# Patient Record
Sex: MALE | Race: WHITE | Employment: OTHER | URBAN - METROPOLITAN AREA
[De-identification: names, ages, dates, MRNs, and addresses within clinical notes are randomized per-mention and may not be internally consistent; named-entity substitution may affect disease eponyms.]

---

## 2017-10-09 ENCOUNTER — RECORDS - HEALTHEAST (OUTPATIENT)
Dept: LAB | Facility: CLINIC | Age: 77
End: 2017-10-09

## 2017-10-09 LAB
CHOLEST SERPL-MCNC: 140 MG/DL
FASTING STATUS PATIENT QL REPORTED: NORMAL
HDLC SERPL-MCNC: 55 MG/DL
LDLC SERPL CALC-MCNC: 72 MG/DL
TRIGL SERPL-MCNC: 63 MG/DL

## 2018-06-20 ENCOUNTER — RECORDS - HEALTHEAST (OUTPATIENT)
Dept: LAB | Facility: CLINIC | Age: 78
End: 2018-06-20

## 2018-06-21 LAB — BACTERIA SPEC CULT: NO GROWTH

## 2018-10-10 ENCOUNTER — RECORDS - HEALTHEAST (OUTPATIENT)
Dept: LAB | Facility: CLINIC | Age: 78
End: 2018-10-10

## 2018-10-11 LAB — BACTERIA SPEC CULT: NO GROWTH

## 2019-01-17 ENCOUNTER — RECORDS - HEALTHEAST (OUTPATIENT)
Dept: LAB | Facility: CLINIC | Age: 79
End: 2019-01-17

## 2019-01-17 LAB
ANION GAP SERPL CALCULATED.3IONS-SCNC: 13 MMOL/L (ref 5–18)
BUN SERPL-MCNC: 22 MG/DL (ref 8–28)
CALCIUM SERPL-MCNC: 9.9 MG/DL (ref 8.5–10.5)
CHLORIDE BLD-SCNC: 100 MMOL/L (ref 98–107)
CHOLEST SERPL-MCNC: 156 MG/DL
CO2 SERPL-SCNC: 25 MMOL/L (ref 22–31)
CREAT SERPL-MCNC: 0.85 MG/DL (ref 0.7–1.3)
FASTING STATUS PATIENT QL REPORTED: YES
GFR SERPL CREATININE-BSD FRML MDRD: >60 ML/MIN/1.73M2
GLUCOSE BLD-MCNC: 99 MG/DL (ref 70–125)
HDLC SERPL-MCNC: 61 MG/DL
LDLC SERPL CALC-MCNC: 80 MG/DL
POTASSIUM BLD-SCNC: 4.3 MMOL/L (ref 3.5–5)
SODIUM SERPL-SCNC: 138 MMOL/L (ref 136–145)
TRIGL SERPL-MCNC: 75 MG/DL

## 2019-05-16 ENCOUNTER — RECORDS - HEALTHEAST (OUTPATIENT)
Dept: LAB | Facility: CLINIC | Age: 79
End: 2019-05-16

## 2019-05-16 LAB
ALBUMIN SERPL-MCNC: 4.2 G/DL (ref 3.5–5)
ALP SERPL-CCNC: 81 U/L (ref 45–120)
ALT SERPL W P-5'-P-CCNC: <9 U/L (ref 0–45)
ANION GAP SERPL CALCULATED.3IONS-SCNC: 11 MMOL/L (ref 5–18)
AST SERPL W P-5'-P-CCNC: 24 U/L (ref 0–40)
BASOPHILS # BLD AUTO: 0.1 THOU/UL (ref 0–0.2)
BASOPHILS NFR BLD AUTO: 1 % (ref 0–2)
BILIRUB SERPL-MCNC: 0.9 MG/DL (ref 0–1)
BUN SERPL-MCNC: 23 MG/DL (ref 8–28)
CALCIUM SERPL-MCNC: 10.1 MG/DL (ref 8.5–10.5)
CHLORIDE BLD-SCNC: 104 MMOL/L (ref 98–107)
CHOLEST SERPL-MCNC: 161 MG/DL
CO2 SERPL-SCNC: 23 MMOL/L (ref 22–31)
CREAT SERPL-MCNC: 0.87 MG/DL (ref 0.7–1.3)
EOSINOPHIL # BLD AUTO: 0.2 THOU/UL (ref 0–0.4)
EOSINOPHIL NFR BLD AUTO: 3 % (ref 0–6)
ERYTHROCYTE [DISTWIDTH] IN BLOOD BY AUTOMATED COUNT: 12.8 % (ref 11–14.5)
FASTING STATUS PATIENT QL REPORTED: YES
GFR SERPL CREATININE-BSD FRML MDRD: >60 ML/MIN/1.73M2
GLUCOSE BLD-MCNC: 116 MG/DL (ref 70–125)
HCT VFR BLD AUTO: 47.7 % (ref 40–54)
HDLC SERPL-MCNC: 57 MG/DL
HGB BLD-MCNC: 15.7 G/DL (ref 14–18)
LDLC SERPL CALC-MCNC: 89 MG/DL
LYMPHOCYTES # BLD AUTO: 0.9 THOU/UL (ref 0.8–4.4)
LYMPHOCYTES NFR BLD AUTO: 16 % (ref 20–40)
MCH RBC QN AUTO: 31.5 PG (ref 27–34)
MCHC RBC AUTO-ENTMCNC: 32.9 G/DL (ref 32–36)
MCV RBC AUTO: 96 FL (ref 80–100)
MONOCYTES # BLD AUTO: 0.5 THOU/UL (ref 0–0.9)
MONOCYTES NFR BLD AUTO: 8 % (ref 2–10)
NEUTROPHILS # BLD AUTO: 3.9 THOU/UL (ref 2–7.7)
NEUTROPHILS NFR BLD AUTO: 71 % (ref 50–70)
PLATELET # BLD AUTO: 260 THOU/UL (ref 140–440)
PMV BLD AUTO: 10.1 FL (ref 8.5–12.5)
POTASSIUM BLD-SCNC: 3.9 MMOL/L (ref 3.5–5)
PROT SERPL-MCNC: 7.5 G/DL (ref 6–8)
RBC # BLD AUTO: 4.98 MILL/UL (ref 4.4–6.2)
SODIUM SERPL-SCNC: 138 MMOL/L (ref 136–145)
TRIGL SERPL-MCNC: 77 MG/DL
WBC: 5.6 THOU/UL (ref 4–11)

## 2019-07-23 ASSESSMENT — MIFFLIN-ST. JEOR: SCORE: 1629.32

## 2019-08-13 ENCOUNTER — TRANSFERRED RECORDS (OUTPATIENT)
Dept: HEALTH INFORMATION MANAGEMENT | Facility: CLINIC | Age: 79
End: 2019-08-13

## 2019-08-13 ENCOUNTER — RECORDS - HEALTHEAST (OUTPATIENT)
Dept: LAB | Facility: CLINIC | Age: 79
End: 2019-08-13

## 2019-08-13 LAB
ANION GAP SERPL CALCULATED.3IONS-SCNC: 10 MMOL/L (ref 5–18)
BUN SERPL-MCNC: 12 MG/DL (ref 8–28)
CALCIUM SERPL-MCNC: 9.8 MG/DL (ref 8.5–10.5)
CHLORIDE BLD-SCNC: 103 MMOL/L (ref 98–107)
CO2 SERPL-SCNC: 26 MMOL/L (ref 22–31)
CREAT SERPL-MCNC: 0.81 MG/DL (ref 0.7–1.3)
GFR SERPL CREATININE-BSD FRML MDRD: >60 ML/MIN/1.73M2
GLUCOSE BLD-MCNC: 110 MG/DL (ref 70–125)
POTASSIUM BLD-SCNC: 4.5 MMOL/L (ref 3.5–5)
SODIUM SERPL-SCNC: 139 MMOL/L (ref 136–145)
VIT B12 SERPL-MCNC: 608 PG/ML (ref 213–816)

## 2019-08-13 ASSESSMENT — MIFFLIN-ST. JEOR: SCORE: 1606.64

## 2019-08-15 ENCOUNTER — AMBULATORY - HEALTHEAST (OUTPATIENT)
Dept: OTHER | Facility: CLINIC | Age: 79
End: 2019-08-15

## 2019-08-15 ENCOUNTER — SURGERY - HEALTHEAST (OUTPATIENT)
Dept: SURGERY | Facility: CLINIC | Age: 79
End: 2019-08-15

## 2019-08-15 ENCOUNTER — ANESTHESIA - HEALTHEAST (OUTPATIENT)
Dept: SURGERY | Facility: CLINIC | Age: 79
End: 2019-08-15

## 2019-08-20 ENCOUNTER — RECORDS - HEALTHEAST (OUTPATIENT)
Dept: LAB | Facility: CLINIC | Age: 79
End: 2019-08-20

## 2019-08-22 LAB — BACTERIA SPEC CULT: NO GROWTH

## 2020-01-27 ENCOUNTER — RECORDS - HEALTHEAST (OUTPATIENT)
Dept: LAB | Facility: CLINIC | Age: 80
End: 2020-01-27

## 2020-01-27 LAB
ALBUMIN SERPL-MCNC: 3.9 G/DL (ref 3.5–5)
ALP SERPL-CCNC: 78 U/L (ref 45–120)
ALT SERPL W P-5'-P-CCNC: <9 U/L (ref 0–45)
ANION GAP SERPL CALCULATED.3IONS-SCNC: 8 MMOL/L (ref 5–18)
AST SERPL W P-5'-P-CCNC: 23 U/L (ref 0–40)
BILIRUB SERPL-MCNC: 0.7 MG/DL (ref 0–1)
BUN SERPL-MCNC: 17 MG/DL (ref 8–28)
CALCIUM SERPL-MCNC: 9.5 MG/DL (ref 8.5–10.5)
CHLORIDE BLD-SCNC: 104 MMOL/L (ref 98–107)
CO2 SERPL-SCNC: 25 MMOL/L (ref 22–31)
CREAT SERPL-MCNC: 0.86 MG/DL (ref 0.7–1.3)
ERYTHROCYTE [DISTWIDTH] IN BLOOD BY AUTOMATED COUNT: 13.2 % (ref 11–14.5)
GFR SERPL CREATININE-BSD FRML MDRD: >60 ML/MIN/1.73M2
GLUCOSE BLD-MCNC: 119 MG/DL (ref 70–125)
HCT VFR BLD AUTO: 45 % (ref 40–54)
HGB BLD-MCNC: 14.8 G/DL (ref 14–18)
MCH RBC QN AUTO: 32.2 PG (ref 27–34)
MCHC RBC AUTO-ENTMCNC: 32.9 G/DL (ref 32–36)
MCV RBC AUTO: 98 FL (ref 80–100)
PLATELET # BLD AUTO: 238 THOU/UL (ref 140–440)
PMV BLD AUTO: 10.3 FL (ref 8.5–12.5)
POTASSIUM BLD-SCNC: 4.1 MMOL/L (ref 3.5–5)
PROT SERPL-MCNC: 6.8 G/DL (ref 6–8)
RBC # BLD AUTO: 4.59 MILL/UL (ref 4.4–6.2)
SODIUM SERPL-SCNC: 137 MMOL/L (ref 136–145)
WBC: 6 THOU/UL (ref 4–11)

## 2020-03-24 ASSESSMENT — MIFFLIN-ST. JEOR: SCORE: 1640.21

## 2020-03-25 ENCOUNTER — SURGERY - HEALTHEAST (OUTPATIENT)
Dept: CARDIOLOGY | Facility: CLINIC | Age: 80
End: 2020-03-25

## 2020-03-26 ASSESSMENT — MIFFLIN-ST. JEOR: SCORE: 1615.72

## 2020-03-27 ENCOUNTER — AMBULATORY - HEALTHEAST (OUTPATIENT)
Dept: CARE COORDINATION | Facility: CLINIC | Age: 80
End: 2020-03-27

## 2020-03-27 ENCOUNTER — COMMUNICATION - HEALTHEAST (OUTPATIENT)
Dept: NURSING | Facility: CLINIC | Age: 80
End: 2020-03-27

## 2020-03-27 DIAGNOSIS — Z95.5 S/P DRUG ELUTING CORONARY STENT PLACEMENT: ICD-10-CM

## 2020-03-30 ENCOUNTER — RECORDS - HEALTHEAST (OUTPATIENT)
Dept: ADMINISTRATIVE | Facility: OTHER | Age: 80
End: 2020-03-30

## 2020-03-30 ENCOUNTER — AMBULATORY - HEALTHEAST (OUTPATIENT)
Dept: CARDIOLOGY | Facility: CLINIC | Age: 80
End: 2020-03-30

## 2020-03-31 ENCOUNTER — COMMUNICATION - HEALTHEAST (OUTPATIENT)
Dept: CARDIOLOGY | Facility: CLINIC | Age: 80
End: 2020-03-31

## 2020-04-01 ENCOUNTER — AMBULATORY - HEALTHEAST (OUTPATIENT)
Dept: CARDIOLOGY | Facility: CLINIC | Age: 80
End: 2020-04-01

## 2020-04-01 ENCOUNTER — OFFICE VISIT - HEALTHEAST (OUTPATIENT)
Dept: CARDIOLOGY | Facility: CLINIC | Age: 80
End: 2020-04-01

## 2020-04-01 DIAGNOSIS — I50.41 ACUTE COMBINED SYSTOLIC AND DIASTOLIC CONGESTIVE HEART FAILURE (H): ICD-10-CM

## 2020-04-01 DIAGNOSIS — I44.7 LBBB (LEFT BUNDLE BRANCH BLOCK): ICD-10-CM

## 2020-04-01 DIAGNOSIS — I25.10 CORONARY ARTERY DISEASE DUE TO LIPID RICH PLAQUE: ICD-10-CM

## 2020-04-01 DIAGNOSIS — Z95.0 CARDIAC PACEMAKER IN SITU: ICD-10-CM

## 2020-04-01 DIAGNOSIS — I44.2 COMPLETE HEART BLOCK (H): ICD-10-CM

## 2020-04-01 DIAGNOSIS — I27.20 PULMONARY HYPERTENSION (H): ICD-10-CM

## 2020-04-01 DIAGNOSIS — I35.1 NONRHEUMATIC AORTIC VALVE INSUFFICIENCY: ICD-10-CM

## 2020-04-01 DIAGNOSIS — I25.83 CORONARY ARTERY DISEASE DUE TO LIPID RICH PLAQUE: ICD-10-CM

## 2020-04-01 DIAGNOSIS — I10 BENIGN HYPERTENSION: ICD-10-CM

## 2020-04-01 LAB
HCC DEVICE COMMENTS: NORMAL
HCC DEVICE IMPLANTING PROVIDER: NORMAL
HCC DEVICE MANUFACTURE: NORMAL
HCC DEVICE MODEL: NORMAL
HCC DEVICE SERIAL NUMBER: NORMAL
HCC DEVICE TYPE: NORMAL

## 2020-04-01 ASSESSMENT — MIFFLIN-ST. JEOR: SCORE: 1611.18

## 2020-04-03 ENCOUNTER — COMMUNICATION - HEALTHEAST (OUTPATIENT)
Dept: CARE COORDINATION | Facility: CLINIC | Age: 80
End: 2020-04-03

## 2020-04-03 ASSESSMENT — ACTIVITIES OF DAILY LIVING (ADL): DEPENDENT_IADLS:: INDEPENDENT

## 2020-04-06 ENCOUNTER — RECORDS - HEALTHEAST (OUTPATIENT)
Dept: LAB | Facility: CLINIC | Age: 80
End: 2020-04-06

## 2020-04-06 LAB
ANION GAP SERPL CALCULATED.3IONS-SCNC: 12 MMOL/L (ref 5–18)
BUN SERPL-MCNC: 14 MG/DL (ref 8–28)
CALCIUM SERPL-MCNC: 9.8 MG/DL (ref 8.5–10.5)
CHLORIDE BLD-SCNC: 101 MMOL/L (ref 98–107)
CO2 SERPL-SCNC: 25 MMOL/L (ref 22–31)
CREAT SERPL-MCNC: 0.98 MG/DL (ref 0.7–1.3)
GFR SERPL CREATININE-BSD FRML MDRD: >60 ML/MIN/1.73M2
GLUCOSE BLD-MCNC: 151 MG/DL (ref 70–125)
POTASSIUM BLD-SCNC: 4.1 MMOL/L (ref 3.5–5)
SODIUM SERPL-SCNC: 138 MMOL/L (ref 136–145)

## 2020-04-07 LAB — BACTERIA SPEC CULT: NO GROWTH

## 2020-04-14 ENCOUNTER — COMMUNICATION - HEALTHEAST (OUTPATIENT)
Dept: NURSING | Facility: CLINIC | Age: 80
End: 2020-04-14

## 2020-04-21 ENCOUNTER — COMMUNICATION - HEALTHEAST (OUTPATIENT)
Dept: NURSING | Facility: CLINIC | Age: 80
End: 2020-04-21

## 2020-04-28 ENCOUNTER — AMBULATORY - HEALTHEAST (OUTPATIENT)
Dept: CARDIOLOGY | Facility: CLINIC | Age: 80
End: 2020-04-28

## 2020-04-28 ENCOUNTER — COMMUNICATION - HEALTHEAST (OUTPATIENT)
Dept: CARDIOLOGY | Facility: CLINIC | Age: 80
End: 2020-04-28

## 2020-04-28 DIAGNOSIS — Z95.0 CARDIAC PACEMAKER IN SITU: ICD-10-CM

## 2020-04-28 DIAGNOSIS — I44.2 ATRIOVENTRICULAR BLOCK, COMPLETE (H): ICD-10-CM

## 2020-05-04 ENCOUNTER — COMMUNICATION - HEALTHEAST (OUTPATIENT)
Dept: CARE COORDINATION | Facility: CLINIC | Age: 80
End: 2020-05-04

## 2020-05-18 ENCOUNTER — COMMUNICATION - HEALTHEAST (OUTPATIENT)
Dept: NURSING | Facility: CLINIC | Age: 80
End: 2020-05-18

## 2020-06-02 ENCOUNTER — COMMUNICATION - HEALTHEAST (OUTPATIENT)
Dept: NURSING | Facility: CLINIC | Age: 80
End: 2020-06-02

## 2020-06-03 ENCOUNTER — COMMUNICATION - HEALTHEAST (OUTPATIENT)
Dept: CARE COORDINATION | Facility: CLINIC | Age: 80
End: 2020-06-03

## 2020-06-16 ENCOUNTER — COMMUNICATION - HEALTHEAST (OUTPATIENT)
Dept: NURSING | Facility: CLINIC | Age: 80
End: 2020-06-16

## 2020-06-26 ENCOUNTER — COMMUNICATION - HEALTHEAST (OUTPATIENT)
Dept: CARE COORDINATION | Facility: CLINIC | Age: 80
End: 2020-06-26

## 2020-06-29 ENCOUNTER — COMMUNICATION - HEALTHEAST (OUTPATIENT)
Dept: CARDIOLOGY | Facility: CLINIC | Age: 80
End: 2020-06-29

## 2020-06-30 ENCOUNTER — COMMUNICATION - HEALTHEAST (OUTPATIENT)
Dept: CARDIOLOGY | Facility: CLINIC | Age: 80
End: 2020-06-30

## 2020-06-30 ENCOUNTER — AMBULATORY - HEALTHEAST (OUTPATIENT)
Dept: CARDIOLOGY | Facility: CLINIC | Age: 80
End: 2020-06-30

## 2020-06-30 ENCOUNTER — COMMUNICATION - HEALTHEAST (OUTPATIENT)
Dept: TELEHEALTH | Facility: CLINIC | Age: 80
End: 2020-06-30

## 2020-06-30 DIAGNOSIS — Z95.0 BIVENTRICULAR CARDIAC PACEMAKER IN SITU: ICD-10-CM

## 2020-06-30 DIAGNOSIS — R00.1 BRADYCARDIA: ICD-10-CM

## 2020-09-01 ENCOUNTER — HOSPITAL ENCOUNTER (OUTPATIENT)
Dept: CARDIOLOGY | Facility: CLINIC | Age: 80
Discharge: HOME | End: 2020-09-01
Attending: INTERNAL MEDICINE

## 2020-09-01 DIAGNOSIS — I35.1 NONRHEUMATIC AORTIC VALVE INSUFFICIENCY: ICD-10-CM

## 2020-09-01 DIAGNOSIS — I27.20 PULMONARY HYPERTENSION (H): ICD-10-CM

## 2020-09-01 LAB
AORTIC ROOT: 4 CM
AORTIC VALVE MEAN VELOCITY: 88.9 CM/S
AR DECEL SLOPE: 2700 MM/S2
AR PEAK VELOCITY: 385 CM/S
ASCENDING AORTA: 4.1 CM
AV DIMENSIONLESS INDEX VTI: 0.7
AV MEAN GRADIENT: 3 MMHG
AV PEAK GRADIENT: 5.7 MMHG
AV REGURGITANT PEAK GRADIENT: 59.3 MMHG
AV REGURGITATION PRESSURE HALF TIME: 420 MS
AV VALVE AREA: 3.2 CM2
AV VELOCITY RATIO: 0.7
BSA FOR ECHO PROCEDURE: 2.1 M2
CV BLOOD PRESSURE: ABNORMAL MMHG
CV ECHO HEIGHT: 71 IN
CV ECHO WEIGHT: 195 LBS
DOP CALC AO PEAK VEL: 119 CM/S
DOP CALC AO VTI: 23 CM
DOP CALC LVOT AREA: 4.52 CM2
DOP CALC LVOT DIAMETER: 2.4 CM
DOP CALC LVOT PEAK VEL: 88.4 CM/S
DOP CALC LVOT STROKE VOLUME: 73.7 CM3
DOP CALCLVOT PEAK VEL VTI: 16.3 CM
EJECTION FRACTION: 46 % (ref 55–75)
FRACTIONAL SHORTENING: 19 % (ref 28–44)
INTERVENTRICULAR SEPTUM IN END DIASTOLE: 2.16 CM (ref 0.6–1)
IVS/PW RATIO: 0.9
LA AREA 1: 24.9 CM2
LA AREA 2: 31.7 CM2
LEFT ATRIUM LENGTH: 6.17 CM
LEFT ATRIUM SIZE: 4.8 CM
LEFT ATRIUM TO AORTIC ROOT RATIO: 1.2 NO UNITS
LEFT ATRIUM VOLUME INDEX: 51.8 ML/M2
LEFT ATRIUM VOLUME: 108.7 ML
LEFT VENTRICLE CARDIAC INDEX: 2.2 L/MIN/M2
LEFT VENTRICLE CARDIAC OUTPUT: 4.7 L/MIN
LEFT VENTRICLE DIASTOLIC VOLUME INDEX: 69 CM3/M2 (ref 34–74)
LEFT VENTRICLE DIASTOLIC VOLUME: 145 CM3 (ref 62–150)
LEFT VENTRICLE HEART RATE: 64 BPM
LEFT VENTRICLE MASS INDEX: 289.8 G/M2
LEFT VENTRICLE SYSTOLIC VOLUME INDEX: 37.1 CM3/M2 (ref 11–31)
LEFT VENTRICLE SYSTOLIC VOLUME: 78 CM3 (ref 21–61)
LEFT VENTRICULAR INTERNAL DIMENSION IN DIASTOLE: 4.95 CM (ref 4.2–5.8)
LEFT VENTRICULAR INTERNAL DIMENSION IN SYSTOLE: 4.01 CM (ref 2.5–4)
LEFT VENTRICULAR MASS: 608.5 G
LEFT VENTRICULAR OUTFLOW TRACT MEAN GRADIENT: 2 MMHG
LEFT VENTRICULAR OUTFLOW TRACT MEAN VELOCITY: 60.5 CM/S
LEFT VENTRICULAR OUTFLOW TRACT PEAK GRADIENT: 3 MMHG
LEFT VENTRICULAR POSTERIOR WALL IN END DIASTOLE: 2.37 CM (ref 0.6–1)
LV STROKE VOLUME INDEX: 35.1 ML/M2
NUC REST DIASTOLIC VOLUME INDEX: 3120 LBS
NUC REST SYSTOLIC VOLUME INDEX: 71 IN
PR MAX PG: 10 MMHG
PR PEAK VELOCITY: 160 CM/S
TRICUSPID REGURGITATION PEAK PRESSURE GRADIENT: 43.3 MMHG
TRICUSPID VALVE ANULAR PLANE SYSTOLIC EXCURSION: 1.9 CM
TRICUSPID VALVE PEAK REGURGITANT VELOCITY: 329 CM/S

## 2020-09-01 ASSESSMENT — MIFFLIN-ST. JEOR: SCORE: 1606.64

## 2020-09-09 ENCOUNTER — COMMUNICATION - HEALTHEAST (OUTPATIENT)
Dept: CARDIOLOGY | Facility: CLINIC | Age: 80
End: 2020-09-09

## 2020-09-11 ENCOUNTER — COMMUNICATION - HEALTHEAST (OUTPATIENT)
Dept: CARDIOLOGY | Facility: CLINIC | Age: 80
End: 2020-09-11

## 2020-09-28 ENCOUNTER — AMBULATORY - HEALTHEAST (OUTPATIENT)
Dept: CARDIOLOGY | Facility: CLINIC | Age: 80
End: 2020-09-28

## 2020-09-28 DIAGNOSIS — I44.2 ATRIOVENTRICULAR BLOCK, COMPLETE (H): ICD-10-CM

## 2020-09-28 DIAGNOSIS — Z95.0 CARDIAC PACEMAKER IN SITU: ICD-10-CM

## 2020-09-28 DIAGNOSIS — R00.1 BRADYCARDIA: ICD-10-CM

## 2020-09-28 DIAGNOSIS — I44.2 COMPLETE HEART BLOCK (H): ICD-10-CM

## 2020-09-29 ENCOUNTER — AMBULATORY - HEALTHEAST (OUTPATIENT)
Dept: CARDIOLOGY | Facility: CLINIC | Age: 80
End: 2020-09-29

## 2020-09-29 DIAGNOSIS — I44.2 COMPLETE HEART BLOCK (H): ICD-10-CM

## 2020-09-29 DIAGNOSIS — Z95.0 CARDIAC PACEMAKER IN SITU: ICD-10-CM

## 2020-09-29 DIAGNOSIS — R00.1 BRADYCARDIA: ICD-10-CM

## 2020-09-29 DIAGNOSIS — I44.7 LBBB (LEFT BUNDLE BRANCH BLOCK): ICD-10-CM

## 2020-09-30 ENCOUNTER — COMMUNICATION - HEALTHEAST (OUTPATIENT)
Dept: CARDIOLOGY | Facility: CLINIC | Age: 80
End: 2020-09-30

## 2020-09-30 DIAGNOSIS — I51.7 LEFT VENTRICULAR HYPERTROPHY: ICD-10-CM

## 2020-09-30 DIAGNOSIS — I50.41 ACUTE COMBINED SYSTOLIC AND DIASTOLIC CONGESTIVE HEART FAILURE (H): ICD-10-CM

## 2020-09-30 DIAGNOSIS — I47.29 NSVT (NONSUSTAINED VENTRICULAR TACHYCARDIA) (H): ICD-10-CM

## 2020-10-07 ENCOUNTER — HOSPITAL ENCOUNTER (OUTPATIENT)
Dept: NUCLEAR MEDICINE | Facility: CLINIC | Age: 80
Discharge: HOME | End: 2020-10-07
Attending: INTERNAL MEDICINE

## 2020-10-07 ENCOUNTER — HOSPITAL ENCOUNTER (OUTPATIENT)
Dept: CARDIOLOGY | Facility: CLINIC | Age: 80
Discharge: HOME | End: 2020-10-07
Attending: INTERNAL MEDICINE

## 2020-10-07 DIAGNOSIS — I50.41 ACUTE COMBINED SYSTOLIC AND DIASTOLIC CONGESTIVE HEART FAILURE (H): ICD-10-CM

## 2020-10-07 DIAGNOSIS — I47.29 NSVT (NONSUSTAINED VENTRICULAR TACHYCARDIA) (H): ICD-10-CM

## 2020-10-07 LAB
CV STRESS CURRENT BP HE: NORMAL
CV STRESS CURRENT HR HE: 74
CV STRESS CURRENT HR HE: 76
CV STRESS CURRENT HR HE: 77
CV STRESS CURRENT HR HE: 77
CV STRESS CURRENT HR HE: 78
CV STRESS CURRENT HR HE: 78
CV STRESS CURRENT HR HE: 79
CV STRESS CURRENT HR HE: 81
CV STRESS CURRENT HR HE: 82
CV STRESS CURRENT HR HE: 83
CV STRESS CURRENT HR HE: 83
CV STRESS CURRENT HR HE: 84
CV STRESS CURRENT HR HE: 84
CV STRESS DEVIATION TIME HE: NORMAL
CV STRESS ECHO PERCENT HR HE: NORMAL
CV STRESS EXERCISE STAGE HE: NORMAL
CV STRESS FINAL RESTING BP HE: NORMAL
CV STRESS FINAL RESTING HR HE: 79
CV STRESS MAX HR HE: 86
CV STRESS MAX TREADMILL GRADE HE: 0
CV STRESS MAX TREADMILL SPEED HE: 0
CV STRESS PEAK DIA BP HE: NORMAL
CV STRESS PEAK SYS BP HE: NORMAL
CV STRESS PHASE HE: NORMAL
CV STRESS PROTOCOL HE: NORMAL
CV STRESS RESTING PT POSITION HE: NORMAL
CV STRESS ST DEVIATION AMOUNT HE: NORMAL
CV STRESS ST DEVIATION ELEVATION HE: NORMAL
CV STRESS ST EVELATION AMOUNT HE: NORMAL
CV STRESS TEST TYPE HE: NORMAL
CV STRESS TOTAL STAGE TIME MIN 1 HE: NORMAL
NUC STRESS EJECTION FRACTION: 32 %
RATE PRESSURE PRODUCT: NORMAL
STRESS ECHO BASELINE DIASTOLIC HE: 73
STRESS ECHO BASELINE HR: 71
STRESS ECHO BASELINE SYSTOLIC BP: 148
STRESS ECHO CALCULATED PERCENT HR: 62 %
STRESS ECHO LAST STRESS DIASTOLIC BP: 67
STRESS ECHO LAST STRESS HR: 79
STRESS ECHO LAST STRESS SYSTOLIC BP: 155
STRESS ECHO TARGET HR: 139
STRESS/REST PERFUSION RATIO: 1.31

## 2020-10-13 ENCOUNTER — AMBULATORY - HEALTHEAST (OUTPATIENT)
Dept: CARDIOLOGY | Facility: CLINIC | Age: 80
End: 2020-10-13

## 2020-10-13 DIAGNOSIS — I25.10 CORONARY ARTERY DISEASE DUE TO LIPID RICH PLAQUE: ICD-10-CM

## 2020-10-13 DIAGNOSIS — I25.83 CORONARY ARTERY DISEASE DUE TO LIPID RICH PLAQUE: ICD-10-CM

## 2020-10-16 ENCOUNTER — RECORDS - HEALTHEAST (OUTPATIENT)
Dept: LAB | Facility: CLINIC | Age: 80
End: 2020-10-16

## 2020-10-16 LAB
ANION GAP SERPL CALCULATED.3IONS-SCNC: 11 MMOL/L (ref 5–18)
BUN SERPL-MCNC: 17 MG/DL (ref 8–28)
CALCIUM SERPL-MCNC: 9.6 MG/DL (ref 8.5–10.5)
CHLORIDE BLD-SCNC: 103 MMOL/L (ref 98–107)
CO2 SERPL-SCNC: 26 MMOL/L (ref 22–31)
CREAT SERPL-MCNC: 0.84 MG/DL (ref 0.7–1.3)
GFR SERPL CREATININE-BSD FRML MDRD: >60 ML/MIN/1.73M2
GLUCOSE BLD-MCNC: 106 MG/DL (ref 70–125)
POTASSIUM BLD-SCNC: 4.2 MMOL/L (ref 3.5–5)
SODIUM SERPL-SCNC: 140 MMOL/L (ref 136–145)

## 2020-10-20 ENCOUNTER — COMMUNICATION - HEALTHEAST (OUTPATIENT)
Dept: CARDIOLOGY | Facility: CLINIC | Age: 80
End: 2020-10-20

## 2020-10-20 ENCOUNTER — AMBULATORY - HEALTHEAST (OUTPATIENT)
Dept: LAB | Facility: CLINIC | Age: 80
End: 2020-10-20

## 2020-10-20 ENCOUNTER — AMBULATORY - HEALTHEAST (OUTPATIENT)
Dept: CARDIOLOGY | Facility: CLINIC | Age: 80
End: 2020-10-20

## 2020-10-20 DIAGNOSIS — Z11.59 ENCOUNTER FOR SCREENING FOR OTHER VIRAL DISEASES: ICD-10-CM

## 2020-10-21 ENCOUNTER — SURGERY - HEALTHEAST (OUTPATIENT)
Dept: CARDIOLOGY | Facility: CLINIC | Age: 80
End: 2020-10-21

## 2020-10-21 DIAGNOSIS — R94.39 ABNORMAL NUCLEAR STRESS TEST: ICD-10-CM

## 2020-10-21 DIAGNOSIS — I25.10 CAD (CORONARY ARTERY DISEASE): ICD-10-CM

## 2020-10-22 ENCOUNTER — COMMUNICATION - HEALTHEAST (OUTPATIENT)
Dept: SCHEDULING | Facility: CLINIC | Age: 80
End: 2020-10-22

## 2020-10-23 ENCOUNTER — RECORDS - HEALTHEAST (OUTPATIENT)
Dept: ADMINISTRATIVE | Facility: OTHER | Age: 80
End: 2020-10-23

## 2020-10-23 ENCOUNTER — SURGERY - HEALTHEAST (OUTPATIENT)
Dept: CARDIOLOGY | Facility: CLINIC | Age: 80
End: 2020-10-23

## 2020-10-23 ASSESSMENT — MIFFLIN-ST. JEOR: SCORE: 1574.89

## 2020-12-30 ENCOUNTER — AMBULATORY - HEALTHEAST (OUTPATIENT)
Dept: CARDIOLOGY | Facility: CLINIC | Age: 80
End: 2020-12-30

## 2020-12-30 DIAGNOSIS — Z95.0 CARDIAC PACEMAKER IN SITU: ICD-10-CM

## 2020-12-30 DIAGNOSIS — I44.7 LBBB (LEFT BUNDLE BRANCH BLOCK): ICD-10-CM

## 2020-12-30 DIAGNOSIS — R00.1 BRADYCARDIA: ICD-10-CM

## 2021-01-27 ENCOUNTER — RECORDS - HEALTHEAST (OUTPATIENT)
Dept: LAB | Facility: CLINIC | Age: 81
End: 2021-01-27

## 2021-01-29 LAB — BACTERIA SPEC CULT: NO GROWTH

## 2021-02-16 ENCOUNTER — AMBULATORY - HEALTHEAST (OUTPATIENT)
Dept: NURSING | Facility: CLINIC | Age: 81
End: 2021-02-16

## 2021-02-16 ENCOUNTER — COMMUNICATION - HEALTHEAST (OUTPATIENT)
Dept: NURSING | Facility: CLINIC | Age: 81
End: 2021-02-16

## 2021-02-16 DIAGNOSIS — U07.1 2019 NOVEL CORONAVIRUS DISEASE (COVID-19): ICD-10-CM

## 2021-02-17 ENCOUNTER — COMMUNICATION - HEALTHEAST (OUTPATIENT)
Dept: NURSING | Facility: CLINIC | Age: 81
End: 2021-02-17

## 2021-03-30 ENCOUNTER — AMBULATORY - HEALTHEAST (OUTPATIENT)
Dept: CARDIOLOGY | Facility: CLINIC | Age: 81
End: 2021-03-30

## 2021-03-30 DIAGNOSIS — I44.7 LBBB (LEFT BUNDLE BRANCH BLOCK): ICD-10-CM

## 2021-03-30 DIAGNOSIS — Z95.0 CARDIAC PACEMAKER IN SITU: ICD-10-CM

## 2021-04-09 ENCOUNTER — COMMUNICATION - HEALTHEAST (OUTPATIENT)
Dept: CARDIOLOGY | Facility: CLINIC | Age: 81
End: 2021-04-09

## 2021-04-10 ENCOUNTER — AMBULATORY - HEALTHEAST (OUTPATIENT)
Dept: NURSING | Facility: CLINIC | Age: 81
End: 2021-04-10

## 2021-04-15 ENCOUNTER — COMMUNICATION - HEALTHEAST (OUTPATIENT)
Dept: CARDIOLOGY | Facility: CLINIC | Age: 81
End: 2021-04-15

## 2021-04-15 ENCOUNTER — AMBULATORY - HEALTHEAST (OUTPATIENT)
Dept: CARDIOLOGY | Facility: CLINIC | Age: 81
End: 2021-04-15

## 2021-04-15 DIAGNOSIS — I44.2 COMPLETE HEART BLOCK (H): ICD-10-CM

## 2021-04-15 DIAGNOSIS — I50.41 ACUTE COMBINED SYSTOLIC AND DIASTOLIC CONGESTIVE HEART FAILURE (H): ICD-10-CM

## 2021-04-15 DIAGNOSIS — I50.42 CHRONIC COMBINED SYSTOLIC AND DIASTOLIC HEART FAILURE (H): ICD-10-CM

## 2021-04-15 DIAGNOSIS — Z95.0 CARDIAC PACEMAKER IN SITU: ICD-10-CM

## 2021-04-29 ENCOUNTER — AMBULATORY - HEALTHEAST (OUTPATIENT)
Dept: LAB | Facility: CLINIC | Age: 81
End: 2021-04-29

## 2021-04-29 DIAGNOSIS — I50.42 CHRONIC COMBINED SYSTOLIC AND DIASTOLIC HEART FAILURE (H): ICD-10-CM

## 2021-04-29 LAB
ANION GAP SERPL CALCULATED.3IONS-SCNC: 8 MMOL/L (ref 5–18)
BUN SERPL-MCNC: 19 MG/DL (ref 8–28)
CALCIUM SERPL-MCNC: 9.4 MG/DL (ref 8.5–10.5)
CHLORIDE BLD-SCNC: 104 MMOL/L (ref 98–107)
CO2 SERPL-SCNC: 28 MMOL/L (ref 22–31)
CREAT SERPL-MCNC: 0.88 MG/DL (ref 0.7–1.3)
GFR SERPL CREATININE-BSD FRML MDRD: >60 ML/MIN/1.73M2
GLUCOSE BLD-MCNC: 112 MG/DL (ref 70–125)
MAGNESIUM SERPL-MCNC: 1.8 MG/DL (ref 1.8–2.6)
POTASSIUM BLD-SCNC: 4.2 MMOL/L (ref 3.5–5)
SODIUM SERPL-SCNC: 140 MMOL/L (ref 136–145)

## 2021-04-30 ENCOUNTER — COMMUNICATION - HEALTHEAST (OUTPATIENT)
Dept: CARDIOLOGY | Facility: CLINIC | Age: 81
End: 2021-04-30

## 2021-05-07 ENCOUNTER — AMBULATORY - HEALTHEAST (OUTPATIENT)
Dept: CARDIOLOGY | Facility: CLINIC | Age: 81
End: 2021-05-07

## 2021-05-07 ENCOUNTER — OFFICE VISIT - HEALTHEAST (OUTPATIENT)
Dept: CARDIOLOGY | Facility: CLINIC | Age: 81
End: 2021-05-07

## 2021-05-07 DIAGNOSIS — E78.2 COMBINED HYPERLIPIDEMIA: ICD-10-CM

## 2021-05-07 DIAGNOSIS — I42.0 DILATED CARDIOMYOPATHY (H): ICD-10-CM

## 2021-05-07 DIAGNOSIS — I25.10 CORONARY ARTERY DISEASE DUE TO LIPID RICH PLAQUE: ICD-10-CM

## 2021-05-07 DIAGNOSIS — U07.1 COVID-19: ICD-10-CM

## 2021-05-07 DIAGNOSIS — R00.1 BRADYCARDIA: ICD-10-CM

## 2021-05-07 DIAGNOSIS — I25.83 CORONARY ARTERY DISEASE DUE TO LIPID RICH PLAQUE: ICD-10-CM

## 2021-05-07 DIAGNOSIS — Z95.0 CARDIAC PACEMAKER IN SITU: ICD-10-CM

## 2021-05-07 DIAGNOSIS — N40.0 BPH (BENIGN PROSTATIC HYPERPLASIA): ICD-10-CM

## 2021-05-07 DIAGNOSIS — I50.42 CHRONIC COMBINED SYSTOLIC AND DIASTOLIC HEART FAILURE (H): ICD-10-CM

## 2021-05-07 DIAGNOSIS — I35.1 NONRHEUMATIC AORTIC VALVE INSUFFICIENCY: ICD-10-CM

## 2021-05-07 DIAGNOSIS — I48.91 ATRIAL FIBRILLATION, TRANSIENT (H): ICD-10-CM

## 2021-05-07 DIAGNOSIS — I10 PRIMARY HYPERTENSION: ICD-10-CM

## 2021-05-07 RX ORDER — GABAPENTIN 100 MG/1
200 CAPSULE ORAL 2 TIMES DAILY
Status: ON HOLD | COMMUNITY
Start: 2021-04-23 | End: 2023-01-01

## 2021-05-07 ASSESSMENT — MIFFLIN-ST. JEOR: SCORE: 1565.82

## 2021-05-12 ENCOUNTER — COMMUNICATION - HEALTHEAST (OUTPATIENT)
Dept: CARDIOLOGY | Facility: CLINIC | Age: 81
End: 2021-05-12

## 2021-05-12 DIAGNOSIS — U07.1 COVID-19: ICD-10-CM

## 2021-05-12 DIAGNOSIS — I48.91 ATRIAL FIBRILLATION, TRANSIENT (H): ICD-10-CM

## 2021-05-19 ENCOUNTER — HOSPITAL ENCOUNTER (OUTPATIENT)
Dept: CARDIOLOGY | Facility: CLINIC | Age: 81
Discharge: HOME | End: 2021-05-19
Attending: INTERNAL MEDICINE

## 2021-05-19 DIAGNOSIS — I25.10 CORONARY ARTERY DISEASE DUE TO LIPID RICH PLAQUE: ICD-10-CM

## 2021-05-19 DIAGNOSIS — I50.42 CHRONIC COMBINED SYSTOLIC AND DIASTOLIC HEART FAILURE (H): ICD-10-CM

## 2021-05-19 DIAGNOSIS — I25.83 CORONARY ARTERY DISEASE DUE TO LIPID RICH PLAQUE: ICD-10-CM

## 2021-05-19 LAB
AORTIC ROOT: 4.1 CM
AORTIC VALVE MEAN VELOCITY: 89.1 CM/S
AR DECEL SLOPE: 2590 MM/S2
AR PEAK VELOCITY: 405 CM/S
ASCENDING AORTA: 4.1 CM
AV DIMENSIONLESS INDEX VTI: 0.7
AV MEAN GRADIENT: 4 MMHG
AV PEAK GRADIENT: 6 MMHG
AV REGURGITANT PEAK GRADIENT: 65.6 MMHG
AV REGURGITATION PRESSURE HALF TIME: 433 MS
AV VALVE AREA: 3.1 CM2
AV VELOCITY RATIO: 0.7
BSA FOR ECHO PROCEDURE: 2.05 M2
CV BLOOD PRESSURE: ABNORMAL MMHG
CV ECHO HEIGHT: 71 IN
CV ECHO WEIGHT: 186 LBS
DOP CALC AO PEAK VEL: 122 CM/S
DOP CALC AO VTI: 24.4 CM
DOP CALC LVOT AREA: 4.15 CM2
DOP CALC LVOT DIAMETER: 2.3 CM
DOP CALC LVOT PEAK VEL: 86.9 CM/S
DOP CALC LVOT STROKE VOLUME: 75.6 CM3
DOP CALCLVOT PEAK VEL VTI: 18.2 CM
EJECTION FRACTION: 43 % (ref 55–75)
FRACTIONAL SHORTENING: 19.2 % (ref 28–44)
INTERVENTRICULAR SEPTUM IN END DIASTOLE: 1.9 CM (ref 0.6–1)
IVS/PW RATIO: 1
LA AREA 1: 33 CM2
LA AREA 2: 32 CM2
LEFT ATRIUM LENGTH: 5.84 CM
LEFT ATRIUM SIZE: 4.8 CM
LEFT ATRIUM VOLUME INDEX: 75 ML/M2
LEFT ATRIUM VOLUME: 153.7 ML
LEFT VENTRICLE CARDIAC INDEX: 2.2 L/MIN/M2
LEFT VENTRICLE CARDIAC OUTPUT: 4.5 L/MIN
LEFT VENTRICLE DIASTOLIC VOLUME INDEX: 75.6 CM3/M2 (ref 34–74)
LEFT VENTRICLE DIASTOLIC VOLUME: 155 CM3 (ref 62–150)
LEFT VENTRICLE HEART RATE: 60 BPM
LEFT VENTRICLE MASS INDEX: 249.1 G/M2
LEFT VENTRICLE SYSTOLIC VOLUME INDEX: 43.4 CM3/M2 (ref 11–31)
LEFT VENTRICLE SYSTOLIC VOLUME: 89 CM3 (ref 21–61)
LEFT VENTRICULAR INTERNAL DIMENSION IN DIASTOLE: 5.2 CM (ref 4.2–5.8)
LEFT VENTRICULAR INTERNAL DIMENSION IN SYSTOLE: 4.2 CM (ref 2.5–4)
LEFT VENTRICULAR MASS: 510.6 G
LEFT VENTRICULAR OUTFLOW TRACT MEAN GRADIENT: 2 MMHG
LEFT VENTRICULAR OUTFLOW TRACT MEAN VELOCITY: 63.1 CM/S
LEFT VENTRICULAR OUTFLOW TRACT PEAK GRADIENT: 3 MMHG
LEFT VENTRICULAR POSTERIOR WALL IN END DIASTOLE: 2 CM (ref 0.6–1)
LV STROKE VOLUME INDEX: 36.9 ML/M2
Lab: 5 CM
MITRAL REGURGITANT VELOCITY TIME INTEGRAL: 130 CM
MR FLOW: 39 CM3
MR MEAN GRADIENT: 45 MMHG
MR MEAN VELOCITY: 293 CM/S
MR PEAK GRADIENT: 63.7 MMHG
MR PISA EROA: 0.1 CM2
MR PISA RADIUS: 0.5 CM
MR PISA VN NYQUIST: 37.3 CM/S
MV AVERAGE E/E' RATIO: 17.1 CM/S
MV DECELERATION TIME: 196 MS
MV E'TISSUE VEL-LAT: 5.65 CM/S
MV E'TISSUE VEL-MED: 4.39 CM/S
MV LATERAL E/E' RATIO: 15.2
MV MEDIAL E/E' RATIO: 19.5
MV PEAK E VELOCITY: 85.8 CM/S
MV REGURGITANT VOLUME: 19.1 CC
NUC REST DIASTOLIC VOLUME INDEX: 2976 LBS
NUC REST SYSTOLIC VOLUME INDEX: 71 IN
PISA MR PEAK VEL: 399 CM/S
PR MAX PG: 11 MMHG
PR PEAK VELOCITY: 163 CM/S
PV ACCELERATION TIME: 130 MS
RAA: 30 CM2
TRICUSPID REGURGITATION PEAK PRESSURE GRADIENT: 42.8 MMHG
TRICUSPID VALVE ANULAR PLANE SYSTOLIC EXCURSION: 2.4 CM
TRICUSPID VALVE PEAK REGURGITANT VELOCITY: 327 CM/S

## 2021-05-19 ASSESSMENT — MIFFLIN-ST. JEOR: SCORE: 1565.82

## 2021-05-25 ENCOUNTER — COMMUNICATION - HEALTHEAST (OUTPATIENT)
Dept: CARDIOLOGY | Facility: CLINIC | Age: 81
End: 2021-05-25

## 2021-05-26 ENCOUNTER — COMMUNICATION - HEALTHEAST (OUTPATIENT)
Dept: CARDIOLOGY | Facility: CLINIC | Age: 81
End: 2021-05-26

## 2021-05-27 VITALS — BODY MASS INDEX: 26.04 KG/M2 | HEIGHT: 71 IN | WEIGHT: 186 LBS

## 2021-05-27 VITALS
HEART RATE: 64 BPM | SYSTOLIC BLOOD PRESSURE: 120 MMHG | WEIGHT: 186 LBS | BODY MASS INDEX: 26.04 KG/M2 | HEIGHT: 71 IN | RESPIRATION RATE: 16 BRPM | DIASTOLIC BLOOD PRESSURE: 70 MMHG

## 2021-05-30 ENCOUNTER — RECORDS - HEALTHEAST (OUTPATIENT)
Dept: ADMINISTRATIVE | Facility: CLINIC | Age: 81
End: 2021-05-30

## 2021-05-31 NOTE — ANESTHESIA CARE TRANSFER NOTE
Last vitals:   Vitals:    08/15/19 1120   BP: 118/59   Pulse: 81   Resp: 18   Temp: 37.2  C (99  F)   SpO2: 100%     Patient's level of consciousness is drowsy  Spontaneous respirations: yes  Maintains airway independently: yes  Dentition unchanged: yes  Oropharynx: oropharynx clear of all foreign objects    QCDR Measures:  ASA# 20 - Surgical Safety Checklist: WHO surgical safety checklist completed prior to induction    PQRS# 430 - Adult PONV Prevention: 4558F - Pt received => 2 anti-emetic agents (different classes) preop & intraop  ASA# 8 - Peds PONV Prevention: NA - Not pediatric patient, not GA or 2 or more risk factors NOT present  PQRS# 424 - Mandy-op Temp Management: 4559F - At least one body temp DOCUMENTED => 35.5C or 95.9F within required timeframe  PQRS# 426 - PACU Transfer Protocol: - Transfer of care checklist used  ASA# 14 - Acute Post-op Pain: ASA14B - Patient did NOT experience pain >= 7 out of 10

## 2021-05-31 NOTE — ANESTHESIA PREPROCEDURE EVALUATION
Anesthesia Evaluation      Patient summary reviewed   No history of anesthetic complications     Airway   Mallampati: II  Neck ROM: full   Pulmonary - negative ROS and normal exam                          Cardiovascular - negative ROS and normal exam  (+) hypertension, CAD, CABG/stent, ,      Neuro/Psych - negative ROS     Endo/Other - negative ROS      GI/Hepatic/Renal - negative ROS   (+) GERD,             Dental - normal exam   (+) upper dentures and lower dentures                       Anesthesia Plan  Planned anesthetic: spinal and peripheral nerve block    ASA 2     Anesthetic plan and risks discussed with: patient    Post-op plan: routine recovery

## 2021-05-31 NOTE — ANESTHESIA PROCEDURE NOTES
Peripheral Block    Patient location during procedure: pre-op  Start time: 8/15/2019 9:05 AM  End time: 8/15/2019 9:10 AM  post-op analgesia per surgeon order as noted in medical record  Staffing:  Performing  Anesthesiologist: Aditya Lanier MD  Preanesthetic Checklist  Completed: patient identified, site marked, risks, benefits, and alternatives discussed, timeout performed, consent obtained, airway assessed, oxygen available, suction available, emergency drugs available and hand hygiene performed  Peripheral Block  Block type: saphenous, adductor canal block  Prep: ChloraPrep  Patient position: supine  Patient monitoring: cardiac monitor, continuous pulse oximetry, heart rate and blood pressure  Laterality: right  Injection technique: ultrasound guided    Ultrasound used to visualize needle placement in proximity to nerve being blocked: yes   Permanent ultrasound image captured for medical record      Needle  Needle type: Stimuplex   Needle gauge: 20G  Needle length: 6 in    Assessment  Injection assessment: no difficulty with injection, negative aspiration for heme, no paresthesia on injection and incremental injection

## 2021-05-31 NOTE — ANESTHESIA POSTPROCEDURE EVALUATION
Patient: Reymundo Petersen  RIGHT  MINIMALLY TOTAL KNEE ARTHROPLASTY  Anesthesia type: spinal    Patient location: PACU  Last vitals:   Vitals Value Taken Time   /71 8/15/2019  1:20 PM   Temp 36.5  C (97.7  F) 8/15/2019  1:20 PM   Pulse 67 8/15/2019  1:20 PM   Resp 16 8/15/2019  1:20 PM   SpO2 99 % 8/15/2019  1:20 PM     Post vital signs: stable  Level of consciousness: awake and responds to simple questions  Post-anesthesia pain: pain controlled  Post-anesthesia nausea and vomiting: no  Pulmonary: unassisted, return to baseline  Cardiovascular: stable and blood pressure at baseline  Hydration: adequate  Anesthetic events: no    QCDR Measures:  ASA# 11 - Madny-op Cardiac Arrest: ASA11B - Patient did NOT experience unanticipated cardiac arrest  ASA# 12 - Mandy-op Mortality Rate: ASA12B - Patient did NOT die  ASA# 13 - PACU Re-Intubation Rate: NA - No ETT / LMA used for case  ASA# 10 - Composite Anes Safety: ASA10A - No serious adverse event    Additional Notes:

## 2021-05-31 NOTE — ANESTHESIA PROCEDURE NOTES
Spinal Block    Patient location during procedure: OR  Start time: 8/15/2019 9:30 AM  End time: 8/15/2019 9:40 AM  Reason for block: primary anesthetic    Staffing:  Performing  Anesthesiologist: Aditya Lanier MD    Preanesthetic Checklist  Completed: patient identified, risks, benefits, and alternatives discussed, timeout performed, consent obtained, airway assessed, oxygen available, suction available, emergency drugs available and hand hygiene performed  Spinal Block  Patient position: sitting  Prep: ChloraPrep  Patient monitoring: heart rate, cardiac monitor, continuous pulse ox and blood pressure  Approach: left paramedian  Location: L3-4  Injection technique: single-shot  Needle type: pencil-tip   Needle gauge: 24 G

## 2021-06-02 ENCOUNTER — RECORDS - HEALTHEAST (OUTPATIENT)
Dept: ADMINISTRATIVE | Facility: CLINIC | Age: 81
End: 2021-06-02

## 2021-06-03 VITALS — BODY MASS INDEX: 27.3 KG/M2 | HEIGHT: 71 IN | WEIGHT: 195 LBS

## 2021-06-04 VITALS
TEMPERATURE: 97.9 F | DIASTOLIC BLOOD PRESSURE: 50 MMHG | SYSTOLIC BLOOD PRESSURE: 116 MMHG | RESPIRATION RATE: 16 BRPM | BODY MASS INDEX: 27.44 KG/M2 | WEIGHT: 196 LBS | HEART RATE: 83 BPM | HEIGHT: 71 IN

## 2021-06-04 VITALS
SYSTOLIC BLOOD PRESSURE: 128 MMHG | DIASTOLIC BLOOD PRESSURE: 60 MMHG | OXYGEN SATURATION: 95 % | BODY MASS INDEX: 27.2 KG/M2 | RESPIRATION RATE: 14 BRPM | HEART RATE: 80 BPM | WEIGHT: 195 LBS

## 2021-06-04 VITALS — BODY MASS INDEX: 27.58 KG/M2 | WEIGHT: 197 LBS | HEIGHT: 71 IN

## 2021-06-04 VITALS — WEIGHT: 195 LBS | BODY MASS INDEX: 27.3 KG/M2 | HEIGHT: 71 IN

## 2021-06-05 VITALS — BODY MASS INDEX: 26.32 KG/M2 | HEIGHT: 71 IN | WEIGHT: 188 LBS

## 2021-06-07 NOTE — TELEPHONE ENCOUNTER
Wellness Screening Tool  Symptom Screening:  Do you have one of the following new symptoms:    Fever or reported chills?  No    A new cough (started within the past 14 days)?  No    Shortness of breath (started within the past 14 days) ?  No     Nausea, vomiting, or diarrhea?  No    Within the past 3 weeks, have you been exposed to someone with a known positive illness below:    COVID-19 (known or suspected)?  No    Chicken pox?  No    Mealses?  No    Pertussis?  No    Patient notified of visitor restrictions: Yes  Patient's appointment status: Patient will be seen in clinic as scheduled on 4/1

## 2021-06-07 NOTE — PROGRESS NOTES
"Clinic Care Coordination Contact    Follow Up Progress Note - BPCI-A    Background/Clinical Data:  Patient was hospitalized at [facility] from 3/24/20 to 3/26/20 for complete heart block, and underwent EP pacemaker insertion.  Pt has additional medical history of CAD, CHF, pulmonary HTN, non-rheumatic aortic valve insuffiencey, HLD, HTN.      Assessment:  Pt states he's feeling good, pacemaker site is healed now.  Denies CP or shortness of breath. States his son bought him an oximeter this past weekend, O2 sats when checked was at 97% and his pulse was 70.  His BP has been \"pretty good,\" SBP ranging 125-145, unsure diastolic readings.  States he wears a mask when he goes out.  Still has hip pain, hoping to get a cortisone injection soon.  Has had follow up with PCP and cardiology since d/c, next follow up in a few months for his echo.        Goals addressed this encounter:   Goals Addressed                 This Visit's Progress       Patient Stated      Reducing Risks (pt-stated)   On track     Goal Statement:  I want to stay out of the hospital for the next 90 days.  Date Goal set:  4/3/2020  Barriers:  Unknown  Strengths:  Able to identify needs and advocate for those needs, good support system from family  Date to Achieve By:  6/26/2020  Patient expressed understanding of goal:  Yes  Action steps to achieve this goal:  1. I will call my clinic if I start to feel sick or notice any change(s) in my health, and schedule an appt if needed  2. I will call the triage nurse line for advice, before going to the hospital( continued)  3. I will go to  or Walk-In-Clinic before going to the hospital, if I am unable to get an appt with my PCP  4. I will update the Community Healthcare Worker during outreach calls and ask for additional support or resources, if needed  (continued)    Updated: 4/21/2020             Intervention/Education provided during outreach:  N/A         Plan:   Next RN Care Coordinator outreach, " 6/3/2020

## 2021-06-07 NOTE — PATIENT INSTRUCTIONS - HE
Mr Reymundo AMBRIZ Nicola,  I enjoyed visiting with you again today.  I am glad to hear you are doing well.  Per our conversation we will recheck the ultrasound of the heart around September.  I will plan on seeing you March or sooner if needed.  Jorge A Leigh

## 2021-06-07 NOTE — TELEPHONE ENCOUNTER
===View-only below this line===  ----- Message -----  From: Yuliet Leigh MD  Sent: 4/29/2020  11:00 AM CDT  To: Shannon Castro, RN, Mary Velasquez RN    Given VT will need to recheck echo in September as planned, need to look at AI.  LF        Per 4/1/2020 visit with LBF:  Plan  1.  Continue current meds.  2.  Recheck echo in September which will be 6 months out from prior echo to evaluate pulmonic pressures, IVC dilation, and mitral and aortic insufficiencies.  3.  Follow-up me in 1 year or sooner if needed.      Order already placed by LBF for echo complete. Msg sent so  to make sure to create recall for patient to have this done in September. Tried calling patient to update him on LBF review of device check and to plan on having echocardiogram in the Fall as previously discussed at 4/1/2020 visit; unable to leave voicemail- he does not have a voicemail box set up. -[facility]

## 2021-06-07 NOTE — PROGRESS NOTES
" Tell me how you are doing now that you are home?\" I am doing good now that he can sleep       Discharge Instructions     Do you understand your discharge instructions?  Pt. Response: yes I do        \"Has an appointment with your primary care provider been scheduled?\" yes  Talk to some one from my clinic yesterday     Medications    \"Did you have any medication changes?   NO    Call Summary    \"What questions or concerns do you have about your recent visit and your follow-up care?\"  I have  None              Can be addressed if scheduled with RN or SW either Care Coordination or if declining to triage for further questions.        Thank you for taking the time to answer my questions now at this time I would like to schedule an appointment with the RN or SW to call you in the next week.       Appointment for Care Coordination assessment has been made with CCC RN 4/3/2020    Patient stated that he doesn't need any help at this time and had a lot of family support but the RN can call but he doesn't think he needs this service    "

## 2021-06-07 NOTE — PROGRESS NOTES
DEVICE CLINIC RN POST-OP NOTE    Reason for visit: 1 week PO CRTP check and wound assessment with Dr. 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 Taylor Sandoval      Assessment  Subjective: Patient reports feeling ok without significant incisional discomfort  Vitals: There were no vitals filed for this visit.    Incision/device pocket: Steri-strips removed. Area around incision was cleaned with adhesive remover. Incision is clean, dry, and intact with edges well approximated. There is no redness or drainage noted. Incision was wiped with alcohol wipe x1. Minimal swelling present.        Device Findings  Interrogation of device reveals normal sensing and capture thresholds  See the Paceart Report for a full summary of the device information.      Patient Education    [facility]'s Partnership Agreement for Device Patients and Post-operative Checklist were presented and reviewed with patient at today's appointment.    Signs and symptoms of infection, poor wound healing, and device function were reviewed. Range of motion and weight restrictions for the right side are 4 weeks. He was issued a Carelink remote monitor and instructed on its set-up and use for remote monitoring by the [employer] representative prior to hospital discharge. These instructions were reviewed with the patient at today s visit.       Plan  - 1 month remote scheduled 4/28/2020  - 3 month PO scheduled 6/30/2020

## 2021-06-07 NOTE — PROGRESS NOTES
Remote device check.  Please see link for full device report.  Patient was informed of results and next follow up via mail.

## 2021-06-07 NOTE — PROGRESS NOTES
Clinic Care Coordination Contact    Follow Up Progress Note          Goals addressed this encounter:   Goals Addressed    None          Patient stated that he is doing really good and he is trying to stay healthy. No questions or concerns repoeted today.          Plan:   CHW will follow up per BPCI-A standard work  Care Coordinator will follow up in 1 week

## 2021-06-07 NOTE — TELEPHONE ENCOUNTER
"Type: 1 month PO check  Presenting Rhythm: AP/BiVP, rates: 60bpm  Lead/Battery status: stable   Arrhythmias: no mode switch episodes. 1 ventricular high rate detected. EGM shows NSVT episode lasting 10 beats, rates: 130-180bpm. EF: 47%.   Comments: normal device function. jtr     Transmission reviewed. Brief NSVT noted on 4/20/20 at ~7 pm as mentioned above. Reviewed with patient. He denies any troublesome lightheadedness or near-syncope. Advised to call with any episodes so we can recheck his device. He agrees to do so, otherwise states he has \"been feeling pretty good.\" Denies any other concerns.     Will updated Dr. Leigh.   Shannon Castro RN    "

## 2021-06-07 NOTE — PATIENT INSTRUCTIONS - HE
Pacemaker Post-operative Checklist      The Device Registered Nurse (RN) reviewed the pacemaker function.      The Device RN did a wound assessment and wound care teaching.    Please call the Device Nurses with any signs of infection or questions regarding wound healing. Device Nurse Line: [phone], Option #3      The Device RN demonstrated and displayed the specific remote monitoring system for your pacemaker.      The Device RN reviewed the Partnership Agreement Form.    Patient Instructions    Do not lift your right arm above the shoulder height, perform any vigorous arm movements such as swimming, golfing, washing windows, shoveling show, vacuuming or lifting greater than 10-15lbs with the affected arm for 4 weeks from the date of implant.      To reduce the risk of infection, try to avoid any dental procedures for the first 6 weeks after your pacemaker implant. If you have an emergent or urgent dental need during this time, contact the device clinic for a prescription for an antibiotic.      You will receive a device identification (ID) card in the mail from the device  within 6 weeks to replace the temporary ID card you were given in the hospital.      You may travel by any mode of transportation; just show your pacemaker ID card. You may be subject to a hand search or use of a handheld wand, but official should not keep the wand over the implant site for greater than 5-10 seconds.      For any surgery, let your doctor know you have a pacemaker.       Your pacemaker is MRI safe       Most household appliances, including a microwave, will not interfere with your pacemaker function. If you suspect interference, simply move away from the source. Cell phones do not cause a problem. Please refer to the device booklet from the  or their website under the section on electromagnetic interference (KAILYN) for further guidelines on things that may interfere with your pacemaker.       Device  Clinic Contact Information  Device Nurses: [phone], Option #3   Device Remote Specialists: [phone], Option #2. For questions about your Remote Transmission or Transmission Schedule  Device Schedulers: [phone], Option #1

## 2021-06-07 NOTE — PROGRESS NOTES
Clinic Care Coordination Contact    Community Health Worker Follow Up    Goals:   Goals Addressed                 This Visit's Progress       Patient Stated      Reducing Risks (pt-stated)        Goal Statement:  I want to stay out of the hospital for the next 90 days.  Date Goal set:  4/3/2020  Barriers:  Unknown  Strengths:  Able to identify needs and advocate for those needs, good support system from family  Date to Achieve By:  6/26/2020  Patient expressed understanding of goal:  Yes  Action steps to achieve this goal:  1. I will call my clinic if I start to feel sick or notice any change(s) in my health, and schedule an appt if needed  2. I will call the triage nurse line for advice, before going to the hospital( continued)  3. I will go to  or Walk-In-Clinic before going to the hospital, if I am unable to get an appt with my PCP  4. I will update the Community Healthcare Worker during outreach calls and ask for additional support or resources, if needed  (continued)    Updated: 4/21/2020            CHW Next Follow-up: 2 weeks     CHW Plan: outreach to patient per BPCI-A standard

## 2021-06-08 NOTE — PROGRESS NOTES
Clinic Care Coordination Contact  [facility]       Clinical Data: Care Coordinator Outreach  Outreach attempted x 1.Plan:Care Coordinator will try to reach patient again in 10 business days.

## 2021-06-08 NOTE — PROGRESS NOTES
Clinic Care Coordination Contact    Community Health Worker Follow Up    Goals:   Goals Addressed                 This Visit's Progress       Patient Stated      Reducing Risks (pt-stated)        Goal Statement:  I want to stay out of the hospital for the next 90 days.  Date Goal set:  4/3/2020  Barriers:  Unknown  Strengths:  Able to identify needs and advocate for those needs, good support system from family  Date to Achieve By:  6/26/2020  Patient expressed understanding of goal:  Yes  Action steps to achieve this goal:  1. I will call my clinic if I start to feel sick or notice any change(s) in my health, and schedule an appt if needed  2. I will call the triage nurse line for advice, before going to the hospital( continued)  3. I will go to  or Walk-In-Clinic before going to the hospital, if I am unable to get an appt with my PCP  4. I will continue to update the Community Healthcare Worker during outreach calls and ask for additional support or resources, if needed      Updated: 6/2/2020            CHW Next Follow-up: 2 weeks      CHW called and spoke with the patient, he stated that he is doing really good, has an appointment coming up to get his pacemaker checked on 6/25/2020 at . Patient has no new needs or concerns.

## 2021-06-08 NOTE — PROGRESS NOTES
Clinic Care Coordination Contact    Community Health Worker Follow Up    Goals:   Goals Addressed                 This Visit's Progress       Patient Stated      Reducing Risks (pt-stated)        Goal Statement:  I want to stay out of the hospital for the next 90 days.  Date Goal set:  4/3/2020  Barriers:  Unknown  Strengths:  Able to identify needs and advocate for those needs, good support system from family  Date to Achieve By:  6/26/2020  Patient expressed understanding of goal:  Yes  Action steps to achieve this goal:  1. I will call my clinic if I start to feel sick or notice any change(s) in my health, and schedule an appt if needed  2. I will call the triage nurse line for advice, before going to the hospital( continued)  3. I will go to  or Walk-In-Clinic before going to the hospital, if I am unable to get an appt with my PCP  4. I will continue to update the Community Healthcare Worker during outreach calls and ask for additional support or resources, if needed  (continued)    Updated: 6/16/2020            CHW Next Follow-up: 2 weeks    CHW Plan: send chart review request for graduation to [facility] RN    CHW called and spoke with the patient he stated that he is doing well. Patient is staying active by cutting the grass and walking.

## 2021-06-08 NOTE — PROGRESS NOTES
Noted.  RN Care Coordinator final BPCI-A outreach scheduled on 6/26/20.    Lidia Doherty RN Clinic Care Coordinator

## 2021-06-08 NOTE — PROGRESS NOTES
Clinic Care Coordination Contact    Follow Up Progress Note - BPCI-A      Assessment:  Pt reports he's doing well.  Has some mild arthritic knee and hip pain, follows with ortho and looking into having another cortisone injection in his hip sometime this summer.  He's still pretty active, does his own yard work and changes the oil on his car.  Pain decreases with rest.    Denies CP, shortness of breath.  Has an upcoming device check 6/30/20 at [facility] for a pacemaker check.        Goals addressed this encounter:   Goals Addressed                 This Visit's Progress       Patient Stated      Reducing Risks (pt-stated)   On track     Goal Statement:  I want to stay out of the hospital for the next 90 days.  Date Goal set:  4/3/2020  Barriers:  Unknown  Strengths:  Able to identify needs and advocate for those needs, good support system from family  Date to Achieve By:  6/26/2020  Patient expressed understanding of goal:  Yes  Action steps to achieve this goal:  1. I will call my clinic if I start to feel sick or notice any change(s) in my health, and schedule an appt if needed  2. I will call the triage nurse line for advice, before going to the hospital( continued)  3. I will go to  or Walk-In-Clinic before going to the hospital, if I am unable to get an appt with my PCP  4. I will continue to update the Community Healthcare Worker during outreach calls and ask for additional support or resources, if needed      Updated: 6/2/2020             Intervention/Education provided during outreach:  N/A         Plan:  90 day post hospitalization monitoring ends 6/26/20, based on hospital d/c date of 3/26/20, per BPCI-A standard workflow.  Final RN Care Coordinator outreach, 6/26/20.       Lidia Doherty RN Clinic Care Coordinator

## 2021-06-09 NOTE — TELEPHONE ENCOUNTER
Disregard the following notes. This was entered in error. Jovani Silva RN BSN    Type: in-clinic CRTD rep check   Presenting: AS-BiVP, 66bpm  Lead/Battery Status: Stable lead and battery measurements.  Atrial Arrhythmias: none  Vent Arrhythmias: none  Comments: Normal device function. 97% BiVP. Per Al, BSC Rep, shock impedance was stable before, during and after isometrics. Per TS, recommendation of CXR. CXR was previously performed in-patient 6/25/2020. No changes made today. YY        Results routed to Dr. Oneill.    Jovani Silva, RN BSN  Device Nurse

## 2021-06-09 NOTE — PROGRESS NOTES
"Clinic Care Coordination Contact    Follow Up Progress Note - BPCI-A     Assessment:  Pt states he's doing well; every once in a while he has \"a little shortness of breath with activity but nothing severe.\"  He is scheduled for a device check on Tuesday, 6//30/20 at  and an eye appt on Monday, 6/29/20.        Goals addressed this encounter:   Goals Addressed                 This Visit's Progress       Patient Stated      COMPLETED: Reducing Risks (pt-stated)   On track     Goal Statement:  I want to stay out of the hospital for the next 90 days.  Date Goal set:  4/3/2020  Barriers:  Unknown  Strengths:  Able to identify needs and advocate for those needs, good support system from family  Date to Achieve By:  6/26/2020  Patient expressed understanding of goal:  Yes  Action steps to achieve this goal:  1. I will call my clinic if I start to feel sick or notice any change(s) in my health, and schedule an appt if needed  2. I will call the triage nurse line for advice, before going to the hospital( continued)  3. I will go to  or Walk-In-Clinic before going to the hospital, if I am unable to get an appt with my PCP  4. I will continue to update the Community Healthcare Worker during outreach calls and ask for additional support or resources, if needed  (continued)    Updated: 6/16/2020             Intervention/Education provided during outreach:  90 day BPCI-A monitoring completed today.         Plan:  90 day BPCI-A monitoring ends today.  Informed patient today was the final RN Care Coordinator outreach.  Patient verbalized understanding to follow up with PCP for further needs/concerns, going forward.  Care Team updated to remove CCC team, and BPCI-A episode resolved.       Lidia Doherty RN Clinic Care Coordinator     "

## 2021-06-11 NOTE — PROGRESS NOTES
Dr. Leigh,     Review of recent remote device check showed 11 high ventricular arrhythmias detected. Review of EGMs showed between 6-28 bt runs NSVT. Last echo was on 9/1/2020 EF: 46%. Please review remote transmission and give any further recommendations. Thank you -AJM    Type: Routine pacemaker remote transmission.   Presenting Rhythm: AP BiVP 73 bpm.  Lead/Battery status: Stable.   Arrhythmias: Since 6/30/2020; 1 mode switch detected on 8/11/2020, review of EGM shows 3 minutes 30 seconds of AF. 11 high ventricular arrhythmias. Review of EGMs show 6-28 bt run NSVT. Last echo 9/1/2020 EF: 46%.   Comments: Normal device function. BiVP: 98.56%. Routed to Dr. Leigh. AJM

## 2021-06-12 NOTE — TELEPHONE ENCOUNTER
----- Message from Aleena Jimenez sent at 10/19/2020  4:34 PM CDT -----  Regarding: JOSE ORDERING  CORS POSS PCI W/ADRIANA/BUCK  930AM ADMIT ON 10/23  H&P-PMD (I APOLOGIZE I DIDN'T INFORM THE PATIENT OF THIS! Are you able to give him a call or have the schedulers set him up w/an NP?)    Thanks!  Aleena

## 2021-06-12 NOTE — TELEPHONE ENCOUNTER
Reymundo AMBRIZ Petersen  [address] [place] [address]  [phone] (home)     Primary cardiologist:  Dr Leigh  PCP:  Napoleon Casas MD  Procedure:  Coronary angiogram with possible percutaneous coronary intervention  H&P completed by:  Solange PCP 10/16/20  Case MD:  Dr Freire or Dr Carranza  Admit date and time:  10/23/20 09:30  Ordering MD:  Dr Leigh  Diagnosis:  Abnormal NM stress  Anticoagulation: None  CPAP: No  Bypass Grafts: No  Renal Issues: No  Allergies:   Allergies   Allergen Reactions     Milk Nausea And Vomiting     Diabetic?: No  Device?: Yes  Type:  Medtronic pacemaker    Angiogram Teaching    Reason for Visit:  Telephone call to discuss pre-procedure education in preparation for: coronary angiogram with possible percutaneous coronary intervention    Procedure Prep:  Cardiologist note dated: 10/7/20 on NM MPI with Lexiscan  EKG results obtained, dated: on admission  Pertinent test results obtained - Viewable in Epic, dated: 10/7/20 NM Stress, 9/1/20 echo, 3/25/20 pacemaker insertion, 4/13/11 previous PCI  Hemogram results obtained: on admission  Basic Metabolic Panel results obtained: on admission  Lipid Profile results obtained: on admission    Pre-procedure instructions  Patient instructed to be NPO after midnight.  Patient instructed to arrange for transportation home following procedure.  Patient instructed to have a responsible adult with them for 24 hours post-procedure.  Post-procedure follow up process.  Conscious sedation discussed.    Pre-procedure medication instructions  Patient instructed on antiplatelet medication.  Continue medications as scheduled, with a small amount of water on the day of the procedure unless indicated.  Patient instructed to take 325 mg of Aspirin am of procedure: Yes  Other medication: instructed to TAKE gabapentin and losartan a.m. of the procedure.    *PATIENTS RECORDS AVAILABLE IN [facility] UNLESS OTHERWISE INDICATED*    *Order set was entered on this date:  10/21/20      Patient Active Problem List   Diagnosis     Primary osteoarthritis of knees, bilateral     Atrioventricular block, complete (H)     Benign hypertension     BPH (benign prostatic hyperplasia)     Bradycardia     Combined hyperlipidemia     CAD (coronary artery disease)     Dizziness     DJD (degenerative joint disease)     LBBB (left bundle branch block)     Left ventricular hypertrophy     S/P drug eluting coronary stent placement     Troponin level elevated     Acute combined systolic and diastolic congestive heart failure (H)     Pulmonary hypertension (H)     Nonrheumatic aortic valve insufficiency     Cardiac pacemaker in situ     Coronary artery disease due to lipid rich plaque       Current Outpatient Medications   Medication Sig Dispense Refill     acetaminophen (TYLENOL) 500 MG tablet Take 1 tablet (500 mg total) by mouth every 4 (four) hours as needed.  0     aspirin 81 mg chewable tablet Chew 81 mg daily.       atorvastatin (LIPITOR) 10 MG tablet Take 10 mg by mouth 4 (four) times a week. Days vary        bismuth subsalicylate (PEPTO BISMOL) 262 mg/15 mL suspension Take 15 mL by mouth every 6 (six) hours as needed for indigestion.       coenzyme Q10 100 mg capsule Take 100 mg by mouth daily.       diphenhydrAMINE-acetaminophen (TYLENOL PM)  mg Tab Take 1 tablet by mouth at bedtime as needed.       doxazosin (CARDURA) 8 MG tablet Take 4 mg by mouth at bedtime.              finasteride (PROSCAR) 5 mg tablet Take 5 mg by mouth at bedtime.              furosemide (LASIX) 40 MG tablet Take 20 mg by mouth daily as needed.       gabapentin (NEURONTIN) 300 MG capsule Take 300 mg by mouth 2 times daily before breakfast and lunch. 300mg in morning  300mg at noon  900mg at bedtime       gabapentin (NEURONTIN) 300 MG capsule Take 900 mg by mouth at bedtime. 300mg in morning  300mg at noon  900mg at bedtime       glucosamine/chondr cole A sod (OSTEO BI-FLEX ORAL) Take 1 tablet by mouth 2 (two) times a  day.              losartan (COZAAR) 25 MG tablet Take 1 tablet (25 mg total) by mouth daily. 30 tablet 0     multivitamin therapeutic tablet Take 1 tablet by mouth daily.       naproxen sodium (ALEVE) 220 MG tablet Take 220 mg by mouth every 8 (eight) hours as needed for pain.       omeprazole (PRILOSEC) 20 MG capsule Take 20 mg by mouth 2 (two) times a day before meals.       sertraline (ZOLOFT) 50 MG tablet Take 50 mg by mouth daily.              triamcinolone (KENALOG) 0.1 % cream Apply 1 application topically 2 (two) times a day as needed.       vit A/vit C/vit E/zinc/copper (PRESERVISION AREDS ORAL) Take 1 tablet by mouth 2 (two) times a day.       No current facility-administered medications for this visit.        Allergies   Allergen Reactions     Milk Nausea And Vomiting       Plan  Daughter will be .  Patient ready for procedure    Nora STANTON RN

## 2021-06-15 NOTE — PROGRESS NOTES
Clinic Care Coordination Contact  [facility]/Voicemail    Clinical Data: Care Coordinator Outreach  Outreach attempted x 1.  VM unavailable, not able to leave message.  Plan: Care Coordinator will try to reach patient again in 1-2 business days.

## 2021-06-15 NOTE — PROGRESS NOTES
Clinic Care Coordination Contact  [facility]    Clinical Data: Care Coordinator Outreach  Outreach attempted x 2.  No VM available.  Plan: Care Coordinator will try to reach patient again in 1-2 business days.

## 2021-06-16 PROBLEM — U07.1 COVID-19: Status: ACTIVE | Noted: 2021-02-12

## 2021-06-16 PROBLEM — M17.0 PRIMARY OSTEOARTHRITIS OF KNEES, BILATERAL: Status: ACTIVE | Noted: 2019-08-15

## 2021-06-16 PROBLEM — I44.2 COMPLETE HEART BLOCK (H): Status: ACTIVE | Noted: 2020-03-24

## 2021-06-16 PROBLEM — Z95.0 STATUS POST BIVENTRICULAR PACEMAKER: Status: ACTIVE | Noted: 2020-12-01

## 2021-06-16 PROBLEM — Z95.5 S/P DRUG ELUTING CORONARY STENT PLACEMENT: Status: ACTIVE | Noted: 2019-05-30

## 2021-06-16 PROBLEM — E78.2 COMBINED HYPERLIPIDEMIA: Status: ACTIVE | Noted: 2018-07-11

## 2021-06-16 PROBLEM — I35.1 NONRHEUMATIC AORTIC VALVE INSUFFICIENCY: Chronic | Status: ACTIVE | Noted: 2020-04-01

## 2021-06-16 PROBLEM — I51.7 LEFT VENTRICULAR HYPERTROPHY: Status: ACTIVE | Noted: 2018-07-31

## 2021-06-16 PROBLEM — I48.91 ATRIAL FIBRILLATION, TRANSIENT (H): Chronic | Status: ACTIVE | Noted: 2020-08-11

## 2021-06-16 PROBLEM — R63.4 WEIGHT LOSS: Status: ACTIVE | Noted: 2021-02-11

## 2021-06-16 PROBLEM — I50.42 CHRONIC COMBINED SYSTOLIC AND DIASTOLIC HEART FAILURE (H): Chronic | Status: ACTIVE | Noted: 2021-02-11

## 2021-06-16 PROBLEM — U07.1 INFECTION DUE TO 2019 NOVEL CORONAVIRUS: Chronic | Status: ACTIVE | Noted: 2021-02-11

## 2021-06-16 PROBLEM — R42 DIZZINESS: Status: ACTIVE | Noted: 2018-07-11

## 2021-06-16 PROBLEM — R79.89 TROPONIN LEVEL ELEVATED: Status: ACTIVE | Noted: 2020-03-25

## 2021-06-16 PROBLEM — R00.1 BRADYCARDIA: Status: ACTIVE | Noted: 2018-07-11

## 2021-06-16 NOTE — TELEPHONE ENCOUNTER
Type: alert remote CRT-P transmission for VT detected.  Presenting rhythm: AP-biVP, rate 60 bpm.  Battery/lead status: stable  Arrhythmias: since 3/30/21, no AT/AF detected. One 22-bt (7 seconds) VT rate 170's. One other NSVT also noted, ~10 beats.  Comments: normal pacemaker function. BiV pacing 98.6%. Routed to device RN. prd       Alert received for episode of NSVT this morning around 6 AM, lasting ~7 seconds. Hx NSVTs in past with Stress test/Angio performed 10/2020. Takes Toprol 25 mg daily. Reviewed with patient, denies any awareness of arrhythmias this morning but states he was probably still sleeping. Advised he call with any troublesome symptoms.  Discussed that his is due to see Dr. Leigh, he will call today to set up appointment. Denies any other concerns/questions.      Shannon Castro RN

## 2021-06-17 NOTE — TELEPHONE ENCOUNTER
Received a call directly from Ramirez. He wanted to clarify his medication, Xarelto to F. His medication list indicated he was on 10 mg post-covid infection. He is no loner taking this and has been off of it for over 5 weeks now. Will send in Rx for 20 mg as he is does not have any 10 mg tablets in the house. Will send update to [facility] that he was off anticoagulation since 30 days post- covid discharge. -[facility]        Dr. Leigh,  Ramirez just wanted to clarify that he was only on the 10 mg of Xarelto 30 days post COVID infection/discharge from . He has been off of it for nearly 5 weeks. I sent in an rx for 20 mg tablets as your note indicates for his paroxysmal afib.    Thanks,  Shelton

## 2021-06-17 NOTE — PATIENT INSTRUCTIONS - HE
Mr Reymundo AMBRIZ Nicola,  I enjoyed visiting with you again today.  I am glad to hear you are doing well.  Per our conversation cut the aspirin to every other day and take the ALLEVE as little as possible.  Check the RIVAROXABAN at home and if only 10 mg take 2 at supper. Call me at [phone] so we can get you 20 mg tablets.  We will relook at heart with the echo.  I will plan on seeing you 1 year or sooner if needed.  Jorge A eLigh

## 2021-06-17 NOTE — TELEPHONE ENCOUNTER
Telephone Encounter by Mary Velasquez RN at 9/11/2020  2:51 PM     Author: Mary Velasquez RN Service: -- Author Type: Registered Nurse    Filed: 9/11/2020  2:57 PM Encounter Date: 9/11/2020 Status: Signed    : Mary Velasquez RN (Registered Nurse)       Echo Complete: Result Notes       Mary Velasquez RN   9/9/2020 12:01 PM       Called and left another message to return call to discuss stable test results; stable result letter mailed. -Stacie Humphrey RN   9/1/2020  4:45 PM       Left message for patient to return call to discuss results. -kcl          Yuliet Leigh MD   9/1/2020  4:39 PM       Please inform him echo is essentially unchanged, continue current meds and follow-up.   LF            Received a call back from Don after result note was completed. Result letter was mailed as well. He verbalized understanding of results. He will follow up and continue with current medication plan. -[facility] [place]

## 2021-06-17 NOTE — TELEPHONE ENCOUNTER
Telephone Encounter by Mary Velasquez RN at 4/16/2021 10:13 AM     Author: Mary Velasquez RN Service: -- Author Type: Registered Nurse    Filed: 4/16/2021 10:32 AM Encounter Date: 4/15/2021 Status: Signed    : Mary Velasquez RN (Registered Nurse)       Yuliet Leigh MD Caswell, Mallory J, RN   Caller: Unspecified (Yesterday, 11:16 AM)             This 80-year-old gentleman is overdue to see me, he has history of coronary artery disease. Has a cardiomyopathy as well and a recent VT on device check.  Can we arrange for renal profile as well as magnesium? Can he then see me thereafter?  LF      Lab orders placed. Appt with [facility] scheduled 5/7 along with device check. Attempted to call patient; unable to leave voicemail. Tried mobile line- pt hung on writer twice so unable to leave voicemail. Msg sent to  pool to arrange blood work. -[facility]

## 2021-06-17 NOTE — PROGRESS NOTES
In clinic device check with Dr. Leigh.  Please see link for full device report.  Patient was informed of results and next follow up during today's visit.

## 2021-06-20 NOTE — LETTER
Letter by Lidia Doherty RN at      Author: Lidia Doherty RN Service: -- Author Type: --    Filed:  Encounter Date: 4/3/2020 Status: (Other)       CARE COORDINATION  [facility]  [address] [place] [address]        April 7, 2020      Reymundo AMBRIZ Nicola  [address] [place] [address]      Dear Reymundo,    I am a clinic care coordinator who works with at [facility] Care Coordination.  I wanted to thank you for spending the time to talk with me.  Below is a description of clinic care coordination and how I can further assist you.      The clinic care coordinator team is made up of a registered nurse,  and community health worker who understand the health care system. The goal of clinic care coordination is to help you manage your health and improve access to the health care system in the most efficient manner. The team can assist you in meeting your health care goals by providing education, coordinating services, strengthening the communication among your providers  and supporting you with any resource needs.    Please feel free to contact the Community Health Worker, Elle at [phone], or Emerson at [phone], with any questions or concerns. We are focused on providing you with the highest-quality healthcare experience possible and that all starts with you.     Sincerely,     Lidia Doherty RN Clinic Care Coordinator     Enclosed: I have enclosed a copy of the Care Plan. This has helpful information and goals that we have talked about. Please keep this in an easy to access place to use as needed.

## 2021-06-20 NOTE — LETTER
Letter by Lidia Doherty RN at      Author: Lidia Doherty RN Service: -- Author Type: --    Filed:  Encounter Date: 6/26/2020 Status: (Other)       CARE COORDINATION  [facility]  [address] [place] [address]       June 26, 2020      Reymundo AMBRIZ Nicola  [address] [place] [address]      Dear Reymundo,  Your Care Team congratulates you on your journey to maintain wellness. This document will help guide you on your journey to maintain a healthy lifestyle.  You can use this to help you overcome any barriers you may encounter.  If you should have any questions or concerns, you can contact the members of your Care Team or contact your Primary Care Clinic for assistance.      Health Maintenance  Health Maintenance Reviewed: Up to date    My Access Plan  Medical Emergency 911   Primary Clinic Line Napoleon Casas MD - [phone]   24 Hour Appointment Line [phone] or  [phone] (toll-free)   24 Hour Nurse Line [phone]   Preferred Urgent Care     Preferred Hospital     Preferred Pharmacy [facility] - [address]     Behavioral Health Crisis Line The National Suicide Prevention Lifeline at 1-617.928.5339 or 911     My Care Team Members  Patient Care Team       Relationship Specialty Notifications Start End    Napoleon Casas MD PCP - General Family Medicine  12/9/14     Phone: [phone] Fax: [phone] [address] [place] [address]           Goals        Patient Stated    ? COMPLETED: Reducing Risks (pt-stated)      Goal Statement:  I want to stay out of the hospital for the next 90 days.  Date Goal set:  4/3/2020  Barriers:  Unknown  Strengths:  Able to identify needs and advocate for those needs, good support system from family  Date to Achieve By:  6/26/2020  Patient expressed understanding of goal:  Yes  Action steps to achieve this goal:  1. I will call my clinic if I start to feel  sick or notice any change(s) in my health, and schedule an appt if needed  2. I will call the triage nurse line for advice, before going to the hospital( continued)  3. I will go to  or Walk-In-Clinic before going to the hospital, if I am unable to get an appt with my PCP  4. I will continue to update the Community Healthcare Worker during outreach calls and ask for additional support or resources, if needed  (continued)    Updated: 6/16/2020             Advance Care Plans/Directives Type:      Thank you for providing us with your Advance Directive. We will keep this on file and recommend that it be updated every 2 years, if your health situation changes, if there is a death of one of your health care agents, and/or if there are changes in your marital status.    It has been your Clinic Care Team's pleasure to work with you on your goals.    Regards,  Your Clinic Care Team

## 2021-06-20 NOTE — LETTER
Letter by Mary Velasquez RN at      Author: Mary Velasquez RN Service: -- Author Type: --    Filed:  Encounter Date: 9/9/2020 Status: (Other)         Reymundo Petersen  [address] [place] [address]             September 9, 2020         Dear Mr. Petersen,    Below are the results from your recent visit:    Resulted Orders   Echo Complete   Result Value Ref Range    LV volume diastolic 145 62 - 150 cm3    LV volume systolic 78 (!) 21 - 61 cm3    HR 64 bpm    IVSd 2.16 (!) 0.6 - 1.0 cm    LVIDd 4.95 4.2 - 5.8 cm    LVIDs 4.01 (!) 2.5 - 4.0 cm    LVOT diam 2.4 cm    LVOT mean gradient 2 mmHg    LVOT peak VTI 16.3 cm    LVOT mean ozzy 60.5 cm/s    LVOT peak ozzy 88.4 cm/s    LVOT peak gradient 3 mmHg    LV PWd 2.37 (!) 0.6 - 1.0 cm    AR decel slope 2,700 mm/s2    AR p 1/2 time 420 ms    AR peak ozzy 385 cm/s    AV peak ozzy 119 cm/s    AV mean ozzy 88.9 cm/s    AV mean gradient 3 mmHg    AV VTI 23 cm    AO root 4 cm    AO ascending 4.1 cm    LA size 4.8 cm    LA/AO root ratio 1.2 no units    NE peak ozzy 160 cm/s    NE peak gradient 10 mmHg    TR peak ozzy 329 cm/s    LA area 1 24.9 cm2    LA length 6.17 cm    LA area 2 31.7 cm2    TAPSE 1.9 cm    BSA 2.1 m2    Hieght 71 in    Weight 3,120 lbs    /68 mmHg    IVS/PW ratio 0.9     TR peak gradent 43.3 mmHg    LV FS 19.0 28 - 44 %    Echo LVEF calculated 46 55 - 75 %    LA volume 108.7 mL    LV mass 608.5 g    AV area 3.2 cm2    AV DIM IND ozzy 0.7     LVOT area 4.52 cm2    LVOT SV 73.7 cm3    AV peak gradient 5.7 mmHg    LV systolic volume index 37.1 11 - 31 cm3/m2    LV diastolic volume index 69.0 34 - 74 cm3/m2    LA volume index 51.8 mL/m2    LV mass index 289.8 g/m2    LV SVi 35.1 ml/m2    LV CO 4.7 l/min    LV Ci 2.2 l/min/m2    Height 71.0 in    Weight 195 lbs    AR peak gradient 59.3 mmHg    AV DIM IND VTI 0.7     Addendum: 9/1/2020      1.Left ventricle ejection fraction is mildly decreased. The calculated left ventricular ejection fraction is  46%.    2.TAPSE is normal, which is consistent with normal right ventricular systolic function.    3.The aortic root is mildly dilated. The ascending aorta is mildly dilated.    4.Estimated central venous pressure equal to 15 mmHg.    5.Moderate tricuspid dilatation with mild pulmonic and mitral insufficiencies. Mild to moderate aortic insufficiency, deceleration pressure half-time is measured 85 ms.    6.Moderate pulmonary hypertension suggested.    7.When compared to the previous study dated 3/25/2020, no significant change.         Narrative      1.Left ventricle ejection fraction is mildly decreased. The calculated   left ventricular ejection fraction is 46%.    2.TAPSE is normal, which is consistent with normal right ventricular   systolic function.    3.The aortic root is mildly dilated. The ascending aorta is mildly   dilated.    4.Estimated central venous pressure equal to 15 mmHg.    5.Moderate tricuspid dilatation with mild pulmonic and mitral   insufficiencies. Mild to moderate aortic insufficiency, deceleration   pressure half-time is measured 85 ms.    6.Moderate pulmonary hypertension suggested.    7.When compared to the previous study dated 3/25/2020, no significant   change.No significant valvular abnormalities are noted on screening   Doppler exam.        The test results show that your current treatment is working. We were unsuccessful at reaching you by phone.  Please continue your current medication and plan. Here is the message from Dr. Leigh:    Yuliet Leigh MD Caswell, Mary BLACK RN               Please inform him echo is essentially unchanged, continue current meds and follow-up.   LF            Please call with questions or contact us using [employer].    Sincerely,    Mary

## 2021-06-20 NOTE — LETTER
Letter by Lidia Doherty, RN at      Author: Lidia Doherty RN Service: -- Author Type: --    Filed:  Encounter Date: 4/3/2020 Status: (Other)       Care Plan  About Me:    Patient Name:  Reymundo Petersen    YOB: 1940  Age:         79 y.o.   [facility] MRN:    [id number] Telephone Information:  Home Phone [phone]   Mobile [phone]       Address:  [address] [place] [address] Email address:  No e-mail address on record      Emergency Contact(s)  Extended Emergency Contact Information      Name: Shine Petersen  Address:       [address] [place] [address]  Home Phone Number: [phone]  Relation: Child      Name: Juliet Call  Address:       [address] [place] [address]  Home Phone Number: [phone]  Work Phone Number: [phone]  Relation: Child      Name: NicolaJusta Hall  Address:       [address] [place] [address]  Home Phone Number: [phone]  Relation: Child            Health Maintenance  Health Maintenance Reviewed: Up to date          My Access Plan  Medical Emergency 911   Primary Clinic Line Napoleon Casas MD - [phone]   24 Hour Appointment Line [phone] or  [phone] (toll-free)   24 Hour Nurse Line [phone] (toll-free)   Preferred Urgent Care Other([facility])   Preferred [facility]  [phone]   Preferred Pharmacy [facility], [place] - [address]     Behavioral Health Crisis Line The National Suicide Prevention Lifeline at 1-718.115.3107 or 911           My Care Team Members  Patient Care Team       Relationship Specialty Notifications Start End    Napoleon Casas MD PCP - General Family Medicine  12/9/14     Phone: [phone] Fax: [phone] [address] [place] [address]    Emerson Tucker, CHW Community Health Worker Primary Care - [facility] Admissions 3/27/20      BPCI-A    Elle Griffin, W Community Health Worker Primary Care - [facility] Admissions 3/27/20     BPCI-A    Phone: [phone] Fax: [phone]        Lidia Doherty, RN Lead Care Coordinator Primary Care - [facility] Admissions 4/7/20     BPCI-A    Maryann Del Real, BSW Clinic Care Coordinator Primary Care - [facility] Admissions 4/7/20     BPCI-A              My Care Plans  Self Management and Treatment Plan      Goals and (Comments)  Goals        General    Reducing Risks (pt-stated)     Notes - Note edited  4/7/2020  9:13 AM by Lidia Doherty, RN    Goal Statement:  I want to stay out of the hospital for the next 90 days.  Date Goal set:  4/3/2020  Barriers:  Unknown  Strengths:  Able to identify needs and advocate for those needs, good support system from family  Date to Achieve By:  6/26/2020  Patient expressed understanding of goal:  Yes  Action steps to achieve this goal:  1. I will call my clinic if I start to feel sick or notice any change(s) in my health, and schedule an appt if needed  2. I will call the triage nurse line for advice, before going to the hospital  3. I will go to  or Walk-In-Clinic before going to the hospital, if I am unable to get an appt with my PCP  4. I will update the Community Healthcare Worker during outreach calls and ask for additional support or resources, if needed           Advance Care Plans/Directives Type:        My Medical and Care Information  Problem List   Patient Active Problem List   Diagnosis   ? Primary osteoarthritis of knees, bilateral   ? Complete heart block (H)   ? Benign hypertension   ? BPH (benign prostatic hyperplasia)   ? Bradycardia   ? Combined hyperlipidemia   ? CAD (coronary artery disease)   ? Dizziness   ? DJD (degenerative joint disease)   ? LBBB (left bundle branch block)   ? Left ventricular hypertrophy   ? S/P drug eluting coronary stent placement   ? Troponin level elevated   ? Acute combined systolic and diastolic congestive heart failure (H)   ?  Pulmonary hypertension (H)   ? Nonrheumatic aortic valve insufficiency        Current Medications and Allergies:      Current Outpatient Medications:   ?  acetaminophen (TYLENOL) 500 MG tablet, Take 1 tablet (500 mg total) by mouth every 4 (four) hours as needed., Disp:  , Rfl: 0  ?  aspirin 81 mg chewable tablet, Chew 81 mg daily., Disp: , Rfl:   ?  atorvastatin (LIPITOR) 10 MG tablet, Take 10 mg by mouth 3 (three) times a week. Days vary , Disp: , Rfl:   ?  bismuth subsalicylate (PEPTO BISMOL) 262 mg/15 mL suspension, Take 15 mL by mouth every 6 (six) hours as needed for indigestion., Disp: , Rfl:   ?  coenzyme Q10 100 mg capsule, Take 100 mg by mouth daily., Disp: , Rfl:   ?  doxazosin (CARDURA) 8 MG tablet, Take 4 mg by mouth at bedtime.    , Disp: , Rfl:   ?  finasteride (PROSCAR) 5 mg tablet, Take 5 mg by mouth at bedtime.    , Disp: , Rfl:   ?  furosemide (LASIX) 40 MG tablet, Take 20 mg by mouth daily as needed., Disp: , Rfl:   ?  glucosamine/chondr cole A sod (OSTEO BI-FLEX ORAL), Take 1 tablet by mouth 2 (two) times a day.    , Disp: , Rfl:   ?  losartan (COZAAR) 25 MG tablet, Take 1 tablet (25 mg total) by mouth daily., Disp: 30 tablet, Rfl: 0  ?  multivitamin therapeutic tablet, Take 1 tablet by mouth daily., Disp: , Rfl:   ?  omeprazole (PRILOSEC) 20 MG capsule, Take 20 mg by mouth 2 (two) times a day before meals., Disp: , Rfl:   ?  sertraline (ZOLOFT) 50 MG tablet, Take 50 mg by mouth daily.    , Disp: , Rfl:   ?  triamcinolone (KENALOG) 0.1 % cream, Apply 1 application topically 2 (two) times a day as needed., Disp: , Rfl:   ?  vit A/vit C/vit E/zinc/copper (PRESERVISION AREDS ORAL), Take 1 tablet by mouth 2 (two) times a day., Disp: , Rfl:      Care Coordination Start Date: No linked episodes   Frequency of Care Coordination:     Form Last Updated: 04/07/2020

## 2021-06-20 NOTE — LETTER
Letter by Erum Handy RN at      Author: Erum Handy RN Service: -- Author Type: --    Filed:  Encounter Date: 9/28/2020 Status: (Other)         Reymundo Petersen  [address] [place] [address]      September 29, 2020      Dear Mr. Petersen,    RE: Remote Results    We are writing to you regarding your recent Remote Pacemaker check from home. Your transmission was received successfully. Battery status is satisfactory at this time.     Your results are being reviewed.  You will be contacted if further information is necessary.     Your next device appointment will be a remote check on 12/30/2020; this will occur automatically.    To schedule or reschedule, please call [phone] and press 1.    NOTE: If you would like to do an extra transmission, please call [phone] and press 3 to speak to a nurse BEFORE transmitting. This ensures that the Device Clinic staff is aware of the reason you are sending a transmission, and can follow-up with you after it has been reviewed.    We will be checking your implanted device from home (remotely) every three months unless otherwise instructed. We will need to see you in the clinic at least once a year. You may need to be seen in the clinic sooner depending on the results of your check.    Please be aware:    The follow-up schedule is like a Physician prescription.    Your remote monitor is paired to your specific implanted device.      Sincerely,    [facility]

## 2021-06-20 NOTE — LETTER
Letter by Kacey Hui RDCS at      Author: Kacey Hui RDCS Service: -- Author Type: --    Filed:  Encounter Date: 4/28/2020 Status: (Other)         Reymundo Petersen  [address] [place] [address]      April 28, 2020      Dear Mr. Petersen,    RE: Remote Results    We are writing to you regarding your recent Remote Pacemaker check from home. Your transmission was received successfully. Battery status is satisfactory at this time.     Your results are showing no significant changes.    Your next device appointment will be a clinic visit on June 30, 2020 at 12:50 at our  [place] location, [facility], [address].    To schedule or reschedule, please call [phone] and press 1.    NOTE: If you would like to do an extra transmission, please call [phone] and press 3 to speak to a nurse BEFORE transmitting. This ensures that the Device Clinic staff is aware of the reason you are sending a transmission, and can follow-up with you after it has been reviewed.    We will be checking your implanted device from home (remotely) every three months unless otherwise instructed. We will need to see you in the clinic at least once a year. You may need to be seen in the clinic sooner depending on the results of your check.    Please be aware:    The follow-up schedule is like a Physician prescription.    Your remote monitor is paired to your specific implanted device.      Sincerely,    [facility]

## 2021-06-21 NOTE — LETTER
Letter by Devora Terrazas RN at      Author: Devora Terrazas RN Service: -- Author Type: --    Filed:  Encounter Date: 12/30/2020 Status: (Other)         Reymundo Petersen  [address] [place] [address]      December 30, 2020      Dear Shae Petersen,    RE: Remote Results    We are writing to you regarding your recent Remote Pacemaker check from home. Your transmission was received successfully. Battery status is satisfactory at this time.     Your results are showing no significant changes.    Your next device appointment will be a remote check on 3/31/2021; this will occur automatically.    To schedule or reschedule, please call [phone] and press 1.    NOTE: If you would like to do an extra transmission, please call [phone] and press 3 to speak to a nurse BEFORE transmitting. This ensures that the Device Clinic staff is aware of the reason you are sending a transmission, and can follow-up with you after it has been reviewed.    We will be checking your implanted device from home (remotely) every three months unless otherwise instructed. We will need to see you in the clinic at least once a year. You may need to be seen in the clinic sooner depending on the results of your check.    Please be aware:    The follow-up schedule is like a Physician prescription.    Your remote monitor is paired to your specific implanted device.      Sincerely,    [facility]

## 2021-06-21 NOTE — LETTER
Letter by Mary Velasquez RN at      Author: Mary Velasquez RN Service: -- Author Type: --    Filed:  Encounter Date: 4/30/2021 Status: (Other)         Reymundo Petersen  [address] [place] [address]             April 30, 2021         Dear Mr. Petersen,    Below are the results from your recent visit:    Resulted Orders   Basic metabolic panel   Result Value Ref Range    Sodium 140 136 - 145 mmol/L    Potassium 4.2 3.5 - 5.0 mmol/L    Chloride 104 98 - 107 mmol/L    CO2 28 22 - 31 mmol/L    Anion Gap, Calculation 8 5 - 18 mmol/L    Glucose 112 70 - 125 mg/dL    Calcium 9.4 8.5 - 10.5 mg/dL    BUN 19 8 - 28 mg/dL    Creatinine 0.88 0.70 - 1.30 mg/dL    GFR MDRD Af Amer >60 >60 mL/min/1.73m2    GFR MDRD Non Af Amer >60 >60 mL/min/1.73m2    Narrative    Fasting Glucose reference range is 70-99 mg/dL per  American Diabetes Association (ADA) guidelines.   Magnesium   Result Value Ref Range    Magnesium 1.8 1.8 - 2.6 mg/dL     The test results show that your lab tests were all normal. I will include Dr. Leigh's message below. Please plan to follow up as scheduled on May 7th with Dr. Leigh.    Mary Dean

## 2021-06-21 NOTE — LETTER
Letter by Yuliet Leigh MD at      Author: Yuliet Leigh MD Service: -- Author Type: --    Filed:  Encounter Date: 4/9/2021 Status: (Other)         Reymundo KATINA Petersen  [address]  [place] [address]      April 9, 2021      Dear Reymundo,    This letter is to remind you that you are due for your follow up appointment with Dr. Jorge A Leigh. To help ensure you are in the best health possible, a regular follow-up with your cardiologist is essential.     Please call our Patient Scheduling Line at [phone] to schedule your appointment at your earliest convenience.  If you have recently scheduled an appointment, please disregard this letter.    We look forward to seeing you again. As always, we are available at the number  above for any questions or concerns you may have.      Sincerely,     [facility] and [facility]

## 2021-06-21 NOTE — LETTER
Letter by Jesica Vila at      Author: Jesica Vila Service: -- Author Type: --    Filed:  Encounter Date: 3/30/2021 Status: (Other)         Reymundo Petersen  [address] [place] [address]      March 30, 2021      Dear Shae Petersen,    RE: Remote Results    We are writing to you regarding your recent Remote Pacemaker check from home. Your transmission was received successfully. Battery status is satisfactory at this time.     Your results are being reviewed.  You will be contacted if further information is necessary.     Your next device appointment will be a remote check on June 30, 2021.  You can choose the time of day you wish to transmit.    To schedule or reschedule, please call [phone] and press 1.    NOTE: If you would like to do an extra transmission, please call [phone] and press 3 to speak to a nurse BEFORE transmitting. This ensures that the Device Clinic staff is aware of the reason you are sending a transmission, and can follow-up with you after it has been reviewed.    We will be checking your implanted device from home (remotely) every three months unless otherwise instructed. We will need to see you in the clinic at least once a year. You may need to be seen in the clinic sooner depending on the results of your check.    Please be aware:    The follow-up schedule is like a Physician prescription.    Your remote monitor is paired to your specific implanted device.      Sincerely,    [facility]

## 2021-06-24 ENCOUNTER — HOSPITAL ENCOUNTER (EMERGENCY)
Dept: EMERGENCY MEDICINE | Facility: CLINIC | Age: 81
Discharge: HOME | End: 2021-06-24
Admitting: EMERGENCY MEDICINE
Payer: COMMERCIAL

## 2021-06-24 DIAGNOSIS — R31.0 GROSS HEMATURIA: ICD-10-CM

## 2021-06-24 DIAGNOSIS — N39.0 URINARY TRACT INFECTION WITH HEMATURIA, SITE UNSPECIFIED: ICD-10-CM

## 2021-06-24 DIAGNOSIS — R31.9 URINARY TRACT INFECTION WITH HEMATURIA, SITE UNSPECIFIED: ICD-10-CM

## 2021-06-24 LAB
ALBUMIN UR-MCNC: ABNORMAL G/DL
ANION GAP SERPL CALCULATED.3IONS-SCNC: 8 MMOL/L (ref 5–18)
APPEARANCE UR: ABNORMAL
BACTERIA #/AREA URNS HPF: ABNORMAL /[HPF]
BILIRUB UR QL STRIP: ABNORMAL
BUN SERPL-MCNC: 17 MG/DL (ref 8–28)
CALCIUM SERPL-MCNC: 9.3 MG/DL (ref 8.5–10.5)
CHLORIDE BLD-SCNC: 106 MMOL/L (ref 98–107)
CO2 SERPL-SCNC: 25 MMOL/L (ref 22–31)
COLOR UR AUTO: ABNORMAL
CREAT SERPL-MCNC: 0.79 MG/DL (ref 0.7–1.3)
ERYTHROCYTE [DISTWIDTH] IN BLOOD BY AUTOMATED COUNT: 12.5 % (ref 11–14.5)
GFR SERPL CREATININE-BSD FRML MDRD: >60 ML/MIN/1.73M2
GLUCOSE BLD-MCNC: 135 MG/DL (ref 70–125)
GLUCOSE UR STRIP-MCNC: NEGATIVE MG/DL
HCT VFR BLD AUTO: 43.2 % (ref 40–54)
HGB BLD-MCNC: 14.3 G/DL (ref 14–18)
HGB UR QL STRIP: ABNORMAL
KETONES UR STRIP-MCNC: ABNORMAL MG/DL
LEUKOCYTE ESTERASE UR QL STRIP: ABNORMAL
MCH RBC QN AUTO: 32.1 PG (ref 27–34)
MCHC RBC AUTO-ENTMCNC: 33.1 G/DL (ref 32–36)
MCV RBC AUTO: 97 FL (ref 80–100)
MUCOUS THREADS #/AREA URNS LPF: PRESENT LPF
NITRATE UR QL: ABNORMAL
PH UR STRIP: 5.5 [PH] (ref 5–8)
PLATELET # BLD AUTO: 197 THOU/UL (ref 140–440)
PMV BLD AUTO: 9.7 FL (ref 8.5–12.5)
POTASSIUM BLD-SCNC: 3.6 MMOL/L (ref 3.5–5)
RBC # BLD AUTO: 4.46 MILL/UL (ref 4.4–6.2)
RBC URINE: >182 HPF
SODIUM SERPL-SCNC: 139 MMOL/L (ref 136–145)
SP GR UR STRIP: 1.02 (ref 1–1.03)
SQUAMOUS EPITHELIAL: 5 /HPF
UROBILINOGEN UR STRIP-ACNC: ABNORMAL
WBC URINE: 150 HPF
WBC: 6.4 THOU/UL (ref 4–11)
YEAST #/AREA URNS HPF: ABNORMAL HPF

## 2021-06-24 ASSESSMENT — MIFFLIN-ST. JEOR: SCORE: 1561.28

## 2021-06-25 LAB — BACTERIA SPEC CULT: NO GROWTH

## 2021-06-25 NOTE — ED NOTES
eMERGENCY dEPARTMENT PROGRESS NOTE        FINAL IMPRESSION    1. Gross hematuria    2. Urinary tract infection with hematuria, site unspecified          MEDICAL DECISION MAKING  Reymundo Petersen is a 80 y.o. male who presented to the ED for evaluation of hematuria and low back pain. Over the past 3 days, patient has had persistent hematuria and slight discomfort over the bladder. He has had low back pain for quite some time, but it has been worse over the past 3 weeks. Signed out to myself awaiting CT.      Anticipate discharge to home.    I obtained my own brief history of patient who reports over the past 3 days, patient reports having persistent progressive hematuria that is now a shirlene color. He states that he is able to empty his bladder, but mentions having slight suprapubic tenderness.    Hgb 14.3. Kidney function WNL. No leukocytosis. UA with significant amount of blood and also appears infected with many bacteria and WBCs present. RBCs interfering with analysis and unable to get leukocytes or nitrites. Culture pending. CT with no definite etiology for symptoms. No urinary tract stones or hydronephrosis. He feels as if he is able to empty bladder completely with no concern for retention. Will have him hold his xarelto for 1-2 days. Will start him on Keflex and have him follow up with PCP. We discussed return precautions and patient discharged home in stable condition.      ED COURSE  4:14 PM  Patient signed out to me by Rere Villagran PA-C  6:21 PM  Updated the patient. We discussed plans for discharge including supportive cares, symptomatic treatment, outpatient follow up, and reasons to return to the emergency department.     At the conclusion of the encounter I discussed the results of all of the tests and the disposition. The questions were answered. The patient and family acknowledged understanding and were agreeable with the care plan.    DISCHARGE PRESCRIPTIONS  New Prescriptions    CEPHALEXIN  "(KEFLEX) 500 MG CAPSULE    Take 1 capsule (500 mg total) by mouth 2 (two) times a day for 7 days.     ______________________________________________________________________    PHYSICAL EXAM  Patient Vitals for the past 24 hrs:   BP Temp Temp src Pulse Resp SpO2 Height Weight   06/24/21 1645 -- -- -- 65 -- 98 % -- --   06/24/21 1630 -- -- -- 64 -- 97 % -- --   06/24/21 1615 -- -- -- 61 -- 96 % -- --   06/24/21 1600 -- -- -- 66 -- 95 % -- --   06/24/21 1545 -- -- -- 61 -- 96 % -- --   06/24/21 1530 -- -- -- 60 -- 96 % -- --   06/24/21 1438 143/73 99.1  F (37.3  C) Oral 78 20 97 % 5' 11\" (1.803 m) 185 lb (83.9 kg)     General: Appears in no acute distress, awake, alert, interactive.  Eyes: Conjunctivae non-injected. Sclera anicteric.  HENT: Atraumatic.  Neck: Supple.  Respiratory/Chest: Respiration unlabored.  Abdomen: non distended. Mild suprapubic tenderness.  Musculoskeletal: Normal extremities. No edema or erythema.  Skin: Normal color. No rash or diaphoresis.  Neurologic: Face symmetric, moves all extremities spontaneously. Speech clear.  Psychiatric: Oriented to person, place, and time. Affect appropriate.    LAB  Pertinent labs results reviewed   Results for orders placed or performed during the hospital encounter of 06/24/21   Urinalysis-UC if Indicated for patients > 12 years   Result Value Ref Range    Color, UA Red (!) Light Yellow, Yellow    Clarity, UA Excessively Turbid (!) Clear    Glucose, UA Negative Negative    Protein, UA      Bilirubin, UA      Urobilinogen, UA      pH, UA 5.5 5.0 - 8.0    Blood, UA >1.0 mg/dL (!) Negative    Ketones, UA      Nitrite, UA      Leukocytes, UA      Specific Gravity, UA 1.020 1.001 - 1.030    RBC, UA >182 (H) <=2 hpf    WBC  (H) <=5 hpf    Bacteria, UA Many (!) None Seen    Squamous Epithel, UA 5 <=5 /HPF    Mucus, UA Present (!) None Seen lpf    Yeast, UA Many (!) None Seen hpf   HM2(CBC w/o Differential)   Result Value Ref Range    WBC 6.4 4.0 - 11.0 thou/uL    " RBC 4.46 4.40 - 6.20 mill/uL    Hemoglobin 14.3 14.0 - 18.0 g/dL    Hematocrit 43.2 40.0 - 54.0 %    MCV 97 80 - 100 fL    MCH 32.1 27.0 - 34.0 pg    MCHC 33.1 32.0 - 36.0 g/dL    RDW 12.5 11.0 - 14.5 %    Platelets 197 140 - 440 thou/uL    MPV 9.7 8.5 - 12.5 fL   Basic Metabolic Panel   Result Value Ref Range    Sodium 139 136 - 145 mmol/L    Potassium 3.6 3.5 - 5.0 mmol/L    Chloride 106 98 - 107 mmol/L    CO2 25 22 - 31 mmol/L    Anion Gap, Calculation 8 5 - 18 mmol/L    Glucose 135 (H) 70 - 125 mg/dL    Calcium 9.3 8.5 - 10.5 mg/dL    BUN 17 8 - 28 mg/dL    Creatinine 0.79 0.70 - 1.30 mg/dL    GFR MDRD Af Amer >60 >60 mL/min/1.73m2    GFR MDRD Non Af Amer >60 >60 mL/min/1.73m2         RADIOLOGY    Pertinent imaging reviewed   Please see official radiology report.  CT Abdomen Pelvis Without Oral Without IV Contrast   Final Result   1.  No definite etiology for symptoms. No urinary tract stones or hydronephrosis.   2.  Diverticulosis but no acute inflammation of bowel evident. No obstruction.             Marine Lee PA-C  06/24/21 [id number]

## 2021-06-25 NOTE — TELEPHONE ENCOUNTER
Attempted to call patient; he is not at home currently. Will try tomorrow. -[facility]

## 2021-06-25 NOTE — TELEPHONE ENCOUNTER
----- Message from AGUSTÍN Monge Junior sent at 5/26/2021 12:36 PM CDT -----  Regarding: LBF Patient  General phone call:    Caller: Patient   Primary cardiologist: STEVEN  Detailed reason for call: Patient has questions about medications   Best phone number: [phone]  Best time to contact: After 4 today or sometime tomorrow   Ok to leave a detailed message? Yes  Device? Yes    Additional Info:       Noted. Will call patient after 4 pm as above. -janeth

## 2021-06-25 NOTE — TELEPHONE ENCOUNTER
Call back received from Ramirez. He just wanted to pass along that he has noticed more bruising and little cuts since starting Xarelto. We went over precautions and what to watch for with anticoagulation. He knows when to seek out medical attention for excessive bleeding. He will call back with any other questions or concerns. -[facility]

## 2021-06-25 NOTE — ED TRIAGE NOTES
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  Pain near his bladder

## 2021-06-26 NOTE — ED PROVIDER NOTES
EMERGENCY DEPARTMENT ENCOUNTER      NAME: Reymundo Petersen  AGE: 80 y.o. male  YOB: 1940  MRN: [id number]  EVALUATION DATE & TIME: 6/24/2021  2:45 PM    PCP: Provider, No Primary Care    ED PROVIDER: Rere Villagran PA-C      Chief Complaint   Patient presents with     Hematuria         FINAL IMPRESSION:  Hematuria      MEDICAL DECISION MAKING:    Pertinent Labs & Imaging studies reviewed. (See chart for details)  80 y.o. male presents to the Emergency Department for evaluation of hematuria and low back pain.  Has had low back pain for quite some time according to the patient and daughter but it has been worse over the last 3 weeks.  It is more over the low back and not necessarily the flank.  Over the past 3 days he has had worsening hematuria.  He notes some mild discomfort over the bladder.  No fevers.    Here vitals are stable.  He is afebrile here and looks quite well.  No flank tenderness, no abdominal tenderness.  Does not appear acutely ill.    Differential diagnosis includes urinary tract infection, malignancy, kidney stone, anemia, kidney disease.  The patient is on Xarelto which is likely worsening the hematuria.    Initiated work-up with basic labs and a CT scan of the abdomen pelvis.  Lab work reassuring.  Urinalysis seems concerning for UTI with WBCs, bacteria.    Patient care was signed out to colleague Marine Lee PA-C at 4:00 PM.  Still awaiting CT scan.  Plan on dispo per CT report but likely plan to treat for UTI and have patient follow-up with urology and pcp.     At the conclusion of the encounter I discussed the results of all of the tests and the disposition. The questions were answered. The patient or family acknowledged understanding and was agreeable with the care plan.       ED COURSE  2:55 PM Met and evaluated patient. Discussed ED plan.   4:00 PM Patient care signed out to Diana Lee PA-C    MEDICATIONS GIVEN IN THE EMERGENCY:  Medications - No data to display    NEW  PRESCRIPTIONS STARTED AT TODAY'S ER VISIT  New Prescriptions    No medications on file          =================================================================    HPI    Patient information was obtained from: Patient    Use of Intrepreter: N/A        Reymundo Petersen is a 80 y.o. male with a relevant PMH of DJD, A-fib, pulmonary hypertension, heart failure, GERD, CAD, complete heart block, who presents the ED via walk in for evaluation of back pain and hematuria.     For the past three weeks, patient reports persistent elevated midline lower back pain that radiates to his bilateral lower back, left greater than right. Patient states he has a history o chronic back pain but this is worse than normal. He states that back pain is provoked when laying flat or walking around.     Over the past 3 days, patient reports having persistent progressive hematuria that is now a shirlene color. He states that he is able to empty his bladder, but mentions having slight suprapubic tenderness. He states that he spoke with his PCP and was advised to be seen in the ED.     Patient mentions restarting on 20 mg Rivaroxaban 6 weeks ago for heart failure, stents, and paroxymal a-fib.  He denies fever, nausea, diarrhea, black or bloody stools, chest pain, shortness of breath, pain in lower extremities, or any other complaints at this time.       REVIEW OF SYSTEMS   Constitutional: Negative for fevers, chills.  Vision: Negative for vision changes  HENT:  Negative for congestion, sore throat  Pulmonary: Negative for shortness of breath  Cardiac: Negative for chest pain  GI: Positive mid supra pubic tenderness. Negative for nausea, vomiting, diarrhea, black or bloody stools.   : Positive hematuria. Negative urinary retention.   Musculoskeletal: Positive lower back pain. Negative for lower extremity pain  Skin: Negative for skin changes  Neuro: Negative for weakness, numbness    All other systems reviewed and are negative      PAST MEDICAL  HISTORY:  Past Medical History:   Diagnosis Date     Atrial fibrillation, transient (H) 8/11/2020     BPH (benign prostatic hyperplasia) 7/11/2018     Chronic combined systolic and diastolic heart failure (H) 2/11/2021     Coronary artery disease      Dilated cardiomyopathy (H) 2/11/2021     MARIAN (generalized anxiety disorder) 2/11/2021     GERD (gastroesophageal reflux disease)      High cholesterol      Hypertension      LBBB (left bundle branch block) 7/11/2018     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     Pulmonary hypertension (H) 4/1/2020       PAST SURGICAL HISTORY:  Past Surgical History:   Procedure Laterality Date     CARPAL TUNNEL RELEASE       CORONARY ANGIOPLASTY WITH STENT PLACEMENT       CV CORONARY ANGIOGRAM N/A 10/23/2020    Procedure: Coronary Angiogram;  Surgeon: Varun Freire MD;  Location: [facility] Cath Lab;  Service: Cardiology     CV LEFT HEART CATHETERIZATION WO LEFT VETRICULOGRAM Left 10/23/2020    Procedure: Left Heart Catheterization Without Left Ventriculogram;  Surgeon: Varun Freire MD;  Location: [facility] Cath Lab;  Service: Cardiology     EP BIV PACEMAKER INSERT N/A 3/25/2020    Procedure: EP Biventricular Pacemaker Insertion;  Surgeon: Taylor Sandoval MD;  Location: [facility] Cath Lab;  Service: Cardiology     KNEE ARTHROSCOPY       VT TOTAL KNEE ARTHROPLASTY Right 8/15/2019    Procedure: RIGHT  MINIMALLY TOTAL KNEE ARTHROPLASTY;  Surgeon: Keven Cerda MD;  Location: [facility];  Service: Orthopedics           CURRENT MEDICATIONS:    No current facility-administered medications on file prior to encounter.      Current Outpatient Medications on File Prior to Encounter   Medication Sig     aspirin 81 mg chewable tablet Chew 81 mg daily.     atorvastatin (LIPITOR) 10 MG tablet Take 10 mg by mouth 4 (four) times a week. Days vary      coQ10, ubiquinol, 200 mg cap Take 400 mg by mouth daily.      doxazosin (CARDURA) 8 MG tablet Take 4 mg  "by mouth at bedtime.            finasteride (PROSCAR) 5 mg tablet Take 5 mg by mouth at bedtime.            furosemide (LASIX) 40 MG tablet Take 20 mg by mouth daily as needed.     gabapentin (NEURONTIN) 100 MG capsule TAKE ONE CAPSULE BY MOUTH EVERY MORNING AND AT NOON AND THREE CAPSULES BY MOUTH AT NIGHT     glucosamine/chondr cole A sod (OSTEO BI-FLEX ORAL) Take 1 tablet by mouth 2 (two) times a day.            losartan (COZAAR) 25 MG tablet Take 1 tablet (25 mg total) by mouth daily.     melatonin 10 mg Tab Take 10 mg by mouth at bedtime as needed.     metoprolol succinate (TOPROL-XL) 25 MG Take 1 tablet (25 mg total) by mouth at bedtime.     naproxen sodium (ALEVE) 220 MG tablet Take 220 mg by mouth every 8 (eight) hours as needed for pain.     omeprazole (PRILOSEC) 20 MG capsule Take 20 mg by mouth 2 (two) times a day before meals.     rivaroxaban ANTICOAGULANT (XARELTO) 20 mg tablet Take 1 tablet (20 mg total) by mouth daily.     sertraline (ZOLOFT) 50 MG tablet Take 50 mg by mouth daily.            triamcinolone (KENALOG) 0.1 % cream Apply 1 application topically 2 (two) times a day as needed.     vit A/vit C/vit E/zinc/copper (PRESERVISION AREDS ORAL) Take 1 tablet by mouth 2 (two) times a day.       ALLERGIES:  Allergies   Allergen Reactions     Milk Nausea And Vomiting       FAMILY HISTORY:  Family History   Problem Relation Age of Onset     Heart disease Father      Diabetes type II Sister      Alzheimer's disease Mother      COPD Brother        SOCIAL HISTORY:   Smoking: None.  Alcohol: None.  Illicit drug: None.    Other: Patient lives with sister-in-law.     VITALS:  Patient Vitals for the past 24 hrs:   BP Temp Temp src Pulse Resp SpO2 Height Weight   06/24/21 1438 143/73 99.1  F (37.3  C) Oral 78 20 97 % 5' 11\" (1.803 m) 185 lb (83.9 kg)       PHYSICAL EXAM    General Appearance:  Alert, cooperative, no distress, appears stated age  HENT: Normocephalic without obvious deformity, atraumatic. Mucous " membranes moist   Eyes: Conjunctiva clear, Lids normal. No discharge.   Respiratory: No distress. Lungs clear to ausculation bilaterally. No wheezes, rhonchi or stridor  Cardiovascular: Regular rate and rhythm. Normal cap refill. No peripheral edema  GI: Abdomen soft, nontender, normal bowel sounds  : No CVA tenderness  Musculoskeletal: Moving all extremities. No swelling. No midline back tenderness  Integument: Warm, dry, no rashes or lesions  Neurologic: Alert and orientated x3. No focal deficits.  Psych: Normal mood and affect        LAB:  All pertinent labs reviewed and interpreted.  Results for orders placed or performed during the hospital encounter of 06/24/21   Urinalysis-UC if Indicated for patients > 12 years   Result Value Ref Range    Color, UA Red (!) Light Yellow, Yellow    Clarity, UA Excessively Turbid (!) Clear    Glucose, UA Negative Negative    Protein, UA      Bilirubin, UA      Urobilinogen, UA      pH, UA 5.5 5.0 - 8.0    Blood, UA >1.0 mg/dL (!) Negative    Ketones, UA      Nitrite, UA      Leukocytes, UA      Specific Gravity, UA 1.020 1.001 - 1.030    RBC, UA >182 (H) <=2 hpf    WBC  (H) <=5 hpf    Bacteria, UA Many (!) None Seen    Squamous Epithel, UA 5 <=5 /HPF    Mucus, UA Present (!) None Seen lpf    Yeast, UA Many (!) None Seen hpf   HM2(CBC w/o Differential)   Result Value Ref Range    WBC 6.4 4.0 - 11.0 thou/uL    RBC 4.46 4.40 - 6.20 mill/uL    Hemoglobin 14.3 14.0 - 18.0 g/dL    Hematocrit 43.2 40.0 - 54.0 %    MCV 97 80 - 100 fL    MCH 32.1 27.0 - 34.0 pg    MCHC 33.1 32.0 - 36.0 g/dL    RDW 12.5 11.0 - 14.5 %    Platelets 197 140 - 440 thou/uL    MPV 9.7 8.5 - 12.5 fL   Basic Metabolic Panel   Result Value Ref Range    Sodium 139 136 - 145 mmol/L    Potassium 3.6 3.5 - 5.0 mmol/L    Chloride 106 98 - 107 mmol/L    CO2 25 22 - 31 mmol/L    Anion Gap, Calculation 8 5 - 18 mmol/L    Glucose 135 (H) 70 - 125 mg/dL    Calcium 9.3 8.5 - 10.5 mg/dL    BUN 17 8 - 28 mg/dL     Creatinine 0.79 0.70 - 1.30 mg/dL    GFR MDRD Af Amer >60 >60 mL/min/1.73m2    GFR MDRD Non Af Amer >60 >60 mL/min/1.73m2           I, Bogdan Trinh, am serving as a scribe to document services personally performed by Rere Villagran PA-C based on my observation and the provider's statements to me. I, Rere Villagran PA-C attest that Bogdan Trinh is acting in a scribe capacity, has observed my performance of the services and has documented them in accordance with my direction.      Rere Villagran PA-C  Emergency Medicine  [facility] EMERGENCY ROOM  [facility] [facility] [address]  Dept: [phone]  Loc: [phone]       Rere Villagran PA-C  06/24/21 1039

## 2021-06-28 NOTE — PROGRESS NOTES
Progress Notes by Yuliet Leigh MD at 4/1/2020 10:30 AM     Author: Yuliet Leigh MD Service: -- Author Type: Physician    Filed: 4/1/2020 10:51 AM Encounter Date: 4/1/2020 Status: Signed    : Yuliet Leigh MD (Physician)           [facility]  Heart Care Clinic Follow-up Note    Assessment & Plan        1. Complete heart block (H) )-symptomatic with a ventricular escape of about 40 bpm.  Given this dual pacemaker placed with inability to place LV lead.  This is a Medtronic device with Medtronic right atrial and Medtronic right ventricular His bundle as well as right ventricular apical lead.  He is pacing 99.3% of the time because of the advanced complete heart block although today he is in a 2-1 second-degree type block.   2. Coronary artery disease due to lipid rich plaque - in 2011 due to shortness of breath underwent angiography showing normal left main, normal LAD, normal circumflex, and significant distal right coronary artery lesion which was intervened upon with a stent.  Ejection fraction at that time was 52%.   3. Benign hypertension -under good control currently.   4. Acute combined systolic and diastolic congestive heart failure (H) -secondary to complete heart block he had significant IVC dilatation with increased central pressure as well as significant mitral and aortic insufficiencies.  With IV diuretic this is resolved.   5. LBBB (left bundle branch block) -given this His bundle pacing with inability to place left ventricular lead.   6. Pulmonary hypertension (H) -mild to moderate with suspected pressure of 53 mmHg on the right side, will recheck echo about 6 months which will be September to reevaluate pulmonic pressures.   7. Nonrheumatic aortic valve insufficiency -based on echo mild to moderate as well as mild to moderate mitral regurgitation.  Will recheck echo.     Plan  1.  Continue current meds.  2.  Recheck echo in September which will be 6 months out from prior  echo to evaluate pulmonic pressures, IVC dilation, and mitral and aortic insufficiencies.  3.  Follow-up me in 1 year or sooner if needed.    Subjective  CC: 79-year-old white gentleman being seen post hospital discharge follow-up today.  He is back at home living independently and feels like a new man.  There is no fatigue, syncope, dizziness, chest discomfort, palpitations, shortness of breath, PND, orthopnea or peripheral edema.    Medications  Current Outpatient Medications   Medication Sig Note   ? acetaminophen (TYLENOL) 500 MG tablet Take 1 tablet (500 mg total) by mouth every 4 (four) hours as needed.    ? aspirin 81 mg chewable tablet Chew 81 mg daily.    ? atorvastatin (LIPITOR) 10 MG tablet Take 10 mg by mouth 3 (three) times a week. Days vary  3/24/2020: Patient states he takes it 2-3 times per week - no specific days, just when he remembers    ? bismuth subsalicylate (PEPTO BISMOL) 262 mg/15 mL suspension Take 15 mL by mouth every 6 (six) hours as needed for indigestion.    ? coenzyme Q10 100 mg capsule Take 100 mg by mouth daily.    ? doxazosin (CARDURA) 8 MG tablet Take 4 mg by mouth at bedtime.           ? finasteride (PROSCAR) 5 mg tablet Take 5 mg by mouth at bedtime.           ? furosemide (LASIX) 40 MG tablet Take 20 mg by mouth daily as needed. 3/24/2020: Takes 2-3 times per month   ? glucosamine/chondr ocle A sod (OSTEO BI-FLEX ORAL) Take 1 tablet by mouth 2 (two) times a day.           ? losartan (COZAAR) 25 MG tablet Take 1 tablet (25 mg total) by mouth daily.    ? multivitamin therapeutic tablet Take 1 tablet by mouth daily.    ? omeprazole (PRILOSEC) 20 MG capsule Take 20 mg by mouth 2 (two) times a day before meals.    ? sertraline (ZOLOFT) 50 MG tablet Take 50 mg by mouth daily.           ? triamcinolone (KENALOG) 0.1 % cream Apply 1 application topically 2 (two) times a day as needed.    ? vit A/vit C/vit E/zinc/copper (PRESERVISION AREDS ORAL) Take 1 tablet by mouth 2 (two) times a day.   "      Objective  /50 (Patient Site: Right Arm, Patient Position: Sitting, Cuff Size: Adult Regular)   Pulse 83   Temp 97.9  F (36.6  C) (Oral)   Resp 16   Ht 5' 11\" (1.803 m)   Wt 196 lb (88.9 kg) Comment: With shoes.  BMI 27.34 kg/m      General Appearance:    Alert, cooperative, no distress, appears stated age   Head:    Normocephalic, without obvious abnormality, atraumatic   Throat:   Lips, mucosa, and tongue normal; teeth and gums normal   Neck:   Supple, symmetrical, trachea midline, no adenopathy;        thyroid:  No enlargement/tenderness/nodules; no carotid    bruit or JVD   Back:     Symmetric, no curvature, ROM normal, no CVA tenderness   Lungs:     Clear to auscultation bilaterally, respirations unlabored   Chest wall:    No tenderness, right-sided pacemaker scar noted   Heart:    Regular rate and rhythm, S1 and S2 normal, no murmur, rub   or gallop   Abdomen:     Soft, non-tender, bowel sounds active all four quadrants,     no masses, no organomegaly   Extremities:   Normal, atraumatic, no cyanosis or edema   Pulses:   2+ and symmetric all extremities   Skin:   Skin color, texture, turgor normal, no rashes or lesions     Results    Lab Results personally reviewed   Lab Results   Component Value Date    CHOL 161 05/16/2019    CHOL 156 01/17/2019     Lab Results   Component Value Date    HDL 57 05/16/2019    HDL 61 01/17/2019     Lab Results   Component Value Date    LDLCALC 89 05/16/2019    LDLCALC 80 01/17/2019     Lab Results   Component Value Date    TRIG 77 05/16/2019    TRIG 75 01/17/2019     Lab Results   Component Value Date    WBC 5.2 03/25/2020    HGB 11.2 (L) 03/25/2020    HCT 34.4 (L) 03/25/2020     03/26/2020     Lab Results   Component Value Date    CREATININE 1.04 03/26/2020    BUN 17 03/26/2020     03/26/2020    K 4.1 03/26/2020    CO2 22 03/26/2020     Review of Systems:   General: WNL  Eyes: WNL  Ears/Nose/Throat: WNL  Lungs: WNL  Heart: WNL  Stomach: " WNL  Bladder: WNL  Muscle/Joints: WNL  Skin: WNL  Nervous System: WNL  Mental Health: WNL     Blood: WNL

## 2021-06-28 NOTE — PROGRESS NOTES
"Progress Notes by Lidia Doherty, RN at 4/3/2020  3:00 PM     Author: Lidia Doherty RN Service: -- Author Type: Registered Nurse    Filed: 4/7/2020  9:49 AM Encounter Date: 4/3/2020 Status: Signed    : Lidia Doherty RN (Registered Nurse)       Clinic Care Coordination Contact    Clinic Care Coordination Contact  OUTREACH    Referral Information:  Referral Source: IP Handoff(BPCI-A)    Primary Diagnosis: Cardiovascular - other(Complete heart block, s/p PPM)    Chief Complaint   Patient presents with   ? Clinic Care Coordination - Initial     BPCI-A        [place] Utilization:   Clinic Utilization  Difficulty keeping appointments:: No  Compliance Concerns: No  No-Show Concerns: No  No PCP office visit in Past Year: No  Utilization    Last refreshed: 4/7/2020  9:16 AM:  Hospital Admissions 2           Last refreshed: 4/7/2020  9:16 AM:  ED Visits 0           Last refreshed: 4/7/2020  9:16 AM:  No Show Count (past year) 0              Current as of: 4/7/2020  9:16 AM          Clinical Concerns:  Current Medical Concerns:  BPCI-A.  RN assessment completed with patient.  Patient was hospitalized at [facility] from 3/24/20 to 3/26/20 for complete heart block, and underwent EP pacemaker insertion.  He was discharged home to follow up with PCP in 1 wk for BP, Device Clinic and Cardiology in 1 wk, and outpatient cardiac nuclear medicine stress test.  Pt has additional medical history of CAD, CHF, pulmonary HTN, non-rheumatic aortic valve insuffiencey, HLD, HTN.  Pt had Device Clinic f/u appt and saw cardiologist on 4/1/2020 - no med changes, recheck echo in September and f/u w/cardiology in 1 year or sooner PRN.    Pt reports he's doing well, \"I'm between % better.\"  At times feels a little weak.  States his balance is \"good,\" and he denies CP, shortness of breath, or dizziness.  He is left handed so had the pacemaker put in on the R side.  Pt states he has been walking without a cane/walker the " majority of the time, but does occasionally use his cane, which he keeps in his car, for times when his knee starts to ache or tingles (s/p R TKA August, 2019).       Denies any financial concerns or food insecurity          Current Behavioral Concerns:  None at this time.  Pt reports he has a good support system from family and neighbors.        Education Provided to patient:  Educated pt on BPCI-A and 90 day post hospitalization transition monitoring.      Pain  Pain (GOAL):: No  Health Maintenance Reviewed: Up to date     Medication Management:  Pt manages his own medications - able to afford his medications.    Functional Status:  Dependent ADLs:: Independent  Dependent IADLs:: Independent  Bed or wheelchair confined:: No  Mobility Status: Independent  Fallen 2 or more times in the past year?: No  Any fall with injury in the past year?: No    Living Situation:  Current living arrangement:: I live in a private home with family(Lives with his sister-in-law)  Type of residence:: Private home - no stairs    Lifestyle & Psychosocial Needs:   Social Needs   ? Financial resource strain: Not very hard   ? Food insecurity     Worry: Never true     Inability: Never true   ? Transportation needs     Medical: No     Non-medical: No     Diet:: Regular, Dairy-free, No added salt  Inadequate nutrition (GOAL):: No  Tube Feeding: No  Inadequate activity/exercise (GOAL):: No  Significant changes in sleep pattern (GOAL): No  Transportation means:: Regular car     Caodaism or spiritual beliefs that impact treatment:: No([employer])  Mental health DX:: No  Mental health management concern (GOAL):: No  Informal Support system:: Children, Family, Neighbors     Socioeconomic History   ? Marital status:      Spouse name: Not on file   ? Number of children: Not on file   ? Years of education: Not on file   ? Highest education level: Not on file   Relationships   ? Social connections     Talks on phone: Three times a week     Gets  together: Not on file     Attends Mu-ism service: Not on file     Active member of club or organization: Not on file     Attends meetings of clubs or organizations: Not on file     Relationship status:    ? Intimate partner violence     Fear of current or ex partner: Not on file     Emotionally abused: Not on file     Physically abused: Not on file     Forced sexual activity: Not on file     Tobacco Use   ? Smoking status: Never Smoker   ? Smokeless tobacco: Never Used   Substance and Sexual Activity   ? Alcohol use: Yes     Comment: rare   ? Drug use: No         Resources and Interventions:  Current Resources:  None     Community Resources: None  Supplies used at home:: None  Equipment Currently Used at Home: grab bar, tub/shower, shower chair, cane, straight, walker, standard    Advance Care Plan/Directive  Advanced Care Plans/Directives on file:: Yes    Referrals Placed: None     Goals:   Goals        General    Reducing Risks (pt-stated)     Notes - Note edited  4/7/2020  9:13 AM by Lidia Doherty RN    Goal Statement:  I want to stay out of the hospital for the next 90 days.  Date Goal set:  4/3/2020  Barriers:  Unknown  Strengths:  Able to identify needs and advocate for those needs, good support system from family  Date to Achieve By:  6/26/2020  Patient expressed understanding of goal:  Yes  Action steps to achieve this goal:  1. I will call my clinic if I start to feel sick or notice any change(s) in my health, and schedule an appt if needed  2. I will call the triage nurse line for advice, before going to the hospital  3. I will go to  or Walk-In-Clinic before going to the hospital, if I am unable to get an appt with my PCP  4. I will update the Community Healthcare Worker during outreach calls and ask for additional support or resources, if needed               Patient/Caregiver understanding:  Pt verbalized understanding and agrees with plan.        Future Appointments              In 3  weeks [facility] REMOTE DEVICE CHECK FROM HOME [facility] , [facility]    In 3 weeks Lidia Doherty, RN [facility], [facility]    In 2 months [facility] DEVICE NURSE 1 [facility] [place] , [facility]          Plan:     See above goals and action steps    Per BPCI-A guidelines, Clinic Care Coordination (CCC) will monitor patient for 90 days post hospitalization with weekly follow up calls the first 30 days, then every two weeks for the following 60 days, completing the 90 day post hospitalization transition period.     Next RN Care Coordinator outreach in one month, 5/4/2020.     CHW Delegation:  Please follow up with per BPCI-A standards.      Lidia Doherty RN Clinic Care Coordinator

## 2021-06-29 ENCOUNTER — RECORDS - HEALTHEAST (OUTPATIENT)
Dept: LAB | Facility: CLINIC | Age: 81
End: 2021-06-29

## 2021-06-30 LAB — BACTERIA SPEC CULT: NO GROWTH

## 2021-06-30 NOTE — PROGRESS NOTES
Progress Notes by Yuliet Leigh MD at 5/7/2021  3:30 PM     Author: Yuliet Leigh MD Service: -- Author Type: Physician    Filed: 5/7/2021  3:58 PM Encounter Date: 5/7/2021 Status: Signed    : Yuliet Leigh MD (Physician)           [facility]  Heart [facility] Follow-up Note    Assessment & Plan        1. Coronary artery disease due to lipid rich plaque  -in 2011 due to shortness of breath underwent angiography showing normal left main, normal LAD, normal circumflex, and significant distal right coronary artery lesion which was intervened upon with a stent.  Ejection fraction at that time was 52%. He underwent nuclear stress test in 2020 showing basal inferior wall defect prompting angiography which showed normal left main, normal LAD, normal circumflex, and normal right coronary artery with minimal in-stent restenosis. No significant symptoms and continue to work on prevention.   2. Atrial fibrillation, transient (H)-not valvular, paroxysmal, and given advanced [id number] score of 4 on Xarelto. He tells me he is only taking 10 mg and Avastin to confirm this at home and if so we will have him increase up to 20 mg given that he is less than 90, creatinine is well-preserved, and his weight is 180 lbs. He did have one episode which lasted for about 3 minutes for less than 0.1% burden.   3. Cardiac pacemaker in situ-Medtronic device with Medtronic right atrial right ventricular leads in the His bundle as well as a right ventricular apical lead. He has 90% atrial pacing with 99% ventricular CRT pacing. Leads stable.   4. Chronic combined systolic and diastolic heart failure (H)-no significant signs or symptoms and takes Lasix a few times a week.   5. BPH (benign prostatic hyperplasia)-under good control on Proscar and Cardura.   6. Combined hyperlipidemia-cholesterol 142 with an LDL of 75 and he takes atorvastatin about 4 times a week. Has side effects from it otherwise.   7. Dilated  cardiomyopathy (H) as above ejection fraction was 52% initially, 47% on follow-up echo in 2020 with nuclear stress test 32%. Essentially asymptomatic but will recheck echo.   8. COVID-19-hospitalized for this and doing better in February of this year.   9. Nonrheumatic aortic valve insufficiency-based on echo moderate as well as mild to moderate mitral regurgitation and recheck echo as above.   10. Primary hypertension-under good control currently.     Plan  1. Recheck echo to substantiate ejection fraction as well as significance of aortic insufficiency.  2. Unless echo significantly abnormal he can proceed with knee surgery.  3. Have him go to aspirin every other day given bruising.  4. Have him try to back off on Aleve to only one or 2 a week.  5. Have him increase his Xarelto to 20 mg, appropriate dose for his age. When he runs out of the 2 10 mg tablets he will let us know and we will go to 20 mg tablet.  6. Follow-up with me in 1 year with device check or sooner if needed.    Subjective  CC: 80-year-old white gentleman here for yearly follow-up. Since have seen him he was hospitalized for COVID-19. He is back at home living independently with his sister-in-law. Is physically active trying to do some gardening and walking his dog but 2 to back issues and knee discomfort having a significant amount of pain. There is no significant chest discomfort, palpitations, PND, orthopnea, syncope, dizziness or peripheral edema.    Medications  Current Outpatient Medications   Medication Sig Note   ? aspirin 81 mg chewable tablet Chew 81 mg daily.    ? atorvastatin (LIPITOR) 10 MG tablet Take 10 mg by mouth 4 (four) times a week. Days vary  3/24/2020: Patient states he takes it 2-3 times per week - no specific days, just when he remembers    ? coQ10, ubiquinol, 200 mg cap Take 400 mg by mouth daily.     ? doxazosin (CARDURA) 8 MG tablet Take 4 mg by mouth at bedtime.           ? finasteride (PROSCAR) 5 mg tablet Take 5 mg  "by mouth at bedtime.           ? furosemide (LASIX) 40 MG tablet Take 20 mg by mouth daily as needed. 3/24/2020: Takes 2-3 times per month   ? gabapentin (NEURONTIN) 100 MG capsule TAKE ONE CAPSULE BY MOUTH EVERY MORNING AND AT NOON AND THREE CAPSULES BY MOUTH AT NIGHT    ? glucosamine/chondr cole A sod (OSTEO BI-FLEX ORAL) Take 1 tablet by mouth 2 (two) times a day.           ? losartan (COZAAR) 25 MG tablet Take 1 tablet (25 mg total) by mouth daily.    ? melatonin 10 mg Tab Take 10 mg by mouth at bedtime as needed.    ? metoprolol succinate (TOPROL-XL) 25 MG Take 1 tablet (25 mg total) by mouth at bedtime.    ? naproxen sodium (ALEVE) 220 MG tablet Take 220 mg by mouth every 8 (eight) hours as needed for pain.    ? omeprazole (PRILOSEC) 20 MG capsule Take 20 mg by mouth 2 (two) times a day before meals.    ? rivaroxaban ANTICOAGULANT (XARELTO) 10 mg tablet Take 1 tablet (10 mg total) by mouth daily.    ? sertraline (ZOLOFT) 50 MG tablet Take 50 mg by mouth daily.           ? triamcinolone (KENALOG) 0.1 % cream Apply 1 application topically 2 (two) times a day as needed.    ? vit A/vit C/vit E/zinc/copper (PRESERVISION AREDS ORAL) Take 1 tablet by mouth 2 (two) times a day.        Objective  /70 (Patient Site: Right Arm, Patient Position: Sitting, Cuff Size: Adult Regular)   Pulse 64   Resp 16   Ht 5' 11\" (1.803 m)   Wt 186 lb (84.4 kg)   BMI 25.94 kg/m      General Appearance:    Alert, cooperative, no distress, appears stated age   Head:    Normocephalic, without obvious abnormality, atraumatic   Throat:   Lips, mucosa, and tongue normal; teeth and gums normal   Neck:   Supple, symmetrical, trachea midline, no adenopathy;        thyroid:  No enlargement/tenderness/nodules; no carotid    bruit or JVD   Back:     Symmetric, no curvature, ROM normal, no CVA tenderness   Lungs:     Clear to auscultation bilaterally, respirations unlabored   Chest wall:    No tenderness, right-sided pacemaker   Heart:    " Regular rate and rhythm, S1 and S2 normal, 1/6 systolic ejection murmur, no rub   or gallop   Abdomen:     Soft, non-tender, bowel sounds active all four quadrants,     no masses, no organomegaly   Extremities:   Normal, atraumatic, no cyanosis or edema   Pulses:   2+ and symmetric all extremities   Skin:   Skin color, texture, turgor normal, no rashes or lesions     Results    Lab Results personally reviewed   Lab Results   Component Value Date    CHOL 142 10/23/2020    CHOL 161 05/16/2019     Lab Results   Component Value Date    HDL 51 10/23/2020    HDL 57 05/16/2019     Lab Results   Component Value Date    LDLCALC 75 10/23/2020    LDLCALC 89 05/16/2019     Lab Results   Component Value Date    TRIG 79 10/23/2020    TRIG 77 05/16/2019     Lab Results   Component Value Date    WBC 4.9 02/15/2021    HGB 13.4 (L) 02/15/2021    HCT 39.7 (L) 02/15/2021     02/15/2021     Lab Results   Component Value Date    CREATININE 0.88 04/29/2021    BUN 19 04/29/2021     04/29/2021    K 4.2 04/29/2021    CO2 28 04/29/2021     Review of Systems:   General: WNL  Eyes: WNL  Ears/Nose/Throat: Hearing Loss  Lungs: WNL  Heart: WNL  Stomach: WNL  Bladder: WNL  Muscle/Joints: Joint Pain  Skin: WNL  Nervous System: WNL  Mental Health: WNL     Blood: WNL

## 2021-07-02 ENCOUNTER — HOSPITAL ENCOUNTER (EMERGENCY)
Dept: EMERGENCY MEDICINE | Facility: CLINIC | Age: 81
Discharge: HOME | End: 2021-07-02
Attending: EMERGENCY MEDICINE
Payer: COMMERCIAL

## 2021-07-02 DIAGNOSIS — N20.1 URETERAL STONE: ICD-10-CM

## 2021-07-02 LAB
ALBUMIN SERPL-MCNC: 3.5 G/DL (ref 3.5–5)
ALBUMIN UR-MCNC: ABNORMAL G/DL
ALP SERPL-CCNC: 72 U/L (ref 45–120)
ALT SERPL W P-5'-P-CCNC: <9 U/L (ref 0–45)
ANION GAP SERPL CALCULATED.3IONS-SCNC: 7 MMOL/L (ref 5–18)
APPEARANCE UR: CLEAR
AST SERPL W P-5'-P-CCNC: 20 U/L (ref 0–40)
BACTERIA #/AREA URNS HPF: ABNORMAL /[HPF]
BASOPHILS # BLD AUTO: 0.1 THOU/UL (ref 0–0.2)
BASOPHILS NFR BLD AUTO: 1 % (ref 0–2)
BILIRUB SERPL-MCNC: 0.8 MG/DL (ref 0–1)
BILIRUB UR QL STRIP: NEGATIVE
BUN SERPL-MCNC: 16 MG/DL (ref 8–28)
C REACTIVE PROTEIN LHE: 0.4 MG/DL (ref 0–0.8)
CALCIUM SERPL-MCNC: 8.8 MG/DL (ref 8.5–10.5)
CHLORIDE BLD-SCNC: 110 MMOL/L (ref 98–107)
CO2 SERPL-SCNC: 24 MMOL/L (ref 22–31)
COLOR UR AUTO: YELLOW
CREAT SERPL-MCNC: 1.02 MG/DL (ref 0.7–1.3)
EOSINOPHIL # BLD AUTO: 0 THOU/UL (ref 0–0.4)
EOSINOPHIL NFR BLD AUTO: 0 % (ref 0–6)
ERYTHROCYTE [DISTWIDTH] IN BLOOD BY AUTOMATED COUNT: 12.7 % (ref 11–14.5)
GFR SERPL CREATININE-BSD FRML MDRD: >60 ML/MIN/1.73M2
GLUCOSE BLD-MCNC: 108 MG/DL (ref 70–125)
GLUCOSE UR STRIP-MCNC: NEGATIVE MG/DL
HCT VFR BLD AUTO: 45.1 % (ref 40–54)
HGB BLD-MCNC: 14.8 G/DL (ref 14–18)
HGB UR QL STRIP: NEGATIVE
IMM GRANULOCYTES # BLD: 0 THOU/UL
IMM GRANULOCYTES NFR BLD: 0 %
KETONES UR STRIP-MCNC: ABNORMAL MG/DL
LEUKOCYTE ESTERASE UR QL STRIP: ABNORMAL
LIPASE SERPL-CCNC: <9 U/L (ref 0–52)
LYMPHOCYTES # BLD AUTO: 0.7 THOU/UL (ref 0.8–4.4)
LYMPHOCYTES NFR BLD AUTO: 8 % (ref 20–40)
MAGNESIUM SERPL-MCNC: 2 MG/DL (ref 1.8–2.6)
MCH RBC QN AUTO: 31.4 PG (ref 27–34)
MCHC RBC AUTO-ENTMCNC: 32.8 G/DL (ref 32–36)
MCV RBC AUTO: 96 FL (ref 80–100)
MONOCYTES # BLD AUTO: 0.6 THOU/UL (ref 0–0.9)
MONOCYTES NFR BLD AUTO: 6 % (ref 2–10)
MUCOUS THREADS #/AREA URNS LPF: PRESENT LPF
NEUTROPHILS # BLD AUTO: 8 THOU/UL (ref 2–7.7)
NEUTROPHILS NFR BLD AUTO: 85 % (ref 50–70)
NITRATE UR QL: NEGATIVE
PH UR STRIP: 5.5 [PH] (ref 5–8)
PLATELET # BLD AUTO: 254 THOU/UL (ref 140–440)
PMV BLD AUTO: 9.6 FL (ref 8.5–12.5)
POTASSIUM BLD-SCNC: 3.9 MMOL/L (ref 3.5–5)
PROT SERPL-MCNC: 6.7 G/DL (ref 6–8)
RBC # BLD AUTO: 4.72 MILL/UL (ref 4.4–6.2)
RBC URINE: 8 HPF
SODIUM SERPL-SCNC: 141 MMOL/L (ref 136–145)
SP GR UR STRIP: 1.02 (ref 1–1.03)
SQUAMOUS EPITHELIAL: 0 /HPF
UROBILINOGEN UR STRIP-ACNC: ABNORMAL
WBC URINE: 26 HPF
WBC: 9.4 THOU/UL (ref 4–11)

## 2021-07-04 LAB — BACTERIA SPEC CULT: NO GROWTH

## 2021-07-04 NOTE — LETTER
Letter by Stacie Morse RN at      Author: Stacie Morse RN Service: -- Author Type: --    Filed:  Encounter Date: 5/25/2021 Status: (Other)         Reymundo Petersen  [address] [place] [address]             May 25, 2021         Dear Mr. Petersen,    We have attempted to call you to discuss echocardiogram results. Please see Dr. Leigh's comments below regarding your stable results:    Yuliet Leigh MD              Please inform this 80-year-old gentleman of [facility] with coronary artery disease that echo is essentially unchanged, continue same meds and follow-up.   LF          Please call with questions or contact us using [employer].    Sincerely,        Electronically signed by Stacie MORIN RN

## 2021-07-04 NOTE — ED TRIAGE NOTES
ED Triage Notes by Anmol Hazel RN at 7/2/2021  7:31 PM     Author: Anmol Hazel RN Service: -- Author Type: Registered Nurse    Filed: 7/2/2021  7:32 PM Date of Service: 7/2/2021  7:31 PM Status: Signed    : Anmol Hazel RN (Registered Nurse)       Pt presents to the ED with c/o worsening RLQ abdominal pain since this morning. Denies any emesis or fevers.

## 2021-07-04 NOTE — ED NOTES
"ED Notes by Tony Antonio, RN at 7/2/2021  9:02 PM     Author: Tony Antonio RN Service: -- Author Type: Registered Nurse    Filed: 7/2/2021  9:04 PM Date of Service: 7/2/2021  9:02 PM Status: Signed    : Tony Antonio, RN (Registered Nurse)       Spoke to daughter of patient on phone, concerned patient is here due to history of \"illness anxiety\". Informed daughter of current status and treatment plan. Daughter planning to call again in about 90 minutes.       "

## 2021-07-05 ENCOUNTER — COMMUNICATION - HEALTHEAST (OUTPATIENT)
Dept: UROLOGY | Facility: CLINIC | Age: 81
End: 2021-07-05

## 2021-07-05 DIAGNOSIS — N20.0 CALCULUS OF KIDNEY: Primary | ICD-10-CM

## 2021-07-05 DIAGNOSIS — N20.1 URETERAL STONE: ICD-10-CM

## 2021-07-05 NOTE — TELEPHONE ENCOUNTER
Telephone Encounter by Julia Ruano RN at 7/5/2021  8:24 AM     Author: Julia Ruano RN Service: -- Author Type: Registered Nurse    Filed: 7/5/2021  8:25 AM Encounter Date: 7/5/2021 Status: Signed    : Julia Ruano RN (Registered Nurse)       Message left for patient to call clinic to set up an appointment for kidney stone follow-up for today or tomorrow.  Julia Ruano RN

## 2021-07-06 VITALS — BODY MASS INDEX: 25.9 KG/M2 | WEIGHT: 185 LBS | HEIGHT: 71 IN

## 2021-07-06 VITALS — WEIGHT: 185 LBS | BODY MASS INDEX: 25.8 KG/M2

## 2021-07-06 DIAGNOSIS — N20.1 URETERAL STONE: Primary | ICD-10-CM

## 2021-07-07 ENCOUNTER — COMMUNICATION - HEALTHEAST (OUTPATIENT)
Dept: UROLOGY | Facility: CLINIC | Age: 81
End: 2021-07-07

## 2021-07-07 NOTE — TELEPHONE ENCOUNTER
Telephone Encounter by Julia Ruano RN at 7/7/2021  1:37 PM     Author: Julia Ruano RN Service: -- Author Type: Registered Nurse    Filed: 7/7/2021  1:39 PM Encounter Date: 7/7/2021 Status: Signed    : Julia Ruano RN (Registered Nurse)       Patient is doing well and his symptoms are under control.  He has a f/u appt scheduled and will call in interim as needed.  Julia Ruano RN

## 2021-07-08 NOTE — ED PROVIDER NOTES
ED Provider Notes by Domenic Ahumada MD at 7/2/2021  8:18 PM     Author: Domenic Ahumada MD Service: -- Author Type: Physician    Filed: 7/8/2021  2:48 AM Date of Service: 7/2/2021  8:18 PM Status: Signed    : Domenic Ahumada MD (Physician)       EMERGENCY DEPARTMENT ENCOUNTER      NAME: Reymundo Petersen  AGE: 80 y.o. male  YOB: 1940  MRN: [id number]  EVALUATION DATE & TIME: 7/2/2021  7:35 PM    PCP: Jamie Noguera MD    ED PROVIDER: Domenic Ahumada M.D.      Chief Complaint   Patient presents with   ? Abdominal Pain         FINAL IMPRESSION:  1. Ureteral stone    2. Pyuria          ED COURSE & MEDICAL DECISION MAKING:    Pertinent Labs & Imaging studies reviewed. (See chart for details)  80 y.o. male presents to the Emergency Department for evaluation of flank pain.  The patient says that he started having right-sided abdominal pain around 2 in the morning.  The pain radiated to his right flank.  He says the pain is about 5 out of 10 and seemed improved with Tylenol that he took in the morning.  He says that he did have diarrhea about 4 to 5 days ago but this resolved.  He has not had any UTI symptoms.  Also couple days ago he was seen in his doctor's office and diagnosed with a bladder infection.  He finished antibiotics and says a UA afterwards did not show signs of infection.  In the ER he was only complaining of the pain.  His WBC and CRP were normal but his urine did show suggestion of infection.  A CT scan was done which showed a obstructing stone in the ureter at the UVJ measuring 5 mm.  The patient was comfortable but I did talk to him about return to the ER if he gets any fever, intractable vomiting or intractable pain.  He is referred to the kidney stone [place] and will be started on Cipro to cover him for a possible UTI.    8:07 PM I met the patient and performed my initial interview and exam.  9:56 PM I rechecked and updated the patient. Plans for discharge were  discussed and patient was agreeable. All questions were answered.      At the conclusion of the encounter I discussed the results of all of the tests and the disposition. The questions were answered. The patient or family acknowledged understanding and was agreeable with the care plan.       MEDICATIONS GIVEN IN THE EMERGENCY:  Medications   sodium chloride 0.9% 500 mL (0 mL Intravenous Stopped 7/2/21 2048)   ketorolac injection 15 mg (TORADOL) (15 mg Intravenous Given 7/2/21 2034)   morphine injection 4 mg (4 mg Intravenous Given 7/2/21 2024)   ondansetron injection 4 mg (ZOFRAN) (4 mg Intravenous Given 7/2/21 2024)   iopamidol solution 100 mL (ISOVUE-370) (100 mL Intravenous Given 7/2/21 2112)       NEW PRESCRIPTIONS STARTED AT TODAY'S ER VISIT  Discharge Medication List as of 7/2/2021 10:40 PM      START taking these medications    Details   acetaminophen (TYLENOL) 500 MG tablet Take 2 tablets (1,000 mg total) by mouth 4 (four) times a day for 7 days., Starting Fri 7/2/2021, Until Fri 7/9/2021, Print      !! ciprofloxacin HCl (CIPRO) 500 MG tablet Take 1 tablet (500 mg total) by mouth 2 (two) times a day for 7 days., Starting Fri 7/2/2021, Until Fri 7/9/2021, Normal      !! dimenhyDRINATE (DRAMAMINE) 50 MG tablet Take 1 tablet (50 mg total) by mouth at bedtime for 7 days., Starting Fri 7/2/2021, Until Fri 7/9/2021, Print      !! dimenhyDRINATE (DRAMAMINE) 50 MG tablet Take 1 tablet (50 mg total) by mouth 4 (four) times a day as needed., Starting Fri 7/2/2021, Until Fri 7/9/2021, Print      ibuprofen (ADVIL,MOTRIN) 200 MG tablet Take 2 tablets (400 mg total) by mouth 4 (four) times a day for 7 days., Starting Fri 7/2/2021, Until Fri 7/9/2021, Print      oxyCODONE (ROXICODONE) 5 MG immediate release tablet Every 4-6 hours as needed if pain is not improved with acetaminophen and ibuprofen., Print      !! ciprofloxacin HCl (CIPRO) 500 MG tablet Take 1 tablet (500 mg total) by mouth 2 (two) times a day for 7 days.,  Starting Fri 7/2/2021, Until Fri 7/9/2021, Print       !! - Potential duplicate medications found. Please discuss with provider.      CONTINUE these medications which have NOT CHANGED    Details   aspirin 81 mg chewable tablet Chew 81 mg daily., Historical Med      atorvastatin (LIPITOR) 10 MG tablet Take 10 mg by mouth 4 (four) times a week. Days vary , Historical Med      coQ10, ubiquinol, 200 mg cap Take 400 mg by mouth daily. , Historical Med      doxazosin (CARDURA) 8 MG tablet Take 4 mg by mouth at bedtime.       , Historical Med      finasteride (PROSCAR) 5 mg tablet Take 5 mg by mouth at bedtime.       , Historical Med      furosemide (LASIX) 40 MG tablet Take 20 mg by mouth daily as needed., Until Discontinued, Historical Med      gabapentin (NEURONTIN) 100 MG capsule TAKE ONE CAPSULE BY MOUTH EVERY MORNING AND AT NOON AND THREE CAPSULES BY MOUTH AT NIGHT, Historical Med      glucosamine/chondr cole A sod (OSTEO BI-FLEX ORAL) Take 1 tablet by mouth 2 (two) times a day.       , Historical Med      losartan (COZAAR) 25 MG tablet Take 1 tablet (25 mg total) by mouth daily., Starting Fri 3/27/2020, Normal      melatonin 10 mg Tab Take 10 mg by mouth at bedtime as needed., Historical Med      metoprolol succinate (TOPROL-XL) 25 MG Take 1 tablet (25 mg total) by mouth at bedtime., Starting Fri 10/23/2020, Normal      omeprazole (PRILOSEC) 20 MG capsule Take 20 mg by mouth 2 (two) times a day before meals., Starting Thu 3/19/2020, Historical Med      rivaroxaban ANTICOAGULANT (XARELTO) 20 mg tablet Take 1 tablet (20 mg total) by mouth daily., Starting Wed 5/12/2021, Normal      sertraline (ZOLOFT) 50 MG tablet Take 50 mg by mouth daily.       , Historical Med      triamcinolone (KENALOG) 0.1 % cream Apply 1 application topically 2 (two) times a day as needed., Starting Thu 2/27/2020, Historical Med      vit A/vit C/vit E/zinc/copper (PRESERVISION AREDS ORAL) Take 1 tablet by mouth 2 (two) times a day., Historical  Med      naproxen sodium (ALEVE) 220 MG tablet Take 220 mg by mouth every 8 (eight) hours as needed for pain., Historical Med         STOP taking these medications       cephalexin (KEFLEX) 500 MG capsule Comments:   Reason for Stopping:                  =================================================================    HPI    Patient information was obtained from: Patient    Use of Intrepreter: N/A         Reymundo Petersen is a 80 y.o. male with a pertinent history of CAD, bradycardia, atrial fibrillation, cardiac pacemaker in situ, GERD, LBBB, and HTN who presents to this ED by walk in for evaluation of abdominal pain.    Patient endorses right abdominal pain starting at 2:00 AM. He states the pain radiates into his right flank. He currently rates the pain as a 5/10. He took Tylenol at 10:00 AM for his symptoms. He reports he had diarrhea 4-5 days ago but is no longer experiencing the symptom. Patient denies dysuria, frequency in urination, nausea, vomiting, fever, chills, leg pain, or any other complaints at this time.     Patient had a bladder infection a few days ago. He states he visited the doctor's office and was given antibiotics that have helped improve his symptoms. He had a urine sample ~1 week after starting the antibiotics and his results look good. Patient denies any urinary symptoms.     REVIEW OF SYSTEMS   Constitutional:  Denies fever, chills, weight loss or weakness, no loss of appetite  Eyes:  No pain, diplopia, or vision loss  HENT:  Denies sore throat or ear pain.    Respiratory: No SOB, wheeze or cough  Cardiovascular:  No CP, HIGUERA, palpitations, syncope  GI:  Positive for right abdominal pain. Denies nausea, vomiting, or dark, bloody stools. No diarrhea     All other systems negative unless noted in HPI    PAST MEDICAL HISTORY:  Past Medical History:   Diagnosis Date   ? Atrial fibrillation, transient (H) 8/11/2020   ? BPH (benign prostatic hyperplasia) 7/11/2018   ? 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 Coronary artery disease    ? Dilated cardiomyopathy (H) 2/11/2021   ? MARIAN (generalized anxiety disorder) 2/11/2021   ? GERD (gastroesophageal reflux disease)    ? High cholesterol    ? Hypertension    ? LBBB (left bundle branch block) 7/11/2018   ? 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   ? Pulmonary hypertension (H) 4/1/2020       PAST SURGICAL HISTORY:  Past Surgical History:   Procedure Laterality Date   ? CARPAL TUNNEL RELEASE     ? CORONARY ANGIOPLASTY WITH STENT PLACEMENT     ? CV CORONARY ANGIOGRAM N/A 10/23/2020    Procedure: Coronary Angiogram;  Surgeon: Varun Freire MD;  Location: [facility];  Service: Cardiology   ? CV LEFT HEART CATHETERIZATION WO LEFT VETRICULOGRAM Left 10/23/2020    Procedure: Left Heart Catheterization Without Left Ventriculogram;  Surgeon: Varun Freire MD;  Location: [facility];  Service: Cardiology   ? EP BIV PACEMAKER INSERT N/A 3/25/2020    Procedure: EP Biventricular Pacemaker Insertion;  Surgeon: Taylor Sandoval MD;  Location: [facility];  Service: Cardiology   ? KNEE ARTHROSCOPY     ? DE TOTAL KNEE ARTHROPLASTY Right 8/15/2019    Procedure: RIGHT  MINIMALLY TOTAL KNEE ARTHROPLASTY;  Surgeon: Keven Cerda MD;  Location: [facility];  Service: Orthopedics           CURRENT MEDICATIONS:    No current facility-administered medications on file prior to encounter.      Current Outpatient Medications on File Prior to Encounter   Medication Sig   ? aspirin 81 mg chewable tablet Chew 81 mg daily.   ? atorvastatin (LIPITOR) 10 MG tablet Take 10 mg by mouth 4 (four) times a week. Days vary    ? coQ10, ubiquinol, 200 mg cap Take 400 mg by mouth daily.    ? doxazosin (CARDURA) 8 MG tablet Take 4 mg by mouth at bedtime.          ? finasteride (PROSCAR) 5 mg tablet Take 5 mg by mouth at bedtime.          ? furosemide (LASIX) 40 MG tablet Take 20 mg by mouth daily as  needed.   ? gabapentin (NEURONTIN) 100 MG capsule TAKE ONE CAPSULE BY MOUTH EVERY MORNING AND AT NOON AND THREE CAPSULES BY MOUTH AT NIGHT   ? glucosamine/chondr coel A sod (OSTEO BI-FLEX ORAL) Take 1 tablet by mouth 2 (two) times a day.          ? losartan (COZAAR) 25 MG tablet Take 1 tablet (25 mg total) by mouth daily.   ? melatonin 10 mg Tab Take 10 mg by mouth at bedtime as needed.   ? metoprolol succinate (TOPROL-XL) 25 MG Take 1 tablet (25 mg total) by mouth at bedtime.   ? omeprazole (PRILOSEC) 20 MG capsule Take 20 mg by mouth 2 (two) times a day before meals.   ? rivaroxaban ANTICOAGULANT (XARELTO) 20 mg tablet Take 1 tablet (20 mg total) by mouth daily.   ? sertraline (ZOLOFT) 50 MG tablet Take 50 mg by mouth daily.          ? triamcinolone (KENALOG) 0.1 % cream Apply 1 application topically 2 (two) times a day as needed.   ? vit A/vit C/vit E/zinc/copper (PRESERVISION AREDS ORAL) Take 1 tablet by mouth 2 (two) times a day.       ALLERGIES:  Allergies   Allergen Reactions   ? Milk Nausea And Vomiting       FAMILY HISTORY:  Family History   Problem Relation Age of Onset   ? Heart disease Father    ? Diabetes type II Sister    ? Alzheimer's disease Mother    ? COPD Brother        SOCIAL HISTORY:   Social History     Socioeconomic History   ? Marital status:      Spouse name: None   ? Number of children: None   ? Years of education: None   ? Highest education level: None   Occupational History   ? None   Social Needs   ? Financial resource strain: Not very hard   ? Food insecurity     Worry: Never true     Inability: Never true   ? Transportation needs     Medical: No     Non-medical: No   Tobacco Use   ? Smoking status: Never Smoker   ? Smokeless tobacco: Never Used   Substance and Sexual Activity   ? Alcohol use: Yes     Frequency: 2-4 times a month     Comment: rare   ? Drug use: No   ? Sexual activity: None   Lifestyle   ? Physical activity     Days per week: None     Minutes per session: None   ?  Stress: None   Relationships   ? Social connections     Talks on phone: Three times a week     Gets together: None     Attends Buddhism service: None     Active member of club or organization: None     Attends meetings of clubs or organizations: None     Relationship status:    ? Intimate partner violence     Fear of current or ex partner: None     Emotionally abused: None     Physically abused: None     Forced sexual activity: None   Other Topics Concern   ? None   Social History Narrative   ? None       VITALS:  No data found.    PHYSICAL EXAM    General Appearance: Alert, cooperative,  appears stated age   Head:  Normocephalic, without obvious abnormality, atraumatic  Eyes:  PERRL, conjunctiva/corneas clear  ENT:  Lips, mucosa, and tongue normal; teeth and gums normal  Neck:  Supple, symmetrical, trachea midline, no adenopathy; no carotid  bruit or JVD  Chest:  No tenderness or deformity  Cardio: Regular rate and rhythm without murmur, full symmetric peripheral pulses  Pulm:  Clear to auscultation bilaterally, respirations unlabored, no wheezes, rales or rhonchi.  Back:  Symmetric, no curvature, ROM normal, no CVA tenderness  Abdomen:  Soft, bowel sounds active all four quadrants, no masses, no organomegaly. Tender on right middle abdomen. No specific tenderness on McBurney's point or costal margin.   Extremities:  Extremities normal, atraumatic, no cyanosis or edema  Skin:  Skin color, texture, turgor normal, no rashes or lesions  Lymph:  Cervical, supraclavicular, and axillary nodes normal  Neuro:  AOx3, CNs grossly intact, motor and sensory intact throughout the extremities.  Bedside cerebellar testing normal.     LAB:  All pertinent labs reviewed and interpreted.  Results for orders placed or performed during the hospital encounter of 07/02/21   Culture, Urine    Specimen: Urine, Clean Catch   Result Value Ref Range    Culture No Growth    Urinalysis-UC if Indicated   Result Value Ref Range    Color,  UA Yellow Light Yellow, Yellow    Clarity, UA Clear Clear    Glucose, UA Negative Negative    Protein, UA 20 mg/dL Negative    Bilirubin, UA Negative Negative    Urobilinogen, UA <2.0 mg/dL <2.0 mg/dL    pH, UA 5.5 5.0 - 8.0    Blood, UA Negative Negative    Ketones, UA 20 mg/dL Negative    Nitrite, UA Negative Negative    Leukocytes,  Susan/uL (!) Negative    Specific Gravity, UA 1.024 1.001 - 1.030    RBC, UA 8 (H) <=2 hpf    WBC UA 26 (H) <=5 hpf    Bacteria, UA None Seen None Seen    Squamous Epithel, UA 0 <=5 /HPF    Mucus, UA Present (!) None Seen lpf   C-Reactive Protein   Result Value Ref Range    CRP 0.4 0.0 - 0.8 mg/dL   Comprehensive Metabolic Panel   Result Value Ref Range    Sodium 141 136 - 145 mmol/L    Potassium 3.9 3.5 - 5.0 mmol/L    Chloride 110 (H) 98 - 107 mmol/L    CO2 24 22 - 31 mmol/L    Anion Gap, Calculation 7 5 - 18 mmol/L    Glucose 108 70 - 125 mg/dL    BUN 16 8 - 28 mg/dL    Creatinine 1.02 0.70 - 1.30 mg/dL    GFR MDRD Af Amer >60 >60 mL/min/1.73m2    GFR MDRD Non Af Amer >60 >60 mL/min/1.73m2    Bilirubin, Total 0.8 0.0 - 1.0 mg/dL    Calcium 8.8 8.5 - 10.5 mg/dL    Protein, Total 6.7 6.0 - 8.0 g/dL    Albumin 3.5 3.5 - 5.0 g/dL    Alkaline Phosphatase 72 45 - 120 U/L    AST 20 0 - 40 U/L    ALT <9 0 - 45 U/L   Lipase   Result Value Ref Range    Lipase <9 0 - 52 U/L   Magnesium   Result Value Ref Range    Magnesium 2.0 1.8 - 2.6 mg/dL   HM1 (CBC with Diff)   Result Value Ref Range    WBC 9.4 4.0 - 11.0 thou/uL    RBC 4.72 4.40 - 6.20 mill/uL    Hemoglobin 14.8 14.0 - 18.0 g/dL    Hematocrit 45.1 40.0 - 54.0 %    MCV 96 80 - 100 fL    MCH 31.4 27.0 - 34.0 pg    MCHC 32.8 32.0 - 36.0 g/dL    RDW 12.7 11.0 - 14.5 %    Platelets 254 140 - 440 thou/uL    MPV 9.6 8.5 - 12.5 fL    Neutrophils % 85 (H) 50 - 70 %    Lymphocytes % 8 (L) 20 - 40 %    Monocytes % 6 2 - 10 %    Eosinophils % 0 0 - 6 %    Basophils % 1 0 - 2 %    Immature Granulocyte % 0 <=0 %    Neutrophils Absolute 8.0 (H)  2.0 - 7.7 thou/uL    Lymphocytes Absolute 0.7 (L) 0.8 - 4.4 thou/uL    Monocytes Absolute 0.6 0.0 - 0.9 thou/uL    Eosinophils Absolute 0.0 0.0 - 0.4 thou/uL    Basophils Absolute 0.1 0.0 - 0.2 thou/uL    Immature Granulocyte Absolute 0.0 <=0.0 thou/uL       RADIOLOGY:  Reviewed all pertinent imaging. Please see official radiology report.  Ct Abdomen Pelvis Without Oral With Iv Contrast    Result Date: 7/2/2021  EXAM: CT ABDOMEN PELVIS WO ORAL W IV CONTRAST LOCATION: [facility] DATE/TIME: 7/2/2021 9:18 PM INDICATION: right mid abd pain rad to right flank COMPARISON: 06/24/2021 CT TECHNIQUE: CT scan of the abdomen and pelvis was performed following injection of IV contrast. Multiplanar reformats were obtained. Dose reduction techniques were used. CONTRAST: 100 mL Omnipaque 350. FINDINGS: LOWER CHEST: Normal. HEPATOBILIARY: Normal. PANCREAS: Atrophic. SPLEEN: Normal. ADRENAL GLANDS: Normal. KIDNEYS/BLADDER: Mild right hydronephrosis due to a 5 x 4 x 3 mm stone in the distal ureter just above the acetabular roof (series 3, image 142). This is approximately 4 cm from the UVJ. No renal stones. Left ureter appears normal. BOWEL: Extensive sigmoid diverticulosis. LYMPH NODES: Normal. VASCULATURE: Patent mesenteric vessels and renal arteries. PELVIC ORGANS: Mild prostate enlargement. MUSCULOSKELETAL: Bilateral hip arthropathy with right hip joint effusion. Extensive lumbar disc degeneration.     1.  Mild right hydronephrosis due to a 5 mm stone in the distal ureter 2.  no renal stones.      I, Ni Gamez, am serving as a scribe to document services personally performed by Dr. Ahumada based on my observation and the provider's statements to me. I,  Domenic Ahumada MD attest that Ni Gamez is acting in a scribe capacity, has observed my performance of the services and has documented them in accordance with my direction.    Domenic Ahumada M.D.  Emergency Medicine  [facility]  [facility] EMERGENCY ROOM  [address] [facility] [address]  Dept: [phone]  Loc: [phone]       Domenic Ahumada MD  07/08/21 0248

## 2021-07-14 PROBLEM — I25.10 CAD (CORONARY ARTERY DISEASE): Status: RESOLVED | Noted: 2018-07-11 | Resolved: 2021-05-07

## 2021-07-22 ENCOUNTER — HOSPITAL ENCOUNTER (OUTPATIENT)
Dept: CT IMAGING | Facility: CLINIC | Age: 81
Discharge: HOME | End: 2021-07-22
Attending: UROLOGY | Admitting: UROLOGY
Payer: MEDICARE

## 2021-07-22 DIAGNOSIS — N20.1 URETERAL STONE: ICD-10-CM

## 2021-07-22 DIAGNOSIS — N20.0 CALCULUS OF KIDNEY: ICD-10-CM

## 2021-07-22 PROCEDURE — 74176 CT ABD & PELVIS W/O CONTRAST: CPT

## 2021-07-23 ENCOUNTER — VIRTUAL VISIT (OUTPATIENT)
Dept: UROLOGY | Facility: CLINIC | Age: 81
End: 2021-07-23
Payer: MEDICARE

## 2021-07-23 DIAGNOSIS — N21.0 BLADDER CALCULUS: Primary | ICD-10-CM

## 2021-07-23 PROCEDURE — 99443 PR PHYSICIAN TELEPHONE EVALUATION 21-30 MIN: CPT | Performed by: PHYSICIAN ASSISTANT

## 2021-07-23 RX ORDER — DIMENHYDRINATE 50 MG
50 TABLET ORAL
Status: ON HOLD | COMMUNITY
End: 2021-09-14

## 2021-07-23 ASSESSMENT — PAIN SCALES - GENERAL: PAINLEVEL: NO PAIN (0)

## 2021-07-23 NOTE — PROGRESS NOTES
Assessment/Plan:    Assessment & Plan   Reymundo was seen today for follow up.    Diagnoses and all orders for this visit:    Bladder calculus  -     Stone analysis [GDF885]; Future  -     Patient Stated Goal: Prevent further stones    Stone Management Plan  Stone Management 7/5/2021 7/23/2021   Urinary Tract Infection No suspicion of infection No suspicion of infection   Renal Colic Well controlled symptoms Asymptomatic at this time   Renal Failure No suspicion of renal failure No suspicion of renal failure   Current CT date 7/2/2021 7/21/2021   Right sided stones? Yes No   R Number of ureteral stones 1 -   R GSD of ureteral stones 5 -   R Location of ureteral stone Distal -   R Number of kidney stones  No renal stones -   R Hydronephrosis Mild None   R Stone Event New event Resolved event   Diagnosis date 7/2/2021 -   Initial location of primary symptomatic stone Distal -   Initial GSD of primary symptomatic stone 5 -   Resolved date - 7/22/2021   R MET status Initiation Passed   R Current Plan MET -   MET 2 week F/U -   Left sided stones? No No   L Stone Event No current event No current event     PLAN    79 yo M first time stone former with migration of right distal ureteral stone into bladder, symptoms resolving.     He is congratulated on passing his stone and will continue to strain urine in hopes of retrieving the stone. We reviewed conservative dietary measures for stone prevention and he is happy to follow up on a prn basis.    Phone call duration: 14 minutes  21 minutes spent on the date of the encounter doing chart review, history and exam, documentation and further activities per the note    Yvonne Talley PA-C  [facility]    Subjective:     HPI  Mr. Reymundo Petersen is a 80 year old  male who is being evaluated via a billable telephone visit by [facility] for medical expulsive therapy follow up.     On last encounter, his 6-7  mm stone was in right distal ureter with mild hydronephrosis. He has had no unanticipated events.    He had mild right sided pain yesterday but is doing better today. He has not captured a stone. He denies symptoms of fever, chills, flank pain, nausea, vomiting, and dysuria.     New CT scan was personally reviewed and demonstrates progression of stone to bladder with resolution of previous hydronephrosis.      ROS   Review of systems is negative except for HPI.    Past Medical History:   Diagnosis Date     Atrial fibrillation, transient (H) 8/11/2020     BPH (benign prostatic hyperplasia) 7/11/2018     Chronic combined systolic and diastolic heart failure (H) 2/11/2021     Coronary artery disease      Dilated cardiomyopathy (H) 2/11/2021     MARIAN (generalized anxiety disorder) 2/11/2021     GERD (gastroesophageal reflux disease)      High cholesterol      Hypertension      LBBB (left bundle branch block) 7/11/2018     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     Pulmonary hypertension (H) 4/1/2020     Past Surgical History:   Procedure Laterality Date     ANGIOPLASTY       ARTHROSCOPY KNEE       C TOTAL KNEE ARTHROPLASTY Right 8/15/2019    Procedure: RIGHT  MINIMALLY TOTAL KNEE ARTHROPLASTY;  Surgeon: Keven Cerda MD;  Location: [facility];  Service: Orthopedics     CV CORONARY ANGIOGRAM N/A 10/23/2020    Procedure: Coronary Angiogram;  Surgeon: Varun Freire MD;  Location: [facility];  Service: Cardiology     CV LEFT HEART CATHETERIZATION WITHOUT LEFT VENTRICULOGRAM Left 10/23/2020    Procedure: Left Heart Catheterization Without Left Ventriculogram;  Surgeon: Varun Freire MD;  Location: [facility];  Service: Cardiology     EP BIV PACEMAKER INSERT N/A 3/25/2020    Procedure: EP Biventricular Pacemaker Insertion;  Surgeon: Taylor Sandoval MD;  Location: [facility];  Service: Cardiology     RELEASE CARPAL TUNNEL       Current Outpatient Medications    Medication Sig Dispense Refill     aspirin 81 mg chewable tablet [ASPIRIN 81 MG CHEWABLE TABLET] Chew 81 mg daily.       atorvastatin (LIPITOR) 10 MG tablet [ATORVASTATIN (LIPITOR) 10 MG TABLET] Take 10 mg by mouth 4 (four) times a week. Days vary        coQ10, ubiquinol, 200 mg cap [COQ10, UBIQUINOL, 200 MG CAP] Take 400 mg by mouth daily.        doxazosin (CARDURA) 8 MG tablet [DOXAZOSIN (CARDURA) 8 MG TABLET] Take 4 mg by mouth at bedtime.              finasteride (PROSCAR) 5 mg tablet [FINASTERIDE (PROSCAR) 5 MG TABLET] Take 5 mg by mouth at bedtime.              furosemide (LASIX) 40 MG tablet [FUROSEMIDE (LASIX) 40 MG TABLET] Take 20 mg by mouth daily as needed.       gabapentin (NEURONTIN) 100 MG capsule [GABAPENTIN (NEURONTIN) 100 MG CAPSULE] TAKE ONE CAPSULE BY MOUTH EVERY MORNING AND AT NOON AND THREE CAPSULES BY MOUTH AT NIGHT       glucosamine/chondr cloe A sod (OSTEO BI-FLEX ORAL) [GLUCOSAMINE/CHONDR COLE A SOD (OSTEO BI-FLEX ORAL)] Take 1 tablet by mouth 2 (two) times a day.              losartan (COZAAR) 25 MG tablet [LOSARTAN (COZAAR) 25 MG TABLET] Take 1 tablet (25 mg total) by mouth daily. 30 tablet 0     melatonin 10 mg Tab [MELATONIN 10 MG TAB] Take 10 mg by mouth at bedtime as needed.       metoprolol succinate (TOPROL-XL) 25 MG [METOPROLOL SUCCINATE (TOPROL-XL) 25 MG] Take 1 tablet (25 mg total) by mouth at bedtime. 90 tablet 11     naproxen sodium (ALEVE) 220 MG tablet [NAPROXEN SODIUM (ALEVE) 220 MG TABLET] Take 220 mg by mouth every 8 (eight) hours as needed for pain.       omeprazole (PRILOSEC) 20 MG capsule [OMEPRAZOLE (PRILOSEC) 20 MG CAPSULE] Take 20 mg by mouth 2 (two) times a day before meals.       rivaroxaban ANTICOAGULANT (XARELTO) 20 mg tablet [RIVAROXABAN ANTICOAGULANT (XARELTO) 20 MG TABLET] Take 1 tablet (20 mg total) by mouth daily. 30 tablet 11     sertraline (ZOLOFT) 50 MG tablet [SERTRALINE (ZOLOFT) 50 MG TABLET] Take 50 mg by mouth daily.              triamcinolone (KENALOG)  0.1 % cream [TRIAMCINOLONE (KENALOG) 0.1 % CREAM] Apply 1 application topically 2 (two) times a day as needed.       vit A/vit C/vit E/zinc/copper (PRESERVISION AREDS ORAL) [VIT A/VIT C/VIT E/ZINC/COPPER (PRESERVISION AREDS ORAL)] Take 1 tablet by mouth 2 (two) times a day.       Allergies   Allergen Reactions     Milk [Lac Bovis] Nausea and Vomiting     Social History     Socioeconomic History     Marital status:      Spouse name: Not on file     Number of children: Not on file     Years of education: Not on file     Highest education level: Not on file   Occupational History     Not on file   Tobacco Use     Smoking status: Never Smoker     Smokeless tobacco: Never Used   Substance and Sexual Activity     Alcohol use: Yes     Comment: Alcoholic Drinks/day: rare     Drug use: No     Sexual activity: Not on file   Other Topics Concern     Not on file   Social History Narrative     Not on file     Social Determinants of Health     Financial Resource Strain:      Difficulty of Paying Living Expenses:    Food Insecurity:      Worried About Running Out of Food in the Last Year:      Ran Out of Food in the Last Year:    Transportation Needs:      Lack of Transportation (Medical):      Lack of Transportation (Non-Medical):    Physical Activity:      Days of Exercise per Week:      Minutes of Exercise per Session:    Stress:      Feeling of Stress :    Social Connections:      Frequency of Communication with Friends and Family:      Frequency of Social Gatherings with Friends and Family:      Attends [employer]ational Services:      Active Member of Clubs or Organizations:      Attends Club or Organization Meetings:      Marital Status:    Intimate Partner Violence:      Fear of Current or Ex-Partner:      Emotionally Abused:      Physically Abused:      Sexually Abused:      Family History   Problem Relation Age of Onset     Heart Disease Father      Diabetes Type 2  Sister      Alzheimer Disease Mother      Chronic  Obstructive Pulmonary Disease Brother      Objective:     No vitals or physical exam obtained due to virtual visit

## 2021-07-23 NOTE — PATIENT INSTRUCTIONS
Calcium Oxalate Stone Prevention Self Management    Drink more fluids:    Drinking more liquids is the best way you can help prevent future stones. Stones can form when substances in the urine are too concentrated. The more you drink, the more urine you will make. This means all substances in the urine will be less concentrated.    How much urine should I be producing?    The usual recommended daily urine production is about 2 to 3 quarts (1144-7643 ml). If you are producing more than 3 quarts of urine on a regular basis, it is possible to deplete important minerals stored in the body.    To measure the amount of urine you produce in a day, you can either:    Collect all urine in a container and measure at the end of the day     Use a measuring cup each time you urinate and add up the amounts at the end of the day     Observe    Color - Dark yahir urine is concentrated. Light straw color or lighter is dilute and desirable     Odor - Concentrated urine tends to smell stronger. Dilute urine is nearly odorless    Ways to increase your fluid intake    Increasing the amount of fluid you drink is effective for all types of kidney stones. While water is commonly recommended, all fluids are effective for increasing the amount of urine your body produces.    Focus on starting a lifelong habit, rather than a short-term solution.     Keep liquids on hand that you like. Crystal Light is a low calorie appropriate choice.    Drink out of larger glasses. You'll tend to drink more with each serving.     Have an additional glass of fluid with each meal.     Keep a water or drink bottle at work and fill it regularly.     *If you are prone to fluid retention, consult your doctor before making changes to your fluid habits.    Low Oxalate Diet:    Avoid excess amounts or daily consumption of these foods:    All nuts and nut products including peanuts, almonds, pecans, peanut butter, almond milk    Rhubarb    Chocolate    Soybeans and  soy products     Spinach    Wheat Germ    Beets    Maintain a normal calcium diet:    Researches have found that people with low calcium intakes tend to have more stones. Foods with high calcium content are acceptable and include:    Dairy products (including milk, cheese and yogurt)    Meat and fish    Enriched cereals    Dark green vegetables    What about calcium supplements?     Many people take calcium supplements, either on their own or as prescribed by a doctor. Research has indicated that calcium supplements do not usually pose a risk for stone formation.  Calcium citrate is a better choice for a supplement.    Avoid excess salt:    Salt (sodium chloride) is found in abundance in many foods. High sodium levels in the urine can interfere with the kidney's handling of calcium.     Tips for reducing the salt in your diet:    Don't use salt at the table    Reduce the salt used in food preparation. Try 1/2 teaspoon when recipes call for 1 teaspoon.    Use herbs and spices for flavoring instead of salt.    Avoid salty foods.    Check the label before you buy or use a product. Note sodium and portion size information.    Try to consume less than 2,000 mg/day. (1 teaspoon = 2,000 mg)    Foods with high sodium content include:    Processed meat (including luncheon meats, sausage)     Crackers     Instant cereal     Processed cheese     Canned soups     Chips and snack foods     Soy sauce    [facility] can respond to your questions or concerns 24 hours a day at [phone].      Patient Stated Goal: Prevent further stones

## 2021-07-23 NOTE — PROGRESS NOTES
Patient is roomed via telephone for a virtual visit.  Patient confirmed he is in the [facility].  Patient understands that this virtual visit is billable and agree to proceed with appointment.

## 2021-07-24 ENCOUNTER — APPOINTMENT (OUTPATIENT)
Dept: CT IMAGING | Facility: CLINIC | Age: 81
End: 2021-07-24
Attending: EMERGENCY MEDICINE
Payer: MEDICARE

## 2021-07-24 ENCOUNTER — HOSPITAL ENCOUNTER (EMERGENCY)
Facility: CLINIC | Age: 81
Discharge: HOME | End: 2021-07-24
Attending: EMERGENCY MEDICINE | Admitting: EMERGENCY MEDICINE
Payer: MEDICARE

## 2021-07-24 VITALS
WEIGHT: 185 LBS | OXYGEN SATURATION: 97 % | DIASTOLIC BLOOD PRESSURE: 70 MMHG | SYSTOLIC BLOOD PRESSURE: 142 MMHG | BODY MASS INDEX: 25.9 KG/M2 | TEMPERATURE: 98.1 F | HEART RATE: 65 BPM | HEIGHT: 71 IN | RESPIRATION RATE: 16 BRPM
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
## 2021-07-24 DIAGNOSIS — S00.81XA FOREHEAD ABRASION, INITIAL ENCOUNTER: ICD-10-CM

## 2021-07-24 DIAGNOSIS — S51.011A SKIN TEAR OF ELBOW WITHOUT COMPLICATION, RIGHT, INITIAL ENCOUNTER: ICD-10-CM

## 2021-07-24 PROCEDURE — 250N000009 HC RX 250: Performed by: EMERGENCY MEDICINE

## 2021-07-24 PROCEDURE — 272N000047 HC ADHESIVE DERMABOND SKIN

## 2021-07-24 PROCEDURE — 72125 CT NECK SPINE W/O DYE: CPT

## 2021-07-24 PROCEDURE — 12004 RPR S/N/AX/GEN/TRK7.6-12.5CM: CPT

## 2021-07-24 PROCEDURE — 70450 CT HEAD/BRAIN W/O DYE: CPT

## 2021-07-24 PROCEDURE — 99284 EMERGENCY DEPT VISIT MOD MDM: CPT | Mod: 25

## 2021-07-24 RX ORDER — LIDOCAINE 40 MG/G
CREAM TOPICAL
Status: DISCONTINUED | OUTPATIENT
Start: 2021-07-24 | End: 2021-07-24

## 2021-07-24 RX ORDER — BACITRACIN ZINC 500 [USP'U]/G
OINTMENT TOPICAL ONCE
Status: COMPLETED | OUTPATIENT
Start: 2021-07-24 | End: 2021-07-24

## 2021-07-24 RX ADMIN — BACITRACIN ZINC: 500 OINTMENT TOPICAL at 14:49

## 2021-07-24 ASSESSMENT — MIFFLIN-ST. JEOR: SCORE: 1561.28

## 2021-07-24 NOTE — ED PROVIDER NOTES
EMERGENCY DEPARTMENT ENCOUNTER      NAME: Reymundo Petersen  AGE: 80 year old male  YOB: 1940  MRN: [id number]  EVALUATION DATE & TIME: No admission date for patient encounter.    PCP: Jamie Noguera    ED PROVIDER: Ana Cristina Roy M.D.      Chief Complaint   Patient presents with     Fall     Head Injury       FINAL IMPRESSION:  1. Fall, initial encounter    2. Forehead abrasion, initial encounter    3. Skin tear of elbow without complication, right, initial encounter          ED COURSE & MEDICAL DECISION MAKING:    ED Course as of Jul 24 1412   Sat Jul 24, 2021   1314 Pt seen by me stat in triage as trauma alert s/p fall and right temporal scalp hematoma with brief LOC on xarelto, stat to CT head and c-spine, no EtOH involved, pt without AMS and neurovascularly intact at this time, stat to CT. Pt with right elbow skin tear and will clean and dermabond repair now       1325 Tetanus booster updated and skin tear repaired by me with dermabond, substantial avulsed skin noted but covered with dermabond to protect region after extensive gentle irrigation and cleaning, pt in CT now and adacel ordered      1410 Tetanus booster not administered as pt notes he got his 2017. Pt neurovascrularly intact, well appearing and GCS 15 with CT head without intracrnaial pathology and CT c-spine normal. Wound care performed. Patient discharged after being provided with extensive anticipatory guidance and given return precautions, importance of PMD follow-up emphasized.           Pertinent Labs & Imaging studies reviewed. (See chart for details)    1:05 PM Trauma alert initiated.  1:06 PM I met with the patient in triage and performed my initial exam while wearing a surgical mask.  1:22 PM Laceration procedure performed.    At the conclusion of the encounter I discussed the results of all of the tests and the disposition. The questions were answered. The patient or family acknowledged understanding and was agreeable with the  care plan.     MEDICATIONS GIVEN IN THE EMERGENCY:  Medications   bacitracin ointment (has no administration in time range)       NEW PRESCRIPTIONS STARTED AT TODAY'S ER VISIT  New Prescriptions    No medications on file       =================================================================    HPI    Reymundo Petersen is a 80 year old male with PMHx of atrial fibrillation on xarelto, hypertension, hyperlipidemia, coronary artery disease, and pacemaker in place who presents to the ED today via walk-in for evaluation of a head injury post status fall.     Patient presents post status fall that occurred 45 minutes prior to emergency department arrival. Patient reports loosing his balance and falling, which resulted in hitting his head on the concrete. He had a brief moment of loss of consciousness. He gained a right temporal scalp hematoma, right elbow skin tare, and right shoulder abrasion. He reports feeling back to his baseline now, but is somewhat fatigued. Denies neck pain, back pain, hip pain, sensation changes, or any other complaints at this time. Patient is on Xarelto. Last Tetanus is unknown.      REVIEW OF SYSTEMS   All other systems reviewed and are negative except as noted above in HPI.    PAST MEDICAL HISTORY:  Past Medical History:   Diagnosis Date     Atrial fibrillation, transient (H) 8/11/2020     BPH (benign prostatic hyperplasia) 7/11/2018     Chronic combined systolic and diastolic heart failure (H) 2/11/2021     Coronary artery disease      Dilated cardiomyopathy (H) 2/11/2021     MARIAN (generalized anxiety disorder) 2/11/2021     GERD (gastroesophageal reflux disease)      High cholesterol      Hypertension      LBBB (left bundle branch block) 7/11/2018     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     Pulmonary hypertension (H) 4/1/2020       PAST SURGICAL HISTORY:  Past Surgical History:   Procedure Laterality Date     ANGIOPLASTY       ARTHROSCOPY KNEE       C TOTAL KNEE ARTHROPLASTY  Right 8/15/2019    Procedure: RIGHT  MINIMALLY TOTAL KNEE ARTHROPLASTY;  Surgeon: Keven Cerda MD;  Location: [facility];  Service: Orthopedics     CV CORONARY ANGIOGRAM N/A 10/23/2020    Procedure: Coronary Angiogram;  Surgeon: Varun Freire MD;  Location: [facility];  Service: Cardiology     CV LEFT HEART CATHETERIZATION WITHOUT LEFT VENTRICULOGRAM Left 10/23/2020    Procedure: Left Heart Catheterization Without Left Ventriculogram;  Surgeon: Varun Freire MD;  Location: [facility];  Service: Cardiology     EP BIV PACEMAKER INSERT N/A 3/25/2020    Procedure: EP Biventricular Pacemaker Insertion;  Surgeon: Taylor Sandoval MD;  Location: [facility];  Service: Cardiology     RELEASE CARPAL TUNNEL         CURRENT MEDICATIONS:    aspirin 81 mg chewable tablet  atorvastatin (LIPITOR) 10 MG tablet  dimenhyDRINATE (DRAMAMINE) 50 MG tablet  doxazosin (CARDURA) 8 MG tablet  finasteride (PROSCAR) 5 mg tablet  furosemide (LASIX) 40 MG tablet  gabapentin (NEURONTIN) 100 MG capsule  glucosamine/chondr cole A sod (OSTEO BI-FLEX ORAL)  losartan (COZAAR) 25 MG tablet  melatonin 10 mg Tab  metoprolol succinate (TOPROL-XL) 25 MG  omeprazole (PRILOSEC) 20 MG capsule  rivaroxaban ANTICOAGULANT (XARELTO) 20 mg tablet  sertraline (ZOLOFT) 50 MG tablet  triamcinolone (KENALOG) 0.1 % cream  vit A/vit C/vit E/zinc/copper (PRESERVISION AREDS ORAL)        ALLERGIES:  Allergies   Allergen Reactions     Milk [Lac Bovis] Nausea and Vomiting       FAMILY HISTORY:  Family History   Problem Relation Age of Onset     Heart Disease Father      Diabetes Type 2  Sister      Alzheimer Disease Mother      Chronic Obstructive Pulmonary Disease Brother        SOCIAL HISTORY:   Social History     Socioeconomic History     Marital status:      Spouse name: Not on file     Number of children: Not on file     Years of education: Not on file     Highest education level: Not on file  "  Occupational History     Not on file   Tobacco Use     Smoking status: Never Smoker     Smokeless tobacco: Never Used   Substance and Sexual Activity     Alcohol use: Yes     Comment: Alcoholic Drinks/day: rare     Drug use: No     Sexual activity: Not on file   Other Topics Concern     Not on file   Social History Narrative     Not on file     Social Determinants of Health     Financial Resource Strain:      Difficulty of Paying Living Expenses:    Food Insecurity:      Worried About Running Out of Food in the Last Year:      Ran Out of Food in the Last Year:    Transportation Needs:      Lack of Transportation (Medical):      Lack of Transportation (Non-Medical):    Physical Activity:      Days of Exercise per Week:      Minutes of Exercise per Session:    Stress:      Feeling of Stress :    Social Connections:      Frequency of Communication with Friends and Family:      Frequency of Social Gatherings with Friends and Family:      Attends [employer] Services:      Active Member of Clubs or Organizations:      Attends Club or Organization Meetings:      Marital Status:    Intimate Partner Violence:      Fear of Current or Ex-Partner:      Emotionally Abused:      Physically Abused:      Sexually Abused:        VITALS:  Patient Vitals for the past 24 hrs:   BP Temp Pulse Resp SpO2 Height Weight   07/24/21 1305 (!) 152/82 98.1  F (36.7  C) 67 16 98 % 1.803 m (5' 11\") 83.9 kg (185 lb)       PHYSICAL EXAM    VITAL SIGNS: BP (!) 152/82   Pulse 67   Temp 98.1  F (36.7  C)   Resp 16   Ht 1.803 m (5' 11\")   Wt 83.9 kg (185 lb)   SpO2 98%   BMI 25.80 kg/m     GENERAL: Awake, alert.  In no acute distress. GCS 15  HEENT: Right temporal scalp 6 x 5 cm hematoma with overlying abrasion. Normocephalic. Pupils equal, round and reactive. Conjunctiva normal.  EOMI. No moralez sign, no racoon eyes, no mastoid tenderness, no hemotympanum, no facial instability, no nasal bridge pain, no nasal septal hematoma, no intraoral " lacerations, no loose teeth, no mandible pain or deformity  NECK: No stridor or apparent deformity. No midline pain to palpation.  PULMONARY: Symmetrical breath sounds without distress.  Lungs clear to auscultation bilaterally without wheezes, rhonchi or rales.  CARDIO: Regular rate and rhythm.  No significant murmur, rub or gallop.  Radial pulses strong and symmetrical.  THORAX: No focal chest wall deformity or crepitus  BACK: No focal tenderness or deformity to each vertebral level in midline  ABDOMINAL: Abdomen soft, non-distended and non-tender to palpation.  No CVAT, BL.  EXTREMITIES: No lower extremity swelling or edema. Pelvis stable and without focal tenderness. Bilateral pedal pulses 2+ and equal. Right elbow irregularly shaped with 9 cm skin tare without active bleeding. Right lateral shoulder 6 x 2 cm abrasion.  NEURO: Alert and oriented to person, place and time.  Cranial nerves grossly intact.  No focal motor deficit. Sensation globally intact.  PSYCH: Normal mood and affect  SKIN: No rashes      LAB:  All pertinent labs reviewed and interpreted.  Results for orders placed or performed during the hospital encounter of 07/24/21   CT Head w/o Contrast    Impression    IMPRESSION:  1.  No acute intracranial process.  2.  Moderate right frontal skull hematoma. No calvarial fracture.  3.  Mild chronic small vessel ischemic disease and mild to moderate generalized brain parenchymal volume loss.   CT Cervical Spine w/o Contrast    Impression    IMPRESSION:  1.  No fracture or traumatic subluxation of the cervical spine.  2.  Advanced multilevel cervical spondylosis.         RADIOLOGY:  Reviewed all pertinent imaging. Please see official radiology report.  CT Cervical Spine w/o Contrast   Preliminary Result   IMPRESSION:   1.  No fracture or traumatic subluxation of the cervical spine.   2.  Advanced multilevel cervical spondylosis.         CT Head w/o Contrast   Final Result   IMPRESSION:   1.  No acute  intracranial process.   2.  Moderate right frontal skull hematoma. No calvarial fracture.   3.  Mild chronic small vessel ischemic disease and mild to moderate generalized brain parenchymal volume loss.                PROCEDURE:  PROCEDURE: Laceration Repair   INDICATIONS: Laceration   PROCEDURE PROVIDER: Ana Cristina Roy    SITE: Right elbow   TYPE/SIZE: simple, clean and no foreign body visualized  9 cm (total length)   FUNCTIONAL ASSESSMENT: Distal sensation, circulation and motor intact   MEDICATION: None   PREPARATION: irrigation with Normal saline   DEBRIDEMENT: no debridement   CLOSURE:  Wound was closed in   Dermabond (medical glue)             I, Ayala Lucas, am serving as a scribe to document services personally performed by Dr. Ana Cristina Roy based on my observation and the provider's statements to me. Ana Cristina CHADWICK MD attest that ayala Lucas is acting in a scribe capacity, has observed my performance of the services and has documented them in accordance with my direction.     Ana Cristina Roy MD  07/24/21 1467

## 2021-07-24 NOTE — ED TRIAGE NOTES
Fell while walking up concrete steps hitting his head on the concrete.  Pt takes thinners for a-fib and a pacemaker.  Brief LOC  Hematoma right side of his head and lacerations and hematomas to right arm.  Bleeding controlled.

## 2021-08-16 ENCOUNTER — ANCILLARY PROCEDURE (OUTPATIENT)
Dept: CARDIOLOGY | Facility: CLINIC | Age: 81
End: 2021-08-16
Attending: INTERNAL MEDICINE
Payer: MEDICARE

## 2021-08-16 DIAGNOSIS — Z95.0 PRESENCE OF BIVENTRICULAR CARDIAC PACEMAKER: ICD-10-CM

## 2021-08-16 PROCEDURE — 93294 REM INTERROG EVL PM/LDLS PM: CPT | Performed by: INTERNAL MEDICINE

## 2021-08-16 PROCEDURE — 93296 REM INTERROG EVL PM/IDS: CPT | Performed by: INTERNAL MEDICINE

## 2021-08-17 LAB
MDC_IDC_LEAD_IMPLANT_DT: NORMAL
MDC_IDC_LEAD_LOCATION: NORMAL
MDC_IDC_LEAD_LOCATION_DETAIL_1: NORMAL
MDC_IDC_LEAD_MFG: NORMAL
MDC_IDC_LEAD_MODEL: NORMAL
MDC_IDC_LEAD_POLARITY_TYPE: NORMAL
MDC_IDC_LEAD_SERIAL: NORMAL
MDC_IDC_LEAD_SPECIAL_FUNCTION: NORMAL
MDC_IDC_MSMT_BATTERY_DTM: NORMAL
MDC_IDC_MSMT_BATTERY_REMAINING_LONGEVITY: 99 MO
MDC_IDC_MSMT_BATTERY_RRT_TRIGGER: 2.6
MDC_IDC_MSMT_BATTERY_STATUS: NORMAL
MDC_IDC_MSMT_BATTERY_VOLTAGE: 3.01 V
MDC_IDC_MSMT_LEADCHNL_LV_IMPEDANCE_VALUE: 209 OHM
MDC_IDC_MSMT_LEADCHNL_LV_IMPEDANCE_VALUE: 266 OHM
MDC_IDC_MSMT_LEADCHNL_LV_IMPEDANCE_VALUE: 285 OHM
MDC_IDC_MSMT_LEADCHNL_LV_IMPEDANCE_VALUE: 342 OHM
MDC_IDC_MSMT_LEADCHNL_LV_IMPEDANCE_VALUE: 380 OHM
MDC_IDC_MSMT_LEADCHNL_LV_PACING_THRESHOLD_AMPLITUDE: 1.5 V
MDC_IDC_MSMT_LEADCHNL_LV_PACING_THRESHOLD_PULSEWIDTH: 0.4 MS
MDC_IDC_MSMT_LEADCHNL_RA_IMPEDANCE_VALUE: 285 OHM
MDC_IDC_MSMT_LEADCHNL_RA_IMPEDANCE_VALUE: 456 OHM
MDC_IDC_MSMT_LEADCHNL_RA_PACING_THRESHOLD_AMPLITUDE: 0.75 V
MDC_IDC_MSMT_LEADCHNL_RA_PACING_THRESHOLD_PULSEWIDTH: 0.4 MS
MDC_IDC_MSMT_LEADCHNL_RA_SENSING_INTR_AMPL: 1.25 MV
MDC_IDC_MSMT_LEADCHNL_RA_SENSING_INTR_AMPL: 1.25 MV
MDC_IDC_MSMT_LEADCHNL_RV_IMPEDANCE_VALUE: 304 OHM
MDC_IDC_MSMT_LEADCHNL_RV_IMPEDANCE_VALUE: 342 OHM
MDC_IDC_MSMT_LEADCHNL_RV_PACING_THRESHOLD_AMPLITUDE: 0.62 V
MDC_IDC_MSMT_LEADCHNL_RV_PACING_THRESHOLD_PULSEWIDTH: 0.4 MS
MDC_IDC_MSMT_LEADCHNL_RV_SENSING_INTR_AMPL: 1.62 MV
MDC_IDC_MSMT_LEADCHNL_RV_SENSING_INTR_AMPL: 1.62 MV
MDC_IDC_PG_IMPLANT_DTM: NORMAL
MDC_IDC_PG_MFG: NORMAL
MDC_IDC_PG_MODEL: NORMAL
MDC_IDC_PG_SERIAL: NORMAL
MDC_IDC_PG_TYPE: NORMAL
MDC_IDC_SESS_CLINIC_NAME: NORMAL
MDC_IDC_SESS_DTM: NORMAL
MDC_IDC_SESS_TYPE: NORMAL
MDC_IDC_SET_BRADY_AT_MODE_SWITCH_RATE: 171 {BEATS}/MIN
MDC_IDC_SET_BRADY_LOWRATE: 60 {BEATS}/MIN
MDC_IDC_SET_BRADY_MAX_SENSOR_RATE: 120 {BEATS}/MIN
MDC_IDC_SET_BRADY_MAX_TRACKING_RATE: 120 {BEATS}/MIN
MDC_IDC_SET_BRADY_MODE: NORMAL
MDC_IDC_SET_BRADY_PAV_DELAY_HIGH: 100 MS
MDC_IDC_SET_BRADY_PAV_DELAY_LOW: 170 MS
MDC_IDC_SET_BRADY_SAV_DELAY_HIGH: 70 MS
MDC_IDC_SET_BRADY_SAV_DELAY_LOW: 140 MS
MDC_IDC_SET_CRT_LVRV_DELAY: 80 MS
MDC_IDC_SET_CRT_PACED_CHAMBERS: NORMAL
MDC_IDC_SET_LEADCHNL_LV_PACING_AMPLITUDE: 2 V
MDC_IDC_SET_LEADCHNL_LV_PACING_ANODE_ELECTRODE_1: NORMAL
MDC_IDC_SET_LEADCHNL_LV_PACING_ANODE_LOCATION_1: NORMAL
MDC_IDC_SET_LEADCHNL_LV_PACING_CAPTURE_MODE: NORMAL
MDC_IDC_SET_LEADCHNL_LV_PACING_CATHODE_ELECTRODE_1: NORMAL
MDC_IDC_SET_LEADCHNL_LV_PACING_CATHODE_LOCATION_1: NORMAL
MDC_IDC_SET_LEADCHNL_LV_PACING_POLARITY: NORMAL
MDC_IDC_SET_LEADCHNL_LV_PACING_PULSEWIDTH: 0.4 MS
MDC_IDC_SET_LEADCHNL_RA_PACING_AMPLITUDE: 1.5 V
MDC_IDC_SET_LEADCHNL_RA_PACING_ANODE_ELECTRODE_1: NORMAL
MDC_IDC_SET_LEADCHNL_RA_PACING_ANODE_LOCATION_1: NORMAL
MDC_IDC_SET_LEADCHNL_RA_PACING_CAPTURE_MODE: NORMAL
MDC_IDC_SET_LEADCHNL_RA_PACING_CATHODE_ELECTRODE_1: NORMAL
MDC_IDC_SET_LEADCHNL_RA_PACING_CATHODE_LOCATION_1: NORMAL
MDC_IDC_SET_LEADCHNL_RA_PACING_POLARITY: NORMAL
MDC_IDC_SET_LEADCHNL_RA_PACING_PULSEWIDTH: 0.4 MS
MDC_IDC_SET_LEADCHNL_RA_SENSING_ANODE_ELECTRODE_1: NORMAL
MDC_IDC_SET_LEADCHNL_RA_SENSING_ANODE_LOCATION_1: NORMAL
MDC_IDC_SET_LEADCHNL_RA_SENSING_CATHODE_ELECTRODE_1: NORMAL
MDC_IDC_SET_LEADCHNL_RA_SENSING_CATHODE_LOCATION_1: NORMAL
MDC_IDC_SET_LEADCHNL_RA_SENSING_POLARITY: NORMAL
MDC_IDC_SET_LEADCHNL_RA_SENSING_SENSITIVITY: 0.15 MV
MDC_IDC_SET_LEADCHNL_RV_PACING_AMPLITUDE: 2 V
MDC_IDC_SET_LEADCHNL_RV_PACING_ANODE_ELECTRODE_1: NORMAL
MDC_IDC_SET_LEADCHNL_RV_PACING_ANODE_LOCATION_1: NORMAL
MDC_IDC_SET_LEADCHNL_RV_PACING_CAPTURE_MODE: NORMAL
MDC_IDC_SET_LEADCHNL_RV_PACING_CATHODE_ELECTRODE_1: NORMAL
MDC_IDC_SET_LEADCHNL_RV_PACING_CATHODE_LOCATION_1: NORMAL
MDC_IDC_SET_LEADCHNL_RV_PACING_POLARITY: NORMAL
MDC_IDC_SET_LEADCHNL_RV_PACING_PULSEWIDTH: 0.4 MS
MDC_IDC_SET_LEADCHNL_RV_SENSING_ANODE_ELECTRODE_1: NORMAL
MDC_IDC_SET_LEADCHNL_RV_SENSING_ANODE_LOCATION_1: NORMAL
MDC_IDC_SET_LEADCHNL_RV_SENSING_CATHODE_ELECTRODE_1: NORMAL
MDC_IDC_SET_LEADCHNL_RV_SENSING_CATHODE_LOCATION_1: NORMAL
MDC_IDC_SET_LEADCHNL_RV_SENSING_POLARITY: NORMAL
MDC_IDC_SET_LEADCHNL_RV_SENSING_SENSITIVITY: 0.9 MV
MDC_IDC_SET_ZONE_DETECTION_INTERVAL: 350 MS
MDC_IDC_SET_ZONE_DETECTION_INTERVAL: 400 MS
MDC_IDC_SET_ZONE_TYPE: NORMAL
MDC_IDC_STAT_AT_BURDEN_PERCENT: 0 %
MDC_IDC_STAT_AT_DTM_END: NORMAL
MDC_IDC_STAT_AT_DTM_START: NORMAL
MDC_IDC_STAT_BRADY_AP_VP_PERCENT: 99.16 %
MDC_IDC_STAT_BRADY_AP_VS_PERCENT: 0.04 %
MDC_IDC_STAT_BRADY_AS_VP_PERCENT: 0.66 %
MDC_IDC_STAT_BRADY_AS_VS_PERCENT: 0.13 %
MDC_IDC_STAT_BRADY_DTM_END: NORMAL
MDC_IDC_STAT_BRADY_DTM_START: NORMAL
MDC_IDC_STAT_BRADY_RA_PERCENT_PACED: 99.23 %
MDC_IDC_STAT_BRADY_RV_PERCENT_PACED: 99.82 %
MDC_IDC_STAT_CRT_DTM_END: NORMAL
MDC_IDC_STAT_CRT_DTM_START: NORMAL
MDC_IDC_STAT_CRT_LV_PERCENT_PACED: 99.79 %
MDC_IDC_STAT_CRT_PERCENT_PACED: 99.79 %
MDC_IDC_STAT_EPISODE_RECENT_COUNT: 0
MDC_IDC_STAT_EPISODE_RECENT_COUNT_DTM_END: NORMAL
MDC_IDC_STAT_EPISODE_RECENT_COUNT_DTM_START: NORMAL
MDC_IDC_STAT_EPISODE_TOTAL_COUNT: 0
MDC_IDC_STAT_EPISODE_TOTAL_COUNT: 0
MDC_IDC_STAT_EPISODE_TOTAL_COUNT: 1
MDC_IDC_STAT_EPISODE_TOTAL_COUNT: 3
MDC_IDC_STAT_EPISODE_TOTAL_COUNT: 45
MDC_IDC_STAT_EPISODE_TOTAL_COUNT_DTM_END: NORMAL
MDC_IDC_STAT_EPISODE_TOTAL_COUNT_DTM_START: NORMAL
MDC_IDC_STAT_EPISODE_TYPE: NORMAL

## 2021-08-22 ENCOUNTER — HEALTH MAINTENANCE LETTER (OUTPATIENT)
Age: 81
End: 2021-08-22

## 2021-08-30 ENCOUNTER — TELEPHONE (OUTPATIENT)
Dept: CARDIOLOGY | Facility: CLINIC | Age: 81
End: 2021-08-30

## 2021-08-31 NOTE — TELEPHONE ENCOUNTER
Type: alert remote CRT-P transmission for AT/AF exceeding burden.  Presenting rhythm: atrial fibrillation with biV pacing rate 60 bpm.  Battery/lead status: stable  Arrhythmias: since 8/16/21, one AF episode in progress since 8/28/21 at 9:40am, burden 2%, V-rates >/=120 bpm 1%. No ventricular high rates detected.  Anticoagulant: rivaroxaban  Comments: normal pacemaker function. BiV pacing 99.7%. Routed to device RN.  SID Avila, Device Specialist     Remote transmission reviewed from weekend. Was in AF at that time, had patient send repeat remote this morning. Shows he is still in A.fib since 8/28/21 with controlled V-rates/BiVpaced ~99%. Confirms he is taking Xarelto 20 mg daily and ASA 81 mg every other day per previous recommendations. Also taking Toprol XL 21 mg daily.     States on Saturday he was feeling a bit tired, but today is reporting no correlating troubles with A.fib. Denies unusual fatigue, edema, or shortness of breath. Reports rarely having to use his prn Lasix. OptiVol report in good parameters.     Advised that he call with any troublesome symptoms. Verbalized understanding. Will review with Dr. Leigh.     Shannon Castro, RN

## 2021-09-01 NOTE — TELEPHONE ENCOUNTER
Jorge A Leigh MD Gebel, Shannon KUNZ RN  Caller: Unspecified (2 days ago,  4:41 PM)  As long as see remains asymptomatic I would make no acute changes.  However, given his other medical morbidities can he come in to see the A. fib nurse practitioners to see if they feel that an attempt at restoration of sinus rhythm with cardioversion is reasonable provided he is on chronic Xarelto hopefully 20 mg.  LF       Above noted, message sent to schedulers to arrange follow up.   Shannon Castro, RN

## 2021-09-07 ENCOUNTER — ANCILLARY PROCEDURE (OUTPATIENT)
Dept: CARDIOLOGY | Facility: CLINIC | Age: 81
End: 2021-09-07
Attending: INTERNAL MEDICINE
Payer: COMMERCIAL

## 2021-09-07 DIAGNOSIS — Z95.0 STATUS POST BIVENTRICULAR PACEMAKER: ICD-10-CM

## 2021-09-07 LAB
MDC_IDC_LEAD_IMPLANT_DT: NORMAL
MDC_IDC_LEAD_LOCATION: NORMAL
MDC_IDC_LEAD_LOCATION_DETAIL_1: NORMAL
MDC_IDC_LEAD_MFG: NORMAL
MDC_IDC_LEAD_MODEL: NORMAL
MDC_IDC_LEAD_POLARITY_TYPE: NORMAL
MDC_IDC_LEAD_SERIAL: NORMAL
MDC_IDC_LEAD_SPECIAL_FUNCTION: NORMAL
MDC_IDC_MSMT_BATTERY_DTM: NORMAL
MDC_IDC_MSMT_BATTERY_REMAINING_LONGEVITY: 97 MO
MDC_IDC_MSMT_BATTERY_RRT_TRIGGER: 2.6
MDC_IDC_MSMT_BATTERY_STATUS: NORMAL
MDC_IDC_MSMT_BATTERY_VOLTAGE: 3.01 V
MDC_IDC_MSMT_LEADCHNL_LV_IMPEDANCE_VALUE: 209 OHM
MDC_IDC_MSMT_LEADCHNL_LV_IMPEDANCE_VALUE: 266 OHM
MDC_IDC_MSMT_LEADCHNL_LV_IMPEDANCE_VALUE: 285 OHM
MDC_IDC_MSMT_LEADCHNL_LV_IMPEDANCE_VALUE: 342 OHM
MDC_IDC_MSMT_LEADCHNL_LV_IMPEDANCE_VALUE: 380 OHM
MDC_IDC_MSMT_LEADCHNL_LV_PACING_THRESHOLD_AMPLITUDE: 1 V
MDC_IDC_MSMT_LEADCHNL_LV_PACING_THRESHOLD_PULSEWIDTH: 0.4 MS
MDC_IDC_MSMT_LEADCHNL_RA_IMPEDANCE_VALUE: 266 OHM
MDC_IDC_MSMT_LEADCHNL_RA_IMPEDANCE_VALUE: 418 OHM
MDC_IDC_MSMT_LEADCHNL_RA_PACING_THRESHOLD_AMPLITUDE: 0.75 V
MDC_IDC_MSMT_LEADCHNL_RA_PACING_THRESHOLD_PULSEWIDTH: 0.4 MS
MDC_IDC_MSMT_LEADCHNL_RA_SENSING_INTR_AMPL: 0.62 MV
MDC_IDC_MSMT_LEADCHNL_RA_SENSING_INTR_AMPL: 0.62 MV
MDC_IDC_MSMT_LEADCHNL_RV_IMPEDANCE_VALUE: 285 OHM
MDC_IDC_MSMT_LEADCHNL_RV_IMPEDANCE_VALUE: 342 OHM
MDC_IDC_MSMT_LEADCHNL_RV_PACING_THRESHOLD_AMPLITUDE: 0.75 V
MDC_IDC_MSMT_LEADCHNL_RV_PACING_THRESHOLD_PULSEWIDTH: 0.4 MS
MDC_IDC_MSMT_LEADCHNL_RV_SENSING_INTR_AMPL: 1.62 MV
MDC_IDC_MSMT_LEADCHNL_RV_SENSING_INTR_AMPL: 1.62 MV
MDC_IDC_PG_IMPLANT_DTM: NORMAL
MDC_IDC_PG_MFG: NORMAL
MDC_IDC_PG_MODEL: NORMAL
MDC_IDC_PG_SERIAL: NORMAL
MDC_IDC_PG_TYPE: NORMAL
MDC_IDC_SESS_CLINIC_NAME: NORMAL
MDC_IDC_SESS_DTM: NORMAL
MDC_IDC_SESS_TYPE: NORMAL
MDC_IDC_SET_BRADY_AT_MODE_SWITCH_RATE: 171 {BEATS}/MIN
MDC_IDC_SET_BRADY_LOWRATE: 60 {BEATS}/MIN
MDC_IDC_SET_BRADY_MAX_SENSOR_RATE: 120 {BEATS}/MIN
MDC_IDC_SET_BRADY_MAX_TRACKING_RATE: 120 {BEATS}/MIN
MDC_IDC_SET_BRADY_MODE: NORMAL
MDC_IDC_SET_BRADY_PAV_DELAY_HIGH: 100 MS
MDC_IDC_SET_BRADY_PAV_DELAY_LOW: 170 MS
MDC_IDC_SET_BRADY_SAV_DELAY_HIGH: 70 MS
MDC_IDC_SET_BRADY_SAV_DELAY_LOW: 140 MS
MDC_IDC_SET_CRT_LVRV_DELAY: 80 MS
MDC_IDC_SET_CRT_PACED_CHAMBERS: NORMAL
MDC_IDC_SET_LEADCHNL_LV_PACING_AMPLITUDE: NORMAL
MDC_IDC_SET_LEADCHNL_LV_PACING_ANODE_ELECTRODE_1: NORMAL
MDC_IDC_SET_LEADCHNL_LV_PACING_ANODE_LOCATION_1: NORMAL
MDC_IDC_SET_LEADCHNL_LV_PACING_CAPTURE_MODE: NORMAL
MDC_IDC_SET_LEADCHNL_LV_PACING_CATHODE_ELECTRODE_1: NORMAL
MDC_IDC_SET_LEADCHNL_LV_PACING_CATHODE_LOCATION_1: NORMAL
MDC_IDC_SET_LEADCHNL_LV_PACING_POLARITY: NORMAL
MDC_IDC_SET_LEADCHNL_LV_PACING_PULSEWIDTH: 0.4 MS
MDC_IDC_SET_LEADCHNL_RA_PACING_AMPLITUDE: NORMAL
MDC_IDC_SET_LEADCHNL_RA_PACING_ANODE_ELECTRODE_1: NORMAL
MDC_IDC_SET_LEADCHNL_RA_PACING_ANODE_LOCATION_1: NORMAL
MDC_IDC_SET_LEADCHNL_RA_PACING_CAPTURE_MODE: NORMAL
MDC_IDC_SET_LEADCHNL_RA_PACING_CATHODE_ELECTRODE_1: NORMAL
MDC_IDC_SET_LEADCHNL_RA_PACING_CATHODE_LOCATION_1: NORMAL
MDC_IDC_SET_LEADCHNL_RA_PACING_POLARITY: NORMAL
MDC_IDC_SET_LEADCHNL_RA_PACING_PULSEWIDTH: 0.4 MS
MDC_IDC_SET_LEADCHNL_RA_SENSING_ANODE_ELECTRODE_1: NORMAL
MDC_IDC_SET_LEADCHNL_RA_SENSING_ANODE_LOCATION_1: NORMAL
MDC_IDC_SET_LEADCHNL_RA_SENSING_CATHODE_ELECTRODE_1: NORMAL
MDC_IDC_SET_LEADCHNL_RA_SENSING_CATHODE_LOCATION_1: NORMAL
MDC_IDC_SET_LEADCHNL_RA_SENSING_POLARITY: NORMAL
MDC_IDC_SET_LEADCHNL_RA_SENSING_SENSITIVITY: 0.15 MV
MDC_IDC_SET_LEADCHNL_RV_PACING_AMPLITUDE: NORMAL
MDC_IDC_SET_LEADCHNL_RV_PACING_ANODE_ELECTRODE_1: NORMAL
MDC_IDC_SET_LEADCHNL_RV_PACING_ANODE_LOCATION_1: NORMAL
MDC_IDC_SET_LEADCHNL_RV_PACING_CAPTURE_MODE: NORMAL
MDC_IDC_SET_LEADCHNL_RV_PACING_CATHODE_ELECTRODE_1: NORMAL
MDC_IDC_SET_LEADCHNL_RV_PACING_CATHODE_LOCATION_1: NORMAL
MDC_IDC_SET_LEADCHNL_RV_PACING_POLARITY: NORMAL
MDC_IDC_SET_LEADCHNL_RV_PACING_PULSEWIDTH: 0.4 MS
MDC_IDC_SET_LEADCHNL_RV_SENSING_ANODE_ELECTRODE_1: NORMAL
MDC_IDC_SET_LEADCHNL_RV_SENSING_ANODE_LOCATION_1: NORMAL
MDC_IDC_SET_LEADCHNL_RV_SENSING_CATHODE_ELECTRODE_1: NORMAL
MDC_IDC_SET_LEADCHNL_RV_SENSING_CATHODE_LOCATION_1: NORMAL
MDC_IDC_SET_LEADCHNL_RV_SENSING_POLARITY: NORMAL
MDC_IDC_SET_LEADCHNL_RV_SENSING_SENSITIVITY: 0.9 MV
MDC_IDC_SET_ZONE_DETECTION_INTERVAL: 350 MS
MDC_IDC_SET_ZONE_DETECTION_INTERVAL: 400 MS
MDC_IDC_SET_ZONE_TYPE: NORMAL
MDC_IDC_STAT_AT_BURDEN_PERCENT: 100 %
MDC_IDC_STAT_AT_DTM_END: NORMAL
MDC_IDC_STAT_AT_DTM_START: NORMAL
MDC_IDC_STAT_AT_MODE_SW_COUNT: 1
MDC_IDC_STAT_BRADY_DTM_END: NORMAL
MDC_IDC_STAT_BRADY_DTM_START: NORMAL
MDC_IDC_STAT_BRADY_RA_PERCENT_PACED: 0 %
MDC_IDC_STAT_BRADY_RV_PERCENT_PACED: 99.7 %
MDC_IDC_STAT_CRT_DTM_END: NORMAL
MDC_IDC_STAT_CRT_DTM_START: NORMAL
MDC_IDC_STAT_CRT_LV_PERCENT_PACED: 99.7 %
MDC_IDC_STAT_CRT_PERCENT_PACED: 99.7 %
MDC_IDC_STAT_EPISODE_RECENT_COUNT: 0
MDC_IDC_STAT_EPISODE_RECENT_COUNT_DTM_END: NORMAL
MDC_IDC_STAT_EPISODE_RECENT_COUNT_DTM_START: NORMAL
MDC_IDC_STAT_EPISODE_TOTAL_COUNT: 0
MDC_IDC_STAT_EPISODE_TOTAL_COUNT: 0
MDC_IDC_STAT_EPISODE_TOTAL_COUNT: 1
MDC_IDC_STAT_EPISODE_TOTAL_COUNT: 45
MDC_IDC_STAT_EPISODE_TOTAL_COUNT: 7
MDC_IDC_STAT_EPISODE_TOTAL_COUNT_DTM_END: NORMAL
MDC_IDC_STAT_EPISODE_TOTAL_COUNT_DTM_START: NORMAL
MDC_IDC_STAT_EPISODE_TYPE: NORMAL

## 2021-09-08 ENCOUNTER — OFFICE VISIT (OUTPATIENT)
Dept: CARDIOLOGY | Facility: CLINIC | Age: 81
End: 2021-09-08
Payer: MEDICARE

## 2021-09-08 VITALS
DIASTOLIC BLOOD PRESSURE: 60 MMHG | RESPIRATION RATE: 16 BRPM | BODY MASS INDEX: 25.06 KG/M2 | HEART RATE: 68 BPM | WEIGHT: 185 LBS | SYSTOLIC BLOOD PRESSURE: 110 MMHG | HEIGHT: 72 IN

## 2021-09-08 DIAGNOSIS — I50.22 CHRONIC SYSTOLIC CONGESTIVE HEART FAILURE (H): ICD-10-CM

## 2021-09-08 DIAGNOSIS — I48.19 PERSISTENT ATRIAL FIBRILLATION (H): Primary | ICD-10-CM

## 2021-09-08 DIAGNOSIS — I48.91 ATRIAL FIBRILLATION, TRANSIENT (H): ICD-10-CM

## 2021-09-08 DIAGNOSIS — I10 PRIMARY HYPERTENSION: ICD-10-CM

## 2021-09-08 PROCEDURE — 99215 OFFICE O/P EST HI 40 MIN: CPT | Performed by: NURSE PRACTITIONER

## 2021-09-08 PROCEDURE — 93000 ELECTROCARDIOGRAM COMPLETE: CPT | Performed by: INTERNAL MEDICINE

## 2021-09-08 RX ORDER — UBIDECARENONE 200 MG
300 CAPSULE ORAL DAILY
Status: ON HOLD | COMMUNITY
End: 2022-01-01

## 2021-09-08 RX ORDER — LIDOCAINE 40 MG/G
CREAM TOPICAL
Status: CANCELLED | OUTPATIENT
Start: 2021-09-08

## 2021-09-08 ASSESSMENT — MIFFLIN-ST. JEOR: SCORE: 1577.15

## 2021-09-08 NOTE — LETTER
9/8/2021    ADDISON BENÍTEZ MD  [facility] [address]  [address] [facility] [address]    RE: Reymundo Petersen       Dear Colleague,    I had the pleasure of seeing Reymundo Petersen in the [facility].      Thank you, Dr. Leigh, for asking the [facility] team to see . Reymundo Petersen to evaluate persistent atrial fibrillation.    Assessment/Recommendations     Assessment/Plan:      Persistent atrial fibrillation-patient is monitored on routine PPM checks.  Typically patient has extremely low burden seen but recently his pacemaker sent in alert he is having 100% atrial fibrillation since 8/29/2021.  Also noted on PPM alert OptiVol diagnostic fluid index is slowly rising and thoracic impedance is slowly declining.    -EKG  -Discussed pathophysiology and chronic nature of atrial fibrillation; this could be/is likely progression of his atrial fibrillation.  Discussed rate versus rhythm control and that management of atrial fibrillation is based upon symptoms.  Educated on increased thromboembolic risk associated with atrial fibrillation and risk versus benefit of OAC  -Recommend cardioversion-risks versus benefits explained to patient and patient would like to proceed.  The cardioversion is being performed for symptom clarification.  -Continue metoprolol succinate 25 mg oral daily  -Continue Xarelto 20 mg oral daily  -Patient has a right sided forehead hematoma that has been present since July 24 trauma fall.  He is having it evaluated by dermatology-he and daughter are aware OAC is required to be uninterrupted for 3 weeks prior to CV and 4 weeks post CV.  -Plan-if patient feels better while in sinus rhythm would recommend further pursuing medication management and rhythm control.  If patient's symptoms do not improve with restoration of rhythm it is reasonable to pursue rate control.  -Patient had traumatic fall July 24 and is likely  a good candidate for watchman device; I mentioned it at the visit but can discuss it more in depth in the future if he is interested    Congestive heart failure-BiV pacing continues-99.7% on most recent check.  Patient is euvolemic on exam despite elevated fluid index on OptiVol and decreased thoracic impedance.  Weight is consistently 185 pounds daily    Hypertension-blood pressure 110/60; currently meeting guideline goals    [id number] score of 5: 2 age, 1 hypertension, 1 CAD, 1 CHF and on Xarelto 20 mg oral daily with meal.  Follow up in 3 to 4 weeks after cardioversion.     History of Present Illness/Subjective     Reymundo Petersen is a very pleasant 80 year old male who comes in today for EP evaluation of new onset persistent atrial fibrillation.  Reymundo Petersen has a known history of left ventricular hypertrophy, CHF with EF 45%, congestive heart failure, coronary artery disease, complete heart block status post pacemaker insertion, hypertension, hyperlipidemia, hospitalization for COVID-19, MARIAN.    Met with Reymundo and his daughter Juliet today to discuss management of atrial fibrillation.  He was not necessarily aware he was having persistent atrial fibrillation for the past 9 days but has noticed increase in tiredness and fatigue.  He denies chest pain, shortness of breath, syncope.  He had a traumatic fall 7/24/2021 with a head injury and I reviewed pacemaker data from that day which there were no recorded events.    Cardiographics (reviewed):      Echo 5/19/2021  Narrative & Impression    Left ventricular cavity size is normal. Severe concentric increase in wall thickness. Ejection fraction is mildly decreased. The calculated left ventricular ejection fraction is 43%.    Right ventricular cavity size is mildly dilated. Normal right ventricular systolic function.    Severe biatrial enlargement.    Mild aortic regurgitation.    The ascending aorta is mildly dilated measuring 4.1 cm.    Moderate pulmonary  hypertension present. The estimated systolic pulmonary artery pressure is 51 mm Hg.    When compared to the previous study dated 9/1/2020, there has been no significant change.    Angiogram 10/23/2020  Angiography findings:  LM: Mild calcified lesion at the distal left main  LAD: Diffuse disease from proximal to mid LAD  Lcx: Small system with minimal disease.  RCA: Dominant vessel with diffuse mild disease.  The prior stent in PL is patent with minimal ISR.    Cardiac testing personally reviewed:  9/7/2021  Type: Remote pacemaker transmission prior to appointment with Soo Mott RN, CNP on 09/08/2021.  Presenting Rhythm: Atrial fibrillation (AF) with  at 60 bpm.  Lead/Battery status: Stable  Arrhythmias: AF in progress since 08/29/21, Paradox 100%, ventricular rates >/=120 bpm ~5%. No ventricular high rate detections.  Anticoagulant: rivaroxaban  Comments: Normal device function. BiVp 99.7%. OptiVol diagonostic - Fluid Index ~40, appears to be a steady rise since start of current AF episode with a concomitant decrease in Thoracic Impedance (T.I.) ~55 Ohms with Reference Impedance ~63 Ohms; these data suggest possible start of fluid accumulation.      Problem List:  Patient Active Problem List   Diagnosis     Primary osteoarthritis of knees, bilateral     Complete heart block (H)     Primary hypertension     BPH (benign prostatic hyperplasia)     Bradycardia     Combined hyperlipidemia     Dizziness     DJD (degenerative joint disease)     LBBB (left bundle branch block)     Left ventricular hypertrophy     S/P drug eluting coronary stent placement     Troponin level elevated     Acute combined systolic and diastolic congestive heart failure (H)     Pulmonary hypertension (H)     Nonrheumatic aortic valve insufficiency     Cardiac pacemaker in situ     Coronary artery disease due to lipid rich plaque     MARIAN (generalized anxiety disorder)     GERD (gastroesophageal reflux disease)     Infection due to 2019  novel coronavirus     Dilated cardiomyopathy (H)     Chronic combined systolic and diastolic heart failure (H)     Persistent atrial fibrillation (H)     Weight loss     Status post biventricular pacemaker     COVID-19     CHF (congestive heart failure) (H)     Revi  e  Physical Examination Review of Systems   w stems  There were no vitals taken for this visit.  There is no height or weight on file to calculate BMI.  Wt Readings from Last 3 Encounters:   07/24/21 83.9 kg (185 lb)   05/07/21 84.4 kg (186 lb)   10/23/20 85.3 kg (188 lb)     General Appearance:   Alert, well-appearing and in no acute distress.   HEENT: Atraumatic, normocephalic.  No scleral icterus, normal conjunctivae; mucous membranes pink and moist.     Chest: Chest symmetric, spine straight.   Lungs:   Respirations unlabored: Lungs sounds clear   Cardiovascular:   Normal first and second heart sounds with no murmurs, rubs, or gallops.  Regular.  Radial and posterior tibial pulses are intact.  No edema.       Extremities: No cyanosis or clubbing.     Musculoskeletal: Moves all extremities   Skin: Warm, dry, intact.    Neurologic: Mood and affect are appropriate, alert and oriented to person, place, time, and situation     ROS: 10 point ROS neg other than the symptoms noted above in the HPI.     Medical History  Surgical History Family History Social History     Past Medical History:   Diagnosis Date     Atrial fibrillation, transient (H) 8/11/2020     BPH (benign prostatic hyperplasia) 7/11/2018     Chronic combined systolic and diastolic heart failure (H) 2/11/2021     Coronary artery disease      Dilated cardiomyopathy (H) 2/11/2021     MARIAN (generalized anxiety disorder) 2/11/2021     GERD (gastroesophageal reflux disease)      High cholesterol      Hypertension      LBBB (left bundle branch block) 7/11/2018     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     Pulmonary hypertension (H) 4/1/2020    Past Surgical History:   Procedure  Laterality Date     ANGIOPLASTY       ARTHROSCOPY KNEE       C TOTAL KNEE ARTHROPLASTY Right 8/15/2019    Procedure: RIGHT  MINIMALLY TOTAL KNEE ARTHROPLASTY;  Surgeon: Keven Cerda MD;  Location: [facility] OR;  Service: Orthopedics     CV CORONARY ANGIOGRAM N/A 10/23/2020    Procedure: Coronary Angiogram;  Surgeon: Varun Freire MD;  Location: [facility];  Service: Cardiology     CV LEFT HEART CATHETERIZATION WITHOUT LEFT VENTRICULOGRAM Left 10/23/2020    Procedure: Left Heart Catheterization Without Left Ventriculogram;  Surgeon: Varun Freire MD;  Location: [facility];  Service: Cardiology     EP BIV PACEMAKER INSERT N/A 3/25/2020    Procedure: EP Biventricular Pacemaker Insertion;  Surgeon: Taylor Sandoval MD;  Location: [facility];  Service: Cardiology     RELEASE CARPAL TUNNEL      Family History   Problem Relation Age of Onset     Heart Disease Father      Diabetes Type 2  Sister      Alzheimer Disease Mother      Chronic Obstructive Pulmonary Disease Brother     History   Smoking Status     Never Smoker   Smokeless Tobacco     Never Used     Social History    Substance and Sexual Activity      Alcohol use: Yes        Comment: Alcoholic Drinks/day: rare       Medications  Allergies     Current Outpatient Medications   Medication Sig Dispense Refill     aspirin 81 mg chewable tablet [ASPIRIN 81 MG CHEWABLE TABLET] Chew 81 mg daily.       atorvastatin (LIPITOR) 10 MG tablet [ATORVASTATIN (LIPITOR) 10 MG TABLET] Take 10 mg by mouth 4 (four) times a week. Days vary        dimenhyDRINATE (DRAMAMINE) 50 MG tablet Take 50 mg by mouth nightly as needed for sleep       doxazosin (CARDURA) 8 MG tablet [DOXAZOSIN (CARDURA) 8 MG TABLET] Take 4 mg by mouth at bedtime.              finasteride (PROSCAR) 5 mg tablet [FINASTERIDE (PROSCAR) 5 MG TABLET] Take 5 mg by mouth at bedtime.              furosemide (LASIX) 40 MG tablet [FUROSEMIDE (LASIX) 40 MG TABLET] Take  20 mg by mouth daily as needed.       gabapentin (NEURONTIN) 100 MG capsule [GABAPENTIN (NEURONTIN) 100 MG CAPSULE] TAKE ONE CAPSULE BY MOUTH EVERY MORNING AND AT NOON AND THREE CAPSULES BY MOUTH AT NIGHT       glucosamine/chondr cole A sod (OSTEO BI-FLEX ORAL) [GLUCOSAMINE/CHONDR COLE A SOD (OSTEO BI-FLEX ORAL)] Take 1 tablet by mouth 2 (two) times a day.              losartan (COZAAR) 25 MG tablet [LOSARTAN (COZAAR) 25 MG TABLET] Take 1 tablet (25 mg total) by mouth daily. 30 tablet 0     melatonin 10 mg Tab [MELATONIN 10 MG TAB] Take 10 mg by mouth at bedtime as needed.       metoprolol succinate (TOPROL-XL) 25 MG [METOPROLOL SUCCINATE (TOPROL-XL) 25 MG] Take 1 tablet (25 mg total) by mouth at bedtime. 90 tablet 11     omeprazole (PRILOSEC) 20 MG capsule [OMEPRAZOLE (PRILOSEC) 20 MG CAPSULE] Take 20 mg by mouth 2 (two) times a day before meals.       rivaroxaban ANTICOAGULANT (XARELTO) 20 mg tablet [RIVAROXABAN ANTICOAGULANT (XARELTO) 20 MG TABLET] Take 1 tablet (20 mg total) by mouth daily. 30 tablet 11     sertraline (ZOLOFT) 50 MG tablet [SERTRALINE (ZOLOFT) 50 MG TABLET] Take 50 mg by mouth daily.              triamcinolone (KENALOG) 0.1 % cream [TRIAMCINOLONE (KENALOG) 0.1 % CREAM] Apply 1 application topically 2 (two) times a day as needed.       vit A/vit C/vit E/zinc/copper (PRESERVISION AREDS ORAL) [VIT A/VIT C/VIT E/ZINC/COPPER (PRESERVISION AREDS ORAL)] Take 1 tablet by mouth 2 (two) times a day.        Allergies   Allergen Reactions     Milk [Lac Bovis] Nausea and Vomiting      Medical, surgical, family, social history, and medications were all reviewed and updated as necessary.   Lab Results    Chemistry/lipid CBC Cardiac Enzymes/BNP/TSH/INR     [unfilled]  Lab Results   Component Value Date     02/11/2021    Lab Results   Component Value Date    WBC 9.4 07/02/2021    HGB 14.8 07/02/2021    HCT 45.1 07/02/2021    MCV 96 07/02/2021     07/02/2021        Lab Results   Component Value Date     CHOL 142 10/23/2020    HDL 51 10/23/2020    TRIG 79 10/23/2020          Total Time- 50 minutes spent on date of encounter doing chart review, history and exam, documentation and further activities as noted above.  This note has been dictated using voice recognition software. Any grammatical, typographical, or context distortions are unintentional and inherent to the software.    SYMONE Mathews [facility]          Thank you for allowing me to participate in the care of your patient.      Sincerely,     SALVADOR Mathews CNP [facility]  cc:   No referring provider defined for this encounter.

## 2021-09-08 NOTE — H&P (VIEW-ONLY)
Thank you, Dr. Leigh, for asking the [facility] team to see . Reymundo Petersen to evaluate persistent atrial fibrillation.    Assessment/Recommendations     Assessment/Plan:      Persistent atrial fibrillation-patient is monitored on routine PPM checks.  Typically patient has extremely low burden seen but recently his pacemaker sent in alert he is having 100% atrial fibrillation since 8/29/2021.  Also noted on PPM alert OptiVol diagnostic fluid index is slowly rising and thoracic impedance is slowly declining.    -EKG  -Discussed pathophysiology and chronic nature of atrial fibrillation; this could be/is likely progression of his atrial fibrillation.  Discussed rate versus rhythm control and that management of atrial fibrillation is based upon symptoms.  Educated on increased thromboembolic risk associated with atrial fibrillation and risk versus benefit of OAC  -Recommend cardioversion-risks versus benefits explained to patient and patient would like to proceed.  The cardioversion is being performed for symptom clarification.  -Continue metoprolol succinate 25 mg oral daily  -Continue Xarelto 20 mg oral daily  -Patient has a right sided forehead hematoma that has been present since July 24 trauma fall.  He is having it evaluated by dermatology-he and daughter are aware OAC is required to be uninterrupted for 3 weeks prior to CV and 4 weeks post CV.  -Plan-if patient feels better while in sinus rhythm would recommend further pursuing medication management and rhythm control.  If patient's symptoms do not improve with restoration of rhythm it is reasonable to pursue rate control.  -Patient had traumatic fall July 24 and is likely a good candidate for watchman device; I mentioned it at the visit but can discuss it more in depth in the future if he is interested    Congestive heart failure-BiV pacing continues-99.7% on most recent check.  Patient is euvolemic on exam despite elevated fluid  index on OptiVol and decreased thoracic impedance.  Weight is consistently 185 pounds daily    Hypertension-blood pressure 110/60; currently meeting guideline goals    [id number] score of 5: 2 age, 1 hypertension, 1 CAD, 1 CHF and on Xarelto 20 mg oral daily with meal.  Follow up in 3 to 4 weeks after cardioversion.     History of Present Illness/Subjective     Reymundo Petersen is a very pleasant 80 year old male who comes in today for EP evaluation of new onset persistent atrial fibrillation.  Reymundo Petersen has a known history of left ventricular hypertrophy, CHF with EF 45%, congestive heart failure, coronary artery disease, complete heart block status post pacemaker insertion, hypertension, hyperlipidemia, hospitalization for COVID-19, MARIAN.    Met with Reymundo and his daughter Juliet today to discuss management of atrial fibrillation.  He was not necessarily aware he was having persistent atrial fibrillation for the past 9 days but has noticed increase in tiredness and fatigue.  He denies chest pain, shortness of breath, syncope.  He had a traumatic fall 7/24/2021 with a head injury and I reviewed pacemaker data from that day which there were no recorded events.    Cardiographics (reviewed):      Echo 5/19/2021  Narrative & Impression    Left ventricular cavity size is normal. Severe concentric increase in wall thickness. Ejection fraction is mildly decreased. The calculated left ventricular ejection fraction is 43%.    Right ventricular cavity size is mildly dilated. Normal right ventricular systolic function.    Severe biatrial enlargement.    Mild aortic regurgitation.    The ascending aorta is mildly dilated measuring 4.1 cm.    Moderate pulmonary hypertension present. The estimated systolic pulmonary artery pressure is 51 mm Hg.    When compared to the previous study dated 9/1/2020, there has been no significant change.    Angiogram 10/23/2020  Angiography findings:  LM: Mild calcified lesion at the  distal left main  LAD: Diffuse disease from proximal to mid LAD  Lcx: Small system with minimal disease.  RCA: Dominant vessel with diffuse mild disease.  The prior stent in PL is patent with minimal ISR.    Cardiac testing personally reviewed:  9/7/2021  Type: Remote pacemaker transmission prior to appointment with Soo Mott RN, CNP on 09/08/2021.  Presenting Rhythm: Atrial fibrillation (AF) with  at 60 bpm.  Lead/Battery status: Stable  Arrhythmias: AF in progress since 08/29/21, [place] 100%, ventricular rates >/=120 bpm ~5%. No ventricular high rate detections.  Anticoagulant: rivaroxaban  Comments: Normal device function. BiVp 99.7%. OptiVol diagonostic - Fluid Index ~40, appears to be a steady rise since start of current AF episode with a concomitant decrease in Thoracic Impedance (T.I.) ~55 Ohms with Reference Impedance ~63 Ohms; these data suggest possible start of fluid accumulation.      Problem List:  Patient Active Problem List   Diagnosis     Primary osteoarthritis of knees, bilateral     Complete heart block (H)     Primary hypertension     BPH (benign prostatic hyperplasia)     Bradycardia     Combined hyperlipidemia     Dizziness     DJD (degenerative joint disease)     LBBB (left bundle branch block)     Left ventricular hypertrophy     S/P drug eluting coronary stent placement     Troponin level elevated     Acute combined systolic and diastolic congestive heart failure (H)     Pulmonary hypertension (H)     Nonrheumatic aortic valve insufficiency     Cardiac pacemaker in situ     Coronary artery disease due to lipid rich plaque     MARIAN (generalized anxiety disorder)     GERD (gastroesophageal reflux disease)     Infection due to 2019 novel coronavirus     Dilated cardiomyopathy (H)     Chronic combined systolic and diastolic heart failure (H)     Persistent atrial fibrillation (H)     Weight loss     Status post biventricular pacemaker     COVID-19     CHF (congestive heart failure) (H)      Revi  e  Physical Examination Review of Systems   [facility]  There were no vitals taken for this visit.  There is no height or weight on file to calculate BMI.  Wt Readings from Last 3 Encounters:   07/24/21 83.9 kg (185 lb)   05/07/21 84.4 kg (186 lb)   10/23/20 85.3 kg (188 lb)     General Appearance:   Alert, well-appearing and in no acute distress.   HEENT: Atraumatic, normocephalic.  No scleral icterus, normal conjunctivae; mucous membranes pink and moist.     Chest: Chest symmetric, spine straight.   Lungs:   Respirations unlabored: Lungs sounds clear   Cardiovascular:   Normal first and second heart sounds with no murmurs, rubs, or gallops.  Regular.  Radial and posterior tibial pulses are intact.  No edema.       Extremities: No cyanosis or clubbing.     Musculoskeletal: Moves all extremities   Skin: Warm, dry, intact.    Neurologic: Mood and affect are appropriate, alert and oriented to person, place, time, and situation     ROS: 10 point ROS neg other than the symptoms noted above in the HPI.     Medical History  Surgical History Family History Social History     Past Medical History:   Diagnosis Date     Atrial fibrillation, transient (H) 8/11/2020     BPH (benign prostatic hyperplasia) 7/11/2018     Chronic combined systolic and diastolic heart failure (H) 2/11/2021     Coronary artery disease      Dilated cardiomyopathy (H) 2/11/2021     MARIAN (generalized anxiety disorder) 2/11/2021     GERD (gastroesophageal reflux disease)      High cholesterol      Hypertension      LBBB (left bundle branch block) 7/11/2018     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     Pulmonary hypertension (H) 4/1/2020    Past Surgical History:   Procedure Laterality Date     ANGIOPLASTY       ARTHROSCOPY KNEE       C TOTAL KNEE ARTHROPLASTY Right 8/15/2019    Procedure: RIGHT  MINIMALLY TOTAL KNEE ARTHROPLASTY;  Surgeon: Keven Cerda MD;  Location: [facility];  Service: Orthopedics     CV CORONARY  ANGIOGRAM N/A 10/23/2020    Procedure: Coronary Angiogram;  Surgeon: Varun Freire MD;  Location: [facility];  Service: Cardiology     CV LEFT HEART CATHETERIZATION WITHOUT LEFT VENTRICULOGRAM Left 10/23/2020    Procedure: Left Heart Catheterization Without Left Ventriculogram;  Surgeon: Varun Freire MD;  Location: [facility];  Service: Cardiology     EP BIV PACEMAKER INSERT N/A 3/25/2020    Procedure: EP Biventricular Pacemaker Insertion;  Surgeon: Taylor Sandoval MD;  Location: [facility];  Service: Cardiology     RELEASE CARPAL TUNNEL      Family History   Problem Relation Age of Onset     Heart Disease Father      Diabetes Type 2  Sister      Alzheimer Disease Mother      Chronic Obstructive Pulmonary Disease Brother     History   Smoking Status     Never Smoker   Smokeless Tobacco     Never Used     Social History    Substance and Sexual Activity      Alcohol use: Yes        Comment: Alcoholic Drinks/day: rare       Medications  Allergies     Current Outpatient Medications   Medication Sig Dispense Refill     aspirin 81 mg chewable tablet [ASPIRIN 81 MG CHEWABLE TABLET] Chew 81 mg daily.       atorvastatin (LIPITOR) 10 MG tablet [ATORVASTATIN (LIPITOR) 10 MG TABLET] Take 10 mg by mouth 4 (four) times a week. Days vary        dimenhyDRINATE (DRAMAMINE) 50 MG tablet Take 50 mg by mouth nightly as needed for sleep       doxazosin (CARDURA) 8 MG tablet [DOXAZOSIN (CARDURA) 8 MG TABLET] Take 4 mg by mouth at bedtime.              finasteride (PROSCAR) 5 mg tablet [FINASTERIDE (PROSCAR) 5 MG TABLET] Take 5 mg by mouth at bedtime.              furosemide (LASIX) 40 MG tablet [FUROSEMIDE (LASIX) 40 MG TABLET] Take 20 mg by mouth daily as needed.       gabapentin (NEURONTIN) 100 MG capsule [GABAPENTIN (NEURONTIN) 100 MG CAPSULE] TAKE ONE CAPSULE BY MOUTH EVERY MORNING AND AT NOON AND THREE CAPSULES BY MOUTH AT NIGHT       glucosamine/chondr cole A sod (OSTEO BI-FLEX ORAL)  [GLUCOSAMINE/CHONDR CONTEH A SOD (OSTEO BI-FLEX ORAL)] Take 1 tablet by mouth 2 (two) times a day.              losartan (COZAAR) 25 MG tablet [LOSARTAN (COZAAR) 25 MG TABLET] Take 1 tablet (25 mg total) by mouth daily. 30 tablet 0     melatonin 10 mg Tab [MELATONIN 10 MG TAB] Take 10 mg by mouth at bedtime as needed.       metoprolol succinate (TOPROL-XL) 25 MG [METOPROLOL SUCCINATE (TOPROL-XL) 25 MG] Take 1 tablet (25 mg total) by mouth at bedtime. 90 tablet 11     omeprazole (PRILOSEC) 20 MG capsule [OMEPRAZOLE (PRILOSEC) 20 MG CAPSULE] Take 20 mg by mouth 2 (two) times a day before meals.       rivaroxaban ANTICOAGULANT (XARELTO) 20 mg tablet [RIVAROXABAN ANTICOAGULANT (XARELTO) 20 MG TABLET] Take 1 tablet (20 mg total) by mouth daily. 30 tablet 11     sertraline (ZOLOFT) 50 MG tablet [SERTRALINE (ZOLOFT) 50 MG TABLET] Take 50 mg by mouth daily.              triamcinolone (KENALOG) 0.1 % cream [TRIAMCINOLONE (KENALOG) 0.1 % CREAM] Apply 1 application topically 2 (two) times a day as needed.       vit A/vit C/vit E/zinc/copper (PRESERVISION AREDS ORAL) [VIT A/VIT C/VIT E/ZINC/COPPER (PRESERVISION AREDS ORAL)] Take 1 tablet by mouth 2 (two) times a day.        Allergies   Allergen Reactions     Milk [Lac Bovis] Nausea and Vomiting      Medical, surgical, family, social history, and medications were all reviewed and updated as necessary.   Lab Results    Chemistry/lipid CBC Cardiac Enzymes/BNP/TSH/INR     [unfilled]  Lab Results   Component Value Date     02/11/2021    Lab Results   Component Value Date    WBC 9.4 07/02/2021    HGB 14.8 07/02/2021    HCT 45.1 07/02/2021    MCV 96 07/02/2021     07/02/2021        Lab Results   Component Value Date    CHOL 142 10/23/2020    HDL 51 10/23/2020    TRIG 79 10/23/2020          Total Time- 50 minutes spent on date of encounter doing chart review, history and exam, documentation and further activities as noted above.  This note has been dictated using voice  recognition software. Any grammatical, typographical, or context distortions are unintentional and inherent to the software.    Soo Mott CNP  [facility]

## 2021-09-08 NOTE — PROGRESS NOTES
Thank you, Dr. Leigh, for asking the [facility] team to see . Reymundo Petersen to evaluate persistent atrial fibrillation.    Assessment/Recommendations     Assessment/Plan:      Persistent atrial fibrillation-patient is monitored on routine PPM checks.  Typically patient has extremely low burden seen but recently his pacemaker sent in alert he is having 100% atrial fibrillation since 8/29/2021.  Also noted on PPM alert OptiVol diagnostic fluid index is slowly rising and thoracic impedance is slowly declining.    -EKG  -Discussed pathophysiology and chronic nature of atrial fibrillation; this could be/is likely progression of his atrial fibrillation.  Discussed rate versus rhythm control and that management of atrial fibrillation is based upon symptoms.  Educated on increased thromboembolic risk associated with atrial fibrillation and risk versus benefit of OAC  -Recommend cardioversion-risks versus benefits explained to patient and patient would like to proceed.  The cardioversion is being performed for symptom clarification.  -Continue metoprolol succinate 25 mg oral daily  -Continue Xarelto 20 mg oral daily  -Patient has a right sided forehead hematoma that has been present since July 24 trauma fall.  He is having it evaluated by dermatology-he and daughter are aware OAC is required to be uninterrupted for 3 weeks prior to CV and 4 weeks post CV.  -Plan-if patient feels better while in sinus rhythm would recommend further pursuing medication management and rhythm control.  If patient's symptoms do not improve with restoration of rhythm it is reasonable to pursue rate control.  -Patient had traumatic fall July 24 and is likely a good candidate for watchman device; I mentioned it at the visit but can discuss it more in depth in the future if he is interested    Congestive heart failure-BiV pacing continues-99.7% on most recent check.  Patient is euvolemic on exam despite elevated fluid  index on OptiVol and decreased thoracic impedance.  Weight is consistently 185 pounds daily    Hypertension-blood pressure 110/60; currently meeting guideline goals    [id number] score of 5: 2 age, 1 hypertension, 1 CAD, 1 CHF and on Xarelto 20 mg oral daily with meal.  Follow up in 3 to 4 weeks after cardioversion.     History of Present Illness/Subjective     Reymundo Petersen is a very pleasant 80 year old male who comes in today for EP evaluation of new onset persistent atrial fibrillation.  Reymundo Petersen has a known history of left ventricular hypertrophy, CHF with EF 45%, congestive heart failure, coronary artery disease, complete heart block status post pacemaker insertion, hypertension, hyperlipidemia, hospitalization for COVID-19, MARIAN.    Met with Reymundo and his daughter Juliet today to discuss management of atrial fibrillation.  He was not necessarily aware he was having persistent atrial fibrillation for the past 9 days but has noticed increase in tiredness and fatigue.  He denies chest pain, shortness of breath, syncope.  He had a traumatic fall 7/24/2021 with a head injury and I reviewed pacemaker data from that day which there were no recorded events.    Cardiographics (reviewed):      Echo 5/19/2021  Narrative & Impression    Left ventricular cavity size is normal. Severe concentric increase in wall thickness. Ejection fraction is mildly decreased. The calculated left ventricular ejection fraction is 43%.    Right ventricular cavity size is mildly dilated. Normal right ventricular systolic function.    Severe biatrial enlargement.    Mild aortic regurgitation.    The ascending aorta is mildly dilated measuring 4.1 cm.    Moderate pulmonary hypertension present. The estimated systolic pulmonary artery pressure is 51 mm Hg.    When compared to the previous study dated 9/1/2020, there has been no significant change.    Angiogram 10/23/2020  Angiography findings:  LM: Mild calcified lesion at the  distal left main  LAD: Diffuse disease from proximal to mid LAD  Lcx: Small system with minimal disease.  RCA: Dominant vessel with diffuse mild disease.  The prior stent in PL is patent with minimal ISR.    Cardiac testing personally reviewed:  9/7/2021  Type: Remote pacemaker transmission prior to appointment with Soo Mott RN, CNP on 09/08/2021.  Presenting Rhythm: Atrial fibrillation (AF) with  at 60 bpm.  Lead/Battery status: Stable  Arrhythmias: AF in progress since 08/29/21, [place] 100%, ventricular rates >/=120 bpm ~5%. No ventricular high rate detections.  Anticoagulant: rivaroxaban  Comments: Normal device function. BiVp 99.7%. OptiVol diagonostic - Fluid Index ~40, appears to be a steady rise since start of current AF episode with a concomitant decrease in Thoracic Impedance (T.I.) ~55 Ohms with Reference Impedance ~63 Ohms; these data suggest possible start of fluid accumulation.      Problem List:  Patient Active Problem List   Diagnosis     Primary osteoarthritis of knees, bilateral     Complete heart block (H)     Primary hypertension     BPH (benign prostatic hyperplasia)     Bradycardia     Combined hyperlipidemia     Dizziness     DJD (degenerative joint disease)     LBBB (left bundle branch block)     Left ventricular hypertrophy     S/P drug eluting coronary stent placement     Troponin level elevated     Acute combined systolic and diastolic congestive heart failure (H)     Pulmonary hypertension (H)     Nonrheumatic aortic valve insufficiency     Cardiac pacemaker in situ     Coronary artery disease due to lipid rich plaque     MARIAN (generalized anxiety disorder)     GERD (gastroesophageal reflux disease)     Infection due to 2019 novel coronavirus     Dilated cardiomyopathy (H)     Chronic combined systolic and diastolic heart failure (H)     Persistent atrial fibrillation (H)     Weight loss     Status post biventricular pacemaker     COVID-19     CHF (congestive heart failure) (H)      Revi  e  Physical Examination Review of Systems   [facility]  There were no vitals taken for this visit.  There is no height or weight on file to calculate BMI.  Wt Readings from Last 3 Encounters:   07/24/21 83.9 kg (185 lb)   05/07/21 84.4 kg (186 lb)   10/23/20 85.3 kg (188 lb)     General Appearance:   Alert, well-appearing and in no acute distress.   HEENT: Atraumatic, normocephalic.  No scleral icterus, normal conjunctivae; mucous membranes pink and moist.     Chest: Chest symmetric, spine straight.   Lungs:   Respirations unlabored: Lungs sounds clear   Cardiovascular:   Normal first and second heart sounds with no murmurs, rubs, or gallops.  Regular.  Radial and posterior tibial pulses are intact.  No edema.       Extremities: No cyanosis or clubbing.     Musculoskeletal: Moves all extremities   Skin: Warm, dry, intact.    Neurologic: Mood and affect are appropriate, alert and oriented to person, place, time, and situation     ROS: 10 point ROS neg other than the symptoms noted above in the HPI.     Medical History  Surgical History Family History Social History     Past Medical History:   Diagnosis Date     Atrial fibrillation, transient (H) 8/11/2020     BPH (benign prostatic hyperplasia) 7/11/2018     Chronic combined systolic and diastolic heart failure (H) 2/11/2021     Coronary artery disease      Dilated cardiomyopathy (H) 2/11/2021     MARIAN (generalized anxiety disorder) 2/11/2021     GERD (gastroesophageal reflux disease)      High cholesterol      Hypertension      LBBB (left bundle branch block) 7/11/2018     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     Pulmonary hypertension (H) 4/1/2020    Past Surgical History:   Procedure Laterality Date     ANGIOPLASTY       ARTHROSCOPY KNEE       C TOTAL KNEE ARTHROPLASTY Right 8/15/2019    Procedure: RIGHT  MINIMALLY TOTAL KNEE ARTHROPLASTY;  Surgeon: Keven Cerda MD;  Location: [facility];  Service: Orthopedics     CV CORONARY  ANGIOGRAM N/A 10/23/2020    Procedure: Coronary Angiogram;  Surgeon: Varun Freire MD;  Location: [facility];  Service: Cardiology     CV LEFT HEART CATHETERIZATION WITHOUT LEFT VENTRICULOGRAM Left 10/23/2020    Procedure: Left Heart Catheterization Without Left Ventriculogram;  Surgeon: Varun Freire MD;  Location: [facility];  Service: Cardiology     EP BIV PACEMAKER INSERT N/A 3/25/2020    Procedure: EP Biventricular Pacemaker Insertion;  Surgeon: Taylor Sandoval MD;  Location: [facility];  Service: Cardiology     RELEASE CARPAL TUNNEL      Family History   Problem Relation Age of Onset     Heart Disease Father      Diabetes Type 2  Sister      Alzheimer Disease Mother      Chronic Obstructive Pulmonary Disease Brother     History   Smoking Status     Never Smoker   Smokeless Tobacco     Never Used     Social History    Substance and Sexual Activity      Alcohol use: Yes        Comment: Alcoholic Drinks/day: rare       Medications  Allergies     Current Outpatient Medications   Medication Sig Dispense Refill     aspirin 81 mg chewable tablet [ASPIRIN 81 MG CHEWABLE TABLET] Chew 81 mg daily.       atorvastatin (LIPITOR) 10 MG tablet [ATORVASTATIN (LIPITOR) 10 MG TABLET] Take 10 mg by mouth 4 (four) times a week. Days vary        dimenhyDRINATE (DRAMAMINE) 50 MG tablet Take 50 mg by mouth nightly as needed for sleep       doxazosin (CARDURA) 8 MG tablet [DOXAZOSIN (CARDURA) 8 MG TABLET] Take 4 mg by mouth at bedtime.              finasteride (PROSCAR) 5 mg tablet [FINASTERIDE (PROSCAR) 5 MG TABLET] Take 5 mg by mouth at bedtime.              furosemide (LASIX) 40 MG tablet [FUROSEMIDE (LASIX) 40 MG TABLET] Take 20 mg by mouth daily as needed.       gabapentin (NEURONTIN) 100 MG capsule [GABAPENTIN (NEURONTIN) 100 MG CAPSULE] TAKE ONE CAPSULE BY MOUTH EVERY MORNING AND AT NOON AND THREE CAPSULES BY MOUTH AT NIGHT       glucosamine/chondr cole A sod (OSTEO BI-FLEX ORAL)  [GLUCOSAMINE/CHONDR CONTEH A SOD (OSTEO BI-FLEX ORAL)] Take 1 tablet by mouth 2 (two) times a day.              losartan (COZAAR) 25 MG tablet [LOSARTAN (COZAAR) 25 MG TABLET] Take 1 tablet (25 mg total) by mouth daily. 30 tablet 0     melatonin 10 mg Tab [MELATONIN 10 MG TAB] Take 10 mg by mouth at bedtime as needed.       metoprolol succinate (TOPROL-XL) 25 MG [METOPROLOL SUCCINATE (TOPROL-XL) 25 MG] Take 1 tablet (25 mg total) by mouth at bedtime. 90 tablet 11     omeprazole (PRILOSEC) 20 MG capsule [OMEPRAZOLE (PRILOSEC) 20 MG CAPSULE] Take 20 mg by mouth 2 (two) times a day before meals.       rivaroxaban ANTICOAGULANT (XARELTO) 20 mg tablet [RIVAROXABAN ANTICOAGULANT (XARELTO) 20 MG TABLET] Take 1 tablet (20 mg total) by mouth daily. 30 tablet 11     sertraline (ZOLOFT) 50 MG tablet [SERTRALINE (ZOLOFT) 50 MG TABLET] Take 50 mg by mouth daily.              triamcinolone (KENALOG) 0.1 % cream [TRIAMCINOLONE (KENALOG) 0.1 % CREAM] Apply 1 application topically 2 (two) times a day as needed.       vit A/vit C/vit E/zinc/copper (PRESERVISION AREDS ORAL) [VIT A/VIT C/VIT E/ZINC/COPPER (PRESERVISION AREDS ORAL)] Take 1 tablet by mouth 2 (two) times a day.        Allergies   Allergen Reactions     Milk [Lac Bovis] Nausea and Vomiting      Medical, surgical, family, social history, and medications were all reviewed and updated as necessary.   Lab Results    Chemistry/lipid CBC Cardiac Enzymes/BNP/TSH/INR     [unfilled]  Lab Results   Component Value Date     02/11/2021    Lab Results   Component Value Date    WBC 9.4 07/02/2021    HGB 14.8 07/02/2021    HCT 45.1 07/02/2021    MCV 96 07/02/2021     07/02/2021        Lab Results   Component Value Date    CHOL 142 10/23/2020    HDL 51 10/23/2020    TRIG 79 10/23/2020          Total Time- 50 minutes spent on date of encounter doing chart review, history and exam, documentation and further activities as noted above.  This note has been dictated using voice  recognition software. Any grammatical, typographical, or context distortions are unintentional and inherent to the software.    oSo Mott CNP  [facility]

## 2021-09-08 NOTE — PATIENT INSTRUCTIONS
Reymundo Petersen,    It was a pleasure to see you today at the [facility].     My recommendations after this visit include:    Reymundo KATINA Nicola,    It was a pleasure to see you today at the [facility].     My recommendations after this visit include:    *Please call my nurse if you have questions about the cardioversion or you have missed tablets of your blood thinner.    Keep these instructions to review before your cardioversion.  You will get a call from a COVID team to schedule a COVID test near your home several days before your procedure.    *Instructions for the day of cardioversion:  #1 Nothing to eat or drink for 8 hours before your procedure.    #2 Okay to take all prescription meds in the morning with water.  If you take your blood thinner in the morning, please take it.    #3 Please hold vitamins, supplements  the day of cardioversion.    #4 You will need a  to drop you off and pick you up after procedure.  It will take 3-4 hrs.    #5 This is an outpatient procedure and done at [facility].     #6  Bathe or shower before coming in.    #7 Leave jewelry at home.    You need 21 days of continuous use of Xarelto before cardioversion.  You must take Xarelto with the same meal each day.    Cardioversion will be ordered today and you will get a call from the  when you are ready for cardioversion.      To followup with me in  3-4 weeks after cardioversion.      My contact information:  Soo Mott CNP  After Hours or Scheduling  [phone]  Cardioversion Nurse---Rylie Stout [phone]        ATRIAL FIBRILLATION: Patient Information    What is atrial fibrillation (a-fib)?  A-fib is a common heart rhythm problem. It will cause irregular and often faster heart beating. A-fib is abnormal electrical activity of the top chambers of the heart. This caused the top chambers of the heart not to squeeze and pump blood to the lower heart chambers. The bottom  chambers of the heart continue to pump blood to the body, but they do this with an irregular often faster heart rate due to the chaotic electrical activity in the top chambers.    How would I know if I have a-fib?    Most people feel their heart beast faster, harder or irregular such as a fluttery feeling in the chest. You could have chest pain, be lightheaded or dizzy, feel weak or tiered or become more short of breath especially with activities. Some patients have no symptoms at all. A-fib might be found to an irregular pulse or on an electrocardiogram. A-fib can stop on its own, come and go or continue for days to months to years on its own. Normally, the heart is in sinus rhythm with a regular pulse and resting heart rates between 50 and 90 beats per minutes (bpm).    How common is a-fib?    Over two million people int he [place] have atrial fibrillation. A-fib is a common heart rhythm problem for older persons. It affects about 4% of people over the age of 60 and 9% of people over 80 years of ages.    What causes a-fib?    A-fib is more common in people who have high blood pressure, heart failure or blockage in the arteries feeding the heart. It is also common in people who were born with or have a disease affecting the valves of the heart. Surgery, lung disease, or thyroid problems can lead to a-fib. Drinking alcohol can also trigger a-fib. Breathing problems during sleep such as loud snoring and pauses in breathing, and daytime sleepiness is called sleep apnea. Sleep apnea is associated with obesity and can lead to a-fib. Many times no cause for a-fib can be found.    How is a-fib diagnosed?    To diagnosis a-fib, you will likely have an electrocardiogram or heart monitor. For these tests, patches are put on your chest to allow a machine to record the electrical activity of your heart. After a-fib is found, you will most likely have blood tests. You will usually be scheduled for an echocardiogram. This  test uses ultrasound to look at your heart to see how well it pumps blood forward and evaluate other heart disease.

## 2021-09-09 ENCOUNTER — DOCUMENTATION ONLY (OUTPATIENT)
Dept: CARDIOLOGY | Facility: CLINIC | Age: 81
End: 2021-09-09

## 2021-09-09 ENCOUNTER — TELEPHONE (OUTPATIENT)
Dept: CARDIOLOGY | Facility: CLINIC | Age: 81
End: 2021-09-09

## 2021-09-09 DIAGNOSIS — I48.19 PERSISTENT ATRIAL FIBRILLATION (H): Primary | ICD-10-CM

## 2021-09-09 LAB
ATRIAL RATE - MUSE: 326 BPM
DIASTOLIC BLOOD PRESSURE - MUSE: NORMAL MMHG
INTERPRETATION ECG - MUSE: NORMAL
P AXIS - MUSE: NORMAL DEGREES
PR INTERVAL - MUSE: NORMAL MS
QRS DURATION - MUSE: 140 MS
QT - MUSE: 424 MS
QTC - MUSE: 450 MS
R AXIS - MUSE: 34 DEGREES
SYSTOLIC BLOOD PRESSURE - MUSE: NORMAL MMHG
T AXIS - MUSE: 201 DEGREES
VENTRICULAR RATE- MUSE: 68 BPM

## 2021-09-09 NOTE — TELEPHONE ENCOUNTER
Pre-Procedure Education Phone Call    Procedure: CV with EP NP  Education:Reviewed Pre-Intra-Post education and instructions reviewed via phone with Juliet mancilla daughter and care giver  COIVD: scheduled on 9/11 at a  facility, results will be viewable in EPIC  Pre-Op: completed and in [facility], from 9/8 9/9/2021 11:44 AM  Kacey Alvarado, DIAMOND Calvert, Kacey Larry RN; P [facility] Ep Support [facility]  Cc: Rylie Stout  Caller: Unspecified (Today,  8:48 AM)  CV WITH SHAMEKA     9/14/21 830 AM ADMIT     H&P: 9/8 SHAMEKA     COVID: 9/11 MPW     FOLLOW UP: 10/8 SHAMEKA AND DEVICE     MDT REP NOTIFIED 9/9     Thanks!   Pam

## 2021-09-09 NOTE — PROGRESS NOTES
H&P LS 9/8 Teach  I Order X P Order X Letter    COVID  Anticoag Xarelto Meds  All ok AM of     Pt reports taking anticoagulation appropriately, denies any missed doses in anticoagulation and pt is aware to call if any missed doses prior to CV     Please call daughter Juliet Petersen [phone]     1940  Home:[phone] (home) Cell:[phone] (mobile)  Emergency Contact: Shine Petersen [phone]    +++Important patient information for CSC/Cath Lab staff : None+++    [facility] EP Cath Lab Procedure Order   Cardioversion:  Cardioversion    Diagnosis:  AF  Anticipated Case Duration:  Standard  Scheduling Needs/Timeframe:  COVID Scheduling- time sensative within 1-2mo  Scheduling Contact: Please contact pt to schedule date/time for COVID testing and CV, if you are unable to schedule date within the next 24 hours please contact pt to update on scheduling process    Current Device:CRT Pacemaker  Device Company/Device Rep Needed for Procedure: Medtronic    Pre-Procedural Testing needed: COVID 19 nasal/lab test within 48hrs of procedure  Anesthesia:  General-CV Only  Research Protocol:  No    [facility] EP Cath Lab Prep   Ordering Provider: Soo Mott NP  Ordering Date: 9/9/2021  Orders Status: Intial order placed and Order set placed    H&P:  Compled by Soo DEVI on 9/8 if scheduled within 30 days, pt to schedule with PMD if procedure outside of this timeframe  PCP: Jamie Noguera, [phone]    Pre-op Labs: Ordered AM of procedure    Medical Records Pertinent for Procedure:  N/A    Patient Education:    Teach with Patient: Will be completed via phone prior to procedure.    Risks Reviewed:     Cardioversion    >90% acute success rate, <10% failure to convert or   reverts shortly after cardioversion.    <1% embolic event of (CVA, pulmonary embolism, or   1. other site).    75% risk for superficial burn.  Risks associated with general anesthesia will be addressed by the Anesthesiology Department    Pre-Procedure  Instructions that were Reviewed with Patient:  NPO after midnight, Remove all jewelry prior to coming in for procedure, Shower prior to arrival, Notified patient of time and date of procedure by CV , Transportation arrangements needed s/p procedure, Post-procedure follow up process, Sedation plan/orders and Pre-procedure letter was sent to pt by CV     Pre-Procedure Medication Instructions:  Instructions given to pt regarding anticoagulants: Xarelto- instructed to continue anticoagulation uninterrupted through their procedure  Instructions given to pt regarding antiarrhythmic medication: Beta Blocker; Pt instructed to continue medication prior to procedure  Instructions for medication, other than anticoagulants/antiarrhythmics listed above, given to pt: to take all morning medications with small sips of water, with the exception of OTC supplements and MVI    Allergies   Allergen Reactions     Milk [Lac Bovis] Nausea and Vomiting       Current Outpatient Medications:      aspirin 81 mg chewable tablet, [ASPIRIN 81 MG CHEWABLE TABLET] Chew 81 mg daily., Disp: , Rfl:      atorvastatin (LIPITOR) 10 MG tablet, [ATORVASTATIN (LIPITOR) 10 MG TABLET] Take 10 mg by mouth 4 (four) times a week. Days vary , Disp: , Rfl:      coenzyme Q-10 200 MG CAPS capsule, Take 200 mg by mouth daily, Disp: , Rfl:      dimenhyDRINATE (DRAMAMINE) 50 MG tablet, Take 50 mg by mouth nightly as needed for sleep, Disp: , Rfl:      doxazosin (CARDURA) 8 MG tablet, [DOXAZOSIN (CARDURA) 8 MG TABLET] Take 4 mg by mouth at bedtime.       , Disp: , Rfl:      finasteride (PROSCAR) 5 mg tablet, [FINASTERIDE (PROSCAR) 5 MG TABLET] Take 5 mg by mouth at bedtime.       , Disp: , Rfl:      furosemide (LASIX) 40 MG tablet, [FUROSEMIDE (LASIX) 40 MG TABLET] Take 20 mg by mouth daily as needed., Disp: , Rfl:      gabapentin (NEURONTIN) 100 MG capsule, [GABAPENTIN (NEURONTIN) 100 MG CAPSULE] TAKE ONE CAPSULE BY MOUTH EVERY MORNING AND AT NOON  AND THREE CAPSULES BY MOUTH AT NIGHT, Disp: , Rfl:      glucosamine/chondr conteh A sod (OSTEO BI-FLEX ORAL), [GLUCOSAMINE/CHONDR CONTEH A SOD (OSTEO BI-FLEX ORAL)] Take 1 tablet by mouth 2 (two) times a day.       , Disp: , Rfl:      losartan (COZAAR) 25 MG tablet, [LOSARTAN (COZAAR) 25 MG TABLET] Take 1 tablet (25 mg total) by mouth daily., Disp: 30 tablet, Rfl: 0     melatonin 10 mg Tab, [MELATONIN 10 MG TAB] Take 10 mg by mouth at bedtime as needed., Disp: , Rfl:      metoprolol succinate (TOPROL-XL) 25 MG, [METOPROLOL SUCCINATE (TOPROL-XL) 25 MG] Take 1 tablet (25 mg total) by mouth at bedtime., Disp: 90 tablet, Rfl: 11     omeprazole (PRILOSEC) 20 MG capsule, [OMEPRAZOLE (PRILOSEC) 20 MG CAPSULE] Take 20 mg by mouth 2 (two) times a day before meals., Disp: , Rfl:      rivaroxaban ANTICOAGULANT (XARELTO) 20 mg tablet, [RIVAROXABAN ANTICOAGULANT (XARELTO) 20 MG TABLET] Take 1 tablet (20 mg total) by mouth daily., Disp: 30 tablet, Rfl: 11     sertraline (ZOLOFT) 50 MG tablet, [SERTRALINE (ZOLOFT) 50 MG TABLET] Take 50 mg by mouth daily.       , Disp: , Rfl:      triamcinolone (KENALOG) 0.1 % cream, [TRIAMCINOLONE (KENALOG) 0.1 % CREAM] Apply 1 application topically 2 (two) times a day as needed., Disp: , Rfl:      vit A/vit C/vit E/zinc/copper (PRESERVISION AREDS ORAL), [VIT A/VIT C/VIT E/ZINC/COPPER (PRESERVISION AREDS ORAL)] Take 1 tablet by mouth 2 (two) times a day., Disp: , Rfl:     Documentation Date:9/9/2021 8:48 AM  DIAMOND Solis,   Here's another CV thanks   He can go any time- he takes Xarelto 20mg oral daily   He has Medtronic CRT-P   No need to hold any meds   Please call daughter Juliet Petersen [phone]   Soo Mott CNP

## 2021-09-10 ENCOUNTER — TELEPHONE (OUTPATIENT)
Dept: CARDIOLOGY | Facility: CLINIC | Age: 81
End: 2021-09-10

## 2021-09-10 DIAGNOSIS — I25.83 CORONARY ARTERY DISEASE DUE TO LIPID RICH PLAQUE: Primary | ICD-10-CM

## 2021-09-10 DIAGNOSIS — I25.10 CORONARY ARTERY DISEASE DUE TO LIPID RICH PLAQUE: Primary | ICD-10-CM

## 2021-09-10 RX ORDER — ASPIRIN 81 MG/1
81 TABLET, CHEWABLE ORAL EVERY OTHER DAY
Qty: 30 TABLET | Refills: 0 | COMMUNITY
Start: 2021-09-10

## 2021-09-10 NOTE — TELEPHONE ENCOUNTER
----- Message from Aracelis Echols sent at 9/10/2021  9:22 AM CDT -----  Regarding: LBF PT  General phone call:    Caller: Juliet - daughter   Primary cardiologist: STEVEN pt  Detailed reason for call: On 9/8 pt was seen and his AVS medications are not the same.  She has a question on the ASA also   Best phone number: [phone]  Best time to contact: any  Ok to leave a detailedmessage? yes  Device? Pacer     Additional Info:       See [facility] chart note from 5/7/2021. ASA was changed to every other day at that time due to bruising. Medication list was not updated. Juliet was called and she was just concerned that the  Medication lists were differing. Writer made appropriate change to reflect that the patient takes every other day. No further questions or concerns. -[facility]

## 2021-09-11 ENCOUNTER — HOSPITAL ENCOUNTER (EMERGENCY)
Facility: HOSPITAL | Age: 81
End: 2021-09-11
Payer: COMMERCIAL

## 2021-09-11 ENCOUNTER — LAB (OUTPATIENT)
Dept: FAMILY MEDICINE | Facility: CLINIC | Age: 81
End: 2021-09-11
Payer: MEDICARE

## 2021-09-11 DIAGNOSIS — I48.19 PERSISTENT ATRIAL FIBRILLATION (H): ICD-10-CM

## 2021-09-11 PROCEDURE — U0005 INFEC AGEN DETEC AMPLI PROBE: HCPCS

## 2021-09-11 PROCEDURE — U0003 INFECTIOUS AGENT DETECTION BY NUCLEIC ACID (DNA OR RNA); SEVERE ACUTE RESPIRATORY SYNDROME CORONAVIRUS 2 (SARS-COV-2) (CORONAVIRUS DISEASE [COVID-19]), AMPLIFIED PROBE TECHNIQUE, MAKING USE OF HIGH THROUGHPUT TECHNOLOGIES AS DESCRIBED BY CMS-2020-01-R: HCPCS

## 2021-09-12 LAB — SARS-COV-2 RNA RESP QL NAA+PROBE: NEGATIVE

## 2021-09-14 ENCOUNTER — ANESTHESIA (OUTPATIENT)
Dept: CARDIOLOGY | Facility: HOSPITAL | Age: 81
End: 2021-09-14
Payer: MEDICARE

## 2021-09-14 ENCOUNTER — ANESTHESIA EVENT (OUTPATIENT)
Dept: CARDIOLOGY | Facility: HOSPITAL | Age: 81
End: 2021-09-14
Payer: MEDICARE

## 2021-09-14 ENCOUNTER — HOSPITAL ENCOUNTER (OUTPATIENT)
Dept: CARDIOLOGY | Facility: HOSPITAL | Age: 81
Discharge: HOME | End: 2021-09-14
Attending: NURSE PRACTITIONER | Admitting: NURSE PRACTITIONER
Payer: MEDICARE

## 2021-09-14 VITALS
RESPIRATION RATE: 40 BRPM | WEIGHT: 185 LBS | HEIGHT: 71 IN | TEMPERATURE: 97.9 F | SYSTOLIC BLOOD PRESSURE: 103 MMHG | DIASTOLIC BLOOD PRESSURE: 66 MMHG | BODY MASS INDEX: 25.9 KG/M2 | OXYGEN SATURATION: 95 % | HEART RATE: 61 BPM

## 2021-09-14 DIAGNOSIS — I48.19 PERSISTENT ATRIAL FIBRILLATION (H): ICD-10-CM

## 2021-09-14 LAB
ATRIAL RATE - MUSE: 60 BPM
DIASTOLIC BLOOD PRESSURE - MUSE: NORMAL MMHG
INTERPRETATION ECG - MUSE: NORMAL
P AXIS - MUSE: NORMAL DEGREES
POTASSIUM BLD-SCNC: 3.8 MMOL/L (ref 3.5–5)
PR INTERVAL - MUSE: 200 MS
QRS DURATION - MUSE: 154 MS
QT - MUSE: 460 MS
QTC - MUSE: 460 MS
R AXIS - MUSE: 34 DEGREES
SYSTOLIC BLOOD PRESSURE - MUSE: NORMAL MMHG
T AXIS - MUSE: 169 DEGREES
VENTRICULAR RATE- MUSE: 60 BPM

## 2021-09-14 PROCEDURE — 250N000009 HC RX 250: Performed by: NURSE ANESTHETIST, CERTIFIED REGISTERED

## 2021-09-14 PROCEDURE — 84132 ASSAY OF SERUM POTASSIUM: CPT | Performed by: NURSE PRACTITIONER

## 2021-09-14 PROCEDURE — 999N000054 HC STATISTIC EKG NON-CHARGEABLE

## 2021-09-14 PROCEDURE — 93005 ELECTROCARDIOGRAM TRACING: CPT

## 2021-09-14 PROCEDURE — 92960 CARDIOVERSION ELECTRIC EXT: CPT | Performed by: NURSE PRACTITIONER

## 2021-09-14 PROCEDURE — 258N000003 HC RX IP 258 OP 636: Performed by: NURSE ANESTHETIST, CERTIFIED REGISTERED

## 2021-09-14 PROCEDURE — 36415 COLL VENOUS BLD VENIPUNCTURE: CPT | Performed by: NURSE PRACTITIONER

## 2021-09-14 PROCEDURE — 93010 ELECTROCARDIOGRAM REPORT: CPT | Mod: HOP | Performed by: INTERNAL MEDICINE

## 2021-09-14 PROCEDURE — 370N000017 HC ANESTHESIA TECHNICAL FEE, PER MIN

## 2021-09-14 PROCEDURE — 92960 CARDIOVERSION ELECTRIC EXT: CPT

## 2021-09-14 RX ORDER — SODIUM CHLORIDE 9 MG/ML
INJECTION, SOLUTION INTRAVENOUS CONTINUOUS PRN
Status: DISCONTINUED | OUTPATIENT
Start: 2021-09-14 | End: 2021-09-14

## 2021-09-14 RX ORDER — LIDOCAINE 40 MG/G
CREAM TOPICAL
Status: DISCONTINUED | OUTPATIENT
Start: 2021-09-14 | End: 2021-09-14 | Stop reason: HOSPADM

## 2021-09-14 RX ADMIN — SODIUM CHLORIDE: 9 INJECTION, SOLUTION INTRAVENOUS at 09:15

## 2021-09-14 RX ADMIN — METHOHEXITAL SODIUM 60 MG: 500 INJECTION, POWDER, LYOPHILIZED, FOR SOLUTION INTRAMUSCULAR; INTRAVENOUS; RECTAL at 09:16

## 2021-09-14 ASSESSMENT — MIFFLIN-ST. JEOR: SCORE: 1561.28

## 2021-09-14 ASSESSMENT — ENCOUNTER SYMPTOMS: DYSRHYTHMIAS: 1

## 2021-09-14 NOTE — PROCEDURES
[facility]    Procedure: Cardioversion External    Date/Time: 9/14/2021 9:31 AM  Performed by: Soo Mott APRN CNP  Authorized by: Soo Mott APRN CNP     UNIVERSAL PROTOCOL   Site Marked: NA  Prior Images Obtained and Reviewed:  Yes  Required items: Required blood products, implants, devices and special equipment available    Patient identity confirmed:  Verbally with patient, arm band and provided demographic data  Patient was reevaluated immediately before administering moderate or deep sedation or anesthesia  Confirmation Checklist:  Patient's identity using two indicators, relevant allergies, procedure was appropriate and matched the consent or emergent situation and correct equipment/implants were available  Time out: Immediately prior to the procedure a time out was called    Universal Protocol: the Joint Commission Universal Protocol was followed           ANESTHESIA  Anesthesia was administered and monitored by anesthesiology.  See anesthesia documentation for details.  PROCEDURE   Patient Tolerance:  Patient tolerated the procedure well with no immediate complications    Length of time physician/provider present for 1:1 monitoring during sedation: 0      Date: 9/14/2021  Preprocedure Dx: Persistent atrial fibrillation  Postprocedure: Successful conversion to normal sinus rhythm from persistent atrial fibrillation    Brief History: History of paroxysmal atrial fibrillation recorded by routine PPM checks; less than 1%.  8/29/2021 patient converted to persistent atrial fibrillation with pacemaker indicating elevated fluid index on OptiVol and decreased thoracic impedance.  Although patient was euvolemic on exam I had concerns persistent atrial fibrillation could lead to acute flare in his congestive heart failure.  Patient had symptoms of tiredness and fatigue that coincided with when his persistent atrial fibrillation started.  Today's cardioversion was done to clarify  symptoms and further management approach.  If symptoms improve recommend rhythm control strategy if symptoms do not improve it would be reasonable to pursue rate control.    : Soo Mott CNP  Methods:  Time out completed.  Then Reymundo Petersen taken in fasting nonsedated state and underwent brief deep anesthesia per anesthesia service.    At 09 19 he received 200 joules of single, biphasic, synchronized DCCV shock between right parasternal and left infrascapular hands-free pads and had prompt restoration of sinus rhythm.  Post cardioversion ECG was personally reviewed, shows 100% AV paced; pacemaker rep present.  Impression:  Successful conversion to sinus rhythm  Full neurological recovery after procedure.  Patient and family updated after procedure.  Plan:  Followup in clinic with device nurse and Soo Mott 10/8/2021.  Prior to Admission medications    Medication Sig Start Date End Date Taking? Authorizing Provider   aspirin (ASA) 81 MG chewable tablet Take 1 tablet (81 mg) by mouth every other day 9/10/21  Yes Jorge A Leigh MD   losartan (COZAAR) 25 MG tablet [LOSARTAN (COZAAR) 25 MG TABLET] Take 1 tablet (25 mg total) by mouth daily. 3/27/20  Yes Alfredo Moore MD   melatonin 10 mg Tab [MELATONIN 10 MG TAB] Take 10 mg by mouth at bedtime as needed. 2/11/21  Yes Provider, Historical   metoprolol succinate (TOPROL-XL) 25 MG [METOPROLOL SUCCINATE (TOPROL-XL) 25 MG] Take 1 tablet (25 mg total) by mouth at bedtime. 10/23/20  Yes Duyen Reddy CNP   rivaroxaban ANTICOAGULANT (XARELTO) 20 mg tablet [RIVAROXABAN ANTICOAGULANT (XARELTO) 20 MG TABLET] Take 1 tablet (20 mg total) by mouth daily. 5/12/21  Yes Jorge A Leigh MD   atorvastatin (LIPITOR) 10 MG tablet [ATORVASTATIN (LIPITOR) 10 MG TABLET] Take 10 mg by mouth 4 (four) times a week. Days vary  1/26/17   Provider, Historical   coenzyme Q-10 200 MG CAPS capsule Take 200 mg by mouth daily    Reported, Patient   dimenhyDRINATE  (DRAMAMINE) 50 MG tablet Take 50 mg by mouth nightly as needed for sleep    Reported, Patient   doxazosin (CARDURA) 8 MG tablet [DOXAZOSIN (CARDURA) 8 MG TABLET] Take 4 mg by mouth at bedtime.        1/26/17   Provider, Historical   finasteride (PROSCAR) 5 mg tablet [FINASTERIDE (PROSCAR) 5 MG TABLET] Take 5 mg by mouth at bedtime.        1/26/17   Provider, Historical   furosemide (LASIX) 40 MG tablet [FUROSEMIDE (LASIX) 40 MG TABLET] Take 20 mg by mouth daily as needed. 1/26/17   Provider, Historical   gabapentin (NEURONTIN) 100 MG capsule [GABAPENTIN (NEURONTIN) 100 MG CAPSULE] TAKE ONE CAPSULE BY MOUTH EVERY MORNING AND AT NOON AND THREE CAPSULES BY MOUTH AT NIGHT 4/23/21   Provider, Historical   glucosamine/chondr cole A sod (OSTEO BI-FLEX ORAL) [GLUCOSAMINE/CHONDR COLE A SOD (OSTEO BI-FLEX ORAL)] Take 1 tablet by mouth 2 (two) times a day.        7/23/19   Provider, Historical   omeprazole (PRILOSEC) 20 MG capsule [OMEPRAZOLE (PRILOSEC) 20 MG CAPSULE] Take 20 mg by mouth 2 (two) times a day before meals. 3/19/20   Provider, Historical   sertraline (ZOLOFT) 50 MG tablet [SERTRALINE (ZOLOFT) 50 MG TABLET] Take 50 mg by mouth daily.        1/26/17   Provider, Historical   triamcinolone (KENALOG) 0.1 % cream [TRIAMCINOLONE (KENALOG) 0.1 % CREAM] Apply 1 application topically 2 (two) times a day as needed. 2/27/20   Provider, Historical   vit A/vit C/vit E/zinc/copper (PRESERVISION AREDS ORAL) [VIT A/VIT C/VIT E/ZINC/COPPER (PRESERVISION AREDS ORAL)] Take 1 tablet by mouth 2 (two) times a day. 8/15/19   Provider, Historical     Continue anticoagulation of Xarelto 20 mg oral daily with meal  Med changes as follows: None needed  Continue all other meds as before.  Discharge to home when stable and at least 1 hr after cardioversion.      Soo Mott, APRN, CNP  9/14/2021

## 2021-09-14 NOTE — INTERVAL H&P NOTE
I have reviewed the surgical (or preoperative) H&P that is linked to this encounter, and examined the patient. There are no significant changes. No missed xarelto for at least 3 weeks.  PPM rep confirmed pt continues in atrial fib

## 2021-09-14 NOTE — ANESTHESIA PREPROCEDURE EVALUATION
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     Coronary artery disease      Dilated cardiomyopathy (H) 2021     MARIAN (generalized anxiety disorder) 2021     GERD (gastroesophageal reflux disease)      High cholesterol      Hypertension      LBBB (left bundle branch block) 2018     Nonrheumatic aortic valve insufficiency 2020     Pulmonary hypertension (H) 2020      Past Surgical History:   Procedure Laterality Date     ANGIOPLASTY       ARTHROSCOPY KNEE       C TOTAL KNEE ARTHROPLASTY Right 8/15/2019    Procedure: RIGHT  MINIMALLY TOTAL KNEE ARTHROPLASTY;  Surgeon: Keven Cerda MD;  Location: [facility];  Service: Orthopedics     CV CORONARY ANGIOGRAM N/A 10/23/2020    Procedure: Coronary Angiogram;  Surgeon: Varun Freire MD;  Location: [facility] Cath Lab;  Service: Cardiology     CV LEFT HEART CATHETERIZATION WITHOUT LEFT VENTRICULOGRAM Left 10/23/2020    Procedure: Left Heart Catheterization Without Left Ventriculogram;  Surgeon: Varun Freire MD;  Location: [facility] Cath Lab;  Service: Cardiology     EP BIV PACEMAKER INSERT N/A 3/25/2020    Procedure: EP Biventricular Pacemaker Insertion;  Surgeon: Taylor Sandoval MD;  Location: [facility] Cath Lab;  Service: Cardiology     RELEASE CARPAL TUNNEL        Allergies   Allergen Reactions     Milk [Lac Bovis] Nausea and Vomiting      Social History     Tobacco Use     Smoking status: Never Smoker     Smokeless tobacco: Never Used   Substance Use Topics     Alcohol use: Yes     Comment: Alcoholic Drinks/day: rare      Wt Readings from Last 1  Encounters:   09/14/21 83.9 kg (185 lb)        Anesthesia Evaluation            ROS/MED HX  ENT/Pulmonary: Comment: Hospitalization due to COVID in 2/2021 - neg pulmonary ROS     Neurologic:  - neg neurologic ROS     Cardiovascular: Comment: TTE 5/2021    Narrative & Impression    Left ventricular cavity size is normal. Severe concentric increase in wall thickness. Ejection fraction is mildly decreased. The calculated left ventricular ejection fraction is 43%.    Right ventricular cavity size is mildly dilated. Normal right ventricular systolic function.    Severe biatrial enlargement.    Mild aortic regurgitation.    The ascending aorta is mildly dilated measuring 4.1 cm.    Moderate pulmonary hypertension present. The estimated systolic pulmonary artery pressure is 51 mm Hg.    When compared to the previous study dated 9/1/2020, there has been no significant change.    (+) hypertension--CAD --stent-Taking blood thinners CHF pacemaker, ICD dysrhythmias, a-fib, pulmonary hypertension,     METS/Exercise Tolerance:     Hematologic:       Musculoskeletal:       GI/Hepatic:     (+) GERD,     Renal/Genitourinary:  - neg Renal ROS     Endo:  - neg endo ROS     Psychiatric/Substance Use:  - neg psychiatric ROS     Infectious Disease:  - neg infectious disease ROS     Malignancy:       Other:            Physical Exam    Airway        Mallampati: II   TM distance: > 3 FB   Neck ROM: full     Respiratory Devices and Support         Dental       (+) missing    B=Bridge, C=Chipped, L=Loose, M=Missing    Cardiovascular          Rhythm and rate: irregular and normal     Pulmonary           breath sounds clear to auscultation           OUTSIDE LABS:  CBC:   Lab Results   Component Value Date    WBC 9.4 07/02/2021    WBC 6.4 06/24/2021    HGB 14.8 07/02/2021    HGB 14.3 06/24/2021    HCT 45.1 07/02/2021    HCT 43.2 06/24/2021     07/02/2021     06/24/2021     BMP:   Lab Results   Component Value Date      07/02/2021     06/24/2021    POTASSIUM 3.9 07/02/2021    POTASSIUM 3.6 06/24/2021    CHLORIDE 110 (H) 07/02/2021    CHLORIDE 106 06/24/2021    CO2 24 07/02/2021    CO2 25 06/24/2021    BUN 16 07/02/2021    BUN 17 06/24/2021    CR 1.02 07/02/2021    CR 0.79 06/24/2021     07/02/2021     (H) 06/24/2021     COAGS:   Lab Results   Component Value Date    PTT 36 02/11/2021    INR 1.03 02/11/2021    FIBR 474 (H) 02/15/2021     POC: No results found for: BGM, HCG, HCGS  HEPATIC:   Lab Results   Component Value Date    ALBUMIN 3.5 07/02/2021    PROTTOTAL 6.7 07/02/2021    ALT <9 07/02/2021    AST 20 07/02/2021    ALKPHOS 72 07/02/2021    BILITOTAL 0.8 07/02/2021     OTHER:   Lab Results   Component Value Date    LACT 1.5 09/21/2020    ANGELY 8.8 07/02/2021    MAG 2.0 07/02/2021    LIPASE <9 07/02/2021    TSH 1.43 03/24/2020    CRP 0.4 07/02/2021       Anesthesia Plan    ASA Status:  3   NPO Status:  NPO Appropriate    Anesthesia Type: General.     - Airway: Mask Only      Maintenance: TIVA.        Consents    Anesthesia Plan(s) and associated risks, benefits, and realistic alternatives discussed. Questions answered and patient/representative(s) expressed understanding.     - Discussed with:  Patient      - Extended Intubation/Ventilatory Support Discussed: No.      - Patient is DNR/DNI Status: No    Use of blood products discussed: No .     Postoperative Care            Comments:    Brief GA mask with brevital. Bite block prior to cardioversion. ETT and LMA available. BALDEMAR available.               Sav Waggoner MD

## 2021-09-14 NOTE — ANESTHESIA POSTPROCEDURE EVALUATION
Patient: Reymundo Petersen    * No procedures listed *    Diagnosis:* No pre-op diagnosis entered *  Diagnosis Additional Information: No value filed.    Anesthesia Type:  General    Note:  Disposition: Outpatient   Postop Pain Control: Uneventful            Sign Out: Well controlled pain   PONV: No   Neuro/Psych: Uneventful            Sign Out: Acceptable/Baseline neuro status   Airway/Respiratory: Uneventful            Sign Out: Acceptable/Baseline resp. status   CV/Hemodynamics: Uneventful            Sign Out: Acceptable CV status   Other NRE: NONE   DID A NON-ROUTINE EVENT OCCUR? No           Last vitals:  Vitals Value Taken Time   /61 09/14/21 0945   Temp     Pulse 60 09/14/21 0947   Resp 20 09/14/21 0947   SpO2 94 % 09/14/21 0947   Vitals shown include unvalidated device data.    Electronically Signed By: Sav Waggoner MD  September 14, 2021  9:49 AM

## 2021-09-14 NOTE — ANESTHESIA CARE TRANSFER NOTE
Patient: Reymundo Petersen    * No procedures listed *    Diagnosis: * No pre-op diagnosis entered *  Diagnosis Additional Information: No value filed.    Anesthesia Type:   General     Note:    Oropharynx: oropharynx clear of all foreign objects  Level of Consciousness: drowsy  Oxygen Supplementation: face mask  Level of Supplemental Oxygen (L/min / FiO2): 8  Independent Airway: airway patency satisfactory and stable  Dentition: dentition unchanged  Vital Signs Stable: post-procedure vital signs reviewed and stable  Report to RN Given: handoff report given  Destination: [facility] [place].          Vitals:  Vitals Value Taken Time   /65 09/14/21 0918   Temp     Pulse 79 09/14/21 0919   Resp 11 09/14/21 0919   SpO2 96 % 09/14/21 0919   Vitals shown include unvalidated device data.    Electronically Signed By: SALVADOR Moreno CRNA  September 14, 2021  9:20 AM

## 2021-09-15 ENCOUNTER — TELEPHONE (OUTPATIENT)
Dept: CARDIOLOGY | Facility: CLINIC | Age: 81
End: 2021-09-15

## 2021-09-15 NOTE — TELEPHONE ENCOUNTER
----- Message from Kacey Alvarado RN sent at 9/15/2021 10:54 AM CDT -----  VM from pts daughter Juliet Cb#[phone]  She said that pt is reporting the the monitor/device is beeping  Requesting a CB  Katalina Pearce

## 2021-09-15 NOTE — TELEPHONE ENCOUNTER
Call placed to daughter and then patient. Thinks it was his bedside monitor that may have been beeping, not his actual pacemaker. States monitor is plugged in, and he is feeling well, although a bit tired. Daughter states he is hard of hearing without his hearing aides in, so she wonders if it could have been something else beeping in his house too. States he is just a bit worried because had ECV yesterday and wondered if his monitor was trying to alert him to being back in AF.     Discussed that beside monitors will not alert the patient to any rhythm issues, but that it will alert the website only if there are concerns. I did check on [employer] to see if there were any alerts on him, did not find any concerns on [employer] monitoring Website.     Patient/daugther are happy to hear this, has not heard beeping since.   Shannon Castro RN

## 2021-10-08 ENCOUNTER — ANCILLARY PROCEDURE (OUTPATIENT)
Dept: CARDIOLOGY | Facility: CLINIC | Age: 81
End: 2021-10-08
Attending: INTERNAL MEDICINE
Payer: MEDICARE

## 2021-10-08 ENCOUNTER — OFFICE VISIT (OUTPATIENT)
Dept: CARDIOLOGY | Facility: CLINIC | Age: 81
End: 2021-10-08
Payer: MEDICARE

## 2021-10-08 VITALS
DIASTOLIC BLOOD PRESSURE: 74 MMHG | WEIGHT: 183 LBS | HEART RATE: 74 BPM | RESPIRATION RATE: 16 BRPM | SYSTOLIC BLOOD PRESSURE: 126 MMHG | BODY MASS INDEX: 25.52 KG/M2

## 2021-10-08 DIAGNOSIS — I48.19 PERSISTENT ATRIAL FIBRILLATION (H): Primary | ICD-10-CM

## 2021-10-08 DIAGNOSIS — Z95.0 PACEMAKER: ICD-10-CM

## 2021-10-08 PROBLEM — H91.90 HEARING LOSS: Status: ACTIVE | Noted: 2021-10-08

## 2021-10-08 PROBLEM — M47.816 ARTHRITIS, LUMBAR SPINE: Status: ACTIVE | Noted: 2021-10-08

## 2021-10-08 LAB
MDC_IDC_EPISODE_DTM: NORMAL
MDC_IDC_EPISODE_DURATION: 13 S
MDC_IDC_EPISODE_DURATION: 2 S
MDC_IDC_EPISODE_DURATION: 4 S
MDC_IDC_EPISODE_DURATION: 5 S
MDC_IDC_EPISODE_ID: 154
MDC_IDC_EPISODE_ID: 155
MDC_IDC_EPISODE_ID: 156
MDC_IDC_EPISODE_ID: 55
MDC_IDC_EPISODE_TYPE: NORMAL
MDC_IDC_LEAD_IMPLANT_DT: NORMAL
MDC_IDC_LEAD_LOCATION: NORMAL
MDC_IDC_LEAD_LOCATION_DETAIL_1: NORMAL
MDC_IDC_LEAD_MFG: NORMAL
MDC_IDC_LEAD_MODEL: NORMAL
MDC_IDC_LEAD_POLARITY_TYPE: NORMAL
MDC_IDC_LEAD_SERIAL: NORMAL
MDC_IDC_LEAD_SPECIAL_FUNCTION: NORMAL
MDC_IDC_MSMT_BATTERY_DTM: NORMAL
MDC_IDC_MSMT_BATTERY_REMAINING_LONGEVITY: 89 MO
MDC_IDC_MSMT_BATTERY_RRT_TRIGGER: 2.6
MDC_IDC_MSMT_BATTERY_STATUS: NORMAL
MDC_IDC_MSMT_BATTERY_VOLTAGE: 3.01 V
MDC_IDC_MSMT_LEADCHNL_LV_IMPEDANCE_VALUE: 209 OHM
MDC_IDC_MSMT_LEADCHNL_LV_IMPEDANCE_VALUE: 285 OHM
MDC_IDC_MSMT_LEADCHNL_LV_IMPEDANCE_VALUE: 285 OHM
MDC_IDC_MSMT_LEADCHNL_LV_IMPEDANCE_VALUE: 342 OHM
MDC_IDC_MSMT_LEADCHNL_LV_IMPEDANCE_VALUE: 380 OHM
MDC_IDC_MSMT_LEADCHNL_LV_PACING_THRESHOLD_AMPLITUDE: 1.62 V
MDC_IDC_MSMT_LEADCHNL_LV_PACING_THRESHOLD_AMPLITUDE: 1.75 V
MDC_IDC_MSMT_LEADCHNL_LV_PACING_THRESHOLD_PULSEWIDTH: 0.4 MS
MDC_IDC_MSMT_LEADCHNL_LV_PACING_THRESHOLD_PULSEWIDTH: 0.4 MS
MDC_IDC_MSMT_LEADCHNL_RA_IMPEDANCE_VALUE: 304 OHM
MDC_IDC_MSMT_LEADCHNL_RA_IMPEDANCE_VALUE: 494 OHM
MDC_IDC_MSMT_LEADCHNL_RA_PACING_THRESHOLD_AMPLITUDE: 0.62 V
MDC_IDC_MSMT_LEADCHNL_RA_PACING_THRESHOLD_AMPLITUDE: 0.75 V
MDC_IDC_MSMT_LEADCHNL_RA_PACING_THRESHOLD_PULSEWIDTH: 0.4 MS
MDC_IDC_MSMT_LEADCHNL_RA_PACING_THRESHOLD_PULSEWIDTH: 0.4 MS
MDC_IDC_MSMT_LEADCHNL_RA_SENSING_INTR_AMPL: 0.25 MV
MDC_IDC_MSMT_LEADCHNL_RA_SENSING_INTR_AMPL: 0.75 MV
MDC_IDC_MSMT_LEADCHNL_RV_IMPEDANCE_VALUE: 285 OHM
MDC_IDC_MSMT_LEADCHNL_RV_IMPEDANCE_VALUE: 323 OHM
MDC_IDC_MSMT_LEADCHNL_RV_PACING_THRESHOLD_AMPLITUDE: 0.88 V
MDC_IDC_MSMT_LEADCHNL_RV_PACING_THRESHOLD_AMPLITUDE: 1 V
MDC_IDC_MSMT_LEADCHNL_RV_PACING_THRESHOLD_PULSEWIDTH: 0.4 MS
MDC_IDC_MSMT_LEADCHNL_RV_PACING_THRESHOLD_PULSEWIDTH: 0.4 MS
MDC_IDC_MSMT_LEADCHNL_RV_SENSING_INTR_AMPL: 2.38 MV
MDC_IDC_MSMT_LEADCHNL_RV_SENSING_INTR_AMPL: 2.38 MV
MDC_IDC_PG_IMPLANT_DTM: NORMAL
MDC_IDC_PG_MFG: NORMAL
MDC_IDC_PG_MODEL: NORMAL
MDC_IDC_PG_SERIAL: NORMAL
MDC_IDC_PG_TYPE: NORMAL
MDC_IDC_SESS_CLINIC_NAME: NORMAL
MDC_IDC_SESS_DTM: NORMAL
MDC_IDC_SESS_TYPE: NORMAL
MDC_IDC_SET_BRADY_AT_MODE_SWITCH_RATE: 171 {BEATS}/MIN
MDC_IDC_SET_BRADY_LOWRATE: 60 {BEATS}/MIN
MDC_IDC_SET_BRADY_MAX_SENSOR_RATE: 120 {BEATS}/MIN
MDC_IDC_SET_BRADY_MAX_TRACKING_RATE: 120 {BEATS}/MIN
MDC_IDC_SET_BRADY_MODE: NORMAL
MDC_IDC_SET_BRADY_PAV_DELAY_HIGH: 100 MS
MDC_IDC_SET_BRADY_PAV_DELAY_LOW: 170 MS
MDC_IDC_SET_BRADY_SAV_DELAY_HIGH: 70 MS
MDC_IDC_SET_BRADY_SAV_DELAY_LOW: 140 MS
MDC_IDC_SET_CRT_LVRV_DELAY: 80 MS
MDC_IDC_SET_CRT_PACED_CHAMBERS: NORMAL
MDC_IDC_SET_LEADCHNL_LV_PACING_AMPLITUDE: 2.5 V
MDC_IDC_SET_LEADCHNL_LV_PACING_ANODE_ELECTRODE_1: NORMAL
MDC_IDC_SET_LEADCHNL_LV_PACING_ANODE_LOCATION_1: NORMAL
MDC_IDC_SET_LEADCHNL_LV_PACING_CAPTURE_MODE: NORMAL
MDC_IDC_SET_LEADCHNL_LV_PACING_CATHODE_ELECTRODE_1: NORMAL
MDC_IDC_SET_LEADCHNL_LV_PACING_CATHODE_LOCATION_1: NORMAL
MDC_IDC_SET_LEADCHNL_LV_PACING_POLARITY: NORMAL
MDC_IDC_SET_LEADCHNL_LV_PACING_PULSEWIDTH: 0.4 MS
MDC_IDC_SET_LEADCHNL_RA_PACING_AMPLITUDE: 1.5 V
MDC_IDC_SET_LEADCHNL_RA_PACING_ANODE_ELECTRODE_1: NORMAL
MDC_IDC_SET_LEADCHNL_RA_PACING_ANODE_LOCATION_1: NORMAL
MDC_IDC_SET_LEADCHNL_RA_PACING_CAPTURE_MODE: NORMAL
MDC_IDC_SET_LEADCHNL_RA_PACING_CATHODE_ELECTRODE_1: NORMAL
MDC_IDC_SET_LEADCHNL_RA_PACING_CATHODE_LOCATION_1: NORMAL
MDC_IDC_SET_LEADCHNL_RA_PACING_POLARITY: NORMAL
MDC_IDC_SET_LEADCHNL_RA_PACING_PULSEWIDTH: 0.4 MS
MDC_IDC_SET_LEADCHNL_RA_SENSING_ANODE_ELECTRODE_1: NORMAL
MDC_IDC_SET_LEADCHNL_RA_SENSING_ANODE_LOCATION_1: NORMAL
MDC_IDC_SET_LEADCHNL_RA_SENSING_CATHODE_ELECTRODE_1: NORMAL
MDC_IDC_SET_LEADCHNL_RA_SENSING_CATHODE_LOCATION_1: NORMAL
MDC_IDC_SET_LEADCHNL_RA_SENSING_POLARITY: NORMAL
MDC_IDC_SET_LEADCHNL_RA_SENSING_SENSITIVITY: 0.15 MV
MDC_IDC_SET_LEADCHNL_RV_PACING_AMPLITUDE: 2 V
MDC_IDC_SET_LEADCHNL_RV_PACING_ANODE_ELECTRODE_1: NORMAL
MDC_IDC_SET_LEADCHNL_RV_PACING_ANODE_LOCATION_1: NORMAL
MDC_IDC_SET_LEADCHNL_RV_PACING_CAPTURE_MODE: NORMAL
MDC_IDC_SET_LEADCHNL_RV_PACING_CATHODE_ELECTRODE_1: NORMAL
MDC_IDC_SET_LEADCHNL_RV_PACING_CATHODE_LOCATION_1: NORMAL
MDC_IDC_SET_LEADCHNL_RV_PACING_POLARITY: NORMAL
MDC_IDC_SET_LEADCHNL_RV_PACING_PULSEWIDTH: 0.4 MS
MDC_IDC_SET_LEADCHNL_RV_SENSING_ANODE_ELECTRODE_1: NORMAL
MDC_IDC_SET_LEADCHNL_RV_SENSING_ANODE_LOCATION_1: NORMAL
MDC_IDC_SET_LEADCHNL_RV_SENSING_CATHODE_ELECTRODE_1: NORMAL
MDC_IDC_SET_LEADCHNL_RV_SENSING_CATHODE_LOCATION_1: NORMAL
MDC_IDC_SET_LEADCHNL_RV_SENSING_POLARITY: NORMAL
MDC_IDC_SET_LEADCHNL_RV_SENSING_SENSITIVITY: 0.9 MV
MDC_IDC_SET_ZONE_DETECTION_INTERVAL: 350 MS
MDC_IDC_SET_ZONE_DETECTION_INTERVAL: 400 MS
MDC_IDC_SET_ZONE_TYPE: NORMAL
MDC_IDC_STAT_AT_BURDEN_PERCENT: 0.1 %
MDC_IDC_STAT_AT_DTM_END: NORMAL
MDC_IDC_STAT_AT_DTM_START: NORMAL
MDC_IDC_STAT_BRADY_AP_VP_PERCENT: 95.27 %
MDC_IDC_STAT_BRADY_AP_VS_PERCENT: 0.51 %
MDC_IDC_STAT_BRADY_AS_VP_PERCENT: 3.43 %
MDC_IDC_STAT_BRADY_AS_VS_PERCENT: 0.79 %
MDC_IDC_STAT_BRADY_DTM_END: NORMAL
MDC_IDC_STAT_BRADY_DTM_START: NORMAL
MDC_IDC_STAT_BRADY_RA_PERCENT_PACED: 95.93 %
MDC_IDC_STAT_BRADY_RV_PERCENT_PACED: 98.69 %
MDC_IDC_STAT_CRT_DTM_END: NORMAL
MDC_IDC_STAT_CRT_DTM_START: NORMAL
MDC_IDC_STAT_CRT_LV_PERCENT_PACED: 98.66 %
MDC_IDC_STAT_CRT_PERCENT_PACED: 98.66 %
MDC_IDC_STAT_EPISODE_RECENT_COUNT: 0
MDC_IDC_STAT_EPISODE_RECENT_COUNT: 0
MDC_IDC_STAT_EPISODE_RECENT_COUNT: 1
MDC_IDC_STAT_EPISODE_RECENT_COUNT_DTM_END: NORMAL
MDC_IDC_STAT_EPISODE_RECENT_COUNT_DTM_START: NORMAL
MDC_IDC_STAT_EPISODE_TOTAL_COUNT: 1
MDC_IDC_STAT_EPISODE_TOTAL_COUNT: 46
MDC_IDC_STAT_EPISODE_TOTAL_COUNT: 8
MDC_IDC_STAT_EPISODE_TOTAL_COUNT_DTM_END: NORMAL
MDC_IDC_STAT_EPISODE_TOTAL_COUNT_DTM_START: NORMAL
MDC_IDC_STAT_EPISODE_TYPE: NORMAL

## 2021-10-08 PROCEDURE — 93281 PM DEVICE PROGR EVAL MULTI: CPT | Performed by: INTERNAL MEDICINE

## 2021-10-08 PROCEDURE — 99215 OFFICE O/P EST HI 40 MIN: CPT | Performed by: NURSE PRACTITIONER

## 2021-10-08 NOTE — PROGRESS NOTES
Assessment/Recommendations     Assessment-    1.  Persistent atrial fibrillation-patient is monitored on routine PPM checks.  Typically patient has extremely low burden seen but was found to have persistent atrial fibrillation that started 8/29/2021.  Also noted on PPM alert OptiVol diagnostic fluid index is slowly rising and thoracic impedance is slowly declining.  Cardioversion 9/14/202.  Follow-up visit today-pacemaker showing there has been no recurrence of atrial fibrillation, OptiVol also shows that improved with restoration of sinus rhythm.  Patient reports feeling better; less fatigue and more energy.     -Discussed pathophysiology and chronic nature of atrial fibrillation; this could be/is likely progression of his atrial fibrillation.  Discussed rate versus rhythm control and that management of atrial fibrillation is based upon symptoms.  Educated on increased thromboembolic risk associated with atrial fibrillation and risk versus benefit of OAC  -Continue metoprolol succinate 25 mg oral daily  -Continue Xarelto 20 mg oral daily  -Patient is to call clinic if he is experiencing symptoms of atrial fibrillation  -Patient had traumatic fall July 24 and is likely a good candidate for watchman device; I mentioned it at the visit but can discuss it more in depth in the future if he is interested.  Patient is not interested at this time   -Due to symptomatic improvement status post cardioversion I recommend rhythm control strategy with this patient.  They are aware atrial fibrillation can recur and he is not on any antiarrhythmic medications.  If atrial fibrillation recurs would recommend trialing antiarrhythmic.    2.  Congestive heart failure-BiV pacing continues-98.7% on most recent check.  Weight is consistently 185 pounds daily  -Continue as needed use of furosemide    3.  Hypertension-blood pressure 126/74; currently meeting guideline goals     [id number] score of 5: 2 age, 1 hypertension, 1 CAD, 1  CHF and on Xarelto 20 mg oral daily with meal.    Reymundo Petersen will follow up with me in 3 months associated with remote device check.       History of Present Illness/Subjective    Reymundo Petersen is a very pleasant 80 year old male who comes in today for EP evaluation of new onset persistent atrial fibrillation.  Reymundo Petersen has a known history of left ventricular hypertrophy, CHF with EF 45%, congestive heart failure, coronary artery disease, complete heart block status post pacemaker insertion, hypertension, hyperlipidemia, hospitalization for COVID-19, MARIAN.     Met with Reymundo and his daughter Juliet today to discuss management of atrial fibrillation.    Cardioversion was done for symptom clarification.  Patient has had improvement in quality of life after restoring sinus rhythm.  Patient is feeling better, he has been able to paint to garage is, has less fatigue, and more energy.  He denies chest pain, shortness of breath, syncope.  He had a traumatic fall 7/24/2021 with a head injury and I reviewed pacemaker data from that day which there were no recorded events.     Cardiographics (reviewed):     Echo 5/19/2021  Narrative & Impression    Left ventricular cavity size is normal. Severe concentric increase in wall thickness. Ejection fraction is mildly decreased. The calculated left ventricular ejection fraction is 43%.    Right ventricular cavity size is mildly dilated. Normal right ventricular systolic function.    Severe biatrial enlargement.    Mild aortic regurgitation.    The ascending aorta is mildly dilated measuring 4.1 cm.    Moderate pulmonary hypertension present. The estimated systolic pulmonary artery pressure is 51 mm Hg.    When compared to the previous study dated 9/1/2020, there has been no significant change.     Angiogram 10/23/2020  Angiography findings:  LM: Mild calcified lesion at the distal left main  LAD: Diffuse disease from proximal to mid LAD  Lcx: Small system with  minimal disease.  RCA: Dominant vessel with diffuse mild disease.  The prior stent in PL is patent with minimal ISR.     Cardiac testing personally reviewed:  9/9/2021 in clinic device check  Narrative & Impression    Type: In-clinic CRTP check with Soo Mott NP  Presenting Rhythm:  APBiVP 63bpm  Lead/Battery status:  Stable lead and battery measurements.  Arrhythmias: No mode switches 9/14/2021 (cardioversion). 1 NSVT, EGM shows frequent ectopy  Anticoagulant: Xarelto  Comments: Normal device function. 98.7% BiVP. LV output programmed to 2.5V from 2V. PMOP turned off. YY        Physical Examination Review of Systems   Vitals: /74 (BP Location: Left arm, Patient Position: Sitting, Cuff Size: Adult Large)   Pulse 74   Resp 16   Wt 83 kg (183 lb)   BMI 25.52 kg/m    BMI= Body mass index is 25.52 kg/m .  Wt Readings from Last 3 Encounters:   10/08/21 83 kg (183 lb)   09/14/21 83.9 kg (185 lb)   09/08/21 83.9 kg (185 lb)       General Appearance:   Alert, cooperative and in no acute distress.   ENT/Mouth: membranes moist, no facial drooping   EYES:  no scleral icterus, normal conjunctivae   Neck: no JVD   Chest/Lungs:   lungs are clear to auscultation, no rales or wheezing, respirations unlabored   Cardiovascular:   Regular. Normal first and second heart sounds with no murmurs, rubs, or gallops; the radial and posterior tibial pulses are intact, trace edema bilateral lower extremities    Abdomen:  Soft, nontender, nondistended, bowel sounds present   Extremities: no cyanosis or clubbing   Skin: warm, dry.    Neurologic: mood and affect are appropriate, alert and oriented x3         Please refer above for cardiac ROS details.      Medical History  Surgical History Family History Social History   Past Medical History:   Diagnosis Date     Atrial fibrillation, transient (H) 8/11/2020     BPH (benign prostatic hyperplasia) 7/11/2018     Chronic combined systolic and diastolic heart failure (H) 2/11/2021      Coronary artery disease      Dilated cardiomyopathy (H) 2/11/2021     MARIAN (generalized anxiety disorder) 2/11/2021     GERD (gastroesophageal reflux disease)      High cholesterol      Hypertension      LBBB (left bundle branch block) 7/11/2018     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     Pulmonary hypertension (H) 4/1/2020     Past Surgical History:   Procedure Laterality Date     ANGIOPLASTY       ARTHROSCOPY KNEE       C TOTAL KNEE ARTHROPLASTY Right 8/15/2019    Procedure: RIGHT  MINIMALLY TOTAL KNEE ARTHROPLASTY;  Surgeon: Keven Cerda MD;  Location: [facility];  Service: Orthopedics     CV CORONARY ANGIOGRAM N/A 10/23/2020    Procedure: Coronary Angiogram;  Surgeon: Varun Freire MD;  Location: [facility];  Service: Cardiology     CV LEFT HEART CATHETERIZATION WITHOUT LEFT VENTRICULOGRAM Left 10/23/2020    Procedure: Left Heart Catheterization Without Left Ventriculogram;  Surgeon: Varun Freire MD;  Location: [facility];  Service: Cardiology     EP BIV PACEMAKER INSERT N/A 3/25/2020    Procedure: EP Biventricular Pacemaker Insertion;  Surgeon: Taylor Sandoval MD;  Location: [facility];  Service: Cardiology     RELEASE CARPAL TUNNEL       Family History   Problem Relation Age of Onset     Heart Disease Father      Diabetes Type 2  Sister      Alzheimer Disease Mother      Chronic Obstructive Pulmonary Disease Brother     Social History     Socioeconomic History     Marital status:      Spouse name: Not on file     Number of children: Not on file     Years of education: Not on file     Highest education level: Not on file   Occupational History     Not on file   Tobacco Use     Smoking status: Never Smoker     Smokeless tobacco: Never Used   Substance and Sexual Activity     Alcohol use: Yes     Comment: Alcoholic Drinks/day: rare     Drug use: No     Sexual activity: Not on file   Other Topics Concern     Not on file   Social  History Narrative     Not on file     Social Determinants of Health     Financial Resource Strain:      Difficulty of Paying Living Expenses:    Food Insecurity:      Worried About Running Out of Food in the Last Year:      Ran Out of Food in the Last Year:    Transportation Needs:      Lack of Transportation (Medical):      Lack of Transportation (Non-Medical):    Physical Activity:      Days of Exercise per Week:      Minutes of Exercise per Session:    Stress:      Feeling of Stress :    Social Connections:      Frequency of Communication with Friends and Family:      Frequency of Social Gatherings with Friends and Family:      Attends Jain Services:      Active Member of Clubs or Organizations:      Attends Club or Organization Meetings:      Marital Status:    Intimate Partner Violence:      Fear of Current or Ex-Partner:      Emotionally Abused:      Physically Abused:      Sexually Abused:           Medications  Allergies   Current Outpatient Medications   Medication Sig Dispense Refill     aspirin (ASA) 81 MG chewable tablet Take 1 tablet (81 mg) by mouth every other day 30 tablet 0     atorvastatin (LIPITOR) 10 MG tablet [ATORVASTATIN (LIPITOR) 10 MG TABLET] Take 10 mg by mouth 4 (four) times a week. Days vary        coenzyme Q-10 200 MG CAPS capsule Take 200 mg by mouth daily       doxazosin (CARDURA) 8 MG tablet [DOXAZOSIN (CARDURA) 8 MG TABLET] Take 4 mg by mouth at bedtime.              finasteride (PROSCAR) 5 mg tablet [FINASTERIDE (PROSCAR) 5 MG TABLET] Take 5 mg by mouth at bedtime.              furosemide (LASIX) 40 MG tablet [FUROSEMIDE (LASIX) 40 MG TABLET] Take 20 mg by mouth daily as needed.       gabapentin (NEURONTIN) 100 MG capsule [GABAPENTIN (NEURONTIN) 100 MG CAPSULE] TAKE ONE CAPSULE BY MOUTH EVERY MORNING AND AT NOON AND THREE CAPSULES BY MOUTH AT NIGHT       glucosamine/chondr cole A sod (OSTEO BI-FLEX ORAL) [GLUCOSAMINE/CHONDR COLE A SOD (OSTEO BI-FLEX ORAL)] Take 1 tablet by mouth 2  (two) times a day.              losartan (COZAAR) 25 MG tablet [LOSARTAN (COZAAR) 25 MG TABLET] Take 1 tablet (25 mg total) by mouth daily. 30 tablet 0     melatonin 10 mg Tab [MELATONIN 10 MG TAB] Take 10 mg by mouth at bedtime as needed.       metoprolol succinate (TOPROL-XL) 25 MG [METOPROLOL SUCCINATE (TOPROL-XL) 25 MG] Take 1 tablet (25 mg total) by mouth at bedtime. 90 tablet 11     omeprazole (PRILOSEC) 20 MG capsule [OMEPRAZOLE (PRILOSEC) 20 MG CAPSULE] Take 20 mg by mouth 2 (two) times a day before meals.       rivaroxaban ANTICOAGULANT (XARELTO) 20 mg tablet [RIVAROXABAN ANTICOAGULANT (XARELTO) 20 MG TABLET] Take 1 tablet (20 mg total) by mouth daily. 30 tablet 11     sertraline (ZOLOFT) 50 MG tablet [SERTRALINE (ZOLOFT) 50 MG TABLET] Take 50 mg by mouth daily.              triamcinolone (KENALOG) 0.1 % cream [TRIAMCINOLONE (KENALOG) 0.1 % CREAM] Apply 1 application topically 2 (two) times a day as needed.       vit A/vit C/vit E/zinc/copper (PRESERVISION AREDS ORAL) [VIT A/VIT C/VIT E/ZINC/COPPER (PRESERVISION AREDS ORAL)] Take 1 tablet by mouth 2 (two) times a day.      Allergies   Allergen Reactions     Lac Bovis Nausea and Vomiting     Other reaction(s): upset stomach         Lab Results    Chemistry/lipid CBC Cardiac Enzymes/BNP/TSH/INR   Recent Labs   Lab Test 10/23/20  0918   CHOL 142   HDL 51   LDL 75   TRIG 79     Recent Labs   Lab Test 10/23/20  0918 05/16/19  0950 01/17/19  1245   LDL 75 89 80     Recent Labs   Lab Test 09/14/21  0902 07/02/21 2047 07/02/21 2047   NA  --   --  141   POTASSIUM 3.8   < > 3.9   CHLORIDE  --   --  110*   CO2  --   --  24   GLC  --   --  108   BUN  --   --  16   CR  --   --  1.02   GFRESTIMATED  --   --  >60   ANGELY  --   --  8.8    < > = values in this interval not displayed.     Recent Labs   Lab Test 07/02/21 2047 06/24/21  1516 04/29/21  1309   CR 1.02 0.79 0.88     No results for input(s): A1C in the last 32233 hours. Recent Labs   Lab Test 07/02/21 2010   WBC  9.4   HGB 14.8   HCT 45.1   MCV 96        Recent Labs   Lab Test 07/02/21 2010 06/24/21  1516 02/15/21  0523   HGB 14.8 14.3 13.4*    Recent Labs   Lab Test 02/11/21  1724 01/31/21  1539 03/25/20  0913   TROPONINI 0.14 0.05 0.26     Recent Labs   Lab Test 02/11/21  1724 01/31/21  1539 03/24/20  1745   * 265* 866*     Recent Labs   Lab Test 03/24/20  1745   TSH 1.43     Recent Labs   Lab Test 02/11/21  1724 03/24/20  1745 08/15/19  0811   INR 1.03 1.05 0.98        Total Time- 50 minutes spent on date of encounter doing chart review, history and exam, documentation and further activities as noted above.  This note has been dictated using voice recognition software. Any grammatical, typographical, or context distortions are unintentional and inherent to the software.    Soo Mott, [facility]

## 2021-10-08 NOTE — PATIENT INSTRUCTIONS
Reymundo Petersen,    It was a pleasure to see you today at the [facility].     My recommendations after this visit include:    No medication changes needed today-    *Please call if atrial fibrillation episodes happen    Follow up with me after the pacemaker check    My contact information:  Soo Mott CNP  After Hours or Scheduling  [phone]  My Nurses phone number [phone]- normal business hours

## 2021-10-08 NOTE — LETTER
10/8/2021    ADDISON BENÍTEZ MD  [facility] [address]  [facility] [address]    RE: Reymundo Petersen       Dear Colleague,    I had the pleasure of seeing Reymundo Petersen in the [facility].          Assessment/Recommendations     Assessment-    1.  Persistent atrial fibrillation-patient is monitored on routine PPM checks.  Typically patient has extremely low burden seen but was found to have persistent atrial fibrillation that started 8/29/2021.  Also noted on PPM alert OptiVol diagnostic fluid index is slowly rising and thoracic impedance is slowly declining.  Cardioversion 9/14/202.  Follow-up visit today-pacemaker showing there has been no recurrence of atrial fibrillation, OptiVol also shows that improved with restoration of sinus rhythm.  Patient reports feeling better; less fatigue and more energy.     -Discussed pathophysiology and chronic nature of atrial fibrillation; this could be/is likely progression of his atrial fibrillation.  Discussed rate versus rhythm control and that management of atrial fibrillation is based upon symptoms.  Educated on increased thromboembolic risk associated with atrial fibrillation and risk versus benefit of OAC  -Continue metoprolol succinate 25 mg oral daily  -Continue Xarelto 20 mg oral daily  -Patient is to call clinic if he is experiencing symptoms of atrial fibrillation  -Patient had traumatic fall July 24 and is likely a good candidate for watchman device; I mentioned it at the visit but can discuss it more in depth in the future if he is interested.  Patient is not interested at this time   -Due to symptomatic improvement status post cardioversion I recommend rhythm control strategy with this patient.  They are aware atrial fibrillation can recur and he is not on any antiarrhythmic medications.  If atrial fibrillation recurs would recommend trialing antiarrhythmic.    2.  Congestive heart  failure-BiV pacing continues-98.7% on most recent check.  Weight is consistently 185 pounds daily  -Continue as needed use of furosemide    3.  Hypertension-blood pressure 126/74; currently meeting guideline goals     [id number] score of 5: 2 age, 1 hypertension, 1 CAD, 1 CHF and on Xarelto 20 mg oral daily with meal.    Reymundo Petersen will follow up with me in 3 months associated with remote device check.       History of Present Illness/Subjective    Reymundo Petersen is a very pleasant 80 year old male who comes in today for EP evaluation of new onset persistent atrial fibrillation.  Reymundo Petersen has a known history of left ventricular hypertrophy, CHF with EF 45%, congestive heart failure, coronary artery disease, complete heart block status post pacemaker insertion, hypertension, hyperlipidemia, hospitalization for COVID-19, MARIAN.     Met with Reymundo and his daughter Juliet today to discuss management of atrial fibrillation.    Cardioversion was done for symptom clarification.  Patient has had improvement in quality of life after restoring sinus rhythm.  Patient is feeling better, he has been able to paint to garage is, has less fatigue, and more energy.  He denies chest pain, shortness of breath, syncope.  He had a traumatic fall 7/24/2021 with a head injury and I reviewed pacemaker data from that day which there were no recorded events.     Cardiographics (reviewed):     Echo 5/19/2021  Narrative & Impression    Left ventricular cavity size is normal. Severe concentric increase in wall thickness. Ejection fraction is mildly decreased. The calculated left ventricular ejection fraction is 43%.    Right ventricular cavity size is mildly dilated. Normal right ventricular systolic function.    Severe biatrial enlargement.    Mild aortic regurgitation.    The ascending aorta is mildly dilated measuring 4.1 cm.    Moderate pulmonary hypertension present. The estimated systolic pulmonary artery pressure is 51  mm Hg.    When compared to the previous study dated 9/1/2020, there has been no significant change.     Angiogram 10/23/2020  Angiography findings:  LM: Mild calcified lesion at the distal left main  LAD: Diffuse disease from proximal to mid LAD  Lcx: Small system with minimal disease.  RCA: Dominant vessel with diffuse mild disease.  The prior stent in PL is patent with minimal ISR.     Cardiac testing personally reviewed:  9/9/2021 in clinic device check  Narrative & Impression    Type: In-clinic CRTP check with Soo Mott NP  Presenting Rhythm:  APBiVP 63bpm  Lead/Battery status:  Stable lead and battery measurements.  Arrhythmias: No mode switches 9/14/2021 (cardioversion). 1 NSVT, EGM shows frequent ectopy  Anticoagulant: Xarelto  Comments: Normal device function. 98.7% BiVP. LV output programmed to 2.5V from 2V. PMOP turned off. YY        Physical Examination Review of Systems   Vitals: /74 (BP Location: Left arm, Patient Position: Sitting, Cuff Size: Adult Large)   Pulse 74   Resp 16   Wt 83 kg (183 lb)   BMI 25.52 kg/m    BMI= Body mass index is 25.52 kg/m .  Wt Readings from Last 3 Encounters:   10/08/21 83 kg (183 lb)   09/14/21 83.9 kg (185 lb)   09/08/21 83.9 kg (185 lb)       General Appearance:   Alert, cooperative and in no acute distress.   ENT/Mouth: membranes moist, no facial drooping   EYES:  no scleral icterus, normal conjunctivae   Neck: no JVD   Chest/Lungs:   lungs are clear to auscultation, no rales or wheezing, respirations unlabored   Cardiovascular:   Regular. Normal first and second heart sounds with no murmurs, rubs, or gallops; the radial and posterior tibial pulses are intact, trace edema bilateral lower extremities    Abdomen:  Soft, nontender, nondistended, bowel sounds present   Extremities: no cyanosis or clubbing   Skin: warm, dry.    Neurologic: mood and affect are appropriate, alert and oriented x3         Please refer above for cardiac ROS details.      Medical  History  Surgical History Family History Social History   Past Medical History:   Diagnosis Date     Atrial fibrillation, transient (H) 8/11/2020     BPH (benign prostatic hyperplasia) 7/11/2018     Chronic combined systolic and diastolic heart failure (H) 2/11/2021     Coronary artery disease      Dilated cardiomyopathy (H) 2/11/2021     MARIAN (generalized anxiety disorder) 2/11/2021     GERD (gastroesophageal reflux disease)      High cholesterol      Hypertension      LBBB (left bundle branch block) 7/11/2018     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     Pulmonary hypertension (H) 4/1/2020     Past Surgical History:   Procedure Laterality Date     ANGIOPLASTY       ARTHROSCOPY KNEE       C TOTAL KNEE ARTHROPLASTY Right 8/15/2019    Procedure: RIGHT  MINIMALLY TOTAL KNEE ARTHROPLASTY;  Surgeon: Keven Cerda MD;  Location: [facility];  Service: Orthopedics     CV CORONARY ANGIOGRAM N/A 10/23/2020    Procedure: Coronary Angiogram;  Surgeon: Varun Freire MD;  Location: [facility];  Service: Cardiology     CV LEFT HEART CATHETERIZATION WITHOUT LEFT VENTRICULOGRAM Left 10/23/2020    Procedure: Left Heart Catheterization Without Left Ventriculogram;  Surgeon: Varun Freire MD;  Location: [facility];  Service: Cardiology     EP BIV PACEMAKER INSERT N/A 3/25/2020    Procedure: EP Biventricular Pacemaker Insertion;  Surgeon: Taylor Sandoval MD;  Location: [facility];  Service: Cardiology     RELEASE CARPAL TUNNEL       Family History   Problem Relation Age of Onset     Heart Disease Father      Diabetes Type 2  Sister      Alzheimer Disease Mother      Chronic Obstructive Pulmonary Disease Brother     Social History     Socioeconomic History     Marital status:      Spouse name: Not on file     Number of children: Not on file     Years of education: Not on file     Highest education level: Not on file   Occupational History     Not on file    Tobacco Use     Smoking status: Never Smoker     Smokeless tobacco: Never Used   Substance and Sexual Activity     Alcohol use: Yes     Comment: Alcoholic Drinks/day: rare     Drug use: No     Sexual activity: Not on file   Other Topics Concern     Not on file   Social History Narrative     Not on file     Social Determinants of Health     Financial Resource Strain:      Difficulty of Paying Living Expenses:    Food Insecurity:      Worried About Running Out of Food in the Last Year:      Ran Out of Food in the Last Year:    Transportation Needs:      Lack of Transportation (Medical):      Lack of Transportation (Non-Medical):    Physical Activity:      Days of Exercise per Week:      Minutes of Exercise per Session:    Stress:      Feeling of Stress :    Social Connections:      Frequency of Communication with Friends and Family:      Frequency of Social Gatherings with Friends and Family:      Attends Worship Services:      Active Member of Clubs or Organizations:      Attends Club or Organization Meetings:      Marital Status:    Intimate Partner Violence:      Fear of Current or Ex-Partner:      Emotionally Abused:      Physically Abused:      Sexually Abused:           Medications  Allergies   Current Outpatient Medications   Medication Sig Dispense Refill     aspirin (ASA) 81 MG chewable tablet Take 1 tablet (81 mg) by mouth every other day 30 tablet 0     atorvastatin (LIPITOR) 10 MG tablet [ATORVASTATIN (LIPITOR) 10 MG TABLET] Take 10 mg by mouth 4 (four) times a week. Days vary        coenzyme Q-10 200 MG CAPS capsule Take 200 mg by mouth daily       doxazosin (CARDURA) 8 MG tablet [DOXAZOSIN (CARDURA) 8 MG TABLET] Take 4 mg by mouth at bedtime.              finasteride (PROSCAR) 5 mg tablet [FINASTERIDE (PROSCAR) 5 MG TABLET] Take 5 mg by mouth at bedtime.              furosemide (LASIX) 40 MG tablet [FUROSEMIDE (LASIX) 40 MG TABLET] Take 20 mg by mouth daily as needed.       gabapentin (NEURONTIN) 100  MG capsule [GABAPENTIN (NEURONTIN) 100 MG CAPSULE] TAKE ONE CAPSULE BY MOUTH EVERY MORNING AND AT NOON AND THREE CAPSULES BY MOUTH AT NIGHT       glucosamine/chondr cole A sod (OSTEO BI-FLEX ORAL) [GLUCOSAMINE/CHONDR COLE A SOD (OSTEO BI-FLEX ORAL)] Take 1 tablet by mouth 2 (two) times a day.              losartan (COZAAR) 25 MG tablet [LOSARTAN (COZAAR) 25 MG TABLET] Take 1 tablet (25 mg total) by mouth daily. 30 tablet 0     melatonin 10 mg Tab [MELATONIN 10 MG TAB] Take 10 mg by mouth at bedtime as needed.       metoprolol succinate (TOPROL-XL) 25 MG [METOPROLOL SUCCINATE (TOPROL-XL) 25 MG] Take 1 tablet (25 mg total) by mouth at bedtime. 90 tablet 11     omeprazole (PRILOSEC) 20 MG capsule [OMEPRAZOLE (PRILOSEC) 20 MG CAPSULE] Take 20 mg by mouth 2 (two) times a day before meals.       rivaroxaban ANTICOAGULANT (XARELTO) 20 mg tablet [RIVAROXABAN ANTICOAGULANT (XARELTO) 20 MG TABLET] Take 1 tablet (20 mg total) by mouth daily. 30 tablet 11     sertraline (ZOLOFT) 50 MG tablet [SERTRALINE (ZOLOFT) 50 MG TABLET] Take 50 mg by mouth daily.              triamcinolone (KENALOG) 0.1 % cream [TRIAMCINOLONE (KENALOG) 0.1 % CREAM] Apply 1 application topically 2 (two) times a day as needed.       vit A/vit C/vit E/zinc/copper (PRESERVISION AREDS ORAL) [VIT A/VIT C/VIT E/ZINC/COPPER (PRESERVISION AREDS ORAL)] Take 1 tablet by mouth 2 (two) times a day.      Allergies   Allergen Reactions     Lac Bovis Nausea and Vomiting     Other reaction(s): upset stomach         Lab Results    Chemistry/lipid CBC Cardiac Enzymes/BNP/TSH/INR   Recent Labs   Lab Test 10/23/20  0918   CHOL 142   HDL 51   LDL 75   TRIG 79     Recent Labs   Lab Test 10/23/20  0918 05/16/19  0950 01/17/19  1245   LDL 75 89 80     Recent Labs   Lab Test 09/14/21  0902 07/02/21 2047 07/02/21 2047   NA  --   --  141   POTASSIUM 3.8   < > 3.9   CHLORIDE  --   --  110*   CO2  --   --  24   GLC  --   --  108   BUN  --   --  16   CR  --   --  1.02   GFRESTIMATED  --    --  >60   ANGELY  --   --  8.8    < > = values in this interval not displayed.     Recent Labs   Lab Test 07/02/21 2047 06/24/21  1516 04/29/21  1309   CR 1.02 0.79 0.88     No results for input(s): A1C in the last 39177 hours. Recent Labs   Lab Test 07/02/21 2010   WBC 9.4   HGB 14.8   HCT 45.1   MCV 96        Recent Labs   Lab Test 07/02/21 2010 06/24/21  1516 02/15/21  0523   HGB 14.8 14.3 13.4*    Recent Labs   Lab Test 02/11/21  1724 01/31/21  1539 03/25/20  0913   TROPONINI 0.14 0.05 0.26     Recent Labs   Lab Test 02/11/21 1724 01/31/21  1539 03/24/20  1745   * 265* 866*     Recent Labs   Lab Test 03/24/20  1745   TSH 1.43     Recent Labs   Lab Test 02/11/21  1724 03/24/20  1745 08/15/19  0811   INR 1.03 1.05 0.98        Total Time- 50 minutes spent on date of encounter doing chart review, history and exam, documentation and further activities as noted above.  This note has been dictated using voice recognition software. Any grammatical, typographical, or context distortions are unintentional and inherent to the software.    SYMONE Mathews [facility]        Thank you for allowing me to participate in the care of your patient.      Sincerely,     SALVADOR Mathews CNP [facility]  cc:   SALVADOR Mathews CNP  [address] [place] [address]

## 2021-10-11 ENCOUNTER — TELEPHONE (OUTPATIENT)
Dept: CARDIOLOGY | Facility: CLINIC | Age: 81
End: 2021-10-11

## 2021-10-11 NOTE — TELEPHONE ENCOUNTER
Phone call from pts daughter Juliet after hours on Friday.  She had questions regarding getting Xarelto through [place] which is a lot cheaper for the pt, then also found a generic option for Xarelto through [place] that was made in [place] and wanted to know if that was safe for the pt.    Called Juliet back today to discuss, let her know that we do not advise medications that are not approved through the [place].  She states understanding and decided she would not like to take the risk at this time.     She said that she discussed with Soo in clinic regarding getting a hard copy of the prescription to fill in [place] if the [place] medication becomes too expensive.  She will call our office if/when this is needed and we will review at that time.    No further questions or concerns.    Mary Jo Smith RN  10/11/2021 9:25 AM

## 2021-10-14 ENCOUNTER — TELEPHONE (OUTPATIENT)
Dept: CARDIOLOGY | Facility: CLINIC | Age: 81
End: 2021-10-14

## 2021-10-14 DIAGNOSIS — I48.91 ATRIAL FIBRILLATION, TRANSIENT (H): ICD-10-CM

## 2021-10-14 NOTE — TELEPHONE ENCOUNTER
Phone call from patient's daughter stating that they have found a [facility] pharmacy that will fill Xarelto for her dad at a fraction of the cost that they are currently paying.  She is requesting a printed, signed prescription for Xarelto in order to fax to the pharmacy.    Will request a signed prescription from Soo Mott who just saw the patient on Friday 10--8-21.

## 2021-10-14 NOTE — TELEPHONE ENCOUNTER
Discussed with Soo, she will print RX and sign today.      Phone call to Juliet, she will  sometime between 4 &5 pm today at the  clinic.

## 2021-10-15 DIAGNOSIS — I48.91 ATRIAL FIBRILLATION, TRANSIENT (H): ICD-10-CM

## 2021-10-15 NOTE — TELEPHONE ENCOUNTER
Phone call to patient's daughter to apologize for coming to the clinic when the rx was not available to .  Explained that a copy is being mailed to the patient today.  She explains that there is no time to receive in the mail and is requesting that the rx be called in or faxed to :  Pharm Store    Ph: [phone]  Fax: [phone]    Order #[id number]    Requesting this be done today.  Will forward to Soo to call in.

## 2021-10-15 NOTE — TELEPHONE ENCOUNTER
Soo Mott APRN CNP Johnson, Caroline RN  Caller: Unspecified (Yesterday,  9:29 AM)  I called this in to the number provided.     I also sent the prescription in the mail and left a VM for the pt.     Soo Chung's phone number is not in the contact info so I did not call her

## 2021-10-17 ENCOUNTER — HEALTH MAINTENANCE LETTER (OUTPATIENT)
Age: 81
End: 2021-10-17

## 2021-10-23 ENCOUNTER — ANCILLARY PROCEDURE (OUTPATIENT)
Dept: CARDIOLOGY | Facility: CLINIC | Age: 81
End: 2021-10-23
Attending: INTERNAL MEDICINE
Payer: COMMERCIAL

## 2021-10-23 DIAGNOSIS — Z95.0 STATUS POST BIVENTRICULAR PACEMAKER: ICD-10-CM

## 2021-10-25 ENCOUNTER — DOCUMENTATION ONLY (OUTPATIENT)
Dept: CARDIOLOGY | Facility: CLINIC | Age: 81
End: 2021-10-25

## 2021-10-25 LAB
MDC_IDC_EPISODE_DTM: NORMAL
MDC_IDC_EPISODE_DURATION: 1 S
MDC_IDC_EPISODE_DURATION: 10 S
MDC_IDC_EPISODE_DURATION: 4 S
MDC_IDC_EPISODE_DURATION: 5 S
MDC_IDC_EPISODE_ID: 157
MDC_IDC_EPISODE_ID: 158
MDC_IDC_EPISODE_ID: 159
MDC_IDC_EPISODE_ID: 56
MDC_IDC_EPISODE_ID: 57
MDC_IDC_EPISODE_TYPE: NORMAL
MDC_IDC_LEAD_IMPLANT_DT: NORMAL
MDC_IDC_LEAD_LOCATION: NORMAL
MDC_IDC_LEAD_LOCATION_DETAIL_1: NORMAL
MDC_IDC_LEAD_MFG: NORMAL
MDC_IDC_LEAD_MODEL: NORMAL
MDC_IDC_LEAD_POLARITY_TYPE: NORMAL
MDC_IDC_LEAD_SERIAL: NORMAL
MDC_IDC_LEAD_SPECIAL_FUNCTION: NORMAL
MDC_IDC_MSMT_BATTERY_DTM: NORMAL
MDC_IDC_MSMT_BATTERY_REMAINING_LONGEVITY: 87 MO
MDC_IDC_MSMT_BATTERY_RRT_TRIGGER: 2.6
MDC_IDC_MSMT_BATTERY_STATUS: NORMAL
MDC_IDC_MSMT_BATTERY_VOLTAGE: 3 V
MDC_IDC_MSMT_LEADCHNL_LV_IMPEDANCE_VALUE: 209 OHM
MDC_IDC_MSMT_LEADCHNL_LV_IMPEDANCE_VALUE: 266 OHM
MDC_IDC_MSMT_LEADCHNL_LV_IMPEDANCE_VALUE: 285 OHM
MDC_IDC_MSMT_LEADCHNL_LV_IMPEDANCE_VALUE: 342 OHM
MDC_IDC_MSMT_LEADCHNL_LV_IMPEDANCE_VALUE: 380 OHM
MDC_IDC_MSMT_LEADCHNL_LV_PACING_THRESHOLD_AMPLITUDE: 1.5 V
MDC_IDC_MSMT_LEADCHNL_LV_PACING_THRESHOLD_PULSEWIDTH: 0.4 MS
MDC_IDC_MSMT_LEADCHNL_RA_IMPEDANCE_VALUE: 285 OHM
MDC_IDC_MSMT_LEADCHNL_RA_IMPEDANCE_VALUE: 456 OHM
MDC_IDC_MSMT_LEADCHNL_RA_PACING_THRESHOLD_AMPLITUDE: 0.75 V
MDC_IDC_MSMT_LEADCHNL_RA_PACING_THRESHOLD_PULSEWIDTH: 0.4 MS
MDC_IDC_MSMT_LEADCHNL_RA_SENSING_INTR_AMPL: 0.62 MV
MDC_IDC_MSMT_LEADCHNL_RA_SENSING_INTR_AMPL: 0.62 MV
MDC_IDC_MSMT_LEADCHNL_RV_IMPEDANCE_VALUE: 266 OHM
MDC_IDC_MSMT_LEADCHNL_RV_IMPEDANCE_VALUE: 323 OHM
MDC_IDC_MSMT_LEADCHNL_RV_PACING_THRESHOLD_AMPLITUDE: 0.75 V
MDC_IDC_MSMT_LEADCHNL_RV_PACING_THRESHOLD_PULSEWIDTH: 0.4 MS
MDC_IDC_MSMT_LEADCHNL_RV_SENSING_INTR_AMPL: 12.25 MV
MDC_IDC_MSMT_LEADCHNL_RV_SENSING_INTR_AMPL: 12.25 MV
MDC_IDC_PG_IMPLANT_DTM: NORMAL
MDC_IDC_PG_MFG: NORMAL
MDC_IDC_PG_MODEL: NORMAL
MDC_IDC_PG_SERIAL: NORMAL
MDC_IDC_PG_TYPE: NORMAL
MDC_IDC_SESS_CLINIC_NAME: NORMAL
MDC_IDC_SESS_DTM: NORMAL
MDC_IDC_SESS_TYPE: NORMAL
MDC_IDC_SET_BRADY_AT_MODE_SWITCH_RATE: 171 {BEATS}/MIN
MDC_IDC_SET_BRADY_LOWRATE: 60 {BEATS}/MIN
MDC_IDC_SET_BRADY_MAX_SENSOR_RATE: 120 {BEATS}/MIN
MDC_IDC_SET_BRADY_MAX_TRACKING_RATE: 120 {BEATS}/MIN
MDC_IDC_SET_BRADY_MODE: NORMAL
MDC_IDC_SET_BRADY_PAV_DELAY_HIGH: 100 MS
MDC_IDC_SET_BRADY_PAV_DELAY_LOW: 170 MS
MDC_IDC_SET_BRADY_SAV_DELAY_HIGH: 70 MS
MDC_IDC_SET_BRADY_SAV_DELAY_LOW: 140 MS
MDC_IDC_SET_CRT_LVRV_DELAY: 80 MS
MDC_IDC_SET_CRT_PACED_CHAMBERS: NORMAL
MDC_IDC_SET_LEADCHNL_LV_PACING_AMPLITUDE: 2.5 V
MDC_IDC_SET_LEADCHNL_LV_PACING_ANODE_ELECTRODE_1: NORMAL
MDC_IDC_SET_LEADCHNL_LV_PACING_ANODE_LOCATION_1: NORMAL
MDC_IDC_SET_LEADCHNL_LV_PACING_CAPTURE_MODE: NORMAL
MDC_IDC_SET_LEADCHNL_LV_PACING_CATHODE_ELECTRODE_1: NORMAL
MDC_IDC_SET_LEADCHNL_LV_PACING_CATHODE_LOCATION_1: NORMAL
MDC_IDC_SET_LEADCHNL_LV_PACING_POLARITY: NORMAL
MDC_IDC_SET_LEADCHNL_LV_PACING_PULSEWIDTH: 0.4 MS
MDC_IDC_SET_LEADCHNL_RA_PACING_AMPLITUDE: 1.5 V
MDC_IDC_SET_LEADCHNL_RA_PACING_ANODE_ELECTRODE_1: NORMAL
MDC_IDC_SET_LEADCHNL_RA_PACING_ANODE_LOCATION_1: NORMAL
MDC_IDC_SET_LEADCHNL_RA_PACING_CAPTURE_MODE: NORMAL
MDC_IDC_SET_LEADCHNL_RA_PACING_CATHODE_ELECTRODE_1: NORMAL
MDC_IDC_SET_LEADCHNL_RA_PACING_CATHODE_LOCATION_1: NORMAL
MDC_IDC_SET_LEADCHNL_RA_PACING_POLARITY: NORMAL
MDC_IDC_SET_LEADCHNL_RA_PACING_PULSEWIDTH: 0.4 MS
MDC_IDC_SET_LEADCHNL_RA_SENSING_ANODE_ELECTRODE_1: NORMAL
MDC_IDC_SET_LEADCHNL_RA_SENSING_ANODE_LOCATION_1: NORMAL
MDC_IDC_SET_LEADCHNL_RA_SENSING_CATHODE_ELECTRODE_1: NORMAL
MDC_IDC_SET_LEADCHNL_RA_SENSING_CATHODE_LOCATION_1: NORMAL
MDC_IDC_SET_LEADCHNL_RA_SENSING_POLARITY: NORMAL
MDC_IDC_SET_LEADCHNL_RA_SENSING_SENSITIVITY: 0.15 MV
MDC_IDC_SET_LEADCHNL_RV_PACING_AMPLITUDE: 2 V
MDC_IDC_SET_LEADCHNL_RV_PACING_ANODE_ELECTRODE_1: NORMAL
MDC_IDC_SET_LEADCHNL_RV_PACING_ANODE_LOCATION_1: NORMAL
MDC_IDC_SET_LEADCHNL_RV_PACING_CAPTURE_MODE: NORMAL
MDC_IDC_SET_LEADCHNL_RV_PACING_CATHODE_ELECTRODE_1: NORMAL
MDC_IDC_SET_LEADCHNL_RV_PACING_CATHODE_LOCATION_1: NORMAL
MDC_IDC_SET_LEADCHNL_RV_PACING_POLARITY: NORMAL
MDC_IDC_SET_LEADCHNL_RV_PACING_PULSEWIDTH: 0.4 MS
MDC_IDC_SET_LEADCHNL_RV_SENSING_ANODE_ELECTRODE_1: NORMAL
MDC_IDC_SET_LEADCHNL_RV_SENSING_ANODE_LOCATION_1: NORMAL
MDC_IDC_SET_LEADCHNL_RV_SENSING_CATHODE_ELECTRODE_1: NORMAL
MDC_IDC_SET_LEADCHNL_RV_SENSING_CATHODE_LOCATION_1: NORMAL
MDC_IDC_SET_LEADCHNL_RV_SENSING_POLARITY: NORMAL
MDC_IDC_SET_LEADCHNL_RV_SENSING_SENSITIVITY: 0.9 MV
MDC_IDC_SET_ZONE_DETECTION_INTERVAL: 350 MS
MDC_IDC_SET_ZONE_DETECTION_INTERVAL: 400 MS
MDC_IDC_SET_ZONE_TYPE: NORMAL
MDC_IDC_STAT_AT_BURDEN_PERCENT: 1.7 %
MDC_IDC_STAT_AT_DTM_END: NORMAL
MDC_IDC_STAT_AT_DTM_START: NORMAL
MDC_IDC_STAT_BRADY_AP_VP_PERCENT: 91.74 %
MDC_IDC_STAT_BRADY_AP_VS_PERCENT: 0.17 %
MDC_IDC_STAT_BRADY_AS_VP_PERCENT: 7.52 %
MDC_IDC_STAT_BRADY_AS_VS_PERCENT: 0.57 %
MDC_IDC_STAT_BRADY_DTM_END: NORMAL
MDC_IDC_STAT_BRADY_DTM_START: NORMAL
MDC_IDC_STAT_BRADY_RA_PERCENT_PACED: 90.6 %
MDC_IDC_STAT_BRADY_RV_PERCENT_PACED: 99.26 %
MDC_IDC_STAT_CRT_DTM_END: NORMAL
MDC_IDC_STAT_CRT_DTM_START: NORMAL
MDC_IDC_STAT_CRT_LV_PERCENT_PACED: 99.24 %
MDC_IDC_STAT_CRT_PERCENT_PACED: 99.24 %
MDC_IDC_STAT_EPISODE_RECENT_COUNT: 0
MDC_IDC_STAT_EPISODE_RECENT_COUNT: 1
MDC_IDC_STAT_EPISODE_RECENT_COUNT: 1
MDC_IDC_STAT_EPISODE_RECENT_COUNT_DTM_END: NORMAL
MDC_IDC_STAT_EPISODE_RECENT_COUNT_DTM_START: NORMAL
MDC_IDC_STAT_EPISODE_TOTAL_COUNT: 0
MDC_IDC_STAT_EPISODE_TOTAL_COUNT: 0
MDC_IDC_STAT_EPISODE_TOTAL_COUNT: 1
MDC_IDC_STAT_EPISODE_TOTAL_COUNT: 47
MDC_IDC_STAT_EPISODE_TOTAL_COUNT: 9
MDC_IDC_STAT_EPISODE_TOTAL_COUNT_DTM_END: NORMAL
MDC_IDC_STAT_EPISODE_TOTAL_COUNT_DTM_START: NORMAL
MDC_IDC_STAT_EPISODE_TYPE: NORMAL

## 2021-10-25 NOTE — Clinical Note
Lilly MUHAMMAD pt confirmed no symptoms, spoke to him and his daughter together and Juliet also confirmed he has been doing really well since CV.    Thanks.  Mary Jo

## 2021-10-25 NOTE — PROGRESS NOTES
Dr. Torrse text paged in regards to discontinuing droplet isolation and potential orders for wound care consult to see patient's stage 2 ulcer on coccyx.     Type: alert remote CRT-P transmission for AF exceeding burden.  Presenting rhythm: atrial fibrillation with biV pacing and sensing, rate 60 bpm.  Battery/lead status: stable  Arrhythmias: since 10/8/21, one AF, in progress at time of transmission since 1:31AM 10/23/21. One 11-bt NSVT rate 170 bpm on 10/13/21. Three V-sensing episodes, marker channels only appear NSVT rates 130-150's, unable to see termination of event on 10/22/21.  Anticoagulant: rivaroxaban  Comments: normal pacemaker function. BiV pacing 99.5%. Routed to device RN.  SID Avila, Device Specialist      Transmission reviewed, agree with above. Patient in AF since 10/23/21, rates controlled. Patient had a successful cardioversion with Soo Mott NP on 9/14/21, this is his first AF event since then.       I spoke with Mr Petersen to review findings. He has not been symptomatic and didn't realize he was in A fib. He also didn't recall NSVT which likely occurred when he was sleeping. We reviewed current medications- Xarelto 20 mg daily and Metoprolol (XL) 25 mg daily. He agreed to send an additional transmission to determine if rhythm has persisted since this morning. I advised that I would route to Yoko Mott NP and to contact clinic if he develops symptoms.     Additional transmission sent at 11:45 AM shows patient still in AF, persistent since 10/23, <5% v-rates >/=120 bpm.       Leslee Friend, BSN, RN, CV-BC  Device Nurse  [facility]

## 2021-10-26 ENCOUNTER — TELEPHONE (OUTPATIENT)
Dept: CARDIOLOGY | Facility: CLINIC | Age: 81
End: 2021-10-26

## 2021-10-26 NOTE — TELEPHONE ENCOUNTER
Return call to patients daughterJuliet, reviewed consent to communicate form.    Reviewed the notes from Leslee, our device nurse.  Follow up transmission has been requested after noting afib on 10-23-21 transmission.  No further updates available.  If still in afib, chart will be forwarded to Soo for further review.  She states understanding.    Encouraged her to follow-up with patients PMD regarding any vaccination questions, she states understanding.

## 2021-10-26 NOTE — TELEPHONE ENCOUNTER
----- Message from Aracelis Echols sent at 10/26/2021  8:13 AM CDT -----  Regarding: CESIA SIMONS  General phone call:    Caller: Juliet - daughter   Primary cardiologist: Cesia  Detailed reason for call: On his device appt yesterday he was noted to be in afib so she wanted to touch base if there is anything they should do. He had a cardioversion.  Also, he had the [employer] Covid 4/10 can he do different boosters>?    Best phone number: [phone]  Best time to contact: any  Ok to leave a detailedmessage? yes  Device? Pacer    Additional Info:

## 2021-10-27 NOTE — PROGRESS NOTES
Attempted to contact pt, voicemail message was left with contact information and instructing pt to call back.  10/27/2021 9:36 AM  DIAMOND Solis Lindsey, APRN CNP  P [facility]  Caller: Unspecified (2 days ago, 11:33 AM)  Hi team,     Please call Ramirez and assess him for symptoms (has recurrence of atrial fibrillation).  He has a daughter who is very involved-her name is Juliet and she would probably want to be involved in these conversations.     If Ramirez is truly having no symptoms I will not change his medications.     If he is having any symptoms which after meeting with him and assessing him status post cardioversion he reported to me he was feeling better in regular rhythm: I would want to start him on sotalol.     Please let me know what he says and I will start sotalol with EKG recheck 3 days after start-would also need to have another cardioversion.*I am only going to change him to sotalol if he is symptomatic with atrial fibrillation.     Thank you!  Soo Mott, CNP

## 2021-10-28 NOTE — PROGRESS NOTES
Spoke to pt regarding his AFIB episode and confirmed he is feeling well and having no symptoms at all.    He will call our office if he starts to develop symptoms and we can assess at that time whether to make any medication changes.    Will send to Soo as LUCIO Camargo

## 2021-11-09 DIAGNOSIS — I25.83 CORONARY ARTERY DISEASE DUE TO LIPID RICH PLAQUE: ICD-10-CM

## 2021-11-09 DIAGNOSIS — I25.10 CORONARY ARTERY DISEASE DUE TO LIPID RICH PLAQUE: ICD-10-CM

## 2021-11-09 RX ORDER — METOPROLOL SUCCINATE 25 MG/1
25 TABLET, EXTENDED RELEASE ORAL AT BEDTIME
Qty: 90 TABLET | Refills: 1 | Status: SHIPPED | OUTPATIENT
Start: 2021-11-09 | End: 2022-01-27

## 2021-11-29 ENCOUNTER — DOCUMENTATION ONLY (OUTPATIENT)
Dept: CARDIOLOGY | Facility: CLINIC | Age: 81
End: 2021-11-29
Payer: COMMERCIAL

## 2021-11-29 ENCOUNTER — ANCILLARY PROCEDURE (OUTPATIENT)
Dept: CARDIOLOGY | Facility: CLINIC | Age: 81
End: 2021-11-29
Attending: INTERNAL MEDICINE
Payer: COMMERCIAL

## 2021-11-29 DIAGNOSIS — I50.9 CHF (CONGESTIVE HEART FAILURE) (H): ICD-10-CM

## 2021-11-29 DIAGNOSIS — Z95.0 STATUS POST BIVENTRICULAR PACEMAKER: ICD-10-CM

## 2021-11-29 DIAGNOSIS — I44.2 AV BLOCK, 3RD DEGREE (H): ICD-10-CM

## 2021-11-29 NOTE — PROGRESS NOTES
Soo Mott APRN CNP  P [facility]  Caller: Unspecified (Today, 12:31 PM)  Hi team,   Can you see this thread?  The pt has optivol alert that got sent to me.  I am hoping you can call him and assess him for CHF.       Thanks   Soo             Previous Message  Pacemaker Check    Soo Mott APRN CNP  [facility] 19 minutes ago (3:02 PM)     [facility] team,   Can you see this thread?  The pt has optivol alert that got sent to me.  I am hoping you can call him and assess him for CHF.       Thanks   Soo    Message text      Soo Mott APRN CNP Helppi, Connie L, RN 20 minutes ago (3:01 PM)         Ok!   Soo    Message text      Darlyn Todd RN Sahli, Lindsey, APRN CNP; [facility] 31 minutes ago (2:50 PM)         Please have EP/CHF nurses follow up, thanks!!!    Routing comment      Soo Mott APRN CNP Helppi, Connie L, RN 43 minutes ago (2:39 PM)     [facility] Darlyn,   His rates are well controlled.  Looks like his BiV pacing 98%.  Would you mind calling him and assessing him for any CHF?  Including weight, swelling, all those symptoms     Thanks   Soo Mott CNP

## 2021-11-29 NOTE — PROGRESS NOTES
"Spoke to pt regarding CHF symptoms in relation to his device remote check results.    Pt states he weighs himself every day and is right at 180lbs.  This has not changed.  He denies increased SOB, LLE edema or Edema in his abd at this time.    When asked about the lower extremity edema specifically asked if there is a line on his skin when he removes his socks and he states \"just a little but not much\".    I let pt know that I will forward over to Soo and we will call him back if we have further recommendations for him.    Soo- let us know if you need anything further.    Thanks!    Mary Jo"

## 2021-11-29 NOTE — PROGRESS NOTES
Optivol results for Soo Mott NP      Data was compiled on 11/28/21 for review and interpretation on the scheduled date 11/29/21.  Type: remote CRT-P transmission done to check Optivol per Yoko DEVI CNP.  Presenting rhythm: atrial fibrillation with biV pacing, rate 68 bpm.  Battery/lead status: stable  Arrhythmias: since 10/25/21, AF appears persistent, burden 100%, V-rates >/=120 bpm ~1%. No ventricular high rate episodes detected.   Anticoagulant: rivaroxaban  Comments: normal pacemaker function. BiV pacing 98.6%. Routed to device RN.  SID Avila, Device Specialist  ADD: Agree with above. Optivol is rising sharply since the beginning of November with a corresponding decrease in thoracic impedance suggesting increase in fluid retention.  Darlyn Todd, Device RN.

## 2021-12-08 ENCOUNTER — TELEPHONE (OUTPATIENT)
Dept: CARDIOLOGY | Facility: CLINIC | Age: 81
End: 2021-12-08
Payer: COMMERCIAL

## 2021-12-08 LAB
MDC_IDC_EPISODE_DTM: NORMAL
MDC_IDC_EPISODE_DURATION: 1005 S
MDC_IDC_EPISODE_DURATION: 105 S
MDC_IDC_EPISODE_DURATION: 110 S
MDC_IDC_EPISODE_DURATION: 123 S
MDC_IDC_EPISODE_DURATION: 130 S
MDC_IDC_EPISODE_DURATION: 16 S
MDC_IDC_EPISODE_DURATION: 163 S
MDC_IDC_EPISODE_DURATION: 1719 S
MDC_IDC_EPISODE_DURATION: 1719 S
MDC_IDC_EPISODE_DURATION: 2207 S
MDC_IDC_EPISODE_DURATION: 253 S
MDC_IDC_EPISODE_DURATION: 2806 S
MDC_IDC_EPISODE_DURATION: 3 S
MDC_IDC_EPISODE_DURATION: 315 S
MDC_IDC_EPISODE_DURATION: 340 S
MDC_IDC_EPISODE_DURATION: 36 S
MDC_IDC_EPISODE_DURATION: 38 S
MDC_IDC_EPISODE_DURATION: 3834 S
MDC_IDC_EPISODE_DURATION: 4 S
MDC_IDC_EPISODE_DURATION: 4 S
MDC_IDC_EPISODE_DURATION: 406 S
MDC_IDC_EPISODE_DURATION: 461 S
MDC_IDC_EPISODE_DURATION: 47 S
MDC_IDC_EPISODE_DURATION: 48 S
MDC_IDC_EPISODE_DURATION: 4833 S
MDC_IDC_EPISODE_DURATION: 4889 S
MDC_IDC_EPISODE_DURATION: 50 S
MDC_IDC_EPISODE_DURATION: 505 S
MDC_IDC_EPISODE_DURATION: 5108 S
MDC_IDC_EPISODE_DURATION: 5115 S
MDC_IDC_EPISODE_DURATION: 527 S
MDC_IDC_EPISODE_DURATION: 53 S
MDC_IDC_EPISODE_DURATION: 54 S
MDC_IDC_EPISODE_DURATION: 5808 S
MDC_IDC_EPISODE_DURATION: 59 S
MDC_IDC_EPISODE_DURATION: 6726 S
MDC_IDC_EPISODE_DURATION: 6735 S
MDC_IDC_EPISODE_DURATION: 7 S
MDC_IDC_EPISODE_DURATION: 7 S
MDC_IDC_EPISODE_DURATION: 719 S
MDC_IDC_EPISODE_DURATION: 8 S
MDC_IDC_EPISODE_DURATION: 84 S
MDC_IDC_EPISODE_DURATION: 874 S
MDC_IDC_EPISODE_DURATION: 9 S
MDC_IDC_EPISODE_DURATION: 9445 S
MDC_IDC_EPISODE_DURATION: 962 S
MDC_IDC_EPISODE_DURATION: NORMAL S
MDC_IDC_EPISODE_ID: 100
MDC_IDC_EPISODE_ID: 101
MDC_IDC_EPISODE_ID: 102
MDC_IDC_EPISODE_ID: 103
MDC_IDC_EPISODE_ID: 104
MDC_IDC_EPISODE_ID: 105
MDC_IDC_EPISODE_ID: 106
MDC_IDC_EPISODE_ID: 107
MDC_IDC_EPISODE_ID: 108
MDC_IDC_EPISODE_ID: 109
MDC_IDC_EPISODE_ID: 110
MDC_IDC_EPISODE_ID: 111
MDC_IDC_EPISODE_ID: 112
MDC_IDC_EPISODE_ID: 113
MDC_IDC_EPISODE_ID: 114
MDC_IDC_EPISODE_ID: 115
MDC_IDC_EPISODE_ID: 116
MDC_IDC_EPISODE_ID: 117
MDC_IDC_EPISODE_ID: 118
MDC_IDC_EPISODE_ID: 119
MDC_IDC_EPISODE_ID: 120
MDC_IDC_EPISODE_ID: 121
MDC_IDC_EPISODE_ID: 122
MDC_IDC_EPISODE_ID: 123
MDC_IDC_EPISODE_ID: 124
MDC_IDC_EPISODE_ID: 125
MDC_IDC_EPISODE_ID: 126
MDC_IDC_EPISODE_ID: 127
MDC_IDC_EPISODE_ID: 128
MDC_IDC_EPISODE_ID: 129
MDC_IDC_EPISODE_ID: 130
MDC_IDC_EPISODE_ID: 131
MDC_IDC_EPISODE_ID: 132
MDC_IDC_EPISODE_ID: 133
MDC_IDC_EPISODE_ID: 134
MDC_IDC_EPISODE_ID: 135
MDC_IDC_EPISODE_ID: 136
MDC_IDC_EPISODE_ID: 137
MDC_IDC_EPISODE_ID: 138
MDC_IDC_EPISODE_ID: 161
MDC_IDC_EPISODE_ID: 162
MDC_IDC_EPISODE_ID: 163
MDC_IDC_EPISODE_ID: 164
MDC_IDC_EPISODE_ID: 165
MDC_IDC_EPISODE_ID: 166
MDC_IDC_EPISODE_ID: 88
MDC_IDC_EPISODE_ID: 89
MDC_IDC_EPISODE_ID: 90
MDC_IDC_EPISODE_ID: 91
MDC_IDC_EPISODE_ID: 92
MDC_IDC_EPISODE_ID: 93
MDC_IDC_EPISODE_ID: 94
MDC_IDC_EPISODE_ID: 95
MDC_IDC_EPISODE_ID: 96
MDC_IDC_EPISODE_ID: 97
MDC_IDC_EPISODE_ID: 98
MDC_IDC_EPISODE_ID: 99
MDC_IDC_EPISODE_TYPE: NORMAL
MDC_IDC_LEAD_IMPLANT_DT: NORMAL
MDC_IDC_LEAD_LOCATION: NORMAL
MDC_IDC_LEAD_LOCATION_DETAIL_1: NORMAL
MDC_IDC_LEAD_MFG: NORMAL
MDC_IDC_LEAD_MODEL: NORMAL
MDC_IDC_LEAD_POLARITY_TYPE: NORMAL
MDC_IDC_LEAD_SERIAL: NORMAL
MDC_IDC_LEAD_SPECIAL_FUNCTION: NORMAL
MDC_IDC_MSMT_BATTERY_DTM: NORMAL
MDC_IDC_MSMT_BATTERY_REMAINING_LONGEVITY: 85 MO
MDC_IDC_MSMT_BATTERY_RRT_TRIGGER: 2.6
MDC_IDC_MSMT_BATTERY_STATUS: NORMAL
MDC_IDC_MSMT_BATTERY_VOLTAGE: 3 V
MDC_IDC_MSMT_LEADCHNL_LV_IMPEDANCE_VALUE: 190 OHM
MDC_IDC_MSMT_LEADCHNL_LV_IMPEDANCE_VALUE: 247 OHM
MDC_IDC_MSMT_LEADCHNL_LV_IMPEDANCE_VALUE: 285 OHM
MDC_IDC_MSMT_LEADCHNL_LV_IMPEDANCE_VALUE: 342 OHM
MDC_IDC_MSMT_LEADCHNL_LV_IMPEDANCE_VALUE: 361 OHM
MDC_IDC_MSMT_LEADCHNL_LV_PACING_THRESHOLD_AMPLITUDE: 1.5 V
MDC_IDC_MSMT_LEADCHNL_LV_PACING_THRESHOLD_PULSEWIDTH: 0.4 MS
MDC_IDC_MSMT_LEADCHNL_RA_IMPEDANCE_VALUE: 247 OHM
MDC_IDC_MSMT_LEADCHNL_RA_IMPEDANCE_VALUE: 418 OHM
MDC_IDC_MSMT_LEADCHNL_RA_PACING_THRESHOLD_AMPLITUDE: 0.75 V
MDC_IDC_MSMT_LEADCHNL_RA_PACING_THRESHOLD_PULSEWIDTH: 0.4 MS
MDC_IDC_MSMT_LEADCHNL_RA_SENSING_INTR_AMPL: 0.12 MV
MDC_IDC_MSMT_LEADCHNL_RA_SENSING_INTR_AMPL: 0.12 MV
MDC_IDC_MSMT_LEADCHNL_RV_IMPEDANCE_VALUE: 247 OHM
MDC_IDC_MSMT_LEADCHNL_RV_IMPEDANCE_VALUE: 323 OHM
MDC_IDC_MSMT_LEADCHNL_RV_PACING_THRESHOLD_AMPLITUDE: 0.88 V
MDC_IDC_MSMT_LEADCHNL_RV_PACING_THRESHOLD_PULSEWIDTH: 0.4 MS
MDC_IDC_MSMT_LEADCHNL_RV_SENSING_INTR_AMPL: 3.62 MV
MDC_IDC_MSMT_LEADCHNL_RV_SENSING_INTR_AMPL: 3.62 MV
MDC_IDC_PG_IMPLANT_DTM: NORMAL
MDC_IDC_PG_MFG: NORMAL
MDC_IDC_PG_MODEL: NORMAL
MDC_IDC_PG_SERIAL: NORMAL
MDC_IDC_PG_TYPE: NORMAL
MDC_IDC_SESS_CLINIC_NAME: NORMAL
MDC_IDC_SESS_DTM: NORMAL
MDC_IDC_SESS_TYPE: NORMAL
MDC_IDC_SET_BRADY_AT_MODE_SWITCH_RATE: 171 {BEATS}/MIN
MDC_IDC_SET_BRADY_LOWRATE: 60 {BEATS}/MIN
MDC_IDC_SET_BRADY_MAX_SENSOR_RATE: 120 {BEATS}/MIN
MDC_IDC_SET_BRADY_MAX_TRACKING_RATE: 120 {BEATS}/MIN
MDC_IDC_SET_BRADY_MODE: NORMAL
MDC_IDC_SET_BRADY_PAV_DELAY_HIGH: 100 MS
MDC_IDC_SET_BRADY_PAV_DELAY_LOW: 170 MS
MDC_IDC_SET_BRADY_SAV_DELAY_HIGH: 70 MS
MDC_IDC_SET_BRADY_SAV_DELAY_LOW: 140 MS
MDC_IDC_SET_CRT_LVRV_DELAY: 80 MS
MDC_IDC_SET_CRT_PACED_CHAMBERS: NORMAL
MDC_IDC_SET_LEADCHNL_LV_PACING_AMPLITUDE: 2.5 V
MDC_IDC_SET_LEADCHNL_LV_PACING_ANODE_ELECTRODE_1: NORMAL
MDC_IDC_SET_LEADCHNL_LV_PACING_ANODE_LOCATION_1: NORMAL
MDC_IDC_SET_LEADCHNL_LV_PACING_CAPTURE_MODE: NORMAL
MDC_IDC_SET_LEADCHNL_LV_PACING_CATHODE_ELECTRODE_1: NORMAL
MDC_IDC_SET_LEADCHNL_LV_PACING_CATHODE_LOCATION_1: NORMAL
MDC_IDC_SET_LEADCHNL_LV_PACING_POLARITY: NORMAL
MDC_IDC_SET_LEADCHNL_LV_PACING_PULSEWIDTH: 0.4 MS
MDC_IDC_SET_LEADCHNL_RA_PACING_AMPLITUDE: 1.5 V
MDC_IDC_SET_LEADCHNL_RA_PACING_ANODE_ELECTRODE_1: NORMAL
MDC_IDC_SET_LEADCHNL_RA_PACING_ANODE_LOCATION_1: NORMAL
MDC_IDC_SET_LEADCHNL_RA_PACING_CAPTURE_MODE: NORMAL
MDC_IDC_SET_LEADCHNL_RA_PACING_CATHODE_ELECTRODE_1: NORMAL
MDC_IDC_SET_LEADCHNL_RA_PACING_CATHODE_LOCATION_1: NORMAL
MDC_IDC_SET_LEADCHNL_RA_PACING_POLARITY: NORMAL
MDC_IDC_SET_LEADCHNL_RA_PACING_PULSEWIDTH: 0.4 MS
MDC_IDC_SET_LEADCHNL_RA_SENSING_ANODE_ELECTRODE_1: NORMAL
MDC_IDC_SET_LEADCHNL_RA_SENSING_ANODE_LOCATION_1: NORMAL
MDC_IDC_SET_LEADCHNL_RA_SENSING_CATHODE_ELECTRODE_1: NORMAL
MDC_IDC_SET_LEADCHNL_RA_SENSING_CATHODE_LOCATION_1: NORMAL
MDC_IDC_SET_LEADCHNL_RA_SENSING_POLARITY: NORMAL
MDC_IDC_SET_LEADCHNL_RA_SENSING_SENSITIVITY: 0.15 MV
MDC_IDC_SET_LEADCHNL_RV_PACING_AMPLITUDE: 2 V
MDC_IDC_SET_LEADCHNL_RV_PACING_ANODE_ELECTRODE_1: NORMAL
MDC_IDC_SET_LEADCHNL_RV_PACING_ANODE_LOCATION_1: NORMAL
MDC_IDC_SET_LEADCHNL_RV_PACING_CAPTURE_MODE: NORMAL
MDC_IDC_SET_LEADCHNL_RV_PACING_CATHODE_ELECTRODE_1: NORMAL
MDC_IDC_SET_LEADCHNL_RV_PACING_CATHODE_LOCATION_1: NORMAL
MDC_IDC_SET_LEADCHNL_RV_PACING_POLARITY: NORMAL
MDC_IDC_SET_LEADCHNL_RV_PACING_PULSEWIDTH: 0.4 MS
MDC_IDC_SET_LEADCHNL_RV_SENSING_ANODE_ELECTRODE_1: NORMAL
MDC_IDC_SET_LEADCHNL_RV_SENSING_ANODE_LOCATION_1: NORMAL
MDC_IDC_SET_LEADCHNL_RV_SENSING_CATHODE_ELECTRODE_1: NORMAL
MDC_IDC_SET_LEADCHNL_RV_SENSING_CATHODE_LOCATION_1: NORMAL
MDC_IDC_SET_LEADCHNL_RV_SENSING_POLARITY: NORMAL
MDC_IDC_SET_LEADCHNL_RV_SENSING_SENSITIVITY: 0.9 MV
MDC_IDC_SET_ZONE_DETECTION_INTERVAL: 350 MS
MDC_IDC_SET_ZONE_DETECTION_INTERVAL: 400 MS
MDC_IDC_SET_ZONE_TYPE: NORMAL
MDC_IDC_STAT_AT_BURDEN_PERCENT: 100 %
MDC_IDC_STAT_AT_DTM_END: NORMAL
MDC_IDC_STAT_AT_DTM_START: NORMAL
MDC_IDC_STAT_BRADY_DTM_END: NORMAL
MDC_IDC_STAT_BRADY_DTM_START: NORMAL
MDC_IDC_STAT_BRADY_RA_PERCENT_PACED: 2.7 %
MDC_IDC_STAT_BRADY_RV_PERCENT_PACED: 98.57 %
MDC_IDC_STAT_CRT_DTM_END: NORMAL
MDC_IDC_STAT_CRT_DTM_START: NORMAL
MDC_IDC_STAT_CRT_LV_PERCENT_PACED: 98.54 %
MDC_IDC_STAT_CRT_PERCENT_PACED: 98.54 %
MDC_IDC_STAT_EPISODE_RECENT_COUNT: 0
MDC_IDC_STAT_EPISODE_RECENT_COUNT: 80
MDC_IDC_STAT_EPISODE_RECENT_COUNT_DTM_END: NORMAL
MDC_IDC_STAT_EPISODE_RECENT_COUNT_DTM_START: NORMAL
MDC_IDC_STAT_EPISODE_TOTAL_COUNT: 0
MDC_IDC_STAT_EPISODE_TOTAL_COUNT: 0
MDC_IDC_STAT_EPISODE_TOTAL_COUNT: 1
MDC_IDC_STAT_EPISODE_TOTAL_COUNT: 47
MDC_IDC_STAT_EPISODE_TOTAL_COUNT: 90
MDC_IDC_STAT_EPISODE_TOTAL_COUNT_DTM_END: NORMAL
MDC_IDC_STAT_EPISODE_TOTAL_COUNT_DTM_START: NORMAL
MDC_IDC_STAT_EPISODE_TYPE: NORMAL

## 2021-12-08 NOTE — TELEPHONE ENCOUNTER
Call placed to Juliet to review MRI safety question. Per [employer] patient's device is 1.5/3T compatible so should be OK for MRI. However, will verify with [employer] because the  3830 lead is LBBB placement.      Shannon Castro RN

## 2021-12-08 NOTE — TELEPHONE ENCOUNTER
----- Message from Kacey Alvarado RN sent at 12/8/2021  9:38 AM CST -----  PC from Juliet mancilla daughter and care giver  Pt just saw the orthopedic surgeon  Pt needs MRI of his knees  Can someone reach back out to the daugther regarding his device and MRI  #[phone]  Thanks  Portia

## 2021-12-09 NOTE — TELEPHONE ENCOUNTER
"Per review with [employer] support this morning, the device would be considered not MRI compatible and would need to be noted as an \"off label\" scan if MRI is done. Would need special \"off label\" approval.      Reviewed situation with patient's daughter and discussed if deemed medically necessary we could see about getting \"off label\" approval. Otherwise if CT scanning is sufficient could go that route without needing clearance due to pacemaker.    Daughter is going to talk with ortho MD and see if CT scanning would be ok. If MRI is needed will call back and review next steps. Daughter appreciates the conversation, denies any other concerns at this time.     Shannon Castro RN    "

## 2021-12-21 ENCOUNTER — TELEPHONE (OUTPATIENT)
Dept: CARDIOLOGY | Facility: CLINIC | Age: 81
End: 2021-12-21
Payer: COMMERCIAL

## 2021-12-21 NOTE — TELEPHONE ENCOUNTER
Phone call from patients daughter, she states that her Dad is having a spinal injection in the next 2-4 weeks and would like to know if he is ok to hold his Xarelto prior to procedure.    Pt with a hx of persistent afib, s/p CRT-P,  cardiomyopathy, CHF, CAD, HTN, CHADS VASc 4.     Last device check on 11-29-21 showed Afib burden 100%.    Pt was last seen on 10-8-21 with Soo Mott, rate control strategy was discusse.    Will review with Marek Clark in the absence of Soo Mott.      Of note, patients daughter states that he has noted a weight gain associated with wheezing and lower extremity edema.  He did see his PMD this week, his Lasix was increased and he was instructed to call next week with an update.  Suggested that if his symptoms don't improve or worsen over the next week that he call for a RAC apt to evaluate for CHF exacerbation.  She states understanding.

## 2021-12-22 NOTE — TELEPHONE ENCOUNTER
Laurie Clark, APRN CNP  You 16 hours ago (5:08 PM)     JH    To hold Xarelto 2 full days prior to spinal injection and restart as soon as given okay.  No bridging.   Marek

## 2021-12-22 NOTE — TELEPHONE ENCOUNTER
Return call to patients daughterJuliet.  Reviewed recommendations from Marek Clark noted below, she states understanding.  Will call with any additional concerns.

## 2022-01-01 ENCOUNTER — TELEPHONE (OUTPATIENT)
Dept: CARDIOLOGY | Facility: CLINIC | Age: 82
End: 2022-01-01

## 2022-01-01 ENCOUNTER — PREP FOR PROCEDURE (OUTPATIENT)
Dept: CARDIOLOGY | Facility: CLINIC | Age: 82
End: 2022-01-01

## 2022-01-01 ENCOUNTER — HEALTH MAINTENANCE LETTER (OUTPATIENT)
Age: 82
End: 2022-01-01

## 2022-01-01 ENCOUNTER — ANESTHESIA (OUTPATIENT)
Dept: CARDIOLOGY | Facility: HOSPITAL | Age: 82
End: 2022-01-01
Payer: MEDICARE

## 2022-01-01 ENCOUNTER — ANESTHESIA EVENT (OUTPATIENT)
Dept: CARDIOLOGY | Facility: HOSPITAL | Age: 82
End: 2022-01-01
Payer: MEDICARE

## 2022-01-01 ENCOUNTER — LAB (OUTPATIENT)
Dept: CARDIOLOGY | Facility: CLINIC | Age: 82
End: 2022-01-01
Payer: MEDICARE

## 2022-01-01 ENCOUNTER — TELEPHONE (OUTPATIENT)
Dept: CARDIOLOGY | Facility: CLINIC | Age: 82
End: 2022-01-01
Payer: COMMERCIAL

## 2022-01-01 ENCOUNTER — APPOINTMENT (OUTPATIENT)
Dept: RADIOLOGY | Facility: HOSPITAL | Age: 82
DRG: 280 | End: 2022-01-01
Attending: EMERGENCY MEDICINE
Payer: MEDICARE

## 2022-01-01 ENCOUNTER — OFFICE VISIT (OUTPATIENT)
Dept: CARDIOLOGY | Facility: CLINIC | Age: 82
End: 2022-01-01

## 2022-01-01 ENCOUNTER — HOSPITAL ENCOUNTER (OUTPATIENT)
Dept: CARDIOLOGY | Facility: CLINIC | Age: 82
Discharge: HOME | End: 2022-06-13
Payer: MEDICARE

## 2022-01-01 ENCOUNTER — DOCUMENTATION ONLY (OUTPATIENT)
Dept: CARDIOLOGY | Facility: CLINIC | Age: 82
End: 2022-01-01

## 2022-01-01 ENCOUNTER — LAB REQUISITION (OUTPATIENT)
Dept: LAB | Facility: CLINIC | Age: 82
End: 2022-01-01
Payer: MEDICARE

## 2022-01-01 ENCOUNTER — APPOINTMENT (OUTPATIENT)
Dept: CARDIOLOGY | Facility: HOSPITAL | Age: 82
DRG: 280 | End: 2022-01-01
Attending: FAMILY MEDICINE
Payer: MEDICARE

## 2022-01-01 ENCOUNTER — OFFICE VISIT (OUTPATIENT)
Dept: CARDIOLOGY | Facility: CLINIC | Age: 82
End: 2022-01-01
Payer: MEDICARE

## 2022-01-01 ENCOUNTER — TELEPHONE (OUTPATIENT)
Dept: CARDIOLOGY | Facility: CLINIC | Age: 82
End: 2022-01-01
Payer: MEDICARE

## 2022-01-01 ENCOUNTER — APPOINTMENT (OUTPATIENT)
Dept: PHYSICAL THERAPY | Facility: HOSPITAL | Age: 82
DRG: 280 | End: 2022-01-01
Attending: STUDENT IN AN ORGANIZED HEALTH CARE EDUCATION/TRAINING PROGRAM
Payer: MEDICARE

## 2022-01-01 ENCOUNTER — APPOINTMENT (OUTPATIENT)
Dept: OCCUPATIONAL THERAPY | Facility: HOSPITAL | Age: 82
DRG: 280 | End: 2022-01-01
Attending: STUDENT IN AN ORGANIZED HEALTH CARE EDUCATION/TRAINING PROGRAM
Payer: MEDICARE

## 2022-01-01 ENCOUNTER — HOSPITAL ENCOUNTER (OUTPATIENT)
Facility: HOSPITAL | Age: 82
End: 2022-01-01
Attending: INTERNAL MEDICINE | Admitting: INTERNAL MEDICINE
Payer: MEDICARE

## 2022-01-01 ENCOUNTER — VIRTUAL VISIT (OUTPATIENT)
Dept: CARDIOLOGY | Facility: CLINIC | Age: 82
End: 2022-01-01
Payer: MEDICARE

## 2022-01-01 ENCOUNTER — ANCILLARY PROCEDURE (OUTPATIENT)
Dept: CARDIOLOGY | Facility: CLINIC | Age: 82
End: 2022-01-01
Attending: INTERNAL MEDICINE
Payer: MEDICARE

## 2022-01-01 ENCOUNTER — TRANSFERRED RECORDS (OUTPATIENT)
Dept: HEALTH INFORMATION MANAGEMENT | Facility: CLINIC | Age: 82
End: 2022-01-01

## 2022-01-01 ENCOUNTER — LAB (OUTPATIENT)
Dept: LAB | Facility: CLINIC | Age: 82
End: 2022-01-01
Payer: MEDICARE

## 2022-01-01 ENCOUNTER — ALLIED HEALTH/NURSE VISIT (OUTPATIENT)
Dept: CARDIOLOGY | Facility: CLINIC | Age: 82
End: 2022-01-01
Payer: MEDICARE

## 2022-01-01 ENCOUNTER — APPOINTMENT (OUTPATIENT)
Dept: RADIOLOGY | Facility: HOSPITAL | Age: 82
DRG: 280 | End: 2022-01-01
Attending: STUDENT IN AN ORGANIZED HEALTH CARE EDUCATION/TRAINING PROGRAM
Payer: MEDICARE

## 2022-01-01 ENCOUNTER — MEDICAL CORRESPONDENCE (OUTPATIENT)
Dept: HEALTH INFORMATION MANAGEMENT | Facility: CLINIC | Age: 82
End: 2022-01-01

## 2022-01-01 ENCOUNTER — HOSPITAL ENCOUNTER (OUTPATIENT)
Dept: RESPIRATORY THERAPY | Facility: CLINIC | Age: 82
Discharge: HOME | End: 2022-05-18
Attending: INTERNAL MEDICINE | Admitting: INTERNAL MEDICINE
Payer: MEDICARE

## 2022-01-01 ENCOUNTER — NURSE TRIAGE (OUTPATIENT)
Dept: CARDIOLOGY | Facility: CLINIC | Age: 82
End: 2022-01-01
Payer: MEDICARE

## 2022-01-01 ENCOUNTER — APPOINTMENT (OUTPATIENT)
Dept: CARDIOLOGY | Facility: CLINIC | Age: 82
End: 2022-01-01
Payer: MEDICARE

## 2022-01-01 ENCOUNTER — HOSPITAL ENCOUNTER (OUTPATIENT)
Facility: HOSPITAL | Age: 82
Discharge: HOME | End: 2022-11-24
Attending: INTERNAL MEDICINE | Admitting: INTERNAL MEDICINE
Payer: MEDICARE

## 2022-01-01 ENCOUNTER — HOSPITAL ENCOUNTER (INPATIENT)
Facility: HOSPITAL | Age: 82
LOS: 4 days | Discharge: HOME HEALTH | DRG: 280 | End: 2022-12-23
Attending: EMERGENCY MEDICINE | Admitting: FAMILY MEDICINE
Payer: MEDICARE

## 2022-01-01 ENCOUNTER — APPOINTMENT (OUTPATIENT)
Dept: ULTRASOUND IMAGING | Facility: HOSPITAL | Age: 82
DRG: 280 | End: 2022-01-01
Attending: EMERGENCY MEDICINE
Payer: MEDICARE

## 2022-01-01 ENCOUNTER — OFFICE VISIT (OUTPATIENT)
Dept: FAMILY MEDICINE | Facility: CLINIC | Age: 82
End: 2022-01-01
Payer: MEDICARE

## 2022-01-01 ENCOUNTER — APPOINTMENT (OUTPATIENT)
Dept: CT IMAGING | Facility: HOSPITAL | Age: 82
DRG: 280 | End: 2022-01-01
Attending: INTERNAL MEDICINE
Payer: MEDICARE

## 2022-01-01 ENCOUNTER — APPOINTMENT (OUTPATIENT)
Dept: OCCUPATIONAL THERAPY | Facility: HOSPITAL | Age: 82
DRG: 280 | End: 2022-01-01
Payer: MEDICARE

## 2022-01-01 ENCOUNTER — APPOINTMENT (OUTPATIENT)
Dept: PHYSICAL THERAPY | Facility: HOSPITAL | Age: 82
DRG: 280 | End: 2022-01-01
Payer: MEDICARE

## 2022-01-01 VITALS
DIASTOLIC BLOOD PRESSURE: 52 MMHG | BODY MASS INDEX: 25.03 KG/M2 | HEART RATE: 65 BPM | RESPIRATION RATE: 16 BRPM | WEIGHT: 174.8 LBS | HEIGHT: 70 IN | SYSTOLIC BLOOD PRESSURE: 82 MMHG

## 2022-01-01 VITALS
SYSTOLIC BLOOD PRESSURE: 94 MMHG | BODY MASS INDEX: 25.25 KG/M2 | DIASTOLIC BLOOD PRESSURE: 52 MMHG | WEIGHT: 176 LBS | RESPIRATION RATE: 16 BRPM | HEART RATE: 66 BPM

## 2022-01-01 VITALS
BODY MASS INDEX: 24.62 KG/M2 | RESPIRATION RATE: 16 BRPM | SYSTOLIC BLOOD PRESSURE: 102 MMHG | HEIGHT: 70 IN | DIASTOLIC BLOOD PRESSURE: 50 MMHG | HEART RATE: 72 BPM | WEIGHT: 172 LBS

## 2022-01-01 VITALS
HEART RATE: 64 BPM | OXYGEN SATURATION: 96 % | WEIGHT: 189 LBS | SYSTOLIC BLOOD PRESSURE: 114 MMHG | BODY MASS INDEX: 26.36 KG/M2 | RESPIRATION RATE: 18 BRPM | TEMPERATURE: 97.7 F | DIASTOLIC BLOOD PRESSURE: 66 MMHG

## 2022-01-01 VITALS
BODY MASS INDEX: 22.55 KG/M2 | WEIGHT: 161.1 LBS | SYSTOLIC BLOOD PRESSURE: 111 MMHG | HEART RATE: 82 BPM | RESPIRATION RATE: 20 BRPM | TEMPERATURE: 97.8 F | DIASTOLIC BLOOD PRESSURE: 81 MMHG | HEIGHT: 71 IN | OXYGEN SATURATION: 94 %

## 2022-01-01 VITALS
HEART RATE: 74 BPM | WEIGHT: 187 LBS | DIASTOLIC BLOOD PRESSURE: 54 MMHG | BODY MASS INDEX: 26.77 KG/M2 | HEIGHT: 70 IN | SYSTOLIC BLOOD PRESSURE: 94 MMHG | RESPIRATION RATE: 18 BRPM

## 2022-01-01 VITALS
SYSTOLIC BLOOD PRESSURE: 102 MMHG | DIASTOLIC BLOOD PRESSURE: 50 MMHG | HEIGHT: 70 IN | BODY MASS INDEX: 24.62 KG/M2 | HEART RATE: 72 BPM | WEIGHT: 172 LBS | RESPIRATION RATE: 16 BRPM

## 2022-01-01 VITALS
DIASTOLIC BLOOD PRESSURE: 52 MMHG | BODY MASS INDEX: 26.63 KG/M2 | HEART RATE: 64 BPM | SYSTOLIC BLOOD PRESSURE: 100 MMHG | WEIGHT: 186 LBS | RESPIRATION RATE: 16 BRPM | HEIGHT: 70 IN

## 2022-01-01 VITALS
BODY MASS INDEX: 25.12 KG/M2 | DIASTOLIC BLOOD PRESSURE: 52 MMHG | HEART RATE: 90 BPM | WEIGHT: 175.1 LBS | SYSTOLIC BLOOD PRESSURE: 94 MMHG | RESPIRATION RATE: 16 BRPM

## 2022-01-01 VITALS
SYSTOLIC BLOOD PRESSURE: 90 MMHG | TEMPERATURE: 98 F | HEIGHT: 70 IN | OXYGEN SATURATION: 93 % | BODY MASS INDEX: 24.52 KG/M2 | WEIGHT: 171.3 LBS | HEART RATE: 74 BPM | DIASTOLIC BLOOD PRESSURE: 54 MMHG | RESPIRATION RATE: 18 BRPM

## 2022-01-01 VITALS — SYSTOLIC BLOOD PRESSURE: 100 MMHG | DIASTOLIC BLOOD PRESSURE: 54 MMHG

## 2022-01-01 VITALS
OXYGEN SATURATION: 95 % | DIASTOLIC BLOOD PRESSURE: 64 MMHG | RESPIRATION RATE: 16 BRPM | SYSTOLIC BLOOD PRESSURE: 110 MMHG | HEART RATE: 64 BPM | BODY MASS INDEX: 25.1 KG/M2 | WEIGHT: 180 LBS

## 2022-01-01 VITALS
BODY MASS INDEX: 26.34 KG/M2 | DIASTOLIC BLOOD PRESSURE: 56 MMHG | RESPIRATION RATE: 16 BRPM | SYSTOLIC BLOOD PRESSURE: 111 MMHG | HEART RATE: 62 BPM | WEIGHT: 184 LBS | HEIGHT: 70 IN

## 2022-01-01 VITALS
HEIGHT: 71 IN | WEIGHT: 189 LBS | SYSTOLIC BLOOD PRESSURE: 112 MMHG | HEART RATE: 84 BPM | RESPIRATION RATE: 16 BRPM | DIASTOLIC BLOOD PRESSURE: 66 MMHG | BODY MASS INDEX: 26.46 KG/M2

## 2022-01-01 DIAGNOSIS — I25.10 CORONARY ARTERY DISEASE DUE TO LIPID RICH PLAQUE: Chronic | ICD-10-CM

## 2022-01-01 DIAGNOSIS — I50.22 CHRONIC SYSTOLIC CONGESTIVE HEART FAILURE (H): ICD-10-CM

## 2022-01-01 DIAGNOSIS — E78.2 COMBINED HYPERLIPIDEMIA: ICD-10-CM

## 2022-01-01 DIAGNOSIS — Z98.890 STATUS POST ABLATION OF ATRIAL FIBRILLATION: Primary | ICD-10-CM

## 2022-01-01 DIAGNOSIS — I48.0 PAROXYSMAL ATRIAL FIBRILLATION (H): ICD-10-CM

## 2022-01-01 DIAGNOSIS — I50.22 CHRONIC SYSTOLIC CONGESTIVE HEART FAILURE (H): Primary | ICD-10-CM

## 2022-01-01 DIAGNOSIS — I48.19 PERSISTENT ATRIAL FIBRILLATION (H): Primary | ICD-10-CM

## 2022-01-01 DIAGNOSIS — I10 PRIMARY HYPERTENSION: ICD-10-CM

## 2022-01-01 DIAGNOSIS — I42.0 DILATED CARDIOMYOPATHY (H): ICD-10-CM

## 2022-01-01 DIAGNOSIS — Z95.0 PRESENCE OF CARDIAC RESYNCHRONIZATION THERAPY PACEMAKER (CRT-P): ICD-10-CM

## 2022-01-01 DIAGNOSIS — R60.0 PERIPHERAL EDEMA: ICD-10-CM

## 2022-01-01 DIAGNOSIS — I25.83 CORONARY ARTERY DISEASE DUE TO LIPID RICH PLAQUE: ICD-10-CM

## 2022-01-01 DIAGNOSIS — I50.9 ACUTE ON CHRONIC CONGESTIVE HEART FAILURE, UNSPECIFIED HEART FAILURE TYPE (H): ICD-10-CM

## 2022-01-01 DIAGNOSIS — Z95.0 STATUS POST BIVENTRICULAR PACEMAKER: ICD-10-CM

## 2022-01-01 DIAGNOSIS — I50.41 ACUTE COMBINED SYSTOLIC AND DIASTOLIC CONGESTIVE HEART FAILURE (H): ICD-10-CM

## 2022-01-01 DIAGNOSIS — N18.2 STAGE 2 CHRONIC KIDNEY DISEASE: ICD-10-CM

## 2022-01-01 DIAGNOSIS — I48.19 PERSISTENT ATRIAL FIBRILLATION (H): ICD-10-CM

## 2022-01-01 DIAGNOSIS — I25.10 CORONARY ARTERY DISEASE DUE TO LIPID RICH PLAQUE: Primary | Chronic | ICD-10-CM

## 2022-01-01 DIAGNOSIS — I50.22 CHRONIC SYSTOLIC HEART FAILURE (H): ICD-10-CM

## 2022-01-01 DIAGNOSIS — I25.83 CORONARY ARTERY DISEASE DUE TO LIPID RICH PLAQUE: Chronic | ICD-10-CM

## 2022-01-01 DIAGNOSIS — I50.9 CHF (CONGESTIVE HEART FAILURE) (H): ICD-10-CM

## 2022-01-01 DIAGNOSIS — I50.22 CHRONIC SYSTOLIC HEART FAILURE (H): Primary | ICD-10-CM

## 2022-01-01 DIAGNOSIS — R79.89 ELEVATED TSH: Primary | ICD-10-CM

## 2022-01-01 DIAGNOSIS — R07.9 CHEST PAIN, UNSPECIFIED TYPE: ICD-10-CM

## 2022-01-01 DIAGNOSIS — Z95.0 BIVENTRICULAR CARDIAC PACEMAKER IN SITU: ICD-10-CM

## 2022-01-01 DIAGNOSIS — R42 DIZZINESS: ICD-10-CM

## 2022-01-01 DIAGNOSIS — J96.01 ACUTE RESPIRATORY FAILURE WITH HYPOXIA (H): ICD-10-CM

## 2022-01-01 DIAGNOSIS — R79.89 TROPONIN LEVEL ELEVATED: ICD-10-CM

## 2022-01-01 DIAGNOSIS — F41.1 GAD (GENERALIZED ANXIETY DISORDER): ICD-10-CM

## 2022-01-01 DIAGNOSIS — I50.41 ACUTE COMBINED SYSTOLIC (CONGESTIVE) AND DIASTOLIC (CONGESTIVE) HEART FAILURE (H): ICD-10-CM

## 2022-01-01 DIAGNOSIS — M47.816 ARTHRITIS, LUMBAR SPINE: ICD-10-CM

## 2022-01-01 DIAGNOSIS — I35.1 NONRHEUMATIC AORTIC VALVE INSUFFICIENCY: Chronic | ICD-10-CM

## 2022-01-01 DIAGNOSIS — I25.5 ISCHEMIC CARDIOMYOPATHY: Primary | ICD-10-CM

## 2022-01-01 DIAGNOSIS — J90 PLEURAL EFFUSION: Primary | ICD-10-CM

## 2022-01-01 DIAGNOSIS — I35.1 NONRHEUMATIC AORTIC VALVE INSUFFICIENCY: Primary | ICD-10-CM

## 2022-01-01 DIAGNOSIS — I25.10 CORONARY ARTERY DISEASE DUE TO LIPID RICH PLAQUE: ICD-10-CM

## 2022-01-01 DIAGNOSIS — I45.5 SINUS ARREST: ICD-10-CM

## 2022-01-01 DIAGNOSIS — I44.2 COMPLETE HEART BLOCK (H): ICD-10-CM

## 2022-01-01 DIAGNOSIS — I35.1 NONRHEUMATIC AORTIC VALVE INSUFFICIENCY: ICD-10-CM

## 2022-01-01 DIAGNOSIS — E78.2 MIXED HYPERLIPIDEMIA: ICD-10-CM

## 2022-01-01 DIAGNOSIS — I50.41 ACUTE COMBINED SYSTOLIC AND DIASTOLIC CONGESTIVE HEART FAILURE (H): Primary | ICD-10-CM

## 2022-01-01 DIAGNOSIS — I48.0 PAROXYSMAL ATRIAL FIBRILLATION (H): Primary | ICD-10-CM

## 2022-01-01 DIAGNOSIS — I45.5 SINUS ARREST: Primary | ICD-10-CM

## 2022-01-01 DIAGNOSIS — R53.81 DEBILITY: ICD-10-CM

## 2022-01-01 DIAGNOSIS — I50.9 ACUTE DECOMPENSATED HEART FAILURE (H): Primary | ICD-10-CM

## 2022-01-01 DIAGNOSIS — I25.10 ATHEROSCLEROTIC HEART DISEASE OF NATIVE CORONARY ARTERY WITHOUT ANGINA PECTORIS: ICD-10-CM

## 2022-01-01 DIAGNOSIS — J90 PLEURAL EFFUSION ON RIGHT: ICD-10-CM

## 2022-01-01 DIAGNOSIS — J18.9 PNEUMONIA OF RIGHT LOWER LOBE DUE TO INFECTIOUS ORGANISM: Primary | ICD-10-CM

## 2022-01-01 DIAGNOSIS — I25.83 CORONARY ARTERY DISEASE DUE TO LIPID RICH PLAQUE: Primary | Chronic | ICD-10-CM

## 2022-01-01 DIAGNOSIS — I48.91 ATRIAL FIBRILLATION, TRANSIENT (H): ICD-10-CM

## 2022-01-01 DIAGNOSIS — R06.09 DYSPNEA ON EXERTION: Primary | ICD-10-CM

## 2022-01-01 DIAGNOSIS — Z79.899 LONG TERM CURRENT USE OF AMIODARONE: ICD-10-CM

## 2022-01-01 DIAGNOSIS — M17.0 PRIMARY OSTEOARTHRITIS OF KNEES, BILATERAL: ICD-10-CM

## 2022-01-01 DIAGNOSIS — I50.9 CHF (CONGESTIVE HEART FAILURE) (H): Primary | ICD-10-CM

## 2022-01-01 DIAGNOSIS — U07.1 INFECTION DUE TO 2019 NOVEL CORONAVIRUS: Chronic | ICD-10-CM

## 2022-01-01 DIAGNOSIS — Z79.899 LONG TERM CURRENT USE OF AMIODARONE: Primary | ICD-10-CM

## 2022-01-01 DIAGNOSIS — I27.20 PULMONARY HYPERTENSION (H): Chronic | ICD-10-CM

## 2022-01-01 DIAGNOSIS — R10.9 FLANK PAIN: Primary | ICD-10-CM

## 2022-01-01 DIAGNOSIS — R79.89 ELEVATED TROPONIN: ICD-10-CM

## 2022-01-01 DIAGNOSIS — N40.0 BENIGN PROSTATIC HYPERPLASIA WITHOUT LOWER URINARY TRACT SYMPTOMS: ICD-10-CM

## 2022-01-01 DIAGNOSIS — Z86.79 STATUS POST ABLATION OF ATRIAL FIBRILLATION: Primary | ICD-10-CM

## 2022-01-01 DIAGNOSIS — I51.7 LEFT VENTRICULAR HYPERTROPHY: ICD-10-CM

## 2022-01-01 DIAGNOSIS — R93.1 ABNORMAL ECHOCARDIOGRAM: ICD-10-CM

## 2022-01-01 DIAGNOSIS — S50.311A ABRASION OF SKIN OF RIGHT ELBOW: ICD-10-CM

## 2022-01-01 LAB
ABO/RH(D): NORMAL
ABO/RH(D): NORMAL
ACT BLD: 313 SECONDS (ref 74–150)
ACT BLD: 314 SECONDS (ref 74–150)
ACT BLD: 318 SECONDS (ref 74–150)
ACT BLD: 382 SECONDS (ref 74–150)
ALBUMIN SERPL BCG-MCNC: 3 G/DL (ref 3.5–5.2)
ALBUMIN SERPL BCG-MCNC: 3.6 G/DL (ref 3.5–5.2)
ALP SERPL-CCNC: 102 U/L (ref 40–129)
ALP SERPL-CCNC: 113 U/L (ref 40–129)
ALT SERPL W P-5'-P-CCNC: 18 U/L (ref 0–45)
ALT SERPL W P-5'-P-CCNC: 7 U/L (ref 10–50)
ALT SERPL W P-5'-P-CCNC: 8 U/L (ref 10–50)
AMIODARONE SERPL-MCNC: <0.3 UG/ML
ANION GAP SERPL CALCULATED.3IONS-SCNC: 10 MMOL/L (ref 5–18)
ANION GAP SERPL CALCULATED.3IONS-SCNC: 10 MMOL/L (ref 5–18)
ANION GAP SERPL CALCULATED.3IONS-SCNC: 10 MMOL/L (ref 7–15)
ANION GAP SERPL CALCULATED.3IONS-SCNC: 11 MMOL/L (ref 7–15)
ANION GAP SERPL CALCULATED.3IONS-SCNC: 11 MMOL/L (ref 7–15)
ANION GAP SERPL CALCULATED.3IONS-SCNC: 12 MMOL/L (ref 5–18)
ANION GAP SERPL CALCULATED.3IONS-SCNC: 7 MMOL/L (ref 5–18)
ANION GAP SERPL CALCULATED.3IONS-SCNC: 7 MMOL/L (ref 5–18)
ANION GAP SERPL CALCULATED.3IONS-SCNC: 7 MMOL/L (ref 7–15)
ANION GAP SERPL CALCULATED.3IONS-SCNC: 8 MMOL/L (ref 5–18)
ANION GAP SERPL CALCULATED.3IONS-SCNC: 8 MMOL/L (ref 7–15)
ANION GAP SERPL CALCULATED.3IONS-SCNC: 9 MMOL/L (ref 5–18)
ANION GAP SERPL CALCULATED.3IONS-SCNC: 9 MMOL/L (ref 7–15)
ANION GAP SERPL CALCULATED.3IONS-SCNC: 9 MMOL/L (ref 7–15)
ANTIBODY SCREEN: NEGATIVE
APPEARANCE FLD: ABNORMAL
AST SERPL W P-5'-P-CCNC: 24 U/L (ref 10–50)
AST SERPL W P-5'-P-CCNC: 24 U/L (ref 10–50)
ATRIAL RATE - MUSE: 61 BPM
ATRIAL RATE - MUSE: 64 BPM
ATRIAL RATE - MUSE: 67 BPM
ATRIAL RATE - MUSE: 73 BPM
ATRIAL RATE - MUSE: 80 BPM
BACTERIA PLR CULT: NO GROWTH
BASE EXCESS BLDV CALC-SCNC: 11.9 MMOL/L
BASOPHILS # BLD AUTO: 0 10E3/UL (ref 0–0.2)
BASOPHILS # BLD MANUAL: 0 10E3/UL (ref 0–0.2)
BASOPHILS NFR BLD AUTO: 0 %
BASOPHILS NFR BLD AUTO: 0 %
BASOPHILS NFR BLD AUTO: 1 %
BASOPHILS NFR BLD MANUAL: 0 %
BILIRUB DIRECT SERPL-MCNC: 0.34 MG/DL (ref 0–0.3)
BILIRUB SERPL-MCNC: 1.1 MG/DL
BILIRUB SERPL-MCNC: 1.1 MG/DL
BNP SERPL-MCNC: 819 PG/ML (ref 0–93)
BUN SERPL-MCNC: 13 MG/DL (ref 8–28)
BUN SERPL-MCNC: 15 MG/DL (ref 8–28)
BUN SERPL-MCNC: 15.9 MG/DL (ref 8–23)
BUN SERPL-MCNC: 16.4 MG/DL (ref 8–23)
BUN SERPL-MCNC: 17 MG/DL (ref 8–23)
BUN SERPL-MCNC: 17 MG/DL (ref 8–28)
BUN SERPL-MCNC: 18 MG/DL (ref 8–28)
BUN SERPL-MCNC: 18.3 MG/DL (ref 8–23)
BUN SERPL-MCNC: 18.5 MG/DL (ref 8–23)
BUN SERPL-MCNC: 18.8 MG/DL (ref 8–23)
BUN SERPL-MCNC: 19 MG/DL (ref 8–28)
BUN SERPL-MCNC: 21 MG/DL (ref 8–28)
BUN SERPL-MCNC: 21.7 MG/DL (ref 8–23)
BUN SERPL-MCNC: 27 MG/DL (ref 8–28)
CALCIUM SERPL-MCNC: 8.6 MG/DL (ref 8.8–10.2)
CALCIUM SERPL-MCNC: 8.7 MG/DL (ref 8.8–10.2)
CALCIUM SERPL-MCNC: 8.8 MG/DL (ref 8.5–10.5)
CALCIUM SERPL-MCNC: 8.9 MG/DL (ref 8.5–10.5)
CALCIUM SERPL-MCNC: 8.9 MG/DL (ref 8.5–10.5)
CALCIUM SERPL-MCNC: 8.9 MG/DL (ref 8.8–10.2)
CALCIUM SERPL-MCNC: 9 MG/DL (ref 8.5–10.5)
CALCIUM SERPL-MCNC: 9 MG/DL (ref 8.8–10.2)
CALCIUM SERPL-MCNC: 9.1 MG/DL (ref 8.5–10.5)
CALCIUM SERPL-MCNC: 9.2 MG/DL (ref 8.5–10.5)
CALCIUM SERPL-MCNC: 9.2 MG/DL (ref 8.5–10.5)
CALCIUM SERPL-MCNC: 9.3 MG/DL (ref 8.8–10.2)
CALCIUM SERPL-MCNC: 9.3 MG/DL (ref 8.8–10.2)
CALCIUM SERPL-MCNC: 9.4 MG/DL (ref 8.8–10.2)
CELL COUNT BODY FLUID SOURCE: ABNORMAL
CHLORIDE BLD-SCNC: 100 MMOL/L (ref 98–107)
CHLORIDE BLD-SCNC: 101 MMOL/L (ref 98–107)
CHLORIDE BLD-SCNC: 98 MMOL/L (ref 98–107)
CHLORIDE BLD-SCNC: 98 MMOL/L (ref 98–107)
CHLORIDE BLD-SCNC: 99 MMOL/L (ref 98–107)
CHLORIDE SERPL-SCNC: 94 MMOL/L (ref 98–107)
CHLORIDE SERPL-SCNC: 94 MMOL/L (ref 98–107)
CHLORIDE SERPL-SCNC: 95 MMOL/L (ref 98–107)
CHLORIDE SERPL-SCNC: 95 MMOL/L (ref 98–107)
CHLORIDE SERPL-SCNC: 96 MMOL/L (ref 98–107)
CHLORIDE SERPL-SCNC: 97 MMOL/L (ref 98–107)
CHLORIDE SERPL-SCNC: 98 MMOL/L (ref 98–107)
CHOLEST SERPL-MCNC: 107 MG/DL
CO2 SERPL-SCNC: 30 MMOL/L (ref 22–31)
CO2 SERPL-SCNC: 31 MMOL/L (ref 22–31)
CO2 SERPL-SCNC: 31 MMOL/L (ref 22–31)
CO2 SERPL-SCNC: 32 MMOL/L (ref 22–31)
COLOR FLD: ABNORMAL
CREAT SERPL-MCNC: 0.83 MG/DL (ref 0.67–1.17)
CREAT SERPL-MCNC: 0.88 MG/DL (ref 0.67–1.17)
CREAT SERPL-MCNC: 0.89 MG/DL (ref 0.67–1.17)
CREAT SERPL-MCNC: 0.97 MG/DL (ref 0.67–1.17)
CREAT SERPL-MCNC: 0.99 MG/DL (ref 0.67–1.17)
CREAT SERPL-MCNC: 1.03 MG/DL (ref 0.67–1.17)
CREAT SERPL-MCNC: 1.05 MG/DL (ref 0.7–1.3)
CREAT SERPL-MCNC: 1.06 MG/DL (ref 0.7–1.3)
CREAT SERPL-MCNC: 1.09 MG/DL (ref 0.67–1.17)
CREAT SERPL-MCNC: 1.1 MG/DL (ref 0.7–1.3)
CREAT SERPL-MCNC: 1.13 MG/DL (ref 0.7–1.3)
CREAT SERPL-MCNC: 1.17 MG/DL (ref 0.7–1.3)
CREAT SERPL-MCNC: 1.18 MG/DL (ref 0.7–1.3)
CREAT SERPL-MCNC: 1.19 MG/DL (ref 0.7–1.3)
DEPRECATED HCO3 PLAS-SCNC: 29 MMOL/L (ref 22–29)
DEPRECATED HCO3 PLAS-SCNC: 30 MMOL/L (ref 22–29)
DEPRECATED HCO3 PLAS-SCNC: 31 MMOL/L (ref 22–29)
DEPRECATED HCO3 PLAS-SCNC: 31 MMOL/L (ref 22–29)
DEPRECATED HCO3 PLAS-SCNC: 32 MMOL/L (ref 22–29)
DEPRECATED HCO3 PLAS-SCNC: 33 MMOL/L (ref 22–29)
DEPRECATED HCO3 PLAS-SCNC: 34 MMOL/L (ref 22–29)
DESETHYLAMIODARONE SERPL-MCNC: <0.3 UG/ML
DIASTOLIC BLOOD PRESSURE - MUSE: NORMAL MMHG
DLCOCOR-%PRED-PRE: 72 %
DLCOCOR-PRE: 17.89 ML/MIN/MMHG
DLCOUNC-%PRED-PRE: 64 %
DLCOUNC-PRE: 15.91 ML/MIN/MMHG
DLCOUNC-PRED: 24.72 ML/MIN/MMHG
EOSINOPHIL # BLD AUTO: 0 10E3/UL (ref 0–0.7)
EOSINOPHIL # BLD AUTO: 0.1 10E3/UL (ref 0–0.7)
EOSINOPHIL # BLD AUTO: 0.1 10E3/UL (ref 0–0.7)
EOSINOPHIL # BLD MANUAL: 0 10E3/UL (ref 0–0.7)
EOSINOPHIL NFR BLD AUTO: 0 %
EOSINOPHIL NFR BLD AUTO: 1 %
EOSINOPHIL NFR BLD AUTO: 1 %
EOSINOPHIL NFR BLD MANUAL: 0 %
EOSINOPHIL NFR FLD MANUAL: 6 %
ERV-%PRED-PRE: 10 %
ERV-PRE: 0.11 L
ERV-PRED: 1.02 L
ERYTHROCYTE [DISTWIDTH] IN BLOOD BY AUTOMATED COUNT: 13.7 % (ref 10–15)
ERYTHROCYTE [DISTWIDTH] IN BLOOD BY AUTOMATED COUNT: 13.7 % (ref 10–15)
ERYTHROCYTE [DISTWIDTH] IN BLOOD BY AUTOMATED COUNT: 14 % (ref 10–15)
ERYTHROCYTE [DISTWIDTH] IN BLOOD BY AUTOMATED COUNT: 14 % (ref 10–15)
ERYTHROCYTE [DISTWIDTH] IN BLOOD BY AUTOMATED COUNT: 14.1 % (ref 10–15)
ERYTHROCYTE [DISTWIDTH] IN BLOOD BY AUTOMATED COUNT: 14.4 % (ref 10–15)
ERYTHROCYTE [DISTWIDTH] IN BLOOD BY AUTOMATED COUNT: 14.4 % (ref 10–15)
ERYTHROCYTE [DISTWIDTH] IN BLOOD BY AUTOMATED COUNT: 14.9 % (ref 10–15)
EXPTIME-PRE: 6.99 SEC
FASTING STATUS PATIENT QL REPORTED: ABNORMAL
FEF2575-%PRED-POST: 40 %
FEF2575-%PRED-PRE: 47 %
FEF2575-POST: 0.81 L/SEC
FEF2575-PRE: 0.93 L/SEC
FEF2575-PRED: 1.97 L/SEC
FEFMAX-%PRED-PRE: 70 %
FEFMAX-PRE: 4.99 L/SEC
FEFMAX-PRED: 7.07 L/SEC
FEV1-%PRED-PRE: 64 %
FEV1-PRE: 1.87 L
FEV1FEV6-PRE: 66 %
FEV1FEV6-PRED: 76 %
FEV1FVC-PRE: 64 %
FEV1FVC-PRED: 74 %
FEV1SVC-PRE: 83 %
FEV1SVC-PRED: 62 %
FIFMAX-PRE: 3.36 L/SEC
FLUAV RNA SPEC QL NAA+PROBE: NEGATIVE
FLUBV RNA RESP QL NAA+PROBE: NEGATIVE
FRCPLETH-%PRED-PRE: 102 %
FRCPLETH-PRE: 3.96 L
FRCPLETH-PRED: 3.88 L
FVC-%PRED-PRE: 74 %
FVC-PRE: 2.91 L
FVC-PRED: 3.93 L
GFR SERPL CREATININE-BSD FRML MDRD: 61 ML/MIN/1.73M2
GFR SERPL CREATININE-BSD FRML MDRD: 61 ML/MIN/1.73M2
GFR SERPL CREATININE-BSD FRML MDRD: 62 ML/MIN/1.73M2
GFR SERPL CREATININE-BSD FRML MDRD: 64 ML/MIN/1.73M2
GFR SERPL CREATININE-BSD FRML MDRD: 67 ML/MIN/1.73M2
GFR SERPL CREATININE-BSD FRML MDRD: 67 ML/MIN/1.73M2
GFR SERPL CREATININE-BSD FRML MDRD: 70 ML/MIN/1.73M2
GFR SERPL CREATININE-BSD FRML MDRD: 70 ML/MIN/1.73M2
GFR SERPL CREATININE-BSD FRML MDRD: 72 ML/MIN/1.73M2
GFR SERPL CREATININE-BSD FRML MDRD: 75 ML/MIN/1.73M2
GFR SERPL CREATININE-BSD FRML MDRD: 77 ML/MIN/1.73M2
GFR SERPL CREATININE-BSD FRML MDRD: 85 ML/MIN/1.73M2
GFR SERPL CREATININE-BSD FRML MDRD: 85 ML/MIN/1.73M2
GFR SERPL CREATININE-BSD FRML MDRD: 86 ML/MIN/1.73M2
GLUCOSE BLD-MCNC: 101 MG/DL (ref 70–125)
GLUCOSE BLD-MCNC: 103 MG/DL (ref 70–125)
GLUCOSE BLD-MCNC: 108 MG/DL (ref 70–125)
GLUCOSE BLD-MCNC: 120 MG/DL (ref 70–125)
GLUCOSE BLD-MCNC: 123 MG/DL (ref 70–125)
GLUCOSE BLD-MCNC: 96 MG/DL (ref 70–125)
GLUCOSE BLD-MCNC: 99 MG/DL (ref 70–125)
GLUCOSE BODY FLUID SOURCE: NORMAL
GLUCOSE FLD-MCNC: 105 MG/DL
GLUCOSE SERPL-MCNC: 102 MG/DL (ref 70–99)
GLUCOSE SERPL-MCNC: 114 MG/DL (ref 70–99)
GLUCOSE SERPL-MCNC: 115 MG/DL (ref 70–99)
GLUCOSE SERPL-MCNC: 123 MG/DL (ref 70–99)
GLUCOSE SERPL-MCNC: 172 MG/DL (ref 70–99)
GRAM STAIN RESULT: NORMAL
HCO3 BLDV-SCNC: 36 MMOL/L (ref 24–30)
HCT VFR BLD AUTO: 33.4 % (ref 40–53)
HCT VFR BLD AUTO: 33.6 % (ref 40–53)
HCT VFR BLD AUTO: 33.6 % (ref 40–53)
HCT VFR BLD AUTO: 37.9 % (ref 40–53)
HCT VFR BLD AUTO: 38.1 % (ref 40–53)
HCT VFR BLD AUTO: 38.2 % (ref 40–53)
HCT VFR BLD AUTO: 38.7 % (ref 40–53)
HCT VFR BLD AUTO: 39.4 % (ref 40–53)
HDLC SERPL-MCNC: 38 MG/DL
HGB BLD-MCNC: 10.6 G/DL (ref 13.3–17.7)
HGB BLD-MCNC: 10.7 G/DL (ref 13.3–17.7)
HGB BLD-MCNC: 10.8 G/DL (ref 13.3–17.7)
HGB BLD-MCNC: 11.2 G/DL (ref 13.3–17.7)
HGB BLD-MCNC: 12.2 G/DL (ref 13.3–17.7)
HGB BLD-MCNC: 12.3 G/DL (ref 13.3–17.7)
HGB BLD-MCNC: 13 G/DL (ref 13.3–17.7)
HOLD SPECIMEN: NORMAL
IC-%PRED-PRE: 59 %
IC-PRE: 2.14 L
IC-PRED: 3.62 L
IMM GRANULOCYTES # BLD: 0 10E3/UL
IMM GRANULOCYTES # BLD: 0.1 10E3/UL
IMM GRANULOCYTES # BLD: 0.1 10E3/UL
IMM GRANULOCYTES NFR BLD: 1 %
INTERPRETATION ECG - MUSE: NORMAL
KOH PREPARATION: NORMAL
KOH PREPARATION: NORMAL
LACTATE SERPL-SCNC: 1.2 MMOL/L (ref 0.7–2)
LACTATE SERPL-SCNC: 1.3 MMOL/L (ref 0.7–2)
LD BODY BODY FLUID SOURCE: NORMAL
LDH FLD L TO P-CCNC: 215 U/L
LDH SERPL L TO P-CCNC: 231 U/L (ref 0–250)
LDLC SERPL CALC-MCNC: 62 MG/DL
LVEF ECHO: NORMAL
LVEF ECHO: NORMAL
LYMPHOCYTES # BLD AUTO: 0.5 10E3/UL (ref 0.8–5.3)
LYMPHOCYTES # BLD AUTO: 0.6 10E3/UL (ref 0.8–5.3)
LYMPHOCYTES # BLD AUTO: 0.7 10E3/UL (ref 0.8–5.3)
LYMPHOCYTES # BLD MANUAL: 0.2 10E3/UL (ref 0.8–5.3)
LYMPHOCYTES NFR BLD AUTO: 6 %
LYMPHOCYTES NFR BLD AUTO: 8 %
LYMPHOCYTES NFR BLD AUTO: 8 %
LYMPHOCYTES NFR BLD MANUAL: 2 %
LYMPHOCYTES NFR FLD MANUAL: 81 %
MAGNESIUM SERPL-MCNC: 1.9 MG/DL (ref 1.7–2.3)
MAGNESIUM SERPL-MCNC: 2 MG/DL (ref 1.7–2.3)
MAGNESIUM SERPL-MCNC: 2.1 MG/DL (ref 1.7–2.3)
MCH RBC QN AUTO: 30.5 PG (ref 26.5–33)
MCH RBC QN AUTO: 30.5 PG (ref 26.5–33)
MCH RBC QN AUTO: 30.6 PG (ref 26.5–33)
MCH RBC QN AUTO: 30.7 PG (ref 26.5–33)
MCH RBC QN AUTO: 31 PG (ref 26.5–33)
MCH RBC QN AUTO: 31.1 PG (ref 26.5–33)
MCH RBC QN AUTO: 31.4 PG (ref 26.5–33)
MCH RBC QN AUTO: 31.8 PG (ref 26.5–33)
MCHC RBC AUTO-ENTMCNC: 31.5 G/DL (ref 31.5–36.5)
MCHC RBC AUTO-ENTMCNC: 31.7 G/DL (ref 31.5–36.5)
MCHC RBC AUTO-ENTMCNC: 31.8 G/DL (ref 31.5–36.5)
MCHC RBC AUTO-ENTMCNC: 32 G/DL (ref 31.5–36.5)
MCHC RBC AUTO-ENTMCNC: 32.1 G/DL (ref 31.5–36.5)
MCHC RBC AUTO-ENTMCNC: 32.2 G/DL (ref 31.5–36.5)
MCHC RBC AUTO-ENTMCNC: 32.2 G/DL (ref 31.5–36.5)
MCHC RBC AUTO-ENTMCNC: 33 G/DL (ref 31.5–36.5)
MCV RBC AUTO: 95 FL (ref 78–100)
MCV RBC AUTO: 95 FL (ref 78–100)
MCV RBC AUTO: 96 FL (ref 78–100)
MCV RBC AUTO: 96 FL (ref 78–100)
MCV RBC AUTO: 97 FL (ref 78–100)
MCV RBC AUTO: 99 FL (ref 78–100)
MDC_IDC_EPISODE_DTM: NORMAL
MDC_IDC_EPISODE_DURATION: 10 S
MDC_IDC_EPISODE_DURATION: 1038 S
MDC_IDC_EPISODE_DURATION: 11 S
MDC_IDC_EPISODE_DURATION: 12 S
MDC_IDC_EPISODE_DURATION: 121 S
MDC_IDC_EPISODE_DURATION: 121 S
MDC_IDC_EPISODE_DURATION: 13 S
MDC_IDC_EPISODE_DURATION: 14 S
MDC_IDC_EPISODE_DURATION: 15 S
MDC_IDC_EPISODE_DURATION: 16 S
MDC_IDC_EPISODE_DURATION: 17 S
MDC_IDC_EPISODE_DURATION: 18 S
MDC_IDC_EPISODE_DURATION: 19 S
MDC_IDC_EPISODE_DURATION: 2 S
MDC_IDC_EPISODE_DURATION: 20 S
MDC_IDC_EPISODE_DURATION: 21 S
MDC_IDC_EPISODE_DURATION: 22 S
MDC_IDC_EPISODE_DURATION: 23 S
MDC_IDC_EPISODE_DURATION: 24 S
MDC_IDC_EPISODE_DURATION: 25 S
MDC_IDC_EPISODE_DURATION: 25 S
MDC_IDC_EPISODE_DURATION: 26 S
MDC_IDC_EPISODE_DURATION: 27 S
MDC_IDC_EPISODE_DURATION: 27 S
MDC_IDC_EPISODE_DURATION: 275 S
MDC_IDC_EPISODE_DURATION: 28 S
MDC_IDC_EPISODE_DURATION: 29 S
MDC_IDC_EPISODE_DURATION: 3 S
MDC_IDC_EPISODE_DURATION: 3 S
MDC_IDC_EPISODE_DURATION: 30 S
MDC_IDC_EPISODE_DURATION: 31 S
MDC_IDC_EPISODE_DURATION: 33 S
MDC_IDC_EPISODE_DURATION: 34 S
MDC_IDC_EPISODE_DURATION: 35 S
MDC_IDC_EPISODE_DURATION: 36 S
MDC_IDC_EPISODE_DURATION: 37 S
MDC_IDC_EPISODE_DURATION: 38 S
MDC_IDC_EPISODE_DURATION: 39 S
MDC_IDC_EPISODE_DURATION: 4 S
MDC_IDC_EPISODE_DURATION: 40 S
MDC_IDC_EPISODE_DURATION: 42 S
MDC_IDC_EPISODE_DURATION: 43 S
MDC_IDC_EPISODE_DURATION: 43 S
MDC_IDC_EPISODE_DURATION: 44 S
MDC_IDC_EPISODE_DURATION: 44 S
MDC_IDC_EPISODE_DURATION: 45 S
MDC_IDC_EPISODE_DURATION: 45 S
MDC_IDC_EPISODE_DURATION: 46 S
MDC_IDC_EPISODE_DURATION: 47 S
MDC_IDC_EPISODE_DURATION: 48 S
MDC_IDC_EPISODE_DURATION: 48 S
MDC_IDC_EPISODE_DURATION: 5 S
MDC_IDC_EPISODE_DURATION: 50 S
MDC_IDC_EPISODE_DURATION: 50 S
MDC_IDC_EPISODE_DURATION: 51 S
MDC_IDC_EPISODE_DURATION: 52 S
MDC_IDC_EPISODE_DURATION: 53 S
MDC_IDC_EPISODE_DURATION: 53 S
MDC_IDC_EPISODE_DURATION: 56 S
MDC_IDC_EPISODE_DURATION: 58 S
MDC_IDC_EPISODE_DURATION: 59 S
MDC_IDC_EPISODE_DURATION: 6 S
MDC_IDC_EPISODE_DURATION: 6 S
MDC_IDC_EPISODE_DURATION: 61 S
MDC_IDC_EPISODE_DURATION: 69 S
MDC_IDC_EPISODE_DURATION: 69 S
MDC_IDC_EPISODE_DURATION: 7 S
MDC_IDC_EPISODE_DURATION: 71 S
MDC_IDC_EPISODE_DURATION: 75 S
MDC_IDC_EPISODE_DURATION: 8 S
MDC_IDC_EPISODE_DURATION: 89 S
MDC_IDC_EPISODE_DURATION: 9 S
MDC_IDC_EPISODE_DURATION: 9 S
MDC_IDC_EPISODE_DURATION: 92 S
MDC_IDC_EPISODE_DURATION: 94 S
MDC_IDC_EPISODE_ID: 179
MDC_IDC_EPISODE_ID: 180
MDC_IDC_EPISODE_ID: 181
MDC_IDC_EPISODE_ID: 182
MDC_IDC_EPISODE_ID: 4202
MDC_IDC_EPISODE_ID: 4203
MDC_IDC_EPISODE_ID: 4204
MDC_IDC_EPISODE_ID: 4331
MDC_IDC_EPISODE_ID: 4332
MDC_IDC_EPISODE_ID: 4333
MDC_IDC_EPISODE_ID: 4334
MDC_IDC_EPISODE_ID: 4335
MDC_IDC_EPISODE_ID: 4336
MDC_IDC_EPISODE_ID: 4337
MDC_IDC_EPISODE_ID: 4338
MDC_IDC_EPISODE_ID: 4339
MDC_IDC_EPISODE_ID: 4340
MDC_IDC_EPISODE_ID: 4341
MDC_IDC_EPISODE_ID: 4342
MDC_IDC_EPISODE_ID: 4343
MDC_IDC_EPISODE_ID: 4344
MDC_IDC_EPISODE_ID: 4345
MDC_IDC_EPISODE_ID: 4346
MDC_IDC_EPISODE_ID: 4347
MDC_IDC_EPISODE_ID: 4348
MDC_IDC_EPISODE_ID: 4349
MDC_IDC_EPISODE_ID: 4350
MDC_IDC_EPISODE_ID: 4351
MDC_IDC_EPISODE_ID: 4352
MDC_IDC_EPISODE_ID: 4353
MDC_IDC_EPISODE_ID: 4354
MDC_IDC_EPISODE_ID: 4355
MDC_IDC_EPISODE_ID: 4356
MDC_IDC_EPISODE_ID: 4357
MDC_IDC_EPISODE_ID: 4358
MDC_IDC_EPISODE_ID: 4359
MDC_IDC_EPISODE_ID: 4360
MDC_IDC_EPISODE_ID: 4361
MDC_IDC_EPISODE_ID: 4362
MDC_IDC_EPISODE_ID: 4363
MDC_IDC_EPISODE_ID: 4364
MDC_IDC_EPISODE_ID: 4365
MDC_IDC_EPISODE_ID: 4366
MDC_IDC_EPISODE_ID: 4367
MDC_IDC_EPISODE_ID: 4368
MDC_IDC_EPISODE_ID: 4369
MDC_IDC_EPISODE_ID: 4370
MDC_IDC_EPISODE_ID: 4371
MDC_IDC_EPISODE_ID: 4372
MDC_IDC_EPISODE_ID: 4373
MDC_IDC_EPISODE_ID: 4374
MDC_IDC_EPISODE_ID: 4375
MDC_IDC_EPISODE_ID: 4376
MDC_IDC_EPISODE_ID: 4377
MDC_IDC_EPISODE_ID: 4378
MDC_IDC_EPISODE_ID: 4379
MDC_IDC_EPISODE_ID: 4380
MDC_IDC_EPISODE_ID: 4721
MDC_IDC_EPISODE_ID: 4722
MDC_IDC_EPISODE_ID: 4723
MDC_IDC_EPISODE_ID: 4724
MDC_IDC_EPISODE_ID: 4725
MDC_IDC_EPISODE_ID: 4726
MDC_IDC_EPISODE_ID: 4727
MDC_IDC_EPISODE_ID: 4728
MDC_IDC_EPISODE_ID: 4729
MDC_IDC_EPISODE_ID: 4730
MDC_IDC_EPISODE_ID: 4731
MDC_IDC_EPISODE_ID: 4732
MDC_IDC_EPISODE_ID: 4733
MDC_IDC_EPISODE_ID: 4734
MDC_IDC_EPISODE_ID: 4735
MDC_IDC_EPISODE_ID: 4736
MDC_IDC_EPISODE_ID: 4737
MDC_IDC_EPISODE_ID: 4738
MDC_IDC_EPISODE_ID: 4739
MDC_IDC_EPISODE_ID: 4740
MDC_IDC_EPISODE_ID: 4741
MDC_IDC_EPISODE_ID: 4742
MDC_IDC_EPISODE_ID: 4743
MDC_IDC_EPISODE_ID: 4744
MDC_IDC_EPISODE_ID: 4745
MDC_IDC_EPISODE_ID: 4746
MDC_IDC_EPISODE_ID: 4747
MDC_IDC_EPISODE_ID: 4748
MDC_IDC_EPISODE_ID: 4749
MDC_IDC_EPISODE_ID: 4750
MDC_IDC_EPISODE_ID: 4751
MDC_IDC_EPISODE_ID: 4752
MDC_IDC_EPISODE_ID: 4753
MDC_IDC_EPISODE_ID: 4754
MDC_IDC_EPISODE_ID: 4755
MDC_IDC_EPISODE_ID: 4756
MDC_IDC_EPISODE_ID: 4757
MDC_IDC_EPISODE_ID: 4758
MDC_IDC_EPISODE_ID: 4759
MDC_IDC_EPISODE_ID: 4760
MDC_IDC_EPISODE_ID: 4761
MDC_IDC_EPISODE_ID: 4762
MDC_IDC_EPISODE_ID: 4763
MDC_IDC_EPISODE_ID: 4764
MDC_IDC_EPISODE_ID: 4765
MDC_IDC_EPISODE_ID: 4766
MDC_IDC_EPISODE_ID: 4767
MDC_IDC_EPISODE_ID: 4768
MDC_IDC_EPISODE_ID: 4769
MDC_IDC_EPISODE_ID: 4770
MDC_IDC_EPISODE_ID: 4805
MDC_IDC_EPISODE_ID: 4806
MDC_IDC_EPISODE_ID: 4807
MDC_IDC_EPISODE_ID: 4808
MDC_IDC_EPISODE_ID: 4809
MDC_IDC_EPISODE_ID: 4810
MDC_IDC_EPISODE_ID: 4811
MDC_IDC_EPISODE_ID: 4812
MDC_IDC_EPISODE_ID: 4813
MDC_IDC_EPISODE_ID: 4814
MDC_IDC_EPISODE_ID: 4815
MDC_IDC_EPISODE_ID: 4816
MDC_IDC_EPISODE_ID: 4817
MDC_IDC_EPISODE_ID: 4818
MDC_IDC_EPISODE_ID: 4819
MDC_IDC_EPISODE_ID: 4820
MDC_IDC_EPISODE_ID: 4821
MDC_IDC_EPISODE_ID: 4822
MDC_IDC_EPISODE_ID: 4823
MDC_IDC_EPISODE_ID: 4824
MDC_IDC_EPISODE_ID: 4825
MDC_IDC_EPISODE_ID: 4826
MDC_IDC_EPISODE_ID: 4827
MDC_IDC_EPISODE_ID: 4828
MDC_IDC_EPISODE_ID: 4829
MDC_IDC_EPISODE_ID: 4830
MDC_IDC_EPISODE_ID: 4831
MDC_IDC_EPISODE_ID: 4832
MDC_IDC_EPISODE_ID: 4833
MDC_IDC_EPISODE_ID: 4834
MDC_IDC_EPISODE_ID: 4835
MDC_IDC_EPISODE_ID: 4836
MDC_IDC_EPISODE_ID: 4837
MDC_IDC_EPISODE_ID: 4838
MDC_IDC_EPISODE_ID: 4839
MDC_IDC_EPISODE_ID: 4840
MDC_IDC_EPISODE_ID: 4841
MDC_IDC_EPISODE_ID: 4842
MDC_IDC_EPISODE_ID: 4843
MDC_IDC_EPISODE_ID: 4844
MDC_IDC_EPISODE_ID: 4845
MDC_IDC_EPISODE_ID: 4846
MDC_IDC_EPISODE_ID: 4847
MDC_IDC_EPISODE_ID: 4848
MDC_IDC_EPISODE_ID: 4849
MDC_IDC_EPISODE_ID: 4850
MDC_IDC_EPISODE_ID: 4851
MDC_IDC_EPISODE_ID: 4852
MDC_IDC_EPISODE_ID: 4853
MDC_IDC_EPISODE_ID: 4854
MDC_IDC_EPISODE_TYPE: NORMAL
MDC_IDC_LEAD_IMPLANT_DT: NORMAL
MDC_IDC_LEAD_LOCATION: NORMAL
MDC_IDC_LEAD_LOCATION_DETAIL_1: NORMAL
MDC_IDC_LEAD_MFG: NORMAL
MDC_IDC_LEAD_MODEL: NORMAL
MDC_IDC_LEAD_POLARITY_TYPE: NORMAL
MDC_IDC_LEAD_SERIAL: NORMAL
MDC_IDC_LEAD_SPECIAL_FUNCTION: NORMAL
MDC_IDC_MSMT_BATTERY_DTM: NORMAL
MDC_IDC_MSMT_BATTERY_REMAINING_LONGEVITY: 70 MO
MDC_IDC_MSMT_BATTERY_REMAINING_LONGEVITY: 71 MO
MDC_IDC_MSMT_BATTERY_REMAINING_LONGEVITY: 72 MO
MDC_IDC_MSMT_BATTERY_REMAINING_LONGEVITY: 79 MO
MDC_IDC_MSMT_BATTERY_RRT_TRIGGER: 2.6
MDC_IDC_MSMT_BATTERY_STATUS: NORMAL
MDC_IDC_MSMT_BATTERY_VOLTAGE: 2.99 V
MDC_IDC_MSMT_BATTERY_VOLTAGE: 3 V
MDC_IDC_MSMT_LEADCHNL_LV_IMPEDANCE_VALUE: 190 OHM
MDC_IDC_MSMT_LEADCHNL_LV_IMPEDANCE_VALUE: 209 OHM
MDC_IDC_MSMT_LEADCHNL_LV_IMPEDANCE_VALUE: 247 OHM
MDC_IDC_MSMT_LEADCHNL_LV_IMPEDANCE_VALUE: 266 OHM
MDC_IDC_MSMT_LEADCHNL_LV_IMPEDANCE_VALUE: 285 OHM
MDC_IDC_MSMT_LEADCHNL_LV_IMPEDANCE_VALUE: 342 OHM
MDC_IDC_MSMT_LEADCHNL_LV_IMPEDANCE_VALUE: 342 OHM
MDC_IDC_MSMT_LEADCHNL_LV_IMPEDANCE_VALUE: 361 OHM
MDC_IDC_MSMT_LEADCHNL_LV_IMPEDANCE_VALUE: 380 OHM
MDC_IDC_MSMT_LEADCHNL_LV_IMPEDANCE_VALUE: 399 OHM
MDC_IDC_MSMT_LEADCHNL_LV_IMPEDANCE_VALUE: 399 OHM
MDC_IDC_MSMT_LEADCHNL_LV_PACING_THRESHOLD_AMPLITUDE: 1.25 V
MDC_IDC_MSMT_LEADCHNL_LV_PACING_THRESHOLD_AMPLITUDE: 1.38 V
MDC_IDC_MSMT_LEADCHNL_LV_PACING_THRESHOLD_AMPLITUDE: 1.5 V
MDC_IDC_MSMT_LEADCHNL_LV_PACING_THRESHOLD_AMPLITUDE: 1.62 V
MDC_IDC_MSMT_LEADCHNL_LV_PACING_THRESHOLD_AMPLITUDE: 1.62 V
MDC_IDC_MSMT_LEADCHNL_LV_PACING_THRESHOLD_AMPLITUDE: 1.75 V
MDC_IDC_MSMT_LEADCHNL_LV_PACING_THRESHOLD_AMPLITUDE: 1.88 V
MDC_IDC_MSMT_LEADCHNL_LV_PACING_THRESHOLD_PULSEWIDTH: 0.4 MS
MDC_IDC_MSMT_LEADCHNL_RA_IMPEDANCE_VALUE: 247 OHM
MDC_IDC_MSMT_LEADCHNL_RA_IMPEDANCE_VALUE: 266 OHM
MDC_IDC_MSMT_LEADCHNL_RA_IMPEDANCE_VALUE: 285 OHM
MDC_IDC_MSMT_LEADCHNL_RA_IMPEDANCE_VALUE: 304 OHM
MDC_IDC_MSMT_LEADCHNL_RA_IMPEDANCE_VALUE: 304 OHM
MDC_IDC_MSMT_LEADCHNL_RA_IMPEDANCE_VALUE: 399 OHM
MDC_IDC_MSMT_LEADCHNL_RA_IMPEDANCE_VALUE: 456 OHM
MDC_IDC_MSMT_LEADCHNL_RA_IMPEDANCE_VALUE: 456 OHM
MDC_IDC_MSMT_LEADCHNL_RA_IMPEDANCE_VALUE: 494 OHM
MDC_IDC_MSMT_LEADCHNL_RA_PACING_THRESHOLD_AMPLITUDE: 0.75 V
MDC_IDC_MSMT_LEADCHNL_RA_PACING_THRESHOLD_PULSEWIDTH: 0.4 MS
MDC_IDC_MSMT_LEADCHNL_RA_SENSING_INTR_AMPL: 0.12 MV
MDC_IDC_MSMT_LEADCHNL_RA_SENSING_INTR_AMPL: 0.62 MV
MDC_IDC_MSMT_LEADCHNL_RA_SENSING_INTR_AMPL: 0.75 MV
MDC_IDC_MSMT_LEADCHNL_RA_SENSING_INTR_AMPL: 0.8 MV
MDC_IDC_MSMT_LEADCHNL_RA_SENSING_INTR_AMPL: 1.12 MV
MDC_IDC_MSMT_LEADCHNL_RV_IMPEDANCE_VALUE: 247 OHM
MDC_IDC_MSMT_LEADCHNL_RV_IMPEDANCE_VALUE: 247 OHM
MDC_IDC_MSMT_LEADCHNL_RV_IMPEDANCE_VALUE: 266 OHM
MDC_IDC_MSMT_LEADCHNL_RV_IMPEDANCE_VALUE: 304 OHM
MDC_IDC_MSMT_LEADCHNL_RV_IMPEDANCE_VALUE: 304 OHM
MDC_IDC_MSMT_LEADCHNL_RV_IMPEDANCE_VALUE: 323 OHM
MDC_IDC_MSMT_LEADCHNL_RV_PACING_THRESHOLD_AMPLITUDE: 0.88 V
MDC_IDC_MSMT_LEADCHNL_RV_PACING_THRESHOLD_AMPLITUDE: 0.88 V
MDC_IDC_MSMT_LEADCHNL_RV_PACING_THRESHOLD_AMPLITUDE: 1 V
MDC_IDC_MSMT_LEADCHNL_RV_PACING_THRESHOLD_PULSEWIDTH: 0.4 MS
MDC_IDC_MSMT_LEADCHNL_RV_SENSING_INTR_AMPL: 6.88 MV
MDC_IDC_PG_IMPLANT_DTM: NORMAL
MDC_IDC_PG_MFG: NORMAL
MDC_IDC_PG_MODEL: NORMAL
MDC_IDC_PG_SERIAL: NORMAL
MDC_IDC_PG_TYPE: NORMAL
MDC_IDC_SESS_CLINIC_NAME: NORMAL
MDC_IDC_SESS_DTM: NORMAL
MDC_IDC_SESS_TYPE: NORMAL
MDC_IDC_SET_BRADY_AT_MODE_SWITCH_RATE: 171 {BEATS}/MIN
MDC_IDC_SET_BRADY_LOWRATE: 60 {BEATS}/MIN
MDC_IDC_SET_BRADY_MAX_SENSOR_RATE: 120 {BEATS}/MIN
MDC_IDC_SET_BRADY_MAX_TRACKING_RATE: 120 {BEATS}/MIN
MDC_IDC_SET_BRADY_MODE: NORMAL
MDC_IDC_SET_BRADY_PAV_DELAY_HIGH: 100 MS
MDC_IDC_SET_BRADY_PAV_DELAY_LOW: 170 MS
MDC_IDC_SET_BRADY_SAV_DELAY_HIGH: 70 MS
MDC_IDC_SET_BRADY_SAV_DELAY_LOW: 140 MS
MDC_IDC_SET_CRT_LVRV_DELAY: 80 MS
MDC_IDC_SET_CRT_PACED_CHAMBERS: NORMAL
MDC_IDC_SET_LEADCHNL_LV_PACING_AMPLITUDE: 2.75 V
MDC_IDC_SET_LEADCHNL_LV_PACING_ANODE_ELECTRODE_1: NORMAL
MDC_IDC_SET_LEADCHNL_LV_PACING_ANODE_LOCATION_1: NORMAL
MDC_IDC_SET_LEADCHNL_LV_PACING_CAPTURE_MODE: NORMAL
MDC_IDC_SET_LEADCHNL_LV_PACING_CATHODE_ELECTRODE_1: NORMAL
MDC_IDC_SET_LEADCHNL_LV_PACING_CATHODE_LOCATION_1: NORMAL
MDC_IDC_SET_LEADCHNL_LV_PACING_POLARITY: NORMAL
MDC_IDC_SET_LEADCHNL_LV_PACING_PULSEWIDTH: 0.4 MS
MDC_IDC_SET_LEADCHNL_RA_PACING_AMPLITUDE: 1.5 V
MDC_IDC_SET_LEADCHNL_RA_PACING_ANODE_ELECTRODE_1: NORMAL
MDC_IDC_SET_LEADCHNL_RA_PACING_ANODE_LOCATION_1: NORMAL
MDC_IDC_SET_LEADCHNL_RA_PACING_CAPTURE_MODE: NORMAL
MDC_IDC_SET_LEADCHNL_RA_PACING_CATHODE_ELECTRODE_1: NORMAL
MDC_IDC_SET_LEADCHNL_RA_PACING_CATHODE_LOCATION_1: NORMAL
MDC_IDC_SET_LEADCHNL_RA_PACING_POLARITY: NORMAL
MDC_IDC_SET_LEADCHNL_RA_PACING_PULSEWIDTH: 0.4 MS
MDC_IDC_SET_LEADCHNL_RA_SENSING_ANODE_ELECTRODE_1: NORMAL
MDC_IDC_SET_LEADCHNL_RA_SENSING_CATHODE_ELECTRODE_1: NORMAL
MDC_IDC_SET_LEADCHNL_RA_SENSING_CATHODE_LOCATION_1: NORMAL
MDC_IDC_SET_LEADCHNL_RA_SENSING_POLARITY: NORMAL
MDC_IDC_SET_LEADCHNL_RA_SENSING_SENSITIVITY: 0.15 MV
MDC_IDC_SET_LEADCHNL_RV_PACING_AMPLITUDE: 2 V
MDC_IDC_SET_LEADCHNL_RV_PACING_AMPLITUDE: NORMAL
MDC_IDC_SET_LEADCHNL_RV_PACING_ANODE_ELECTRODE_1: NORMAL
MDC_IDC_SET_LEADCHNL_RV_PACING_ANODE_LOCATION_1: NORMAL
MDC_IDC_SET_LEADCHNL_RV_PACING_CAPTURE_MODE: NORMAL
MDC_IDC_SET_LEADCHNL_RV_PACING_CATHODE_ELECTRODE_1: NORMAL
MDC_IDC_SET_LEADCHNL_RV_PACING_CATHODE_LOCATION_1: NORMAL
MDC_IDC_SET_LEADCHNL_RV_PACING_POLARITY: NORMAL
MDC_IDC_SET_LEADCHNL_RV_PACING_PULSEWIDTH: 0.4 MS
MDC_IDC_SET_LEADCHNL_RV_SENSING_ANODE_ELECTRODE_1: NORMAL
MDC_IDC_SET_LEADCHNL_RV_SENSING_ANODE_LOCATION_1: NORMAL
MDC_IDC_SET_LEADCHNL_RV_SENSING_CATHODE_ELECTRODE_1: NORMAL
MDC_IDC_SET_LEADCHNL_RV_SENSING_CATHODE_LOCATION_1: NORMAL
MDC_IDC_SET_LEADCHNL_RV_SENSING_POLARITY: NORMAL
MDC_IDC_SET_LEADCHNL_RV_SENSING_SENSITIVITY: 0.9 MV
MDC_IDC_SET_ZONE_DETECTION_INTERVAL: 350 MS
MDC_IDC_SET_ZONE_DETECTION_INTERVAL: 400 MS
MDC_IDC_SET_ZONE_TYPE: NORMAL
MDC_IDC_STAT_AT_BURDEN_PERCENT: 0 %
MDC_IDC_STAT_AT_BURDEN_PERCENT: 0.4 %
MDC_IDC_STAT_AT_BURDEN_PERCENT: 0.6 %
MDC_IDC_STAT_AT_BURDEN_PERCENT: 1.1 %
MDC_IDC_STAT_AT_DTM_END: NORMAL
MDC_IDC_STAT_AT_DTM_START: NORMAL
MDC_IDC_STAT_AT_MODE_SW_COUNT: 650
MDC_IDC_STAT_BRADY_AP_VP_PERCENT: 88.2 %
MDC_IDC_STAT_BRADY_AP_VP_PERCENT: 91.83 %
MDC_IDC_STAT_BRADY_AP_VP_PERCENT: 93.29 %
MDC_IDC_STAT_BRADY_AP_VP_PERCENT: 98.55 %
MDC_IDC_STAT_BRADY_AP_VS_PERCENT: 0.05 %
MDC_IDC_STAT_BRADY_AP_VS_PERCENT: 0.06 %
MDC_IDC_STAT_BRADY_AP_VS_PERCENT: 0.06 %
MDC_IDC_STAT_BRADY_AP_VS_PERCENT: 0.08 %
MDC_IDC_STAT_BRADY_AS_VP_PERCENT: 1.14 %
MDC_IDC_STAT_BRADY_AS_VP_PERCENT: 11.44 %
MDC_IDC_STAT_BRADY_AS_VP_PERCENT: 6.31 %
MDC_IDC_STAT_BRADY_AS_VP_PERCENT: 7.81 %
MDC_IDC_STAT_BRADY_AS_VS_PERCENT: 0.25 %
MDC_IDC_STAT_BRADY_AS_VS_PERCENT: 0.3 %
MDC_IDC_STAT_BRADY_AS_VS_PERCENT: 0.32 %
MDC_IDC_STAT_BRADY_AS_VS_PERCENT: 0.32 %
MDC_IDC_STAT_BRADY_DTM_END: NORMAL
MDC_IDC_STAT_BRADY_DTM_START: NORMAL
MDC_IDC_STAT_BRADY_RA_PERCENT_PACED: 87.56 %
MDC_IDC_STAT_BRADY_RA_PERCENT_PACED: 91.52 %
MDC_IDC_STAT_BRADY_RA_PERCENT_PACED: 93.16 %
MDC_IDC_STAT_BRADY_RA_PERCENT_PACED: 98.63 %
MDC_IDC_STAT_BRADY_RV_PERCENT_PACED: 99.59 %
MDC_IDC_STAT_BRADY_RV_PERCENT_PACED: 99.62 %
MDC_IDC_STAT_BRADY_RV_PERCENT_PACED: 99.63 %
MDC_IDC_STAT_BRADY_RV_PERCENT_PACED: 99.69 %
MDC_IDC_STAT_CRT_DTM_END: NORMAL
MDC_IDC_STAT_CRT_DTM_START: NORMAL
MDC_IDC_STAT_CRT_LV_PERCENT_PACED: 99.56 %
MDC_IDC_STAT_CRT_LV_PERCENT_PACED: 99.59 %
MDC_IDC_STAT_CRT_LV_PERCENT_PACED: 99.6 %
MDC_IDC_STAT_CRT_LV_PERCENT_PACED: 99.66 %
MDC_IDC_STAT_CRT_PERCENT_PACED: 99.56 %
MDC_IDC_STAT_CRT_PERCENT_PACED: 99.59 %
MDC_IDC_STAT_CRT_PERCENT_PACED: 99.6 %
MDC_IDC_STAT_CRT_PERCENT_PACED: 99.66 %
MDC_IDC_STAT_EPISODE_RECENT_COUNT: 0
MDC_IDC_STAT_EPISODE_RECENT_COUNT: 1
MDC_IDC_STAT_EPISODE_RECENT_COUNT: 176
MDC_IDC_STAT_EPISODE_RECENT_COUNT: 2
MDC_IDC_STAT_EPISODE_RECENT_COUNT: 346
MDC_IDC_STAT_EPISODE_RECENT_COUNT: 650
MDC_IDC_STAT_EPISODE_RECENT_COUNT_DTM_END: NORMAL
MDC_IDC_STAT_EPISODE_RECENT_COUNT_DTM_START: NORMAL
MDC_IDC_STAT_EPISODE_TOTAL_COUNT: 0
MDC_IDC_STAT_EPISODE_TOTAL_COUNT: 1
MDC_IDC_STAT_EPISODE_TOTAL_COUNT: 4154
MDC_IDC_STAT_EPISODE_TOTAL_COUNT: 4330
MDC_IDC_STAT_EPISODE_TOTAL_COUNT: 4720
MDC_IDC_STAT_EPISODE_TOTAL_COUNT: 4804
MDC_IDC_STAT_EPISODE_TOTAL_COUNT: 49
MDC_IDC_STAT_EPISODE_TOTAL_COUNT_DTM_END: NORMAL
MDC_IDC_STAT_EPISODE_TOTAL_COUNT_DTM_START: NORMAL
MDC_IDC_STAT_EPISODE_TYPE: NORMAL
MONOCYTES # BLD AUTO: 0.5 10E3/UL (ref 0–1.3)
MONOCYTES # BLD AUTO: 0.6 10E3/UL (ref 0–1.3)
MONOCYTES # BLD AUTO: 0.8 10E3/UL (ref 0–1.3)
MONOCYTES # BLD MANUAL: 0.7 10E3/UL (ref 0–1.3)
MONOCYTES NFR BLD AUTO: 7 %
MONOCYTES NFR BLD AUTO: 8 %
MONOCYTES NFR BLD AUTO: 9 %
MONOCYTES NFR BLD MANUAL: 6 %
MONOS+MACROS NFR FLD MANUAL: 11 %
NEUTROPHILS # BLD AUTO: 6 10E3/UL (ref 1.6–8.3)
NEUTROPHILS # BLD AUTO: 6.5 10E3/UL (ref 1.6–8.3)
NEUTROPHILS # BLD AUTO: 7 10E3/UL (ref 1.6–8.3)
NEUTROPHILS # BLD MANUAL: 10.9 10E3/UL (ref 1.6–8.3)
NEUTROPHILS NFR BLD AUTO: 82 %
NEUTROPHILS NFR BLD AUTO: 82 %
NEUTROPHILS NFR BLD AUTO: 84 %
NEUTROPHILS NFR BLD MANUAL: 92 %
NEUTS BAND NFR FLD MANUAL: 2 %
NRBC # BLD AUTO: 0 10E3/UL
NRBC BLD AUTO-RTO: 0 /100
NT-PROBNP SERPL-MCNC: 7078 PG/ML (ref 0–1800)
OXYHGB MFR BLDV: 27.6 % (ref 70–75)
P AXIS - MUSE: 48 DEGREES
P AXIS - MUSE: NORMAL DEGREES
PATH REPORT.COMMENTS IMP SPEC: NORMAL
PATH REPORT.FINAL DX SPEC: NORMAL
PATH REPORT.GROSS SPEC: NORMAL
PATH REPORT.MICROSCOPIC SPEC OTHER STN: NORMAL
PATH REPORT.RELEVANT HX SPEC: NORMAL
PCO2 BLDV: 56 MM HG (ref 35–50)
PH BLDV: 7.42 [PH] (ref 7.35–7.45)
PLAT MORPH BLD: ABNORMAL
PLATELET # BLD AUTO: 225 10E3/UL (ref 150–450)
PLATELET # BLD AUTO: 243 10E3/UL (ref 150–450)
PLATELET # BLD AUTO: 250 10E3/UL (ref 150–450)
PLATELET # BLD AUTO: 252 10E3/UL (ref 150–450)
PLATELET # BLD AUTO: 288 10E3/UL (ref 150–450)
PLATELET # BLD AUTO: 321 10E3/UL (ref 150–450)
PLATELET # BLD AUTO: 326 10E3/UL (ref 150–450)
PLATELET # BLD AUTO: 368 10E3/UL (ref 150–450)
PO2 BLDV: 20 MM HG (ref 25–47)
POTASSIUM BLD-SCNC: 3 MMOL/L (ref 3.5–5)
POTASSIUM BLD-SCNC: 3.4 MMOL/L (ref 3.5–5)
POTASSIUM BLD-SCNC: 3.6 MMOL/L (ref 3.5–5)
POTASSIUM BLD-SCNC: 3.7 MMOL/L (ref 3.5–5)
POTASSIUM BLD-SCNC: 3.8 MMOL/L (ref 3.5–5)
POTASSIUM BLD-SCNC: 3.8 MMOL/L (ref 3.5–5)
POTASSIUM BLD-SCNC: 4.2 MMOL/L (ref 3.5–5)
POTASSIUM BLD-SCNC: 4.5 MMOL/L (ref 3.5–5)
POTASSIUM SERPL-SCNC: 3.3 MMOL/L (ref 3.4–5.3)
POTASSIUM SERPL-SCNC: 3.6 MMOL/L (ref 3.4–5.3)
POTASSIUM SERPL-SCNC: 3.7 MMOL/L (ref 3.4–5.3)
POTASSIUM SERPL-SCNC: 4 MMOL/L (ref 3.4–5.3)
POTASSIUM SERPL-SCNC: 4.1 MMOL/L (ref 3.4–5.3)
POTASSIUM SERPL-SCNC: 4.1 MMOL/L (ref 3.4–5.3)
POTASSIUM SERPL-SCNC: 4.3 MMOL/L (ref 3.4–5.3)
POTASSIUM SERPL-SCNC: 4.5 MMOL/L (ref 3.4–5.3)
PR INTERVAL - MUSE: 160 MS
PR INTERVAL - MUSE: 174 MS
PR INTERVAL - MUSE: 176 MS
PR INTERVAL - MUSE: 192 MS
PR INTERVAL - MUSE: 200 MS
PROCALCITONIN SERPL IA-MCNC: 0.22 NG/ML
PROCALCITONIN SERPL IA-MCNC: 0.78 NG/ML
PROT FLD-MCNC: 3.5 G/DL
PROT SERPL-MCNC: 6.7 G/DL (ref 6.4–8.3)
PROT SERPL-MCNC: 6.7 G/DL (ref 6.4–8.3)
PROT SERPL-MCNC: 7.6 G/DL (ref 6.4–8.3)
PROTEIN BODY FLUID SOURCE: NORMAL
QRS DURATION - MUSE: 148 MS
QRS DURATION - MUSE: 150 MS
QRS DURATION - MUSE: 154 MS
QT - MUSE: 440 MS
QT - MUSE: 442 MS
QT - MUSE: 456 MS
QT - MUSE: 472 MS
QT - MUSE: 488 MS
QTC - MUSE: 481 MS
QTC - MUSE: 484 MS
QTC - MUSE: 486 MS
QTC - MUSE: 491 MS
QTC - MUSE: 509 MS
R AXIS - MUSE: 47 DEGREES
R AXIS - MUSE: 49 DEGREES
R AXIS - MUSE: 54 DEGREES
R AXIS - MUSE: 58 DEGREES
R AXIS - MUSE: 61 DEGREES
RBC # BLD AUTO: 3.47 10E6/UL (ref 4.4–5.9)
RBC # BLD AUTO: 3.49 10E6/UL (ref 4.4–5.9)
RBC # BLD AUTO: 3.53 10E6/UL (ref 4.4–5.9)
RBC # BLD AUTO: 3.87 10E6/UL (ref 4.4–5.9)
RBC # BLD AUTO: 3.92 10E6/UL (ref 4.4–5.9)
RBC # BLD AUTO: 3.93 10E6/UL (ref 4.4–5.9)
RBC # BLD AUTO: 4 10E6/UL (ref 4.4–5.9)
RBC # BLD AUTO: 4.14 10E6/UL (ref 4.4–5.9)
RBC # FLD: ABNORMAL /UL
RBC MORPH BLD: ABNORMAL
RSV RNA SPEC NAA+PROBE: NEGATIVE
RVPLETH-%PRED-PRE: 130 %
RVPLETH-PRE: 3.86 L
RVPLETH-PRED: 2.96 L
SAO2 % BLDV: 28.2 % (ref 70–75)
SARS-COV-2 RNA RESP QL NAA+PROBE: NEGATIVE
SARS-COV-2 RNA RESP QL NAA+PROBE: NEGATIVE
SODIUM SERPL-SCNC: 135 MMOL/L (ref 136–145)
SODIUM SERPL-SCNC: 136 MMOL/L (ref 136–145)
SODIUM SERPL-SCNC: 137 MMOL/L (ref 136–145)
SODIUM SERPL-SCNC: 139 MMOL/L (ref 136–145)
SODIUM SERPL-SCNC: 139 MMOL/L (ref 136–145)
SODIUM SERPL-SCNC: 140 MMOL/L (ref 136–145)
SODIUM SERPL-SCNC: 142 MMOL/L (ref 136–145)
SPECIMEN EXPIRATION DATE: NORMAL
SPECIMEN EXPIRATION DATE: NORMAL
SYSTOLIC BLOOD PRESSURE - MUSE: NORMAL MMHG
T AXIS - MUSE: 150 DEGREES
T AXIS - MUSE: 188 DEGREES
T AXIS - MUSE: 190 DEGREES
T AXIS - MUSE: 194 DEGREES
T AXIS - MUSE: 198 DEGREES
TLCPLETH-%PRED-PRE: 83 %
TLCPLETH-PRE: 6.11 L
TLCPLETH-PRED: 7.33 L
TRIGL SERPL-MCNC: 35 MG/DL
TROPONIN T SERPL HS-MCNC: 87 NG/L
TROPONIN T SERPL HS-MCNC: 88 NG/L
TROPONIN T SERPL HS-MCNC: 94 NG/L
TSH SERPL DL<=0.005 MIU/L-ACNC: 4.26 UIU/ML (ref 0.3–5)
VA-%PRED-PRE: 83 %
VA-PRE: 5.42 L
VC-%PRED-PRE: 48 %
VC-PRE: 2.25 L
VC-PRED: 4.64 L
VENTRICULAR RATE- MUSE: 61 BPM
VENTRICULAR RATE- MUSE: 64 BPM
VENTRICULAR RATE- MUSE: 67 BPM
VENTRICULAR RATE- MUSE: 73 BPM
VENTRICULAR RATE- MUSE: 80 BPM
WBC # BLD AUTO: 10.2 10E3/UL (ref 4–11)
WBC # BLD AUTO: 11.9 10E3/UL (ref 4–11)
WBC # BLD AUTO: 4.9 10E3/UL (ref 4–11)
WBC # BLD AUTO: 7.3 10E3/UL (ref 4–11)
WBC # BLD AUTO: 7.7 10E3/UL (ref 4–11)
WBC # BLD AUTO: 7.9 10E3/UL (ref 4–11)
WBC # BLD AUTO: 8.1 10E3/UL (ref 4–11)
WBC # BLD AUTO: 8.3 10E3/UL (ref 4–11)
WBC # FLD AUTO: 663 /UL

## 2022-01-01 PROCEDURE — 93655 ICAR CATH ABLTJ DSCRT ARRHYT: CPT | Performed by: INTERNAL MEDICINE

## 2022-01-01 PROCEDURE — 84157 ASSAY OF PROTEIN OTHER: CPT | Performed by: STUDENT IN AN ORGANIZED HEALTH CARE EDUCATION/TRAINING PROGRAM

## 2022-01-01 PROCEDURE — 99207 PR NO CHARGE NURSE ONLY: CPT

## 2022-01-01 PROCEDURE — 99203 OFFICE O/P NEW LOW 30 MIN: CPT | Performed by: PHYSICIAN ASSISTANT

## 2022-01-01 PROCEDURE — 250N000011 HC RX IP 250 OP 636: Performed by: STUDENT IN AN ORGANIZED HEALTH CARE EDUCATION/TRAINING PROGRAM

## 2022-01-01 PROCEDURE — 94640 AIRWAY INHALATION TREATMENT: CPT | Mod: 76

## 2022-01-01 PROCEDURE — 99213 OFFICE O/P EST LOW 20 MIN: CPT | Performed by: INTERNAL MEDICINE

## 2022-01-01 PROCEDURE — 99232 SBSQ HOSP IP/OBS MODERATE 35: CPT | Performed by: STUDENT IN AN ORGANIZED HEALTH CARE EDUCATION/TRAINING PROGRAM

## 2022-01-01 PROCEDURE — 36415 COLL VENOUS BLD VENIPUNCTURE: CPT | Performed by: EMERGENCY MEDICINE

## 2022-01-01 PROCEDURE — 93005 ELECTROCARDIOGRAM TRACING: CPT | Performed by: EMERGENCY MEDICINE

## 2022-01-01 PROCEDURE — 85025 COMPLETE CBC W/AUTO DIFF WBC: CPT | Performed by: STUDENT IN AN ORGANIZED HEALTH CARE EDUCATION/TRAINING PROGRAM

## 2022-01-01 PROCEDURE — 36415 COLL VENOUS BLD VENIPUNCTURE: CPT | Performed by: STUDENT IN AN ORGANIZED HEALTH CARE EDUCATION/TRAINING PROGRAM

## 2022-01-01 PROCEDURE — 93296 REM INTERROG EVL PM/IDS: CPT | Performed by: INTERNAL MEDICINE

## 2022-01-01 PROCEDURE — 93000 ELECTROCARDIOGRAM COMPLETE: CPT | Performed by: INTERNAL MEDICINE

## 2022-01-01 PROCEDURE — 87210 SMEAR WET MOUNT SALINE/INK: CPT | Performed by: INTERNAL MEDICINE

## 2022-01-01 PROCEDURE — 83880 ASSAY OF NATRIURETIC PEPTIDE: CPT | Performed by: EMERGENCY MEDICINE

## 2022-01-01 PROCEDURE — 80048 BASIC METABOLIC PNL TOTAL CA: CPT | Mod: ORL | Performed by: FAMILY MEDICINE

## 2022-01-01 PROCEDURE — 83615 LACTATE (LD) (LDH) ENZYME: CPT | Performed by: STUDENT IN AN ORGANIZED HEALTH CARE EDUCATION/TRAINING PROGRAM

## 2022-01-01 PROCEDURE — 93656 COMPRE EP EVAL ABLTJ ATR FIB: CPT | Performed by: INTERNAL MEDICINE

## 2022-01-01 PROCEDURE — 99223 1ST HOSP IP/OBS HIGH 75: CPT | Performed by: FAMILY MEDICINE

## 2022-01-01 PROCEDURE — 99233 SBSQ HOSP IP/OBS HIGH 50: CPT | Performed by: INTERNAL MEDICINE

## 2022-01-01 PROCEDURE — 88112 CYTOPATH CELL ENHANCE TECH: CPT | Mod: 26 | Performed by: PATHOLOGY

## 2022-01-01 PROCEDURE — 80048 BASIC METABOLIC PNL TOTAL CA: CPT

## 2022-01-01 PROCEDURE — 99214 OFFICE O/P EST MOD 30 MIN: CPT | Performed by: INTERNAL MEDICINE

## 2022-01-01 PROCEDURE — 272N000710 US THORACENTESIS

## 2022-01-01 PROCEDURE — 93005 ELECTROCARDIOGRAM TRACING: CPT

## 2022-01-01 PROCEDURE — 258N000003 HC RX IP 258 OP 636: Performed by: INTERNAL MEDICINE

## 2022-01-01 PROCEDURE — 93657 TX L/R ATRIAL FIB ADDL: CPT | Performed by: INTERNAL MEDICINE

## 2022-01-01 PROCEDURE — 80048 BASIC METABOLIC PNL TOTAL CA: CPT | Performed by: STUDENT IN AN ORGANIZED HEALTH CARE EDUCATION/TRAINING PROGRAM

## 2022-01-01 PROCEDURE — 250N000009 HC RX 250: Performed by: FAMILY MEDICINE

## 2022-01-01 PROCEDURE — 71045 X-RAY EXAM CHEST 1 VIEW: CPT

## 2022-01-01 PROCEDURE — 87206 SMEAR FLUORESCENT/ACID STAI: CPT | Performed by: INTERNAL MEDICINE

## 2022-01-01 PROCEDURE — G1010 CDSM STANSON: HCPCS

## 2022-01-01 PROCEDURE — 250N000013 HC RX MED GY IP 250 OP 250 PS 637: Performed by: FAMILY MEDICINE

## 2022-01-01 PROCEDURE — 94640 AIRWAY INHALATION TREATMENT: CPT

## 2022-01-01 PROCEDURE — 250N000011 HC RX IP 250 OP 636: Performed by: FAMILY MEDICINE

## 2022-01-01 PROCEDURE — 36415 COLL VENOUS BLD VENIPUNCTURE: CPT

## 2022-01-01 PROCEDURE — 97530 THERAPEUTIC ACTIVITIES: CPT | Mod: GP

## 2022-01-01 PROCEDURE — 83735 ASSAY OF MAGNESIUM: CPT | Performed by: STUDENT IN AN ORGANIZED HEALTH CARE EDUCATION/TRAINING PROGRAM

## 2022-01-01 PROCEDURE — 85027 COMPLETE CBC AUTOMATED: CPT | Performed by: EMERGENCY MEDICINE

## 2022-01-01 PROCEDURE — 89051 BODY FLUID CELL COUNT: CPT | Performed by: STUDENT IN AN ORGANIZED HEALTH CARE EDUCATION/TRAINING PROGRAM

## 2022-01-01 PROCEDURE — 99285 EMERGENCY DEPT VISIT HI MDM: CPT

## 2022-01-01 PROCEDURE — 93000 ELECTROCARDIOGRAM COMPLETE: CPT | Performed by: GENERAL ACUTE CARE HOSPITAL

## 2022-01-01 PROCEDURE — C1887 CATHETER, GUIDING: HCPCS | Performed by: INTERNAL MEDICINE

## 2022-01-01 PROCEDURE — 258N000003 HC RX IP 258 OP 636: Performed by: FAMILY MEDICINE

## 2022-01-01 PROCEDURE — 80048 BASIC METABOLIC PNL TOTAL CA: CPT | Performed by: INTERNAL MEDICINE

## 2022-01-01 PROCEDURE — 210N000001 HC R&B IMCU HEART CARE

## 2022-01-01 PROCEDURE — 99417 PROLNG OP E/M EACH 15 MIN: CPT | Performed by: NURSE PRACTITIONER

## 2022-01-01 PROCEDURE — 36415 COLL VENOUS BLD VENIPUNCTURE: CPT | Performed by: FAMILY MEDICINE

## 2022-01-01 PROCEDURE — 250N000013 HC RX MED GY IP 250 OP 250 PS 637: Performed by: STUDENT IN AN ORGANIZED HEALTH CARE EDUCATION/TRAINING PROGRAM

## 2022-01-01 PROCEDURE — 93306 TTE W/DOPPLER COMPLETE: CPT

## 2022-01-01 PROCEDURE — 97110 THERAPEUTIC EXERCISES: CPT | Mod: GO

## 2022-01-01 PROCEDURE — C1894 INTRO/SHEATH, NON-LASER: HCPCS | Performed by: INTERNAL MEDICINE

## 2022-01-01 PROCEDURE — 80061 LIPID PANEL: CPT | Mod: ORL | Performed by: FAMILY MEDICINE

## 2022-01-01 PROCEDURE — 84132 ASSAY OF SERUM POTASSIUM: CPT

## 2022-01-01 PROCEDURE — 84132 ASSAY OF SERUM POTASSIUM: CPT | Performed by: INTERNAL MEDICINE

## 2022-01-01 PROCEDURE — 99215 OFFICE O/P EST HI 40 MIN: CPT | Performed by: NURSE PRACTITIONER

## 2022-01-01 PROCEDURE — 87102 FUNGUS ISOLATION CULTURE: CPT | Performed by: STUDENT IN AN ORGANIZED HEALTH CARE EDUCATION/TRAINING PROGRAM

## 2022-01-01 PROCEDURE — 84484 ASSAY OF TROPONIN QUANT: CPT | Performed by: EMERGENCY MEDICINE

## 2022-01-01 PROCEDURE — G0463 HOSPITAL OUTPT CLINIC VISIT: HCPCS

## 2022-01-01 PROCEDURE — 93294 REM INTERROG EVL PM/LDLS PM: CPT | Performed by: INTERNAL MEDICINE

## 2022-01-01 PROCEDURE — 85027 COMPLETE CBC AUTOMATED: CPT | Performed by: FAMILY MEDICINE

## 2022-01-01 PROCEDURE — 99223 1ST HOSP IP/OBS HIGH 75: CPT | Performed by: INTERNAL MEDICINE

## 2022-01-01 PROCEDURE — 97116 GAIT TRAINING THERAPY: CPT | Mod: GP

## 2022-01-01 PROCEDURE — 85027 COMPLETE CBC AUTOMATED: CPT | Performed by: INTERNAL MEDICINE

## 2022-01-01 PROCEDURE — 85004 AUTOMATED DIFF WBC COUNT: CPT | Performed by: STUDENT IN AN ORGANIZED HEALTH CARE EDUCATION/TRAINING PROGRAM

## 2022-01-01 PROCEDURE — 258N000003 HC RX IP 258 OP 636: Performed by: ANESTHESIOLOGY

## 2022-01-01 PROCEDURE — 83605 ASSAY OF LACTIC ACID: CPT | Performed by: STUDENT IN AN ORGANIZED HEALTH CARE EDUCATION/TRAINING PROGRAM

## 2022-01-01 PROCEDURE — 86901 BLOOD TYPING SEROLOGIC RH(D): CPT

## 2022-01-01 PROCEDURE — 99239 HOSP IP/OBS DSCHRG MGMT >30: CPT | Performed by: STUDENT IN AN ORGANIZED HEALTH CARE EDUCATION/TRAINING PROGRAM

## 2022-01-01 PROCEDURE — 80151 DRUG ASSAY AMIODARONE: CPT | Mod: 90

## 2022-01-01 PROCEDURE — C1730 CATH, EP, 19 OR FEW ELECT: HCPCS | Performed by: INTERNAL MEDICINE

## 2022-01-01 PROCEDURE — 0W993ZZ DRAINAGE OF RIGHT PLEURAL CAVITY, PERCUTANEOUS APPROACH: ICD-10-PCS | Performed by: RADIOLOGY

## 2022-01-01 PROCEDURE — 94726 PLETHYSMOGRAPHY LUNG VOLUMES: CPT

## 2022-01-01 PROCEDURE — 36415 COLL VENOUS BLD VENIPUNCTURE: CPT | Performed by: INTERNAL MEDICINE

## 2022-01-01 PROCEDURE — 83735 ASSAY OF MAGNESIUM: CPT | Performed by: INTERNAL MEDICINE

## 2022-01-01 PROCEDURE — 84145 PROCALCITONIN (PCT): CPT | Performed by: EMERGENCY MEDICINE

## 2022-01-01 PROCEDURE — 88305 TISSUE EXAM BY PATHOLOGIST: CPT | Mod: TC | Performed by: STUDENT IN AN ORGANIZED HEALTH CARE EDUCATION/TRAINING PROGRAM

## 2022-01-01 PROCEDURE — 99207 PR NO CHARGE LOS: CPT | Performed by: INTERNAL MEDICINE

## 2022-01-01 PROCEDURE — 99211 OFF/OP EST MAY X REQ PHY/QHP: CPT | Mod: 25 | Performed by: INTERNAL MEDICINE

## 2022-01-01 PROCEDURE — 250N000009 HC RX 250: Performed by: INTERNAL MEDICINE

## 2022-01-01 PROCEDURE — 84443 ASSAY THYROID STIM HORMONE: CPT

## 2022-01-01 PROCEDURE — 85027 COMPLETE CBC AUTOMATED: CPT | Performed by: NURSE PRACTITIONER

## 2022-01-01 PROCEDURE — 250N000013 HC RX MED GY IP 250 OP 250 PS 637: Performed by: HOSPITALIST

## 2022-01-01 PROCEDURE — 84460 ALANINE AMINO (ALT) (SGPT): CPT

## 2022-01-01 PROCEDURE — C1733 CATH, EP, OTHR THAN COOL-TIP: HCPCS | Performed by: INTERNAL MEDICINE

## 2022-01-01 PROCEDURE — 99000 SPECIMEN HANDLING OFFICE-LAB: CPT

## 2022-01-01 PROCEDURE — 250N000011 HC RX IP 250 OP 636: Performed by: INTERNAL MEDICINE

## 2022-01-01 PROCEDURE — 99204 OFFICE O/P NEW MOD 45 MIN: CPT | Performed by: INTERNAL MEDICINE

## 2022-01-01 PROCEDURE — 999N000054 HC STATISTIC EKG NON-CHARGEABLE

## 2022-01-01 PROCEDURE — 88305 TISSUE EXAM BY PATHOLOGIST: CPT | Mod: 26 | Performed by: PATHOLOGY

## 2022-01-01 PROCEDURE — 250N000011 HC RX IP 250 OP 636: Performed by: EMERGENCY MEDICINE

## 2022-01-01 PROCEDURE — 250N000009 HC RX 250: Performed by: NURSE ANESTHETIST, CERTIFIED REGISTERED

## 2022-01-01 PROCEDURE — 87070 CULTURE OTHR SPECIMN AEROBIC: CPT | Performed by: STUDENT IN AN ORGANIZED HEALTH CARE EDUCATION/TRAINING PROGRAM

## 2022-01-01 PROCEDURE — 272N000001 HC OR GENERAL SUPPLY STERILE: Performed by: INTERNAL MEDICINE

## 2022-01-01 PROCEDURE — 250N000013 HC RX MED GY IP 250 OP 250 PS 637: Performed by: INTERNAL MEDICINE

## 2022-01-01 PROCEDURE — 93306 TTE W/DOPPLER COMPLETE: CPT | Mod: 26 | Performed by: INTERNAL MEDICINE

## 2022-01-01 PROCEDURE — 93281 PM DEVICE PROGR EVAL MULTI: CPT | Performed by: INTERNAL MEDICINE

## 2022-01-01 PROCEDURE — 84145 PROCALCITONIN (PCT): CPT | Performed by: STUDENT IN AN ORGANIZED HEALTH CARE EDUCATION/TRAINING PROGRAM

## 2022-01-01 PROCEDURE — 82310 ASSAY OF CALCIUM: CPT | Performed by: EMERGENCY MEDICINE

## 2022-01-01 PROCEDURE — 82310 ASSAY OF CALCIUM: CPT

## 2022-01-01 PROCEDURE — 86901 BLOOD TYPING SEROLOGIC RH(D): CPT | Performed by: INTERNAL MEDICINE

## 2022-01-01 PROCEDURE — 88112 CYTOPATH CELL ENHANCE TECH: CPT | Mod: TC | Performed by: STUDENT IN AN ORGANIZED HEALTH CARE EDUCATION/TRAINING PROGRAM

## 2022-01-01 PROCEDURE — 85018 HEMOGLOBIN: CPT | Performed by: INTERNAL MEDICINE

## 2022-01-01 PROCEDURE — 84484 ASSAY OF TROPONIN QUANT: CPT | Performed by: FAMILY MEDICINE

## 2022-01-01 PROCEDURE — 80048 BASIC METABOLIC PNL TOTAL CA: CPT | Performed by: NURSE PRACTITIONER

## 2022-01-01 PROCEDURE — 250N000011 HC RX IP 250 OP 636: Performed by: NURSE ANESTHETIST, CERTIFIED REGISTERED

## 2022-01-01 PROCEDURE — 83880 ASSAY OF NATRIURETIC PEPTIDE: CPT

## 2022-01-01 PROCEDURE — 97535 SELF CARE MNGMENT TRAINING: CPT | Mod: GO

## 2022-01-01 PROCEDURE — 370N000017 HC ANESTHESIA TECHNICAL FEE, PER MIN: Performed by: INTERNAL MEDICINE

## 2022-01-01 PROCEDURE — 85007 BL SMEAR W/DIFF WBC COUNT: CPT | Performed by: EMERGENCY MEDICINE

## 2022-01-01 PROCEDURE — 99215 OFFICE O/P EST HI 40 MIN: CPT | Performed by: INTERNAL MEDICINE

## 2022-01-01 PROCEDURE — 80053 COMPREHEN METABOLIC PANEL: CPT | Performed by: FAMILY MEDICINE

## 2022-01-01 PROCEDURE — C1769 GUIDE WIRE: HCPCS | Performed by: INTERNAL MEDICINE

## 2022-01-01 PROCEDURE — 86850 RBC ANTIBODY SCREEN: CPT

## 2022-01-01 PROCEDURE — C9803 HOPD COVID-19 SPEC COLLECT: HCPCS

## 2022-01-01 PROCEDURE — 87116 MYCOBACTERIA CULTURE: CPT | Performed by: INTERNAL MEDICINE

## 2022-01-01 PROCEDURE — 94060 EVALUATION OF WHEEZING: CPT

## 2022-01-01 PROCEDURE — U0005 INFEC AGEN DETEC AMPLI PROBE: HCPCS

## 2022-01-01 PROCEDURE — C1732 CATH, EP, DIAG/ABL, 3D/VECT: HCPCS | Performed by: INTERNAL MEDICINE

## 2022-01-01 PROCEDURE — 87637 SARSCOV2&INF A&B&RSV AMP PRB: CPT | Performed by: EMERGENCY MEDICINE

## 2022-01-01 PROCEDURE — 99215 OFFICE O/P EST HI 40 MIN: CPT | Mod: 25 | Performed by: INTERNAL MEDICINE

## 2022-01-01 PROCEDURE — 86900 BLOOD TYPING SEROLOGIC ABO: CPT

## 2022-01-01 PROCEDURE — 999N000157 HC STATISTIC RCP TIME EA 10 MIN

## 2022-01-01 PROCEDURE — C1759 CATH, INTRA ECHOCARDIOGRAPHY: HCPCS | Performed by: INTERNAL MEDICINE

## 2022-01-01 PROCEDURE — 250N000011 HC RX IP 250 OP 636

## 2022-01-01 PROCEDURE — 82805 BLOOD GASES W/O2 SATURATION: CPT | Performed by: EMERGENCY MEDICINE

## 2022-01-01 PROCEDURE — 97165 OT EVAL LOW COMPLEX 30 MIN: CPT | Mod: GO

## 2022-01-01 PROCEDURE — 99232 SBSQ HOSP IP/OBS MODERATE 35: CPT | Performed by: INTERNAL MEDICINE

## 2022-01-01 PROCEDURE — 97161 PT EVAL LOW COMPLEX 20 MIN: CPT | Mod: GP

## 2022-01-01 PROCEDURE — U0003 INFECTIOUS AGENT DETECTION BY NUCLEIC ACID (DNA OR RNA); SEVERE ACUTE RESPIRATORY SYNDROME CORONAVIRUS 2 (SARS-COV-2) (CORONAVIRUS DISEASE [COVID-19]), AMPLIFIED PROBE TECHNIQUE, MAKING USE OF HIGH THROUGHPUT TECHNOLOGIES AS DESCRIBED BY CMS-2020-01-R: HCPCS

## 2022-01-01 PROCEDURE — 85014 HEMATOCRIT: CPT | Performed by: STUDENT IN AN ORGANIZED HEALTH CARE EDUCATION/TRAINING PROGRAM

## 2022-01-01 PROCEDURE — 82248 BILIRUBIN DIRECT: CPT | Performed by: EMERGENCY MEDICINE

## 2022-01-01 PROCEDURE — 258N000003 HC RX IP 258 OP 636: Performed by: NURSE ANESTHETIST, CERTIFIED REGISTERED

## 2022-01-01 PROCEDURE — 93010 ELECTROCARDIOGRAM REPORT: CPT | Mod: HOP | Performed by: GENERAL ACUTE CARE HOSPITAL

## 2022-01-01 PROCEDURE — 85347 COAGULATION TIME ACTIVATED: CPT

## 2022-01-01 PROCEDURE — 94729 DIFFUSING CAPACITY: CPT

## 2022-01-01 PROCEDURE — 999N000054 ECG 12-LEAD WITH MUSE (LHE): Performed by: INTERNAL MEDICINE

## 2022-01-01 PROCEDURE — 82435 ASSAY OF BLOOD CHLORIDE: CPT

## 2022-01-01 PROCEDURE — 82945 GLUCOSE OTHER FLUID: CPT | Performed by: STUDENT IN AN ORGANIZED HEALTH CARE EDUCATION/TRAINING PROGRAM

## 2022-01-01 PROCEDURE — 85027 COMPLETE CBC AUTOMATED: CPT

## 2022-01-01 PROCEDURE — 36415 COLL VENOUS BLD VENIPUNCTURE: CPT | Performed by: NURSE PRACTITIONER

## 2022-01-01 PROCEDURE — 83735 ASSAY OF MAGNESIUM: CPT | Performed by: EMERGENCY MEDICINE

## 2022-01-01 RX ORDER — HEPARIN SODIUM 10000 [USP'U]/100ML
INJECTION, SOLUTION INTRAVENOUS CONTINUOUS PRN
Status: DISCONTINUED | OUTPATIENT
Start: 2022-01-01 | End: 2022-01-01 | Stop reason: HOSPADM

## 2022-01-01 RX ORDER — MEPERIDINE HYDROCHLORIDE 25 MG/ML
12.5 INJECTION INTRAMUSCULAR; INTRAVENOUS; SUBCUTANEOUS
Status: DISCONTINUED | OUTPATIENT
Start: 2022-01-01 | End: 2022-01-01 | Stop reason: HOSPADM

## 2022-01-01 RX ORDER — FENTANYL CITRATE 50 UG/ML
50 INJECTION, SOLUTION INTRAMUSCULAR; INTRAVENOUS EVERY 5 MIN PRN
Status: DISCONTINUED | OUTPATIENT
Start: 2022-01-01 | End: 2022-01-01 | Stop reason: HOSPADM

## 2022-01-01 RX ORDER — FINASTERIDE 5 MG/1
5 TABLET, FILM COATED ORAL AT BEDTIME
Status: DISCONTINUED | OUTPATIENT
Start: 2022-01-01 | End: 2022-01-01 | Stop reason: HOSPADM

## 2022-01-01 RX ORDER — FUROSEMIDE 40 MG
80 TABLET ORAL DAILY
Qty: 100 TABLET | Refills: 3 | Status: SHIPPED | OUTPATIENT
Start: 2022-01-01 | End: 2022-01-01

## 2022-01-01 RX ORDER — BUMETANIDE 1 MG/1
3 TABLET ORAL
Qty: 180 TABLET | Refills: 0 | Status: SHIPPED | OUTPATIENT
Start: 2022-01-01 | End: 2023-01-01

## 2022-01-01 RX ORDER — LIDOCAINE 40 MG/G
CREAM TOPICAL
Status: CANCELLED | OUTPATIENT
Start: 2022-01-01

## 2022-01-01 RX ORDER — LOSARTAN POTASSIUM 25 MG/1
25 TABLET ORAL DAILY
Status: DISCONTINUED | OUTPATIENT
Start: 2022-01-01 | End: 2022-01-01

## 2022-01-01 RX ORDER — NALOXONE HYDROCHLORIDE 0.4 MG/ML
0.2 INJECTION, SOLUTION INTRAMUSCULAR; INTRAVENOUS; SUBCUTANEOUS
Status: DISCONTINUED | OUTPATIENT
Start: 2022-01-01 | End: 2022-01-01 | Stop reason: HOSPADM

## 2022-01-01 RX ORDER — LIDOCAINE HYDROCHLORIDE 10 MG/ML
INJECTION, SOLUTION INFILTRATION; PERINEURAL PRN
Status: DISCONTINUED | OUTPATIENT
Start: 2022-01-01 | End: 2022-01-01

## 2022-01-01 RX ORDER — BUMETANIDE 2 MG/1
2 TABLET ORAL
Status: DISCONTINUED | OUTPATIENT
Start: 2022-01-01 | End: 2022-01-01 | Stop reason: HOSPADM

## 2022-01-01 RX ORDER — GABAPENTIN 300 MG/1
300 CAPSULE ORAL AT BEDTIME
Status: DISCONTINUED | OUTPATIENT
Start: 2022-01-01 | End: 2022-01-01 | Stop reason: HOSPADM

## 2022-01-01 RX ORDER — ONDANSETRON 4 MG/1
4 TABLET, ORALLY DISINTEGRATING ORAL EVERY 30 MIN PRN
Status: DISCONTINUED | OUTPATIENT
Start: 2022-01-01 | End: 2022-01-01 | Stop reason: HOSPADM

## 2022-01-01 RX ORDER — POTASSIUM CHLORIDE 1500 MG/1
20 TABLET, EXTENDED RELEASE ORAL 2 TIMES DAILY
Qty: 180 TABLET | Refills: 1 | Status: ON HOLD | OUTPATIENT
Start: 2022-01-01 | End: 2023-01-01

## 2022-01-01 RX ORDER — HYDROCODONE BITARTRATE AND ACETAMINOPHEN 5; 325 MG/1; MG/1
1 TABLET ORAL EVERY 6 HOURS PRN
Status: DISCONTINUED | OUTPATIENT
Start: 2022-01-01 | End: 2022-01-01 | Stop reason: HOSPADM

## 2022-01-01 RX ORDER — CEFTRIAXONE 1 G/1
1 INJECTION, POWDER, FOR SOLUTION INTRAMUSCULAR; INTRAVENOUS EVERY 24 HOURS
Status: DISCONTINUED | OUTPATIENT
Start: 2022-01-01 | End: 2022-01-01 | Stop reason: HOSPADM

## 2022-01-01 RX ORDER — POTASSIUM CHLORIDE 1500 MG/1
20 TABLET, EXTENDED RELEASE ORAL 2 TIMES DAILY
Status: DISCONTINUED | OUTPATIENT
Start: 2022-01-01 | End: 2022-01-01 | Stop reason: HOSPADM

## 2022-01-01 RX ORDER — HYDROMORPHONE HYDROCHLORIDE 1 MG/ML
0.2 INJECTION, SOLUTION INTRAMUSCULAR; INTRAVENOUS; SUBCUTANEOUS EVERY 5 MIN PRN
Status: DISCONTINUED | OUTPATIENT
Start: 2022-01-01 | End: 2022-01-01 | Stop reason: HOSPADM

## 2022-01-01 RX ORDER — ATORVASTATIN CALCIUM 10 MG/1
10 TABLET, FILM COATED ORAL
Status: DISCONTINUED | OUTPATIENT
Start: 2022-01-01 | End: 2022-01-01 | Stop reason: HOSPADM

## 2022-01-01 RX ORDER — ONDANSETRON 4 MG/1
4 TABLET, ORALLY DISINTEGRATING ORAL EVERY 6 HOURS PRN
Status: DISCONTINUED | OUTPATIENT
Start: 2022-01-01 | End: 2022-01-01 | Stop reason: HOSPADM

## 2022-01-01 RX ORDER — LOSARTAN POTASSIUM 25 MG/1
25 TABLET ORAL DAILY
Status: DISCONTINUED | OUTPATIENT
Start: 2022-01-01 | End: 2022-01-01 | Stop reason: HOSPADM

## 2022-01-01 RX ORDER — LIDOCAINE 40 MG/G
CREAM TOPICAL
Status: DISCONTINUED | OUTPATIENT
Start: 2022-01-01 | End: 2022-01-01 | Stop reason: HOSPADM

## 2022-01-01 RX ORDER — EPHEDRINE SULFATE 50 MG/ML
INJECTION, SOLUTION INTRAMUSCULAR; INTRAVENOUS; SUBCUTANEOUS PRN
Status: DISCONTINUED | OUTPATIENT
Start: 2022-01-01 | End: 2022-01-01

## 2022-01-01 RX ORDER — FENTANYL CITRATE 50 UG/ML
INJECTION, SOLUTION INTRAMUSCULAR; INTRAVENOUS PRN
Status: DISCONTINUED | OUTPATIENT
Start: 2022-01-01 | End: 2022-01-01

## 2022-01-01 RX ORDER — HEPARIN SODIUM 1000 [USP'U]/ML
INJECTION, SOLUTION INTRAVENOUS; SUBCUTANEOUS
Status: DISCONTINUED | OUTPATIENT
Start: 2022-01-01 | End: 2022-01-01 | Stop reason: HOSPADM

## 2022-01-01 RX ORDER — FUROSEMIDE 10 MG/ML
40 INJECTION INTRAMUSCULAR; INTRAVENOUS ONCE
Status: COMPLETED | OUTPATIENT
Start: 2022-01-01 | End: 2022-01-01

## 2022-01-01 RX ORDER — MORPHINE SULFATE 2 MG/ML
2 INJECTION, SOLUTION INTRAMUSCULAR; INTRAVENOUS
Status: DISCONTINUED | OUTPATIENT
Start: 2022-01-01 | End: 2022-01-01 | Stop reason: HOSPADM

## 2022-01-01 RX ORDER — SODIUM CHLORIDE, SODIUM LACTATE, POTASSIUM CHLORIDE, CALCIUM CHLORIDE 600; 310; 30; 20 MG/100ML; MG/100ML; MG/100ML; MG/100ML
INJECTION, SOLUTION INTRAVENOUS CONTINUOUS
Status: DISCONTINUED | OUTPATIENT
Start: 2022-01-01 | End: 2022-01-01 | Stop reason: HOSPADM

## 2022-01-01 RX ORDER — HYDROCODONE BITARTRATE AND ACETAMINOPHEN 5; 325 MG/1; MG/1
1 TABLET ORAL EVERY 6 HOURS PRN
COMMUNITY
End: 2023-01-01

## 2022-01-01 RX ORDER — NITROGLYCERIN 0.4 MG/1
0.4 TABLET SUBLINGUAL EVERY 5 MIN PRN
Status: DISCONTINUED | OUTPATIENT
Start: 2022-01-01 | End: 2022-01-01 | Stop reason: HOSPADM

## 2022-01-01 RX ORDER — FENTANYL CITRATE 50 UG/ML
25 INJECTION, SOLUTION INTRAMUSCULAR; INTRAVENOUS EVERY 5 MIN PRN
Status: DISCONTINUED | OUTPATIENT
Start: 2022-01-01 | End: 2022-01-01 | Stop reason: HOSPADM

## 2022-01-01 RX ORDER — ONDANSETRON 2 MG/ML
INJECTION INTRAMUSCULAR; INTRAVENOUS PRN
Status: DISCONTINUED | OUTPATIENT
Start: 2022-01-01 | End: 2022-01-01

## 2022-01-01 RX ORDER — AMIODARONE HYDROCHLORIDE 200 MG/1
200 TABLET ORAL EVERY OTHER DAY
Qty: 45 TABLET | Refills: 0
Start: 2022-01-01 | End: 2022-01-01

## 2022-01-01 RX ORDER — PANTOPRAZOLE SODIUM 40 MG/1
TABLET, DELAYED RELEASE ORAL
Qty: 45 TABLET | Refills: 1 | Status: SHIPPED | OUTPATIENT
Start: 2022-01-01 | End: 2022-01-01

## 2022-01-01 RX ORDER — GABAPENTIN 100 MG/1
100 CAPSULE ORAL 3 TIMES DAILY
Status: DISCONTINUED | OUTPATIENT
Start: 2022-01-01 | End: 2022-01-01 | Stop reason: HOSPADM

## 2022-01-01 RX ORDER — AZITHROMYCIN 250 MG/1
500 TABLET, FILM COATED ORAL AT BEDTIME
Status: DISCONTINUED | OUTPATIENT
Start: 2022-01-01 | End: 2022-01-01

## 2022-01-01 RX ORDER — NITROGLYCERIN 0.4 MG/1
TABLET SUBLINGUAL
Qty: 9 TABLET | Refills: 0 | Status: SHIPPED | OUTPATIENT
Start: 2022-01-01

## 2022-01-01 RX ORDER — ALBUTEROL SULFATE 0.83 MG/ML
2.5 SOLUTION RESPIRATORY (INHALATION) ONCE
Status: COMPLETED | OUTPATIENT
Start: 2022-01-01 | End: 2022-01-01

## 2022-01-01 RX ORDER — NALOXONE HYDROCHLORIDE 0.4 MG/ML
0.4 INJECTION, SOLUTION INTRAMUSCULAR; INTRAVENOUS; SUBCUTANEOUS
Status: DISCONTINUED | OUTPATIENT
Start: 2022-01-01 | End: 2022-01-01 | Stop reason: HOSPADM

## 2022-01-01 RX ORDER — ASPIRIN 81 MG/1
81 TABLET, CHEWABLE ORAL
Status: DISCONTINUED | OUTPATIENT
Start: 2022-01-01 | End: 2022-01-01 | Stop reason: HOSPADM

## 2022-01-01 RX ORDER — PROPOFOL 10 MG/ML
INJECTION, EMULSION INTRAVENOUS PRN
Status: DISCONTINUED | OUTPATIENT
Start: 2022-01-01 | End: 2022-01-01

## 2022-01-01 RX ORDER — LIDOCAINE 40 MG/G
CREAM TOPICAL
Status: DISCONTINUED | OUTPATIENT
Start: 2022-01-01 | End: 2022-01-01

## 2022-01-01 RX ORDER — FENTANYL CITRATE 50 UG/ML
25 INJECTION, SOLUTION INTRAMUSCULAR; INTRAVENOUS
Status: DISCONTINUED | OUTPATIENT
Start: 2022-01-01 | End: 2022-01-01 | Stop reason: HOSPADM

## 2022-01-01 RX ORDER — POTASSIUM CHLORIDE 1500 MG/1
20 TABLET, EXTENDED RELEASE ORAL 2 TIMES DAILY
Qty: 90 TABLET | Refills: 1 | Status: SHIPPED | OUTPATIENT
Start: 2022-01-01 | End: 2022-01-01

## 2022-01-01 RX ORDER — IPRATROPIUM BROMIDE AND ALBUTEROL SULFATE 2.5; .5 MG/3ML; MG/3ML
3 SOLUTION RESPIRATORY (INHALATION) EVERY 4 HOURS PRN
Status: DISCONTINUED | OUTPATIENT
Start: 2022-01-01 | End: 2022-01-01 | Stop reason: HOSPADM

## 2022-01-01 RX ORDER — DEXAMETHASONE SODIUM PHOSPHATE 10 MG/ML
INJECTION, SOLUTION INTRAMUSCULAR; INTRAVENOUS PRN
Status: DISCONTINUED | OUTPATIENT
Start: 2022-01-01 | End: 2022-01-01

## 2022-01-01 RX ORDER — IOPAMIDOL 755 MG/ML
100 INJECTION, SOLUTION INTRAVASCULAR ONCE
Status: COMPLETED | OUTPATIENT
Start: 2022-01-01 | End: 2022-01-01

## 2022-01-01 RX ORDER — AMIODARONE HYDROCHLORIDE 200 MG/1
200 TABLET ORAL EVERY OTHER DAY
Qty: 45 TABLET | Refills: 0 | Status: SHIPPED | OUTPATIENT
Start: 2022-01-01 | End: 2022-01-01

## 2022-01-01 RX ORDER — POTASSIUM CHLORIDE 1500 MG/1
40 TABLET, EXTENDED RELEASE ORAL ONCE
Status: COMPLETED | OUTPATIENT
Start: 2022-01-01 | End: 2022-01-01

## 2022-01-01 RX ORDER — AZITHROMYCIN 250 MG/1
500 TABLET, FILM COATED ORAL AT BEDTIME
Status: DISCONTINUED | OUTPATIENT
Start: 2022-01-01 | End: 2022-01-01 | Stop reason: HOSPADM

## 2022-01-01 RX ORDER — FENTANYL CITRATE 50 UG/ML
25 INJECTION, SOLUTION INTRAMUSCULAR; INTRAVENOUS
Status: CANCELLED | OUTPATIENT
Start: 2022-01-01

## 2022-01-01 RX ORDER — METOPROLOL SUCCINATE 25 MG/1
25 TABLET, EXTENDED RELEASE ORAL AT BEDTIME
Status: DISCONTINUED | OUTPATIENT
Start: 2022-01-01 | End: 2022-01-01 | Stop reason: HOSPADM

## 2022-01-01 RX ORDER — CALCIUM CARBONATE 500 MG/1
500 TABLET, CHEWABLE ORAL DAILY PRN
Status: DISCONTINUED | OUTPATIENT
Start: 2022-01-01 | End: 2022-01-01

## 2022-01-01 RX ORDER — CARBOXYMETHYLCELLULOSE SODIUM 5 MG/ML
2 SOLUTION/ DROPS OPHTHALMIC
Status: DISCONTINUED | OUTPATIENT
Start: 2022-01-01 | End: 2022-01-01

## 2022-01-01 RX ORDER — BUMETANIDE 2 MG/1
2 TABLET ORAL 2 TIMES DAILY
Qty: 180 TABLET | Refills: 3 | Status: SHIPPED | OUTPATIENT
Start: 2022-01-01 | End: 2022-01-01

## 2022-01-01 RX ORDER — AMIODARONE HYDROCHLORIDE 200 MG/1
200 TABLET ORAL EVERY OTHER DAY
Qty: 45 TABLET | Refills: 0 | Status: ON HOLD
Start: 2022-01-01 | End: 2023-01-01

## 2022-01-01 RX ORDER — KETAMINE HYDROCHLORIDE 10 MG/ML
INJECTION INTRAMUSCULAR; INTRAVENOUS PRN
Status: DISCONTINUED | OUTPATIENT
Start: 2022-01-01 | End: 2022-01-01

## 2022-01-01 RX ORDER — ACETAMINOPHEN 325 MG/1
650 TABLET ORAL EVERY 4 HOURS PRN
Status: DISCONTINUED | OUTPATIENT
Start: 2022-01-01 | End: 2022-01-01 | Stop reason: HOSPADM

## 2022-01-01 RX ORDER — BUMETANIDE 0.25 MG/ML
2 INJECTION INTRAMUSCULAR; INTRAVENOUS ONCE
Status: COMPLETED | OUTPATIENT
Start: 2022-01-01 | End: 2022-01-01

## 2022-01-01 RX ORDER — IPRATROPIUM BROMIDE AND ALBUTEROL SULFATE 2.5; .5 MG/3ML; MG/3ML
3 SOLUTION RESPIRATORY (INHALATION)
Status: DISCONTINUED | OUTPATIENT
Start: 2022-01-01 | End: 2022-01-01

## 2022-01-01 RX ORDER — AMIODARONE HYDROCHLORIDE 200 MG/1
200 TABLET ORAL EVERY OTHER DAY
Status: DISCONTINUED | OUTPATIENT
Start: 2022-01-01 | End: 2022-01-01 | Stop reason: HOSPADM

## 2022-01-01 RX ORDER — CARBOXYMETHYLCELLULOSE SODIUM 5 MG/ML
1 SOLUTION/ DROPS OPHTHALMIC
Status: DISCONTINUED | OUTPATIENT
Start: 2022-01-01 | End: 2022-01-01 | Stop reason: HOSPADM

## 2022-01-01 RX ORDER — PANTOPRAZOLE SODIUM 40 MG/1
TABLET, DELAYED RELEASE ORAL
Qty: 1 TABLET | Refills: 1 | Status: SHIPPED | OUTPATIENT
Start: 2022-01-01 | End: 2022-01-01 | Stop reason: ALTCHOICE

## 2022-01-01 RX ORDER — BUMETANIDE 2 MG/1
2 TABLET ORAL 2 TIMES DAILY
Qty: 180 TABLET | Refills: 3 | Status: ON HOLD | OUTPATIENT
Start: 2022-01-01 | End: 2022-01-01

## 2022-01-01 RX ORDER — ADENOSINE 3 MG/ML
INJECTION, SOLUTION INTRAVENOUS
Status: DISCONTINUED | OUTPATIENT
Start: 2022-01-01 | End: 2022-01-01 | Stop reason: HOSPADM

## 2022-01-01 RX ORDER — DOBUTAMINE HYDROCHLORIDE 200 MG/100ML
5 INJECTION INTRAVENOUS CONTINUOUS
Status: DISCONTINUED | OUTPATIENT
Start: 2022-01-01 | End: 2022-01-01

## 2022-01-01 RX ORDER — HYDROMORPHONE HYDROCHLORIDE 1 MG/ML
0.4 INJECTION, SOLUTION INTRAMUSCULAR; INTRAVENOUS; SUBCUTANEOUS EVERY 5 MIN PRN
Status: DISCONTINUED | OUTPATIENT
Start: 2022-01-01 | End: 2022-01-01 | Stop reason: HOSPADM

## 2022-01-01 RX ORDER — DOXAZOSIN 4 MG/1
4 TABLET ORAL AT BEDTIME
Status: DISCONTINUED | OUTPATIENT
Start: 2022-01-01 | End: 2022-01-01 | Stop reason: HOSPADM

## 2022-01-01 RX ORDER — SODIUM CHLORIDE 9 MG/ML
100 INJECTION, SOLUTION INTRAVENOUS CONTINUOUS
Status: CANCELLED | OUTPATIENT
Start: 2022-01-01

## 2022-01-01 RX ORDER — ONDANSETRON 2 MG/ML
4 INJECTION INTRAMUSCULAR; INTRAVENOUS EVERY 6 HOURS PRN
Status: DISCONTINUED | OUTPATIENT
Start: 2022-01-01 | End: 2022-01-01 | Stop reason: HOSPADM

## 2022-01-01 RX ORDER — POTASSIUM CHLORIDE 1.5 G/1.58G
20 POWDER, FOR SOLUTION ORAL DAILY
Qty: 90 PACKET | Refills: 0 | Status: SHIPPED | OUTPATIENT
Start: 2022-01-01 | End: 2022-01-01 | Stop reason: ALTCHOICE

## 2022-01-01 RX ORDER — ONDANSETRON 2 MG/ML
4 INJECTION INTRAMUSCULAR; INTRAVENOUS EVERY 30 MIN PRN
Status: DISCONTINUED | OUTPATIENT
Start: 2022-01-01 | End: 2022-01-01 | Stop reason: HOSPADM

## 2022-01-01 RX ORDER — POTASSIUM CHLORIDE 1.5 G/1.58G
20 POWDER, FOR SOLUTION ORAL DAILY
Qty: 90 PACKET | Refills: 3 | Status: SHIPPED | OUTPATIENT
Start: 2022-01-01 | End: 2022-01-01

## 2022-01-01 RX ORDER — VASOPRESSIN IN 0.9 % NACL 2 UNIT/2ML
SYRINGE (ML) INTRAVENOUS PRN
Status: DISCONTINUED | OUTPATIENT
Start: 2022-01-01 | End: 2022-01-01

## 2022-01-01 RX ORDER — BUMETANIDE 0.25 MG/ML
2 INJECTION INTRAMUSCULAR; INTRAVENOUS EVERY 8 HOURS
Status: DISCONTINUED | OUTPATIENT
Start: 2022-01-01 | End: 2022-01-01

## 2022-01-01 RX ORDER — PANTOPRAZOLE SODIUM 40 MG/1
40 TABLET, DELAYED RELEASE ORAL
Status: DISCONTINUED | OUTPATIENT
Start: 2022-01-01 | End: 2022-01-01 | Stop reason: HOSPADM

## 2022-01-01 RX ORDER — BUMETANIDE 0.25 MG/ML
1 INJECTION INTRAMUSCULAR; INTRAVENOUS EVERY 24 HOURS
Status: DISCONTINUED | OUTPATIENT
Start: 2022-01-01 | End: 2022-01-01

## 2022-01-01 RX ORDER — CALCIUM CARBONATE 500 MG/1
500 TABLET, CHEWABLE ORAL 3 TIMES DAILY PRN
Status: DISCONTINUED | OUTPATIENT
Start: 2022-01-01 | End: 2022-01-01 | Stop reason: HOSPADM

## 2022-01-01 RX ORDER — GLYCOPYRROLATE 0.2 MG/ML
INJECTION, SOLUTION INTRAMUSCULAR; INTRAVENOUS PRN
Status: DISCONTINUED | OUTPATIENT
Start: 2022-01-01 | End: 2022-01-01

## 2022-01-01 RX ORDER — PROTAMINE SULFATE 10 MG/ML
INJECTION, SOLUTION INTRAVENOUS PRN
Status: DISCONTINUED | OUTPATIENT
Start: 2022-01-01 | End: 2022-01-01

## 2022-01-01 RX ORDER — ACETAMINOPHEN 500 MG
500 TABLET ORAL EVERY 6 HOURS PRN
COMMUNITY
End: 2023-01-01

## 2022-01-01 RX ORDER — SODIUM CHLORIDE 9 MG/ML
100 INJECTION, SOLUTION INTRAVENOUS CONTINUOUS
Status: DISCONTINUED | OUTPATIENT
Start: 2022-01-01 | End: 2022-01-01 | Stop reason: HOSPADM

## 2022-01-01 RX ADMIN — SODIUM CHLORIDE 100 ML/HR: 9 INJECTION, SOLUTION INTRAVENOUS at 06:07

## 2022-01-01 RX ADMIN — FENTANYL CITRATE 100 MCG: 50 INJECTION, SOLUTION INTRAMUSCULAR; INTRAVENOUS at 07:08

## 2022-01-01 RX ADMIN — FINASTERIDE 5 MG: 5 TABLET, FILM COATED ORAL at 20:18

## 2022-01-01 RX ADMIN — AMIODARONE HYDROCHLORIDE 200 MG: 200 TABLET ORAL at 09:44

## 2022-01-01 RX ADMIN — Medication 1 DROP: at 22:00

## 2022-01-01 RX ADMIN — DOBUTAMINE HYDROCHLORIDE 5 MCG/KG/MIN: 200 INJECTION INTRAVENOUS at 11:22

## 2022-01-01 RX ADMIN — PANTOPRAZOLE SODIUM 40 MG: 40 TABLET, DELAYED RELEASE ORAL at 08:50

## 2022-01-01 RX ADMIN — AZITHROMYCIN MONOHYDRATE 500 MG: 250 TABLET ORAL at 21:32

## 2022-01-01 RX ADMIN — GABAPENTIN 100 MG: 100 CAPSULE ORAL at 21:28

## 2022-01-01 RX ADMIN — BUMETANIDE 2 MG: 0.25 INJECTION, SOLUTION INTRAMUSCULAR; INTRAVENOUS at 08:42

## 2022-01-01 RX ADMIN — ALBUTEROL SULFATE 2.5 MG: 2.5 SOLUTION RESPIRATORY (INHALATION) at 10:14

## 2022-01-01 RX ADMIN — PROPOFOL 100 MG: 10 INJECTION, EMULSION INTRAVENOUS at 07:09

## 2022-01-01 RX ADMIN — POTASSIUM CHLORIDE 20 MEQ: 1500 TABLET, EXTENDED RELEASE ORAL at 08:50

## 2022-01-01 RX ADMIN — GLYCOPYRROLATE 0.2 MG: 0.2 INJECTION INTRAMUSCULAR; INTRAVENOUS at 07:09

## 2022-01-01 RX ADMIN — METOPROLOL SUCCINATE 25 MG: 25 TABLET, EXTENDED RELEASE ORAL at 21:34

## 2022-01-01 RX ADMIN — PANTOPRAZOLE SODIUM 40 MG: 40 TABLET, DELAYED RELEASE ORAL at 16:46

## 2022-01-01 RX ADMIN — PROPOFOL 40 MG: 10 INJECTION, EMULSION INTRAVENOUS at 08:35

## 2022-01-01 RX ADMIN — AMIODARONE HYDROCHLORIDE 200 MG: 200 TABLET ORAL at 08:50

## 2022-01-01 RX ADMIN — ASPIRIN 81 MG CHEWABLE TABLET 81 MG: 81 TABLET CHEWABLE at 08:50

## 2022-01-01 RX ADMIN — ACETAMINOPHEN 650 MG: 325 TABLET, FILM COATED ORAL at 20:19

## 2022-01-01 RX ADMIN — PHENYLEPHRINE HYDROCHLORIDE 100 MCG: 10 INJECTION INTRAVENOUS at 09:17

## 2022-01-01 RX ADMIN — SERTRALINE HYDROCHLORIDE 50 MG: 50 TABLET ORAL at 09:25

## 2022-01-01 RX ADMIN — Medication 2 DROP: at 22:40

## 2022-01-01 RX ADMIN — Medication 1 MG: at 19:55

## 2022-01-01 RX ADMIN — SERTRALINE HYDROCHLORIDE 50 MG: 50 TABLET ORAL at 08:49

## 2022-01-01 RX ADMIN — FUROSEMIDE 40 MG: 10 INJECTION, SOLUTION INTRAMUSCULAR; INTRAVENOUS at 13:09

## 2022-01-01 RX ADMIN — SERTRALINE HYDROCHLORIDE 50 MG: 50 TABLET ORAL at 21:31

## 2022-01-01 RX ADMIN — Medication 5 MG: at 07:10

## 2022-01-01 RX ADMIN — PHENYLEPHRINE HYDROCHLORIDE 100 MCG: 10 INJECTION INTRAVENOUS at 08:54

## 2022-01-01 RX ADMIN — GABAPENTIN 100 MG: 100 CAPSULE ORAL at 14:21

## 2022-01-01 RX ADMIN — CALCIUM CARBONATE (ANTACID) CHEW TAB 500 MG 500 MG: 500 CHEW TAB at 22:40

## 2022-01-01 RX ADMIN — SERTRALINE HYDROCHLORIDE 50 MG: 50 TABLET ORAL at 09:44

## 2022-01-01 RX ADMIN — FINASTERIDE 5 MG: 5 TABLET, FILM COATED ORAL at 21:31

## 2022-01-01 RX ADMIN — GABAPENTIN 100 MG: 100 CAPSULE ORAL at 19:34

## 2022-01-01 RX ADMIN — GABAPENTIN 100 MG: 100 CAPSULE ORAL at 20:15

## 2022-01-01 RX ADMIN — AZITHROMYCIN DIHYDRATE 500 MG: 500 INJECTION, POWDER, LYOPHILIZED, FOR SOLUTION INTRAVENOUS at 23:13

## 2022-01-01 RX ADMIN — SERTRALINE HYDROCHLORIDE 50 MG: 50 TABLET ORAL at 20:15

## 2022-01-01 RX ADMIN — BUMETANIDE 3 MG: 2 TABLET ORAL at 16:45

## 2022-01-01 RX ADMIN — RIVAROXABAN 20 MG: 10 TABLET, FILM COATED ORAL at 19:39

## 2022-01-01 RX ADMIN — PHENYLEPHRINE HYDROCHLORIDE 100 MCG: 10 INJECTION INTRAVENOUS at 08:35

## 2022-01-01 RX ADMIN — AZITHROMYCIN DIHYDRATE 500 MG: 500 INJECTION, POWDER, LYOPHILIZED, FOR SOLUTION INTRAVENOUS at 21:06

## 2022-01-01 RX ADMIN — PANTOPRAZOLE SODIUM 40 MG: 40 TABLET, DELAYED RELEASE ORAL at 06:30

## 2022-01-01 RX ADMIN — DEXAMETHASONE SODIUM PHOSPHATE 10 MG: 10 INJECTION, SOLUTION INTRAMUSCULAR; INTRAVENOUS at 07:18

## 2022-01-01 RX ADMIN — PHENYLEPHRINE HYDROCHLORIDE 100 MCG: 10 INJECTION INTRAVENOUS at 07:10

## 2022-01-01 RX ADMIN — POTASSIUM CHLORIDE 20 MEQ: 1500 TABLET, EXTENDED RELEASE ORAL at 21:30

## 2022-01-01 RX ADMIN — KETAMINE HYDROCHLORIDE 50 MG: 10 INJECTION, SOLUTION INTRAMUSCULAR; INTRAVENOUS at 07:08

## 2022-01-01 RX ADMIN — BUMETANIDE 2 MG: 2 TABLET ORAL at 18:23

## 2022-01-01 RX ADMIN — GABAPENTIN 100 MG: 100 CAPSULE ORAL at 09:25

## 2022-01-01 RX ADMIN — IPRATROPIUM BROMIDE AND ALBUTEROL SULFATE 3 ML: .5; 3 SOLUTION RESPIRATORY (INHALATION) at 21:36

## 2022-01-01 RX ADMIN — BUMETANIDE 2 MG: 0.25 INJECTION, SOLUTION INTRAMUSCULAR; INTRAVENOUS at 22:15

## 2022-01-01 RX ADMIN — Medication 1 MG: at 01:31

## 2022-01-01 RX ADMIN — ACETAMINOPHEN 650 MG: 325 TABLET ORAL at 21:33

## 2022-01-01 RX ADMIN — ROCURONIUM BROMIDE 50 MG: 50 INJECTION, SOLUTION INTRAVENOUS at 07:10

## 2022-01-01 RX ADMIN — RIVAROXABAN 20 MG: 10 TABLET, FILM COATED ORAL at 17:21

## 2022-01-01 RX ADMIN — POTASSIUM CHLORIDE 20 MEQ: 1500 TABLET, EXTENDED RELEASE ORAL at 08:43

## 2022-01-01 RX ADMIN — PANTOPRAZOLE SODIUM 40 MG: 40 TABLET, DELAYED RELEASE ORAL at 06:32

## 2022-01-01 RX ADMIN — POTASSIUM CHLORIDE 20 MEQ: 1500 TABLET, EXTENDED RELEASE ORAL at 20:15

## 2022-01-01 RX ADMIN — PHENYLEPHRINE HYDROCHLORIDE 100 MCG: 10 INJECTION INTRAVENOUS at 07:22

## 2022-01-01 RX ADMIN — PANTOPRAZOLE SODIUM 40 MG: 40 TABLET, DELAYED RELEASE ORAL at 17:21

## 2022-01-01 RX ADMIN — Medication 1 MG: at 03:26

## 2022-01-01 RX ADMIN — FINASTERIDE 5 MG: 5 TABLET, FILM COATED ORAL at 06:29

## 2022-01-01 RX ADMIN — PROPOFOL 40 MG: 10 INJECTION, EMULSION INTRAVENOUS at 08:05

## 2022-01-01 RX ADMIN — ATORVASTATIN CALCIUM 10 MG: 10 TABLET, FILM COATED ORAL at 09:44

## 2022-01-01 RX ADMIN — PHENYLEPHRINE HYDROCHLORIDE 100 MCG: 10 INJECTION INTRAVENOUS at 07:19

## 2022-01-01 RX ADMIN — CEFTRIAXONE SODIUM 1 G: 1 INJECTION, POWDER, FOR SOLUTION INTRAMUSCULAR; INTRAVENOUS at 22:14

## 2022-01-01 RX ADMIN — Medication 5 MG: at 07:17

## 2022-01-01 RX ADMIN — PHENYLEPHRINE HYDROCHLORIDE 100 MCG: 10 INJECTION INTRAVENOUS at 08:03

## 2022-01-01 RX ADMIN — GABAPENTIN 100 MG: 100 CAPSULE ORAL at 08:42

## 2022-01-01 RX ADMIN — BUMETANIDE 2 MG: 0.25 INJECTION, SOLUTION INTRAMUSCULAR; INTRAVENOUS at 08:44

## 2022-01-01 RX ADMIN — RIVAROXABAN 20 MG: 10 TABLET, FILM COATED ORAL at 16:24

## 2022-01-01 RX ADMIN — CEFTRIAXONE SODIUM 1 G: 1 INJECTION, POWDER, FOR SOLUTION INTRAMUSCULAR; INTRAVENOUS at 21:35

## 2022-01-01 RX ADMIN — Medication 0.5 UNITS: at 07:40

## 2022-01-01 RX ADMIN — GABAPENTIN 100 MG: 100 CAPSULE ORAL at 08:50

## 2022-01-01 RX ADMIN — SODIUM CHLORIDE, POTASSIUM CHLORIDE, SODIUM LACTATE AND CALCIUM CHLORIDE: 600; 310; 30; 20 INJECTION, SOLUTION INTRAVENOUS at 07:28

## 2022-01-01 RX ADMIN — GABAPENTIN 100 MG: 100 CAPSULE ORAL at 09:44

## 2022-01-01 RX ADMIN — BUMETANIDE 2 MG: 0.25 INJECTION, SOLUTION INTRAMUSCULAR; INTRAVENOUS at 15:07

## 2022-01-01 RX ADMIN — Medication 5 MG: at 07:22

## 2022-01-01 RX ADMIN — PROTAMINE SULFATE 50 MG: 10 INJECTION, SOLUTION INTRAVENOUS at 09:59

## 2022-01-01 RX ADMIN — PHENYLEPHRINE HYDROCHLORIDE 100 MCG: 10 INJECTION INTRAVENOUS at 08:05

## 2022-01-01 RX ADMIN — ONDANSETRON 4 MG: 2 INJECTION INTRAMUSCULAR; INTRAVENOUS at 09:56

## 2022-01-01 RX ADMIN — IPRATROPIUM BROMIDE AND ALBUTEROL SULFATE 3 ML: .5; 3 SOLUTION RESPIRATORY (INHALATION) at 07:27

## 2022-01-01 RX ADMIN — BUMETANIDE 2 MG: 0.25 INJECTION, SOLUTION INTRAMUSCULAR; INTRAVENOUS at 15:34

## 2022-01-01 RX ADMIN — PANTOPRAZOLE SODIUM 40 MG: 40 TABLET, DELAYED RELEASE ORAL at 19:39

## 2022-01-01 RX ADMIN — Medication 1 MG: at 21:31

## 2022-01-01 RX ADMIN — ACETAMINOPHEN 650 MG: 325 TABLET, FILM COATED ORAL at 14:38

## 2022-01-01 RX ADMIN — POTASSIUM CHLORIDE 20 MEQ: 1500 TABLET, EXTENDED RELEASE ORAL at 09:44

## 2022-01-01 RX ADMIN — POTASSIUM CHLORIDE 20 MEQ: 1500 TABLET, EXTENDED RELEASE ORAL at 09:25

## 2022-01-01 RX ADMIN — BUMETANIDE 2 MG: 0.25 INJECTION, SOLUTION INTRAMUSCULAR; INTRAVENOUS at 22:44

## 2022-01-01 RX ADMIN — PHENYLEPHRINE HYDROCHLORIDE 0.5 MCG/KG/MIN: 10 INJECTION INTRAVENOUS at 07:26

## 2022-01-01 RX ADMIN — FINASTERIDE 5 MG: 5 TABLET, FILM COATED ORAL at 22:15

## 2022-01-01 RX ADMIN — BUMETANIDE 2 MG: 0.25 INJECTION, SOLUTION INTRAMUSCULAR; INTRAVENOUS at 19:28

## 2022-01-01 RX ADMIN — RIVAROXABAN 20 MG: 10 TABLET, FILM COATED ORAL at 16:45

## 2022-01-01 RX ADMIN — DOXAZOSIN 4 MG: 4 TABLET ORAL at 20:19

## 2022-01-01 RX ADMIN — Medication 1 UNITS: at 07:30

## 2022-01-01 RX ADMIN — CEFTRIAXONE SODIUM 1 G: 1 INJECTION, POWDER, FOR SOLUTION INTRAMUSCULAR; INTRAVENOUS at 22:34

## 2022-01-01 RX ADMIN — SERTRALINE HYDROCHLORIDE 50 MG: 50 TABLET ORAL at 08:42

## 2022-01-01 RX ADMIN — PANTOPRAZOLE SODIUM 40 MG: 40 TABLET, DELAYED RELEASE ORAL at 06:54

## 2022-01-01 RX ADMIN — POTASSIUM CHLORIDE 20 MEQ: 1500 TABLET, EXTENDED RELEASE ORAL at 19:34

## 2022-01-01 RX ADMIN — GABAPENTIN 300 MG: 300 CAPSULE ORAL at 20:19

## 2022-01-01 RX ADMIN — PHENYLEPHRINE HYDROCHLORIDE 200 MCG: 10 INJECTION INTRAVENOUS at 07:26

## 2022-01-01 RX ADMIN — ACETAMINOPHEN 650 MG: 325 TABLET ORAL at 06:54

## 2022-01-01 RX ADMIN — BUMETANIDE 2 MG: 0.25 INJECTION, SOLUTION INTRAMUSCULAR; INTRAVENOUS at 06:55

## 2022-01-01 RX ADMIN — METOPROLOL SUCCINATE 25 MG: 25 TABLET, EXTENDED RELEASE ORAL at 20:18

## 2022-01-01 RX ADMIN — CALCIUM CARBONATE (ANTACID) CHEW TAB 500 MG 500 MG: 500 CHEW TAB at 01:31

## 2022-01-01 RX ADMIN — SERTRALINE HYDROCHLORIDE 50 MG: 50 TABLET ORAL at 19:34

## 2022-01-01 RX ADMIN — ACETAMINOPHEN 650 MG: 325 TABLET, FILM COATED ORAL at 04:35

## 2022-01-01 RX ADMIN — Medication 0.5 UNITS: at 07:53

## 2022-01-01 RX ADMIN — PANTOPRAZOLE SODIUM 40 MG: 40 TABLET, DELAYED RELEASE ORAL at 16:24

## 2022-01-01 RX ADMIN — DOBUTAMINE HYDROCHLORIDE 5 MCG/KG/MIN: 200 INJECTION INTRAVENOUS at 15:07

## 2022-01-01 RX ADMIN — AMIODARONE HYDROCHLORIDE 200 MG: 200 TABLET ORAL at 08:42

## 2022-01-01 RX ADMIN — DOBUTAMINE HYDROCHLORIDE 5 MCG/KG/MIN: 200 INJECTION INTRAVENOUS at 19:31

## 2022-01-01 RX ADMIN — GABAPENTIN 100 MG: 100 CAPSULE ORAL at 13:59

## 2022-01-01 RX ADMIN — IPRATROPIUM BROMIDE AND ALBUTEROL SULFATE 3 ML: .5; 3 SOLUTION RESPIRATORY (INHALATION) at 11:29

## 2022-01-01 RX ADMIN — Medication 5 MG: at 07:15

## 2022-01-01 RX ADMIN — POTASSIUM CHLORIDE 40 MEQ: 1500 TABLET, EXTENDED RELEASE ORAL at 11:20

## 2022-01-01 RX ADMIN — LIDOCAINE HYDROCHLORIDE 50 MG: 10 INJECTION, SOLUTION INFILTRATION; PERINEURAL at 07:08

## 2022-01-01 RX ADMIN — SUGAMMADEX 150 MG: 100 INJECTION, SOLUTION INTRAVENOUS at 10:02

## 2022-01-01 RX ADMIN — PROPOFOL 20 MG: 10 INJECTION, EMULSION INTRAVENOUS at 09:06

## 2022-01-01 RX ADMIN — ASPIRIN 81 MG CHEWABLE TABLET 81 MG: 81 TABLET CHEWABLE at 09:25

## 2022-01-01 RX ADMIN — FINASTERIDE 5 MG: 5 TABLET, FILM COATED ORAL at 22:44

## 2022-01-01 RX ADMIN — SERTRALINE HYDROCHLORIDE 50 MG: 50 TABLET ORAL at 08:51

## 2022-01-01 RX ADMIN — Medication 5 MG: at 07:19

## 2022-01-01 RX ADMIN — AZITHROMYCIN DIHYDRATE 500 MG: 500 INJECTION, POWDER, LYOPHILIZED, FOR SOLUTION INTRAVENOUS at 22:11

## 2022-01-01 RX ADMIN — PHENYLEPHRINE HYDROCHLORIDE 100 MCG: 10 INJECTION INTRAVENOUS at 08:48

## 2022-01-01 RX ADMIN — CEFTRIAXONE SODIUM 1 G: 1 INJECTION, POWDER, FOR SOLUTION INTRAMUSCULAR; INTRAVENOUS at 21:57

## 2022-01-01 RX ADMIN — IOPAMIDOL 100 ML: 755 INJECTION, SOLUTION INTRAVENOUS at 18:53

## 2022-01-01 RX ADMIN — BUMETANIDE 3 MG: 2 TABLET ORAL at 09:25

## 2022-01-01 RX ADMIN — ATORVASTATIN CALCIUM 10 MG: 10 TABLET, FILM COATED ORAL at 08:42

## 2022-01-01 RX ADMIN — GABAPENTIN 100 MG: 100 CAPSULE ORAL at 15:07

## 2022-01-01 ASSESSMENT — ACTIVITIES OF DAILY LIVING (ADL)
ADLS_ACUITY_SCORE: 27
ADLS_ACUITY_SCORE: 33
ADLS_ACUITY_SCORE: 35
ADLS_ACUITY_SCORE: 27
ADLS_ACUITY_SCORE: 35
ADLS_ACUITY_SCORE: 33
ADLS_ACUITY_SCORE: 23
DEPENDENT_IADLS:: INDEPENDENT
ADLS_ACUITY_SCORE: 27
ADLS_ACUITY_SCORE: 35
FALL_HISTORY_WITHIN_LAST_SIX_MONTHS: NO
ADLS_ACUITY_SCORE: 27
ADLS_ACUITY_SCORE: 27
ADLS_ACUITY_SCORE: 35
CONCENTRATING,_REMEMBERING_OR_MAKING_DECISIONS_DIFFICULTY: NO
ADLS_ACUITY_SCORE: 37
ADLS_ACUITY_SCORE: 23
DIFFICULTY_EATING/SWALLOWING: NO
ADLS_ACUITY_SCORE: 35
ADLS_ACUITY_SCORE: 35
ADLS_ACUITY_SCORE: 37
WALKING_OR_CLIMBING_STAIRS: AMBULATION DIFFICULTY, REQUIRES EQUIPMENT
ADLS_ACUITY_SCORE: 35
TOILETING_ISSUES: NO
ADLS_ACUITY_SCORE: 23
ADLS_ACUITY_SCORE: 37
ADLS_ACUITY_SCORE: 35
ADLS_ACUITY_SCORE: 23
ADLS_ACUITY_SCORE: 35
ADLS_ACUITY_SCORE: 33
WEAR_GLASSES_OR_BLIND: YES
ADLS_ACUITY_SCORE: 23
ADLS_ACUITY_SCORE: 35
ADLS_ACUITY_SCORE: 30
ADLS_ACUITY_SCORE: 27
ADLS_ACUITY_SCORE: 30
ADLS_ACUITY_SCORE: 27
TRANSFERRING: 1-->ASSISTANCE (EQUIPMENT/PERSON) NEEDED
ADLS_ACUITY_SCORE: 23
VISION_MANAGEMENT: GLASSES
ADLS_ACUITY_SCORE: 37
ADLS_ACUITY_SCORE: 30
ADLS_ACUITY_SCORE: 35
ADLS_ACUITY_SCORE: 30
DOING_ERRANDS_INDEPENDENTLY_DIFFICULTY: NO
ADLS_ACUITY_SCORE: 23
ADLS_ACUITY_SCORE: 35
ADLS_ACUITY_SCORE: 30
ADLS_ACUITY_SCORE: 37
ADLS_ACUITY_SCORE: 35
ADLS_ACUITY_SCORE: 35
ADLS_ACUITY_SCORE: 27
TRANSFERRING: 0-->ASSISTANCE NEEDED (DEVELOPMENTALLY APPROPRIATE)
ADLS_ACUITY_SCORE: 33
ADLS_ACUITY_SCORE: 37
ADLS_ACUITY_SCORE: 35
ADLS_ACUITY_SCORE: 33
EQUIPMENT_CURRENTLY_USED_AT_HOME: WALKER, ROLLING
ADLS_ACUITY_SCORE: 35
ADLS_ACUITY_SCORE: 35
ADLS_ACUITY_SCORE: 37
DRESSING/BATHING_DIFFICULTY: NO
WALKING_OR_CLIMBING_STAIRS_DIFFICULTY: YES
ADLS_ACUITY_SCORE: 30
ADLS_ACUITY_SCORE: 23
CHANGE_IN_FUNCTIONAL_STATUS_SINCE_ONSET_OF_CURRENT_ILLNESS/INJURY: NO
ADLS_ACUITY_SCORE: 35

## 2022-01-01 ASSESSMENT — COPD QUESTIONNAIRES
COPD: 1
CAT_SEVERITY: MILD

## 2022-01-04 ENCOUNTER — TELEPHONE (OUTPATIENT)
Dept: CARDIOLOGY | Facility: CLINIC | Age: 82
End: 2022-01-04
Payer: COMMERCIAL

## 2022-01-04 DIAGNOSIS — I50.9 CHF (CONGESTIVE HEART FAILURE) (H): Primary | ICD-10-CM

## 2022-01-04 DIAGNOSIS — R06.09 DYSPNEA ON EXERTION: ICD-10-CM

## 2022-01-04 NOTE — TELEPHONE ENCOUNTER
----- Message from Brigid Rush sent at 1/4/2022  1:25 PM CST -----  General phone call:    Caller: Daughter Juliet  Primary cardiologist: Reese  Detailed reason for call: Daughter would like phone call to discuss patients weight gain, fluid retention. wheezing and has questions regarding his medication. Patient is scheduled to see Dr. Leigh on 1/24/22. Does patient need to be seen sooner?  New or active symptoms? Weight gain, fluid retention, wheezing  Best phone number: [phone]  Best time to contact: any  Ok to leave a detailedmessage? yes  Device? yes    Additional Info:          ==called back Juliet to address her concerns. She states that Ramirez developed some weight gain and increased SOB early December which prompted a visit to Dr. Noguera's office on 12/14. He was previously on Furosemide 20 mg prn and had not been taking it and was indulging more in salty foods with the Thanksgiving holiday. Per their report, his weight was up around 15-20 lbs. He typically weighs between 180-185 lbs. He reports that he was up to 199 lbs on the [facility] scale.     He was doubled up on his furosemide to 40 mg daily for 1 week. He is now back down to 20 mg daily. He is since back down to 181 lbs today. He was told to update our office and pt was able to get an Appt for follow up with LBF on 1/24/2022. He still feels some occasional wheezing and finds that the cold air or stepping outside helps with this. He also still gets some occasional shortness of breath when lying flat. He is reassured that his weight is back down to baseline. Will ask for records to be obtained from Dr. Noguera's office- writer cannot see that any blood work was done that came through  [facility]. Will update LBF and encouraged them to keep scheduled appt with LBF on 1/24. -[facility] [place]        Dr. Leigh,  Just an update from patient and his daughter unless you have any more recommendations. Pt developed some fluid retention and weight gain early  December that was treated by PMD with doubling up on Furosemide to 40 mg daily x1 week. He is now back down to 20 mg daily. He reports his weight was 199 at that time but it is back down to baseline of around 180-185 lbs. He still gets some occasional shortness of breath lying flat and wheezing around the house but cold air helps this. He says he is feeling much better. I do not see any records or labs for [facility] and will have HIS obtain records. Pt will see you 1/24/22. Any recommendations prior to then?   Thank you,  Mal

## 2022-01-05 NOTE — TELEPHONE ENCOUNTER
Jorge A Leigh MD  You 15 hours ago (6:54 PM)     LF    If not done can we get hemoglbin, bnp and bmp.   LF              Noted response from LBF. Writer does not see that any blood work was done based on the records scanned in from Dr. Noguera. Orders placed. LVM for patient's daughter, Juliet. She handles his medical care and patient is very [place]. Indicated in VM that scheduling team will reach out to arrange lab appt prior to visit with LBF. Msg to schedulers to please arrange. -[facility] [place]

## 2022-01-17 ENCOUNTER — ANCILLARY PROCEDURE (OUTPATIENT)
Dept: CARDIOLOGY | Facility: CLINIC | Age: 82
End: 2022-01-17
Attending: INTERNAL MEDICINE
Payer: MEDICARE

## 2022-01-17 ENCOUNTER — TELEPHONE (OUTPATIENT)
Dept: CARDIOLOGY | Facility: CLINIC | Age: 82
End: 2022-01-17

## 2022-01-17 DIAGNOSIS — I44.2 AV BLOCK, 3RD DEGREE (H): ICD-10-CM

## 2022-01-17 DIAGNOSIS — I50.9 CHF (CONGESTIVE HEART FAILURE) (H): ICD-10-CM

## 2022-01-17 DIAGNOSIS — Z95.0 STATUS POST BIVENTRICULAR PACEMAKER: ICD-10-CM

## 2022-01-17 PROCEDURE — 93296 REM INTERROG EVL PM/IDS: CPT | Performed by: INTERNAL MEDICINE

## 2022-01-17 PROCEDURE — 93294 REM INTERROG EVL PM/LDLS PM: CPT | Performed by: INTERNAL MEDICINE

## 2022-01-17 NOTE — TELEPHONE ENCOUNTER
Type: routine remote CRT-P transmission.   presenting rhythm: atrial fibrillation with biVP 90 bpm.  Battery/lead status: stable.  Arrhythmias: since 11/28/21; AF appears persistent, burden 100%, V-rates >/=120 bpm 2%. No ventricular arrhythmias detected.  Anticoagulant: Rivaroxaban.  Comments: normal pacemaker function. Device biVP 97.3%. Routed to device RN to review HF diagnostics. E. Pivec, Device Specialist        Transmission reviewed, OptiVol continues to be quite elevated, despite recent increase in Lasix per PMD. OptiVol upward trends seems to somewhat correlate with onset of AF. BiV pacing remains good with AF at 97.3%. Has upcoming appt with Dr. Liegh next week.    Shannon Castro RN

## 2022-01-18 ENCOUNTER — LAB (OUTPATIENT)
Dept: CARDIOLOGY | Facility: CLINIC | Age: 82
End: 2022-01-18
Payer: MEDICARE

## 2022-01-18 ENCOUNTER — TELEPHONE (OUTPATIENT)
Dept: CARDIOLOGY | Facility: CLINIC | Age: 82
End: 2022-01-18

## 2022-01-18 DIAGNOSIS — R06.09 DYSPNEA ON EXERTION: ICD-10-CM

## 2022-01-18 DIAGNOSIS — I50.9 CHF (CONGESTIVE HEART FAILURE) (H): ICD-10-CM

## 2022-01-18 DIAGNOSIS — I50.9 CHF (CONGESTIVE HEART FAILURE) (H): Primary | ICD-10-CM

## 2022-01-18 LAB
ANION GAP SERPL CALCULATED.3IONS-SCNC: 10 MMOL/L (ref 5–18)
BUN SERPL-MCNC: 17 MG/DL (ref 8–28)
CALCIUM SERPL-MCNC: 9.1 MG/DL (ref 8.5–10.5)
CHLORIDE BLD-SCNC: 100 MMOL/L (ref 98–107)
CO2 SERPL-SCNC: 25 MMOL/L (ref 22–31)
CREAT SERPL-MCNC: 0.84 MG/DL (ref 0.7–1.3)
GFR SERPL CREATININE-BSD FRML MDRD: 87 ML/MIN/1.73M2
GLUCOSE BLD-MCNC: 120 MG/DL (ref 70–125)
HGB BLD-MCNC: 13.1 G/DL (ref 13.3–17.7)
MDC_IDC_EPISODE_DTM: NORMAL
MDC_IDC_EPISODE_DURATION: 114 S
MDC_IDC_EPISODE_DURATION: 115 S
MDC_IDC_EPISODE_DURATION: 1156 S
MDC_IDC_EPISODE_DURATION: 1328 S
MDC_IDC_EPISODE_DURATION: 151 S
MDC_IDC_EPISODE_DURATION: 189 S
MDC_IDC_EPISODE_DURATION: 19 S
MDC_IDC_EPISODE_DURATION: 1953 S
MDC_IDC_EPISODE_DURATION: 196 S
MDC_IDC_EPISODE_DURATION: 20 S
MDC_IDC_EPISODE_DURATION: 202 S
MDC_IDC_EPISODE_DURATION: 2222 S
MDC_IDC_EPISODE_DURATION: 237 S
MDC_IDC_EPISODE_DURATION: 25 S
MDC_IDC_EPISODE_DURATION: 2560 S
MDC_IDC_EPISODE_DURATION: 286 S
MDC_IDC_EPISODE_DURATION: 296 S
MDC_IDC_EPISODE_DURATION: 305 S
MDC_IDC_EPISODE_DURATION: 32 S
MDC_IDC_EPISODE_DURATION: 33 S
MDC_IDC_EPISODE_DURATION: 348 S
MDC_IDC_EPISODE_DURATION: 348 S
MDC_IDC_EPISODE_DURATION: 36 S
MDC_IDC_EPISODE_DURATION: 3713 S
MDC_IDC_EPISODE_DURATION: 372 S
MDC_IDC_EPISODE_DURATION: 4283 S
MDC_IDC_EPISODE_DURATION: 442 S
MDC_IDC_EPISODE_DURATION: 47 S
MDC_IDC_EPISODE_DURATION: 5 S
MDC_IDC_EPISODE_DURATION: 5407 S
MDC_IDC_EPISODE_DURATION: 55 S
MDC_IDC_EPISODE_DURATION: 559 S
MDC_IDC_EPISODE_DURATION: 567 S
MDC_IDC_EPISODE_DURATION: 5713 S
MDC_IDC_EPISODE_DURATION: 6 S
MDC_IDC_EPISODE_DURATION: 6 S
MDC_IDC_EPISODE_DURATION: 6438 S
MDC_IDC_EPISODE_DURATION: 7 S
MDC_IDC_EPISODE_DURATION: 7206 S
MDC_IDC_EPISODE_DURATION: 74 S
MDC_IDC_EPISODE_DURATION: 81 S
MDC_IDC_EPISODE_DURATION: 816 S
MDC_IDC_EPISODE_DURATION: 9107 S
MDC_IDC_EPISODE_DURATION: 94 S
MDC_IDC_EPISODE_DURATION: 991 S
MDC_IDC_EPISODE_DURATION: NORMAL S
MDC_IDC_EPISODE_ID: 167
MDC_IDC_EPISODE_ID: 168
MDC_IDC_EPISODE_ID: 169
MDC_IDC_EPISODE_ID: 170
MDC_IDC_EPISODE_ID: 570
MDC_IDC_EPISODE_ID: 571
MDC_IDC_EPISODE_ID: 572
MDC_IDC_EPISODE_ID: 573
MDC_IDC_EPISODE_ID: 574
MDC_IDC_EPISODE_ID: 575
MDC_IDC_EPISODE_ID: 576
MDC_IDC_EPISODE_ID: 577
MDC_IDC_EPISODE_ID: 578
MDC_IDC_EPISODE_ID: 579
MDC_IDC_EPISODE_ID: 580
MDC_IDC_EPISODE_ID: 581
MDC_IDC_EPISODE_ID: 582
MDC_IDC_EPISODE_ID: 583
MDC_IDC_EPISODE_ID: 584
MDC_IDC_EPISODE_ID: 585
MDC_IDC_EPISODE_ID: 586
MDC_IDC_EPISODE_ID: 587
MDC_IDC_EPISODE_ID: 588
MDC_IDC_EPISODE_ID: 589
MDC_IDC_EPISODE_ID: 590
MDC_IDC_EPISODE_ID: 591
MDC_IDC_EPISODE_ID: 592
MDC_IDC_EPISODE_ID: 593
MDC_IDC_EPISODE_ID: 594
MDC_IDC_EPISODE_ID: 595
MDC_IDC_EPISODE_ID: 596
MDC_IDC_EPISODE_ID: 597
MDC_IDC_EPISODE_ID: 598
MDC_IDC_EPISODE_ID: 599
MDC_IDC_EPISODE_ID: 600
MDC_IDC_EPISODE_ID: 601
MDC_IDC_EPISODE_ID: 602
MDC_IDC_EPISODE_ID: 603
MDC_IDC_EPISODE_ID: 604
MDC_IDC_EPISODE_ID: 605
MDC_IDC_EPISODE_ID: 606
MDC_IDC_EPISODE_ID: 607
MDC_IDC_EPISODE_ID: 608
MDC_IDC_EPISODE_ID: 609
MDC_IDC_EPISODE_ID: 610
MDC_IDC_EPISODE_ID: 611
MDC_IDC_EPISODE_ID: 612
MDC_IDC_EPISODE_ID: 613
MDC_IDC_EPISODE_ID: 614
MDC_IDC_EPISODE_ID: 615
MDC_IDC_EPISODE_ID: 616
MDC_IDC_EPISODE_ID: 617
MDC_IDC_EPISODE_ID: 618
MDC_IDC_EPISODE_ID: 619
MDC_IDC_EPISODE_ID: 620
MDC_IDC_EPISODE_TYPE: NORMAL
MDC_IDC_LEAD_IMPLANT_DT: NORMAL
MDC_IDC_LEAD_LOCATION: NORMAL
MDC_IDC_LEAD_LOCATION_DETAIL_1: NORMAL
MDC_IDC_LEAD_MFG: NORMAL
MDC_IDC_LEAD_MODEL: NORMAL
MDC_IDC_LEAD_POLARITY_TYPE: NORMAL
MDC_IDC_LEAD_SERIAL: NORMAL
MDC_IDC_LEAD_SPECIAL_FUNCTION: NORMAL
MDC_IDC_MSMT_BATTERY_DTM: NORMAL
MDC_IDC_MSMT_BATTERY_REMAINING_LONGEVITY: 85 MO
MDC_IDC_MSMT_BATTERY_RRT_TRIGGER: 2.6
MDC_IDC_MSMT_BATTERY_STATUS: NORMAL
MDC_IDC_MSMT_BATTERY_VOLTAGE: 3 V
MDC_IDC_MSMT_LEADCHNL_LV_IMPEDANCE_VALUE: 190 OHM
MDC_IDC_MSMT_LEADCHNL_LV_IMPEDANCE_VALUE: 247 OHM
MDC_IDC_MSMT_LEADCHNL_LV_IMPEDANCE_VALUE: 285 OHM
MDC_IDC_MSMT_LEADCHNL_LV_IMPEDANCE_VALUE: 342 OHM
MDC_IDC_MSMT_LEADCHNL_LV_IMPEDANCE_VALUE: 380 OHM
MDC_IDC_MSMT_LEADCHNL_LV_PACING_THRESHOLD_AMPLITUDE: 1.38 V
MDC_IDC_MSMT_LEADCHNL_LV_PACING_THRESHOLD_PULSEWIDTH: 0.4 MS
MDC_IDC_MSMT_LEADCHNL_RA_IMPEDANCE_VALUE: 247 OHM
MDC_IDC_MSMT_LEADCHNL_RA_IMPEDANCE_VALUE: 456 OHM
MDC_IDC_MSMT_LEADCHNL_RA_PACING_THRESHOLD_AMPLITUDE: 0.75 V
MDC_IDC_MSMT_LEADCHNL_RA_PACING_THRESHOLD_PULSEWIDTH: 0.4 MS
MDC_IDC_MSMT_LEADCHNL_RA_SENSING_INTR_AMPL: 0.25 MV
MDC_IDC_MSMT_LEADCHNL_RA_SENSING_INTR_AMPL: 0.25 MV
MDC_IDC_MSMT_LEADCHNL_RV_IMPEDANCE_VALUE: 247 OHM
MDC_IDC_MSMT_LEADCHNL_RV_IMPEDANCE_VALUE: 323 OHM
MDC_IDC_MSMT_LEADCHNL_RV_PACING_THRESHOLD_AMPLITUDE: 0.88 V
MDC_IDC_MSMT_LEADCHNL_RV_PACING_THRESHOLD_PULSEWIDTH: 0.4 MS
MDC_IDC_MSMT_LEADCHNL_RV_SENSING_INTR_AMPL: 6.38 MV
MDC_IDC_MSMT_LEADCHNL_RV_SENSING_INTR_AMPL: 6.38 MV
MDC_IDC_PG_IMPLANT_DTM: NORMAL
MDC_IDC_PG_MFG: NORMAL
MDC_IDC_PG_MODEL: NORMAL
MDC_IDC_PG_SERIAL: NORMAL
MDC_IDC_PG_TYPE: NORMAL
MDC_IDC_SESS_CLINIC_NAME: NORMAL
MDC_IDC_SESS_DTM: NORMAL
MDC_IDC_SESS_TYPE: NORMAL
MDC_IDC_SET_BRADY_AT_MODE_SWITCH_RATE: 171 {BEATS}/MIN
MDC_IDC_SET_BRADY_LOWRATE: 60 {BEATS}/MIN
MDC_IDC_SET_BRADY_MAX_SENSOR_RATE: 120 {BEATS}/MIN
MDC_IDC_SET_BRADY_MAX_TRACKING_RATE: 120 {BEATS}/MIN
MDC_IDC_SET_BRADY_MODE: NORMAL
MDC_IDC_SET_BRADY_PAV_DELAY_HIGH: 100 MS
MDC_IDC_SET_BRADY_PAV_DELAY_LOW: 170 MS
MDC_IDC_SET_BRADY_SAV_DELAY_HIGH: 70 MS
MDC_IDC_SET_BRADY_SAV_DELAY_LOW: 140 MS
MDC_IDC_SET_CRT_LVRV_DELAY: 80 MS
MDC_IDC_SET_CRT_PACED_CHAMBERS: NORMAL
MDC_IDC_SET_LEADCHNL_LV_PACING_AMPLITUDE: 2.5 V
MDC_IDC_SET_LEADCHNL_LV_PACING_ANODE_ELECTRODE_1: NORMAL
MDC_IDC_SET_LEADCHNL_LV_PACING_ANODE_LOCATION_1: NORMAL
MDC_IDC_SET_LEADCHNL_LV_PACING_CAPTURE_MODE: NORMAL
MDC_IDC_SET_LEADCHNL_LV_PACING_CATHODE_ELECTRODE_1: NORMAL
MDC_IDC_SET_LEADCHNL_LV_PACING_CATHODE_LOCATION_1: NORMAL
MDC_IDC_SET_LEADCHNL_LV_PACING_POLARITY: NORMAL
MDC_IDC_SET_LEADCHNL_LV_PACING_PULSEWIDTH: 0.4 MS
MDC_IDC_SET_LEADCHNL_RA_PACING_AMPLITUDE: 1.5 V
MDC_IDC_SET_LEADCHNL_RA_PACING_ANODE_ELECTRODE_1: NORMAL
MDC_IDC_SET_LEADCHNL_RA_PACING_ANODE_LOCATION_1: NORMAL
MDC_IDC_SET_LEADCHNL_RA_PACING_CAPTURE_MODE: NORMAL
MDC_IDC_SET_LEADCHNL_RA_PACING_CATHODE_ELECTRODE_1: NORMAL
MDC_IDC_SET_LEADCHNL_RA_PACING_CATHODE_LOCATION_1: NORMAL
MDC_IDC_SET_LEADCHNL_RA_PACING_POLARITY: NORMAL
MDC_IDC_SET_LEADCHNL_RA_PACING_PULSEWIDTH: 0.4 MS
MDC_IDC_SET_LEADCHNL_RA_SENSING_ANODE_ELECTRODE_1: NORMAL
MDC_IDC_SET_LEADCHNL_RA_SENSING_ANODE_LOCATION_1: NORMAL
MDC_IDC_SET_LEADCHNL_RA_SENSING_CATHODE_ELECTRODE_1: NORMAL
MDC_IDC_SET_LEADCHNL_RA_SENSING_CATHODE_LOCATION_1: NORMAL
MDC_IDC_SET_LEADCHNL_RA_SENSING_POLARITY: NORMAL
MDC_IDC_SET_LEADCHNL_RA_SENSING_SENSITIVITY: 0.15 MV
MDC_IDC_SET_LEADCHNL_RV_PACING_AMPLITUDE: 2 V
MDC_IDC_SET_LEADCHNL_RV_PACING_ANODE_ELECTRODE_1: NORMAL
MDC_IDC_SET_LEADCHNL_RV_PACING_ANODE_LOCATION_1: NORMAL
MDC_IDC_SET_LEADCHNL_RV_PACING_CAPTURE_MODE: NORMAL
MDC_IDC_SET_LEADCHNL_RV_PACING_CATHODE_ELECTRODE_1: NORMAL
MDC_IDC_SET_LEADCHNL_RV_PACING_CATHODE_LOCATION_1: NORMAL
MDC_IDC_SET_LEADCHNL_RV_PACING_POLARITY: NORMAL
MDC_IDC_SET_LEADCHNL_RV_PACING_PULSEWIDTH: 0.4 MS
MDC_IDC_SET_LEADCHNL_RV_SENSING_ANODE_ELECTRODE_1: NORMAL
MDC_IDC_SET_LEADCHNL_RV_SENSING_ANODE_LOCATION_1: NORMAL
MDC_IDC_SET_LEADCHNL_RV_SENSING_CATHODE_ELECTRODE_1: NORMAL
MDC_IDC_SET_LEADCHNL_RV_SENSING_CATHODE_LOCATION_1: NORMAL
MDC_IDC_SET_LEADCHNL_RV_SENSING_POLARITY: NORMAL
MDC_IDC_SET_LEADCHNL_RV_SENSING_SENSITIVITY: 0.9 MV
MDC_IDC_SET_ZONE_DETECTION_INTERVAL: 350 MS
MDC_IDC_SET_ZONE_DETECTION_INTERVAL: 400 MS
MDC_IDC_SET_ZONE_TYPE: NORMAL
MDC_IDC_STAT_AT_BURDEN_PERCENT: 99.9 %
MDC_IDC_STAT_AT_DTM_END: NORMAL
MDC_IDC_STAT_AT_DTM_START: NORMAL
MDC_IDC_STAT_BRADY_DTM_END: NORMAL
MDC_IDC_STAT_BRADY_DTM_START: NORMAL
MDC_IDC_STAT_BRADY_RA_PERCENT_PACED: 7.13 %
MDC_IDC_STAT_BRADY_RV_PERCENT_PACED: 97.33 %
MDC_IDC_STAT_CRT_DTM_END: NORMAL
MDC_IDC_STAT_CRT_DTM_START: NORMAL
MDC_IDC_STAT_CRT_LV_PERCENT_PACED: 97.31 %
MDC_IDC_STAT_CRT_PERCENT_PACED: 97.31 %
MDC_IDC_STAT_EPISODE_RECENT_COUNT: 0
MDC_IDC_STAT_EPISODE_RECENT_COUNT: 482
MDC_IDC_STAT_EPISODE_RECENT_COUNT_DTM_END: NORMAL
MDC_IDC_STAT_EPISODE_RECENT_COUNT_DTM_START: NORMAL
MDC_IDC_STAT_EPISODE_TOTAL_COUNT: 0
MDC_IDC_STAT_EPISODE_TOTAL_COUNT: 0
MDC_IDC_STAT_EPISODE_TOTAL_COUNT: 1
MDC_IDC_STAT_EPISODE_TOTAL_COUNT: 47
MDC_IDC_STAT_EPISODE_TOTAL_COUNT: 572
MDC_IDC_STAT_EPISODE_TOTAL_COUNT_DTM_END: NORMAL
MDC_IDC_STAT_EPISODE_TOTAL_COUNT_DTM_START: NORMAL
MDC_IDC_STAT_EPISODE_TYPE: NORMAL
POTASSIUM BLD-SCNC: 4.2 MMOL/L (ref 3.5–5)
SODIUM SERPL-SCNC: 135 MMOL/L (ref 136–145)

## 2022-01-18 PROCEDURE — 85018 HEMOGLOBIN: CPT

## 2022-01-18 PROCEDURE — 80048 BASIC METABOLIC PNL TOTAL CA: CPT

## 2022-01-18 PROCEDURE — 36415 COLL VENOUS BLD VENIPUNCTURE: CPT

## 2022-01-18 PROCEDURE — 83880 ASSAY OF NATRIURETIC PEPTIDE: CPT

## 2022-01-18 RX ORDER — FUROSEMIDE 40 MG
40 TABLET ORAL 2 TIMES DAILY
Qty: 60 TABLET | Refills: 0 | Status: SHIPPED | OUTPATIENT
Start: 2022-01-18 | End: 2022-01-19

## 2022-01-18 NOTE — TELEPHONE ENCOUNTER
Call Documentation      Jorge A Leigh MD at 1/18/2022  8:13 AM    Status: Signed   Please contact this 81-year-old gentleman of mine who I am due to see next week, see how his breathing is doing.  OptiVol shows heart failure and arrhythmia correlates with A. fib.  Apparently had his Lasix increased by primary, can we may be change it from 40 mg once a day to 40 mg twice a day, if that does not work we will need to go to 80 mg once a day.  If he is feeling rather poorly should come in to be seen by heart failure nurse practitioners ASAP.  Shannon Valverde, RN at 1/17/2022  3:09 PM    Status: Signed   Type: routine remote CRT-P transmission.   presenting rhythm: atrial fibrillation with biVP 90 bpm.  Battery/lead status: stable.  Arrhythmias: since 11/28/21; AF appears persistent, burden 100%, V-rates >/=120 bpm 2%. No ventricular arrhythmias detected.  Anticoagulant: Rivaroxaban.  Comments: normal pacemaker function. Device biVP 97.3%. Routed to device RN to review HF diagnostics. E. Pivec, Device Specialist          Transmission reviewed, OptiVol continues to be quite elevated, despite recent increase in Lasix per PMD. OptiVol upward trends seems to somewhat correlate with onset of AF. BiV pacing remains good with AF at 97.3%. Has upcoming appt with Dr. Leigh next week.     Shannon Castro, RN

## 2022-01-18 NOTE — TELEPHONE ENCOUNTER
Received a call back from Juliet. She verbalized understanding of recommendations from LBF to increase furosemide to 40 mg bid due to abnormal optivol. Med list updated. He also has labs today being drawn at [facility] at 3:30- BNP/BMP pending. Juliet says that Reymundo tends to take his furosemide sort of as needed due to the inconvenience of frequent urination. She will try to assist him with being more compliant. Will call her with results and recommendations. -[facility] [place]

## 2022-01-18 NOTE — TELEPHONE ENCOUNTER
LVM for patient's daughter, Juliet, to discuss medication changes. Historically writer has spoke with daughter as she reports her dad is very hard of hearing. Left a call back number to discuss. -[facility]

## 2022-01-18 NOTE — PROGRESS NOTES
Separate telephone encounter created than device navigator- dated 1/18/22. -[facility] [place]

## 2022-01-18 NOTE — TELEPHONE ENCOUNTER
Please contact this 81-year-old gentleman of mine who I am due to see next week, see how his breathing is doing.  OptiVol shows heart failure and arrhythmia correlates with A. fib.  Apparently had his Lasix increased by primary, can we may be change it from 40 mg once a day to 40 mg twice a day, if that does not work we will need to go to 80 mg once a day.  If he is feeling rather poorly should come in to be seen by heart failure nurse practitioners ASAP.  LF

## 2022-01-19 DIAGNOSIS — I50.9 CHF (CONGESTIVE HEART FAILURE) (H): ICD-10-CM

## 2022-01-19 LAB — BNP SERPL-MCNC: 661 PG/ML (ref 0–93)

## 2022-01-19 RX ORDER — FUROSEMIDE 40 MG
40 TABLET ORAL 2 TIMES DAILY
Qty: 180 TABLET | Refills: 0 | Status: SHIPPED | OUTPATIENT
Start: 2022-01-19 | End: 2022-01-27

## 2022-01-24 ENCOUNTER — OFFICE VISIT (OUTPATIENT)
Dept: CARDIOLOGY | Facility: CLINIC | Age: 82
End: 2022-01-24
Payer: MEDICARE

## 2022-01-24 ENCOUNTER — DOCUMENTATION ONLY (OUTPATIENT)
Dept: CARDIOLOGY | Facility: CLINIC | Age: 82
End: 2022-01-24

## 2022-01-24 ENCOUNTER — PREP FOR PROCEDURE (OUTPATIENT)
Dept: CARDIOLOGY | Facility: CLINIC | Age: 82
End: 2022-01-24

## 2022-01-24 VITALS
DIASTOLIC BLOOD PRESSURE: 60 MMHG | RESPIRATION RATE: 16 BRPM | WEIGHT: 183 LBS | BODY MASS INDEX: 25.62 KG/M2 | HEIGHT: 71 IN | SYSTOLIC BLOOD PRESSURE: 112 MMHG | HEART RATE: 74 BPM

## 2022-01-24 DIAGNOSIS — I48.19 PERSISTENT ATRIAL FIBRILLATION (H): Primary | ICD-10-CM

## 2022-01-24 DIAGNOSIS — I25.83 CORONARY ARTERY DISEASE DUE TO LIPID RICH PLAQUE: Primary | Chronic | ICD-10-CM

## 2022-01-24 DIAGNOSIS — N18.2 STAGE 2 CHRONIC KIDNEY DISEASE: ICD-10-CM

## 2022-01-24 DIAGNOSIS — U07.1 INFECTION DUE TO 2019 NOVEL CORONAVIRUS: Chronic | ICD-10-CM

## 2022-01-24 DIAGNOSIS — I50.41 ACUTE COMBINED SYSTOLIC AND DIASTOLIC CONGESTIVE HEART FAILURE (H): ICD-10-CM

## 2022-01-24 DIAGNOSIS — I48.19 PERSISTENT ATRIAL FIBRILLATION (H): ICD-10-CM

## 2022-01-24 DIAGNOSIS — I25.10 CORONARY ARTERY DISEASE DUE TO LIPID RICH PLAQUE: Primary | Chronic | ICD-10-CM

## 2022-01-24 DIAGNOSIS — I35.1 NONRHEUMATIC AORTIC VALVE INSUFFICIENCY: Chronic | ICD-10-CM

## 2022-01-24 DIAGNOSIS — Z95.0 CARDIAC PACEMAKER IN SITU: Chronic | ICD-10-CM

## 2022-01-24 DIAGNOSIS — I42.0 DILATED CARDIOMYOPATHY (H): ICD-10-CM

## 2022-01-24 PROBLEM — I50.9 CHF (CONGESTIVE HEART FAILURE) (H): Status: RESOLVED | Noted: 2021-09-08 | Resolved: 2022-01-24

## 2022-01-24 PROBLEM — Z95.5 S/P DRUG ELUTING CORONARY STENT PLACEMENT: Status: RESOLVED | Noted: 2019-05-30 | Resolved: 2022-01-24

## 2022-01-24 PROCEDURE — 99214 OFFICE O/P EST MOD 30 MIN: CPT | Performed by: INTERNAL MEDICINE

## 2022-01-24 RX ORDER — SOTALOL HYDROCHLORIDE 80 MG/1
40 TABLET ORAL 2 TIMES DAILY
Qty: 10 TABLET | Refills: 1 | Status: SHIPPED | OUTPATIENT
Start: 2022-01-24 | End: 2022-01-27

## 2022-01-24 RX ORDER — LIDOCAINE 40 MG/G
CREAM TOPICAL
Status: CANCELLED | OUTPATIENT
Start: 2022-01-24

## 2022-01-24 ASSESSMENT — MIFFLIN-ST. JEOR: SCORE: 1547.21

## 2022-01-24 NOTE — PROGRESS NOTES
[facility]  Heart Care Clinic Follow-up Note    Assessment & Plan        (I25.10,  I25.83) Coronary artery disease due to lipid rich plaque  (primary encounter diagnosis)  Comment: 2011 due to shortness of breath underwent angiography showing normal left main, normal LAD, normal circumflex, and significant distal right coronary artery lesion which was intervened upon with a stent.  Ejection fraction at that time was 52%. He underwent nuclear stress test in 2020 showing basal inferior wall defect prompting angiography which showed normal left main, normal LAD, normal circumflex, and normal right coronary artery with minimal in-stent restenosis. No significant symptoms and continue to work on prevention.  However, contemplating back surgery, given this we will perform pharmacological nuclear stress again.    (Z95.0) Cardiac pacemaker in situ  Comment: Medtronic device with Medtronic right atrial right ventricular leads in the His bundle as well as a right ventricular apical lead. He has 7% atrial pacing with 97% ventricular CRT pacing. Leads stable.  This is down from 99%, does have atrial fibrillation.  Given this, will start sotalol, plan on cardioversion several weeks down the line.    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  Recheck echo in about a year.    (I48.19) Persistent atrial fibrillation (H)  Comment: Not valvular, paroxysmal, and given advanced [id number] score of 4 on Xarelto. He tells me he 20 mg and device check shows a high percent atrial fibrillation which I feel is been intermittent heart failure.  Will start sotalol 40 mg by mouth twice a day, renal profile ECG in a week for QT interval.  We will then plan on cardioversion.  Could always go up on the dose of this.  Have lower dose of Toprol to 12 and half milligrams a day.  Given bleeding, will consider left atrial appendage occlusion  device.    (I42.0) Dilated cardiomyopathy (H)  Comment: Ejection fraction was 52% initially, 47% on follow-up echo in 2020 with nuclear stress test 32%. Essentially asymptomatic but recheck echo showed 42% last May, continue to monitor..    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    (N18.2) Stage 2 chronic kidney disease  Comment: Creatinine stable.    (U07.1) Infection due to 2019 novel coronavirus  Comment: This was in 2021 and he is doing much better.  Has been vaccinated.    Plan  1.  Start sotalol 40 mg p.o. twice daily.  Check renal profile and ECG in a week.  2.  Decrease Toprol down to 12.5 mg p.o. daily.  3.  Plan on cardioversion.  4.  Stress nuclear in anticipation of back surgery.  5.  Consider left atrial appendage occlusion device.  6.  Follow-up in about 4 months given all these issues.    Subjective  CC: 80-year-old white gentleman here for follow-up visit with his daughter present.  He is still living independently with his sister-in-law, he is very active using snowblower, shoveling, changing oil.  With increased dose of diuretic his breathing is better, no major PND, orthopnea, chest discomfort, palpitations, syncope or dizziness or peripheral edema.    Medications  Current Outpatient Medications   Medication Sig Dispense Refill     aspirin (ASA) 81 MG chewable tablet Take 1 tablet (81 mg) by mouth every other day 30 tablet 0     atorvastatin (LIPITOR) 10 MG tablet [ATORVASTATIN (LIPITOR) 10 MG TABLET] Take 10 mg by mouth 4 (four) times a week. Days vary        coenzyme Q-10 200 MG CAPS capsule Take 200 mg by mouth daily       doxazosin (CARDURA) 8 MG tablet [DOXAZOSIN (CARDURA) 8 MG TABLET] Take 4 mg by mouth at bedtime.              finasteride (PROSCAR) 5 mg tablet [FINASTERIDE (PROSCAR) 5 MG TABLET] Take 5 mg by mouth at bedtime.               "furosemide (LASIX) 40 MG tablet Take 1 tablet (40 mg) by mouth 2 times daily (Patient taking differently: Take 80 mg by mouth 2 times daily ) 180 tablet 0     gabapentin (NEURONTIN) 100 MG capsule [GABAPENTIN (NEURONTIN) 100 MG CAPSULE] TAKE ONE CAPSULE BY MOUTH EVERY MORNING AND AT NOON AND THREE CAPSULES BY MOUTH AT NIGHT       glucosamine/chondr cole A sod (OSTEO BI-FLEX ORAL) [GLUCOSAMINE/CHONDR COLE A SOD (OSTEO BI-FLEX ORAL)] Take 1 tablet by mouth 2 (two) times a day.              losartan (COZAAR) 25 MG tablet [LOSARTAN (COZAAR) 25 MG TABLET] Take 1 tablet (25 mg total) by mouth daily. 30 tablet 0     melatonin 10 mg Tab [MELATONIN 10 MG TAB] Take 10 mg by mouth at bedtime as needed.       metoprolol succinate ER (TOPROL-XL) 25 MG 24 hr tablet Take 1 tablet (25 mg) by mouth At Bedtime 90 tablet 1     omeprazole (PRILOSEC) 20 MG capsule [OMEPRAZOLE (PRILOSEC) 20 MG CAPSULE] Take 20 mg by mouth 2 (two) times a day before meals.       rivaroxaban ANTICOAGULANT (XARELTO) 20 MG TABS tablet Take 1 tablet (20 mg) by mouth daily 90 tablet 3     sertraline (ZOLOFT) 50 MG tablet Take 50 mg by mouth 2 times daily        sotalol (BETAPACE) 80 MG tablet Take 0.5 tablets (40 mg) by mouth 2 times daily 10 tablet 1     triamcinolone (KENALOG) 0.1 % cream [TRIAMCINOLONE (KENALOG) 0.1 % CREAM] Apply 1 application topically 2 (two) times a day as needed.       vit A/vit C/vit E/zinc/copper (PRESERVISION AREDS ORAL) [VIT A/VIT C/VIT E/ZINC/COPPER (PRESERVISION AREDS ORAL)] Take 1 tablet by mouth 2 (two) times a day.         Objective  /60 (BP Location: Right arm, Patient Position: Sitting, Cuff Size: Adult Regular)   Pulse 74   Resp 16   Ht 1.803 m (5' 11\")   Wt 83 kg (183 lb)   BMI 25.52 kg/m      General Appearance:    Alert, cooperative, no distress, appears stated age   Head:    Normocephalic, without obvious abnormality, atraumatic   Throat:   Lips, mucosa, and tongue normal; teeth and gums normal   Neck:   Supple, " symmetrical, trachea midline, no adenopathy;        thyroid:  No enlargement/tenderness/nodules; no carotid    bruit or JVD   Back:     Symmetric, no curvature, ROM normal, no CVA tenderness   Lungs:     Clear to auscultation bilaterally, respirations unlabored   Chest wall:    No tenderness, left-sided CRT   Heart:    Regular rate and rhythm, S1 and S2 normal, no murmur, rub   or gallop   Abdomen:     Soft, non-tender, bowel sounds active all four quadrants,     no masses, no organomegaly   Extremities:   Normal, atraumatic, no cyanosis or edema   Pulses:   2+ and symmetric all extremities   Skin:   Skin color, texture, turgor normal, no rashes or lesions     Results    Lab Results personally reviewed   Lab Results   Component Value Date    CHOL 142 10/23/2020    CHOL 161 05/16/2019     Lab Results   Component Value Date    HDL 51 10/23/2020    HDL 57 05/16/2019     No components found for: LDLCALC  Lab Results   Component Value Date    TRIG 79 10/23/2020    TRIG 77 05/16/2019     Lab Results   Component Value Date    WBC 9.4 07/02/2021    HGB 13.1 (L) 01/18/2022    HCT 45.1 07/02/2021     07/02/2021     Lab Results   Component Value Date    BUN 17 01/18/2022     (L) 01/18/2022    CO2 25 01/18/2022

## 2022-01-24 NOTE — PROGRESS NOTES
H&P  PMD []  Received [] OV: [x]LBF  Date:1-24 Teach  []   Orders  I [x] P  [x]  Letter []   COVID  FV/EPIC []  Date:  External  []  Date: AC: []Eliquis  [x] Xarelto  [] Warfarin  [] Pradaxa Meds: [] Review AAD with EP NP needed  [x] Per EP NO chg AAD/Med- Per Soo 1/28  [] Per EP Hold:      Pt reports taking anticoagulation appropriately, denies any missed doses in anticoagulation and pt is aware to call if any missed doses prior to CV    1940  Home:[phone] (home) Cell:[phone] (mobile)  Emergency Contact: Shine Petersen [phone]  PCP: Jamie Noguera, [phone]    +++Important patient information for CSC/Cath Lab staff : None+++    [facility] EP Cath Lab Procedure Order   Cardioversion:  Cardioversion  Ordering Provider: Dr. Leigh  Ordering Date: 1/24/2022    Diagnosis:  AF  Anticipated Case Duration:  Standard  Scheduling Needs/Timeframe:  Per Reese, please schedule in 2-3 weeks  Scheduling Contact: Please contact pt to schedule date/time for COVID testing and CV, if you are unable to schedule date within the next 24 hours please contact pt to update on scheduling process    Current Device: BIV Pacemaker  Device Company/Device Rep Needed for Procedure: Medtronic    Pre-Procedural Testing needed: COVID 19 nasal/lab test within 48hrs of procedure  Anesthesia:  General    [facility] EP Cath Lab Prep   H&P:  Compled by Dr. Leigh on 1-24-22 if scheduled within 30 days, pt to schedule with PMD if procedure outside of this timeframe    Pre-op Labs: Ordered AM of procedure    Medical Records Pertinent for Procedure:  Angiogram 10-23-20;  Echo 5-19-21 EF 45%;  Pacemaker implant EAC 3-31-20;  Cardioversion 9-14-21;  EKG 9-14-2 paced    Patient Education:  Teach with Patient: Will be completed via phone prior to procedure, and letter was also sent to pt via [email or web address] with written pre-procedural instructions.    Risks Reviewed:     Cardioversion    >90% acute success rate, <10% failure to convert or    reverts shortly after cardioversion.    <1% embolic event of (CVA, pulmonary embolism, or   1. other site).    75% risk for superficial burn.  Risks associated with general anesthesia will be addressed by the Anesthesiology Department    Pre-Procedure Instructions:  NPO after midnight, Remove all jewelry prior to coming in for procedure, Shower prior to arrival, Notified patient of time and date of procedure by CV , Transportation arrangements needed s/p procedure, Post-procedure follow up process, Sedation plan/orders and Pre-procedure letter was sent to pt    Pre-Procedure Medication Instructions:  Instructions given to pt regarding anticoagulants: Xarelto- instructed to continue anticoagulation uninterrupted through their procedure  Instructions given to pt regarding antiarrhythmic medication: Sotalol; Pt instructed to continue medication prior to procedure  Instructions for medication, other than anticoagulants/antiarrhythmics listed above, given to pt: Will review medications with EP NP prior to CV    Allergies   Allergen Reactions     Lac Bovis Nausea and Vomiting     Other reaction(s): upset stomach       Current Outpatient Medications:      aspirin (ASA) 81 MG chewable tablet, Take 1 tablet (81 mg) by mouth every other day, Disp: 30 tablet, Rfl: 0     atorvastatin (LIPITOR) 10 MG tablet, [ATORVASTATIN (LIPITOR) 10 MG TABLET] Take 10 mg by mouth 4 (four) times a week. Days vary , Disp: , Rfl:      coenzyme Q-10 200 MG CAPS capsule, Take 200 mg by mouth daily, Disp: , Rfl:      doxazosin (CARDURA) 8 MG tablet, [DOXAZOSIN (CARDURA) 8 MG TABLET] Take 4 mg by mouth at bedtime.       , Disp: , Rfl:      finasteride (PROSCAR) 5 mg tablet, [FINASTERIDE (PROSCAR) 5 MG TABLET] Take 5 mg by mouth at bedtime.       , Disp: , Rfl:      furosemide (LASIX) 40 MG tablet, Take 1 tablet (40 mg) by mouth 2 times daily (Patient taking differently: Take 80 mg by mouth 2 times daily ), Disp: 180 tablet, Rfl: 0      gabapentin (NEURONTIN) 100 MG capsule, [GABAPENTIN (NEURONTIN) 100 MG CAPSULE] TAKE ONE CAPSULE BY MOUTH EVERY MORNING AND AT NOON AND THREE CAPSULES BY MOUTH AT NIGHT, Disp: , Rfl:      glucosamine/chondr cole A sod (OSTEO BI-FLEX ORAL), [GLUCOSAMINE/CHONDR COLE A SOD (OSTEO BI-FLEX ORAL)] Take 1 tablet by mouth 2 (two) times a day.       , Disp: , Rfl:      losartan (COZAAR) 25 MG tablet, [LOSARTAN (COZAAR) 25 MG TABLET] Take 1 tablet (25 mg total) by mouth daily., Disp: 30 tablet, Rfl: 0     melatonin 10 mg Tab, [MELATONIN 10 MG TAB] Take 10 mg by mouth at bedtime as needed., Disp: , Rfl:      metoprolol succinate ER (TOPROL-XL) 25 MG 24 hr tablet, Take 1 tablet (25 mg) by mouth At Bedtime, Disp: 90 tablet, Rfl: 1     omeprazole (PRILOSEC) 20 MG capsule, [OMEPRAZOLE (PRILOSEC) 20 MG CAPSULE] Take 20 mg by mouth 2 (two) times a day before meals., Disp: , Rfl:      rivaroxaban ANTICOAGULANT (XARELTO) 20 MG TABS tablet, Take 1 tablet (20 mg) by mouth daily, Disp: 90 tablet, Rfl: 3     sertraline (ZOLOFT) 50 MG tablet, Take 50 mg by mouth 2 times daily , Disp: , Rfl:      sotalol (BETAPACE) 80 MG tablet, Take 0.5 tablets (40 mg) by mouth 2 times daily, Disp: 10 tablet, Rfl: 1     triamcinolone (KENALOG) 0.1 % cream, [TRIAMCINOLONE (KENALOG) 0.1 % CREAM] Apply 1 application topically 2 (two) times a day as needed., Disp: , Rfl:      vit A/vit C/vit E/zinc/copper (PRESERVISION AREDS ORAL), [VIT A/VIT C/VIT E/ZINC/COPPER (PRESERVISION AREDS ORAL)] Take 1 tablet by mouth 2 (two) times a day., Disp: , Rfl:     Documentation Date:1/24/2022 12:05 PM  Cheryle Munoz RN

## 2022-01-24 NOTE — PATIENT INSTRUCTIONS
Mr Reymundo AMBRIZ Nicola,  I enjoyed visiting with you again today.  I am glad to hear you are doing well.  Per our conversation continue to adjust the LASIX based on weights and breathing.  We will try 1/2 SOTALOL twice a day and cut the METOPROLOL in 1/2. If no issues call [phone] for a 90 day supply. We will get ECG and blood tests 5-7 days after starting.  I will get a stress test in case of surgery and possible shock.  I will plan on seeing you 4 months.  Jorge A Leigh

## 2022-01-24 NOTE — PROGRESS NOTES
Noted.  Chart prepped and orders placed for cardioversion.  Cheryle Munoz RN  1/24/2022 12:29 PM

## 2022-01-24 NOTE — LETTER
1/24/2022    ADDISON BENÍTEZ MD  [facility] [address]  [facility] [address]    RE: Reymundo Petersen       Dear Colleague,     I had the pleasure of seeing Reymundo Petersen in the [facility].      [facility]  Heart Care Clinic Follow-up Note    Assessment & Plan        (I25.10,  I25.83) Coronary artery disease due to lipid rich plaque  (primary encounter diagnosis)  Comment: 2011 due to shortness of breath underwent angiography showing normal left main, normal LAD, normal circumflex, and significant distal right coronary artery lesion which was intervened upon with a stent.  Ejection fraction at that time was 52%. He underwent nuclear stress test in 2020 showing basal inferior wall defect prompting angiography which showed normal left main, normal LAD, normal circumflex, and normal right coronary artery with minimal in-stent restenosis. No significant symptoms and continue to work on prevention.  However, contemplating back surgery, given this we will perform pharmacological nuclear stress again.    (Z95.0) Cardiac pacemaker in situ  Comment: Medtronic device with Medtronic right atrial right ventricular leads in the His bundle as well as a right ventricular apical lead. He has 7% atrial pacing with 97% ventricular CRT pacing. Leads stable.  This is down from 99%, does have atrial fibrillation.  Given this, will start sotalol, plan on cardioversion several weeks down the line.    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  Recheck echo in about a year.    (I48.19) Persistent atrial fibrillation (H)  Comment: Not valvular, paroxysmal, and given advanced [id number] score of 4 on Xarelto. He tells me he 20 mg and device check shows a high percent atrial fibrillation which I feel is been intermittent heart failure.  Will start sotalol 40 mg by mouth twice a day, renal profile ECG in a week  for QT interval.  We will then plan on cardioversion.  Could always go up on the dose of this.  Have lower dose of Toprol to 12 and half milligrams a day.  Given bleeding, will consider left atrial appendage occlusion device.    (I42.0) Dilated cardiomyopathy (H)  Comment: Ejection fraction was 52% initially, 47% on follow-up echo in 2020 with nuclear stress test 32%. Essentially asymptomatic but recheck echo showed 42% last May, continue to monitor..    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    (N18.2) Stage 2 chronic kidney disease  Comment: Creatinine stable.    (U07.1) Infection due to 2019 novel coronavirus  Comment: This was in 2021 and he is doing much better.  Has been vaccinated.    Plan  1.  Start sotalol 40 mg p.o. twice daily.  Check renal profile and ECG in a week.  2.  Decrease Toprol down to 12.5 mg p.o. daily.  3.  Plan on cardioversion.  4.  Stress nuclear in anticipation of back surgery.  5.  Consider left atrial appendage occlusion device.  6.  Follow-up in about 4 months given all these issues.    Subjective  CC: 80-year-old white gentleman here for follow-up visit with his daughter present.  He is still living independently with his sister-in-law, he is very active using snowblower, shoveling, changing oil.  With increased dose of diuretic his breathing is better, no major PND, orthopnea, chest discomfort, palpitations, syncope or dizziness or peripheral edema.    Medications  Current Outpatient Medications   Medication Sig Dispense Refill     aspirin (ASA) 81 MG chewable tablet Take 1 tablet (81 mg) by mouth every other day 30 tablet 0     atorvastatin (LIPITOR) 10 MG tablet [ATORVASTATIN (LIPITOR) 10 MG TABLET] Take 10 mg by mouth 4 (four) times a week. Days vary        coenzyme Q-10 200 MG CAPS capsule Take 200 mg by mouth daily       doxazosin  "(CARDURA) 8 MG tablet [DOXAZOSIN (CARDURA) 8 MG TABLET] Take 4 mg by mouth at bedtime.              finasteride (PROSCAR) 5 mg tablet [FINASTERIDE (PROSCAR) 5 MG TABLET] Take 5 mg by mouth at bedtime.              furosemide (LASIX) 40 MG tablet Take 1 tablet (40 mg) by mouth 2 times daily (Patient taking differently: Take 80 mg by mouth 2 times daily ) 180 tablet 0     gabapentin (NEURONTIN) 100 MG capsule [GABAPENTIN (NEURONTIN) 100 MG CAPSULE] TAKE ONE CAPSULE BY MOUTH EVERY MORNING AND AT NOON AND THREE CAPSULES BY MOUTH AT NIGHT       glucosamine/chondr cole A sod (OSTEO BI-FLEX ORAL) [GLUCOSAMINE/CHONDR COLE A SOD (OSTEO BI-FLEX ORAL)] Take 1 tablet by mouth 2 (two) times a day.              losartan (COZAAR) 25 MG tablet [LOSARTAN (COZAAR) 25 MG TABLET] Take 1 tablet (25 mg total) by mouth daily. 30 tablet 0     melatonin 10 mg Tab [MELATONIN 10 MG TAB] Take 10 mg by mouth at bedtime as needed.       metoprolol succinate ER (TOPROL-XL) 25 MG 24 hr tablet Take 1 tablet (25 mg) by mouth At Bedtime 90 tablet 1     omeprazole (PRILOSEC) 20 MG capsule [OMEPRAZOLE (PRILOSEC) 20 MG CAPSULE] Take 20 mg by mouth 2 (two) times a day before meals.       rivaroxaban ANTICOAGULANT (XARELTO) 20 MG TABS tablet Take 1 tablet (20 mg) by mouth daily 90 tablet 3     sertraline (ZOLOFT) 50 MG tablet Take 50 mg by mouth 2 times daily        sotalol (BETAPACE) 80 MG tablet Take 0.5 tablets (40 mg) by mouth 2 times daily 10 tablet 1     triamcinolone (KENALOG) 0.1 % cream [TRIAMCINOLONE (KENALOG) 0.1 % CREAM] Apply 1 application topically 2 (two) times a day as needed.       vit A/vit C/vit E/zinc/copper (PRESERVISION AREDS ORAL) [VIT A/VIT C/VIT E/ZINC/COPPER (PRESERVISION AREDS ORAL)] Take 1 tablet by mouth 2 (two) times a day.         Objective  /60 (BP Location: Right arm, Patient Position: Sitting, Cuff Size: Adult Regular)   Pulse 74   Resp 16   Ht 1.803 m (5' 11\")   Wt 83 kg (183 lb)   BMI 25.52 kg/m      General " Appearance:    Alert, cooperative, no distress, appears stated age   Head:    Normocephalic, without obvious abnormality, atraumatic   Throat:   Lips, mucosa, and tongue normal; teeth and gums normal   Neck:   Supple, symmetrical, trachea midline, no adenopathy;        thyroid:  No enlargement/tenderness/nodules; no carotid    bruit or JVD   Back:     Symmetric, no curvature, ROM normal, no CVA tenderness   Lungs:     Clear to auscultation bilaterally, respirations unlabored   Chest wall:    No tenderness, left-sided CRT   Heart:    Regular rate and rhythm, S1 and S2 normal, no murmur, rub   or gallop   Abdomen:     Soft, non-tender, bowel sounds active all four quadrants,     no masses, no organomegaly   Extremities:   Normal, atraumatic, no cyanosis or edema   Pulses:   2+ and symmetric all extremities   Skin:   Skin color, texture, turgor normal, no rashes or lesions     Results    Lab Results personally reviewed   Lab Results   Component Value Date    CHOL 142 10/23/2020    CHOL 161 05/16/2019     Lab Results   Component Value Date    HDL 51 10/23/2020    HDL 57 05/16/2019     No components found for: LDLCALC  Lab Results   Component Value Date    TRIG 79 10/23/2020    TRIG 77 05/16/2019     Lab Results   Component Value Date    WBC 9.4 07/02/2021    HGB 13.1 (L) 01/18/2022    HCT 45.1 07/02/2021     07/02/2021     Lab Results   Component Value Date    BUN 17 01/18/2022     (L) 01/18/2022    CO2 25 01/18/2022

## 2022-01-24 NOTE — PROGRESS NOTES
Jorge A romano MD Caswell, Mallory J, DIAMOND; Cheryle Munoz, DIAMOND; Soo Mott APRN CNP; Rylie Stout  I am sending you this to you, a lot going on, probably easier for you to spearhead all this.  Given his recurrent symptomatic atrial fibrillation with heart failure I am starting sotalol 40 mg twice a day, I am decrease his metoprolol down to 12.5 mg a day.  I would like him to get cardioversion in 2 to 3 weeks, he is compliant with Xarelto.  Given the sotalol, I have ordered a renal profile and ECG for a week, hopefully this gets done.  Given possible surgery I am taking stress nuclear on him.  I would like him considered for left atrial appendage occlusion device, he is willing to start the discussion, this is due to bleeding from kidney stone as well as follow-up.  I am CCing all the appropriate people on this hopefully appropriately.  LF

## 2022-01-25 ENCOUNTER — TELEPHONE (OUTPATIENT)
Dept: CARDIOLOGY | Facility: CLINIC | Age: 82
End: 2022-01-25

## 2022-01-25 NOTE — TELEPHONE ENCOUNTER
Estephanie,    Please call the pt and schedule clinic consult to discuss LAAC (separate from cardioversion appts).    Thanks,  Rama    General Surgery History and Physical      Summary:    Mr. Dick Galindo is a 72 y.o. gentleman rectal bleeding and discomfort, likely from a thrombosed external hemorrhoid.  On exam it appears he has a resorbing thrombosed hemorrhoid.  Otherwise minimal hemorrhoid disease noticed.  Discussed medical management with fiber supplementation, stool softeners as needed, lidocaine cream, increased water intake as his symptoms.  Have been improving over the last few weeks.  He will call back if his symptoms do not improve.    Referring Provider: RAMÍREZ Garcia    Chief Complaint:    hemorrhoids    History of Present Illness:    Mr. Dick Galindo is a 72 y.o. gentleman with rectal bleeding and discomfort.  He states he also has itching.  He believes this is due to his hemorrhoids.  He underwent a colonoscopy last year that showed mild internal nonbleeding hemorrhoids.  He has tried Metamucil.  He complains of increased pain.  Over the last 2 weeks, his symptoms have been rather mild.    Past Medical History:   • Hypothyroidism  • GERD  • HLD  • HTN  • ROMINA on CPAP     Past Surgical History:    • Cystoscopy/TURP  • Tonsillectomy    Family History:    • No family history of colon cancer    Social History:    • Denies tobacco use  • Occasional alcohol use    Allergies:   Allergies   Allergen Reactions   • Amoxicillin Itching   • Levaquin [Levofloxacin] Nausea And Vomiting       Medications:     Current Outpatient Medications:   •  acetaminophen (TYLENOL) 325 MG tablet, Take 650 mg by mouth Every 6 (Six) Hours As Needed., Disp: , Rfl:   •  albuterol sulfate HFA (Ventolin HFA) 108 (90 Base) MCG/ACT inhaler, 2 puffs Q4H for cough and wheeze, Disp: 18 g, Rfl: 1  •  amLODIPine (NORVASC) 10 MG tablet, Take 1 tablet by mouth Daily., Disp: 90 tablet, Rfl: 1  •  atorvastatin (LIPITOR) 40 MG tablet, Take 1 tablet by mouth Daily., Disp: 90 tablet, Rfl: 1  •  carvedilol (COREG) 6.25 MG tablet, Take 1 tablet by mouth 2 (Two)  Times a Day With Meals., Disp: 180 tablet, Rfl: 1  •  hydroCHLOROthiazide (HYDRODIURIL) 12.5 MG tablet, Take 1 tablet by mouth Daily., Disp: 90 tablet, Rfl: 1  •  lansoprazole (PREVACID) 30 MG capsule, Take 1 capsule by mouth Daily., Disp: 90 capsule, Rfl: 3  •  levothyroxine (SYNTHROID, LEVOTHROID) 75 MCG tablet, Take 1 tablet by mouth Daily., Disp: 90 tablet, Rfl: 1  •  lisinopril (PRINIVIL,ZESTRIL) 40 MG tablet, Take 1 tablet by mouth Daily., Disp: 90 tablet, Rfl: 1  •  sertraline (ZOLOFT) 50 MG tablet, Take 1 tablet by mouth Daily., Disp: 90 tablet, Rfl: 1    Radiology/Endoscopy:    • 11/2019 CT abdomen/pelvis reviewed    Labs:    • Labs from 7/9/2021 reviewed    Review of Systems:   Influenza-like illness: no fever, no  cough, no  sore throat, no  body aches, no loss of sense of taste or smell, no known exposure to person with Covid-19.  Constitutional: Negative for fevers or chills  HENT: Negative for hearing loss or runny nose  Eyes: Negative for vision changes or scleral icterus  Respiratory: Negative for cough or shortness of breath  Cardiovascular: Negative for chest pain or heart palpitations  Gastrointestinal: Negative for abdominal pain, nausea, vomiting, constipation, melena, or hematochezia  Genitourinary: Negative for hematuria or dysuria  Musculoskeletal: Negative for joint swelling or gait instability  Neurologic: Negative for tremors or seizures  Psychiatric: Negative for suicidal ideations or depression  All other systems reviewed and negative    Physical Exam:   • Constitutional: Well-developed well-nourished, no acute distress  • Eyes: Conjunctiva normal, sclera nonicteric  • ENMT: Hearing grossly normal, oral mucosa moist  • Neck: Supple, trachea midline  • Respiratory: No increased work of breathing, normal inspiratory effort  • Cardiovascular: Regular rate, no peripheral edema, no jugular venous distention  • Gastrointestinal: Soft, nontender, rectal exam with small resorbing thrombosed  hemorrhoid at the 11 o'clock position, no other significant external hemorrhoid disease  • Skin:  Warm, dry, no rash on visualized skin surfaces  • Musculoskeletal: Symmetric strength, normal gait  • Psychiatric: Alert and oriented ×3, normal affect       Kajal Bean M.D.  General and Endoscopic Surgery  [facility]    [address], [address]  [place], [place], [address]  P: [phone]  F: [phone]

## 2022-01-25 NOTE — TELEPHONE ENCOUNTER
From: Jorge A Leigh MD   Sent: 1/24/2022  10:10 AM CST   To: Mary Velasquez RN, Rylie Stout, *     I am sending you this to you, a lot going on, probably easier for you to spearhead all this.  Given his recurrent symptomatic atrial fibrillation with heart failure I am starting sotalol 40 mg twice a day, I am decrease his metoprolol down to 12.5 mg a day.  I would like him to get cardioversion in 2 to 3 weeks, he is compliant with Xarelto.  Given the sotalol, I have ordered a renal profile and ECG for a week, hopefully this gets done.  Given possible surgery I am taking stress nuclear on him.  I would like him considered for left atrial appendage occlusion device, he is willing to start the discussion, this is due to bleeding from kidney stone as well as follow-up.  I am CCing all the appropriate people on this hopefully appropriately.  LF

## 2022-01-26 ENCOUNTER — TELEPHONE (OUTPATIENT)
Dept: CARDIOLOGY | Facility: CLINIC | Age: 82
End: 2022-01-26
Payer: COMMERCIAL

## 2022-01-26 NOTE — TELEPHONE ENCOUNTER
----- Message from SALVADOR Mathews CNP sent at 1/24/2022 12:00 PM CST -----  Thank you Dr. Leigh,    Plan sounds ideal.  I am happy to see him for Watchman and sotalol follow up if needed (along with the kidney stone bleed he had a large hematoma on head that had to be surgically removed-we can add falls to the list of good reasons to get Watchman).    Soo Mott CNP  ----- Message -----  From: Jorge A Leigh MD  Sent: 1/24/2022  10:10 AM CST  To: Mary Velasquez RN, Rylie Stout, #    I am sending you this to you, a lot going on, probably easier for you to spearhead all this.Given his recurrent symptomatic atrial fibrillation with heart failure I am starting sotalol 40 mg twice a day, I am decrease his metoprolol down to 12.5 mg a day.  I would like him to get cardioversion in 2 to 3 weeks, he is compliant with Xarelto.Given the sotalol, I have ordered a renal profile and ECG for a week, hopefully this gets done.Given possible surgery I am taking stress nuclear on him.I would like him considered for left atrial appendage occlusion device, he is willing to start the discussion, this is due to bleeding from kidney stone as well as follow-up.I am CCing all the appropriate people on this hopefully appropriately.SAMRA

## 2022-01-26 NOTE — TELEPHONE ENCOUNTER
"PC from pt  He had questions about what an Echo with bubble study was  Pt still declines ILR at this time, \"does not want a foreign object in his body\"  Pt had questions about insurance coverage, he was given the CT and ICD10 codes and instructed to contact his insurance co  Pt had not further questions at this time  Pt verbalized understanding, has no further questions or concerns at this time, and has our contact information if needed.  1/26/2022 8:28 AM  Kacey Alvarado RN      "

## 2022-01-26 NOTE — TELEPHONE ENCOUNTER
Noted that this was addressed but lab appt is scheduled for Monday with no mention of EKG. Msg to schedulers to please add on EKG to appt due to new sotalol start. -janis

## 2022-01-27 ENCOUNTER — OFFICE VISIT (OUTPATIENT)
Dept: CARDIOLOGY | Facility: CLINIC | Age: 82
End: 2022-01-27
Payer: MEDICARE

## 2022-01-27 ENCOUNTER — ANCILLARY PROCEDURE (OUTPATIENT)
Dept: CARDIOLOGY | Facility: CLINIC | Age: 82
End: 2022-01-27
Attending: INTERNAL MEDICINE
Payer: MEDICARE

## 2022-01-27 ENCOUNTER — TELEPHONE (OUTPATIENT)
Dept: CARDIOLOGY | Facility: CLINIC | Age: 82
End: 2022-01-27
Payer: COMMERCIAL

## 2022-01-27 VITALS
WEIGHT: 183 LBS | SYSTOLIC BLOOD PRESSURE: 100 MMHG | DIASTOLIC BLOOD PRESSURE: 68 MMHG | HEIGHT: 71 IN | HEART RATE: 64 BPM | RESPIRATION RATE: 16 BRPM | BODY MASS INDEX: 25.62 KG/M2

## 2022-01-27 DIAGNOSIS — I25.10 CORONARY ARTERY DISEASE DUE TO LIPID RICH PLAQUE: ICD-10-CM

## 2022-01-27 DIAGNOSIS — Z95.0 BIVENTRICULAR CARDIAC PACEMAKER IN SITU: ICD-10-CM

## 2022-01-27 DIAGNOSIS — I48.19 PERSISTENT ATRIAL FIBRILLATION (H): Primary | ICD-10-CM

## 2022-01-27 DIAGNOSIS — I50.9 CHF (CONGESTIVE HEART FAILURE) (H): ICD-10-CM

## 2022-01-27 DIAGNOSIS — Z95.0 PACEMAKER: ICD-10-CM

## 2022-01-27 DIAGNOSIS — I25.83 CORONARY ARTERY DISEASE DUE TO LIPID RICH PLAQUE: ICD-10-CM

## 2022-01-27 DIAGNOSIS — I44.2 COMPLETE HEART BLOCK (H): Primary | ICD-10-CM

## 2022-01-27 DIAGNOSIS — I50.42 CHRONIC COMBINED SYSTOLIC AND DIASTOLIC HEART FAILURE (H): Chronic | ICD-10-CM

## 2022-01-27 DIAGNOSIS — I50.22 CHRONIC SYSTOLIC CONGESTIVE HEART FAILURE (H): ICD-10-CM

## 2022-01-27 PROBLEM — R63.4 WEIGHT LOSS: Status: RESOLVED | Noted: 2021-02-11 | Resolved: 2022-01-27

## 2022-01-27 LAB
ATRIAL RATE - MUSE: 67 BPM
DIASTOLIC BLOOD PRESSURE - MUSE: NORMAL MMHG
INTERPRETATION ECG - MUSE: NORMAL
P AXIS - MUSE: NORMAL DEGREES
PR INTERVAL - MUSE: NORMAL MS
QRS DURATION - MUSE: 146 MS
QT - MUSE: 446 MS
QTC - MUSE: 460 MS
R AXIS - MUSE: 14 DEGREES
SYSTOLIC BLOOD PRESSURE - MUSE: NORMAL MMHG
T AXIS - MUSE: 225 DEGREES
VENTRICULAR RATE- MUSE: 64 BPM

## 2022-01-27 PROCEDURE — 99214 OFFICE O/P EST MOD 30 MIN: CPT | Performed by: NURSE PRACTITIONER

## 2022-01-27 PROCEDURE — 93281 PM DEVICE PROGR EVAL MULTI: CPT | Performed by: INTERNAL MEDICINE

## 2022-01-27 PROCEDURE — 93000 ELECTROCARDIOGRAM COMPLETE: CPT | Performed by: INTERNAL MEDICINE

## 2022-01-27 RX ORDER — FUROSEMIDE 40 MG
40 TABLET ORAL DAILY
Qty: 100 TABLET | Refills: 3 | Status: SHIPPED | OUTPATIENT
Start: 2022-01-27 | End: 2022-02-24

## 2022-01-27 RX ORDER — SOTALOL HYDROCHLORIDE 80 MG/1
40 TABLET ORAL 2 TIMES DAILY
Qty: 30 TABLET | Refills: 3 | Status: SHIPPED | OUTPATIENT
Start: 2022-01-27 | End: 2022-02-10 | Stop reason: ALTCHOICE

## 2022-01-27 RX ORDER — METOPROLOL SUCCINATE 25 MG/1
12.5 TABLET, EXTENDED RELEASE ORAL AT BEDTIME
Start: 2022-01-27 | End: 2022-02-10

## 2022-01-27 ASSESSMENT — MIFFLIN-ST. JEOR: SCORE: 1547.21

## 2022-01-27 NOTE — TELEPHONE ENCOUNTER
From: Kacey Alvarado, DIAMOND  Sent: 1/26/2022   8:20 AM CST  To: [facility] Left Atrial Appendage Closure [facility]    Can you call Yvonne the preferred number on the chart  She does not understand the need for the extra apt, and is not sure they want to discuss LAAO  Can you please call her  Thanks  Portia

## 2022-01-27 NOTE — PROGRESS NOTES
Assessment/Recommendations     Assessment-     1.  Persistent atrial fibrillation-patient is monitored on routine PPM checks.  -Over the course of the past 9 months patient has had increasing frequency of atrial fibrillation and persistent atrial fibrillation which was initially not associated with symptoms but ended up leading to congestive heart failure symptoms.  -8/29/2021 patient in persistent atrial fibrillation with OptiVol rising  -9/14/2021 cardioversion done and OptiVol corrected  -10/25/2021 patient back in persistent atrial fibrillation with controlled rates but OptiVol rising-phone call/nurse assessment complete patient denied all symptoms  -11/28/2021 another alert for OptiVol rising heart rates good  -11/29/2021 nurses called patient to assess for symptoms patient says he was feeling great and have no symptoms  -12/14/2021 patient presented to PCP with 19 pound weight gain and required increased diuretics  -Congestive heart failure symptoms continued and OptiVol is not correcting even with diuresis so for that reason Dr. Leigh and myself have recommended cardioversion.  Patient presented today to discuss plan and gain deeper understanding of recommendations     -Discussed pathophysiology and chronic nature of atrial fibrillation; this could be/is likely progression of his atrial fibrillation.  Discussed rate versus rhythm control and that management of atrial fibrillation is based upon symptoms.  Educated on increased thromboembolic risk associated with atrial fibrillation and risk versus benefit of OAC  -Continue metoprolol succinate 12.5 mg oral daily  -Continue sotalol 40 mg p.o. twice daily  -Continue Xarelto 20 mg oral daily  -Patient had traumatic fall July 24, has also experienced gum bleeding and is likely a good candidate for watchman device; I mentioned it again at the visit but can discuss it more in depth in the future if he is interested.  Patient is not interested at this time.  He  would like to continue on Xarelto and assess for future bleeding complications       2.  Congestive heart failure-BiV pacing continues-98.1% on most recent check.  Dry weight is approximately 179 to 180 pounds.  Patient is up a few pounds today, fine crackles heard in left lower lobe, +1-2 BLE pitting edema present    -Continue furosemide 40 mg oral daily and take extra dose as needed for symptoms of CHF  -Take 80 mg furosemide tomorrow     3.  Hypertension-blood pressure 126/74; currently meeting guideline goals     [id number] score of 5: 2 age, 1 hypertension, 1 CAD, 1 CHF and on Xarelto 20 mg oral daily with meal.     Reymundo Petersen will follow up with me 3 to 4 weeks after cardioversion       History of Present Illness/Subjective    MrShae Petersen is a 81 year old male seen at [facility] today for management of atrial fibrillation and H&P for cardioversion.      Reymundo Petersen has a known history of left ventricular hypertrophy, CHF with EF 45%, congestive heart failure, coronary artery disease, complete heart block status post pacemaker insertion, hypertension, hyperlipidemia, hospitalization for COVID-19, MARIAN    Met with patient in clinic today to discuss rate versus rhythm control-have recommended rhythm control moving forward due to atrial fibrillation causing congestive heart failure.  He and his daughter Juliet are in agreements.  He denies fatigue, lightheadedness, shortness of breath, orthopnea, PND, palpitations, chest pain and abdominal fullness/bloating.      Cardiographics (reviewed):    Echo 5/19/2021  Narrative & Impression    Left ventricular cavity size is normal. Severe concentric increase in wall thickness. Ejection fraction is mildly decreased. The calculated left ventricular ejection fraction is 43%.    Right ventricular cavity size is mildly dilated. Normal right ventricular systolic function.    Severe biatrial enlargement.    Mild aortic regurgitation.     "The ascending aorta is mildly dilated measuring 4.1 cm.    Moderate pulmonary hypertension present. The estimated systolic pulmonary artery pressure is 51 mm Hg.    When compared to the previous study dated 9/1/2020, there has been no significant change.     Angiogram 10/23/2020  Angiography findings:  LM: Mild calcified lesion at the distal left main  LAD: Diffuse disease from proximal to mid LAD  Lcx: Small system with minimal disease.  RCA: Dominant vessel with diffuse mild disease.  The prior stent in PL is patent     Cardiac testing personally reviewed:  Device check shows leads stable and battery ok.  Device functioning appropriately.   Atrial pacing 16.9% and biVentricular pacing 98.1%.  AT/AFib burden persistent since 10/25/2021. No ventr arrhythmias.      EKG done today following sotalol start-100% V paced ventricular rate 64, QRS duration 146 ms, QT/QTc 446/460 ms       Physical Examination Review of Systems   Vitals: /68 (BP Location: Left arm, Patient Position: Sitting, Cuff Size: Adult Regular)   Pulse 64   Resp 16   Ht 1.803 m (5' 11\")   Wt 83 kg (183 lb)   BMI 25.52 kg/m    BMI= Body mass index is 25.52 kg/m .  Wt Readings from Last 3 Encounters:   01/27/22 83 kg (183 lb)   01/24/22 83 kg (183 lb)   10/08/21 83 kg (183 lb)       General Appearance:   Alert, cooperative and in no acute distress.   ENT/Mouth: membranes moist, no facial drooping   EYES:  no scleral icterus, normal conjunctivae   Neck: no JVD   Chest/Lungs:   lungs are clear to auscultation, no rales or wheezing, respirations unlabored   Cardiovascular:   Regular. Normal first and second heart sounds with mild murmur, rubs, or gallops; the radial and posterior tibial pulses are intact, +1-2 edema bilateral lower extremities    Abdomen:  Soft, nontender, nondistended, bowel sounds present   Extremities: no cyanosis or clubbing   Skin: warm, dry.    Neurologic: mood and affect are appropriate, alert and oriented x3         Please " refer above for cardiac ROS details.      Medical History  Surgical History Family History Social History   Past Medical History:   Diagnosis Date     Atrial fibrillation, transient (H) 8/11/2020     BPH (benign prostatic hyperplasia) 7/11/2018     Chronic combined systolic and diastolic heart failure (H) 2/11/2021     Coronary artery disease      Dilated cardiomyopathy (H) 2/11/2021     MARIAN (generalized anxiety disorder) 2/11/2021     GERD (gastroesophageal reflux disease)      High cholesterol      Hypertension      LBBB (left bundle branch block) 7/11/2018     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     Pulmonary hypertension (H) 4/1/2020     Past Surgical History:   Procedure Laterality Date     ANGIOPLASTY       ARTHROSCOPY KNEE       CV CORONARY ANGIOGRAM N/A 10/23/2020    Procedure: Coronary Angiogram;  Surgeon: Varun Freire MD;  Location: [facility];  Service: Cardiology     CV LEFT HEART CATHETERIZATION WITHOUT LEFT VENTRICULOGRAM Left 10/23/2020    Procedure: Left Heart Catheterization Without Left Ventriculogram;  Surgeon: Varun Freire MD;  Location: [facility];  Service: Cardiology     EP BIV PACEMAKER INSERT N/A 3/25/2020    Procedure: EP Biventricular Pacemaker Insertion;  Surgeon: Taylor Sandoval MD;  Location: [facility];  Service: Cardiology     RELEASE CARPAL TUNNEL       ZZC TOTAL KNEE ARTHROPLASTY Right 8/15/2019    Procedure: RIGHT  MINIMALLY TOTAL KNEE ARTHROPLASTY;  Surgeon: Keven Cerda MD;  Location: [facility];  Service: Orthopedics     Family History   Problem Relation Age of Onset     Heart Disease Father      Diabetes Type 2  Sister      Alzheimer Disease Mother      Chronic Obstructive Pulmonary Disease Brother     Social History     Socioeconomic History     Marital status:      Spouse name: Not on file     Number of children: Not on file     Years of education: Not on file     Highest education level: Not  on file   Occupational History     Not on file   Tobacco Use     Smoking status: Never Smoker     Smokeless tobacco: Never Used   Substance and Sexual Activity     Alcohol use: Yes     Comment: Alcoholic Drinks/day: rare     Drug use: No     Sexual activity: Not on file   Other Topics Concern     Not on file   Social History Narrative     Not on file     Social Determinants of Health     Financial Resource Strain: Not on file   Food Insecurity: Not on file   Transportation Needs: Not on file   Physical Activity: Not on file   Stress: Not on file   Social Connections: Not on file   Intimate Partner Violence: Not on file   Housing Stability: Not on file          Medications  Allergies   Current Outpatient Medications   Medication Sig Dispense Refill     aspirin (ASA) 81 MG chewable tablet Take 1 tablet (81 mg) by mouth every other day 30 tablet 0     atorvastatin (LIPITOR) 10 MG tablet [ATORVASTATIN (LIPITOR) 10 MG TABLET] Take 10 mg by mouth 4 (four) times a week. Days vary        coenzyme Q-10 200 MG CAPS capsule Take 200 mg by mouth daily       doxazosin (CARDURA) 8 MG tablet [DOXAZOSIN (CARDURA) 8 MG TABLET] Take 4 mg by mouth at bedtime.              finasteride (PROSCAR) 5 mg tablet [FINASTERIDE (PROSCAR) 5 MG TABLET] Take 5 mg by mouth at bedtime.              furosemide (LASIX) 40 MG tablet Take 1 tablet (40 mg) by mouth 2 times daily (Patient taking differently: Take 40 mg by mouth daily ) 180 tablet 0     gabapentin (NEURONTIN) 100 MG capsule Two capsules in the morning, two capsules in the afternoon, and three capsules in the evening       glucosamine/chondr cole A sod (OSTEO BI-FLEX ORAL) [GLUCOSAMINE/CHONDR COLE A SOD (OSTEO BI-FLEX ORAL)] Take 1 tablet by mouth 2 (two) times a day.              losartan (COZAAR) 25 MG tablet [LOSARTAN (COZAAR) 25 MG TABLET] Take 1 tablet (25 mg total) by mouth daily. 30 tablet 0     melatonin 10 mg Tab [MELATONIN 10 MG TAB] Take 10 mg by mouth at bedtime as needed.        metoprolol succinate ER (TOPROL-XL) 25 MG 24 hr tablet Take 1 tablet (25 mg) by mouth At Bedtime (Patient taking differently: Take 12.5 mg by mouth At Bedtime ) 90 tablet 1     omeprazole (PRILOSEC) 20 MG capsule [OMEPRAZOLE (PRILOSEC) 20 MG CAPSULE] Take 20 mg by mouth 2 (two) times a day before meals.       rivaroxaban ANTICOAGULANT (XARELTO) 20 MG TABS tablet Take 1 tablet (20 mg) by mouth daily 90 tablet 3     sertraline (ZOLOFT) 50 MG tablet Take 50 mg by mouth 2 times daily        sotalol (BETAPACE) 80 MG tablet Take 0.5 tablets (40 mg) by mouth 2 times daily 10 tablet 1     vit A/vit C/vit E/zinc/copper (PRESERVISION AREDS ORAL) [VIT A/VIT C/VIT E/ZINC/COPPER (PRESERVISION AREDS ORAL)] Take 1 tablet by mouth 2 (two) times a day.       triamcinolone (KENALOG) 0.1 % cream [TRIAMCINOLONE (KENALOG) 0.1 % CREAM] Apply 1 application topically 2 (two) times a day as needed. (Patient not taking: Reported on 1/27/2022)      Allergies   Allergen Reactions     Lac Bovis Nausea and Vomiting     Other reaction(s): upset stomach         Lab Results    Chemistry/lipid CBC Cardiac Enzymes/BNP/TSH/INR   Lab Results   Component Value Date    CHOL 142 10/23/2020    HDL 51 10/23/2020    TRIG 79 10/23/2020    BUN 17 01/18/2022     (L) 01/18/2022    CO2 25 01/18/2022    Lab Results   Component Value Date    WBC 9.4 07/02/2021    HGB 13.1 (L) 01/18/2022    HCT 45.1 07/02/2021    MCV 96 07/02/2021     07/02/2021    Lab Results   Component Value Date    TROPONINI 0.14 02/11/2021    TROPONINI 0.05 01/31/2021    TROPONINI 0.26 03/25/2020     Lab Results   Component Value Date     (H) 01/18/2022     (H) 02/11/2021     (H) 01/31/2021     Lab Results   Component Value Date    TSH 1.43 03/24/2020     Lab Results   Component Value Date    INR 1.03 02/11/2021    INR 1.05 03/24/2020    INR 0.98 08/15/2019        Total Time-40 minutes spent on date of encounter doing chart review, history and exam,  documentation and further activities as noted above.  This note has been dictated using voice recognition software. Any grammatical, typographical, or context distortions are unintentional and inherent to the software.    Soo Mott [facility]

## 2022-01-27 NOTE — TELEPHONE ENCOUNTER
Noted.  Will await plan after OV.  1/27/2022 10:31 AM  Kacey Alvarado, Soo Jimenez APRN CNP Lewandowski, Jessica, RN  Caller: Unspecified (Today,  7:58 AM)  Called jose daniel Chung, they are coming in today at 330.       Soo

## 2022-01-27 NOTE — TELEPHONE ENCOUNTER
Soo-  PC from pts daughter Juliet CB#[phone]  She is requesting a CB from you to further discuss reason for CV that Dr Leigh is recommending, as she reports when pt was seen by you in September that you had discussed another CV would not be recommended in the future  We ordered and sent request to scheduling to call the pt to arrange per request by Dr Leigh, see additional scheduling note below  Please advise  Let me know if you want me to defer to Dr Leigh  Thank you,  1/27/2022 8:01 AM  Kacey Alvarado, DIAMOND Calvert, Cheryle Cronin RN; P [facility] - [facility]  Caller: Unspecified (3 days ago, 12:04 PM)  FYI - just called the patients cell phone number, daughter Juliet answered and she said she is not sure her dad wants to proceed with the cardioversion anymore. She states that Soo told them at the last visit they werent going to do a repeat CV, and now LBF says he should so they are getting conflicted information. Juliet said she would talk to the patient tonight and get back to me.     Thanks,   Pam

## 2022-01-27 NOTE — LETTER
1/27/2022    ADDISON BENÍTEZ MD  [facility] [address]  [address] [place] [address]    RE: Reymundo Petersen       Dear Colleague,     I had the pleasure of seeing Reymundo Petersen in the [facility].      Assessment/Recommendations     Assessment-     1.  Persistent atrial fibrillation-patient is monitored on routine PPM checks.  -Over the course of the past 9 months patient has had increasing frequency of atrial fibrillation and persistent atrial fibrillation which was initially not associated with symptoms but ended up leading to congestive heart failure symptoms.  -8/29/2021 patient in persistent atrial fibrillation with OptiVol rising  -9/14/2021 cardioversion done and OptiVol corrected  -10/25/2021 patient back in persistent atrial fibrillation with controlled rates but OptiVol rising-phone call/nurse assessment complete patient denied all symptoms  -11/28/2021 another alert for OptiVol rising heart rates good  -11/29/2021 nurses called patient to assess for symptoms patient says he was feeling great and have no symptoms  -12/14/2021 patient presented to PCP with 19 pound weight gain and required increased diuretics  -Congestive heart failure symptoms continued and OptiVol is not correcting even with diuresis so for that reason Dr. Leigh and myself have recommended cardioversion.  Patient presented today to discuss plan and gain deeper understanding of recommendations     -Discussed pathophysiology and chronic nature of atrial fibrillation; this could be/is likely progression of his atrial fibrillation.  Discussed rate versus rhythm control and that management of atrial fibrillation is based upon symptoms.  Educated on increased thromboembolic risk associated with atrial fibrillation and risk versus benefit of OAC  -Continue metoprolol succinate 12.5 mg oral daily  -Continue sotalol 40 mg p.o. twice daily  -Continue Xarelto 20 mg oral daily  -Patient had traumatic fall July 24,  has also experienced gum bleeding and is likely a good candidate for watchman device; I mentioned it again at the visit but can discuss it more in depth in the future if he is interested.  Patient is not interested at this time.  He would like to continue on Xarelto and assess for future bleeding complications       2.  Congestive heart failure-BiV pacing continues-98.1% on most recent check.  Dry weight is approximately 179 to 180 pounds.  Patient is up a few pounds today, fine crackles heard in left lower lobe, +1-2 BLE pitting edema present    -Continue furosemide 40 mg oral daily and take extra dose as needed for symptoms of CHF  -Take 80 mg furosemide tomorrow     3.  Hypertension-blood pressure 126/74; currently meeting guideline goals     [id number] score of 5: 2 age, 1 hypertension, 1 CAD, 1 CHF and on Xarelto 20 mg oral daily with meal.     Reymundo Petersen will follow up with me 3 to 4 weeks after cardioversion       History of Present Illness/Subjective    Mr. Reymundo Petersen is a 81 year old male seen at [facility] today for management of atrial fibrillation and H&P for cardioversion.      Reymundo Petersen has a known history of left ventricular hypertrophy, CHF with EF 45%, congestive heart failure, coronary artery disease, complete heart block status post pacemaker insertion, hypertension, hyperlipidemia, hospitalization for COVID-19, MARIAN    Met with patient in clinic today to discuss rate versus rhythm control-have recommended rhythm control moving forward due to atrial fibrillation causing congestive heart failure.  He and his daughter Juliet are in agreements.  He denies fatigue, lightheadedness, shortness of breath, orthopnea, PND, palpitations, chest pain and abdominal fullness/bloating.      Cardiographics (reviewed):    Echo 5/19/2021  Narrative & Impression    Left ventricular cavity size is normal. Severe concentric increase in wall thickness. Ejection fraction is  "mildly decreased. The calculated left ventricular ejection fraction is 43%.    Right ventricular cavity size is mildly dilated. Normal right ventricular systolic function.    Severe biatrial enlargement.    Mild aortic regurgitation.    The ascending aorta is mildly dilated measuring 4.1 cm.    Moderate pulmonary hypertension present. The estimated systolic pulmonary artery pressure is 51 mm Hg.    When compared to the previous study dated 9/1/2020, there has been no significant change.     Angiogram 10/23/2020  Angiography findings:  LM: Mild calcified lesion at the distal left main  LAD: Diffuse disease from proximal to mid LAD  Lcx: Small system with minimal disease.  RCA: Dominant vessel with diffuse mild disease.  The prior stent in PL is patent     Cardiac testing personally reviewed:  Device check shows leads stable and battery ok.  Device functioning appropriately.   Atrial pacing 16.9% and biVentricular pacing 98.1%.  AT/AFib burden persistent since 10/25/2021. No ventr arrhythmias.      EKG done today following sotalol start-100% V paced ventricular rate 64, QRS duration 146 ms, QT/QTc 446/460 ms       Physical Examination Review of Systems   Vitals: /68 (BP Location: Left arm, Patient Position: Sitting, Cuff Size: Adult Regular)   Pulse 64   Resp 16   Ht 1.803 m (5' 11\")   Wt 83 kg (183 lb)   BMI 25.52 kg/m    BMI= Body mass index is 25.52 kg/m .  Wt Readings from Last 3 Encounters:   01/27/22 83 kg (183 lb)   01/24/22 83 kg (183 lb)   10/08/21 83 kg (183 lb)       General Appearance:   Alert, cooperative and in no acute distress.   ENT/Mouth: membranes moist, no facial drooping   EYES:  no scleral icterus, normal conjunctivae   Neck: no JVD   Chest/Lungs:   lungs are clear to auscultation, no rales or wheezing, respirations unlabored   Cardiovascular:   Regular. Normal first and second heart sounds with mild murmur, rubs, or gallops; the radial and posterior tibial pulses are intact, +1-2 " edema bilateral lower extremities    Abdomen:  Soft, nontender, nondistended, bowel sounds present   Extremities: no cyanosis or clubbing   Skin: warm, dry.    Neurologic: mood and affect are appropriate, alert and oriented x3         Please refer above for cardiac ROS details.      Medical History  Surgical History Family History Social History   Past Medical History:   Diagnosis Date     Atrial fibrillation, transient (H) 8/11/2020     BPH (benign prostatic hyperplasia) 7/11/2018     Chronic combined systolic and diastolic heart failure (H) 2/11/2021     Coronary artery disease      Dilated cardiomyopathy (H) 2/11/2021     MARIAN (generalized anxiety disorder) 2/11/2021     GERD (gastroesophageal reflux disease)      High cholesterol      Hypertension      LBBB (left bundle branch block) 7/11/2018     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     Pulmonary hypertension (H) 4/1/2020     Past Surgical History:   Procedure Laterality Date     ANGIOPLASTY       ARTHROSCOPY KNEE       CV CORONARY ANGIOGRAM N/A 10/23/2020    Procedure: Coronary Angiogram;  Surgeon: Varun Freire MD;  Location: [facility];  Service: Cardiology     CV LEFT HEART CATHETERIZATION WITHOUT LEFT VENTRICULOGRAM Left 10/23/2020    Procedure: Left Heart Catheterization Without Left Ventriculogram;  Surgeon: Varun Freire MD;  Location: [facility];  Service: Cardiology     EP BIV PACEMAKER INSERT N/A 3/25/2020    Procedure: EP Biventricular Pacemaker Insertion;  Surgeon: Taylor Sandoval MD;  Location: [facility];  Service: Cardiology     RELEASE CARPAL TUNNEL       ZZC TOTAL KNEE ARTHROPLASTY Right 8/15/2019    Procedure: RIGHT  MINIMALLY TOTAL KNEE ARTHROPLASTY;  Surgeon: Keven Cerda MD;  Location: [facility];  Service: Orthopedics     Family History   Problem Relation Age of Onset     Heart Disease Father      Diabetes Type 2  Sister      Alzheimer Disease Mother      Chronic  Obstructive Pulmonary Disease Brother     Social History     Socioeconomic History     Marital status:      Spouse name: Not on file     Number of children: Not on file     Years of education: Not on file     Highest education level: Not on file   Occupational History     Not on file   Tobacco Use     Smoking status: Never Smoker     Smokeless tobacco: Never Used   Substance and Sexual Activity     Alcohol use: Yes     Comment: Alcoholic Drinks/day: rare     Drug use: No     Sexual activity: Not on file   Other Topics Concern     Not on file   Social History Narrative     Not on file     Social Determinants of Health     Financial Resource Strain: Not on file   Food Insecurity: Not on file   Transportation Needs: Not on file   Physical Activity: Not on file   Stress: Not on file   Social Connections: Not on file   Intimate Partner Violence: Not on file   Housing Stability: Not on file          Medications  Allergies   Current Outpatient Medications   Medication Sig Dispense Refill     aspirin (ASA) 81 MG chewable tablet Take 1 tablet (81 mg) by mouth every other day 30 tablet 0     atorvastatin (LIPITOR) 10 MG tablet [ATORVASTATIN (LIPITOR) 10 MG TABLET] Take 10 mg by mouth 4 (four) times a week. Days vary        coenzyme Q-10 200 MG CAPS capsule Take 200 mg by mouth daily       doxazosin (CARDURA) 8 MG tablet [DOXAZOSIN (CARDURA) 8 MG TABLET] Take 4 mg by mouth at bedtime.              finasteride (PROSCAR) 5 mg tablet [FINASTERIDE (PROSCAR) 5 MG TABLET] Take 5 mg by mouth at bedtime.              furosemide (LASIX) 40 MG tablet Take 1 tablet (40 mg) by mouth 2 times daily (Patient taking differently: Take 40 mg by mouth daily ) 180 tablet 0     gabapentin (NEURONTIN) 100 MG capsule Two capsules in the morning, two capsules in the afternoon, and three capsules in the evening       glucosamine/chondr cole A sod (OSTEO BI-FLEX ORAL) [GLUCOSAMINE/CHONDR COLE A SOD (OSTEO BI-FLEX ORAL)] Take 1 tablet by mouth 2  (two) times a day.              losartan (COZAAR) 25 MG tablet [LOSARTAN (COZAAR) 25 MG TABLET] Take 1 tablet (25 mg total) by mouth daily. 30 tablet 0     melatonin 10 mg Tab [MELATONIN 10 MG TAB] Take 10 mg by mouth at bedtime as needed.       metoprolol succinate ER (TOPROL-XL) 25 MG 24 hr tablet Take 1 tablet (25 mg) by mouth At Bedtime (Patient taking differently: Take 12.5 mg by mouth At Bedtime ) 90 tablet 1     omeprazole (PRILOSEC) 20 MG capsule [OMEPRAZOLE (PRILOSEC) 20 MG CAPSULE] Take 20 mg by mouth 2 (two) times a day before meals.       rivaroxaban ANTICOAGULANT (XARELTO) 20 MG TABS tablet Take 1 tablet (20 mg) by mouth daily 90 tablet 3     sertraline (ZOLOFT) 50 MG tablet Take 50 mg by mouth 2 times daily        sotalol (BETAPACE) 80 MG tablet Take 0.5 tablets (40 mg) by mouth 2 times daily 10 tablet 1     vit A/vit C/vit E/zinc/copper (PRESERVISION AREDS ORAL) [VIT A/VIT C/VIT E/ZINC/COPPER (PRESERVISION AREDS ORAL)] Take 1 tablet by mouth 2 (two) times a day.       triamcinolone (KENALOG) 0.1 % cream [TRIAMCINOLONE (KENALOG) 0.1 % CREAM] Apply 1 application topically 2 (two) times a day as needed. (Patient not taking: Reported on 1/27/2022)      Allergies   Allergen Reactions     Lac Bovis Nausea and Vomiting     Other reaction(s): upset stomach         Lab Results    Chemistry/lipid CBC Cardiac Enzymes/BNP/TSH/INR   Lab Results   Component Value Date    CHOL 142 10/23/2020    HDL 51 10/23/2020    TRIG 79 10/23/2020    BUN 17 01/18/2022     (L) 01/18/2022    CO2 25 01/18/2022    Lab Results   Component Value Date    WBC 9.4 07/02/2021    HGB 13.1 (L) 01/18/2022    HCT 45.1 07/02/2021    MCV 96 07/02/2021     07/02/2021    Lab Results   Component Value Date    TROPONINI 0.14 02/11/2021    TROPONINI 0.05 01/31/2021    TROPONINI 0.26 03/25/2020     Lab Results   Component Value Date     (H) 01/18/2022     (H) 02/11/2021     (H) 01/31/2021     Lab Results   Component  Value Date    TSH 1.43 03/24/2020     Lab Results   Component Value Date    INR 1.03 02/11/2021    INR 1.05 03/24/2020    INR 0.98 08/15/2019        Total Time-40 minutes spent on date of encounter doing chart review, history and exam, documentation and further activities as noted above.  This note has been dictated using voice recognition software. Any grammatical, typographical, or context distortions are unintentional and inherent to the software.    SYMONE Mathews [facility]        Thank you for allowing me to participate in the care of your patient.      Sincerely,     SALVADOR Mathews CNP [facility]  cc:   No referring provider defined for this encounter.

## 2022-01-27 NOTE — H&P (VIEW-ONLY)
Assessment/Recommendations     Assessment-     1.  Persistent atrial fibrillation-patient is monitored on routine PPM checks.  -Over the course of the past 9 months patient has had increasing frequency of atrial fibrillation and persistent atrial fibrillation which was initially not associated with symptoms but ended up leading to congestive heart failure symptoms.  -8/29/2021 patient in persistent atrial fibrillation with OptiVol rising  -9/14/2021 cardioversion done and OptiVol corrected  -10/25/2021 patient back in persistent atrial fibrillation with controlled rates but OptiVol rising-phone call/nurse assessment complete patient denied all symptoms  -11/28/2021 another alert for OptiVol rising heart rates good  -11/29/2021 nurses called patient to assess for symptoms patient says he was feeling great and have no symptoms  -12/14/2021 patient presented to PCP with 19 pound weight gain and required increased diuretics  -Congestive heart failure symptoms continued and OptiVol is not correcting even with diuresis so for that reason Dr. Leigh and myself have recommended cardioversion.  Patient presented today to discuss plan and gain deeper understanding of recommendations     -Discussed pathophysiology and chronic nature of atrial fibrillation; this could be/is likely progression of his atrial fibrillation.  Discussed rate versus rhythm control and that management of atrial fibrillation is based upon symptoms.  Educated on increased thromboembolic risk associated with atrial fibrillation and risk versus benefit of OAC  -Continue metoprolol succinate 12.5 mg oral daily  -Continue sotalol 40 mg p.o. twice daily  -Continue Xarelto 20 mg oral daily  -Patient had traumatic fall July 24, has also experienced gum bleeding and is likely a good candidate for watchman device; I mentioned it again at the visit but can discuss it more in depth in the future if he is interested.  Patient is not interested at this time.  He  would like to continue on Xarelto and assess for future bleeding complications       2.  Congestive heart failure-BiV pacing continues-98.1% on most recent check.  Dry weight is approximately 179 to 180 pounds.  Patient is up a few pounds today, fine crackles heard in left lower lobe, +1-2 BLE pitting edema present    -Continue furosemide 40 mg oral daily and take extra dose as needed for symptoms of CHF  -Take 80 mg furosemide tomorrow     3.  Hypertension-blood pressure 126/74; currently meeting guideline goals     [id number] score of 5: 2 age, 1 hypertension, 1 CAD, 1 CHF and on Xarelto 20 mg oral daily with meal.     Reymundo Petersen will follow up with me 3 to 4 weeks after cardioversion       History of Present Illness/Subjective    MrShae Petersen is a 81 year old male seen at [facility] today for management of atrial fibrillation and H&P for cardioversion.      Reymundo Petersen has a known history of left ventricular hypertrophy, CHF with EF 45%, congestive heart failure, coronary artery disease, complete heart block status post pacemaker insertion, hypertension, hyperlipidemia, hospitalization for COVID-19, MARIAN    Met with patient in clinic today to discuss rate versus rhythm control-have recommended rhythm control moving forward due to atrial fibrillation causing congestive heart failure.  He and his daughter Juliet are in agreements.  He denies fatigue, lightheadedness, shortness of breath, orthopnea, PND, palpitations, chest pain and abdominal fullness/bloating.      Cardiographics (reviewed):    Echo 5/19/2021  Narrative & Impression    Left ventricular cavity size is normal. Severe concentric increase in wall thickness. Ejection fraction is mildly decreased. The calculated left ventricular ejection fraction is 43%.    Right ventricular cavity size is mildly dilated. Normal right ventricular systolic function.    Severe biatrial enlargement.    Mild aortic regurgitation.     "The ascending aorta is mildly dilated measuring 4.1 cm.    Moderate pulmonary hypertension present. The estimated systolic pulmonary artery pressure is 51 mm Hg.    When compared to the previous study dated 9/1/2020, there has been no significant change.     Angiogram 10/23/2020  Angiography findings:  LM: Mild calcified lesion at the distal left main  LAD: Diffuse disease from proximal to mid LAD  Lcx: Small system with minimal disease.  RCA: Dominant vessel with diffuse mild disease.  The prior stent in PL is patent     Cardiac testing personally reviewed:  Device check shows leads stable and battery ok.  Device functioning appropriately.   Atrial pacing 16.9% and biVentricular pacing 98.1%.  AT/AFib burden persistent since 10/25/2021. No ventr arrhythmias.      EKG done today following sotalol start-100% V paced ventricular rate 64, QRS duration 146 ms, QT/QTc 446/460 ms       Physical Examination Review of Systems   Vitals: /68 (BP Location: Left arm, Patient Position: Sitting, Cuff Size: Adult Regular)   Pulse 64   Resp 16   Ht 1.803 m (5' 11\")   Wt 83 kg (183 lb)   BMI 25.52 kg/m    BMI= Body mass index is 25.52 kg/m .  Wt Readings from Last 3 Encounters:   01/27/22 83 kg (183 lb)   01/24/22 83 kg (183 lb)   10/08/21 83 kg (183 lb)       General Appearance:   Alert, cooperative and in no acute distress.   ENT/Mouth: membranes moist, no facial drooping   EYES:  no scleral icterus, normal conjunctivae   Neck: no JVD   Chest/Lungs:   lungs are clear to auscultation, no rales or wheezing, respirations unlabored   Cardiovascular:   Regular. Normal first and second heart sounds with mild murmur, rubs, or gallops; the radial and posterior tibial pulses are intact, +1-2 edema bilateral lower extremities    Abdomen:  Soft, nontender, nondistended, bowel sounds present   Extremities: no cyanosis or clubbing   Skin: warm, dry.    Neurologic: mood and affect are appropriate, alert and oriented x3         Please " refer above for cardiac ROS details.      Medical History  Surgical History Family History Social History   Past Medical History:   Diagnosis Date     Atrial fibrillation, transient (H) 8/11/2020     BPH (benign prostatic hyperplasia) 7/11/2018     Chronic combined systolic and diastolic heart failure (H) 2/11/2021     Coronary artery disease      Dilated cardiomyopathy (H) 2/11/2021     MARIAN (generalized anxiety disorder) 2/11/2021     GERD (gastroesophageal reflux disease)      High cholesterol      Hypertension      LBBB (left bundle branch block) 7/11/2018     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     Pulmonary hypertension (H) 4/1/2020     Past Surgical History:   Procedure Laterality Date     ANGIOPLASTY       ARTHROSCOPY KNEE       CV CORONARY ANGIOGRAM N/A 10/23/2020    Procedure: Coronary Angiogram;  Surgeon: Varun Freire MD;  Location: [facility];  Service: Cardiology     CV LEFT HEART CATHETERIZATION WITHOUT LEFT VENTRICULOGRAM Left 10/23/2020    Procedure: Left Heart Catheterization Without Left Ventriculogram;  Surgeon: Varun Freire MD;  Location: [facility];  Service: Cardiology     EP BIV PACEMAKER INSERT N/A 3/25/2020    Procedure: EP Biventricular Pacemaker Insertion;  Surgeon: Taylor Sandoval MD;  Location: [facility];  Service: Cardiology     RELEASE CARPAL TUNNEL       ZZC TOTAL KNEE ARTHROPLASTY Right 8/15/2019    Procedure: RIGHT  MINIMALLY TOTAL KNEE ARTHROPLASTY;  Surgeon: Keven Cerda MD;  Location: [facility];  Service: Orthopedics     Family History   Problem Relation Age of Onset     Heart Disease Father      Diabetes Type 2  Sister      Alzheimer Disease Mother      Chronic Obstructive Pulmonary Disease Brother     Social History     Socioeconomic History     Marital status:      Spouse name: Not on file     Number of children: Not on file     Years of education: Not on file     Highest education level: Not  on file   Occupational History     Not on file   Tobacco Use     Smoking status: Never Smoker     Smokeless tobacco: Never Used   Substance and Sexual Activity     Alcohol use: Yes     Comment: Alcoholic Drinks/day: rare     Drug use: No     Sexual activity: Not on file   Other Topics Concern     Not on file   Social History Narrative     Not on file     Social Determinants of Health     Financial Resource Strain: Not on file   Food Insecurity: Not on file   Transportation Needs: Not on file   Physical Activity: Not on file   Stress: Not on file   Social Connections: Not on file   Intimate Partner Violence: Not on file   Housing Stability: Not on file          Medications  Allergies   Current Outpatient Medications   Medication Sig Dispense Refill     aspirin (ASA) 81 MG chewable tablet Take 1 tablet (81 mg) by mouth every other day 30 tablet 0     atorvastatin (LIPITOR) 10 MG tablet [ATORVASTATIN (LIPITOR) 10 MG TABLET] Take 10 mg by mouth 4 (four) times a week. Days vary        coenzyme Q-10 200 MG CAPS capsule Take 200 mg by mouth daily       doxazosin (CARDURA) 8 MG tablet [DOXAZOSIN (CARDURA) 8 MG TABLET] Take 4 mg by mouth at bedtime.              finasteride (PROSCAR) 5 mg tablet [FINASTERIDE (PROSCAR) 5 MG TABLET] Take 5 mg by mouth at bedtime.              furosemide (LASIX) 40 MG tablet Take 1 tablet (40 mg) by mouth 2 times daily (Patient taking differently: Take 40 mg by mouth daily ) 180 tablet 0     gabapentin (NEURONTIN) 100 MG capsule Two capsules in the morning, two capsules in the afternoon, and three capsules in the evening       glucosamine/chondr cole A sod (OSTEO BI-FLEX ORAL) [GLUCOSAMINE/CHONDR COLE A SOD (OSTEO BI-FLEX ORAL)] Take 1 tablet by mouth 2 (two) times a day.              losartan (COZAAR) 25 MG tablet [LOSARTAN (COZAAR) 25 MG TABLET] Take 1 tablet (25 mg total) by mouth daily. 30 tablet 0     melatonin 10 mg Tab [MELATONIN 10 MG TAB] Take 10 mg by mouth at bedtime as needed.        metoprolol succinate ER (TOPROL-XL) 25 MG 24 hr tablet Take 1 tablet (25 mg) by mouth At Bedtime (Patient taking differently: Take 12.5 mg by mouth At Bedtime ) 90 tablet 1     omeprazole (PRILOSEC) 20 MG capsule [OMEPRAZOLE (PRILOSEC) 20 MG CAPSULE] Take 20 mg by mouth 2 (two) times a day before meals.       rivaroxaban ANTICOAGULANT (XARELTO) 20 MG TABS tablet Take 1 tablet (20 mg) by mouth daily 90 tablet 3     sertraline (ZOLOFT) 50 MG tablet Take 50 mg by mouth 2 times daily        sotalol (BETAPACE) 80 MG tablet Take 0.5 tablets (40 mg) by mouth 2 times daily 10 tablet 1     vit A/vit C/vit E/zinc/copper (PRESERVISION AREDS ORAL) [VIT A/VIT C/VIT E/ZINC/COPPER (PRESERVISION AREDS ORAL)] Take 1 tablet by mouth 2 (two) times a day.       triamcinolone (KENALOG) 0.1 % cream [TRIAMCINOLONE (KENALOG) 0.1 % CREAM] Apply 1 application topically 2 (two) times a day as needed. (Patient not taking: Reported on 1/27/2022)      Allergies   Allergen Reactions     Lac Bovis Nausea and Vomiting     Other reaction(s): upset stomach         Lab Results    Chemistry/lipid CBC Cardiac Enzymes/BNP/TSH/INR   Lab Results   Component Value Date    CHOL 142 10/23/2020    HDL 51 10/23/2020    TRIG 79 10/23/2020    BUN 17 01/18/2022     (L) 01/18/2022    CO2 25 01/18/2022    Lab Results   Component Value Date    WBC 9.4 07/02/2021    HGB 13.1 (L) 01/18/2022    HCT 45.1 07/02/2021    MCV 96 07/02/2021     07/02/2021    Lab Results   Component Value Date    TROPONINI 0.14 02/11/2021    TROPONINI 0.05 01/31/2021    TROPONINI 0.26 03/25/2020     Lab Results   Component Value Date     (H) 01/18/2022     (H) 02/11/2021     (H) 01/31/2021     Lab Results   Component Value Date    TSH 1.43 03/24/2020     Lab Results   Component Value Date    INR 1.03 02/11/2021    INR 1.05 03/24/2020    INR 0.98 08/15/2019        Total Time-40 minutes spent on date of encounter doing chart review, history and exam,  documentation and further activities as noted above.  This note has been dictated using voice recognition software. Any grammatical, typographical, or context distortions are unintentional and inherent to the software.    Soo Mott [facility]

## 2022-01-27 NOTE — PATIENT INSTRUCTIONS
Your next Pacemaker check will be from home on 4/27/2022, this will occur automatically.   Statement Selected

## 2022-01-27 NOTE — TELEPHONE ENCOUNTER
Pt met with Soo Mott today. Pt is not currently interested in a LAAC consult and will call if they ever change their mind in the future.

## 2022-01-27 NOTE — Clinical Note
Ramon Leigh- I met with pt in clinic and he will be moving forward with cardioversion.  After looking at his OptiVol and congestive heart failure that is obviously caused by his atrial fibrillation, I agree with you that we should continue with rhythm control.  If sotalol fails I let him and his daughter know that he should be advised to start with amiodarone.  Thank you for your help Soo Mott, CNP See my note from today if you need any further details thank you

## 2022-01-27 NOTE — Clinical Note
Hi team, patient would like to proceed with cardioversion.  Please take note he has a Medtronic BiV pacemaker and the rep needs to be present for the cardioversion.  I believe the orders are already in from Dr. Leigh No need to hold medications to prepare for cardioversion Please confirm he has not missed any of his Xarelto in at least 21 days Patient needs to be scheduled on a Tuesday or Thursday so his daughter can drive him Thank you Soo Mott, CNP

## 2022-01-28 ENCOUNTER — TELEPHONE (OUTPATIENT)
Dept: CARDIOLOGY | Facility: CLINIC | Age: 82
End: 2022-01-28
Payer: COMMERCIAL

## 2022-01-28 NOTE — TELEPHONE ENCOUNTER
[facility] Call Center    Phone Message    May a detailed message be left on voicemail: no     Reason for Call: Other: Juliet called to speak with Soo Mott APRN CNP. She would prefer her father to be scheduled on a Tuesday or a Thursday in the next couple of weeks if possible. Please reach out to Juliet at [phone].      Action Taken: Other: [facility]    Travel Screening: Not Applicable

## 2022-01-28 NOTE — TELEPHONE ENCOUNTER
Noted.  Scheduling notified.  1/28/2022 7:54 AM  DIAMOND Solis Lindsey, APRN CNP  P [facility] Ep Support [facility] team, patient would like to proceed with cardioversion.  Please take note he has a Medtronic BiV pacemaker and the rep needs to be present for the cardioversion.  I believe the orders are already in from Dr. Leigh   No need to hold medications to prepare for cardioversion   Please confirm he has not missed any of his Xarelto in at least 21 days   Patient needs to be scheduled on a Tuesday or Thursday so his daughter can drive him   Thank you   Soo Mott, CNP

## 2022-02-01 ENCOUNTER — TELEPHONE (OUTPATIENT)
Dept: CARDIOLOGY | Facility: CLINIC | Age: 82
End: 2022-02-01

## 2022-02-01 ENCOUNTER — LAB (OUTPATIENT)
Dept: LAB | Facility: CLINIC | Age: 82
End: 2022-02-01
Payer: MEDICARE

## 2022-02-01 ENCOUNTER — LAB (OUTPATIENT)
Dept: CARDIOLOGY | Facility: CLINIC | Age: 82
End: 2022-02-01
Payer: MEDICARE

## 2022-02-01 DIAGNOSIS — I48.19 PERSISTENT ATRIAL FIBRILLATION (H): ICD-10-CM

## 2022-02-01 LAB
ANION GAP SERPL CALCULATED.3IONS-SCNC: 9 MMOL/L (ref 5–18)
BUN SERPL-MCNC: 28 MG/DL (ref 8–28)
CALCIUM SERPL-MCNC: 9.7 MG/DL (ref 8.5–10.5)
CHLORIDE BLD-SCNC: 103 MMOL/L (ref 98–107)
CO2 SERPL-SCNC: 29 MMOL/L (ref 22–31)
CREAT SERPL-MCNC: 1.13 MG/DL (ref 0.7–1.3)
GFR SERPL CREATININE-BSD FRML MDRD: 64 ML/MIN/1.73M2
GLUCOSE BLD-MCNC: 115 MG/DL (ref 70–125)
MDC_IDC_EPISODE_DTM: NORMAL
MDC_IDC_EPISODE_DURATION: 1081 S
MDC_IDC_EPISODE_DURATION: 1107 S
MDC_IDC_EPISODE_DURATION: 122 S
MDC_IDC_EPISODE_DURATION: 1357 S
MDC_IDC_EPISODE_DURATION: 1607 S
MDC_IDC_EPISODE_DURATION: 1619 S
MDC_IDC_EPISODE_DURATION: 1692 S
MDC_IDC_EPISODE_DURATION: 207 S
MDC_IDC_EPISODE_DURATION: 246 S
MDC_IDC_EPISODE_DURATION: 2506 S
MDC_IDC_EPISODE_DURATION: 2518 S
MDC_IDC_EPISODE_DURATION: 2667 S
MDC_IDC_EPISODE_DURATION: 267 S
MDC_IDC_EPISODE_DURATION: 2827 S
MDC_IDC_EPISODE_DURATION: 3041 S
MDC_IDC_EPISODE_DURATION: 3142 S
MDC_IDC_EPISODE_DURATION: 35 S
MDC_IDC_EPISODE_DURATION: 3659 S
MDC_IDC_EPISODE_DURATION: 373 S
MDC_IDC_EPISODE_DURATION: 416 S
MDC_IDC_EPISODE_DURATION: 441 S
MDC_IDC_EPISODE_DURATION: 46 S
MDC_IDC_EPISODE_DURATION: 469 S
MDC_IDC_EPISODE_DURATION: 4953 S
MDC_IDC_EPISODE_DURATION: 5020 S
MDC_IDC_EPISODE_DURATION: 518 S
MDC_IDC_EPISODE_DURATION: 5653 S
MDC_IDC_EPISODE_DURATION: 58 S
MDC_IDC_EPISODE_DURATION: 594 S
MDC_IDC_EPISODE_DURATION: 6 S
MDC_IDC_EPISODE_DURATION: 633 S
MDC_IDC_EPISODE_DURATION: 77 S
MDC_IDC_EPISODE_DURATION: 8 S
MDC_IDC_EPISODE_DURATION: 8240 S
MDC_IDC_EPISODE_DURATION: 84 S
MDC_IDC_EPISODE_DURATION: 8425 S
MDC_IDC_EPISODE_DURATION: 895 S
MDC_IDC_EPISODE_DURATION: 9 S
MDC_IDC_EPISODE_DURATION: 9189 S
MDC_IDC_EPISODE_DURATION: 94 S
MDC_IDC_EPISODE_DURATION: 9549 S
MDC_IDC_EPISODE_DURATION: 9567 S
MDC_IDC_EPISODE_DURATION: 9844 S
MDC_IDC_EPISODE_DURATION: NORMAL S
MDC_IDC_EPISODE_ID: 171
MDC_IDC_EPISODE_ID: 704
MDC_IDC_EPISODE_ID: 705
MDC_IDC_EPISODE_ID: 706
MDC_IDC_EPISODE_ID: 707
MDC_IDC_EPISODE_ID: 708
MDC_IDC_EPISODE_ID: 709
MDC_IDC_EPISODE_ID: 710
MDC_IDC_EPISODE_ID: 711
MDC_IDC_EPISODE_ID: 712
MDC_IDC_EPISODE_ID: 713
MDC_IDC_EPISODE_ID: 714
MDC_IDC_EPISODE_ID: 715
MDC_IDC_EPISODE_ID: 716
MDC_IDC_EPISODE_ID: 717
MDC_IDC_EPISODE_ID: 718
MDC_IDC_EPISODE_ID: 719
MDC_IDC_EPISODE_ID: 720
MDC_IDC_EPISODE_ID: 721
MDC_IDC_EPISODE_ID: 722
MDC_IDC_EPISODE_ID: 723
MDC_IDC_EPISODE_ID: 724
MDC_IDC_EPISODE_ID: 725
MDC_IDC_EPISODE_ID: 726
MDC_IDC_EPISODE_ID: 727
MDC_IDC_EPISODE_ID: 728
MDC_IDC_EPISODE_ID: 729
MDC_IDC_EPISODE_ID: 730
MDC_IDC_EPISODE_ID: 731
MDC_IDC_EPISODE_ID: 732
MDC_IDC_EPISODE_ID: 733
MDC_IDC_EPISODE_ID: 734
MDC_IDC_EPISODE_ID: 735
MDC_IDC_EPISODE_ID: 736
MDC_IDC_EPISODE_ID: 737
MDC_IDC_EPISODE_ID: 738
MDC_IDC_EPISODE_ID: 739
MDC_IDC_EPISODE_ID: 740
MDC_IDC_EPISODE_ID: 741
MDC_IDC_EPISODE_ID: 742
MDC_IDC_EPISODE_ID: 743
MDC_IDC_EPISODE_ID: 744
MDC_IDC_EPISODE_ID: 745
MDC_IDC_EPISODE_ID: 746
MDC_IDC_EPISODE_ID: 747
MDC_IDC_EPISODE_ID: 748
MDC_IDC_EPISODE_ID: 749
MDC_IDC_EPISODE_ID: 750
MDC_IDC_EPISODE_ID: 751
MDC_IDC_EPISODE_ID: 752
MDC_IDC_EPISODE_ID: 753
MDC_IDC_EPISODE_ID: 754
MDC_IDC_EPISODE_TYPE: NORMAL
MDC_IDC_LEAD_IMPLANT_DT: NORMAL
MDC_IDC_LEAD_LOCATION: NORMAL
MDC_IDC_LEAD_LOCATION_DETAIL_1: NORMAL
MDC_IDC_LEAD_MFG: NORMAL
MDC_IDC_LEAD_MODEL: NORMAL
MDC_IDC_LEAD_POLARITY_TYPE: NORMAL
MDC_IDC_LEAD_SERIAL: NORMAL
MDC_IDC_LEAD_SPECIAL_FUNCTION: NORMAL
MDC_IDC_MSMT_BATTERY_DTM: NORMAL
MDC_IDC_MSMT_BATTERY_REMAINING_LONGEVITY: 87 MO
MDC_IDC_MSMT_BATTERY_RRT_TRIGGER: 2.6
MDC_IDC_MSMT_BATTERY_STATUS: NORMAL
MDC_IDC_MSMT_BATTERY_VOLTAGE: 3 V
MDC_IDC_MSMT_LEADCHNL_LV_IMPEDANCE_VALUE: 209 OHM
MDC_IDC_MSMT_LEADCHNL_LV_IMPEDANCE_VALUE: 266 OHM
MDC_IDC_MSMT_LEADCHNL_LV_IMPEDANCE_VALUE: 304 OHM
MDC_IDC_MSMT_LEADCHNL_LV_IMPEDANCE_VALUE: 361 OHM
MDC_IDC_MSMT_LEADCHNL_LV_IMPEDANCE_VALUE: 399 OHM
MDC_IDC_MSMT_LEADCHNL_LV_PACING_THRESHOLD_AMPLITUDE: 1.38 V
MDC_IDC_MSMT_LEADCHNL_LV_PACING_THRESHOLD_PULSEWIDTH: 0.4 MS
MDC_IDC_MSMT_LEADCHNL_RA_IMPEDANCE_VALUE: 304 OHM
MDC_IDC_MSMT_LEADCHNL_RA_IMPEDANCE_VALUE: 513 OHM
MDC_IDC_MSMT_LEADCHNL_RA_PACING_THRESHOLD_AMPLITUDE: 0.75 V
MDC_IDC_MSMT_LEADCHNL_RA_PACING_THRESHOLD_PULSEWIDTH: 0.4 MS
MDC_IDC_MSMT_LEADCHNL_RA_SENSING_INTR_AMPL: 0.38 MV
MDC_IDC_MSMT_LEADCHNL_RA_SENSING_INTR_AMPL: 0.38 MV
MDC_IDC_MSMT_LEADCHNL_RV_IMPEDANCE_VALUE: 266 OHM
MDC_IDC_MSMT_LEADCHNL_RV_IMPEDANCE_VALUE: 323 OHM
MDC_IDC_MSMT_LEADCHNL_RV_PACING_THRESHOLD_AMPLITUDE: 0.88 V
MDC_IDC_MSMT_LEADCHNL_RV_PACING_THRESHOLD_PULSEWIDTH: 0.4 MS
MDC_IDC_MSMT_LEADCHNL_RV_SENSING_INTR_AMPL: 6.62 MV
MDC_IDC_MSMT_LEADCHNL_RV_SENSING_INTR_AMPL: 6.62 MV
MDC_IDC_PG_IMPLANT_DTM: NORMAL
MDC_IDC_PG_MFG: NORMAL
MDC_IDC_PG_MODEL: NORMAL
MDC_IDC_PG_SERIAL: NORMAL
MDC_IDC_PG_TYPE: NORMAL
MDC_IDC_SESS_CLINIC_NAME: NORMAL
MDC_IDC_SESS_DTM: NORMAL
MDC_IDC_SESS_TYPE: NORMAL
MDC_IDC_SET_BRADY_AT_MODE_SWITCH_RATE: 171 {BEATS}/MIN
MDC_IDC_SET_BRADY_LOWRATE: 60 {BEATS}/MIN
MDC_IDC_SET_BRADY_MAX_SENSOR_RATE: 120 {BEATS}/MIN
MDC_IDC_SET_BRADY_MAX_TRACKING_RATE: 120 {BEATS}/MIN
MDC_IDC_SET_BRADY_MODE: NORMAL
MDC_IDC_SET_BRADY_PAV_DELAY_HIGH: 100 MS
MDC_IDC_SET_BRADY_PAV_DELAY_LOW: 170 MS
MDC_IDC_SET_BRADY_SAV_DELAY_HIGH: 70 MS
MDC_IDC_SET_BRADY_SAV_DELAY_LOW: 140 MS
MDC_IDC_SET_CRT_LVRV_DELAY: 80 MS
MDC_IDC_SET_CRT_PACED_CHAMBERS: NORMAL
MDC_IDC_SET_LEADCHNL_LV_PACING_AMPLITUDE: 2.5 V
MDC_IDC_SET_LEADCHNL_LV_PACING_ANODE_ELECTRODE_1: NORMAL
MDC_IDC_SET_LEADCHNL_LV_PACING_ANODE_LOCATION_1: NORMAL
MDC_IDC_SET_LEADCHNL_LV_PACING_CAPTURE_MODE: NORMAL
MDC_IDC_SET_LEADCHNL_LV_PACING_CATHODE_ELECTRODE_1: NORMAL
MDC_IDC_SET_LEADCHNL_LV_PACING_CATHODE_LOCATION_1: NORMAL
MDC_IDC_SET_LEADCHNL_LV_PACING_POLARITY: NORMAL
MDC_IDC_SET_LEADCHNL_LV_PACING_PULSEWIDTH: 0.4 MS
MDC_IDC_SET_LEADCHNL_RA_PACING_AMPLITUDE: NORMAL
MDC_IDC_SET_LEADCHNL_RA_PACING_ANODE_ELECTRODE_1: NORMAL
MDC_IDC_SET_LEADCHNL_RA_PACING_ANODE_LOCATION_1: NORMAL
MDC_IDC_SET_LEADCHNL_RA_PACING_CAPTURE_MODE: NORMAL
MDC_IDC_SET_LEADCHNL_RA_PACING_CATHODE_ELECTRODE_1: NORMAL
MDC_IDC_SET_LEADCHNL_RA_PACING_CATHODE_LOCATION_1: NORMAL
MDC_IDC_SET_LEADCHNL_RA_PACING_POLARITY: NORMAL
MDC_IDC_SET_LEADCHNL_RA_PACING_PULSEWIDTH: 0.4 MS
MDC_IDC_SET_LEADCHNL_RA_SENSING_ANODE_ELECTRODE_1: NORMAL
MDC_IDC_SET_LEADCHNL_RA_SENSING_ANODE_LOCATION_1: NORMAL
MDC_IDC_SET_LEADCHNL_RA_SENSING_CATHODE_ELECTRODE_1: NORMAL
MDC_IDC_SET_LEADCHNL_RA_SENSING_CATHODE_LOCATION_1: NORMAL
MDC_IDC_SET_LEADCHNL_RA_SENSING_POLARITY: NORMAL
MDC_IDC_SET_LEADCHNL_RA_SENSING_SENSITIVITY: 0.15 MV
MDC_IDC_SET_LEADCHNL_RV_PACING_AMPLITUDE: NORMAL
MDC_IDC_SET_LEADCHNL_RV_PACING_ANODE_ELECTRODE_1: NORMAL
MDC_IDC_SET_LEADCHNL_RV_PACING_ANODE_LOCATION_1: NORMAL
MDC_IDC_SET_LEADCHNL_RV_PACING_CAPTURE_MODE: NORMAL
MDC_IDC_SET_LEADCHNL_RV_PACING_CATHODE_ELECTRODE_1: NORMAL
MDC_IDC_SET_LEADCHNL_RV_PACING_CATHODE_LOCATION_1: NORMAL
MDC_IDC_SET_LEADCHNL_RV_PACING_POLARITY: NORMAL
MDC_IDC_SET_LEADCHNL_RV_PACING_PULSEWIDTH: 0.4 MS
MDC_IDC_SET_LEADCHNL_RV_SENSING_ANODE_ELECTRODE_1: NORMAL
MDC_IDC_SET_LEADCHNL_RV_SENSING_ANODE_LOCATION_1: NORMAL
MDC_IDC_SET_LEADCHNL_RV_SENSING_CATHODE_ELECTRODE_1: NORMAL
MDC_IDC_SET_LEADCHNL_RV_SENSING_CATHODE_LOCATION_1: NORMAL
MDC_IDC_SET_LEADCHNL_RV_SENSING_POLARITY: NORMAL
MDC_IDC_SET_LEADCHNL_RV_SENSING_SENSITIVITY: 0.9 MV
MDC_IDC_SET_ZONE_DETECTION_INTERVAL: 350 MS
MDC_IDC_SET_ZONE_DETECTION_INTERVAL: 400 MS
MDC_IDC_SET_ZONE_TYPE: NORMAL
MDC_IDC_STAT_AT_BURDEN_PERCENT: 86.8 %
MDC_IDC_STAT_AT_DTM_END: NORMAL
MDC_IDC_STAT_AT_DTM_START: NORMAL
MDC_IDC_STAT_AT_MODE_SW_COUNT: 698
MDC_IDC_STAT_BRADY_AP_VP_PERCENT: 91.24 %
MDC_IDC_STAT_BRADY_AP_VS_PERCENT: 0.17 %
MDC_IDC_STAT_BRADY_AS_VP_PERCENT: 8 %
MDC_IDC_STAT_BRADY_AS_VS_PERCENT: 0.57 %
MDC_IDC_STAT_BRADY_DTM_END: NORMAL
MDC_IDC_STAT_BRADY_DTM_START: NORMAL
MDC_IDC_STAT_BRADY_RA_PERCENT_PACED: 16.93 %
MDC_IDC_STAT_BRADY_RV_PERCENT_PACED: 98.13 %
MDC_IDC_STAT_CRT_DTM_END: NORMAL
MDC_IDC_STAT_CRT_DTM_START: NORMAL
MDC_IDC_STAT_CRT_LV_PERCENT_PACED: 98.1 %
MDC_IDC_STAT_CRT_PERCENT_PACED: 98.1 %
MDC_IDC_STAT_EPISODE_RECENT_COUNT: 0
MDC_IDC_STAT_EPISODE_RECENT_COUNT: 1
MDC_IDC_STAT_EPISODE_RECENT_COUNT: 698
MDC_IDC_STAT_EPISODE_RECENT_COUNT_DTM_END: NORMAL
MDC_IDC_STAT_EPISODE_RECENT_COUNT_DTM_START: NORMAL
MDC_IDC_STAT_EPISODE_TOTAL_COUNT: 1
MDC_IDC_STAT_EPISODE_TOTAL_COUNT: 47
MDC_IDC_STAT_EPISODE_TOTAL_COUNT: 706
MDC_IDC_STAT_EPISODE_TOTAL_COUNT_DTM_END: NORMAL
MDC_IDC_STAT_EPISODE_TOTAL_COUNT_DTM_START: NORMAL
MDC_IDC_STAT_EPISODE_TYPE: NORMAL
POTASSIUM BLD-SCNC: 4.5 MMOL/L (ref 3.5–5)
SODIUM SERPL-SCNC: 141 MMOL/L (ref 136–145)

## 2022-02-01 PROCEDURE — 36415 COLL VENOUS BLD VENIPUNCTURE: CPT

## 2022-02-01 PROCEDURE — U0003 INFECTIOUS AGENT DETECTION BY NUCLEIC ACID (DNA OR RNA); SEVERE ACUTE RESPIRATORY SYNDROME CORONAVIRUS 2 (SARS-COV-2) (CORONAVIRUS DISEASE [COVID-19]), AMPLIFIED PROBE TECHNIQUE, MAKING USE OF HIGH THROUGHPUT TECHNOLOGIES AS DESCRIBED BY CMS-2020-01-R: HCPCS

## 2022-02-01 PROCEDURE — 80048 BASIC METABOLIC PNL TOTAL CA: CPT

## 2022-02-01 PROCEDURE — U0005 INFEC AGEN DETEC AMPLI PROBE: HCPCS

## 2022-02-01 NOTE — TELEPHONE ENCOUNTER
Pre-Procedure Education Phone Call    Procedure: CV with with Soo oMtt NP on 2/3/2022 AT 8 30    PT IS ON XARELTO AND NO MISSED DOSES      Education:        PT HAS A  FOR PROCEDURE THAT WILL STAY WITH HIM      ALL INSTRUCTIONS REVIEWED WITH DAUGHTER JOSIE  [phone]    PT INSTRUCTED TO HOLD ANY VITAMINS MINERALS CALCIUM IRON OR SUPPLEMENTS THE MORNING OF CV  PT INSTRUCTED NO GUM CHEWING MINTS OR CANDY THE MORNING OF CV  PT INSTRUCTED TO LEAVE JEWELRY AT HOME  PT INSTRUCTED TO BATHE OR SHOWER BEFORE COMING IN  PT IS VERY HARD OF HEARING  PT HAS A PACEMAKER  PT IS ON XARELTO  PT IS ON SOTALOL  NO MEDICATIONS CHANGES MADE  PT HAS A POST CV FOLLOW UP WITH SOO AND DEVICE 2/24     Reviewed Pre-Intra-Post education and instructions reviewed via phone- refer to procedure prep for instructions that were reviewed    COIVD: scheduled on COVID 2/1/2022 AT [facility] at a  facility, results will be viewable in EPIC    Pre-Op: completed and in Epic    Medications: Pt verbalized understanding of medication instructions pre procedure    Important patient information for staff: PT HAS PACEMAKER, PT IS ON XARELTO, PT IS VERY HARD OF HEARING WILL WEAR HEARING AIDE THAT DAY, PT IS ON SOTALOL    2/1/2022 8:58 AM  Rylie Stout

## 2022-02-02 LAB — SARS-COV-2 RNA RESP QL NAA+PROBE: NEGATIVE

## 2022-02-03 ENCOUNTER — ANESTHESIA EVENT (OUTPATIENT)
Dept: CARDIOLOGY | Facility: HOSPITAL | Age: 82
End: 2022-02-03
Payer: MEDICARE

## 2022-02-03 ENCOUNTER — HOSPITAL ENCOUNTER (OUTPATIENT)
Dept: CARDIOLOGY | Facility: HOSPITAL | Age: 82
Discharge: HOME | End: 2022-02-03
Attending: NURSE PRACTITIONER | Admitting: NURSE PRACTITIONER
Payer: MEDICARE

## 2022-02-03 ENCOUNTER — ANESTHESIA (OUTPATIENT)
Dept: CARDIOLOGY | Facility: HOSPITAL | Age: 82
End: 2022-02-03
Payer: MEDICARE

## 2022-02-03 VITALS
HEIGHT: 71 IN | DIASTOLIC BLOOD PRESSURE: 58 MMHG | WEIGHT: 187.5 LBS | TEMPERATURE: 98.6 F | OXYGEN SATURATION: 98 % | HEART RATE: 60 BPM | SYSTOLIC BLOOD PRESSURE: 102 MMHG | RESPIRATION RATE: 10 BRPM | BODY MASS INDEX: 26.25 KG/M2

## 2022-02-03 DIAGNOSIS — I48.19 PERSISTENT ATRIAL FIBRILLATION (H): ICD-10-CM

## 2022-02-03 LAB
ATRIAL RATE - MUSE: 535 BPM
DIASTOLIC BLOOD PRESSURE - MUSE: NORMAL MMHG
INTERPRETATION ECG - MUSE: NORMAL
P AXIS - MUSE: NORMAL DEGREES
PR INTERVAL - MUSE: 212 MS
QRS DURATION - MUSE: 154 MS
QT - MUSE: 488 MS
QTC - MUSE: 495 MS
R AXIS - MUSE: 52 DEGREES
SYSTOLIC BLOOD PRESSURE - MUSE: NORMAL MMHG
T AXIS - MUSE: 192 DEGREES
VENTRICULAR RATE- MUSE: 62 BPM

## 2022-02-03 PROCEDURE — 92960 CARDIOVERSION ELECTRIC EXT: CPT | Performed by: NURSE PRACTITIONER

## 2022-02-03 PROCEDURE — 250N000009 HC RX 250: Performed by: NURSE ANESTHETIST, CERTIFIED REGISTERED

## 2022-02-03 PROCEDURE — 93010 ELECTROCARDIOGRAM REPORT: CPT | Mod: HOP | Performed by: INTERNAL MEDICINE

## 2022-02-03 PROCEDURE — 93005 ELECTROCARDIOGRAM TRACING: CPT

## 2022-02-03 PROCEDURE — 370N000017 HC ANESTHESIA TECHNICAL FEE, PER MIN

## 2022-02-03 PROCEDURE — 999N000054 HC STATISTIC EKG NON-CHARGEABLE

## 2022-02-03 PROCEDURE — 92960 CARDIOVERSION ELECTRIC EXT: CPT

## 2022-02-03 RX ORDER — LIDOCAINE 40 MG/G
CREAM TOPICAL
Status: DISCONTINUED | OUTPATIENT
Start: 2022-02-03 | End: 2022-02-03 | Stop reason: HOSPADM

## 2022-02-03 RX ADMIN — METHOHEXITAL SODIUM 70 MG: 500 INJECTION, POWDER, LYOPHILIZED, FOR SOLUTION INTRAMUSCULAR; INTRAVENOUS; RECTAL at 09:40

## 2022-02-03 ASSESSMENT — MIFFLIN-ST. JEOR: SCORE: 1567.62

## 2022-02-03 ASSESSMENT — ENCOUNTER SYMPTOMS: DYSRHYTHMIAS: 1

## 2022-02-03 NOTE — ANESTHESIA CARE TRANSFER NOTE
Patient: Reymundo Petersen    Procedure: * No procedures listed *  Cardioversion External    Diagnosis: * No pre-op diagnosis entered *  Diagnosis Additional Information: No value filed.    Anesthesia Type:   General     Note:    Oropharynx: oropharynx clear of all foreign objects  Level of Consciousness: drowsy  Oxygen Supplementation: face mask  Level of Supplemental Oxygen (L/min / FiO2): 6  Independent Airway: airway patency satisfactory and stable  Dentition: dentition unchanged  Vital Signs Stable: post-procedure vital signs reviewed and stable  Report to RN Given: handoff report given  Patient transferred to: Cardiac Special Care          Vitals:  Vitals Value Taken Time   /56 02/03/22 0946   Temp     Pulse 62 02/03/22 0946   Resp 12 02/03/22 0946   SpO2 100 % 02/03/22 0946       Electronically Signed By: SALVADOR Pillai CRNA  February 3, 2022  9:47 AM

## 2022-02-03 NOTE — PROCEDURES
[facility]    Procedure: Cardioversion External    Date/Time: 2/3/2022 10:26 AM  Performed by: Soo Mott APRN CNP  Authorized by: Soo Mott APRN CNP       UNIVERSAL PROTOCOL   Site Marked: NA  Prior Images Obtained and Reviewed:  Yes  Required items: Required blood products, implants, devices and special equipment available    Patient identity confirmed:  Verbally with patient, arm band and provided demographic data  Patient was reevaluated immediately before administering moderate or deep sedation or anesthesia  Confirmation Checklist:  Patient's identity using two indicators, relevant allergies, procedure was appropriate and matched the consent or emergent situation and correct equipment/implants were available  Time out: Immediately prior to the procedure a time out was called    Universal Protocol: the Joint Commission Universal Protocol was followed       ANESTHESIA  Anesthesia was administered and monitored by anesthesiology.  See anesthesia documentation for details.    PROCEDURE    Patient Tolerance:  Patient tolerated the procedure well with no immediate complications  Length of time physician/provider present for 1:1 monitoring during sedation: 0      Date: 2/3/2022  Preprocedure Dx: Persistent atrial fibrillation  Postprocedure: Successful conversion to normal sinus rhythm from persistent atrial fibrillation    Brief History: history of left ventricular hypertrophy, CHF with EF 45%, congestive heart failure, coronary artery disease, complete heart block status post pacemaker insertion, hypertension, hyperlipidemia, hospitalization for COVID-19, MARIAN.  When patient is in persistent atrial fibrillation his congestive heart failure is not well controlled.    : Soo Mott CNP  Methods:  Time out completed.  Then Reymundo Petersen taken in fasting nonsedated state and underwent brief deep anesthesia per anesthesia service.    At 09 44 he received 200 joules of  single, biphasic, synchronized DCCV shock between right parasternal and left infrascapular hands-free pads and had prompt restoration of sinus rhythm, Medtronic device rep present.  Post cardioversion ECG was personally reviewed, shows 100% AV pacing, QT/QTc 488/495 ms.  Impression:  Successful conversion to sinus rhythm  Full neurological recovery after procedure.  Patient and family updated after procedure.  Plan:  Followup in clinic with Soo Mott CNP 2/24/2022  Prior to Admission medications    Medication Sig Start Date End Date Taking? Authorizing Provider   aspirin (ASA) 81 MG chewable tablet Take 1 tablet (81 mg) by mouth every other day 9/10/21  Yes Jorge A Leigh MD   coenzyme Q-10 200 MG CAPS capsule Take 200 mg by mouth daily   Yes Reported, Patient   doxazosin (CARDURA) 8 MG tablet [DOXAZOSIN (CARDURA) 8 MG TABLET] Take 4 mg by mouth at bedtime.        1/26/17  Yes Provider, Historical   finasteride (PROSCAR) 5 mg tablet [FINASTERIDE (PROSCAR) 5 MG TABLET] Take 5 mg by mouth at bedtime.        1/26/17  Yes Provider, Historical   furosemide (LASIX) 40 MG tablet Take 1 tablet (40 mg) by mouth daily Take extra dose if weight gain, leg swelling, or short of breath 1/27/22  Yes Soo Mott APRN CNP   gabapentin (NEURONTIN) 100 MG capsule Two capsules in the morning, two capsules in the afternoon, and three capsules in the evening 4/23/21  Yes Provider, Historical   glucosamine/chondr conteh A sod (OSTEO BI-FLEX ORAL) [GLUCOSAMINE/CHONDR CONTEH A SOD (OSTEO BI-FLEX ORAL)] Take 1 tablet by mouth 2 (two) times a day.        7/23/19  Yes Provider, Historical   losartan (COZAAR) 25 MG tablet [LOSARTAN (COZAAR) 25 MG TABLET] Take 1 tablet (25 mg total) by mouth daily. 3/27/20  Yes Alfredo Moore MD   melatonin 10 mg Tab [MELATONIN 10 MG TAB] Take 10 mg by mouth at bedtime as needed. 2/11/21  Yes Provider, Historical   metoprolol succinate ER (TOPROL-XL) 25 MG 24 hr tablet Take 0.5 tablets (12.5 mg) by mouth At  Bedtime 1/27/22  Yes Soo Mott APRN CNP   omeprazole (PRILOSEC) 20 MG capsule [OMEPRAZOLE (PRILOSEC) 20 MG CAPSULE] Take 20 mg by mouth 2 (two) times a day before meals. 3/19/20  Yes Provider, Historical   rivaroxaban ANTICOAGULANT (XARELTO) 20 MG TABS tablet Take 1 tablet (20 mg) by mouth daily 10/15/21  Yes Soo Mott APRN CNP   sertraline (ZOLOFT) 50 MG tablet Take 50 mg by mouth 2 times daily  1/26/17  Yes Provider, Historical   sotalol (BETAPACE) 80 MG tablet Take 0.5 tablets (40 mg) by mouth 2 times daily 1/27/22  Yes Soo Mott APRN CNP   vit A/vit C/vit E/zinc/copper (PRESERVISION AREDS ORAL) [VIT A/VIT C/VIT E/ZINC/COPPER (PRESERVISION AREDS ORAL)] Take 1 tablet by mouth 2 (two) times a day. 8/15/19  Yes Provider, Historical   atorvastatin (LIPITOR) 10 MG tablet [ATORVASTATIN (LIPITOR) 10 MG TABLET] Take 10 mg by mouth 4 (four) times a week. Days vary  1/26/17   Provider, Historical     Continue anticoagulation of Xarelto 20 mg oral daily with meal   Med changes as follows: None needed  Continue all other meds as before.  Discharge to home when stable and at least 1 hr after cardioversion.      SALVADOR Mathews, CNP  2/3/2022

## 2022-02-03 NOTE — ANESTHESIA POSTPROCEDURE EVALUATION
Patient: Reymundo Petersen    Procedure: * No procedures listed *  Cardioversion External    Diagnosis:* No pre-op diagnosis entered *  Diagnosis Additional Information: No value filed.    Anesthesia Type:  General    Note:  Disposition: Outpatient   Postop Pain Control: Uneventful            Sign Out: Well controlled pain   PONV: No   Neuro/Psych: Uneventful            Sign Out: Acceptable/Baseline neuro status   Airway/Respiratory: Uneventful            Sign Out: Acceptable/Baseline resp. status   CV/Hemodynamics: Uneventful            Sign Out: Acceptable CV status; No obvious hypovolemia; No obvious fluid overload   Other NRE: NONE   DID A NON-ROUTINE EVENT OCCUR?            Last vitals:  Vitals Value Taken Time   /59 02/03/22 1000   Temp     Pulse 61 02/03/22 1009   Resp 16 02/03/22 1009   SpO2 96 % 02/03/22 1009   Vitals shown include unvalidated device data.    Electronically Signed By: Waldo Rubi MD  February 3, 2022  10:11 AM

## 2022-02-03 NOTE — ANESTHESIA PREPROCEDURE EVALUATION
Anesthesia Pre-Procedure Evaluation    Patient: Reymundo Petersen   MRN: [id number] : 1940        Preoperative Diagnosis: * No pre-op diagnosis entered *   Procedure : * No procedures listed *     Past Medical History:   Diagnosis Date     Atrial fibrillation, transient (H) 2020     BPH (benign prostatic hyperplasia) 2018     Chronic combined systolic and diastolic heart failure (H) 2021     Coronary artery disease      Dilated cardiomyopathy (H) 2021     MARIAN (generalized anxiety disorder) 2021     GERD (gastroesophageal reflux disease)      High cholesterol      Hypertension      LBBB (left bundle branch block) 2018     Nonrheumatic aortic valve insufficiency 2020     Pulmonary hypertension (H) 2020      Past Surgical History:   Procedure Laterality Date     ANGIOPLASTY       ARTHROSCOPY KNEE       CV CORONARY ANGIOGRAM N/A 10/23/2020    Procedure: Coronary Angiogram;  Surgeon: Varun Freire MD;  Location: [facility];  Service: Cardiology     CV LEFT HEART CATHETERIZATION WITHOUT LEFT VENTRICULOGRAM Left 10/23/2020    Procedure: Left Heart Catheterization Without Left Ventriculogram;  Surgeon: Varun Freire MD;  Location: [facility];  Service: Cardiology     EP BIV PACEMAKER INSERT N/A 3/25/2020    Procedure: EP Biventricular Pacemaker Insertion;  Surgeon: Taylor Sandoval MD;  Location: [facility];  Service: Cardiology     RELEASE CARPAL TUNNEL       ZZC TOTAL KNEE ARTHROPLASTY Right 8/15/2019    Procedure: RIGHT  MINIMALLY TOTAL KNEE ARTHROPLASTY;  Surgeon: Keven Cerda MD;  Location: [facility];  Service: Orthopedics      Allergies   Allergen Reactions     Lac Bovis Nausea and Vomiting     Other reaction(s): upset stomach      Social History     Tobacco Use     Smoking status: Never Smoker     Smokeless tobacco: Never Used   Substance Use Topics     Alcohol use: Yes     Comment: Alcoholic Drinks/day: rare       Wt Readings from Last 1 Encounters:   01/27/22 83 kg (183 lb)        Anesthesia Evaluation   Pt has had prior anesthetic.     No history of anesthetic complications       ROS/MED HX  ENT/Pulmonary: Comment: Hospitalization due to COVID in 2/2021 - neg pulmonary ROS     Neurologic:  - neg neurologic ROS     Cardiovascular: Comment: TTE 5/2021    Narrative & Impression    Left ventricular cavity size is normal. Severe concentric increase in wall thickness. Ejection fraction is mildly decreased. The calculated left ventricular ejection fraction is 43%.    Right ventricular cavity size is mildly dilated. Normal right ventricular systolic function.    Severe biatrial enlargement.    Mild aortic regurgitation.    The ascending aorta is mildly dilated measuring 4.1 cm.    Moderate pulmonary hypertension present. The estimated systolic pulmonary artery pressure is 51 mm Hg.    When compared to the previous study dated 9/1/2020, there has been no significant change.    (+) hypertension--CAD --stent-Taking blood thinners CHF pacemaker, ICD dysrhythmias, a-fib, pulmonary hypertension,     METS/Exercise Tolerance:     Hematologic:       Musculoskeletal:       GI/Hepatic:     (+) GERD,     Renal/Genitourinary:     (+) renal disease,     Endo:  - neg endo ROS     Psychiatric/Substance Use:  - neg psychiatric ROS     Infectious Disease:  - neg infectious disease ROS     Malignancy:       Other:            Physical Exam    Airway        Mallampati: II   TM distance: > 3 FB   Neck ROM: full     Respiratory Devices and Support         Dental       (+) missing, lower dentures and upper dentures    B=Bridge, C=Chipped, L=Loose, M=Missing    Cardiovascular          Rhythm and rate: irregular and normal     Pulmonary           breath sounds clear to auscultation           OUTSIDE LABS:  CBC:   Lab Results   Component Value Date    WBC 9.4 07/02/2021    WBC 6.4 06/24/2021    HGB 13.1 (L) 01/18/2022    HGB 14.8 07/02/2021    HCT 45.1  07/02/2021    HCT 43.2 06/24/2021     07/02/2021     06/24/2021     BMP:   Lab Results   Component Value Date     02/01/2022     (L) 01/18/2022    POTASSIUM 4.5 02/01/2022    POTASSIUM 4.2 01/18/2022    CHLORIDE 103 02/01/2022    CHLORIDE 100 01/18/2022    CO2 29 02/01/2022    CO2 25 01/18/2022    BUN 28 02/01/2022    BUN 17 01/18/2022    CR 1.13 02/01/2022    CR 0.84 01/18/2022     02/01/2022     01/18/2022     COAGS:   Lab Results   Component Value Date    PTT 36 02/11/2021    INR 1.03 02/11/2021    FIBR 474 (H) 02/15/2021     POC: No results found for: BGM, HCG, HCGS  HEPATIC:   Lab Results   Component Value Date    ALBUMIN 3.5 07/02/2021    PROTTOTAL 6.7 07/02/2021    ALT <9 07/02/2021    AST 20 07/02/2021    ALKPHOS 72 07/02/2021    BILITOTAL 0.8 07/02/2021     OTHER:   Lab Results   Component Value Date    LACT 1.5 09/21/2020    ANGELY 9.7 02/01/2022    MAG 2.0 07/02/2021    LIPASE <9 07/02/2021    TSH 1.43 03/24/2020    CRP 0.4 07/02/2021       Anesthesia Plan    ASA Status:  3   NPO Status:  NPO Appropriate    Anesthesia Type: General.     - Airway: Mask Only      Maintenance: TIVA.        Consents    Anesthesia Plan(s) and associated risks, benefits, and realistic alternatives discussed. Questions answered and patient/representative(s) expressed understanding.    - Discussed:     - Discussed with:  Patient      - Extended Intubation/Ventilatory Support Discussed: No.      - Patient is DNR/DNI Status: No    Use of blood products discussed: No .     Postoperative Care            Comments:    Other Comments:                  Waldo Rubi MD

## 2022-02-03 NOTE — DISCHARGE INSTRUCTIONS
Discharge Instructions for Cardioversion  Your healthcare provider did a procedure called cardioversion. He or she used a controlled electric shock or a medicine to briefly stop all electrical activity in your heart. This helped restore your heart s normal rhythm. Here are some instructions to follow while you recover.  Home care    Before cardioversion, you will typically be given sedation. So, you won't be able to drive home. You will need a ride. Wait at least 24 hours before driving a car or operating heavy machinery after getting sedating medicines.    The skin on your chest may be irritated or feel like it's sunburned. Your healthcare provider may prescribe a soothing lotion to ease this discomfort. These minor symptoms will go away in a few days.    Ask your healthcare provider about medicines to keep your heart rhythm steady.    If you were prescribed medicine, take it as instructed by your healthcare provider. Don t skip doses or take double doses. Cardioversion requires blood thinners for at least 4 weeks after the procedure to prevent a delayed risk of stroke when treating atrial fibrillation or atrial flutter. Be sure you discuss which medicine you are taking to prevent stroke. Ask when you need to have your medicine levels checked. Also ask whether you may be able to stop taking it in the future or whether you need to take it for life. Some of these blood-thinning medicines such as warfarin will have the dose adjusted, and interact with other medicines or foods. Your healthcare team will give you full instructions on what to watch out for. Report bleeding or symptoms of stroke immediately to your healthcare team and seek emergency medical attention.    Learn to take your own pulse. Keep a record of your results. Ask your healthcare provider when you should seek emergency medical attention. He or she will tell you which pulse rate reading is dangerous.     Cardioversion is usually short term  (temporary). You may need it repeated if the abnormal heart rhythm returns. After the procedure, your healthcare provider will tell you if the treatment worked or if you will need further treatments or medicine.    Follow-up care  Make a follow-up appointment, or as advised.  When to call your healthcare provider  Call 911 right away if you have:    Chest pain    Shortness of breath    Loss of vision, speech, or strength or coordination in any body part  Call your healthcare provider right away if you:    Feel faint, dizzy, or lightheaded    Have chest pain with increased activity    Have irregular heartbeat or fast pulse    Have bleeding issues from blood-thinning medicines  Haird[employer] last reviewed this educational content on 5/1/2019 2000-2021 [employer]. All rights reserved. This information is not intended as a substitute for professional medical care. Always follow your healthcare professional's instructions.      Anesthesia: General Anesthesia     You are watched continuously during your procedure by your anesthesia provider.   You re due to have surgery. During surgery, you ll be given medicine called anesthesia or anesthetic. This will keep you comfortable and pain-free. Your anesthesia provider will use general anesthesia .  What is general anesthesia?  General anesthesia puts you into a state like deep sleep. It goes into the bloodstream (IV anesthetics), into the lungs (gas anesthetics), or both. You feel nothing during the procedure. You will not remember it. During the procedure, the anesthesia provider monitors you continuously. He or she checks your heart rate and rhythm, blood pressure, breathing, and blood oxygen.     IV anesthetics. IV anesthetics are given through an IV line in your arm. They re often given first. This is so you are asleep before a gas anesthetic is started. Some kinds of IV anesthetics relieve pain. Others relax you. Your doctor will decide which kind is best in  your case.    Gas anesthetics. Gas anesthetics are breathed into the lungs. They are often used to keep you asleep. They can be given through a face mask or a tube placed in your larynx or trachea (breathing tube) .  ? If you have a face mask, your anesthesia provider will most likely place it over your nose and mouth while you re still awake. You ll breathe oxygen through the mask as your IV anesthetic is started. Gas anesthetic may be added through the mask.  ? If you have a tube in the larynx or trachea, it will be inserted into your throat after you re asleep.  Anesthesia tools and medicines  You will likely have:    IV anesthetics. These are put into an IV line into your bloodstream.    Gas anesthetics. You breathe these anesthetics into your lungs, where they pass into your bloodstream.    Pulse oximeter. This is a small clip that is attached to the end of your finger. This measures your blood oxygen level.    Electrocardiography leads (electrodes). These are small sticky pads that are placed on your chest. They record your heart rate and rhythm.    Blood pressure cuff. This reads your blood pressure.  Risks and possible complications  General anesthesia has some risks. These include:    Breathing problems    Nausea and vomiting    Sore throat or hoarseness (usually temporary)    Allergic reaction to the anesthetic    Irregular heartbeat (rare)    Cardiac arrest (rare)  Anesthesia safety    Follow all instructions you are given for how long not to eat or drink before your procedure.    Be sure your doctor knows what medicines and drugs you take. This includes over-the-counter medicines, herbs, supplements, alcohol or other drugs. You will be asked when those were last taken.    Have an adult family member or friend drive you home after the procedure.    For the first 24 hours after your surgery:  ? Don't drive or use heavy equipment.  ? Don't make important decisions or sign legal documents. If important  decisions or signing legal documents is necessary during the first 24 hours after surgery, have a trusted family member or spouse act on your behalf.  ? Don't drink alcohol.  ? Have a responsible adult stay with you. He or she can watch for problems and help keep you safe.    Chandrika last reviewed this educational content on 10/1/2019    9072-5186 [employer]. All rights reserved. This information is not intended as a substitute for professional medical care. Always follow your healthcare professional's instructions.

## 2022-02-03 NOTE — INTERVAL H&P NOTE
I have reviewed the surgical (or preoperative) H&P that is linked to this encounter, and examined the patient. There are no significant changes.  No missed doses of Xarelto.

## 2022-02-08 ENCOUNTER — TELEPHONE (OUTPATIENT)
Dept: CARDIOLOGY | Facility: CLINIC | Age: 82
End: 2022-02-08
Payer: COMMERCIAL

## 2022-02-08 NOTE — TELEPHONE ENCOUNTER
Spoke with patients daughter, Juliet, regarding the [employer] Trial. Said that Reymundo was kind of hesitant right now on having a big surgery, but did not realize that it was minimally invasive. Juliet said that she would talk to him a little more and then also speak with the nurses in clinic to get a little more direction on where to go from here seeing as he just had a cardioversion next week. Took down my contact information and will be calling back with questions.     Zhane Szymanski

## 2022-02-08 NOTE — TELEPHONE ENCOUNTER
"[facility] Call Center    Phone Message    May a detailed message be left on voicemail: no     Reason for Call: Other: Juliet called to request clarification if Reymundo should hold his beta blocker prior to the 2/10/2022 stress test as he recently had a cardioversion on 2/3/2022.     Action Taken: Other: [facility]    Travel Screening: Not Applicable                                                                    ==Noted message- pt chart reviewed. Call that was placed yesterday AM and being routed to writer today. Pt is back in Afib and there looks to be some medication changes occurring. Will see if stress test should continue for tomorrow or if this is something that should maybe be delayed. But with his  New Afib, would not think that beta blocker should be held. Per order in nuc stress test:    Order Questions    Question Answer   Priority Routine   Procedure to be scheduled at [facility]   Hold beta blockers on the day of the procedure and 24hrs prior to procedure Yes             Dr. Leigh,   This patient who had a recent cardioversion and is back in Afib, is also having a nuclear stress test tomorrow. They called in to ask about holding beta blockers. Due to his HF and Afib, my guess is that he should continue them but the boxed is checked \"yes\" in his order. Should he proceed with stress test tomorrow and may he continue beta blockers or should he hold? Looks to be on both sotalol and metoprolol succinate but Afib NP may be adjusting medications.  Thanks,  Mal   "

## 2022-02-08 NOTE — TELEPHONE ENCOUNTER
"----- Message from Jesica DEVI Julito sent at 2/7/2022 12:34 PM CST -----  Regarding: device RN review  No charge. No Epic interface required.  Type: alert remote CRT-P transmission for AT/AF >/=6hrs for two days.   Presenting rhythm: AF with biVP 75-90 bpm.  Battery/lead status: stable.  Arrhythmias: since 2/3/22; 90 mode switch epsiodes, EGMs confirm AF, current episode in progress since 2/6/22, overall v-rates well controlled, AF burden 12.3%. No ventricular arrhythmias detected.  Anticoagulant: Xarelto  Comments: normal pacemaker function. Optivol remains elevated since November 2021. Routed to device RN for review. CARMELLA Vila, Device Specialist     Known PAF, recent ECV on 2/3/22. Now back in AF since 2/6/22. V-rates controlled. Frequent AF undersensing noted, however is at most sensitive setting on RA lead already. Scheduled for stress test 2/10/22. OptiVol remains elevated since November, has been seen in clinic since.     Discussed results with Juliet (his daughter) this morning, she is surprised to hear about return of AF, and a bit discouraged that he didn't stay in normal rhythm very long. States she spoke with him on Sunday prior to the Af starting and he was feeling a \"bit wheezy, short of breath, after shoveling that day. He took some extra Lasix on Sunday and Monday.\" States she spoke to him quite early this morning and he was really \"really good today.\" States he is good about daily weights and better about reporting possible fluid retention and/or taking extra lasix.     Daughter states he was not too interested in pursuing the Watchman but that she would really like to set up another appt to discuss this procedure in further detail as she is really hoping he will change his mind if this is a good option for him.     Will review with Dr. Leigh/MIKEL Mott since AF is now back.   Shannon Castro, DIAMOND            "

## 2022-02-09 NOTE — TELEPHONE ENCOUNTER
Jorge A Leigh MD  You; Soo Mott APRN CNP 18 hours ago (2:47 PM)     LF    I would tend to agree with this elderly gentleman who tries to be relatively active living independently with his sister-in-law that OptiVol volume is up, atrial fibrillation is seen, must consider the 2 together.  I agree with change from sotalol to amiodarone with eventual cardioversion if appropriately anticoagulated in good amiodarone on board.  Thank you for your help with this gentleman.  LF    Message text      Soo Mott APRN CNP  You; Jorge A Leigh MD 20 hours ago (12:38 PM)     CEDRICK Ortega and Dr. Leigh,     I think we should plan to stop the sotalol, wait 3 days, initiate amiodarone and have him back for cardioversion in 3 weeks.  Ramirez and Juliet knew this may happen.     Dr. Leigh, do you agree?  Do you want me to initiate this plan?     (his CHF is not able to be controlled well in a fib)     Soo Mott CNP      Above noted.  Shannon Castro, RN

## 2022-02-09 NOTE — TELEPHONE ENCOUNTER
Message  Received: Today  Jorge A Leigh MD Caswell, Mallory J, RN  Caller: Unspecified (Yesterday,  8:23 AM)  Since the stress test appears to be preoperatively for a potential back surgery and it will be without exercise there is no need to hold the beta-blocker.  In addition, since his pharmacological even though he is in A. fib we can proceed.  LF                   ==Called Juliet and left a detailed message outlining that Ramirez does not need to hold any beta blockers prior to his stress test. -[facility]

## 2022-02-09 NOTE — TELEPHONE ENCOUNTER
Call back received from Juliet. She states that they were previously told by non-invasive staff to hold Metoprolol. Informed Juliet that LBF reviewed and this is not necessary as it is a pharmacological stress at any rate. She verbalized understanding and will have Don continue his current meds. Writer mentioned that his rate control and rhythm control may be adjusted based on chart review and recent device check. She verbalized understanding that she may receive a call on this. -[facility] [place] [facility]

## 2022-02-10 ENCOUNTER — HOSPITAL ENCOUNTER (OUTPATIENT)
Dept: CARDIOLOGY | Facility: CLINIC | Age: 82
End: 2022-02-10
Attending: INTERNAL MEDICINE
Payer: MEDICARE

## 2022-02-10 ENCOUNTER — HOSPITAL ENCOUNTER (OUTPATIENT)
Dept: NUCLEAR MEDICINE | Facility: CLINIC | Age: 82
End: 2022-02-10
Attending: INTERNAL MEDICINE
Payer: MEDICARE

## 2022-02-10 ENCOUNTER — TELEPHONE (OUTPATIENT)
Dept: CARDIOLOGY | Facility: CLINIC | Age: 82
End: 2022-02-10
Payer: COMMERCIAL

## 2022-02-10 ENCOUNTER — PREP FOR PROCEDURE (OUTPATIENT)
Dept: CARDIOLOGY | Facility: CLINIC | Age: 82
End: 2022-02-10
Payer: COMMERCIAL

## 2022-02-10 DIAGNOSIS — I35.1 NONRHEUMATIC AORTIC VALVE INSUFFICIENCY: Chronic | ICD-10-CM

## 2022-02-10 DIAGNOSIS — Z95.0 CARDIAC PACEMAKER IN SITU: Chronic | ICD-10-CM

## 2022-02-10 DIAGNOSIS — I25.10 CORONARY ARTERY DISEASE DUE TO LIPID RICH PLAQUE: ICD-10-CM

## 2022-02-10 DIAGNOSIS — I48.19 PERSISTENT ATRIAL FIBRILLATION (H): Primary | ICD-10-CM

## 2022-02-10 DIAGNOSIS — I48.0 PAROXYSMAL ATRIAL FIBRILLATION (H): Primary | ICD-10-CM

## 2022-02-10 DIAGNOSIS — I25.10 CORONARY ARTERY DISEASE DUE TO LIPID RICH PLAQUE: Chronic | ICD-10-CM

## 2022-02-10 DIAGNOSIS — I25.83 CORONARY ARTERY DISEASE DUE TO LIPID RICH PLAQUE: ICD-10-CM

## 2022-02-10 DIAGNOSIS — I25.83 CORONARY ARTERY DISEASE DUE TO LIPID RICH PLAQUE: Chronic | ICD-10-CM

## 2022-02-10 LAB
CV STRESS CURRENT BP HE: NORMAL
CV STRESS CURRENT HR HE: 58
CV STRESS CURRENT HR HE: 60
CV STRESS CURRENT HR HE: 61
CV STRESS CURRENT HR HE: 62
CV STRESS CURRENT HR HE: 66
CV STRESS CURRENT HR HE: 67
CV STRESS DEVIATION TIME HE: NORMAL
CV STRESS ECHO PERCENT HR HE: NORMAL
CV STRESS EXERCISE STAGE HE: NORMAL
CV STRESS FINAL RESTING BP HE: NORMAL
CV STRESS FINAL RESTING HR HE: 62
CV STRESS MAX HR HE: 66
CV STRESS MAX TREADMILL GRADE HE: 0
CV STRESS MAX TREADMILL SPEED HE: 0
CV STRESS PEAK DIA BP HE: NORMAL
CV STRESS PEAK SYS BP HE: NORMAL
CV STRESS PHASE HE: NORMAL
CV STRESS PROTOCOL HE: NORMAL
CV STRESS RESTING PT POSITION HE: NORMAL
CV STRESS ST DEVIATION AMOUNT HE: NORMAL
CV STRESS ST DEVIATION ELEVATION HE: NORMAL
CV STRESS ST EVELATION AMOUNT HE: NORMAL
CV STRESS TEST TYPE HE: NORMAL
CV STRESS TOTAL STAGE TIME MIN 1 HE: NORMAL
NUC STRESS EJECTION FRACTION: 41 %
RATE PRESSURE PRODUCT: 6666
STRESS ECHO BASELINE DIASTOLIC HE: 64
STRESS ECHO BASELINE HR: 62
STRESS ECHO BASELINE SYSTOLIC BP: 109
STRESS ECHO CALCULATED PERCENT HR: 48 %
STRESS ECHO LAST STRESS DIASTOLIC BP: 52
STRESS ECHO LAST STRESS HR: 60
STRESS ECHO LAST STRESS SYSTOLIC BP: 101
STRESS ECHO TARGET HR: 137

## 2022-02-10 PROCEDURE — 343N000001 HC RX 343: Performed by: INTERNAL MEDICINE

## 2022-02-10 PROCEDURE — 250N000011 HC RX IP 250 OP 636: Performed by: INTERNAL MEDICINE

## 2022-02-10 PROCEDURE — A9500 TC99M SESTAMIBI: HCPCS | Performed by: INTERNAL MEDICINE

## 2022-02-10 PROCEDURE — 78452 HT MUSCLE IMAGE SPECT MULT: CPT | Mod: 26 | Performed by: INTERNAL MEDICINE

## 2022-02-10 PROCEDURE — 93018 CV STRESS TEST I&R ONLY: CPT | Mod: MG | Performed by: INTERNAL MEDICINE

## 2022-02-10 PROCEDURE — 93017 CV STRESS TEST TRACING ONLY: CPT | Performed by: INTERNAL MEDICINE

## 2022-02-10 PROCEDURE — 93016 CV STRESS TEST SUPVJ ONLY: CPT | Performed by: INTERNAL MEDICINE

## 2022-02-10 PROCEDURE — G1004 CDSM NDSC: HCPCS

## 2022-02-10 PROCEDURE — G1004 CDSM NDSC: HCPCS | Performed by: INTERNAL MEDICINE

## 2022-02-10 RX ORDER — METOPROLOL SUCCINATE 25 MG/1
25 TABLET, EXTENDED RELEASE ORAL AT BEDTIME
Qty: 90 TABLET | Refills: 3 | Status: ON HOLD | OUTPATIENT
Start: 2022-02-10 | End: 2023-01-01

## 2022-02-10 RX ORDER — AMIODARONE HYDROCHLORIDE 200 MG/1
TABLET ORAL
Qty: 180 TABLET | Refills: 3 | Status: ON HOLD | OUTPATIENT
Start: 2022-02-10 | End: 2022-03-08

## 2022-02-10 RX ORDER — REGADENOSON 0.08 MG/ML
0.4 INJECTION, SOLUTION INTRAVENOUS ONCE
Status: COMPLETED | OUTPATIENT
Start: 2022-02-10 | End: 2022-02-10

## 2022-02-10 RX ORDER — LIDOCAINE 40 MG/G
CREAM TOPICAL
Status: CANCELLED | OUTPATIENT
Start: 2022-02-10

## 2022-02-10 RX ORDER — AMINOPHYLLINE 25 MG/ML
50 INJECTION, SOLUTION INTRAVENOUS
Status: DISCONTINUED | OUTPATIENT
Start: 2022-02-10 | End: 2022-02-10 | Stop reason: HOSPADM

## 2022-02-10 RX ADMIN — Medication 24.6 MILLICURIE: at 13:30

## 2022-02-10 RX ADMIN — Medication 8.47 MILLICURIE: at 12:50

## 2022-02-10 RX ADMIN — REGADENOSON 0.4 MG: 0.08 INJECTION, SOLUTION INTRAVENOUS at 13:26

## 2022-02-10 NOTE — TELEPHONE ENCOUNTER
Pt was called, spoke to daughter, corresponding information/recommendations reviewed, verbalized understanding, has no further questions at this time, contact information was given for further concerns/questions, scheduling notified to contact pt, order(s) were placed, RX was sent to pt pharmacy and medication list updated   Pt will stop Sotalol on 2/11, will increase Metoprolol to 25mg Daily on 2/11  Pt will start Amiodarone on 2/14 200mg BID, and reduce Amiodarone to 200mg Daily on 3/7  CV ordered for 3 wks  Amiodarone program noitified  2/10/2022 8:47 AM  Kacey Alvarado RN      ----- Message from SALVADOR Mathews CNP sent at 2/9/2022  4:00 PM CST -----  HI Team,  This patient is well-known to me and Dr. Leigh.    His atrial fibrillation has continually reoccurred and causes him to go into heart failure.    3 days after his last cardioversion he went back into atrial fibrillation.    I discussed with Panfilo (his daughter who is very involved) that this may happen.    I have discussed the plan of care with Dr. Leigh and I am wondering if you can call them(both of them) to go over it?    Stop sotalol    Increase metoprolol succinate to 25 mg oral daily (currently he is taking 12.5 mg oral daily)    3 days after stopping sotalol start amiodarone 200 mg p.o. twice daily x3 weeks then reduce to 200 mg oral daily    3 weeks after starting amiodarone return for cardioversion    He is scheduled to see me 2/24 and I can further go over details at that time but I would like to get him started on the medication changes    Thank you,    Soo Mott CNP

## 2022-02-10 NOTE — PROGRESS NOTES
Pt having increase CHF and wheezing when in A-Fib.  Recent cardioversion for A-Fib.  Denies chest pain or SOB.  EF decreased.  Cardiac evaluation.  Cardiac hx with stent placement.  Ligia Sotomayor RN

## 2022-02-10 NOTE — Clinical Note
Pam- CV order Looks like there is still an order in for CV from [facility] I placed the COVID order and Order set Thanks Portia

## 2022-02-10 NOTE — PROGRESS NOTES
Bedside and Verbal shift change report given to GERMAN Dugan RN (oncoming nurse) by Edvin Burger RN (offgoing nurse). Report included the following information SBAR, Kardex, Intake/Output, MAR, Accordion and Recent Results. H&P  PMD []  Received [] OV: [x] LS  Date: 2/24 Sent LM  []  Teach  [] Orders  I [x] P  [x]  Letter []   COVID  FV/EPIC []  Date:  External  []  Date: AC: []Eliquis  [x] Xarelto  [] Warfarin  [] Pradaxa Meds: [x] Review AAD with EP NP needed  [] Per EP NO chg AAD/Med  [] Per EP Hold:      Pt reports taking anticoagulation appropriately, denies any missed doses in anticoagulation and pt is aware to call if any missed doses prior to CV    1940  Home:[phone] (home) Cell:[phone] (mobile)  Emergency Contact: Shine Petersen [phone]  PCP: Jamie Noguera, [phone]    +++Important patient information for CSC/Cath Lab staff : None+++    [facility] EP Cath Lab Procedure Order   Cardioversion:  Cardioversion  Ordering Provider: Soo Mott NP  Ordering Date: 2/10/2022    Diagnosis:  AF  Anticipated Case Duration:  Standard  Scheduling Needs/Timeframe:  Schedule on or after 3/7/22, pt loading on Amiodaronew  Scheduling Contact: Please contact pt to schedule date/time for COVID testing and CV, if you are unable to schedule date within the next 24 hours please contact pt to update on scheduling process    Current Device: Dual Pacemaker  Device Company/Device Rep Needed for Procedure: Medtronic    Pre-Procedural Testing needed: COVID 19 nasal/lab test within 48hrs of procedure  Anesthesia:  General    [facility] EP Cath Lab Prep   H&P:  Compled by Soo on 2/24/22 if scheduled within 30 days, pt to schedule with PMD if procedure outside of this timeframe    Pre-op Labs: Ordered AM of procedure    Medical Records Pertinent for Procedure:  N/A    Patient Education:  Teach with Patient: Will be completed via phone prior to procedure.    Risks Reviewed:     Cardioversion    >90% acute success rate, <10% failure to convert or   reverts shortly after cardioversion.    <1% embolic event of (CVA, pulmonary embolism, or   1. other site).    75% risk for superficial burn.  Risks associated with general anesthesia will be  addressed by the Anesthesiology Department    Pre-Procedure Instructions:  NPO after midnight, Remove all jewelry prior to coming in for procedure, Shower prior to arrival, Notified patient of time and date of procedure by CV , Transportation arrangements needed s/p procedure, Post-procedure follow up process, Sedation plan/orders and Pre-procedure letter was sent to pt    Pre-Procedure Medication Instructions:  Instructions given to pt regarding anticoagulants: Xarelto- instructed to continue anticoagulation uninterrupted through their procedure  Instructions given to pt regarding antiarrhythmic medication: Amiodarone; Pt instructed to continue medication prior to procedure  Instructions for medication, other than anticoagulants/antiarrhythmics listed above, given to pt: to take all morning medications with small sips of water, with the exception of OTC supplements and MVI    Allergies   Allergen Reactions     Lac Bovis Nausea and Vomiting     Other reaction(s): upset stomach       Current Outpatient Medications:      amiodarone (PACERONE) 200 MG tablet, Take 200mg twice daily for 3 weeks, then reduce to 200mg Daily., Disp: 180 tablet, Rfl: 3     aspirin (ASA) 81 MG chewable tablet, Take 1 tablet (81 mg) by mouth every other day, Disp: 30 tablet, Rfl: 0     atorvastatin (LIPITOR) 10 MG tablet, [ATORVASTATIN (LIPITOR) 10 MG TABLET] Take 10 mg by mouth 4 (four) times a week. Days vary , Disp: , Rfl:      coenzyme Q-10 200 MG CAPS capsule, Take 200 mg by mouth daily, Disp: , Rfl:      doxazosin (CARDURA) 8 MG tablet, [DOXAZOSIN (CARDURA) 8 MG TABLET] Take 4 mg by mouth at bedtime.       , Disp: , Rfl:      finasteride (PROSCAR) 5 mg tablet, [FINASTERIDE (PROSCAR) 5 MG TABLET] Take 5 mg by mouth at bedtime.       , Disp: , Rfl:      furosemide (LASIX) 40 MG tablet, Take 1 tablet (40 mg) by mouth daily Take extra dose if weight gain, leg swelling, or short of breath, Disp: 100 tablet, Rfl: 3     gabapentin  (NEURONTIN) 100 MG capsule, Two capsules in the morning, two capsules in the afternoon, and three capsules in the evening, Disp: , Rfl:      glucosamine/chondr cole A sod (OSTEO BI-FLEX ORAL), [GLUCOSAMINE/CHONDR COLE A SOD (OSTEO BI-FLEX ORAL)] Take 1 tablet by mouth 2 (two) times a day.       , Disp: , Rfl:      losartan (COZAAR) 25 MG tablet, [LOSARTAN (COZAAR) 25 MG TABLET] Take 1 tablet (25 mg total) by mouth daily., Disp: 30 tablet, Rfl: 0     melatonin 10 mg Tab, [MELATONIN 10 MG TAB] Take 10 mg by mouth at bedtime as needed., Disp: , Rfl:      metoprolol succinate ER (TOPROL-XL) 25 MG 24 hr tablet, Take 1 tablet (25 mg) by mouth At Bedtime, Disp: 90 tablet, Rfl: 3     omeprazole (PRILOSEC) 20 MG capsule, [OMEPRAZOLE (PRILOSEC) 20 MG CAPSULE] Take 20 mg by mouth 2 (two) times a day before meals., Disp: , Rfl:      rivaroxaban ANTICOAGULANT (XARELTO) 20 MG TABS tablet, Take 1 tablet (20 mg) by mouth daily, Disp: 90 tablet, Rfl: 3     sertraline (ZOLOFT) 50 MG tablet, Take 50 mg by mouth 2 times daily , Disp: , Rfl:      vit A/vit C/vit E/zinc/copper (PRESERVISION AREDS ORAL), [VIT A/VIT C/VIT E/ZINC/COPPER (PRESERVISION AREDS ORAL)] Take 1 tablet by mouth 2 (two) times a day., Disp: , Rfl:     Documentation Date:2/10/2022 8:59 AM  Kacey Alvarado RN

## 2022-02-14 ENCOUNTER — TELEPHONE (OUTPATIENT)
Dept: CARDIOLOGY | Facility: CLINIC | Age: 82
End: 2022-02-14
Payer: COMMERCIAL

## 2022-02-14 NOTE — TELEPHONE ENCOUNTER
Pre-Procedure Education Phone Call    Procedure: CV with  Lizzie Pena NP on 3/8/2022 AT 8 30    PT IS ON XARELTO AND NO MISSED DOSES PER DAUGHTER JOSIE        Education:       PT HAS A  FOR PROCEDURE THAT WILL STAY WITH HIM HIS DAUGHTER JOSIE PT IS VERY HARD OF HEARING AND WILL WEAR HIS HEARING AIDE THAT DAY    ALL INSTRUCTIONS REVIEWED WITH DAUGHTER JOSIE     PT INSTRUCTED TO HOLD ANY VITAMINS,MINERALS CALCIUM IRON OR SUPPLEMENTS THE MORNING OF CV  PT INSTRUCTED NO GUM CHEWING MINTS OR CANDY THE MORNING OF CV  PT INSTRUCTED TO LEAVE JEWELRY AT HOME  PT INSTRUCTED TO BATHE OR SHOWER BEFORE COMING IN  PT HAS AN ICD  PT IS NEW ON AMIODARONE  PT IS ON XARELTO  PT IS HARD OF HEARING   NO MEDICATION CHANGES MADE PER SHAMEKA CALLAWAY CNP  PT WILL HAVE PRE OP WITH SHAMEKA 2/24/2022  PT WILL HAVE POST CV FOLLOW UP WITH SHAMEKA 4/5 AND ZAHRAA 4/4         Reviewed Pre-Intra-Post education and instructions reviewed via phone- refer to procedure prep for instructions that were reviewed    COIVD: scheduled on COVID 3/5/2022 AT  at a  facility, results will be viewable in "[employer]    Pre-Op: scheduled on 2/24/2022 WITH SHAMEKA CALLAWAY CNP    Medications: Pt verbalized understanding of medication instructions pre procedure    Important patient information for staff: PT HAS AN ICD, IS ON XARELTO , NEW ON AMIODARONE IS VERY HARD OF HARING, DAUGHTER WILL BE WITH PT    2/14/2022 12:29 PM  Rylie Stout

## 2022-02-14 NOTE — TELEPHONE ENCOUNTER
Received a voicemail from patient's daughter, Juliet on [facility] nurse line. She had appointment questions- clarifying when cardioversion was and follow up with EP NP. Writer can see that she spoke with EP nurse to go over cardioversion education. Writer called back and left message giving dates of when appts are and left direct line at  clinic to call back with any questions. -[facility]

## 2022-02-22 ENCOUNTER — ANCILLARY PROCEDURE (OUTPATIENT)
Dept: CARDIOLOGY | Facility: CLINIC | Age: 82
End: 2022-02-22
Attending: INTERNAL MEDICINE
Payer: COMMERCIAL

## 2022-02-22 ENCOUNTER — TELEPHONE (OUTPATIENT)
Dept: CARDIOLOGY | Facility: CLINIC | Age: 82
End: 2022-02-22

## 2022-02-22 ENCOUNTER — TELEPHONE (OUTPATIENT)
Dept: CARDIOLOGY | Facility: CLINIC | Age: 82
End: 2022-02-22
Payer: COMMERCIAL

## 2022-02-22 DIAGNOSIS — I44.2 AV BLOCK, 3RD DEGREE (H): ICD-10-CM

## 2022-02-22 DIAGNOSIS — Z95.0 STATUS POST BIVENTRICULAR PACEMAKER: ICD-10-CM

## 2022-02-22 DIAGNOSIS — I50.9 CHF (CONGESTIVE HEART FAILURE) (H): ICD-10-CM

## 2022-02-22 LAB
MDC_IDC_EPISODE_DTM: NORMAL
MDC_IDC_EPISODE_DURATION: 1 S
MDC_IDC_EPISODE_DURATION: 100 S
MDC_IDC_EPISODE_DURATION: 102 S
MDC_IDC_EPISODE_DURATION: 104 S
MDC_IDC_EPISODE_DURATION: 106 S
MDC_IDC_EPISODE_DURATION: 1105 S
MDC_IDC_EPISODE_DURATION: 112 S
MDC_IDC_EPISODE_DURATION: 116 S
MDC_IDC_EPISODE_DURATION: 1193 S
MDC_IDC_EPISODE_DURATION: 12 S
MDC_IDC_EPISODE_DURATION: 121 S
MDC_IDC_EPISODE_DURATION: 1213 S
MDC_IDC_EPISODE_DURATION: 131 S
MDC_IDC_EPISODE_DURATION: 133 S
MDC_IDC_EPISODE_DURATION: 158 S
MDC_IDC_EPISODE_DURATION: 161 S
MDC_IDC_EPISODE_DURATION: 1611 S
MDC_IDC_EPISODE_DURATION: 163 S
MDC_IDC_EPISODE_DURATION: 164 S
MDC_IDC_EPISODE_DURATION: 168 S
MDC_IDC_EPISODE_DURATION: 17 S
MDC_IDC_EPISODE_DURATION: 179 S
MDC_IDC_EPISODE_DURATION: 180 S
MDC_IDC_EPISODE_DURATION: 181 S
MDC_IDC_EPISODE_DURATION: 192 S
MDC_IDC_EPISODE_DURATION: 193 S
MDC_IDC_EPISODE_DURATION: 195 S
MDC_IDC_EPISODE_DURATION: 197 S
MDC_IDC_EPISODE_DURATION: 203 S
MDC_IDC_EPISODE_DURATION: 21 S
MDC_IDC_EPISODE_DURATION: 214 S
MDC_IDC_EPISODE_DURATION: 218 S
MDC_IDC_EPISODE_DURATION: 224 S
MDC_IDC_EPISODE_DURATION: 225 S
MDC_IDC_EPISODE_DURATION: 227 S
MDC_IDC_EPISODE_DURATION: 24 S
MDC_IDC_EPISODE_DURATION: 24 S
MDC_IDC_EPISODE_DURATION: 252 S
MDC_IDC_EPISODE_DURATION: 26 S
MDC_IDC_EPISODE_DURATION: 270 S
MDC_IDC_EPISODE_DURATION: 28 S
MDC_IDC_EPISODE_DURATION: 291 S
MDC_IDC_EPISODE_DURATION: 294 S
MDC_IDC_EPISODE_DURATION: 295 S
MDC_IDC_EPISODE_DURATION: 308 S
MDC_IDC_EPISODE_DURATION: 309 S
MDC_IDC_EPISODE_DURATION: 312 S
MDC_IDC_EPISODE_DURATION: 316 S
MDC_IDC_EPISODE_DURATION: 319 S
MDC_IDC_EPISODE_DURATION: 32 S
MDC_IDC_EPISODE_DURATION: 336 S
MDC_IDC_EPISODE_DURATION: 343 S
MDC_IDC_EPISODE_DURATION: 35 S
MDC_IDC_EPISODE_DURATION: 358 S
MDC_IDC_EPISODE_DURATION: 36 S
MDC_IDC_EPISODE_DURATION: 393 S
MDC_IDC_EPISODE_DURATION: 397 S
MDC_IDC_EPISODE_DURATION: 4 S
MDC_IDC_EPISODE_DURATION: 401 S
MDC_IDC_EPISODE_DURATION: 411 S
MDC_IDC_EPISODE_DURATION: 42 S
MDC_IDC_EPISODE_DURATION: 42 S
MDC_IDC_EPISODE_DURATION: 422 S
MDC_IDC_EPISODE_DURATION: 44 S
MDC_IDC_EPISODE_DURATION: 45 S
MDC_IDC_EPISODE_DURATION: 464 S
MDC_IDC_EPISODE_DURATION: 475 S
MDC_IDC_EPISODE_DURATION: 5 S
MDC_IDC_EPISODE_DURATION: 50 S
MDC_IDC_EPISODE_DURATION: 501 S
MDC_IDC_EPISODE_DURATION: 52 S
MDC_IDC_EPISODE_DURATION: 528 S
MDC_IDC_EPISODE_DURATION: 53 S
MDC_IDC_EPISODE_DURATION: 57 S
MDC_IDC_EPISODE_DURATION: 58 S
MDC_IDC_EPISODE_DURATION: 59 S
MDC_IDC_EPISODE_DURATION: 6 S
MDC_IDC_EPISODE_DURATION: 60 S
MDC_IDC_EPISODE_DURATION: 60 S
MDC_IDC_EPISODE_DURATION: 610 S
MDC_IDC_EPISODE_DURATION: 64 S
MDC_IDC_EPISODE_DURATION: 64 S
MDC_IDC_EPISODE_DURATION: 644 S
MDC_IDC_EPISODE_DURATION: 665 S
MDC_IDC_EPISODE_DURATION: 68 S
MDC_IDC_EPISODE_DURATION: 69 S
MDC_IDC_EPISODE_DURATION: 7 S
MDC_IDC_EPISODE_DURATION: 706 S
MDC_IDC_EPISODE_DURATION: 71 S
MDC_IDC_EPISODE_DURATION: 763 S
MDC_IDC_EPISODE_DURATION: 8 S
MDC_IDC_EPISODE_DURATION: 8 S
MDC_IDC_EPISODE_DURATION: 80 S
MDC_IDC_EPISODE_DURATION: 83 S
MDC_IDC_EPISODE_DURATION: 858 S
MDC_IDC_EPISODE_DURATION: 9 S
MDC_IDC_EPISODE_DURATION: 90 S
MDC_IDC_EPISODE_DURATION: 90 S
MDC_IDC_EPISODE_DURATION: 917 S
MDC_IDC_EPISODE_DURATION: 94 S
MDC_IDC_EPISODE_DURATION: 970 S
MDC_IDC_EPISODE_DURATION: 973 S
MDC_IDC_EPISODE_DURATION: 980 S
MDC_IDC_EPISODE_ID: 1352
MDC_IDC_EPISODE_ID: 173
MDC_IDC_EPISODE_ID: 174
MDC_IDC_EPISODE_ID: 175
MDC_IDC_EPISODE_ID: 176
MDC_IDC_EPISODE_ID: 177
MDC_IDC_EPISODE_ID: 178
MDC_IDC_EPISODE_ID: 1832
MDC_IDC_EPISODE_ID: 1833
MDC_IDC_EPISODE_ID: 1834
MDC_IDC_EPISODE_ID: 1837
MDC_IDC_EPISODE_ID: 1840
MDC_IDC_EPISODE_ID: 1842
MDC_IDC_EPISODE_ID: 1843
MDC_IDC_EPISODE_ID: 1845
MDC_IDC_EPISODE_ID: 1846
MDC_IDC_EPISODE_ID: 1848
MDC_IDC_EPISODE_ID: 1849
MDC_IDC_EPISODE_ID: 1850
MDC_IDC_EPISODE_ID: 1852
MDC_IDC_EPISODE_ID: 1854
MDC_IDC_EPISODE_ID: 1857
MDC_IDC_EPISODE_ID: 1861
MDC_IDC_EPISODE_ID: 1862
MDC_IDC_EPISODE_ID: 1863
MDC_IDC_EPISODE_ID: 1864
MDC_IDC_EPISODE_ID: 1866
MDC_IDC_EPISODE_ID: 1868
MDC_IDC_EPISODE_ID: 1875
MDC_IDC_EPISODE_ID: 1884
MDC_IDC_EPISODE_ID: 1885
MDC_IDC_EPISODE_ID: 1886
MDC_IDC_EPISODE_ID: 1887
MDC_IDC_EPISODE_ID: 1888
MDC_IDC_EPISODE_ID: 1889
MDC_IDC_EPISODE_ID: 1890
MDC_IDC_EPISODE_ID: 1891
MDC_IDC_EPISODE_ID: 1892
MDC_IDC_EPISODE_ID: 1893
MDC_IDC_EPISODE_ID: 1894
MDC_IDC_EPISODE_ID: 1895
MDC_IDC_EPISODE_ID: 1896
MDC_IDC_EPISODE_ID: 1897
MDC_IDC_EPISODE_ID: 1898
MDC_IDC_EPISODE_ID: 1899
MDC_IDC_EPISODE_ID: 1900
MDC_IDC_EPISODE_ID: 1901
MDC_IDC_EPISODE_ID: 1902
MDC_IDC_EPISODE_ID: 1903
MDC_IDC_EPISODE_ID: 1904
MDC_IDC_EPISODE_ID: 1905
MDC_IDC_EPISODE_ID: 1906
MDC_IDC_EPISODE_ID: 1907
MDC_IDC_EPISODE_ID: 1908
MDC_IDC_EPISODE_ID: 1909
MDC_IDC_EPISODE_ID: 1910
MDC_IDC_EPISODE_ID: 1911
MDC_IDC_EPISODE_ID: 1912
MDC_IDC_EPISODE_ID: 1913
MDC_IDC_EPISODE_ID: 1914
MDC_IDC_EPISODE_ID: 1915
MDC_IDC_EPISODE_ID: 1916
MDC_IDC_EPISODE_ID: 1917
MDC_IDC_EPISODE_ID: 1918
MDC_IDC_EPISODE_ID: 1919
MDC_IDC_EPISODE_ID: 1920
MDC_IDC_EPISODE_ID: 1921
MDC_IDC_EPISODE_ID: 1922
MDC_IDC_EPISODE_ID: 1923
MDC_IDC_EPISODE_ID: 1924
MDC_IDC_EPISODE_ID: 1925
MDC_IDC_EPISODE_ID: 1926
MDC_IDC_EPISODE_ID: 1927
MDC_IDC_EPISODE_ID: 1928
MDC_IDC_EPISODE_ID: 1929
MDC_IDC_EPISODE_ID: 1930
MDC_IDC_EPISODE_ID: 1931
MDC_IDC_EPISODE_ID: 1932
MDC_IDC_EPISODE_ID: 1933
MDC_IDC_EPISODE_ID: 1934
MDC_IDC_EPISODE_ID: 1935
MDC_IDC_EPISODE_ID: 1936
MDC_IDC_EPISODE_ID: 1937
MDC_IDC_EPISODE_ID: 1938
MDC_IDC_EPISODE_ID: 1939
MDC_IDC_EPISODE_ID: 1940
MDC_IDC_EPISODE_ID: 1941
MDC_IDC_EPISODE_ID: 1942
MDC_IDC_EPISODE_ID: 1943
MDC_IDC_EPISODE_ID: 1944
MDC_IDC_EPISODE_ID: 1945
MDC_IDC_EPISODE_ID: 1946
MDC_IDC_EPISODE_ID: 1947
MDC_IDC_EPISODE_ID: 1948
MDC_IDC_EPISODE_ID: 1949
MDC_IDC_EPISODE_ID: 1950
MDC_IDC_EPISODE_ID: 1951
MDC_IDC_EPISODE_ID: 1952
MDC_IDC_EPISODE_ID: 1953
MDC_IDC_EPISODE_ID: 1954
MDC_IDC_EPISODE_ID: 1955
MDC_IDC_EPISODE_ID: 1956
MDC_IDC_EPISODE_ID: 1957
MDC_IDC_EPISODE_ID: 1958
MDC_IDC_EPISODE_ID: 1959
MDC_IDC_EPISODE_ID: 1960
MDC_IDC_EPISODE_ID: 1961
MDC_IDC_EPISODE_TYPE: NORMAL
MDC_IDC_LEAD_IMPLANT_DT: NORMAL
MDC_IDC_LEAD_LOCATION: NORMAL
MDC_IDC_LEAD_LOCATION_DETAIL_1: NORMAL
MDC_IDC_LEAD_MFG: NORMAL
MDC_IDC_LEAD_MODEL: NORMAL
MDC_IDC_LEAD_POLARITY_TYPE: NORMAL
MDC_IDC_LEAD_SERIAL: NORMAL
MDC_IDC_LEAD_SPECIAL_FUNCTION: NORMAL
MDC_IDC_MSMT_BATTERY_DTM: NORMAL
MDC_IDC_MSMT_BATTERY_REMAINING_LONGEVITY: 83 MO
MDC_IDC_MSMT_BATTERY_RRT_TRIGGER: 2.6
MDC_IDC_MSMT_BATTERY_STATUS: NORMAL
MDC_IDC_MSMT_BATTERY_VOLTAGE: 2.98 V
MDC_IDC_MSMT_LEADCHNL_LV_IMPEDANCE_VALUE: 190 OHM
MDC_IDC_MSMT_LEADCHNL_LV_IMPEDANCE_VALUE: 266 OHM
MDC_IDC_MSMT_LEADCHNL_LV_IMPEDANCE_VALUE: 285 OHM
MDC_IDC_MSMT_LEADCHNL_LV_IMPEDANCE_VALUE: 342 OHM
MDC_IDC_MSMT_LEADCHNL_LV_IMPEDANCE_VALUE: 380 OHM
MDC_IDC_MSMT_LEADCHNL_LV_PACING_THRESHOLD_AMPLITUDE: 1.5 V
MDC_IDC_MSMT_LEADCHNL_LV_PACING_THRESHOLD_PULSEWIDTH: 0.4 MS
MDC_IDC_MSMT_LEADCHNL_RA_IMPEDANCE_VALUE: 266 OHM
MDC_IDC_MSMT_LEADCHNL_RA_IMPEDANCE_VALUE: 418 OHM
MDC_IDC_MSMT_LEADCHNL_RA_PACING_THRESHOLD_AMPLITUDE: 0.75 V
MDC_IDC_MSMT_LEADCHNL_RA_PACING_THRESHOLD_PULSEWIDTH: 0.4 MS
MDC_IDC_MSMT_LEADCHNL_RA_SENSING_INTR_AMPL: 0.12 MV
MDC_IDC_MSMT_LEADCHNL_RA_SENSING_INTR_AMPL: 0.12 MV
MDC_IDC_MSMT_LEADCHNL_RV_IMPEDANCE_VALUE: 247 OHM
MDC_IDC_MSMT_LEADCHNL_RV_IMPEDANCE_VALUE: 304 OHM
MDC_IDC_MSMT_LEADCHNL_RV_PACING_THRESHOLD_AMPLITUDE: 0.88 V
MDC_IDC_MSMT_LEADCHNL_RV_PACING_THRESHOLD_PULSEWIDTH: 0.4 MS
MDC_IDC_MSMT_LEADCHNL_RV_SENSING_INTR_AMPL: 6.88 MV
MDC_IDC_MSMT_LEADCHNL_RV_SENSING_INTR_AMPL: 6.88 MV
MDC_IDC_PG_IMPLANT_DTM: NORMAL
MDC_IDC_PG_MFG: NORMAL
MDC_IDC_PG_MODEL: NORMAL
MDC_IDC_PG_SERIAL: NORMAL
MDC_IDC_PG_TYPE: NORMAL
MDC_IDC_SESS_CLINIC_NAME: NORMAL
MDC_IDC_SESS_DTM: NORMAL
MDC_IDC_SESS_TYPE: NORMAL
MDC_IDC_SET_BRADY_AT_MODE_SWITCH_RATE: 171 {BEATS}/MIN
MDC_IDC_SET_BRADY_LOWRATE: 60 {BEATS}/MIN
MDC_IDC_SET_BRADY_MAX_SENSOR_RATE: 120 {BEATS}/MIN
MDC_IDC_SET_BRADY_MAX_TRACKING_RATE: 120 {BEATS}/MIN
MDC_IDC_SET_BRADY_MODE: NORMAL
MDC_IDC_SET_BRADY_PAV_DELAY_HIGH: 100 MS
MDC_IDC_SET_BRADY_PAV_DELAY_LOW: 170 MS
MDC_IDC_SET_BRADY_SAV_DELAY_HIGH: 70 MS
MDC_IDC_SET_BRADY_SAV_DELAY_LOW: 140 MS
MDC_IDC_SET_CRT_LVRV_DELAY: 80 MS
MDC_IDC_SET_CRT_PACED_CHAMBERS: NORMAL
MDC_IDC_SET_LEADCHNL_LV_PACING_AMPLITUDE: 2.5 V
MDC_IDC_SET_LEADCHNL_LV_PACING_ANODE_ELECTRODE_1: NORMAL
MDC_IDC_SET_LEADCHNL_LV_PACING_ANODE_LOCATION_1: NORMAL
MDC_IDC_SET_LEADCHNL_LV_PACING_CAPTURE_MODE: NORMAL
MDC_IDC_SET_LEADCHNL_LV_PACING_CATHODE_ELECTRODE_1: NORMAL
MDC_IDC_SET_LEADCHNL_LV_PACING_CATHODE_LOCATION_1: NORMAL
MDC_IDC_SET_LEADCHNL_LV_PACING_POLARITY: NORMAL
MDC_IDC_SET_LEADCHNL_LV_PACING_PULSEWIDTH: 0.4 MS
MDC_IDC_SET_LEADCHNL_RA_PACING_AMPLITUDE: 1.5 V
MDC_IDC_SET_LEADCHNL_RA_PACING_ANODE_ELECTRODE_1: NORMAL
MDC_IDC_SET_LEADCHNL_RA_PACING_ANODE_LOCATION_1: NORMAL
MDC_IDC_SET_LEADCHNL_RA_PACING_CAPTURE_MODE: NORMAL
MDC_IDC_SET_LEADCHNL_RA_PACING_CATHODE_ELECTRODE_1: NORMAL
MDC_IDC_SET_LEADCHNL_RA_PACING_CATHODE_LOCATION_1: NORMAL
MDC_IDC_SET_LEADCHNL_RA_PACING_POLARITY: NORMAL
MDC_IDC_SET_LEADCHNL_RA_PACING_PULSEWIDTH: 0.4 MS
MDC_IDC_SET_LEADCHNL_RA_SENSING_ANODE_ELECTRODE_1: NORMAL
MDC_IDC_SET_LEADCHNL_RA_SENSING_ANODE_LOCATION_1: NORMAL
MDC_IDC_SET_LEADCHNL_RA_SENSING_CATHODE_ELECTRODE_1: NORMAL
MDC_IDC_SET_LEADCHNL_RA_SENSING_CATHODE_LOCATION_1: NORMAL
MDC_IDC_SET_LEADCHNL_RA_SENSING_POLARITY: NORMAL
MDC_IDC_SET_LEADCHNL_RA_SENSING_SENSITIVITY: 0.15 MV
MDC_IDC_SET_LEADCHNL_RV_PACING_AMPLITUDE: 2 V
MDC_IDC_SET_LEADCHNL_RV_PACING_ANODE_ELECTRODE_1: NORMAL
MDC_IDC_SET_LEADCHNL_RV_PACING_ANODE_LOCATION_1: NORMAL
MDC_IDC_SET_LEADCHNL_RV_PACING_CAPTURE_MODE: NORMAL
MDC_IDC_SET_LEADCHNL_RV_PACING_CATHODE_ELECTRODE_1: NORMAL
MDC_IDC_SET_LEADCHNL_RV_PACING_CATHODE_LOCATION_1: NORMAL
MDC_IDC_SET_LEADCHNL_RV_PACING_POLARITY: NORMAL
MDC_IDC_SET_LEADCHNL_RV_PACING_PULSEWIDTH: 0.4 MS
MDC_IDC_SET_LEADCHNL_RV_SENSING_ANODE_ELECTRODE_1: NORMAL
MDC_IDC_SET_LEADCHNL_RV_SENSING_ANODE_LOCATION_1: NORMAL
MDC_IDC_SET_LEADCHNL_RV_SENSING_CATHODE_ELECTRODE_1: NORMAL
MDC_IDC_SET_LEADCHNL_RV_SENSING_CATHODE_LOCATION_1: NORMAL
MDC_IDC_SET_LEADCHNL_RV_SENSING_POLARITY: NORMAL
MDC_IDC_SET_LEADCHNL_RV_SENSING_SENSITIVITY: 0.9 MV
MDC_IDC_SET_ZONE_DETECTION_INTERVAL: 350 MS
MDC_IDC_SET_ZONE_DETECTION_INTERVAL: 400 MS
MDC_IDC_SET_ZONE_TYPE: NORMAL
MDC_IDC_STAT_AT_BURDEN_PERCENT: 95.7 %
MDC_IDC_STAT_AT_DTM_END: NORMAL
MDC_IDC_STAT_AT_DTM_START: NORMAL
MDC_IDC_STAT_BRADY_AP_VP_PERCENT: 32.78 %
MDC_IDC_STAT_BRADY_AP_VS_PERCENT: 0.18 %
MDC_IDC_STAT_BRADY_AS_VP_PERCENT: 70.07 %
MDC_IDC_STAT_BRADY_AS_VS_PERCENT: 1.39 %
MDC_IDC_STAT_BRADY_DTM_END: NORMAL
MDC_IDC_STAT_BRADY_DTM_START: NORMAL
MDC_IDC_STAT_BRADY_RA_PERCENT_PACED: 13.06 %
MDC_IDC_STAT_BRADY_RV_PERCENT_PACED: 98.11 %
MDC_IDC_STAT_CRT_DTM_END: NORMAL
MDC_IDC_STAT_CRT_DTM_START: NORMAL
MDC_IDC_STAT_CRT_LV_PERCENT_PACED: 98.09 %
MDC_IDC_STAT_CRT_PERCENT_PACED: 98.09 %
MDC_IDC_STAT_EPISODE_RECENT_COUNT: 0
MDC_IDC_STAT_EPISODE_RECENT_COUNT: 1
MDC_IDC_STAT_EPISODE_RECENT_COUNT: 826
MDC_IDC_STAT_EPISODE_RECENT_COUNT_DTM_END: NORMAL
MDC_IDC_STAT_EPISODE_RECENT_COUNT_DTM_START: NORMAL
MDC_IDC_STAT_EPISODE_TOTAL_COUNT: 0
MDC_IDC_STAT_EPISODE_TOTAL_COUNT: 0
MDC_IDC_STAT_EPISODE_TOTAL_COUNT: 1
MDC_IDC_STAT_EPISODE_TOTAL_COUNT: 1912
MDC_IDC_STAT_EPISODE_TOTAL_COUNT: 48
MDC_IDC_STAT_EPISODE_TOTAL_COUNT_DTM_END: NORMAL
MDC_IDC_STAT_EPISODE_TOTAL_COUNT_DTM_START: NORMAL
MDC_IDC_STAT_EPISODE_TYPE: NORMAL

## 2022-02-22 NOTE — TELEPHONE ENCOUNTER
"2/22/22: Called patient to discuss remote sent in for Soo Mott NP appointment on 2/24/2022. Patient has been in Afib 14/15 days. He has been working on many things around the house and notices that sometimes he gets wheezy, but \"it never bothers me\".  His daughter had called in and listed many symptoms over the past few days, according to her, his weight has been stable.  See report below.    Type: DRT-P remote check due to having many symptoms over the weekend.  Presenting Rhythm: AS/BiVP, Atrial Lead very noisy, already programmed to most sensitive. AP 13%, BiVP 98%.  Lead/Battery status: Stable lead and battery measurements. Battery estimates 6.9 years remaining.  Arrhythmias: 826 mode switches, burden 95.7% since 2/7/2022, 14/15 days. Atrial ATP has been effective 21.9% of the time, 132/602.  VR >= 120 bpm ~1-2% of the time while in AF. 1 NSVT on 2/14/2022 at 22:45 pm for 9 beats,  bpm. EGM confirms NSVT.  Anticoagulant: Xarelto  Comments: Normal device function. Optivol is elevated to the max level with a corresponding decrease in thoracic impedance since beginning of December 2021.  Will route to Soo Mott NP.   Darlyn Todd, Device RN.                  "

## 2022-02-22 NOTE — TELEPHONE ENCOUNTER
"Soo,     Pt well known to you, currently scheduled for CV on 3/8.  Currently loading on amiodarone 200mg BID, metoprolol 25mg daily, lasix 40mg daily, Xarelto 20mg daily, losartan 25mg daily and 81 mg ASA every other day     His daughter Juliet called with a few concerns.  They are currently scheduled for 2/24 to see you.    She describes an episode on Sunday where ankles and feer were swollen and puffy, after a full day of working in the garage, outside etc.  Pt took lasix 40mg x4 pills to resolve this.    Weight has been stable throughout.    She also describes he complained of his heart feeling \"weird\" with a little chest pain on Sunday, he took an extra ASA for this.    She also describes headaches 1 hour after taking his amiodarone.    Pt is feeling well today, again weight is stable and puffiness and edema is gone.    She wants to discuss all of this with you on Thursday but also wanted to give you a heads up.  w  Device, she also asked if there were any alerts found on device?    Thank you  Makenzie"

## 2022-02-24 ENCOUNTER — OFFICE VISIT (OUTPATIENT)
Dept: CARDIOLOGY | Facility: CLINIC | Age: 82
End: 2022-02-24
Payer: MEDICARE

## 2022-02-24 VITALS
WEIGHT: 197.9 LBS | BODY MASS INDEX: 27.71 KG/M2 | SYSTOLIC BLOOD PRESSURE: 118 MMHG | DIASTOLIC BLOOD PRESSURE: 58 MMHG | HEIGHT: 71 IN | RESPIRATION RATE: 16 BRPM | HEART RATE: 68 BPM

## 2022-02-24 DIAGNOSIS — Z95.0 CARDIAC PACEMAKER IN SITU: Chronic | ICD-10-CM

## 2022-02-24 DIAGNOSIS — I50.42 CHRONIC COMBINED SYSTOLIC AND DIASTOLIC HEART FAILURE (H): Primary | Chronic | ICD-10-CM

## 2022-02-24 DIAGNOSIS — I50.22 CHRONIC SYSTOLIC CONGESTIVE HEART FAILURE (H): ICD-10-CM

## 2022-02-24 DIAGNOSIS — I48.19 PERSISTENT ATRIAL FIBRILLATION (H): ICD-10-CM

## 2022-02-24 PROCEDURE — 99214 OFFICE O/P EST MOD 30 MIN: CPT | Performed by: NURSE PRACTITIONER

## 2022-02-24 RX ORDER — AMIODARONE HYDROCHLORIDE 200 MG/1
1 TABLET ORAL 2 TIMES DAILY
Status: ON HOLD | COMMUNITY
End: 2022-03-08

## 2022-02-24 RX ORDER — FUROSEMIDE 40 MG
80 TABLET ORAL DAILY
Qty: 100 TABLET | Refills: 3
Start: 2022-02-24 | End: 2022-02-25

## 2022-02-24 NOTE — H&P (VIEW-ONLY)
Assessment/Recommendations     Assessment-    1.  Persistent atrial fibrillation-patient is monitored on routine PPM checks.    -Over the course of the past 10 months patient has had increasing frequency of atrial fibrillation and persistent atrial fibrillation which was initially not associated with symptoms but ended up leading to congestive heart failure symptoms.    -8/29/2021 patient in persistent atrial fibrillation with OptiVol rising  -9/14/2021 cardioversion done and OptiVol corrected  -10/25/2021 patient back in persistent atrial fibrillation with controlled rates but OptiVol rising-phone call/nurse assessment complete patient denied all symptoms  -11/28/2021 another alert for OptiVol rising heart rates good  -11/29/2021 nurses called patient to assess for symptoms patient says he was feeling great and have no symptoms  -12/14/2021 patient presented to PCP with 19 pound weight gain and required increased diuretics  -2/3/2022 repeat cardioversion; atrial fibrillation recurred 2/6/2022  -2/14/2022 amiodarone 200 mg p.o. twice daily x21 days then reduce to 200 mg oral daily  -Discussed pathophysiology and chronic nature of atrial fibrillation; this could be/is likely progression of his atrial fibrillation.  Discussed rate versus rhythm control and that management of atrial fibrillation is based upon symptoms.  Educated on increased thromboembolic risk associated with atrial fibrillation and risk versus benefit of OAC  -Continue metoprolol succinate 25 mg oral daily  -Continue Xarelto 20 mg oral daily  -Patient had traumatic fall July 24, has also experienced gum bleeding and is likely a good candidate for watchman device; I mentioned it again at the visit but can discuss it more in depth in the future if he is interested.  Patient is not interested at this time.  He would like to continue on Xarelto and assess for future bleeding complications  -If atrial fibrillation recurs patient needs to be referred  to electrophysiologist     2.  Congestive heart failure-BiV pacing continues-98% on most recent check.  Dry weight is approximately 184 pounds.  Patient is up to 197 pounds, fine crackles heard in left lower lobe, +1-2 BLE pitting edema present.  Experiencing some shortness of breath with exertion.  There was a possible mixup with furosemide tablets which may have contributed to weight gain     -Continue furosemide 80 mg daily  -Increase furosemide to 80 mg a.m. and 40 mg p.m. x4 days  -If weight continues to be >190 pounds please call the clinic and at that time we would need a BMP drawn and further increase in furosemide  -Patient may need to start following in core clinic if weight continues to be an issue     3.  Hypertension-blood pressure 118/58; currently meeting guideline goals    4.  Medtronic CRT-P Percepta-remote device check done 2/22/2022  Presenting Rhythm: AS/BiVP, Atrial Lead very noisy, already programmed to most sensitive. AP 13%, BiVP 98%.  Lead/Battery status: Stable lead and battery measurements. Battery estimates 6.9 years remaining.  Arrhythmias: 826 mode switches, burden 95.7% since 2/7/2022, 14/15 days. Atrial ATP has been effective 21.9% of the time, 132/602.  VR >= 120 bpm ~1-2% of the time while in AF. 1 NSVT on 2/14/2022 at 22:45 pm for 9 beats,  bpm. EGM confirms NSVT.  Anticoagulant: Xarelto  Comments: Normal device function. Optivol is elevated to the max level with a corresponding decrease in thoracic impedance since beginning of December 2021     [id number] score of 5: 2 age, 1 hypertension, 1 CAD, 1 CHF and on Xarelto 20 mg oral daily with meal.     Reymundo Petersen will follow up with me 3 to 4 weeks after cardioversion       History of Present Illness/Subjective    Mr. Reymundo Petersen is a 81 year old male seen at [facility] today for management of atrial fibrillation/H&P for cardioversion.      Reymundo Petersen has a known history of left  ventricular hypertrophy, CHF with EF 45%, congestive heart failure, coronary artery disease, complete heart block status post pacemaker insertion, hypertension, hyperlipidemia, hospitalization for COVID-19, MARIAN    Ramirez was seen for H&P prior to cardioversion today.  Unfortunately his weight is up at least 13 pounds which he reports happened overnight (?).  Discussed importance of cardioversion to restore sinus rhythm also noting it is important to follow-up about amiodarone prior to cardioversion.  He denies lightheadedness, orthopnea, PND, palpitations, chest pain and abdominal fullness/bloating.      Cardiographics (reviewed):  Echo 5/19/2021  Narrative & Impression    Left ventricular cavity size is normal. Severe concentric increase in wall thickness. Ejection fraction is mildly decreased. The calculated left ventricular ejection fraction is 43%.    Right ventricular cavity size is mildly dilated. Normal right ventricular systolic function.    Severe biatrial enlargement.    Mild aortic regurgitation.    The ascending aorta is mildly dilated measuring 4.1 cm.    Moderate pulmonary hypertension present. The estimated systolic pulmonary artery pressure is 51 mm Hg.    When compared to the previous study dated 9/1/2020, there has been no significant change.     Angiogram 10/23/2020  Angiography findings:  LM: Mild calcified lesion at the distal left main  LAD: Diffuse disease from proximal to mid LAD  Lcx: Small system with minimal disease.  RCA: Dominant vessel with diffuse mild disease.  The prior stent in PL is patent        Physical Examination Review of Systems   Vitals: There were no vitals taken for this visit.  BMI= There is no height or weight on file to calculate BMI.  Wt Readings from Last 3 Encounters:   02/03/22 85 kg (187 lb 8 oz)   01/27/22 83 kg (183 lb)   01/24/22 83 kg (183 lb)       General Appearance:   Alert, cooperative and in no acute distress.   ENT/Mouth: membranes moist, no facial drooping    EYES:  no scleral icterus, normal conjunctivae   Neck: no JVD   Chest/Lungs:   lungs are clear to auscultation, no rales or wheezing, respirations unlabored   Cardiovascular:   Irregular. Normal first and second heart sounds with no murmurs, rubs, or gallops; the radial and posterior tibial pulses are intact, +1-2 edema bilateral lower extremities    Abdomen:  Soft, nontender, nondistended, bowel sounds present   Extremities: no cyanosis or clubbing   Skin: warm, dry.    Neurologic: mood and affect are appropriate, alert and oriented x3         Please refer above for cardiac ROS details.      Medical History  Surgical History Family History Social History   Past Medical History:   Diagnosis Date     Atrial fibrillation, transient (H) 8/11/2020     BPH (benign prostatic hyperplasia) 7/11/2018     Chronic combined systolic and diastolic heart failure (H) 2/11/2021     Coronary artery disease      Dilated cardiomyopathy (H) 2/11/2021     MARIAN (generalized anxiety disorder) 2/11/2021     GERD (gastroesophageal reflux disease)      High cholesterol      Hypertension      LBBB (left bundle branch block) 7/11/2018     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     Pulmonary hypertension (H) 4/1/2020     Past Surgical History:   Procedure Laterality Date     ANGIOPLASTY       ARTHROSCOPY KNEE       CARDIOVERSION  2021     CARDIOVERSION  02/03/2022     CV CORONARY ANGIOGRAM N/A 10/23/2020    Procedure: Coronary Angiogram;  Surgeon: Varun Freire MD;  Location: [facility] Cath Lab;  Service: Cardiology     CV LEFT HEART CATHETERIZATION WITHOUT LEFT VENTRICULOGRAM Left 10/23/2020    Procedure: Left Heart Catheterization Without Left Ventriculogram;  Surgeon: Varun Freire MD;  Location: [facility] Cath Lab;  Service: Cardiology     EP BIV PACEMAKER INSERT N/A 3/25/2020    Procedure: EP Biventricular Pacemaker Insertion;  Surgeon: Taylor Sandoval MD;  Location: [facility] Cath Lab;  Service:  Cardiology     RELEASE CARPAL TUNNEL       ZZC TOTAL KNEE ARTHROPLASTY Right 8/15/2019    Procedure: RIGHT  MINIMALLY TOTAL KNEE ARTHROPLASTY;  Surgeon: Keven Cerda MD;  Location: [facility] Main OR;  Service: Orthopedics     Family History   Problem Relation Age of Onset     Heart Disease Father      Diabetes Type 2  Sister      Alzheimer Disease Mother      Chronic Obstructive Pulmonary Disease Brother     Social History     Socioeconomic History     Marital status:      Spouse name: Not on file     Number of children: Not on file     Years of education: Not on file     Highest education level: Not on file   Occupational History     Not on file   Tobacco Use     Smoking status: Never Smoker     Smokeless tobacco: Never Used   Substance and Sexual Activity     Alcohol use: Yes     Comment: Alcoholic Drinks/day: rare     Drug use: No     Sexual activity: Not on file   Other Topics Concern     Not on file   Social History Narrative     Not on file     Social Determinants of Health     Financial Resource Strain: Not on file   Food Insecurity: Not on file   Transportation Needs: Not on file   Physical Activity: Not on file   Stress: Not on file   Social Connections: Not on file   Intimate Partner Violence: Not on file   Housing Stability: Not on file          Medications  Allergies   Current Outpatient Medications   Medication Sig Dispense Refill     amiodarone (PACERONE) 200 MG tablet Take 200mg twice daily for 3 weeks, then reduce to 200mg Daily. 180 tablet 3     aspirin (ASA) 81 MG chewable tablet Take 1 tablet (81 mg) by mouth every other day 30 tablet 0     atorvastatin (LIPITOR) 10 MG tablet [ATORVASTATIN (LIPITOR) 10 MG TABLET] Take 10 mg by mouth 4 (four) times a week. Days vary        coenzyme Q-10 200 MG CAPS capsule Take 200 mg by mouth daily       doxazosin (CARDURA) 8 MG tablet [DOXAZOSIN (CARDURA) 8 MG TABLET] Take 4 mg by mouth at bedtime.              finasteride (PROSCAR) 5 mg tablet  [FINASTERIDE (PROSCAR) 5 MG TABLET] Take 5 mg by mouth at bedtime.              furosemide (LASIX) 40 MG tablet Take 1 tablet (40 mg) by mouth daily Take extra dose if weight gain, leg swelling, or short of breath 100 tablet 3     gabapentin (NEURONTIN) 100 MG capsule Two capsules in the morning, two capsules in the afternoon, and three capsules in the evening       glucosamine/chondr cole A sod (OSTEO BI-FLEX ORAL) [GLUCOSAMINE/CHONDR COLE A SOD (OSTEO BI-FLEX ORAL)] Take 1 tablet by mouth 2 (two) times a day.              losartan (COZAAR) 25 MG tablet [LOSARTAN (COZAAR) 25 MG TABLET] Take 1 tablet (25 mg total) by mouth daily. 30 tablet 0     melatonin 10 mg Tab [MELATONIN 10 MG TAB] Take 10 mg by mouth at bedtime as needed.       metoprolol succinate ER (TOPROL-XL) 25 MG 24 hr tablet Take 1 tablet (25 mg) by mouth At Bedtime 90 tablet 3     omeprazole (PRILOSEC) 20 MG capsule [OMEPRAZOLE (PRILOSEC) 20 MG CAPSULE] Take 20 mg by mouth 2 (two) times a day before meals.       rivaroxaban ANTICOAGULANT (XARELTO) 20 MG TABS tablet Take 1 tablet (20 mg) by mouth daily 90 tablet 3     sertraline (ZOLOFT) 50 MG tablet Take 50 mg by mouth 2 times daily        vit A/vit C/vit E/zinc/copper (PRESERVISION AREDS ORAL) [VIT A/VIT C/VIT E/ZINC/COPPER (PRESERVISION AREDS ORAL)] Take 1 tablet by mouth 2 (two) times a day.      Allergies   Allergen Reactions     Lac Bovis Nausea and Vomiting     Other reaction(s): upset stomach         Lab Results    Chemistry/lipid CBC Cardiac Enzymes/BNP/TSH/INR   Lab Results   Component Value Date    CHOL 142 10/23/2020    HDL 51 10/23/2020    TRIG 79 10/23/2020    BUN 28 02/01/2022     02/01/2022    CO2 29 02/01/2022    Lab Results   Component Value Date    WBC 9.4 07/02/2021    HGB 13.1 (L) 01/18/2022    HCT 45.1 07/02/2021    MCV 96 07/02/2021     07/02/2021    Lab Results   Component Value Date    TROPONINI 0.14 02/11/2021    TROPONINI 0.05 01/31/2021    TROPONINI 0.26 03/25/2020      Lab Results   Component Value Date     (H) 01/18/2022     (H) 02/11/2021     (H) 01/31/2021     Lab Results   Component Value Date    TSH 1.43 03/24/2020     Lab Results   Component Value Date    INR 1.03 02/11/2021    INR 1.05 03/24/2020    INR 0.98 08/15/2019        Total Time- 35 minutes spent on date of encounter doing chart review, history and exam, documentation and further activities as noted above.  This note has been dictated using voice recognition software. Any grammatical, typographical, or context distortions are unintentional and inherent to the software.    Soo Mott, [facility]

## 2022-02-24 NOTE — PROGRESS NOTES
Assessment/Recommendations     Assessment-    1.  Persistent atrial fibrillation-patient is monitored on routine PPM checks.    -Over the course of the past 10 months patient has had increasing frequency of atrial fibrillation and persistent atrial fibrillation which was initially not associated with symptoms but ended up leading to congestive heart failure symptoms.    -8/29/2021 patient in persistent atrial fibrillation with OptiVol rising  -9/14/2021 cardioversion done and OptiVol corrected  -10/25/2021 patient back in persistent atrial fibrillation with controlled rates but OptiVol rising-phone call/nurse assessment complete patient denied all symptoms  -11/28/2021 another alert for OptiVol rising heart rates good  -11/29/2021 nurses called patient to assess for symptoms patient says he was feeling great and have no symptoms  -12/14/2021 patient presented to PCP with 19 pound weight gain and required increased diuretics  -2/3/2022 repeat cardioversion; atrial fibrillation recurred 2/6/2022  -2/14/2022 amiodarone 200 mg p.o. twice daily x21 days then reduce to 200 mg oral daily  -Discussed pathophysiology and chronic nature of atrial fibrillation; this could be/is likely progression of his atrial fibrillation.  Discussed rate versus rhythm control and that management of atrial fibrillation is based upon symptoms.  Educated on increased thromboembolic risk associated with atrial fibrillation and risk versus benefit of OAC  -Continue metoprolol succinate 25 mg oral daily  -Continue Xarelto 20 mg oral daily  -Patient had traumatic fall July 24, has also experienced gum bleeding and is likely a good candidate for watchman device; I mentioned it again at the visit but can discuss it more in depth in the future if he is interested.  Patient is not interested at this time.  He would like to continue on Xarelto and assess for future bleeding complications  -If atrial fibrillation recurs patient needs to be referred  to electrophysiologist     2.  Congestive heart failure-BiV pacing continues-98% on most recent check.  Dry weight is approximately 184 pounds.  Patient is up to 197 pounds, fine crackles heard in left lower lobe, +1-2 BLE pitting edema present.  Experiencing some shortness of breath with exertion.  There was a possible mixup with furosemide tablets which may have contributed to weight gain     -Continue furosemide 80 mg daily  -Increase furosemide to 80 mg a.m. and 40 mg p.m. x4 days  -If weight continues to be >190 pounds please call the clinic and at that time we would need a BMP drawn and further increase in furosemide  -Patient may need to start following in core clinic if weight continues to be an issue     3.  Hypertension-blood pressure 118/58; currently meeting guideline goals    4.  [employer] CRT-P Percepta-remote device check done 2/22/2022  Presenting Rhythm: AS/BiVP, Atrial Lead very noisy, already programmed to most sensitive. AP 13%, BiVP 98%.  Lead/Battery status: Stable lead and battery measurements. Battery estimates 6.9 years remaining.  Arrhythmias: 826 mode switches, burden 95.7% since 2/7/2022, 14/15 days. Atrial ATP has been effective 21.9% of the time, 132/602.  VR >= 120 bpm ~1-2% of the time while in AF. 1 NSVT on 2/14/2022 at 22:45 pm for 9 beats,  bpm. EGM confirms NSVT.  Anticoagulant: Xarelto  Comments: Normal device function. Optivol is elevated to the max level with a corresponding decrease in thoracic impedance since beginning of December 2021     [id number] score of 5: 2 age, 1 hypertension, 1 CAD, 1 CHF and on Xarelto 20 mg oral daily with meal.     Reymundo Petersen will follow up with me 3 to 4 weeks after cardioversion       History of Present Illness/Subjective    Mr. Reymundo Petersen is a 81 year old male seen at [facility] today for management of atrial fibrillation/H&P for cardioversion.      Reymundo Petersen has a known history of left  ventricular hypertrophy, CHF with EF 45%, congestive heart failure, coronary artery disease, complete heart block status post pacemaker insertion, hypertension, hyperlipidemia, hospitalization for COVID-19, MARIAN    Ramirez was seen for H&P prior to cardioversion today.  Unfortunately his weight is up at least 13 pounds which he reports happened overnight (?).  Discussed importance of cardioversion to restore sinus rhythm also noting it is important to follow-up about amiodarone prior to cardioversion.  He denies lightheadedness, orthopnea, PND, palpitations, chest pain and abdominal fullness/bloating.      Cardiographics (reviewed):  Echo 5/19/2021  Narrative & Impression    Left ventricular cavity size is normal. Severe concentric increase in wall thickness. Ejection fraction is mildly decreased. The calculated left ventricular ejection fraction is 43%.    Right ventricular cavity size is mildly dilated. Normal right ventricular systolic function.    Severe biatrial enlargement.    Mild aortic regurgitation.    The ascending aorta is mildly dilated measuring 4.1 cm.    Moderate pulmonary hypertension present. The estimated systolic pulmonary artery pressure is 51 mm Hg.    When compared to the previous study dated 9/1/2020, there has been no significant change.     Angiogram 10/23/2020  Angiography findings:  LM: Mild calcified lesion at the distal left main  LAD: Diffuse disease from proximal to mid LAD  Lcx: Small system with minimal disease.  RCA: Dominant vessel with diffuse mild disease.  The prior stent in PL is patent        Physical Examination Review of Systems   Vitals: There were no vitals taken for this visit.  BMI= There is no height or weight on file to calculate BMI.  Wt Readings from Last 3 Encounters:   02/03/22 85 kg (187 lb 8 oz)   01/27/22 83 kg (183 lb)   01/24/22 83 kg (183 lb)       General Appearance:   Alert, cooperative and in no acute distress.   ENT/Mouth: membranes moist, no facial drooping    EYES:  no scleral icterus, normal conjunctivae   Neck: no JVD   Chest/Lungs:   lungs are clear to auscultation, no rales or wheezing, respirations unlabored   Cardiovascular:   Irregular. Normal first and second heart sounds with no murmurs, rubs, or gallops; the radial and posterior tibial pulses are intact, +1-2 edema bilateral lower extremities    Abdomen:  Soft, nontender, nondistended, bowel sounds present   Extremities: no cyanosis or clubbing   Skin: warm, dry.    Neurologic: mood and affect are appropriate, alert and oriented x3         Please refer above for cardiac ROS details.      Medical History  Surgical History Family History Social History   Past Medical History:   Diagnosis Date     Atrial fibrillation, transient (H) 8/11/2020     BPH (benign prostatic hyperplasia) 7/11/2018     Chronic combined systolic and diastolic heart failure (H) 2/11/2021     Coronary artery disease      Dilated cardiomyopathy (H) 2/11/2021     MARIAN (generalized anxiety disorder) 2/11/2021     GERD (gastroesophageal reflux disease)      High cholesterol      Hypertension      LBBB (left bundle branch block) 7/11/2018     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     Pulmonary hypertension (H) 4/1/2020     Past Surgical History:   Procedure Laterality Date     ANGIOPLASTY       ARTHROSCOPY KNEE       CARDIOVERSION  2021     CARDIOVERSION  02/03/2022     CV CORONARY ANGIOGRAM N/A 10/23/2020    Procedure: Coronary Angiogram;  Surgeon: Varun Freire MD;  Location: [facility];  Service: Cardiology     CV LEFT HEART CATHETERIZATION WITHOUT LEFT VENTRICULOGRAM Left 10/23/2020    Procedure: Left Heart Catheterization Without Left Ventriculogram;  Surgeon: Varun Freire MD;  Location: [facility];  Service: Cardiology     EP BIV PACEMAKER INSERT N/A 3/25/2020    Procedure: EP Biventricular Pacemaker Insertion;  Surgeon: Taylor Sandoval MD;  Location: [facility];  Service:  Cardiology     RELEASE CARPAL TUNNEL       ZZC TOTAL KNEE ARTHROPLASTY Right 8/15/2019    Procedure: RIGHT  MINIMALLY TOTAL KNEE ARTHROPLASTY;  Surgeon: Keven Cerda MD;  Location: [facility] OR;  Service: Orthopedics     Family History   Problem Relation Age of Onset     Heart Disease Father      Diabetes Type 2  Sister      Alzheimer Disease Mother      Chronic Obstructive Pulmonary Disease Brother     Social History     Socioeconomic History     Marital status:      Spouse name: Not on file     Number of children: Not on file     Years of education: Not on file     Highest education level: Not on file   Occupational History     Not on file   Tobacco Use     Smoking status: Never Smoker     Smokeless tobacco: Never Used   Substance and Sexual Activity     Alcohol use: Yes     Comment: Alcoholic Drinks/day: rare     Drug use: No     Sexual activity: Not on file   Other Topics Concern     Not on file   Social History Narrative     Not on file     Social Determinants of Health     Financial Resource Strain: Not on file   Food Insecurity: Not on file   Transportation Needs: Not on file   Physical Activity: Not on file   Stress: Not on file   Social Connections: Not on file   Intimate Partner Violence: Not on file   Housing Stability: Not on file          Medications  Allergies   Current Outpatient Medications   Medication Sig Dispense Refill     amiodarone (PACERONE) 200 MG tablet Take 200mg twice daily for 3 weeks, then reduce to 200mg Daily. 180 tablet 3     aspirin (ASA) 81 MG chewable tablet Take 1 tablet (81 mg) by mouth every other day 30 tablet 0     atorvastatin (LIPITOR) 10 MG tablet [ATORVASTATIN (LIPITOR) 10 MG TABLET] Take 10 mg by mouth 4 (four) times a week. Days vary        coenzyme Q-10 200 MG CAPS capsule Take 200 mg by mouth daily       doxazosin (CARDURA) 8 MG tablet [DOXAZOSIN (CARDURA) 8 MG TABLET] Take 4 mg by mouth at bedtime.              finasteride (PROSCAR) 5 mg tablet  [FINASTERIDE (PROSCAR) 5 MG TABLET] Take 5 mg by mouth at bedtime.              furosemide (LASIX) 40 MG tablet Take 1 tablet (40 mg) by mouth daily Take extra dose if weight gain, leg swelling, or short of breath 100 tablet 3     gabapentin (NEURONTIN) 100 MG capsule Two capsules in the morning, two capsules in the afternoon, and three capsules in the evening       glucosamine/chondr cole A sod (OSTEO BI-FLEX ORAL) [GLUCOSAMINE/CHONDR COLE A SOD (OSTEO BI-FLEX ORAL)] Take 1 tablet by mouth 2 (two) times a day.              losartan (COZAAR) 25 MG tablet [LOSARTAN (COZAAR) 25 MG TABLET] Take 1 tablet (25 mg total) by mouth daily. 30 tablet 0     melatonin 10 mg Tab [MELATONIN 10 MG TAB] Take 10 mg by mouth at bedtime as needed.       metoprolol succinate ER (TOPROL-XL) 25 MG 24 hr tablet Take 1 tablet (25 mg) by mouth At Bedtime 90 tablet 3     omeprazole (PRILOSEC) 20 MG capsule [OMEPRAZOLE (PRILOSEC) 20 MG CAPSULE] Take 20 mg by mouth 2 (two) times a day before meals.       rivaroxaban ANTICOAGULANT (XARELTO) 20 MG TABS tablet Take 1 tablet (20 mg) by mouth daily 90 tablet 3     sertraline (ZOLOFT) 50 MG tablet Take 50 mg by mouth 2 times daily        vit A/vit C/vit E/zinc/copper (PRESERVISION AREDS ORAL) [VIT A/VIT C/VIT E/ZINC/COPPER (PRESERVISION AREDS ORAL)] Take 1 tablet by mouth 2 (two) times a day.      Allergies   Allergen Reactions     Lac Bovis Nausea and Vomiting     Other reaction(s): upset stomach         Lab Results    Chemistry/lipid CBC Cardiac Enzymes/BNP/TSH/INR   Lab Results   Component Value Date    CHOL 142 10/23/2020    HDL 51 10/23/2020    TRIG 79 10/23/2020    BUN 28 02/01/2022     02/01/2022    CO2 29 02/01/2022    Lab Results   Component Value Date    WBC 9.4 07/02/2021    HGB 13.1 (L) 01/18/2022    HCT 45.1 07/02/2021    MCV 96 07/02/2021     07/02/2021    Lab Results   Component Value Date    TROPONINI 0.14 02/11/2021    TROPONINI 0.05 01/31/2021    TROPONINI 0.26 03/25/2020      Lab Results   Component Value Date     (H) 01/18/2022     (H) 02/11/2021     (H) 01/31/2021     Lab Results   Component Value Date    TSH 1.43 03/24/2020     Lab Results   Component Value Date    INR 1.03 02/11/2021    INR 1.05 03/24/2020    INR 0.98 08/15/2019        Total Time- 35 minutes spent on date of encounter doing chart review, history and exam, documentation and further activities as noted above.  This note has been dictated using voice recognition software. Any grammatical, typographical, or context distortions are unintentional and inherent to the software.    Soo Mott, [facility]

## 2022-02-24 NOTE — LETTER
2/24/2022    ADDISON BENÍTEZ MD  [facility] [address]  [address] [facility] [address]    RE: Reymundo Petersen       Dear Colleague,     I had the pleasure of seeing Reymundo Petersen in the [facility].      Assessment/Recommendations     Assessment-    1.  Persistent atrial fibrillation-patient is monitored on routine PPM checks.    -Over the course of the past 10 months patient has had increasing frequency of atrial fibrillation and persistent atrial fibrillation which was initially not associated with symptoms but ended up leading to congestive heart failure symptoms.    -8/29/2021 patient in persistent atrial fibrillation with OptiVol rising  -9/14/2021 cardioversion done and OptiVol corrected  -10/25/2021 patient back in persistent atrial fibrillation with controlled rates but OptiVol rising-phone call/nurse assessment complete patient denied all symptoms  -11/28/2021 another alert for OptiVol rising heart rates good  -11/29/2021 nurses called patient to assess for symptoms patient says he was feeling great and have no symptoms  -12/14/2021 patient presented to PCP with 19 pound weight gain and required increased diuretics  -2/3/2022 repeat cardioversion; atrial fibrillation recurred 2/6/2022  -2/14/2022 amiodarone 200 mg p.o. twice daily x21 days then reduce to 200 mg oral daily  -Discussed pathophysiology and chronic nature of atrial fibrillation; this could be/is likely progression of his atrial fibrillation.  Discussed rate versus rhythm control and that management of atrial fibrillation is based upon symptoms.  Educated on increased thromboembolic risk associated with atrial fibrillation and risk versus benefit of OAC  -Continue metoprolol succinate 25 mg oral daily  -Continue Xarelto 20 mg oral daily  -Patient had traumatic fall July 24, has also experienced gum bleeding and is likely a good candidate for watchman device; I mentioned it again at the visit but can discuss it more  in depth in the future if he is interested.  Patient is not interested at this time.  He would like to continue on Xarelto and assess for future bleeding complications  -If atrial fibrillation recurs patient needs to be referred to electrophysiologist     2.  Congestive heart failure-BiV pacing continues-98% on most recent check.  Dry weight is approximately 184 pounds.  Patient is up to 197 pounds, fine crackles heard in left lower lobe, +1-2 BLE pitting edema present.  Experiencing some shortness of breath with exertion.  There was a possible mixup with furosemide tablets which may have contributed to weight gain     -Continue furosemide 80 mg daily  -Increase furosemide to 80 mg a.m. and 40 mg p.m. x4 days  -If weight continues to be >190 pounds please call the clinic and at that time we would need a BMP drawn and further increase in furosemide  -Patient may need to start following in core clinic if weight continues to be an issue     3.  Hypertension-blood pressure 118/58; currently meeting guideline goals    4.  [employer] CRT-P Percepta-remote device check done 2/22/2022  Presenting Rhythm: AS/BiVP, Atrial Lead very noisy, already programmed to most sensitive. AP 13%, BiVP 98%.  Lead/Battery status: Stable lead and battery measurements. Battery estimates 6.9 years remaining.  Arrhythmias: 826 mode switches, burden 95.7% since 2/7/2022, 14/15 days. Atrial ATP has been effective 21.9% of the time, 132/602.  VR >= 120 bpm ~1-2% of the time while in AF. 1 NSVT on 2/14/2022 at 22:45 pm for 9 beats,  bpm. EGM confirms NSVT.  Anticoagulant: Xarelto  Comments: Normal device function. Optivol is elevated to the max level with a corresponding decrease in thoracic impedance since beginning of December 2021     [id number] score of 5: 2 age, 1 hypertension, 1 CAD, 1 CHF and on Xarelto 20 mg oral daily with meal.     Reymundo Petersen will follow up with me 3 to 4 weeks after cardioversion       History of Present  Illness/Subjective    Mr. Reymundo Petersen is a 81 year old male seen at [facility] today for management of atrial fibrillation/H&P for cardioversion.      Reymundo Petersen has a known history of left ventricular hypertrophy, CHF with EF 45%, congestive heart failure, coronary artery disease, complete heart block status post pacemaker insertion, hypertension, hyperlipidemia, hospitalization for COVID-19, MARIAN    Don was seen for H&P prior to cardioversion today.  Unfortunately his weight is up at least 13 pounds which he reports happened overnight (?).  Discussed importance of cardioversion to restore sinus rhythm also noting it is important to follow-up about amiodarone prior to cardioversion.  He denies lightheadedness, orthopnea, PND, palpitations, chest pain and abdominal fullness/bloating.      Cardiographics (reviewed):  Echo 5/19/2021  Narrative & Impression    Left ventricular cavity size is normal. Severe concentric increase in wall thickness. Ejection fraction is mildly decreased. The calculated left ventricular ejection fraction is 43%.    Right ventricular cavity size is mildly dilated. Normal right ventricular systolic function.    Severe biatrial enlargement.    Mild aortic regurgitation.    The ascending aorta is mildly dilated measuring 4.1 cm.    Moderate pulmonary hypertension present. The estimated systolic pulmonary artery pressure is 51 mm Hg.    When compared to the previous study dated 9/1/2020, there has been no significant change.     Angiogram 10/23/2020  Angiography findings:  LM: Mild calcified lesion at the distal left main  LAD: Diffuse disease from proximal to mid LAD  Lcx: Small system with minimal disease.  RCA: Dominant vessel with diffuse mild disease.  The prior stent in PL is patent        Physical Examination Review of Systems   Vitals: There were no vitals taken for this visit.  BMI= There is no height or weight on file to calculate BMI.  Wt Readings from  Last 3 Encounters:   02/03/22 85 kg (187 lb 8 oz)   01/27/22 83 kg (183 lb)   01/24/22 83 kg (183 lb)       General Appearance:   Alert, cooperative and in no acute distress.   ENT/Mouth: membranes moist, no facial drooping   EYES:  no scleral icterus, normal conjunctivae   Neck: no JVD   Chest/Lungs:   lungs are clear to auscultation, no rales or wheezing, respirations unlabored   Cardiovascular:   Irregular. Normal first and second heart sounds with no murmurs, rubs, or gallops; the radial and posterior tibial pulses are intact, +1-2 edema bilateral lower extremities    Abdomen:  Soft, nontender, nondistended, bowel sounds present   Extremities: no cyanosis or clubbing   Skin: warm, dry.    Neurologic: mood and affect are appropriate, alert and oriented x3         Please refer above for cardiac ROS details.      Medical History  Surgical History Family History Social History   Past Medical History:   Diagnosis Date     Atrial fibrillation, transient (H) 8/11/2020     BPH (benign prostatic hyperplasia) 7/11/2018     Chronic combined systolic and diastolic heart failure (H) 2/11/2021     Coronary artery disease      Dilated cardiomyopathy (H) 2/11/2021     MARIAN (generalized anxiety disorder) 2/11/2021     GERD (gastroesophageal reflux disease)      High cholesterol      Hypertension      LBBB (left bundle branch block) 7/11/2018     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     Pulmonary hypertension (H) 4/1/2020     Past Surgical History:   Procedure Laterality Date     ANGIOPLASTY       ARTHROSCOPY KNEE       CARDIOVERSION  2021     CARDIOVERSION  02/03/2022     CV CORONARY ANGIOGRAM N/A 10/23/2020    Procedure: Coronary Angiogram;  Surgeon: Varun Freire MD;  Location: [facility] Cath Lab;  Service: Cardiology     CV LEFT HEART CATHETERIZATION WITHOUT LEFT VENTRICULOGRAM Left 10/23/2020    Procedure: Left Heart Catheterization Without Left Ventriculogram;  Surgeon: Varun Freire MD;   Location: [facility] Cath Lab;  Service: Cardiology     EP BIV PACEMAKER INSERT N/A 3/25/2020    Procedure: EP Biventricular Pacemaker Insertion;  Surgeon: Taylor Sandoval MD;  Location: [facility] Cath Lab;  Service: Cardiology     RELEASE CARPAL TUNNEL       ZZC TOTAL KNEE ARTHROPLASTY Right 8/15/2019    Procedure: RIGHT  MINIMALLY TOTAL KNEE ARTHROPLASTY;  Surgeon: Keven Cerda MD;  Location: [facility] Main OR;  Service: Orthopedics     Family History   Problem Relation Age of Onset     Heart Disease Father      Diabetes Type 2  Sister      Alzheimer Disease Mother      Chronic Obstructive Pulmonary Disease Brother     Social History     Socioeconomic History     Marital status:      Spouse name: Not on file     Number of children: Not on file     Years of education: Not on file     Highest education level: Not on file   Occupational History     Not on file   Tobacco Use     Smoking status: Never Smoker     Smokeless tobacco: Never Used   Substance and Sexual Activity     Alcohol use: Yes     Comment: Alcoholic Drinks/day: rare     Drug use: No     Sexual activity: Not on file   Other Topics Concern     Not on file   Social History Narrative     Not on file     Social Determinants of Health     Financial Resource Strain: Not on file   Food Insecurity: Not on file   Transportation Needs: Not on file   Physical Activity: Not on file   Stress: Not on file   Social Connections: Not on file   Intimate Partner Violence: Not on file   Housing Stability: Not on file          Medications  Allergies   Current Outpatient Medications   Medication Sig Dispense Refill     amiodarone (PACERONE) 200 MG tablet Take 200mg twice daily for 3 weeks, then reduce to 200mg Daily. 180 tablet 3     aspirin (ASA) 81 MG chewable tablet Take 1 tablet (81 mg) by mouth every other day 30 tablet 0     atorvastatin (LIPITOR) 10 MG tablet [ATORVASTATIN (LIPITOR) 10 MG TABLET] Take 10 mg by mouth 4 (four) times a week. Days vary         coenzyme Q-10 200 MG CAPS capsule Take 200 mg by mouth daily       doxazosin (CARDURA) 8 MG tablet [DOXAZOSIN (CARDURA) 8 MG TABLET] Take 4 mg by mouth at bedtime.              finasteride (PROSCAR) 5 mg tablet [FINASTERIDE (PROSCAR) 5 MG TABLET] Take 5 mg by mouth at bedtime.              furosemide (LASIX) 40 MG tablet Take 1 tablet (40 mg) by mouth daily Take extra dose if weight gain, leg swelling, or short of breath 100 tablet 3     gabapentin (NEURONTIN) 100 MG capsule Two capsules in the morning, two capsules in the afternoon, and three capsules in the evening       glucosamine/chondr cole A sod (OSTEO BI-FLEX ORAL) [GLUCOSAMINE/CHONDR COLE A SOD (OSTEO BI-FLEX ORAL)] Take 1 tablet by mouth 2 (two) times a day.              losartan (COZAAR) 25 MG tablet [LOSARTAN (COZAAR) 25 MG TABLET] Take 1 tablet (25 mg total) by mouth daily. 30 tablet 0     melatonin 10 mg Tab [MELATONIN 10 MG TAB] Take 10 mg by mouth at bedtime as needed.       metoprolol succinate ER (TOPROL-XL) 25 MG 24 hr tablet Take 1 tablet (25 mg) by mouth At Bedtime 90 tablet 3     omeprazole (PRILOSEC) 20 MG capsule [OMEPRAZOLE (PRILOSEC) 20 MG CAPSULE] Take 20 mg by mouth 2 (two) times a day before meals.       rivaroxaban ANTICOAGULANT (XARELTO) 20 MG TABS tablet Take 1 tablet (20 mg) by mouth daily 90 tablet 3     sertraline (ZOLOFT) 50 MG tablet Take 50 mg by mouth 2 times daily        vit A/vit C/vit E/zinc/copper (PRESERVISION AREDS ORAL) [VIT A/VIT C/VIT E/ZINC/COPPER (PRESERVISION AREDS ORAL)] Take 1 tablet by mouth 2 (two) times a day.      Allergies   Allergen Reactions     Lac Bovis Nausea and Vomiting     Other reaction(s): upset stomach         Lab Results    Chemistry/lipid CBC Cardiac Enzymes/BNP/TSH/INR   Lab Results   Component Value Date    CHOL 142 10/23/2020    HDL 51 10/23/2020    TRIG 79 10/23/2020    BUN 28 02/01/2022     02/01/2022    CO2 29 02/01/2022    Lab Results   Component Value Date    WBC 9.4 07/02/2021     HGB 13.1 (L) 01/18/2022    HCT 45.1 07/02/2021    MCV 96 07/02/2021     07/02/2021    Lab Results   Component Value Date    TROPONINI 0.14 02/11/2021    TROPONINI 0.05 01/31/2021    TROPONINI 0.26 03/25/2020     Lab Results   Component Value Date     (H) 01/18/2022     (H) 02/11/2021     (H) 01/31/2021     Lab Results   Component Value Date    TSH 1.43 03/24/2020     Lab Results   Component Value Date    INR 1.03 02/11/2021    INR 1.05 03/24/2020    INR 0.98 08/15/2019        Total Time- 35 minutes spent on date of encounter doing chart review, history and exam, documentation and further activities as noted above.  This note has been dictated using voice recognition software. Any grammatical, typographical, or context distortions are unintentional and inherent to the software.      Soo Mott [facility] Card[facility]

## 2022-02-24 NOTE — PATIENT INSTRUCTIONS
Reymundo Petersen,    It was a pleasure to see you today at the [facility].     My recommendations after this visit include:    Continue the furosemide 80mg oral daily, then 3-4 hours later take an extra 40mg for 4 days- your weight should be going down    If you are over 190 pounds 2/28 then call us please      My contact information:  Soo oMtt CNP  After Hours or Scheduling  [phone]  My Nurses phone number [phone]- normal business hours

## 2022-02-24 NOTE — Clinical Note
HI Dr. Leigh, Update here- Don keeps going into CHF with weight gain of 13-19 pounds requiring extra diuresis- we have him loading on amio and repeat CV 3/8-- if for some reason we cannot restore SR with him (which would be very unfortunate because there is a direct link with his AF/CHF); I think we would have to refer him to [facility] Any other thoughts? Soo Mott CNP

## 2022-02-25 ENCOUNTER — TELEPHONE (OUTPATIENT)
Dept: CARDIOLOGY | Facility: CLINIC | Age: 82
End: 2022-02-25
Payer: COMMERCIAL

## 2022-02-25 DIAGNOSIS — I50.22 CHRONIC SYSTOLIC CONGESTIVE HEART FAILURE (H): ICD-10-CM

## 2022-02-25 RX ORDER — FUROSEMIDE 40 MG
80 TABLET ORAL DAILY
Qty: 100 TABLET | Refills: 3 | Status: SHIPPED | OUTPATIENT
Start: 2022-02-25 | End: 2022-01-01

## 2022-02-25 NOTE — TELEPHONE ENCOUNTER
Attempted to contact pt, voicemail message was left with contact information and instructing pt to call back.  2/25/2022 2:07 PM  Kacey Torres RN

## 2022-02-25 NOTE — TELEPHONE ENCOUNTER
[facility]    Phone Message    May a detailed message be left on voicemail: yes     Reason for Call: Medication Question or concern regarding medication   Prescription Clarification  Name of Medication: furosemide (LASIX) 40 MG tablet  Prescribing Provider: MIKEL Mott   Pharmacy: [facility] [id number] [facility] - [place], [place] - [address]   What on the order needs clarification? Juliet called in stating new rx needs to be sent to pharmacy. Pt has been taking 20 mg tabs instead of 40 and water weight is not coming off. Please c/b when complete          Action Taken: Message routed to:  Other: [facility]    Travel Screening: Not Applicable

## 2022-02-28 ENCOUNTER — TELEPHONE (OUTPATIENT)
Dept: CARDIOLOGY | Facility: CLINIC | Age: 82
End: 2022-02-28
Payer: COMMERCIAL

## 2022-02-28 DIAGNOSIS — I48.19 PERSISTENT ATRIAL FIBRILLATION (H): Primary | ICD-10-CM

## 2022-02-28 NOTE — TELEPHONE ENCOUNTER
[facility]    Phone Message    May a detailed message be left on voicemail: yes     Reason for Call: Other: Daughter - Juliet called, patients weight was was up on to 202, when she went to check his medications there was 20 mg Lasix mixed in with his meds. She ordered pill organizer.  He is back down to 191-192 today. He did take 5 Lasix tabs yesterday.  He had no issues peeing today, she told him to take 1 tab of Lasix  today. His BP is good. She would like to know: How much water should he be having? How much Lasix should he be taking? were should he be with his weight? Do they needs to do labs?      Action Taken: Other: Cardiology    Travel Screening: Not Applicable

## 2022-02-28 NOTE — TELEPHONE ENCOUNTER
Return call to Juliet.  Reviewed recommendations from Soo below including checking labs, BMP and Mg, due to late call, will be drawn tomorrow.  Labs ordered.  Explained he should take 80 mg Lasix every moring and one more dose of 40 mg this afternoon.  She thinks he only took 40 mg this morning.  Will continue to monitor weight and will touch base with lab results tomorrow.     She is requesting labs to be drawn at [facility] tomorrow.  Will place orders and ask our scheduling team to reach out.

## 2022-02-28 NOTE — TELEPHONE ENCOUNTER
Juliet and I figured he had his pills mixed up so I am glad she went through them.     As she is noticing she will have to keep closer tabs on his medications.     I am concerned that he took that much furosemide in 1 day and I agree he needs a BMP and magnesium drawn, please place order.     Today he should have been on schedule to take 80 mg this morning and 40 mg this afternoon which I would continue to do.     As she recalls he was supposed to take elevated dose of furosemide as we discussed then return to 80 mg daily.     Please see my recommendations in my office visit from 2/24/2022.     His goal weight was 190 or less and it looks like he reported to be at 191 so with 1 more day of elevated furosemide we should be able to meet his goal.     Thank you, Soo Mott, CNP     Please watch for his lab results and if he has a critically low potassium of less than 3.0 report it to on-call cardiologist.     Do not know if you are aware but I am only working half day today and I am headed out.

## 2022-02-28 NOTE — TELEPHONE ENCOUNTER
Phone call from patients daughter.  She states that she noted over the weekend, that her dad had jumbled up his medications.  She found the wrong doses of Metoprolol in his Metoprolol bottle along with Lipitor;  She also found 20 mg tablets of Lasix mixed in with 40 mg tablets in his 40 mg tablet bottle.  She is not sure what dose he had been taking up until Saturday.  She bought a pill organizer and went through all of his medications and straightened them out.    On Sunday his weight was 202.  Somehow on Sunday he took 5, 40 mg tablets of Lasix (200 mg total throughout the day), instead of 80 in the am and 40 in the afternoon.      The patient was up all night last night voiding and his weight today is 191.    His daughter is concerned about his kidney and electrolyte status given the high amount of lasix that he took.      He is scheduled for a cardioversion next week, 3-8-22.  She wonders if he should come in for labs today?  She is also wondering how much water he should be drinking at this time, given his rhythm status and potential for heart failure.    Will review with Soo Mott and call daughter back with recommendations.

## 2022-03-01 ENCOUNTER — LAB (OUTPATIENT)
Dept: CARDIOLOGY | Facility: CLINIC | Age: 82
End: 2022-03-01
Payer: MEDICARE

## 2022-03-01 DIAGNOSIS — I48.19 PERSISTENT ATRIAL FIBRILLATION (H): ICD-10-CM

## 2022-03-01 LAB
ANION GAP SERPL CALCULATED.3IONS-SCNC: 12 MMOL/L (ref 5–18)
BUN SERPL-MCNC: 26 MG/DL (ref 8–28)
CALCIUM SERPL-MCNC: 9.3 MG/DL (ref 8.5–10.5)
CHLORIDE BLD-SCNC: 101 MMOL/L (ref 98–107)
CO2 SERPL-SCNC: 28 MMOL/L (ref 22–31)
CREAT SERPL-MCNC: 1.33 MG/DL (ref 0.7–1.3)
GFR SERPL CREATININE-BSD FRML MDRD: 53 ML/MIN/1.73M2
GLUCOSE BLD-MCNC: 119 MG/DL (ref 70–125)
MAGNESIUM SERPL-MCNC: 1.9 MG/DL (ref 1.8–2.6)
POTASSIUM BLD-SCNC: 3.7 MMOL/L (ref 3.5–5)
SODIUM SERPL-SCNC: 141 MMOL/L (ref 136–145)

## 2022-03-01 PROCEDURE — 83735 ASSAY OF MAGNESIUM: CPT

## 2022-03-01 PROCEDURE — 36415 COLL VENOUS BLD VENIPUNCTURE: CPT

## 2022-03-01 PROCEDURE — 80048 BASIC METABOLIC PNL TOTAL CA: CPT

## 2022-03-05 ENCOUNTER — LAB (OUTPATIENT)
Dept: FAMILY MEDICINE | Facility: CLINIC | Age: 82
End: 2022-03-05
Payer: MEDICARE

## 2022-03-05 DIAGNOSIS — I48.19 PERSISTENT ATRIAL FIBRILLATION (H): ICD-10-CM

## 2022-03-05 PROCEDURE — U0003 INFECTIOUS AGENT DETECTION BY NUCLEIC ACID (DNA OR RNA); SEVERE ACUTE RESPIRATORY SYNDROME CORONAVIRUS 2 (SARS-COV-2) (CORONAVIRUS DISEASE [COVID-19]), AMPLIFIED PROBE TECHNIQUE, MAKING USE OF HIGH THROUGHPUT TECHNOLOGIES AS DESCRIBED BY CMS-2020-01-R: HCPCS

## 2022-03-05 PROCEDURE — U0005 INFEC AGEN DETEC AMPLI PROBE: HCPCS

## 2022-03-06 LAB — SARS-COV-2 RNA RESP QL NAA+PROBE: NEGATIVE

## 2022-03-08 ENCOUNTER — ANESTHESIA (OUTPATIENT)
Dept: CARDIOLOGY | Facility: HOSPITAL | Age: 82
End: 2022-03-08
Payer: MEDICARE

## 2022-03-08 ENCOUNTER — HOSPITAL ENCOUNTER (OUTPATIENT)
Dept: CARDIOLOGY | Facility: HOSPITAL | Age: 82
Discharge: HOME | End: 2022-03-08
Attending: NURSE PRACTITIONER | Admitting: NURSE PRACTITIONER
Payer: MEDICARE

## 2022-03-08 ENCOUNTER — ANESTHESIA EVENT (OUTPATIENT)
Dept: CARDIOLOGY | Facility: HOSPITAL | Age: 82
End: 2022-03-08
Payer: MEDICARE

## 2022-03-08 VITALS
SYSTOLIC BLOOD PRESSURE: 95 MMHG | WEIGHT: 200.6 LBS | HEART RATE: 60 BPM | RESPIRATION RATE: 23 BRPM | HEIGHT: 71 IN | OXYGEN SATURATION: 95 % | TEMPERATURE: 97.9 F | BODY MASS INDEX: 28.08 KG/M2 | DIASTOLIC BLOOD PRESSURE: 61 MMHG

## 2022-03-08 DIAGNOSIS — I48.19 PERSISTENT ATRIAL FIBRILLATION (H): ICD-10-CM

## 2022-03-08 DIAGNOSIS — I48.0 PAROXYSMAL ATRIAL FIBRILLATION (H): ICD-10-CM

## 2022-03-08 DIAGNOSIS — I48.0 PAROXYSMAL ATRIAL FIBRILLATION (H): Primary | ICD-10-CM

## 2022-03-08 LAB
ANION GAP SERPL CALCULATED.3IONS-SCNC: 14 MMOL/L (ref 5–18)
ATRIAL RATE - MUSE: 66 BPM
BUN SERPL-MCNC: 26 MG/DL (ref 8–28)
CALCIUM SERPL-MCNC: 9.7 MG/DL (ref 8.5–10.5)
CHLORIDE BLD-SCNC: 100 MMOL/L (ref 98–107)
CO2 SERPL-SCNC: 24 MMOL/L (ref 22–31)
CREAT SERPL-MCNC: 1.58 MG/DL (ref 0.7–1.3)
DIASTOLIC BLOOD PRESSURE - MUSE: NORMAL MMHG
GFR SERPL CREATININE-BSD FRML MDRD: 43 ML/MIN/1.73M2
GLUCOSE BLD-MCNC: 97 MG/DL (ref 70–125)
INTERPRETATION ECG - MUSE: NORMAL
P AXIS - MUSE: -11 DEGREES
POTASSIUM BLD-SCNC: 4.1 MMOL/L (ref 3.5–5)
PR INTERVAL - MUSE: 234 MS
QRS DURATION - MUSE: 142 MS
QT - MUSE: 486 MS
QTC - MUSE: 509 MS
R AXIS - MUSE: 53 DEGREES
SODIUM SERPL-SCNC: 138 MMOL/L (ref 136–145)
SYSTOLIC BLOOD PRESSURE - MUSE: NORMAL MMHG
T AXIS - MUSE: 196 DEGREES
VENTRICULAR RATE- MUSE: 66 BPM

## 2022-03-08 PROCEDURE — 92960 CARDIOVERSION ELECTRIC EXT: CPT

## 2022-03-08 PROCEDURE — 36415 COLL VENOUS BLD VENIPUNCTURE: CPT | Performed by: NURSE PRACTITIONER

## 2022-03-08 PROCEDURE — 80048 BASIC METABOLIC PNL TOTAL CA: CPT | Performed by: NURSE PRACTITIONER

## 2022-03-08 PROCEDURE — 250N000009 HC RX 250: Performed by: NURSE ANESTHETIST, CERTIFIED REGISTERED

## 2022-03-08 PROCEDURE — 92960 CARDIOVERSION ELECTRIC EXT: CPT | Performed by: NURSE PRACTITIONER

## 2022-03-08 PROCEDURE — 999N000054 HC STATISTIC EKG NON-CHARGEABLE

## 2022-03-08 PROCEDURE — 93010 ELECTROCARDIOGRAM REPORT: CPT | Mod: HOP | Performed by: INTERNAL MEDICINE

## 2022-03-08 PROCEDURE — 93005 ELECTROCARDIOGRAM TRACING: CPT

## 2022-03-08 PROCEDURE — 370N000017 HC ANESTHESIA TECHNICAL FEE, PER MIN

## 2022-03-08 RX ORDER — AMIODARONE HYDROCHLORIDE 200 MG/1
200 TABLET ORAL DAILY
Qty: 90 TABLET | Refills: 3 | Status: SHIPPED | OUTPATIENT
Start: 2022-03-08 | End: 2022-01-01

## 2022-03-08 RX ORDER — LIDOCAINE 40 MG/G
CREAM TOPICAL
Status: DISCONTINUED | OUTPATIENT
Start: 2022-03-08 | End: 2022-03-08 | Stop reason: HOSPADM

## 2022-03-08 RX ADMIN — METHOHEXITAL SODIUM 60 MG: 500 INJECTION, POWDER, LYOPHILIZED, FOR SOLUTION INTRAMUSCULAR; INTRAVENOUS; RECTAL at 09:47

## 2022-03-08 NOTE — ANESTHESIA PREPROCEDURE EVALUATION
Anesthesia Pre-Procedure Evaluation    Patient: Reymundo Petersen   MRN: [id number] : 1940        Procedure :   Cardioversion External       Past Medical History:   Diagnosis Date     Atrial fibrillation (H)      Atrial fibrillation, transient (H) 2020     BPH (benign prostatic hyperplasia) 2018     Chronic combined systolic and diastolic heart failure (H) 2021     Congestive heart failure (H)      Coronary artery disease      COVID 2021     Dilated cardiomyopathy (H) 2021     MARIAN (generalized anxiety disorder) 2021     GERD (gastroesophageal reflux disease)      High cholesterol      Hypertension      LBBB (left bundle branch block) 2018     Nonrheumatic aortic valve insufficiency 2020     Pulmonary hypertension (H) 2020      Past Surgical History:   Procedure Laterality Date     ANGIOPLASTY       ARTHROSCOPY KNEE       CARDIOVERSION       CARDIOVERSION  2022     CV CORONARY ANGIOGRAM N/A 10/23/2020    Procedure: Coronary Angiogram;  Surgeon: Varun Freire MD;  Location: [facility] Cath Lab;  Service: Cardiology     CV LEFT HEART CATHETERIZATION WITHOUT LEFT VENTRICULOGRAM Left 10/23/2020    Procedure: Left Heart Catheterization Without Left Ventriculogram;  Surgeon: Varun Freire MD;  Location: [facility] Cath Lab;  Service: Cardiology     EP BIV PACEMAKER INSERT N/A 3/25/2020    Procedure: EP Biventricular Pacemaker Insertion;  Surgeon: Taylor Sandoval MD;  Location: [facility] Cath Lab;  Service: Cardiology     RELEASE CARPAL TUNNEL       ZZC TOTAL KNEE ARTHROPLASTY Right 8/15/2019    Procedure: RIGHT  MINIMALLY TOTAL KNEE ARTHROPLASTY;  Surgeon: Keven Cerda MD;  Location: [facility];  Service: Orthopedics      Allergies   Allergen Reactions     No Known Allergies       Social History     Tobacco Use     Smoking status: Never Smoker     Smokeless tobacco: Never Used   Substance Use Topics     Alcohol use: Yes      Comment: Alcoholic Drinks/day: rare      Wt Readings from Last 1 Encounters:   02/24/22 89.8 kg (197 lb 14.4 oz)        Anesthesia Evaluation            ROS/MED HX  ENT/Pulmonary:       Neurologic:       Cardiovascular: Comment: Lexiscan 2/10/22:       The nuclear stress test is probably negative for inducible myocardial ischemia or infarction. Cannot exclude a small non-transmural apical inferior infarct but it is likely diaphragmatic attenuation     The left ventricular ejection fraction at stress is 41% without focal wall motion abnormality.     A prior study was conducted on 10/7/2020.  Prior images were unavailable for comparison review, but the report sounds similar to what is seen today, except that TID is no longer present.    TTE 5/19/21:  Narrative & Impression    Left ventricular cavity size is normal. Severe concentric increase in wall thickness. Ejection fraction is mildly decreased. The calculated left ventricular ejection fraction is 43%.    Right ventricular cavity size is mildly dilated. Normal right ventricular systolic function.    Severe biatrial enlargement.    Mild aortic regurgitation.    The ascending aorta is mildly dilated measuring 4.1 cm.    Moderate pulmonary hypertension present. The estimated systolic pulmonary artery pressure is 51 mm Hg.    When compared to the previous study dated 9/1/2020, there has been no significant change.    (+) Dyslipidemia hypertension--CAD ---CHF pacemaker, pulmonary hypertension,     METS/Exercise Tolerance:     Hematologic:       Musculoskeletal:       GI/Hepatic:       Renal/Genitourinary:       Endo:       Psychiatric/Substance Use:     (+) psychiatric history anxiety     Infectious Disease:       Malignancy:       Other:            Physical Exam    Airway        Mallampati: II    Neck ROM: full   Mouth opening: > 3 cm    Respiratory Devices and Support         Dental       (+) upper dentures and lower dentures      Cardiovascular          Rhythm and  rate: irregular and normal     Pulmonary           breath sounds clear to auscultation           OUTSIDE LABS:  CBC:   Lab Results   Component Value Date    WBC 9.4 07/02/2021    WBC 6.4 06/24/2021    HGB 13.1 (L) 01/18/2022    HGB 14.8 07/02/2021    HCT 45.1 07/02/2021    HCT 43.2 06/24/2021     07/02/2021     06/24/2021     BMP:   Lab Results   Component Value Date     03/01/2022     02/01/2022    POTASSIUM 3.7 03/01/2022    POTASSIUM 4.5 02/01/2022    CHLORIDE 101 03/01/2022    CHLORIDE 103 02/01/2022    CO2 28 03/01/2022    CO2 29 02/01/2022    BUN 26 03/01/2022    BUN 28 02/01/2022    CR 1.33 (H) 03/01/2022    CR 1.13 02/01/2022     03/01/2022     02/01/2022     COAGS:   Lab Results   Component Value Date    PTT 36 02/11/2021    INR 1.03 02/11/2021    FIBR 474 (H) 02/15/2021     POC: No results found for: BGM, HCG, HCGS  HEPATIC:   Lab Results   Component Value Date    ALBUMIN 3.5 07/02/2021    PROTTOTAL 6.7 07/02/2021    ALT <9 07/02/2021    AST 20 07/02/2021    ALKPHOS 72 07/02/2021    BILITOTAL 0.8 07/02/2021     OTHER:   Lab Results   Component Value Date    LACT 1.5 09/21/2020    ANGELY 9.3 03/01/2022    MAG 1.9 03/01/2022    LIPASE <9 07/02/2021    TSH 1.43 03/24/2020    CRP 0.4 07/02/2021       Anesthesia Plan    ASA Status:  3   NPO Status:  NPO Appropriate    Anesthesia Type: General.     - Airway: Mask Only              Consents    Anesthesia Plan(s) and associated risks, benefits, and realistic alternatives discussed. Questions answered and patient/representative(s) expressed understanding.    - Discussed:     - Discussed with:  Patient (and daughter)      - Extended Intubation/Ventilatory Support Discussed: No.      - Patient is DNR/DNI Status: No    Use of blood products discussed: No .     Postoperative Care            Comments:    Other Comments: Brief GA mask with brevital. Bite block prior to cardioversion. ETT and LMA available. BALDEMAR available.              Sav Waggoner MD

## 2022-03-08 NOTE — PROGRESS NOTES
Discharged to home accompanied by his daughter. Cardioverted with one 200j shock. Atrial paced, confirmed. All instructions reviewed with him and his daughter, no concerns at discharge.

## 2022-03-08 NOTE — ANESTHESIA POSTPROCEDURE EVALUATION
Patient: Reymundo Petersen    Procedure: * No procedures listed *  Cardioversion External    Anesthesia Type:  General    Note:  Disposition: Outpatient   Postop Pain Control: Uneventful            Sign Out: Well controlled pain   PONV: No   Neuro/Psych: Uneventful            Sign Out: Acceptable/Baseline neuro status   Airway/Respiratory: Uneventful            Sign Out: Acceptable/Baseline resp. status   CV/Hemodynamics: Uneventful            Sign Out: Acceptable CV status   Other NRE: NONE   DID A NON-ROUTINE EVENT OCCUR? No           Last vitals:  Vitals Value Taken Time   BP 96/57 03/08/22 1030   Temp     Pulse 60 03/08/22 1040   Resp 25 03/08/22 1040   SpO2 97 % 03/08/22 1040   Vitals shown include unvalidated device data.    Electronically Signed By: Sav Waggoner MD  March 8, 2022  10:41 AM

## 2022-03-08 NOTE — PROCEDURES
[facility]    Procedure: Cardioversion    Date/Time: 3/8/2022 10:18 AM  Performed by: Lizzie Pena APRN CNP  Authorized by: Soo Mott APRN CNP       UNIVERSAL PROTOCOL   Site Marked: NA  Prior Images Obtained and Reviewed:  Yes  Required items: Required blood products, implants, devices and special equipment available    Patient identity confirmed:  Verbally with patient, arm band and provided demographic data  Patient was reevaluated immediately before administering moderate or deep sedation or anesthesia  Confirmation Checklist:  Patient's identity using two indicators, relevant allergies, procedure was appropriate and matched the consent or emergent situation and correct equipment/implants were available  Time out: Immediately prior to the procedure a time out was called    Universal Protocol: the Joint Commission Universal Protocol was followed    Preparation: Patient was prepped and draped in usual sterile fashion       ANESTHESIA  Anesthesia was administered and monitored by anesthesiology.  See anesthesia documentation for details.    PROCEDURE DETAILS  Pre-procedure rhythm: atrial fibrillation  Patient position: patient was placed in a supine position  Chest area: chest area exposed  Electrodes: pads  Electrodes placed: anterior-posterior  Number of attempts: 1    Details of Attempts:  At 0951, after administration of IV Brevital by MDA and confirmation of adequate sedation, he received a single synchronous shock of 200 J with prompt restoration of AV sequential pacing.  Post cardioversion EKG pending.  Post-procedure rhythm: paced- a-v sequential      PROCEDURE  Describe Procedure: Early recurrence of atrial fibrillation associated with fluid retention/heart failure despite 98% biventricular pacing.  He has been loaded on amiodarone.  He has a [id number] score of 5 for age >75, hypertension, CAD, and CHF.  He is on Xarelto 20 mg daily which he takes with a full meal.  He  denies missing any doses in >3 weeks.  Successful cardioversion to AV sequential pacing.  Continue amiodarone 200 mg once daily.  Continue Xarelto 20 mg daily for stroke prophylaxis.  Follow-up with Soo Mott CNP on 4/7/2022  Discharge to home 1 hour after cardioversion if fully awake and stable.  Patient Tolerance:  Patient tolerated the procedure well with no immediate complications  Length of time physician/provider present for 1:1 monitoring during sedation: 10

## 2022-03-08 NOTE — INTERVAL H&P NOTE
I have reviewed the surgical (or preoperative) H&P that is linked to this encounter, and examined the patient. There are no significant changes.  Pt denies missing any doses of Xarelto in > 3 weeks and takes with full meal.

## 2022-03-08 NOTE — DISCHARGE INSTRUCTIONS
Discharge Instructions for Cardioversion  Your healthcare provider did a procedure called cardioversion. He or she used a controlled electric shock or a medicine to briefly stop all electrical activity in your heart. This helped restore your heart s normal rhythm. Here are some instructions to follow while you recover.  Home care    Before cardioversion, you will typically be given sedation. So, you won't be able to drive home. You will need a ride. Wait at least 24 hours before driving a car or operating heavy machinery after getting sedating medicines.    The skin on your chest may be irritated or feel like it's sunburned. Your healthcare provider may prescribe a soothing lotion to ease this discomfort. These minor symptoms will go away in a few days.    Ask your healthcare provider about medicines to keep your heart rhythm steady.    If you were prescribed medicine, take it as instructed by your healthcare provider. Don t skip doses or take double doses. Cardioversion requires blood thinners for at least 4 weeks after the procedure to prevent a delayed risk of stroke when treating atrial fibrillation or atrial flutter. Be sure you discuss which medicine you are taking to prevent stroke. Ask when you need to have your medicine levels checked. Also ask whether you may be able to stop taking it in the future or whether you need to take it for life. Some of these blood-thinning medicines such as warfarin will have the dose adjusted, and interact with other medicines or foods. Your healthcare team will give you full instructions on what to watch out for. Report bleeding or symptoms of stroke immediately to your healthcare team and seek emergency medical attention.    Learn to take your own pulse. Keep a record of your results. Ask your healthcare provider when you should seek emergency medical attention. He or she will tell you which pulse rate reading is dangerous.     Cardioversion is usually short term  (temporary). You may need it repeated if the abnormal heart rhythm returns. After the procedure, your healthcare provider will tell you if the treatment worked or if you will need further treatments or medicine.    Follow-up care  Make a follow-up appointment, or as advised.  When to call your healthcare provider  Call 911 right away if you have:    Chest pain    Shortness of breath    Loss of vision, speech, or strength or coordination in any body part  Call your healthcare provider right away if you:    Feel faint, dizzy, or lightheaded    Have chest pain with increased activity    Have irregular heartbeat or fast pulse    Have bleeding issues from blood-thinning medicines  [employer] last reviewed this educational content on 5/1/2019 2000-2021 [employer]. All rights reserved. This information is not intended as a substitute for professional medical care. Always follow your healthcare professional's instructions.

## 2022-03-08 NOTE — ANESTHESIA CARE TRANSFER NOTE
Patient: Reymundo Petersen    Procedure: * No procedures listed *  Cardioversion External    Diagnosis: * No pre-op diagnosis entered *  Diagnosis Additional Information: No value filed.    Anesthesia Type:   General     Note:    Oropharynx: oropharynx clear of all foreign objects  Level of Consciousness: awake  Patient oxygen source: oxymask.  Level of Supplemental Oxygen (L/min / FiO2): 8  Independent Airway: airway patency satisfactory and stable  Dentition: dentition unchanged  Vital Signs Stable: post-procedure vital signs reviewed and stable  Report to RN Given: handoff report given  Patient transferred to: Cardiac Special [facility]          Vitals:  Vitals Value Taken Time   /69 03/08/22 0948   Temp     Pulse 80 03/08/22 0949   Resp 11 03/08/22 0949   SpO2 100 % 03/08/22 0949   Vitals shown include unvalidated device data.    Electronically Signed By: SALVADOR Diaz CRNA  March 8, 2022  9:50 AM

## 2022-03-14 ENCOUNTER — APPOINTMENT (OUTPATIENT)
Dept: RADIOLOGY | Facility: CLINIC | Age: 82
DRG: 291 | End: 2022-03-14
Payer: MEDICARE

## 2022-03-14 ENCOUNTER — HOSPITAL ENCOUNTER (INPATIENT)
Facility: CLINIC | Age: 82
LOS: 2 days | Discharge: HOME | DRG: 291 | End: 2022-03-16
Attending: EMERGENCY MEDICINE | Admitting: FAMILY MEDICINE
Payer: MEDICARE

## 2022-03-14 ENCOUNTER — NURSE TRIAGE (OUTPATIENT)
Dept: CARDIOLOGY | Facility: CLINIC | Age: 82
End: 2022-03-14

## 2022-03-14 DIAGNOSIS — I50.22 CHRONIC SYSTOLIC CONGESTIVE HEART FAILURE (H): Primary | ICD-10-CM

## 2022-03-14 DIAGNOSIS — I50.9 CONGESTIVE HEART FAILURE, UNSPECIFIED HF CHRONICITY, UNSPECIFIED HEART FAILURE TYPE (H): ICD-10-CM

## 2022-03-14 DIAGNOSIS — R60.0 PERIPHERAL EDEMA: ICD-10-CM

## 2022-03-14 LAB
ALBUMIN SERPL-MCNC: 3.4 G/DL (ref 3.5–5)
ALBUMIN UR-MCNC: NEGATIVE MG/DL
ALP SERPL-CCNC: 71 U/L (ref 45–120)
ALT SERPL W P-5'-P-CCNC: 12 U/L (ref 0–45)
ANION GAP SERPL CALCULATED.3IONS-SCNC: 11 MMOL/L (ref 5–18)
APPEARANCE UR: CLEAR
AST SERPL W P-5'-P-CCNC: 27 U/L (ref 0–40)
ATRIAL RATE - MUSE: 63 BPM
BASOPHILS # BLD AUTO: 0.1 10E3/UL (ref 0–0.2)
BASOPHILS NFR BLD AUTO: 1 %
BILIRUB SERPL-MCNC: 1.1 MG/DL (ref 0–1)
BILIRUB UR QL STRIP: NEGATIVE
BNP SERPL-MCNC: 1163 PG/ML (ref 0–93)
BUN SERPL-MCNC: 19 MG/DL (ref 8–28)
CALCIUM SERPL-MCNC: 8.9 MG/DL (ref 8.5–10.5)
CHLORIDE BLD-SCNC: 101 MMOL/L (ref 98–107)
CO2 SERPL-SCNC: 28 MMOL/L (ref 22–31)
COLOR UR AUTO: ABNORMAL
CREAT SERPL-MCNC: 1.37 MG/DL (ref 0.7–1.3)
DIASTOLIC BLOOD PRESSURE - MUSE: 60 MMHG
EOSINOPHIL # BLD AUTO: 0.2 10E3/UL (ref 0–0.7)
EOSINOPHIL NFR BLD AUTO: 3 %
ERYTHROCYTE [DISTWIDTH] IN BLOOD BY AUTOMATED COUNT: 16.9 % (ref 10–15)
GFR SERPL CREATININE-BSD FRML MDRD: 51 ML/MIN/1.73M2
GLUCOSE BLD-MCNC: 112 MG/DL (ref 70–125)
GLUCOSE UR STRIP-MCNC: NEGATIVE MG/DL
HCT VFR BLD AUTO: 36.2 % (ref 40–53)
HGB BLD-MCNC: 11.6 G/DL (ref 13.3–17.7)
HGB UR QL STRIP: NEGATIVE
HYALINE CASTS: 5 /LPF
IMM GRANULOCYTES # BLD: 0 10E3/UL
IMM GRANULOCYTES NFR BLD: 0 %
INTERPRETATION ECG - MUSE: NORMAL
KETONES UR STRIP-MCNC: NEGATIVE MG/DL
LEUKOCYTE ESTERASE UR QL STRIP: NEGATIVE
LYMPHOCYTES # BLD AUTO: 0.7 10E3/UL (ref 0.8–5.3)
LYMPHOCYTES NFR BLD AUTO: 10 %
MAGNESIUM SERPL-MCNC: 2.1 MG/DL (ref 1.8–2.6)
MCH RBC QN AUTO: 30.7 PG (ref 26.5–33)
MCHC RBC AUTO-ENTMCNC: 32 G/DL (ref 31.5–36.5)
MCV RBC AUTO: 96 FL (ref 78–100)
MONOCYTES # BLD AUTO: 0.5 10E3/UL (ref 0–1.3)
MONOCYTES NFR BLD AUTO: 8 %
MUCOUS THREADS #/AREA URNS LPF: PRESENT /LPF
NEUTROPHILS # BLD AUTO: 4.9 10E3/UL (ref 1.6–8.3)
NEUTROPHILS NFR BLD AUTO: 78 %
NITRATE UR QL: NEGATIVE
NRBC # BLD AUTO: 0 10E3/UL
NRBC BLD AUTO-RTO: 0 /100
P AXIS - MUSE: NORMAL DEGREES
PH UR STRIP: 6.5 [PH] (ref 5–7)
PLATELET # BLD AUTO: 173 10E3/UL (ref 150–450)
POTASSIUM BLD-SCNC: 3.7 MMOL/L (ref 3.5–5)
PR INTERVAL - MUSE: NORMAL MS
PROT SERPL-MCNC: 6.7 G/DL (ref 6–8)
QRS DURATION - MUSE: 148 MS
QT - MUSE: 464 MS
QTC - MUSE: 482 MS
R AXIS - MUSE: 69 DEGREES
RBC # BLD AUTO: 3.78 10E6/UL (ref 4.4–5.9)
RBC URINE: 1 /HPF
SARS-COV-2 RNA RESP QL NAA+PROBE: NEGATIVE
SODIUM SERPL-SCNC: 140 MMOL/L (ref 136–145)
SP GR UR STRIP: 1.01 (ref 1–1.03)
SQUAMOUS EPITHELIAL: <1 /HPF
SYSTOLIC BLOOD PRESSURE - MUSE: 138 MMHG
T AXIS - MUSE: 213 DEGREES
T4 FREE SERPL-MCNC: 0.91 NG/DL (ref 0.7–1.8)
TROPONIN I SERPL-MCNC: 0.07 NG/ML (ref 0–0.29)
TROPONIN I SERPL-MCNC: 0.09 NG/ML (ref 0–0.29)
TSH SERPL DL<=0.005 MIU/L-ACNC: 7.62 UIU/ML (ref 0.3–5)
UROBILINOGEN UR STRIP-MCNC: <2 MG/DL
VENTRICULAR RATE- MUSE: 65 BPM
WBC # BLD AUTO: 6.3 10E3/UL (ref 4–11)
WBC URINE: 3 /HPF

## 2022-03-14 PROCEDURE — 87635 SARS-COV-2 COVID-19 AMP PRB: CPT | Performed by: PHYSICIAN ASSISTANT

## 2022-03-14 PROCEDURE — 51798 US URINE CAPACITY MEASURE: CPT

## 2022-03-14 PROCEDURE — 36415 COLL VENOUS BLD VENIPUNCTURE: CPT | Performed by: PHYSICIAN ASSISTANT

## 2022-03-14 PROCEDURE — 200N000001 HC R&B ICU

## 2022-03-14 PROCEDURE — 250N000013 HC RX MED GY IP 250 OP 250 PS 637: Performed by: FAMILY MEDICINE

## 2022-03-14 PROCEDURE — 71045 X-RAY EXAM CHEST 1 VIEW: CPT

## 2022-03-14 PROCEDURE — 84443 ASSAY THYROID STIM HORMONE: CPT | Performed by: PHYSICIAN ASSISTANT

## 2022-03-14 PROCEDURE — C9803 HOPD COVID-19 SPEC COLLECT: HCPCS

## 2022-03-14 PROCEDURE — 85018 HEMOGLOBIN: CPT | Performed by: PHYSICIAN ASSISTANT

## 2022-03-14 PROCEDURE — 250N000011 HC RX IP 250 OP 636: Performed by: PHYSICIAN ASSISTANT

## 2022-03-14 PROCEDURE — 80053 COMPREHEN METABOLIC PANEL: CPT | Performed by: PHYSICIAN ASSISTANT

## 2022-03-14 PROCEDURE — 250N000013 HC RX MED GY IP 250 OP 250 PS 637: Performed by: STUDENT IN AN ORGANIZED HEALTH CARE EDUCATION/TRAINING PROGRAM

## 2022-03-14 PROCEDURE — 84484 ASSAY OF TROPONIN QUANT: CPT

## 2022-03-14 PROCEDURE — 84439 ASSAY OF FREE THYROXINE: CPT | Performed by: PHYSICIAN ASSISTANT

## 2022-03-14 PROCEDURE — 81001 URINALYSIS AUTO W/SCOPE: CPT | Performed by: PHYSICIAN ASSISTANT

## 2022-03-14 PROCEDURE — 99285 EMERGENCY DEPT VISIT HI MDM: CPT | Mod: 25

## 2022-03-14 PROCEDURE — 83880 ASSAY OF NATRIURETIC PEPTIDE: CPT | Performed by: PHYSICIAN ASSISTANT

## 2022-03-14 PROCEDURE — 96374 THER/PROPH/DIAG INJ IV PUSH: CPT

## 2022-03-14 PROCEDURE — 36415 COLL VENOUS BLD VENIPUNCTURE: CPT

## 2022-03-14 PROCEDURE — 83735 ASSAY OF MAGNESIUM: CPT | Performed by: PHYSICIAN ASSISTANT

## 2022-03-14 PROCEDURE — 93005 ELECTROCARDIOGRAM TRACING: CPT | Performed by: PHYSICIAN ASSISTANT

## 2022-03-14 PROCEDURE — 84484 ASSAY OF TROPONIN QUANT: CPT | Performed by: PHYSICIAN ASSISTANT

## 2022-03-14 RX ORDER — ACETAMINOPHEN 325 MG/1
975 TABLET ORAL EVERY 8 HOURS PRN
Status: DISCONTINUED | OUTPATIENT
Start: 2022-03-14 | End: 2022-03-16 | Stop reason: HOSPADM

## 2022-03-14 RX ORDER — GABAPENTIN 300 MG/1
300 CAPSULE ORAL AT BEDTIME
Status: DISCONTINUED | OUTPATIENT
Start: 2022-03-14 | End: 2022-03-16 | Stop reason: HOSPADM

## 2022-03-14 RX ORDER — FUROSEMIDE 10 MG/ML
40 INJECTION INTRAMUSCULAR; INTRAVENOUS DAILY
Status: DISCONTINUED | OUTPATIENT
Start: 2022-03-15 | End: 2022-03-16 | Stop reason: HOSPADM

## 2022-03-14 RX ORDER — AMOXICILLIN 250 MG
2 CAPSULE ORAL 2 TIMES DAILY PRN
Status: DISCONTINUED | OUTPATIENT
Start: 2022-03-14 | End: 2022-03-16 | Stop reason: HOSPADM

## 2022-03-14 RX ORDER — FINASTERIDE 5 MG/1
5 TABLET, FILM COATED ORAL AT BEDTIME
Status: DISCONTINUED | OUTPATIENT
Start: 2022-03-14 | End: 2022-03-16 | Stop reason: HOSPADM

## 2022-03-14 RX ORDER — DOXAZOSIN 4 MG/1
4 TABLET ORAL AT BEDTIME
Status: DISCONTINUED | OUTPATIENT
Start: 2022-03-14 | End: 2022-03-16 | Stop reason: HOSPADM

## 2022-03-14 RX ORDER — PROCHLORPERAZINE 25 MG
12.5 SUPPOSITORY, RECTAL RECTAL EVERY 12 HOURS PRN
Status: DISCONTINUED | OUTPATIENT
Start: 2022-03-14 | End: 2022-03-16 | Stop reason: HOSPADM

## 2022-03-14 RX ORDER — LOSARTAN POTASSIUM 25 MG/1
25 TABLET ORAL DAILY
Status: DISCONTINUED | OUTPATIENT
Start: 2022-03-15 | End: 2022-03-16 | Stop reason: HOSPADM

## 2022-03-14 RX ORDER — METOPROLOL SUCCINATE 25 MG/1
25 TABLET, EXTENDED RELEASE ORAL AT BEDTIME
Status: DISCONTINUED | OUTPATIENT
Start: 2022-03-14 | End: 2022-03-16 | Stop reason: HOSPADM

## 2022-03-14 RX ORDER — FUROSEMIDE 10 MG/ML
80 INJECTION INTRAMUSCULAR; INTRAVENOUS ONCE
Status: COMPLETED | OUTPATIENT
Start: 2022-03-14 | End: 2022-03-14

## 2022-03-14 RX ORDER — AMOXICILLIN 250 MG
1 CAPSULE ORAL 2 TIMES DAILY PRN
Status: DISCONTINUED | OUTPATIENT
Start: 2022-03-14 | End: 2022-03-16 | Stop reason: HOSPADM

## 2022-03-14 RX ORDER — FUROSEMIDE 10 MG/ML
80 INJECTION INTRAMUSCULAR; INTRAVENOUS DAILY
Status: DISCONTINUED | OUTPATIENT
Start: 2022-03-15 | End: 2022-03-16 | Stop reason: HOSPADM

## 2022-03-14 RX ORDER — ATORVASTATIN CALCIUM 10 MG/1
10 TABLET, FILM COATED ORAL EVERY OTHER DAY
Status: DISCONTINUED | OUTPATIENT
Start: 2022-03-14 | End: 2022-03-16 | Stop reason: HOSPADM

## 2022-03-14 RX ORDER — MULTIVITAMIN,THERAPEUTIC
1 TABLET ORAL DAILY
COMMUNITY
Start: 2023-01-01

## 2022-03-14 RX ORDER — AMIODARONE HYDROCHLORIDE 200 MG/1
200 TABLET ORAL DAILY
Status: DISCONTINUED | OUTPATIENT
Start: 2022-03-14 | End: 2022-03-16 | Stop reason: HOSPADM

## 2022-03-14 RX ORDER — POTASSIUM CHLORIDE 1500 MG/1
20 TABLET, EXTENDED RELEASE ORAL ONCE
Status: COMPLETED | OUTPATIENT
Start: 2022-03-14 | End: 2022-03-14

## 2022-03-14 RX ORDER — PROCHLORPERAZINE MALEATE 5 MG
5 TABLET ORAL EVERY 6 HOURS PRN
Status: DISCONTINUED | OUTPATIENT
Start: 2022-03-14 | End: 2022-03-16 | Stop reason: HOSPADM

## 2022-03-14 RX ORDER — PANTOPRAZOLE SODIUM 20 MG/1
40 TABLET, DELAYED RELEASE ORAL
Status: DISCONTINUED | OUTPATIENT
Start: 2022-03-14 | End: 2022-03-16 | Stop reason: HOSPADM

## 2022-03-14 RX ORDER — GABAPENTIN 100 MG/1
200 CAPSULE ORAL 2 TIMES DAILY
Status: DISCONTINUED | OUTPATIENT
Start: 2022-03-15 | End: 2022-03-16 | Stop reason: HOSPADM

## 2022-03-14 RX ORDER — LIDOCAINE 40 MG/G
CREAM TOPICAL
Status: DISCONTINUED | OUTPATIENT
Start: 2022-03-14 | End: 2022-03-16 | Stop reason: HOSPADM

## 2022-03-14 RX ORDER — ASPIRIN 81 MG/1
81 TABLET, CHEWABLE ORAL EVERY OTHER DAY
Status: DISCONTINUED | OUTPATIENT
Start: 2022-03-15 | End: 2022-03-16 | Stop reason: HOSPADM

## 2022-03-14 RX ADMIN — METOPROLOL SUCCINATE 25 MG: 25 TABLET, EXTENDED RELEASE ORAL at 21:39

## 2022-03-14 RX ADMIN — ATORVASTATIN CALCIUM 10 MG: 10 TABLET, FILM COATED ORAL at 21:42

## 2022-03-14 RX ADMIN — DOXAZOSIN 4 MG: 4 TABLET ORAL at 21:39

## 2022-03-14 RX ADMIN — SERTRALINE HYDROCHLORIDE 50 MG: 50 TABLET, FILM COATED ORAL at 21:39

## 2022-03-14 RX ADMIN — RIVAROXABAN 20 MG: 10 TABLET, FILM COATED ORAL at 18:51

## 2022-03-14 RX ADMIN — Medication 1 MG: at 21:39

## 2022-03-14 RX ADMIN — GABAPENTIN 300 MG: 300 CAPSULE ORAL at 21:39

## 2022-03-14 RX ADMIN — PANTOPRAZOLE SODIUM 40 MG: 20 TABLET, DELAYED RELEASE ORAL at 18:52

## 2022-03-14 RX ADMIN — FINASTERIDE 5 MG: 5 TABLET, FILM COATED ORAL at 21:39

## 2022-03-14 RX ADMIN — FUROSEMIDE 80 MG: 10 INJECTION, SOLUTION INTRAVENOUS at 16:30

## 2022-03-14 RX ADMIN — POTASSIUM CHLORIDE 20 MEQ: 1500 TABLET, EXTENDED RELEASE ORAL at 21:39

## 2022-03-14 ASSESSMENT — ENCOUNTER SYMPTOMS
FREQUENCY: 0
DYSURIA: 0
FEVER: 0
DIFFICULTY URINATING: 1
FATIGUE: 1
HEMATURIA: 0
SHORTNESS OF BREATH: 1
COUGH: 0

## 2022-03-14 ASSESSMENT — ACTIVITIES OF DAILY LIVING (ADL)
DRESSING/BATHING_DIFFICULTY: NO
FALL_HISTORY_WITHIN_LAST_SIX_MONTHS: YES
HEARING_DIFFICULTY_OR_DEAF: YES
THE_FOLLOWING_AIDS_WERE_PROVIDED;: PATIENT DECLINED OFFER OF AUXILIARY AIDS
TRANSFERRING: 1-->ASSISTANCE (EQUIPMENT/PERSON) NEEDED
TOILETING_ISSUES: NO
VISION_MANAGEMENT: GLASSES
ADLS_ACUITY_SCORE: 6
CHANGE_IN_FUNCTIONAL_STATUS_SINCE_ONSET_OF_CURRENT_ILLNESS/INJURY: NO
WERE_AUXILIARY_AIDS_OFFERED?: NO
TRANSFERRING: 1-->ASSISTANCE (EQUIPMENT/PERSON) NEEDED (NOT DEVELOPMENTALLY APPROPRIATE)
ADLS_ACUITY_SCORE: 7
DOING_ERRANDS_INDEPENDENTLY_DIFFICULTY: NO
DIFFICULTY_EATING/SWALLOWING: NO
ADLS_ACUITY_SCORE: 6
ADLS_ACUITY_SCORE: 7
WALKING_OR_CLIMBING_STAIRS_DIFFICULTY: YES
WALKING_OR_CLIMBING_STAIRS: AMBULATION DIFFICULTY, REQUIRES EQUIPMENT
NUMBER_OF_TIMES_PATIENT_HAS_FALLEN_WITHIN_LAST_SIX_MONTHS: 1
CONCENTRATING,_REMEMBERING_OR_MAKING_DECISIONS_DIFFICULTY: NO
ADLS_ACUITY_SCORE: 6
USE_OF_HEARING_ASSISTIVE_DEVICES: BILATERAL HEARING AIDS
ADLS_ACUITY_SCORE: 6
ADLS_ACUITY_SCORE: 6
DESCRIBE_HEARING_LOSS: BILATERAL HEARING LOSS
PATIENT'S_PREFERRED_MEANS_OF_COMMUNICATION: ENGLISH SPEAKER WITH HEARING LOSS, NO SPEECH PROBLEMS.
WEAR_GLASSES_OR_BLIND: YES
DIFFICULTY_COMMUNICATING: NO

## 2022-03-14 NOTE — ED PROVIDER NOTES
Emergency Department Midlevel Supervisory Note     I personally saw the patient and performed a substantive portion of the visit including all aspects of the medical decision making.    ED Course:  4:10 PM Waldo Whitehead PA-C staffed patient with me. I agree with their assessment and plan of management, and I will see the patient.  4:43 PM I met with the patient to introduce myself, gather additional history, perform my initial exam, and discuss the plan.     Brief HPI:     Reymundo Petersen is a 81 year old male who presents for evaluation of urinary retention. He is also experiencing associated symptoms of worsening fluid retention in bilateral lower extremities and shortness of breath. Patient reports these symptoms began 6 days ago (~3/9) after a cardioversion. Patient is currently on Lasix. No other complaints at this time.     I, Justa Santos, am serving as a scribe to document services personally performed by Ana Cristina Ryo, based on my observations and the provider's statements to me.   I, Ana Cristina Roy, attest that Justa Santos was acting in a scribe capacity, has observed my performance of the services and has documented them in accordance with my direction.    Brief Physical Exam:  Constitutional:  Alert, in no acute distress  EYES: Conjunctivae clear  HENT:  Atraumatic, normocephalic  Respiratory:  Respirations even, unlabored, in no acute respiratory distress  Cardiovascular:  Regular rate and rhythm, good peripheral perfusion  GI: Soft, nondistended, nontender, no palpable masses, no rebound, no guarding   Musculoskeletal:  1+ Bilateral LE edema. No cyanosis. Range of motion major extremities intact.    Integument: Warm, Dry, No erythema, No rash.   Neurologic:  Alert & oriented, no focal deficits noted  Psych: Normal mood and affect     MDM:  4:49 PM Patient and family amenable to plan to admit for further diuresis, and for surveillance of known prostate enlargement and urinary retention.       1.  Congestive heart failure, unspecified HF chronicity, unspecified heart failure type (H)    2. Peripheral edema        Labs and Imaging:  Results for orders placed or performed during the hospital encounter of 03/14/22   XR Chest Port 1 View    Impression    IMPRESSION:     There is a right subclavian approach pacemaker with right atrial appendage and right ventricular leads. Enlarged cardiac silhouette with particular conspicuous left ventricular heart border, accentuated by projection. The vascular pedicle width is   normal.    Bilateral calcified pleural plaque. No pleural effusion or pneumothorax.    Symmetric lung inflation with normal lung vascularity. No peribronchial fluid cuffs, subpleural edema along the interlobar fissures or intralobular septal thickening to suggest interstitial edema. No lung consolidation.   B-Type Natriuretic Peptide ([facility] Only)   Result Value Ref Range    BNP 1,163 (H) 0 - 93 pg/mL   Comprehensive metabolic panel   Result Value Ref Range    Sodium 140 136 - 145 mmol/L    Potassium 3.7 3.5 - 5.0 mmol/L    Chloride 101 98 - 107 mmol/L    Carbon Dioxide (CO2) 28 22 - 31 mmol/L    Anion Gap 11 5 - 18 mmol/L    Urea Nitrogen 19 8 - 28 mg/dL    Creatinine 1.37 (H) 0.70 - 1.30 mg/dL    Calcium 8.9 8.5 - 10.5 mg/dL    Glucose 112 70 - 125 mg/dL    Alkaline Phosphatase 71 45 - 120 U/L    AST 27 0 - 40 U/L    ALT 12 0 - 45 U/L    Protein Total 6.7 6.0 - 8.0 g/dL    Albumin 3.4 (L) 3.5 - 5.0 g/dL    Bilirubin Total 1.1 (H) 0.0 - 1.0 mg/dL    GFR Estimate 51 (L) >60 mL/min/1.73m2   UA with Microscopic reflex to Culture    Specimen: Urine, Clean Catch   Result Value Ref Range    Color Urine Light Yellow Colorless, Straw, Light Yellow, Yellow    Appearance Urine Clear Clear    Glucose Urine Negative Negative mg/dL    Bilirubin Urine Negative Negative    Ketones Urine Negative Negative mg/dL    Specific Gravity Urine 1.010 1.001 - 1.030    Blood Urine Negative Negative    pH Urine 6.5 5.0 - 7.0     Protein Albumin Urine Negative Negative mg/dL    Urobilinogen Urine <2.0 <2.0 mg/dL    Nitrite Urine Negative Negative    Leukocyte Esterase Urine Negative Negative    Mucus Urine Present (A) None Seen /LPF    RBC Urine 1 <=2 /HPF    WBC Urine 3 <=5 /HPF    Squamous Epithelials Urine <1 <=1 /HPF    Hyaline Casts Urine 5 (H) <=2 /LPF   Result Value Ref Range    Magnesium 2.1 1.8 - 2.6 mg/dL   Result Value Ref Range    Troponin I 0.07 0.00 - 0.29 ng/mL   CBC with platelets and differential   Result Value Ref Range    WBC Count 6.3 4.0 - 11.0 10e3/uL    RBC Count 3.78 (L) 4.40 - 5.90 10e6/uL    Hemoglobin 11.6 (L) 13.3 - 17.7 g/dL    Hematocrit 36.2 (L) 40.0 - 53.0 %    MCV 96 78 - 100 fL    MCH 30.7 26.5 - 33.0 pg    MCHC 32.0 31.5 - 36.5 g/dL    RDW 16.9 (H) 10.0 - 15.0 %    Platelet Count 173 150 - 450 10e3/uL    % Neutrophils 78 %    % Lymphocytes 10 %    % Monocytes 8 %    % Eosinophils 3 %    % Basophils 1 %    % Immature Granulocytes 0 %    NRBCs per 100 WBC 0 <1 /100    Absolute Neutrophils 4.9 1.6 - 8.3 10e3/uL    Absolute Lymphocytes 0.7 (L) 0.8 - 5.3 10e3/uL    Absolute Monocytes 0.5 0.0 - 1.3 10e3/uL    Absolute Eosinophils 0.2 0.0 - 0.7 10e3/uL    Absolute Basophils 0.1 0.0 - 0.2 10e3/uL    Absolute Immature Granulocytes 0.0 <=0.4 10e3/uL    Absolute NRBCs 0.0 10e3/uL   TSH with free T4 reflex   Result Value Ref Range    TSH 7.62 (H) 0.30 - 5.00 uIU/mL   Asymptomatic COVID-19 Virus (Coronavirus) by PCR Nasopharyngeal    Specimen: Nasopharyngeal; Swab   Result Value Ref Range    SARS CoV2 PCR Negative Negative   ECG 12-LEAD WITH MUSE ([facility])   Result Value Ref Range    Systolic Blood Pressure 138 mmHg    Diastolic Blood Pressure 60 mmHg    Ventricular Rate 65 BPM    Atrial Rate 63 BPM    KS Interval  ms    QRS Duration 148 ms     ms    QTc 482 ms    P Axis  degrees    R AXIS 69 degrees    T Axis 213 degrees    Interpretation ECG       Atrial fibrillation with a competing junctional pacemaker  Left  bundle branch block  Abnormal ECG  When compared with ECG of 08-MAR-2022 10:37,  Atrial fibrillation has replaced Electronic ventricular pacemaker  Confirmed by SEE ED PROVIDER NOTE FOR, ECG INTERPRETATION ([id number]),  FAREED ROBERTS ([id number]) on 3/14/2022 3:48:06 PM       I have reviewed the relevant laboratory and radiology studies      Ana Cristina Roy MD  [facility] EMERGENCY ROOM  Critical access hospital[address] [facility] [id number] [phone]       Ana Cristina Roy MD  03/14/22 9014

## 2022-03-14 NOTE — TELEPHONE ENCOUNTER
Attempted to contact pt, voicemail message was left with contact information and instructing pt to call back.  3/14/2022 10:01 AM  Kacey Torres RN

## 2022-03-14 NOTE — TELEPHONE ENCOUNTER
"PC to patients daughter Juliet to review the below information.  She confirms her dads weight is not coming down/returning to baseline.  Feets and ankles seem to be getting \"puffier\".  Wheezing noted with exertion.     He is consistently taking 80mg daily in AM and then his PRN dose of 40mg in the afternoon with low urine output.    Unsure if he is still in NSR as he doesn't note symptoms of AF.    His daughter also feels they may need urology involved, shes placed a call with PCP for possible referral.    Soo, patient s/p CV recently, pt well known to you.  Other recommendations?    Thank you  Mkaenzie"

## 2022-03-14 NOTE — TELEPHONE ENCOUNTER
"Patient's daughter Juliet was transferred from [facility] to discuss patient's fluid retention and weight gain. She states he had a Cardioversion on 3/8/22. He usually had been around 187lb and is now around 204-205lb. He C/O not voiding much. He has been taking two of the Lasix plus one additional. He has some wheezing with activity. Juliet wanted to note that patient also has an enlarged prostate possibly complicating things and may need a Urology referral. RN advised a message will be routed to the team with Soo Mott to call and discuss. She verbalized understanding.   Initial Assessment Questions   1. REASON FOR CALL: \"What is your main concern right now?\" Fluid retention.  2. ONSET: \"When did the it start?\" Since Cardioversion.  3. SEVERITY: \"How bad is the fluid retention?\" 204-205lb weight gain.   4. FEVER: \"Do you have a fever?\" No.  5. OTHER SYMPTOMS: \"Do you have any other new symptoms?\" Not voiding much, some wheezing with activity.  6. INTERVENTIONS AND RESPONSE: \"What have you done so far to try to make this better? What medications have you used?\" Has been taking two Lasix plus one prn.      "

## 2022-03-14 NOTE — H&P
[facility]    History and Physical - Hospitalist Service       Date of Admission:  3/14/2022    Assessment & Plan      Reymundo Petersen is a 81 year old male with a past medical history of atrial fibrillation with multiple cardioversion (3) and pacemaker, CHF, coronary artery disease, dilated cardiomyopathy, aortic valve insufficiency, GERD, BPH, anxiety, HLD, HTN, LBBB, and pulmonary hypertension. He was admitted on 3/14/2022. He is admitted for fluid overload not responding to home dose of lasix.    CHF  Volume overload, patient is currently on 80mg in the morning and 40mg in the evening for the last at home. He has received 80 mg IV lasix in the ED.  - Strict I/Os  - Daily weights  - Lasix IV 80 mEq in the morning and 40 mEq in the evening  - Fluid restriction to 1.5L a day  - AM magnesium  - Incentive spirometry  - Trend BMP daily, with close monitoring of potassium    Paroxysmal atrial fibrillation, cardioversion on 3/8/2022  S/p bi-ventricular pacemaker  Dilated cardiomyopathy  Aortic valve insufficiency  CAD, history of stent placement  Echocardiogram completed on 5/19/2022 showing EF of 43%, severe bi-atrial enlargement, mildly dilated right ventricle, ascending aortic dilation to 4.1cm, severe concentric increase of left ventricle, moderate pulmonary hypertension. BNP 3/14 was 1163.  - Cardiac monitoring  - PTA xarelto 20 mg daily  - Continue PTA aspirin 81 mg  - Continue PTA metoprolol succinate ER 25mg 24 hr daily  - Continue PTA amiodarone 200 mg daily  - Troponin x2 - first was 0.07, next at 2100    QTc prolongation  - EKG on 3/14 showing QTc of 482 msec  - Avoid medications which cause QT elongation  - Cardiac telemetry    Hypertension  - Continue PTA losartan 25 mg daily  - Continue PTA doxazosin 4 mg daily  - Monitor due to increased lasix doses    Elevated TSH  Normal 2 years ago. Hypothyroidism is a side effect of amiodarone. However, he has been on amiodarone for  less than 3 months (little over 1 month), so it is unlikely this is the cause.  - T4 pending  - Follow up outpatient    Chronic diseases  General anxiety disease: PTA sertraline 50 mg daily  Hypercholesterolemia and CAD: PTA atorvastatin 10 mg every other day (last dose 3/14)  BPH: PTA finasteride 5 mg daily  GERD: PTA omeprazole 20mg BID  Osteoarthritis: hold glucosamine        Diet: Low Saturated Fat Na <2400 mg  Fluid restriction 1500 ML FLUID  DVT Prophylaxis: Xarelto  Adorno Catheter: Not present  Fluids: PO, restricted to 1.5L/day  Central Lines: None  Cardiac Monitoring: ACTIVE order. Indication: Acute decompensated heart failure (48 hours)  Code Status: Full Code      - Advanced directive on file per patient      Disposition Plan   Expected Discharge: 03/17/2022   Anticipated discharge location:  previous living arrangement        The patient's care was discussed with the Attending Physician, Dr. Opal Araujo.    Rere Kumar MD  Hospitalist Service  [facility]  Text page via [facility] Paging/Directory       ______________________________________________________________________    Chief Complaint   Increased swelling of feet, shortness of breath, decreased urination    History is obtained from the patient and daughter, Juliet    History of Present Illness   Reymundo Petersen is a 81 year old male who has a history of atrial fibrillation with multiple cardioversion (3) and pacemaker, CHF, coronary artery disease, dilated cardiomyopathy, aortic valve insufficiency, GERD, BPH, anxiety, HLD, HTN, LBBB, and pulmonary hypertension and is admitted for CHF exacerbation with fluid overload.    Review of Systems    General: denies fever and chills, endorses fatigue and weight gain  Skin: denies rashes, itching, dryness  Head: denies headache and trauma  Eyes: denies pain, blurry or double vision, dryness  Nose: denies congestion, discharge, nose bleeds  Neck: denies lumps, pain, and  stiffness  Respiratory: denies cough, hemoptysis, wheezing, endorses shortness of breath with walking and bending  Cardiovascular: denies chest pain, palpitations, positive for a. Fib and x3 cardioversion, CHF  Gastrointestinal: denies nausea, constipation, diarrhea, and change in bowel habits  Urinary: denies frequency, urgency, incontinence, dysuria  Vascular: denies calf pain, varicose veins, and leg cramping  Musculoskeletal: denies arthralgias, myalgias, and redness of joints  Neurologic: denies dizziness, numbness, tingling, or weakness  Hematologic: endorses easy bruising and bleeding    Past Medical History    I have reviewed this patient's medical history and updated it with pertinent information if needed.   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  Past Surgical History:   Procedure Laterality Date     ANGIOPLASTY       ARTHROSCOPY KNEE       CARDIOVERSION  2021     CARDIOVERSION  02/03/2022     CV CORONARY ANGIOGRAM N/A 10/23/2020    Procedure: Coronary Angiogram;  Surgeon: Varun Freire MD;  Location: [facility] Cath Lab;  Service: Cardiology     CV LEFT HEART CATHETERIZATION WITHOUT LEFT VENTRICULOGRAM Left 10/23/2020    Procedure: Left Heart Catheterization Without Left  Ventriculogram;  Surgeon: Varun Freire MD;  Location: [facility] Cath Lab;  Service: Cardiology     EP BIV PACEMAKER INSERT N/A 3/25/2020    Procedure: EP Biventricular Pacemaker Insertion;  Surgeon: Taylor Sandoval MD;  Location: [facility] Cath Lab;  Service: Cardiology     RELEASE CARPAL TUNNEL       ZZC TOTAL KNEE ARTHROPLASTY Right 8/15/2019    Procedure: RIGHT  MINIMALLY TOTAL KNEE ARTHROPLASTY;  Surgeon: Keven Cerda MD;  Location: [facility];  Service: Orthopedics       Social History   I have reviewed this patient's social history and updated it with pertinent information if needed. Reymundo Petersen  reports that he has never smoked. He has never used smokeless tobacco. He reports current alcohol use. He reports that he does not use drugs.    Family History   I have reviewed this patient's family history and updated it with pertinent information if needed.  Family History   Problem Relation Age of Onset     Heart Disease Father      Diabetes Type 2  Sister      Alzheimer Disease Mother      Chronic Obstructive Pulmonary Disease Brother        Prior to Admission Medications   Prior to Admission Medications   Prescriptions Last Dose Informant Patient Reported? Taking?   amiodarone (PACERONE) 200 MG tablet 3/13/2022 at supper  No Yes   Sig: Take 1 tablet (200 mg) by mouth daily   aspirin (ASA) 81 MG chewable tablet 3/13/2022  No Yes   Sig: Take 1 tablet (81 mg) by mouth every other day   atorvastatin (LIPITOR) 10 MG tablet 3/12/2022  Yes Yes   Sig: Take 10 mg by mouth every other day Even days, bedtime   coenzyme Q-10 200 MG CAPS capsule 3/14/2022  Yes Yes   Sig: Take 200 mg by mouth daily   diphenhydrAMINE-acetaminophen (TYLENOL PM)  MG tablet prn  Yes Yes   Sig: Take 1 tablet by mouth nightly as needed for sleep   doxazosin (CARDURA) 8 MG tablet 3/13/2022  Yes Yes   Sig: [DOXAZOSIN (CARDURA) 8 MG TABLET] Take 4 mg by mouth at bedtime.          finasteride (PROSCAR) 5 mg tablet  3/13/2022  Yes Yes   Sig: [FINASTERIDE (PROSCAR) 5 MG TABLET] Take 5 mg by mouth at bedtime.          furosemide (LASIX) 40 MG tablet 3/14/2022  No Yes   Sig: Take 2 tablets (80 mg) by mouth daily Take extra dose if weight gain, leg swelling, or short of breath   gabapentin (NEURONTIN) 100 MG capsule 3/14/2022 at am  Yes Yes   Sig: Two capsules in the morning, two capsules in the afternoon, and three capsules in the evening   glucosamine/chondr cole A sod (OSTEO BI-FLEX ORAL) 3/14/2022 at x1  Yes Yes   Sig: [GLUCOSAMINE/CHONDR COLE A SOD (OSTEO BI-FLEX ORAL)] Take 1 tablet by mouth 2 (two) times a day.          losartan (COZAAR) 25 MG tablet 3/14/2022  No Yes   Sig: [LOSARTAN (COZAAR) 25 MG TABLET] Take 1 tablet (25 mg total) by mouth daily.   melatonin 10 mg Tab 3/13/2022  Yes Yes   Sig: Take 10 mg by mouth At Bedtime    metoprolol succinate ER (TOPROL-XL) 25 MG 24 hr tablet 3/13/2022  No Yes   Sig: Take 1 tablet (25 mg) by mouth At Bedtime   multivitamin, therapeutic (THERA-VIT) TABS tablet 3/14/2022  Yes Yes   Sig: Take 1 tablet by mouth daily   omeprazole (PRILOSEC) 20 MG capsule 3/14/2022 at x1  Yes Yes   Sig: [OMEPRAZOLE (PRILOSEC) 20 MG CAPSULE] Take 20 mg by mouth 2 (two) times a day before meals.   rivaroxaban ANTICOAGULANT (XARELTO) 20 MG TABS tablet 3/13/2022 at supper  No Yes   Sig: Take 1 tablet (20 mg) by mouth daily   sertraline (ZOLOFT) 50 MG tablet 3/14/2022 at x1  Yes Yes   Sig: Take 50 mg by mouth 2 times daily    vit A/vit C/vit E/zinc/copper (PRESERVISION AREDS ORAL) 3/14/2022 at x1  Yes Yes   Sig: [VIT A/VIT C/VIT E/ZINC/COPPER (PRESERVISION AREDS ORAL)] Take 1 tablet by mouth 2 (two) times a day.      Facility-Administered Medications: None     Allergies   Allergies   Allergen Reactions     No Known Allergies        Physical Exam   Vital Signs: Temp: 98.8  F (37.1  C) Temp src: Oral BP: 119/63 Pulse: 66   Resp: 22 SpO2: 95 % O2 Device: None (Room air)    Weight: 208 lbs 0 oz    General appearance:  alert, in no distress, cooperative, appears stated age  Head: normocephalic, without obvious abnormalities  Eyes: conjunctivae/corneas clear, EOM intact  Ears: hearing grossly intact  Nose: nares normal, no drainage  Lung: bibasilar crackles, remaining is clear to auscultation bilaterally, no wheezing, coughing, or use of accessory muscles  Chest wall: no tenderness  Heart: regular rate and rhythm, S1, S2 normal, soft murmur, no click, rub, or gallop  Abdomen: soft, non-tender,non-distended, bowel sounds present in all four quadrants  Extremities: warm, dry, atraumatic, no cyanosis, +2 pitting edema up to waist  Skin: Red petechia of bilateral lower extremities. Right knee scar from total knee arthroplasty. Scar on right elbow. Normal texture and turgor.  Neurologic: ambulatory by self, sensation grossly intact in bilateral lower extremities  Psychologic: appropriate mood    Data   Data reviewed today: I reviewed all medications, new labs and imaging results over the last 24 hours. I personally reviewed the EKG tracing showing LBBB and pacemaker markings and the chest x-ray image(s) showing cardiomegaly without consolidation.    Recent Labs   Lab 03/14/22  1454 03/08/22  0912   WBC 6.3  --    HGB 11.6*  --    MCV 96  --      --     138   POTASSIUM 3.7 4.1   CHLORIDE 101 100   CO2 28 24   BUN 19 26   CR 1.37* 1.58*   ANIONGAP 11 14   ANGELY 8.9 9.7    97   ALBUMIN 3.4*  --    PROTTOTAL 6.7  --    BILITOTAL 1.1*  --    ALKPHOS 71  --    ALT 12  --    AST 27  --      Recent Results (from the past 24 hour(s))   XR Chest Port 1 View    Narrative    EXAM: XR CHEST PORT 1 VIEW  LOCATION: [facility]  DATE/TIME: 3/14/2022 2:59 PM    INDICATION: Shortness of breath  COMPARISON: CT of the abdomen and pelvis with IV contrast 7/2/2001      Impression    IMPRESSION:     There is a right subclavian approach pacemaker with right atrial appendage and right ventricular leads. Enlarged  cardiac silhouette with particular conspicuous left ventricular heart border, accentuated by projection. The vascular pedicle width is   normal.    Bilateral calcified pleural plaque. No pleural effusion or pneumothorax.    Symmetric lung inflation with normal lung vascularity. No peribronchial fluid cuffs, subpleural edema along the interlobar fissures or intralobular septal thickening to suggest interstitial edema. No lung consolidation.

## 2022-03-14 NOTE — ED PROVIDER NOTES
EMERGENCY DEPARTMENT ENCOUNTER      NAME: Reymundo Petersen  AGE: 81 year old male  YOB: 1940  MRN: [id number]  EVALUATION DATE & TIME: 3/14/2022  2:33 PM    PCP: Jamie Noguera    ED PROVIDER: Waldo Whitehead PA-C      Chief Complaint   Patient presents with     Urinary Retention         FINAL IMPRESSION:  1. Congestive heart failure, unspecified HF chronicity, unspecified heart failure type (H)    2. Peripheral edema          MEDICAL DECISION MAKING:    Pertinent Labs & Imaging studies reviewed. (See chart for details)  81 year old male presents to the Emergency Department for evaluation of concerns regarding peripheral edema and weight gain that has been increasing as well as decreased urinary output and possible urinary retention.    After obtaining history present illness, reviewing vital signs and reviewing previous medical records plan to assess with EKG, chest x-ray, full sets of labs as well as urinalysis and bladder scan.    During my examination of the patient he does have significant pitting edema that goes all the way up to his thighs but stops at the waistline.  Based on this as well as the mild CHF on chest x-ray as well as elevated BNP we will plan to aggressively diurese him while in the hospital.  I discussed the case with the resident service and at this point time we agreed with IV dose of his morning dose of Lasix which is 80 mg to the IV.  We can change to Bumex therapy if there is no improvement of symptoms.  Renal function will need to be monitored closely.  With regards to the patient's complaints of urinary retention certainly that can be discussed during inpatient stay and then facilitate outpatient follow-up with your urologist.    ED COURSE    I met with the patient, obtained history, performed an initial exam, and discussed options and plan for diagnostics and treatment here in the ED.    At the conclusion of the encounter I discussed the results of all of the tests and the  disposition. The questions were answered. The patient or family acknowledged understanding and was agreeable with the care plan.     MEDICATIONS GIVEN IN THE EMERGENCY:  Medications   furosemide (LASIX) injection 80 mg (80 mg Intravenous Given 3/14/22 1630)       NEW PRESCRIPTIONS STARTED AT TODAY'S ER VISIT  New Prescriptions    No medications on file            =================================================================    HPI    Patient information was obtained from: Patient and daughter at the bedside  Reymundo Petersen is a 81 year old male with a pertinent history of paroxysmal atrial fibrillation on Xarelto, with pacemaker who presents to this ED for evaluation of progressive worsening peripheral extra lower extremity edema while on 120 mg of Lasix daily.  Over the last 2 weeks he has noticed a steady increase in his lower extremity edema.  He denies any redness or acute injuries.  He states it is becoming painful and more difficult to get around because of the water retention in the legs.  Patient also reports noticing that he feels as if he cannot empty his bladder and that he is retaining urine and there is decreased urinary output.  He denies any blood in his urine or pain with urination.  Patient reports shortness of breath with exertion as well as difficulty sleeping at night because of shortness of breath when he lies flat.  No reports of fever or chills.  She denies any recent nausea or vomiting.  Patient did discuss his symptoms with his cardiology team prior to coming in and they referred him to the ED.      REVIEW OF SYSTEMS   Review of Systems   Constitutional: Positive for fatigue. Negative for fever.   Respiratory: Positive for shortness of breath. Negative for cough.    Cardiovascular: Positive for leg swelling. Negative for chest pain.   Genitourinary: Positive for decreased urine volume and difficulty urinating. Negative for dysuria, frequency and hematuria.   All other systems reviewed  and are negative.         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) 2/11/2021     Congestive heart failure (H)      Coronary artery disease      COVID 02/2021     Dilated cardiomyopathy (H) 2/11/2021     MARIAN (generalized anxiety disorder) 2/11/2021     GERD (gastroesophageal reflux disease)      High cholesterol      Hypertension      LBBB (left bundle branch block) 7/11/2018     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     Pulmonary hypertension (H) 4/1/2020       PAST SURGICAL HISTORY:  Past Surgical History:   Procedure Laterality Date     ANGIOPLASTY       ARTHROSCOPY KNEE       CARDIOVERSION  2021     CARDIOVERSION  02/03/2022     CV CORONARY ANGIOGRAM N/A 10/23/2020    Procedure: Coronary Angiogram;  Surgeon: Varun Freire MD;  Location: [facility] Cath Lab;  Service: Cardiology     CV LEFT HEART CATHETERIZATION WITHOUT LEFT VENTRICULOGRAM Left 10/23/2020    Procedure: Left Heart Catheterization Without Left Ventriculogram;  Surgeon: Varun Freire MD;  Location: [facility] Cath Lab;  Service: Cardiology     EP BIV PACEMAKER INSERT N/A 3/25/2020    Procedure: EP Biventricular Pacemaker Insertion;  Surgeon: Taylor Sandoval MD;  Location: [facility] Cath Lab;  Service: Cardiology     RELEASE CARPAL TUNNEL       ZZC TOTAL KNEE ARTHROPLASTY Right 8/15/2019    Procedure: RIGHT  MINIMALLY TOTAL KNEE ARTHROPLASTY;  Surgeon: Keven Cerda MD;  Location: [facility];  Service: Orthopedics         CURRENT MEDICATIONS:    No current facility-administered medications for this encounter.    Current Outpatient Medications:      amiodarone (PACERONE) 200 MG tablet, Take 1 tablet (200 mg) by mouth daily, Disp: 90 tablet, Rfl: 3     aspirin (ASA) 81 MG chewable tablet, Take 1 tablet  (81 mg) by mouth every other day, Disp: 30 tablet, Rfl: 0     atorvastatin (LIPITOR) 10 MG tablet, Take 10 mg by mouth every other day Even days, bedtime, Disp: , Rfl:      coenzyme Q-10 200 MG CAPS capsule, Take 200 mg by mouth daily, Disp: , Rfl:      diphenhydrAMINE-acetaminophen (TYLENOL PM)  MG tablet, Take 1 tablet by mouth nightly as needed for sleep, Disp: , Rfl:      doxazosin (CARDURA) 8 MG tablet, [DOXAZOSIN (CARDURA) 8 MG TABLET] Take 4 mg by mouth at bedtime.       , Disp: , Rfl:      finasteride (PROSCAR) 5 mg tablet, [FINASTERIDE (PROSCAR) 5 MG TABLET] Take 5 mg by mouth at bedtime.       , Disp: , Rfl:      furosemide (LASIX) 40 MG tablet, Take 2 tablets (80 mg) by mouth daily Take extra dose if weight gain, leg swelling, or short of breath, Disp: 100 tablet, Rfl: 3     gabapentin (NEURONTIN) 100 MG capsule, Two capsules in the morning, two capsules in the afternoon, and three capsules in the evening, Disp: , Rfl:      glucosamine/chondr cole A sod (OSTEO BI-FLEX ORAL), [GLUCOSAMINE/CHONDR COLE A SOD (OSTEO BI-FLEX ORAL)] Take 1 tablet by mouth 2 (two) times a day.       , Disp: , Rfl:      losartan (COZAAR) 25 MG tablet, [LOSARTAN (COZAAR) 25 MG TABLET] Take 1 tablet (25 mg total) by mouth daily., Disp: 30 tablet, Rfl: 0     melatonin 10 mg Tab, Take 10 mg by mouth At Bedtime , Disp: , Rfl:      metoprolol succinate ER (TOPROL-XL) 25 MG 24 hr tablet, Take 1 tablet (25 mg) by mouth At Bedtime, Disp: 90 tablet, Rfl: 3     multivitamin, therapeutic (THERA-VIT) TABS tablet, Take 1 tablet by mouth daily, Disp: , Rfl:      omeprazole (PRILOSEC) 20 MG capsule, [OMEPRAZOLE (PRILOSEC) 20 MG CAPSULE] Take 20 mg by mouth 2 (two) times a day before meals., Disp: , Rfl:      rivaroxaban ANTICOAGULANT (XARELTO) 20 MG TABS tablet, Take 1 tablet (20 mg) by mouth daily, Disp: 90 tablet, Rfl: 3     sertraline (ZOLOFT) 50 MG tablet, Take 50 mg by mouth 2 times daily , Disp: , Rfl:      vit A/vit C/vit E/zinc/copper  (PRESERVISION AREDS ORAL), [VIT A/VIT C/VIT E/ZINC/COPPER (PRESERVISION AREDS ORAL)] Take 1 tablet by mouth 2 (two) times a day., Disp: , Rfl:       ALLERGIES:  Allergies   Allergen Reactions     No Known Allergies        FAMILY HISTORY:  Family History   Problem Relation Age of Onset     Heart Disease Father      Diabetes Type 2  Sister      Alzheimer Disease Mother      Chronic Obstructive Pulmonary Disease Brother        SOCIAL HISTORY:   Social History     Socioeconomic History     Marital status:      Spouse name: Not on file     Number of children: Not on file     Years of education: Not on file     Highest education level: Not on file   Occupational History     Not on file   Tobacco Use     Smoking status: Never Smoker     Smokeless tobacco: Never Used   Substance and Sexual Activity     Alcohol use: Yes     Comment: Alcoholic Drinks/day: rare     Drug use: No     Sexual activity: Not on file   Other Topics Concern     Parent/sibling w/ CABG, MI or angioplasty before 65F 55M? Not Asked   Social History Narrative     Not on file     Social Determinants of Health     Financial Resource Strain: Not on file   Food Insecurity: Not on file   Transportation Needs: Not on file   Physical Activity: Not on file   Stress: Not on file   Social Connections: Not on file   Intimate Partner Violence: Not on file   Housing Stability: Not on file       VITALS:  Patient Vitals for the past 24 hrs:   BP Temp Temp src Pulse Resp SpO2 Weight   03/14/22 1348 118/76 98.8  F (37.1  C) Oral 83 20 94 % 94.3 kg (208 lb)       PHYSICAL EXAM    Physical Exam  Vitals and nursing note reviewed.   Constitutional:       General: He is not in acute distress.     Appearance: He is normal weight. He is not ill-appearing or toxic-appearing.   HENT:      Head: Normocephalic.      Right Ear: External ear normal.      Left Ear: External ear normal.   Eyes:      Conjunctiva/sclera: Conjunctivae normal.   Cardiovascular:      Rate and Rhythm:  Normal rate.      Pulses: Normal pulses.   Pulmonary:      Effort: Pulmonary effort is normal. No respiratory distress.      Breath sounds: No wheezing.      Comments: Somewhat diminished breath sounds in the bases bilaterally  Abdominal:      General: Abdomen is flat. Bowel sounds are normal.      Tenderness: There is no abdominal tenderness. There is no guarding or rebound.   Musculoskeletal:         General: No tenderness, deformity or signs of injury.      Right lower leg: Edema present.      Left lower leg: Edema present.   Skin:     General: Skin is warm and dry.      Findings: No rash.   Neurological:      General: No focal deficit present.      Mental Status: He is alert. Mental status is at baseline.      Sensory: No sensory deficit.      Motor: No weakness.   Psychiatric:         Mood and Affect: Mood normal.          LAB:  All pertinent labs reviewed and interpreted.  Results for orders placed or performed during the hospital encounter of 03/14/22   XR Chest Port 1 View    Impression    IMPRESSION:     There is a right subclavian approach pacemaker with right atrial appendage and right ventricular leads. Enlarged cardiac silhouette with particular conspicuous left ventricular heart border, accentuated by projection. The vascular pedicle width is   normal.    Bilateral calcified pleural plaque. No pleural effusion or pneumothorax.    Symmetric lung inflation with normal lung vascularity. No peribronchial fluid cuffs, subpleural edema along the interlobar fissures or intralobular septal thickening to suggest interstitial edema. No lung consolidation.   B-Type Natriuretic Peptide ([facility] Only)   Result Value Ref Range    BNP 1,163 (H) 0 - 93 pg/mL   Comprehensive metabolic panel   Result Value Ref Range    Sodium 140 136 - 145 mmol/L    Potassium 3.7 3.5 - 5.0 mmol/L    Chloride 101 98 - 107 mmol/L    Carbon Dioxide (CO2) 28 22 - 31 mmol/L    Anion Gap 11 5 - 18 mmol/L    Urea Nitrogen 19 8 - 28 mg/dL     Creatinine 1.37 (H) 0.70 - 1.30 mg/dL    Calcium 8.9 8.5 - 10.5 mg/dL    Glucose 112 70 - 125 mg/dL    Alkaline Phosphatase 71 45 - 120 U/L    AST 27 0 - 40 U/L    ALT 12 0 - 45 U/L    Protein Total 6.7 6.0 - 8.0 g/dL    Albumin 3.4 (L) 3.5 - 5.0 g/dL    Bilirubin Total 1.1 (H) 0.0 - 1.0 mg/dL    GFR Estimate 51 (L) >60 mL/min/1.73m2   UA with Microscopic reflex to Culture    Specimen: Urine, Clean Catch   Result Value Ref Range    Color Urine Light Yellow Colorless, Straw, Light Yellow, Yellow    Appearance Urine Clear Clear    Glucose Urine Negative Negative mg/dL    Bilirubin Urine Negative Negative    Ketones Urine Negative Negative mg/dL    Specific Gravity Urine 1.010 1.001 - 1.030    Blood Urine Negative Negative    pH Urine 6.5 5.0 - 7.0    Protein Albumin Urine Negative Negative mg/dL    Urobilinogen Urine <2.0 <2.0 mg/dL    Nitrite Urine Negative Negative    Leukocyte Esterase Urine Negative Negative    Mucus Urine Present (A) None Seen /LPF    RBC Urine 1 <=2 /HPF    WBC Urine 3 <=5 /HPF    Squamous Epithelials Urine <1 <=1 /HPF    Hyaline Casts Urine 5 (H) <=2 /LPF   Result Value Ref Range    Magnesium 2.1 1.8 - 2.6 mg/dL   Result Value Ref Range    Troponin I 0.07 0.00 - 0.29 ng/mL   CBC with platelets and differential   Result Value Ref Range    WBC Count 6.3 4.0 - 11.0 10e3/uL    RBC Count 3.78 (L) 4.40 - 5.90 10e6/uL    Hemoglobin 11.6 (L) 13.3 - 17.7 g/dL    Hematocrit 36.2 (L) 40.0 - 53.0 %    MCV 96 78 - 100 fL    MCH 30.7 26.5 - 33.0 pg    MCHC 32.0 31.5 - 36.5 g/dL    RDW 16.9 (H) 10.0 - 15.0 %    Platelet Count 173 150 - 450 10e3/uL    % Neutrophils 78 %    % Lymphocytes 10 %    % Monocytes 8 %    % Eosinophils 3 %    % Basophils 1 %    % Immature Granulocytes 0 %    NRBCs per 100 WBC 0 <1 /100    Absolute Neutrophils 4.9 1.6 - 8.3 10e3/uL    Absolute Lymphocytes 0.7 (L) 0.8 - 5.3 10e3/uL    Absolute Monocytes 0.5 0.0 - 1.3 10e3/uL    Absolute Eosinophils 0.2 0.0 - 0.7 10e3/uL    Absolute  Basophils 0.1 0.0 - 0.2 10e3/uL    Absolute Immature Granulocytes 0.0 <=0.4 10e3/uL    Absolute NRBCs 0.0 10e3/uL   TSH with free T4 reflex   Result Value Ref Range    TSH 7.62 (H) 0.30 - 5.00 uIU/mL   Asymptomatic COVID-19 Virus (Coronavirus) by PCR Nasopharyngeal    Specimen: Nasopharyngeal; Swab   Result Value Ref Range    SARS CoV2 PCR Negative Negative   ECG 12-LEAD WITH MUSE ([facility])   Result Value Ref Range    Systolic Blood Pressure 138 mmHg    Diastolic Blood Pressure 60 mmHg    Ventricular Rate 65 BPM    Atrial Rate 63 BPM    MD Interval  ms    QRS Duration 148 ms     ms    QTc 482 ms    P Axis  degrees    R AXIS 69 degrees    T Axis 213 degrees    Interpretation ECG       Atrial fibrillation with a competing junctional pacemaker  Left bundle branch block  Abnormal ECG  When compared with ECG of 08-MAR-2022 10:37,  Atrial fibrillation has replaced Electronic ventricular pacemaker  Confirmed by SEE ED PROVIDER NOTE FOR, ECG INTERPRETATION ([id number]),  FAREED ROBERTS ([id number]) on 3/14/2022 3:48:06 PM         RADIOLOGY:  Reviewed all pertinent imaging. Please see official radiology report.  XR Chest Port 1 View   Final Result   IMPRESSION:       There is a right subclavian approach pacemaker with right atrial appendage and right ventricular leads. Enlarged cardiac silhouette with particular conspicuous left ventricular heart border, accentuated by projection. The vascular pedicle width is    normal.      Bilateral calcified pleural plaque. No pleural effusion or pneumothorax.      Symmetric lung inflation with normal lung vascularity. No peribronchial fluid cuffs, subpleural edema along the interlobar fissures or intralobular septal thickening to suggest interstitial edema. No lung consolidation.          EKG:    Performed at: 1504    The EKG is showing what appears to be a paced rhythm at a rate of 65 bpm.  There is evidence of chronic left bundle branch block which is unchanged compared to previous EKG.   QTC measured at 482.  Compared to previous EKG from March 8 does not appear that there is significant change.    I have independently reviewed and interpreted the EKG(s) documented above.  Reviewed with Dr. Maciel Whitehead PA-C  Emergency Medicine  [facility]     Waldo Whitehead PA-C  03/14/22 5139

## 2022-03-14 NOTE — PHARMACY-ADMISSION MEDICATION HISTORY
Pharmacy Note - Admission Medication History    Pertinent Provider Information:    ______________________________________________________________________    Prior To Admission (PTA) med list completed and updated in EMR.       PTA Med List   Medication Sig Note Last Dose     amiodarone (PACERONE) 200 MG tablet Take 1 tablet (200 mg) by mouth daily 3/14/2022: suppertime 3/13/2022 at supper     aspirin (ASA) 81 MG chewable tablet Take 1 tablet (81 mg) by mouth every other day 3/14/2022: Odd days 3/13/2022     atorvastatin (LIPITOR) 10 MG tablet Take 10 mg by mouth every other day Even days, bedtime 3/14/2022: Even days 3/12/2022     coenzyme Q-10 200 MG CAPS capsule Take 200 mg by mouth daily  3/14/2022     diphenhydrAMINE-acetaminophen (TYLENOL PM)  MG tablet Take 1 tablet by mouth nightly as needed for sleep  prn     doxazosin (CARDURA) 8 MG tablet [DOXAZOSIN (CARDURA) 8 MG TABLET] Take 4 mg by mouth at bedtime.         3/13/2022     finasteride (PROSCAR) 5 mg tablet [FINASTERIDE (PROSCAR) 5 MG TABLET] Take 5 mg by mouth at bedtime.         3/13/2022     furosemide (LASIX) 40 MG tablet Take 2 tablets (80 mg) by mouth daily Take extra dose if weight gain, leg swelling, or short of breath  3/14/2022     gabapentin (NEURONTIN) 100 MG capsule Two capsules in the morning, two capsules in the afternoon, and three capsules in the evening  3/14/2022 at am     glucosamine/chondr cole A sod (OSTEO BI-FLEX ORAL) [GLUCOSAMINE/CHONDR COLE A SOD (OSTEO BI-FLEX ORAL)] Take 1 tablet by mouth 2 (two) times a day.         3/14/2022 at x1     losartan (COZAAR) 25 MG tablet [LOSARTAN (COZAAR) 25 MG TABLET] Take 1 tablet (25 mg total) by mouth daily.  3/14/2022     melatonin 10 mg Tab Take 10 mg by mouth At Bedtime   3/13/2022     metoprolol succinate ER (TOPROL-XL) 25 MG 24 hr tablet Take 1 tablet (25 mg) by mouth At Bedtime  3/13/2022     multivitamin, therapeutic (THERA-VIT) TABS tablet Take 1 tablet by mouth daily  3/14/2022      omeprazole (PRILOSEC) 20 MG capsule [OMEPRAZOLE (PRILOSEC) 20 MG CAPSULE] Take 20 mg by mouth 2 (two) times a day before meals.  3/14/2022 at x1     rivaroxaban ANTICOAGULANT (XARELTO) 20 MG TABS tablet Take 1 tablet (20 mg) by mouth daily 3/14/2022: Supper time 3/13/2022 at supper     sertraline (ZOLOFT) 50 MG tablet Take 50 mg by mouth 2 times daily   3/14/2022 at x1     vit A/vit C/vit E/zinc/copper (PRESERVISION AREDS ORAL) [VIT A/VIT C/VIT E/ZINC/COPPER (PRESERVISION AREDS ORAL)] Take 1 tablet by mouth 2 (two) times a day.  3/14/2022 at x1       Information source(s): Family member    Method of interview communication: in-person    Patient was asked about OTC/herbal products specifically.  PTA med list reflects this.    Based on the pharmacist's assessment, the PTA med list information appears reliable    Allergies were reviewed, assessed, and updated with the patient.      Patient does not use any multi-dose medications prior to admission.     Thank you for the opportunity to participate in the care of this patient.      Maria Esther Downs RPH     3/14/2022     4:27 PM

## 2022-03-14 NOTE — ED TRIAGE NOTES
Pt presents with worsening fluid retention in bilateral lower extremities and urinary retention. Pt also endorses shortness of breath. Pt reports the symptoms started after a cardioversion 6 days ago. Pt is on lasix

## 2022-03-14 NOTE — ED NOTES
Pt complains of urinary retention and is able  To void however, he feels he is retaining.  Pt also has B leg swelling.

## 2022-03-14 NOTE — TELEPHONE ENCOUNTER
Laurie Clark APRN CNP Westlund, Jessica, RN  Cc: Soo Mott APRN CNP  Caller: Unspecified (Today,  8:32 AM)  Don's kidney function is up 0.4 from 2 months ago.  This is significant change and he needs to be evaluated in primary clinic ASAP to have UTI/hydronephrosis or kidney cause of edema ruled out.  No change in diuretic over the phone as needs in clinic evaluation.   Offer remote device check if they want to verify rhythm, biv pacing and/or Optivol reading.   Marek

## 2022-03-15 LAB
ANION GAP SERPL CALCULATED.3IONS-SCNC: 8 MMOL/L (ref 5–18)
BUN SERPL-MCNC: 18 MG/DL (ref 8–28)
CALCIUM SERPL-MCNC: 8.6 MG/DL (ref 8.5–10.5)
CHLORIDE BLD-SCNC: 103 MMOL/L (ref 98–107)
CO2 SERPL-SCNC: 31 MMOL/L (ref 22–31)
CREAT SERPL-MCNC: 1.16 MG/DL (ref 0.7–1.3)
GFR SERPL CREATININE-BSD FRML MDRD: 62 ML/MIN/1.73M2
GLUCOSE BLD-MCNC: 102 MG/DL (ref 70–125)
MAGNESIUM SERPL-MCNC: 2.1 MG/DL (ref 1.8–2.6)
POTASSIUM BLD-SCNC: 3.5 MMOL/L (ref 3.5–5)
SODIUM SERPL-SCNC: 142 MMOL/L (ref 136–145)

## 2022-03-15 PROCEDURE — 250N000013 HC RX MED GY IP 250 OP 250 PS 637: Performed by: STUDENT IN AN ORGANIZED HEALTH CARE EDUCATION/TRAINING PROGRAM

## 2022-03-15 PROCEDURE — 80048 BASIC METABOLIC PNL TOTAL CA: CPT | Performed by: STUDENT IN AN ORGANIZED HEALTH CARE EDUCATION/TRAINING PROGRAM

## 2022-03-15 PROCEDURE — 83735 ASSAY OF MAGNESIUM: CPT | Performed by: STUDENT IN AN ORGANIZED HEALTH CARE EDUCATION/TRAINING PROGRAM

## 2022-03-15 PROCEDURE — 36415 COLL VENOUS BLD VENIPUNCTURE: CPT | Performed by: STUDENT IN AN ORGANIZED HEALTH CARE EDUCATION/TRAINING PROGRAM

## 2022-03-15 PROCEDURE — 99223 1ST HOSP IP/OBS HIGH 75: CPT | Mod: AI

## 2022-03-15 PROCEDURE — 200N000001 HC R&B ICU

## 2022-03-15 PROCEDURE — 250N000011 HC RX IP 250 OP 636

## 2022-03-15 PROCEDURE — 250N000013 HC RX MED GY IP 250 OP 250 PS 637: Performed by: FAMILY MEDICINE

## 2022-03-15 RX ORDER — POTASSIUM CHLORIDE 1500 MG/1
20 TABLET, EXTENDED RELEASE ORAL ONCE
Status: COMPLETED | OUTPATIENT
Start: 2022-03-15 | End: 2022-03-15

## 2022-03-15 RX ADMIN — FUROSEMIDE 80 MG: 10 INJECTION, SOLUTION INTRAMUSCULAR; INTRAVENOUS at 08:20

## 2022-03-15 RX ADMIN — GABAPENTIN 200 MG: 100 CAPSULE ORAL at 08:18

## 2022-03-15 RX ADMIN — AMIODARONE HYDROCHLORIDE 200 MG: 200 TABLET ORAL at 08:16

## 2022-03-15 RX ADMIN — ASPIRIN 81 MG CHEWABLE TABLET 81 MG: 81 TABLET CHEWABLE at 08:20

## 2022-03-15 RX ADMIN — GABAPENTIN 200 MG: 100 CAPSULE ORAL at 12:28

## 2022-03-15 RX ADMIN — RIVAROXABAN 20 MG: 10 TABLET, FILM COATED ORAL at 16:51

## 2022-03-15 RX ADMIN — ACETAMINOPHEN 975 MG: 325 TABLET ORAL at 21:12

## 2022-03-15 RX ADMIN — DOXAZOSIN 4 MG: 4 TABLET ORAL at 21:10

## 2022-03-15 RX ADMIN — PANTOPRAZOLE SODIUM 40 MG: 20 TABLET, DELAYED RELEASE ORAL at 16:51

## 2022-03-15 RX ADMIN — SERTRALINE HYDROCHLORIDE 50 MG: 50 TABLET, FILM COATED ORAL at 08:17

## 2022-03-15 RX ADMIN — PANTOPRAZOLE SODIUM 40 MG: 20 TABLET, DELAYED RELEASE ORAL at 06:23

## 2022-03-15 RX ADMIN — FUROSEMIDE 40 MG: 10 INJECTION, SOLUTION INTRAMUSCULAR; INTRAVENOUS at 16:51

## 2022-03-15 RX ADMIN — SERTRALINE HYDROCHLORIDE 50 MG: 50 TABLET, FILM COATED ORAL at 21:11

## 2022-03-15 RX ADMIN — FINASTERIDE 5 MG: 5 TABLET, FILM COATED ORAL at 21:11

## 2022-03-15 RX ADMIN — Medication 1 MG: at 21:12

## 2022-03-15 RX ADMIN — GABAPENTIN 300 MG: 300 CAPSULE ORAL at 21:12

## 2022-03-15 RX ADMIN — POTASSIUM CHLORIDE 20 MEQ: 1500 TABLET, EXTENDED RELEASE ORAL at 06:23

## 2022-03-15 RX ADMIN — METOPROLOL SUCCINATE 25 MG: 25 TABLET, EXTENDED RELEASE ORAL at 21:12

## 2022-03-15 ASSESSMENT — ACTIVITIES OF DAILY LIVING (ADL)
ADLS_ACUITY_SCORE: 6
DEPENDENT_IADLS:: INDEPENDENT
ADLS_ACUITY_SCORE: 6

## 2022-03-15 NOTE — PROGRESS NOTES
[facility]    Progress Note - Hospitalist Service       Date of Admission:  3/14/2022    Assessment & Plan          Reymundo Petersen is a 81 year old male with a past medical history of atrial fibrillation with multiple cardioversion (3) and pacemaker, CHF, coronary artery disease, dilated cardiomyopathy, aortic valve insufficiency, GERD, BPH, anxiety, HLD, HTN, LBBB, and pulmonary hypertension. He was admitted on 3/14/2022. He is admitted for fluid overload not responding to home dose of lasix.    CHF  Volume overload, patient is currently on 80mg in the morning and 40mg in the evening for the last at home. He has received 80 mg IV lasix in the ED.  - Strict I/Os  - Daily weights  - Lasix IV 80 mEq in the morning and 40 mEq in the evening starting 3/15  - Fluid restriction to 1.5L a day  - Incentive spirometry  - Trend BMP daily, with close monitoring of potassium  - Potassium protocol     Paroxysmal atrial fibrillation, cardioversion on 3/8/2022  S/p bi-ventricular pacemaker  Dilated cardiomyopathy  Aortic valve insufficiency  CAD, history of stent placement  Echocardiogram completed on 5/19/2021 showing EF of 43%, severe bi-atrial enlargement, mildly dilated right ventricle, ascending aortic dilation to 4.1cm, severe concentric increase of left ventricle, moderate pulmonary hypertension. BNP on 3/14 was 1163.  - Cardiac monitoring  - PTA xarelto 20 mg daily  - Continue PTA aspirin 81 mg  - Continue PTA metoprolol succinate ER 25mg 24 hr daily  - Continue PTA amiodarone 200 mg daily  - Troponin x2 - first was 0.07, second was 0.09     QTc prolongation  - EKG on 3/14 showing QTc of 482 msec  - Avoid medications which cause QT elongation  - Cardiac telemetry     Hypertension  - Holding PTA losartan 25 mg daily due to soft pressures and soft pulse rate with the increased lasix  - Continue PTA doxazosin 4 mg daily  - Monitor due to increased lasix doses. If blood pressure continues to  decrease with holding losartan adjust morning dose of lasix to 40 mEq.     Elevated TSH  Normal 2 years ago. Hypothyroidism is a side effect of amiodarone. However, he has been on amiodarone for less than 3 months (little over 1 month), so it is unlikely this is the cause.  - T4 normal at 0.91  - Follow up outpatient     Chronic diseases  General anxiety disease: PTA sertraline 50 mg daily  Hypercholesterolemia and CAD: PTA atorvastatin 10 mg every other day (last dose 3/14)  BPH: PTA finasteride 5 mg daily  GERD: PTA omeprazole 20mg BID  Osteoarthritis: hold glucosamine and Coenzyme Q      Diet: Low Saturated Fat Na <2400 mg  Fluid restriction 1500 ML FLUID    DVT Prophylaxis: Xarelto  Adorno Catheter: Not present  Fluids: PO, restricted to 1.5L/day  Central Lines: None  Cardiac Monitoring: ACTIVE order. Indication: Acute decompensated heart failure (48 hours)  Code Status: Full Code      Disposition Plan   Expected Discharge: 03/17/2022     Anticipated discharge location:  Awaiting care coordination huddle       The patient's care was discussed with the Attending Physician, Dr. Opal Araujo.    Rere Kumar MD  Hospitalist Service  [facility]  Text page via [facility] Paging/Directory     ______________________________________________________________________    Interval History   Daughter, Juliet, is present in the room with patient. Both state the edema in the legs look much better. Patient is also happy to be here with good food and kind service.     Patient denies cramping, chest pain, cough, increased shortness of breath, and other pain. Endorses good appetite and good mobility.    Data reviewed today: I reviewed all medications, new labs and imaging results over the last 24 hours. I personally reviewed the EKG tracing showing atrial paced, no atrial fibrillation and the chest x-ray image(s) showing cardiomegally without suggestion of increased fluid.    Physical Exam   Vital Signs:  Temp: 98.8  F (37.1  C) Temp src: Oral BP: 105/58 Pulse: 78   Resp: 20 SpO2: 91 % O2 Device: None (Room air)    Weight: 198 lbs 14.4 oz    General appearance: alert, in no distress, cooperative, appears stated age  Head: normocephalic, without obvious abnormalities  Eyes: conjunctivae/corneas clear, EOM intact  Ears: hearing grossly intact  Nose: nares normal, no drainage  Lung: bibasilar crackles, remaining is clear to auscultation bilaterally, no wheezing, coughing, or use of accessory muscles  Chest wall: no tenderness  Heart: regular rate and rhythm, S1, S2 normal, soft murmur, no click, rub, or gallop  Abdomen: soft, non-tender, non-distended, bowel sounds present in all four quadrants  Extremities: warm, dry, atraumatic, no cyanosis, +2 pitting edema up to waist  Skin: Red petechia of bilateral lower extremities. Right knee scar from total knee arthroplasty. Scar on right elbow. Normal texture and turgor.  Neurologic: ambulatory by self, sensation grossly intact in bilateral lower extremities  Psychologic: appropriate mood    Data   Recent Labs   Lab 03/15/22  0437 03/14/22  1454 03/08/22  0912   WBC  --  6.3  --    HGB  --  11.6*  --    MCV  --  96  --    PLT  --  173  --     140 138   POTASSIUM 3.5 3.7 4.1   CHLORIDE 103 101 100   CO2 31 28 24   BUN 18 19 26   CR 1.16 1.37* 1.58*   ANIONGAP 8 11 14   ANGELY 8.6 8.9 9.7    112 97   ALBUMIN  --  3.4*  --    PROTTOTAL  --  6.7  --    BILITOTAL  --  1.1*  --    ALKPHOS  --  71  --    ALT  --  12  --    AST  --  27  --      Recent Results (from the past 24 hour(s))   XR Chest Port 1 View    Narrative    EXAM: XR CHEST PORT 1 VIEW  LOCATION: [facility]  DATE/TIME: 3/14/2022 2:59 PM    INDICATION: Shortness of breath  COMPARISON: CT of the abdomen and pelvis with IV contrast 7/2/2001      Impression    IMPRESSION:     There is a right subclavian approach pacemaker with right atrial appendage and right ventricular leads. Enlarged  cardiac silhouette with particular conspicuous left ventricular heart border, accentuated by projection. The vascular pedicle width is   normal.    Bilateral calcified pleural plaque. No pleural effusion or pneumothorax.    Symmetric lung inflation with normal lung vascularity. No peribronchial fluid cuffs, subpleural edema along the interlobar fissures or intralobular septal thickening to suggest interstitial edema. No lung consolidation.

## 2022-03-15 NOTE — CONSULTS
Care Management Initial Consult    General Information  Assessment completed with: Patient,    Type of CM/SW Visit: Initial Assessment    Primary Care Provider verified and updated as needed: Yes   Readmission within the last 30 days:        Reason for Consult: discharge planning  Advance Care Planning:            Communication Assessment  Patient's communication style: spoken language (English or Bilingual)    Hearing Difficulty or Deaf: yes   Wear Glasses or Blind: yes    Cognitive  Cognitive/Neuro/Behavioral: WDL                      Living Environment:   People in home: other relative(s)     Current living Arrangements: house      Able to return to prior arrangements: yes       Family/Social Support:  Care provided by: self  Provides care for: no one  Marital Status:   Neighbor, Other (specify) (family)          Description of Support System: Supportive, Involved    Support Assessment: Adequate family and caregiver support    Current Resources:   Patient receiving home care services: No     Community Resources: None  Equipment currently used at home: cane, straight  Supplies currently used at home: None    Employment/Financial:  Employment Status:          Financial Concerns:             Lifestyle & Psychosocial Needs:  Social Determinants of Health     Tobacco Use: Low Risk      Smoking Tobacco Use: Never Smoker     Smokeless Tobacco Use: Never Used   Alcohol Use: Not on file   Financial Resource Strain: Not on file   Food Insecurity: Not on file   Transportation Needs: Not on file   Physical Activity: Not on file   Stress: Not on file   Social Connections: Not on file   Intimate Partner Violence: Not on file   Depression: Not on file   Housing Stability: Not on file       Functional Status:  Prior to admission patient needed assistance:   Dependent ADLs:: Independent  Dependent IADLs:: Independent       Mental Health Status:          Chemical Dependency Status:                Values/Beliefs:  Spiritual,  Cultural Beliefs, Yarsani Practices, Values that affect care:                 Additional Information:  2:19 PM  Assessed.  From home, lives with sister-in-law.  Uses a cane as needed.  Anticipate discharge home, no needs.  Daughter will transport at discharge.      DEUCE Uriostegui

## 2022-03-15 NOTE — PROGRESS NOTES
Reymundo did well throughout the day. IV lasix given per order, good urine output. Ambulating well 1 assist with a cane.     Daughter at the bedside this morning and updated.     Discharge plans pending medical clearance.

## 2022-03-16 ENCOUNTER — PATIENT OUTREACH (OUTPATIENT)
Dept: CARE COORDINATION | Facility: CLINIC | Age: 82
End: 2022-03-16
Payer: COMMERCIAL

## 2022-03-16 VITALS
BODY MASS INDEX: 27.65 KG/M2 | RESPIRATION RATE: 18 BRPM | TEMPERATURE: 98.7 F | HEIGHT: 71 IN | SYSTOLIC BLOOD PRESSURE: 100 MMHG | OXYGEN SATURATION: 93 % | DIASTOLIC BLOOD PRESSURE: 59 MMHG | HEART RATE: 66 BPM | WEIGHT: 197.5 LBS

## 2022-03-16 PROBLEM — I44.7 LBBB (LEFT BUNDLE BRANCH BLOCK): Status: ACTIVE | Noted: 2018-07-11

## 2022-03-16 PROBLEM — R79.89 ELEVATED TSH: Status: ACTIVE | Noted: 2022-03-16

## 2022-03-16 PROBLEM — I42.0 DILATED CARDIOMYOPATHY (H): Status: ACTIVE | Noted: 2021-02-11

## 2022-03-16 PROBLEM — N18.2 STAGE 2 CHRONIC KIDNEY DISEASE: Status: ACTIVE | Noted: 2021-10-08

## 2022-03-16 PROBLEM — Z95.0 CARDIAC PACEMAKER IN SITU: Chronic | Status: ACTIVE | Noted: 2020-09-29

## 2022-03-16 LAB
ANION GAP SERPL CALCULATED.3IONS-SCNC: 6 MMOL/L (ref 5–18)
BUN SERPL-MCNC: 17 MG/DL (ref 8–28)
CALCIUM SERPL-MCNC: 8.4 MG/DL (ref 8.5–10.5)
CHLORIDE BLD-SCNC: 101 MMOL/L (ref 98–107)
CO2 SERPL-SCNC: 33 MMOL/L (ref 22–31)
CREAT SERPL-MCNC: 1.03 MG/DL (ref 0.7–1.3)
GFR SERPL CREATININE-BSD FRML MDRD: 72 ML/MIN/1.73M2
GLUCOSE BLD-MCNC: 106 MG/DL (ref 70–125)
MAGNESIUM SERPL-MCNC: 2.2 MG/DL (ref 1.8–2.6)
POTASSIUM BLD-SCNC: 3.6 MMOL/L (ref 3.5–5)
SODIUM SERPL-SCNC: 140 MMOL/L (ref 136–145)

## 2022-03-16 PROCEDURE — 250N000011 HC RX IP 250 OP 636

## 2022-03-16 PROCEDURE — 250N000013 HC RX MED GY IP 250 OP 250 PS 637: Performed by: FAMILY MEDICINE

## 2022-03-16 PROCEDURE — 99238 HOSP IP/OBS DSCHRG MGMT 30/<: CPT | Mod: GC

## 2022-03-16 PROCEDURE — 36415 COLL VENOUS BLD VENIPUNCTURE: CPT

## 2022-03-16 PROCEDURE — 83735 ASSAY OF MAGNESIUM: CPT | Performed by: FAMILY MEDICINE

## 2022-03-16 PROCEDURE — 250N000013 HC RX MED GY IP 250 OP 250 PS 637: Performed by: STUDENT IN AN ORGANIZED HEALTH CARE EDUCATION/TRAINING PROGRAM

## 2022-03-16 PROCEDURE — 80048 BASIC METABOLIC PNL TOTAL CA: CPT

## 2022-03-16 PROCEDURE — 250N000013 HC RX MED GY IP 250 OP 250 PS 637: Performed by: INTERNAL MEDICINE

## 2022-03-16 RX ORDER — POTASSIUM CHLORIDE 1500 MG/1
20 TABLET, EXTENDED RELEASE ORAL ONCE
Status: COMPLETED | OUTPATIENT
Start: 2022-03-16 | End: 2022-03-16

## 2022-03-16 RX ADMIN — AMIODARONE HYDROCHLORIDE 200 MG: 200 TABLET ORAL at 09:16

## 2022-03-16 RX ADMIN — POTASSIUM CHLORIDE 20 MEQ: 20 TABLET, EXTENDED RELEASE ORAL at 06:26

## 2022-03-16 RX ADMIN — GABAPENTIN 200 MG: 100 CAPSULE ORAL at 09:16

## 2022-03-16 RX ADMIN — FUROSEMIDE 80 MG: 10 INJECTION, SOLUTION INTRAMUSCULAR; INTRAVENOUS at 09:15

## 2022-03-16 RX ADMIN — ATORVASTATIN CALCIUM 10 MG: 10 TABLET, FILM COATED ORAL at 09:16

## 2022-03-16 RX ADMIN — PANTOPRAZOLE SODIUM 40 MG: 20 TABLET, DELAYED RELEASE ORAL at 06:26

## 2022-03-16 RX ADMIN — SERTRALINE HYDROCHLORIDE 50 MG: 50 TABLET, FILM COATED ORAL at 09:15

## 2022-03-16 ASSESSMENT — ACTIVITIES OF DAILY LIVING (ADL)
ADLS_ACUITY_SCORE: 6

## 2022-03-16 NOTE — PLAN OF CARE
Problem: Adjustment to Illness (Heart Failure)  Goal: Optimal Coping  Outcome: Ongoing, Progressing     Problem: Dysrhythmia (Heart Failure)  Goal: Stable Heart Rate and Rhythm  Outcome: Ongoing, Progressing     Problem: Respiratory Compromise (Heart Failure)  Goal: Effective Oxygenation and Ventilation  Outcome: Ongoing, Progressing  Intervention: Promote Airway Secretion Clearance  Recent Flowsheet Documentation  Taken 3/16/2022 0400 by Ligia Babcock RN  Cough And Deep Breathing: done independently per patient  Taken 3/15/2022 2342 by Ligia Babcock RN  Cough And Deep Breathing: done independently per patient   Goal Outcome Evaluation:              Pt sleeping soundly overnight.  Sats dropping to 87-89% while sleeping on room air.  Pt placed on 2 Liter O2 overnight. Denies pain.  Edema continues in lower extremities. Tele shows V- pacing.

## 2022-03-16 NOTE — PLAN OF CARE
Problem: Plan of Care - These are the overarching goals to be used throughout the patient stay.    Goal: Plan of Care Review/Shift Note  Description: The Plan of Care Review/Shift note should be completed every shift.  The Outcome Evaluation is a brief statement about your assessment that the patient is improving, declining, or no change.  This information will be displayed automatically on your shift note.  Outcome: Adequate for Care Transition     Problem: Plan of Care - These are the overarching goals to be used throughout the patient stay.    Goal: Optimal Comfort and Wellbeing  Outcome: Adequate for Care Transition  Intervention: Provide Person-Centered Care  Recent Flowsheet Documentation  Taken 3/16/2022 0800 by Bethany Dugan, RN  Trust Relationship/Rapport:   care explained   emotional support provided   choices provided   empathic listening provided   questions answered   questions encouraged   reassurance provided   thoughts/feelings acknowledged   Goal Outcome Evaluation:  Pt a/ox4. VSS. Vpaced. On RA. Denies SOB, pain. Up to chair this AM. Plans for discharge home w daughter.

## 2022-03-16 NOTE — PROGRESS NOTES
Care Management Discharge Note    Discharge Date: 03/16/2022       Discharge Disposition: Home    Discharge Services: None    Discharge DME: None    Discharge Transportation:      Private pay costs discussed: Not applicable    PAS Confirmation Code:    Patient/family educated on Medicare website which has current facility and service quality ratings:      Education Provided on the Discharge Plan:    Persons Notified of Discharge Plans: pt  Patient/Family in Agreement with the Plan: yes    Handoff Referral Completed: Yes    Additional Information:  10:05 AM  Pt medically ready to discharge home with family.  Daughter will transport.        DEUCE Uriostegui

## 2022-03-16 NOTE — DISCHARGE SUMMARY
[facility]  Discharge Summary - Medicine & Pediatrics       Date of Admission:  3/14/2022  Date of Discharge:  3/16/2022  Discharging Provider: Dr. Rere Kumar and Dr. Opal Araujo  Discharge Service: Hospitalist Service    Discharge Diagnoses   Principal Problem:    CHF (congestive heart failure) (H) (9/8/2021)  Active Problems:    Primary hypertension (7/11/2018)    BPH (benign prostatic hyperplasia) (7/11/2018)    LBBB (left bundle branch block) (7/11/2018)    Pulmonary hypertension (H) (4/1/2020)    Cardiac pacemaker in situ (9/29/2020)    Coronary artery disease due to lipid rich plaque (10/15/2020)    GERD (gastroesophageal reflux disease) ()    Dilated cardiomyopathy (H) (2/11/2021)    Stage 2 chronic kidney disease (10/8/2021)    Peripheral edema (3/14/2022)    Elevated TSH (3/16/2022)    Follow-ups Needed After Discharge   Follow up with primary care provider, ADDISON BENÍTEZ, within 7 days for hospital follow-up and elevated TSH. The following labs/tests are recommended: BMP.       Discharge Disposition   Discharged to home  Condition at discharge: Good    Hospital Course   Reymundo Petersen is a 81 year old male with a past medical history of atrial fibrillation with multiple cardioversion (3) and pacemaker, CHF, coronary artery disease, dilated cardiomyopathy, aortic valve insufficiency, GERD, BPH, anxiety, HLD, HTN, LBBB, and pulmonary hypertension. He was admitted on 3/14/2022 for CHF exacerbation and peripheral edema not responding to home doses of lasix.    Patient's home regimen of lasix is 80 mEq in the morning and 40 mEq at home. He was treated in the hospital with IV lasix 80 mEq in the morning and 40 mEq in the evening. Patient responded well without decrease in potassium, creatine, or GFR. About 2.5L taken off during admission. Losartan was held due to well controlled blood pressures with increased lasix dosing. Losartan restarted as outpatient.    Consultations This  Hospital Stay   SOCIAL WORK IP CONSULT    Code Status   Prior       The patient was discussed with Dr. Opal Araujo.    Rere Kumar MD  [facility] ICU  [address] [place] [id number]  Phone: [phone]  Fax: [phone]  ______________________________________________________________________    Physical Exam   Vital Signs: Temp: 98.7  F (37.1  C) Temp src: Oral BP: 100/59 Pulse: 66   Resp: 18 SpO2: 93 % O2 Device: Nasal cannula Oxygen Delivery: 2 LPM  Weight: 197 lbs 8 oz    General appearance: alert, in no distress, cooperative, appears stated age  Head: normocephalic, without obvious abnormalities  Eyes: conjunctivae/corneas clear, EOM intact  Ears: hearing grossly intact  Nose: nares normal, no drainage  Lung: clear to auscultation bilaterally, no wheezing, coughing, or use of accessory muscles  Chest wall: no tenderness  Heart: regular rate and rhythm, S1, S2 normal, no murmur, click, rub, or gallop  Abdomen: soft, non-tender, non-distended, bowel sounds present in all four quadrants, no masses or organomegaly  Extremities: warm, dry, atraumatic, no cyanosis, pitting edema in bilateral lower extremities below the knee  Skin: increase redness of lower extremities without pruritis  Neurologic: ambulatory, sensation intact on bilateral lower extremities  Psychologic: appropriate mood      Primary Care Physician   ADDISON BENÍTEZ    Discharge Orders      Primary Care - Care Coordination Referral      Reason for your hospital stay    You were hospitalized for a CHF exacerbation and fluid overload in your legs. We will continue with your home doses of lasix. I recommend increasing the evening dose to 80 mEq if you are retaining more fluid in your legs or have put on more weight. May also use compression socks for your legs.     Follow-up and recommended labs and tests     Follow up with primary care provider, ADDISON BENÍTEZ, within 7  days for hospital follow- up.  The following labs/tests are recommended: BMP.     Activity    Your activity upon discharge: activity as tolerated     Diet    Follow this diet upon discharge: Orders Placed This Encounter      Fluid restriction 1500 ML FLUID      Low Saturated Fat Na <2400 mg       Significant Results and Procedures   Most Recent 3 CBC's:Recent Labs   Lab Test 03/14/22  1454 01/18/22  1526 07/02/21 2010 06/24/21  1516   WBC 6.3  --  9.4 6.4   HGB 11.6* 13.1* 14.8 14.3   MCV 96  --  96 97     --  254 197     Most Recent 3 BMP's:Recent Labs   Lab Test 03/16/22  0506 03/15/22  0437 03/14/22  1454    142 140   POTASSIUM 3.6 3.5 3.7   CHLORIDE 101 103 101   CO2 33* 31 28   BUN 17 18 19   CR 1.03 1.16 1.37*   ANIONGAP 6 8 11   ANGELY 8.4* 8.6 8.9    102 112   ,   Results for orders placed or performed during the hospital encounter of 03/14/22   XR Chest Port 1 View    Narrative    EXAM: XR CHEST PORT 1 VIEW  LOCATION: [facility]  DATE/TIME: 3/14/2022 2:59 PM    INDICATION: Shortness of breath  COMPARISON: CT of the abdomen and pelvis with IV contrast 7/2/2001      Impression    IMPRESSION:     There is a right subclavian approach pacemaker with right atrial appendage and right ventricular leads. Enlarged cardiac silhouette with particular conspicuous left ventricular heart border, accentuated by projection. The vascular pedicle width is   normal.    Bilateral calcified pleural plaque. No pleural effusion or pneumothorax.    Symmetric lung inflation with normal lung vascularity. No peribronchial fluid cuffs, subpleural edema along the interlobar fissures or intralobular septal thickening to suggest interstitial edema. No lung consolidation.       Discharge Medications   Discharge Medication List as of 3/16/2022 10:47 AM      CONTINUE these medications which have NOT CHANGED    Details   amiodarone (PACERONE) 200 MG tablet Take 1 tablet (200 mg) by mouth daily,  Disp-90 tablet, R-3, E-Prescribe      aspirin (ASA) 81 MG chewable tablet Take 1 tablet (81 mg) by mouth every other day, Disp-30 tablet, R-0, OTC      atorvastatin (LIPITOR) 10 MG tablet Take 10 mg by mouth every other day Even days, bedtime, Historical      coenzyme Q-10 200 MG CAPS capsule Take 200 mg by mouth daily, Historical      diphenhydrAMINE-acetaminophen (TYLENOL PM)  MG tablet Take 1 tablet by mouth nightly as needed for sleep, Historical      doxazosin (CARDURA) 8 MG tablet [DOXAZOSIN (CARDURA) 8 MG TABLET] Take 4 mg by mouth at bedtime.       , Historical      finasteride (PROSCAR) 5 mg tablet [FINASTERIDE (PROSCAR) 5 MG TABLET] Take 5 mg by mouth at bedtime.       , Historical      furosemide (LASIX) 40 MG tablet Take 2 tablets (80 mg) by mouth daily Take extra dose if weight gain, leg swelling, or short of breath, Disp-100 tablet, R-3, E-Prescribe      gabapentin (NEURONTIN) 100 MG capsule Two capsules in the morning, two capsules in the afternoon, and three capsules in the evening, Historical      glucosamine/chondr cole A sod (OSTEO BI-FLEX ORAL) [GLUCOSAMINE/CHONDR COLE A SOD (OSTEO BI-FLEX ORAL)] Take 1 tablet by mouth 2 (two) times a day.       , Historical      losartan (COZAAR) 25 MG tablet [LOSARTAN (COZAAR) 25 MG TABLET] Take 1 tablet (25 mg total) by mouth daily., Disp-30 tablet, R-0, Normal      melatonin 10 mg Tab Take 10 mg by mouth At Bedtime , Historical      metoprolol succinate ER (TOPROL-XL) 25 MG 24 hr tablet Take 1 tablet (25 mg) by mouth At Bedtime, Disp-90 tablet, R-3, E-Prescribe      multivitamin, therapeutic (THERA-VIT) TABS tablet Take 1 tablet by mouth daily, Historical      omeprazole (PRILOSEC) 20 MG capsule [OMEPRAZOLE (PRILOSEC) 20 MG CAPSULE] Take 20 mg by mouth 2 (two) times a day before meals., Historical      rivaroxaban ANTICOAGULANT (XARELTO) 20 MG TABS tablet Take 1 tablet (20 mg) by mouth daily, Disp-90 tablet, R-3, Local Print      sertraline (ZOLOFT) 50 MG  tablet Take 50 mg by mouth 2 times daily , Historical      vit A/vit C/vit E/zinc/copper (PRESERVISION AREDS ORAL) [VIT A/VIT C/VIT E/ZINC/COPPER (PRESERVISION AREDS ORAL)] Take 1 tablet by mouth 2 (two) times a day., Historical           Allergies   Allergies   Allergen Reactions     No Known Allergies

## 2022-03-16 NOTE — PROGRESS NOTES
Clinic Care Coordination Contact  Care Team Conversations    Patient identified for care management outreach, however patient is not on a value based contract so cannot complete outreach. Will escalate to clinic staff if specific needs or resources are indicated.    ZINA Muller  Social Work Care Coordinator - [facility]  Care Coordination  [email or web address]  Cell Phone: [phone]  Gender pronouns: she/her  Employed by [employer]

## 2022-03-21 NOTE — PROGRESS NOTES
Addendum to Discharge Summary dated 3/16/2022    Acute on Chronic Heart Failure with Reduced Ejection Fraction   - Clarification for coding purposes      Rere Kumar MD PGY1  [facility]

## 2022-04-14 NOTE — PATIENT INSTRUCTIONS
[facility]  Cardiac Electrophysiology  [address]  [place] [address]   Office: [phone]  Fax: [phone]       Thank you for seeing us in clinic today - it is a pleasure to be a part of your care team.  Below is a summary of our plan from today's visit.       You have persistent atrial fibrillation which has been associated with systolic heart failure.  You have been noted to have systolic heart failure as well as significant heart wall thickening, and severe atrial dilation.  Your atrial fibrillation is presently being managed with amiodarone and rivaroxaban (Xarelto).  We reviewed physiology and management options including continuing antiarrhythmic drug therapy, catheter ablation.  We will plan for the following:  - consider options further, and let us know with any questions or decision as to how you would like to proceed  - continue amiodarone 200mg daily for now  - continue metoprolol XL  - continue rivaroxaban (Xarelto).     Please do not hesitate to be in touch with our office at [phone] with any questions that may arise.       Thank you for trusting us with your care,    Ole Michelle MD  Clinical Cardiac Electrophysiology  [facility]  [address]  [place] [address]   Office: [phone]  Fax: [phone]              ATRIAL FIBRILLATION: Patient Information    What is atrial fibrillation?  Atrial fibrillation (AF, A-fib) is a common heart rhythm problem (arrhythmia) occurring within the upper chambers of the heart (the atria).  In normal rhythm, the upper and lower chambers of the heart are electrically driven to contract in a coordinated sequence.  In atrial fibrillation, the atria lose their ability to contract because of rapid and chaotic electrical activity.  The lower chambers of the heart (the ventricles) continue to pump blood throughout the body, though with irregular and often faster rate due  to the chaotic activity within the atria.        How do I know if I have atrial fibrillation?   Some people may feel their heart beating faster, harder, or irregularly while in atrial fibrillation.  Others may be lightheaded, fatigued, feel weak or tired or become more short of breath especially with activities.  Some patients have no symptoms at all.  Atrial fibrillation may be found due to an irregular pulse or on an electrocardiogram (ECG). Atrial fibrillation can start and stop on its own, and episodes can last from seconds to several months.      How common is atrial fibrillation?   An estimated 3-6 million people in the [place] have atrial fibrillation.  Atrial fibrillation is a common heart rhythm problem for older persons, affecting as estimated 12-15% of people over the age of 65 years of age.    What causes atrial fibrillation?   Age is the most important risk factor for atrial fibrillation.  Atrial fibrillation is more common in people with other heart disease, high blood pressure, diabetes, obesity, sleep apnea and in people who regularly consume alcohol.  Surgery, lung disease, or thyroid problems can lead to atrial fibrillation.  Atrial fibrillation has multiple possible causes, and in most cases a single cause cannot be found.  Atrial fibrillation is a progressive condition, usually starting with at an early stage with short and infrequent episodes.  In later stages of disease, more frequent and longer lasting episodes of atrial fibrillation occur, ultimately culminating in episodes which do not spontaneously terminate.  Generally, more enlargement and scarring within the upper chambers of the heart is observed as atrial fibrillation progresses from early to late-stage disease.    How is atrial fibrillation diagnosed and evaluated?    Because of its start-stop nature, atrial fibrillation can be challenging to diagnose.  Atrial fibrillation is most commonly diagnosed via cardiac rhythm recordings  - either an ECG or wearable cardiac rhythm monitor.  For patients with pacemakers, defibrillators or implantable loop recorders, atrial fibrillation may be recorded via these devices.  Recently, commercially available devices (eg. Apple Watch, [employer] device, certain [employer] devices, others) can allow patients to take 30 second cardiac rhythm recordings which may document atrial fibrillation.  Once atrial fibrillation is diagnosed, additional tests include blood tests and an echocardiogram.  The echocardiogram uses ultrasound to look at your heart to assess your cardiac function and evaluate for other heart disease.  Additional evaluation may include CT or MRI studies.    Is atrial fibrillation dangerous?   Atrial fibrillation is not usually a life-threatening arrhythmia.  The most serious consequences of atrial fibrillation including stroke and worsening of overall cardiac function.  While in atrial fibrillation, the upper cardiac chambers do not contract normally, resulting in slower blood flow and increased risk of clot formation.  If this blood clot becomes detached from the heart a stroke can occur.  Unfortunately, stroke can be the first sign of atrial fibrillation for some people.  With a stroke, you may notice abnormal sensation, weakness on one side of the body or face, changes in your vision or speech.  If you have any of these signs, you should contact EMS and be evaluated in an emergency room as soon as possible.      How is atrial fibrillation treated?     Several treatment options exist for suppressing atrial fibrillation - however, it is not an easily curable arrhythmia.  The first goal in managing atrial fibrillation is to minimize stroke risk.  The second goal is to improve symptoms associated with atrial fibrillation.  Finally, in patients with reduced cardiac function, maintaining normal rhythm can help improve cardiac function.      Blood thinners are used to reduce the risk of stroke in patients  with high estimated stroke risk related to atrial fibrillation.  For patients at higher risk of bleeding related to blood thinner use, implantable devices may be an option to reduce stroke risk without the need for long term blood thinner use.      Atrial fibrillation can be managed via two strategies: rate control and rhythm control.  In a rate control strategy, continued atrial fibrillation is accepted and medications (eg. beta-blockers or calcium channel blockers) are used to control the lower chamber rate.  In a rhythm control strategy, anti-arrhythmic medications or catheter ablation are used to maintain normal cardiac rhythm and slow disease progression by suppressing atrial fibrillation.  A procedure called a cardioversion, in which an electric shock is delivered through patches placed on the chest wall while under deep sedation, can be performed to temporarily restore normal cardiac rhythm, though does not address the chance of atrial fibrillation recurrence.  Treatments are more effective for earlier rather than later stage atrial fibrillation.  Lifestyle modifications (maintaining a healthy weight, aerobic exercise, diagnosing and treating sleep apnea, and minimizing alcohol intake) are important elements of atrial fibrillation rhythm control.     What is catheter ablation for atrial fibrillation?  Cardiac catheter ablation is a commonly performed, minimally invasive procedure performed by a cardiac electrophysiologist to treat many different cardiac rhythm abnormalities.  During catheter ablation, long, thin, flexible tubes are advanced into the heart via small sheaths inserted into the femoral veins and thermal energy (either heating or cooling) is applied within the heart to disrupt abnormal electrical activity.  Atrial fibrillation ablation is performed under general anesthesia, with procedures generally taking approximately 2-3 hours.  Patients are typically observed for 3-5 hours after the ablation,  and in most cases can be discharged home the same day.  Atrial fibrillation ablation is associated with better outcomes (mortality, cardiovascular hospitalizations, atrial arrhythmia recurrences) compared to antiarrhythmic drug therapy.  However, atrial fibrillation recurrences are not uncommon, and repeat catheter ablation may be offered.  Your electrophysiology team can review atrial fibrillation ablation, anticipated success rates, risks, and recovery expectations with you.    What are anti-arrhythmic medications?  Anti-arrhythmic medications are specialized drugs which alter cardiac electrical functioning to suppress arrhythmias.  There are several anti-arrhythmic medications available, each with its own success rate and side effects.  Some anti-arrhythmic medications are less effective though safer to use, others are more effective though have serious potential toxicities.  Atrial fibrillation recurrences are common and may require dose adjustment or change in antiarrhythmic therapy.  Your electrophysiology team will carefully consider which medication would be the best and safest for your particular case.      Can I live a normal life?    The goal of atrial fibrillation management is for patients to live normal lives without being limited by symptoms related to atrial fibrillation.    Are any additional educational resources available?  There are a number of excellent atrial fibrillation education resources available to you online.  A few options you may wish to review include:  hrsonline.org/guide-atrial-fibrillation  afibmatters.org  getsmartaboutafib.com  stopaf.com    What comes next?    Consider your management options and let us know how we can help in your decision process.  Please take medications as they have been prescribed.  You should also get any tests that may have been ordered for you.      When to Call Your Doctor or seek emergency care:  Call your doctor or seek emergency care if you have any  significant changes with the following:  Weakness  Dizziness  Fainting  Fatigue  Shortness of breath  Chest pain with increased activity  If you are concerned that your heart rate is too fast or too slow  Bleeding that does not stop in 10 minutes  Coughing or throwing up blood  Bloody diarrhea or bleeding hemorrhoids  Dark-colored urine or black stool  Allergic reactions:  Rash  Itching  Swelling  Trouble breathing or swallowing      Please call the [facility] at [phone] if you have concerns about your symptoms, your medicines, or your follow-up appointments.

## 2022-04-14 NOTE — PROGRESS NOTES
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    Persistent atrial fibrillation - associated with recurrent episodes of decompensated heart failure, though unclear if primary or secondary  [id number], severe biatrial dilation in the setting of chronic systolic and diastolic heart failure with severe concentric LV thickening  We reviewed the pathophysiology of atrial fibrillation and management considerations including stroke risk and anticoagulation, rate control, cardioversion, antiarrhythmic drug therapy, and catheter ablation.  We discussed atrial fibrillation ablation procedures, anticipated success rates in light of his severe atrial dilation, the potential need for re-do ablation vs addition of anti-arrhythmic drugs, procedural risks (including groin bleeding, tamponade, phrenic or esophageal injury, stroke, pulmonary vein stenosis, lead dislodgement) and recovery expectations.  He and his daughter will take some additional time to consider options, and will let us  know with any further questions or decision as to how he would like to proceed  - continue amiodarone 200mg daily for now - if he remains on this long term, will need interval follow up of potential toxicities including liver, thyroid, lung, eye function  - if ablation elected upon, PVI, assessment for non-PV triggers, general anesthesia, continue rivaroxaban  - continue metoprolol XL 25mg daily  - continue rivaroxaban 20mg daily (GFR 62ml/min)    Chronic systolic and diastolic heart failure - MPI LVEF 41% 2/10/2022.  TTE LVEF 43% with severe concentric LV thickening   Query cardiac amyloidosis, other infiltrative process, vs hypertensive cardiomyopathy  - ongoing evaluation for possible cardiac amyloid with his cardiology team  - continue present heart failure medical therapies including metoprolol XL, losartan 25mg daily, lasix    CRT-P in-situ - [employer], implanted 3/25/2020.  Low atrial sensing.  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    Mr. Petersen was noted to have onset of atrial fibrillation around 8/2021.  He was noted to have persistent AF with increasing OptiVol 8/29/2021 - he underwent DCCV 9/14/2021 after which OptiVol improved for a period.  He had rising OptiVol while in SR through 12/2021.  He redeveloped AF, was started on sotalol around 1/24/2022 and underwent DCCV 2/3/2022 with AF recurrence 2/6/2022.  He was started on amiodarone around mid 2/2022 and underwent DCCV 3/8/2022.  He was admitted 3/14/2022 to 3/16/2022 with  decompensated heart failure treated with lasix IV diuresis.  Device check today shows maintenance of sinus rhythm since 3/8/2022.  He notes improved exertional tolerance and improved lower extremity edema.  His baseline weight is around 185lbs.    He had a fall 7/2021with traumatic head injury and hematoma.  He denies syncope, chest pain.         Physical Examination  Review of Systems   VITALS: /64 (BP Location: Right arm, Patient Position: Sitting, Cuff Size: Adult Regular)   Pulse 64   Resp 16   Wt 81.6 kg (180 lb)   SpO2 95%   BMI 25.10 kg/m    Wt Readings from Last 3 Encounters:   03/16/22 89.6 kg (197 lb 8 oz)   03/08/22 91 kg (200 lb 9.6 oz)   02/24/22 89.8 kg (197 lb 14.4 oz)     CONSTITUTIONAL: well nourished, comfortable, no distress  EYES:  Conjunctivae pink, sclerae clear.    E/N/T:  Oral mucosa pink  RESPIRATORY:  Respiratory effort is normal  CARDIOVASCULAR:  normal S1 and S2  GASTROINTESTINAL:  Abdomen without masses or tenderness  EXTREMITIES:  No clubbing or cyanosis.    MUSCULOSKELETAL:  Overall grossly normal muscle strength  SKIN:  Overall, skin warm and dry, no lesions.  NEURO/PSYCH:  Oriented x 3 with normal affect.   Constitutional:  No weight loss or loss of appetite    Eyes:  No difficulty with vision, no double vision, no dry eyes  ENT:  No sore throat, difficulty swallowing; changes in hearing or tinnitus  Cardiovascular: As detailed above  Respiratory:  No cough  Musculoskeletal  No joint pain, muscle aches  Neurologic:  No syncope, lightheadedness, fainting spells   Hematologic: No easy bruising, excessive bleeding tendency   Gastrointestinal:  No jaundice, abdominal pain or abdominal bloating  Genitourinary: No changes in urinary habits, no trouble urinating    Psychiatric: No anxiety or depression      Medical History  Surgical History   Past Medical History:   Diagnosis Date     Atrial fibrillation (H)      Atrial fibrillation, transient (H) 8/11/2020     BPH (benign  prostatic hyperplasia) 7/11/2018     Chronic combined systolic and diastolic heart failure (H) 2/11/2021     Congestive heart failure (H)      Coronary artery disease      COVID 02/2021     Dilated cardiomyopathy (H) 2/11/2021     MARIAN (generalized anxiety disorder) 2/11/2021     GERD (gastroesophageal reflux disease)      High cholesterol      Hypertension      LBBB (left bundle branch block) 7/11/2018     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     Pulmonary hypertension (H) 4/1/2020    Past Surgical History:   Procedure Laterality Date     ANGIOPLASTY       ARTHROSCOPY KNEE       CARDIOVERSION  2021     CARDIOVERSION  02/03/2022     CV CORONARY ANGIOGRAM N/A 10/23/2020    Procedure: Coronary Angiogram;  Surgeon: Varun Freire MD;  Location: [facility];  Service: Cardiology     CV LEFT HEART CATHETERIZATION WITHOUT LEFT VENTRICULOGRAM Left 10/23/2020    Procedure: Left Heart Catheterization Without Left Ventriculogram;  Surgeon: Varun Freire MD;  Location: [facility];  Service: Cardiology     EP BIV PACEMAKER INSERT N/A 3/25/2020    Procedure: EP Biventricular Pacemaker Insertion;  Surgeon: Taylor Sandoval MD;  Location: [facility];  Service: Cardiology     RELEASE CARPAL TUNNEL       ZZC TOTAL KNEE ARTHROPLASTY Right 8/15/2019    Procedure: RIGHT  MINIMALLY TOTAL KNEE ARTHROPLASTY;  Surgeon: Keven Cerda MD;  Location: [facility];  Service: Orthopedics         Family History Social History   Family History   Problem Relation Age of Onset     Heart Disease Father      Diabetes Type 2  Sister      Alzheimer Disease Mother      Chronic Obstructive Pulmonary Disease Brother         Social History     Tobacco Use     Smoking status: Never Smoker     Smokeless tobacco: Never Used   Substance Use Topics     Alcohol use: Yes     Comment: Alcoholic Drinks/day: rare     Drug use: No         Medications  Allergies     Current Outpatient Medications:       amiodarone (PACERONE) 200 MG tablet, Take 1 tablet (200 mg) by mouth daily, Disp: 90 tablet, Rfl: 3     aspirin (ASA) 81 MG chewable tablet, Take 1 tablet (81 mg) by mouth every other day, Disp: 30 tablet, Rfl: 0     atorvastatin (LIPITOR) 10 MG tablet, Take 10 mg by mouth every other day Even days, bedtime, Disp: , Rfl:      coenzyme Q-10 200 MG CAPS capsule, Take 200 mg by mouth daily, Disp: , Rfl:      diphenhydrAMINE-acetaminophen (TYLENOL PM)  MG tablet, Take 1 tablet by mouth nightly as needed for sleep, Disp: , Rfl:      doxazosin (CARDURA) 8 MG tablet, [DOXAZOSIN (CARDURA) 8 MG TABLET] Take 4 mg by mouth at bedtime.       , Disp: , Rfl:      finasteride (PROSCAR) 5 mg tablet, [FINASTERIDE (PROSCAR) 5 MG TABLET] Take 5 mg by mouth at bedtime.       , Disp: , Rfl:      furosemide (LASIX) 40 MG tablet, Take 2 tablets (80 mg) by mouth daily Take extra dose if weight gain, leg swelling, or short of breath, Disp: 100 tablet, Rfl: 3     gabapentin (NEURONTIN) 100 MG capsule, Two capsules in the morning, two capsules in the afternoon, and three capsules in the evening, Disp: , Rfl:      glucosamine/chondr cole A sod (OSTEO BI-FLEX ORAL), [GLUCOSAMINE/CHONDR COLE A SOD (OSTEO BI-FLEX ORAL)] Take 1 tablet by mouth 2 (two) times a day.       , Disp: , Rfl:      losartan (COZAAR) 25 MG tablet, [LOSARTAN (COZAAR) 25 MG TABLET] Take 1 tablet (25 mg total) by mouth daily., Disp: 30 tablet, Rfl: 0     melatonin 10 mg Tab, Take 10 mg by mouth At Bedtime , Disp: , Rfl:      metoprolol succinate ER (TOPROL-XL) 25 MG 24 hr tablet, Take 1 tablet (25 mg) by mouth At Bedtime, Disp: 90 tablet, Rfl: 3     multivitamin, therapeutic (THERA-VIT) TABS tablet, Take 1 tablet by mouth daily, Disp: , Rfl:      omeprazole (PRILOSEC) 20 MG capsule, [OMEPRAZOLE (PRILOSEC) 20 MG CAPSULE] Take 20 mg by mouth 2 (two) times a day before meals., Disp: , Rfl:      rivaroxaban ANTICOAGULANT (XARELTO) 20 MG TABS tablet, Take 1 tablet (20 mg) by  mouth daily, Disp: 90 tablet, Rfl: 3     sertraline (ZOLOFT) 50 MG tablet, Take 50 mg by mouth 2 times daily , Disp: , Rfl:      vit A/vit C/vit E/zinc/copper (PRESERVISION AREDS ORAL), [VIT A/VIT C/VIT E/ZINC/COPPER (PRESERVISION AREDS ORAL)] Take 1 tablet by mouth 2 (two) times a day., Disp: , Rfl:      Allergies   Allergen Reactions     No Known Allergies           Lab Results    Chemistry CBC Cardiac Enzymes/BNP/TSH/INR   Recent Labs   Lab Test 03/22/22  1616      POTASSIUM 3.8   CHLORIDE 98   CO2 32*      BUN 13   CR 1.17   GFRESTIMATED 62   ANGELY 9.1     Recent Labs   Lab Test 03/22/22  1616 03/16/22  0506 03/15/22  0437   CR 1.17 1.03 1.16          Recent Labs   Lab Test 03/14/22  1454   WBC 6.3   HGB 11.6*   HCT 36.2*   MCV 96        Recent Labs   Lab Test 03/14/22  1454 01/18/22  1526 07/02/21  2010   HGB 11.6* 13.1* 14.8    Recent Labs   Lab Test 03/14/22  2055 03/14/22  1454 02/11/21  1724   TROPONINI 0.09 0.07 0.14     Recent Labs   Lab Test 03/14/22  1454 01/18/22  1526 02/11/21  1724   BNP 1,163* 661* 436*     Recent Labs   Lab Test 03/14/22  1454   TSH 7.62*     Recent Labs   Lab Test 02/11/21  1724 03/24/20  1745 08/15/19  0811   INR 1.03 1.05 0.98         Data Review    ECGs (tracings independently reviewed)  3/14/2022 - ApVp 65bpm, QRS 148ms    4/14/2022  CRT-P check  Normal lead and device function - low atrial sensing (0.1mV bipolar, 1.1mV bipolar)  No atrial fibrillation episodes since 3/8/2022 (DCCV)  98% RA pacing, 99.7% BiV pacing  Estimated remaining battery longevity 6.8yrs    5/19/2021 TTE    Left ventricular cavity size is normal. Severe concentric increase in wall thickness. Ejection fraction is mildly decreased. The calculated left ventricular ejection fraction is 43%.    Right ventricular cavity size is mildly dilated. Normal right ventricular systolic function.    Severe biatrial enlargement.    Mild aortic regurgitation.    The ascending aorta is mildly dilated  measuring 4.1 cm.    Moderate pulmonary hypertension present. The estimated systolic pulmonary artery pressure is 51 mm Hg.    When compared to the previous study dated 9/1/2020, there has been no significant change.    2/10/2022 MPI     The nuclear stress test is probably negative for inducible myocardial ischemia or infarction. Cannot exclude a small non-transmural apical inferior infarct but it is likely diaphragmatic attenuation     The left ventricular ejection fraction at stress is 41% without focal wall motion abnormality.     A prior study was conducted on 10/7/2020.  Prior images were unavailable for comparison review, but the report sounds similar to what is seen today, except that TID is no longer present.       Cc: Soo Mott CNP, Jorge A Leigh MD, Jamie Noguera MD Amila Dilusha William, MD  4/14/2022  1:50 PM

## 2022-04-14 NOTE — LETTER
2022    ADDISON BENÍTEZ MD  [facility] [address]  [facility] [address]    RE: Reymundo ePtersen       Dear Colleague,     I had the pleasure of seeing Reymundo Petersen in the [facility].     [facility]  Cardiac Electrophysiology  [address]  [place] [address]   Office: [phone]  Fax: [phone]     Cardiac Electrophysiology Consultation    Patient: Reymundo Petersen   : 1940     Referring Provider: Soo Mott CNP  Primary Care Provider: Addison Benítez MD    CHIEF COMPLAINT/REASON FOR CONSULTATION  Persistent atrial fibrillation  Chronic systolic and diastolic heart failure due to nonischemic cardiomyopathy with severe concentric LV thickening (LVEF 41% by 2/10/2022 MPI)  Complete heart block  CRT-P in-situ    Assessment/Recommendations   Reymundo Petersen is a 81 year old male with persistent atrial fibrillation, chronic systolic and diastolic heart failure due to nonischemic cardiomyopathy with severe concentric LV thickening (LVEF 41% by 2/10/2022 MPI), CHB, CRT-P in-situ (Medtronic, 3/25/2020), CAD with prior RCA PCI, HTN, CKD stage 2, referred by Jh Mott CNP for consultation regarding atrial fibrillation.    Persistent atrial fibrillation - associated with recurrent episodes of decompensated heart failure, though unclear if primary or secondary  [id number], severe biatrial dilation in the setting of chronic systolic and diastolic heart failure with severe concentric LV thickening  We reviewed the pathophysiology of atrial fibrillation and management considerations including stroke risk and anticoagulation, rate control, cardioversion, antiarrhythmic drug therapy, and catheter ablation.  We discussed atrial fibrillation ablation procedures, anticipated success rates in light of his severe atrial dilation, the potential need for re-do ablation vs addition of anti-arrhythmic drugs, procedural risks  (including groin bleeding, tamponade, phrenic or esophageal injury, stroke, pulmonary vein stenosis, lead dislodgement) and recovery expectations.  He and his daughter will take some additional time to consider options, and will let us know with any further questions or decision as to how he would like to proceed  - continue amiodarone 200mg daily for now - if he remains on this long term, will need interval follow up of potential toxicities including liver, thyroid, lung, eye function  - if ablation elected upon, PVI, assessment for non-PV triggers, general anesthesia, continue rivaroxaban  - continue metoprolol XL 25mg daily  - continue rivaroxaban 20mg daily (GFR 62ml/min)    Chronic systolic and diastolic heart failure - MPI LVEF 41% 2/10/2022.  TTE LVEF 43% with severe concentric LV thickening   Query cardiac amyloidosis, other infiltrative process, vs hypertensive cardiomyopathy  - ongoing evaluation for possible cardiac amyloid with his cardiology team  - continue present heart failure medical therapies including metoprolol XL, losartan 25mg daily, lasix    CRT-P in-situ - Medtronic, implanted 3/25/2020.  Low atrial sensing.  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    Mr. Petersen was noted to have onset of atrial fibrillation around 8/2021.  He was noted to have persistent AF with increasing OptiVol 8/29/2021 - he underwent DCCV 9/14/2021 after which OptiVol improved for a period.  He had rising OptiVol while in SR through 12/2021.  He  redeveloped AF, was started on sotalol around 1/24/2022 and underwent DCCV 2/3/2022 with AF recurrence 2/6/2022.  He was started on amiodarone around mid 2/2022 and underwent DCCV 3/8/2022.  He was admitted 3/14/2022 to 3/16/2022 with decompensated heart failure treated with lasix IV diuresis.  Device check today shows maintenance of sinus rhythm since 3/8/2022.  He notes improved exertional tolerance and improved lower extremity edema.  His baseline weight is around 185lbs.    He had a fall 7/2021with traumatic head injury and hematoma.  He denies syncope, chest pain.         Physical Examination  Review of Systems   VITALS: /64 (BP Location: Right arm, Patient Position: Sitting, Cuff Size: Adult Regular)   Pulse 64   Resp 16   Wt 81.6 kg (180 lb)   SpO2 95%   BMI 25.10 kg/m    Wt Readings from Last 3 Encounters:   03/16/22 89.6 kg (197 lb 8 oz)   03/08/22 91 kg (200 lb 9.6 oz)   02/24/22 89.8 kg (197 lb 14.4 oz)     CONSTITUTIONAL: well nourished, comfortable, no distress  EYES:  Conjunctivae pink, sclerae clear.    E/N/T:  Oral mucosa pink  RESPIRATORY:  Respiratory effort is normal  CARDIOVASCULAR:  normal S1 and S2  GASTROINTESTINAL:  Abdomen without masses or tenderness  EXTREMITIES:  No clubbing or cyanosis.    MUSCULOSKELETAL:  Overall grossly normal muscle strength  SKIN:  Overall, skin warm and dry, no lesions.  NEURO/PSYCH:  Oriented x 3 with normal affect.   Constitutional:  No weight loss or loss of appetite    Eyes:  No difficulty with vision, no double vision, no dry eyes  ENT:  No sore throat, difficulty swallowing; changes in hearing or tinnitus  Cardiovascular: As detailed above  Respiratory:  No cough  Musculoskeletal  No joint pain, muscle aches  Neurologic:  No syncope, lightheadedness, fainting spells   Hematologic: No easy bruising, excessive bleeding tendency   Gastrointestinal:  No jaundice, abdominal pain or abdominal bloating  Genitourinary: No changes in urinary habits, no  trouble urinating    Psychiatric: No anxiety or depression      Medical History  Surgical History   Past Medical History:   Diagnosis Date     Atrial fibrillation (H)      Atrial fibrillation, transient (H) 8/11/2020     BPH (benign prostatic hyperplasia) 7/11/2018     Chronic combined systolic and diastolic heart failure (H) 2/11/2021     Congestive heart failure (H)      Coronary artery disease      COVID 02/2021     Dilated cardiomyopathy (H) 2/11/2021     MARIAN (generalized anxiety disorder) 2/11/2021     GERD (gastroesophageal reflux disease)      High cholesterol      Hypertension      LBBB (left bundle branch block) 7/11/2018     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     Pulmonary hypertension (H) 4/1/2020    Past Surgical History:   Procedure Laterality Date     ANGIOPLASTY       ARTHROSCOPY KNEE       CARDIOVERSION  2021     CARDIOVERSION  02/03/2022     CV CORONARY ANGIOGRAM N/A 10/23/2020    Procedure: Coronary Angiogram;  Surgeon: Varun Freire MD;  Location: [facility];  Service: Cardiology     CV LEFT HEART CATHETERIZATION WITHOUT LEFT VENTRICULOGRAM Left 10/23/2020    Procedure: Left Heart Catheterization Without Left Ventriculogram;  Surgeon: Varun Freire MD;  Location: [facility];  Service: Cardiology     EP BIV PACEMAKER INSERT N/A 3/25/2020    Procedure: EP Biventricular Pacemaker Insertion;  Surgeon: Taylor Sandoval MD;  Location: [facility];  Service: Cardiology     RELEASE CARPAL TUNNEL       ZZC TOTAL KNEE ARTHROPLASTY Right 8/15/2019    Procedure: RIGHT  MINIMALLY TOTAL KNEE ARTHROPLASTY;  Surgeon: Keven Cerda MD;  Location: [facility];  Service: Orthopedics         Family History Social History   Family History   Problem Relation Age of Onset     Heart Disease Father      Diabetes Type 2  Sister      Alzheimer Disease Mother      Chronic Obstructive Pulmonary Disease Brother         Social History     Tobacco Use      Smoking status: Never Smoker     Smokeless tobacco: Never Used   Substance Use Topics     Alcohol use: Yes     Comment: Alcoholic Drinks/day: rare     Drug use: No         Medications  Allergies     Current Outpatient Medications:      amiodarone (PACERONE) 200 MG tablet, Take 1 tablet (200 mg) by mouth daily, Disp: 90 tablet, Rfl: 3     aspirin (ASA) 81 MG chewable tablet, Take 1 tablet (81 mg) by mouth every other day, Disp: 30 tablet, Rfl: 0     atorvastatin (LIPITOR) 10 MG tablet, Take 10 mg by mouth every other day Even days, bedtime, Disp: , Rfl:      coenzyme Q-10 200 MG CAPS capsule, Take 200 mg by mouth daily, Disp: , Rfl:      diphenhydrAMINE-acetaminophen (TYLENOL PM)  MG tablet, Take 1 tablet by mouth nightly as needed for sleep, Disp: , Rfl:      doxazosin (CARDURA) 8 MG tablet, [DOXAZOSIN (CARDURA) 8 MG TABLET] Take 4 mg by mouth at bedtime.       , Disp: , Rfl:      finasteride (PROSCAR) 5 mg tablet, [FINASTERIDE (PROSCAR) 5 MG TABLET] Take 5 mg by mouth at bedtime.       , Disp: , Rfl:      furosemide (LASIX) 40 MG tablet, Take 2 tablets (80 mg) by mouth daily Take extra dose if weight gain, leg swelling, or short of breath, Disp: 100 tablet, Rfl: 3     gabapentin (NEURONTIN) 100 MG capsule, Two capsules in the morning, two capsules in the afternoon, and three capsules in the evening, Disp: , Rfl:      glucosamine/chondr cole A sod (OSTEO BI-FLEX ORAL), [GLUCOSAMINE/CHONDR COLE A SOD (OSTEO BI-FLEX ORAL)] Take 1 tablet by mouth 2 (two) times a day.       , Disp: , Rfl:      losartan (COZAAR) 25 MG tablet, [LOSARTAN (COZAAR) 25 MG TABLET] Take 1 tablet (25 mg total) by mouth daily., Disp: 30 tablet, Rfl: 0     melatonin 10 mg Tab, Take 10 mg by mouth At Bedtime , Disp: , Rfl:      metoprolol succinate ER (TOPROL-XL) 25 MG 24 hr tablet, Take 1 tablet (25 mg) by mouth At Bedtime, Disp: 90 tablet, Rfl: 3     multivitamin, therapeutic (THERA-VIT) TABS tablet, Take 1 tablet by mouth daily, Disp: , Rfl:       omeprazole (PRILOSEC) 20 MG capsule, [OMEPRAZOLE (PRILOSEC) 20 MG CAPSULE] Take 20 mg by mouth 2 (two) times a day before meals., Disp: , Rfl:      rivaroxaban ANTICOAGULANT (XARELTO) 20 MG TABS tablet, Take 1 tablet (20 mg) by mouth daily, Disp: 90 tablet, Rfl: 3     sertraline (ZOLOFT) 50 MG tablet, Take 50 mg by mouth 2 times daily , Disp: , Rfl:      vit A/vit C/vit E/zinc/copper (PRESERVISION AREDS ORAL), [VIT A/VIT C/VIT E/ZINC/COPPER (PRESERVISION AREDS ORAL)] Take 1 tablet by mouth 2 (two) times a day., Disp: , Rfl:      Allergies   Allergen Reactions     No Known Allergies           Lab Results    Chemistry CBC Cardiac Enzymes/BNP/TSH/INR   Recent Labs   Lab Test 03/22/22  1616      POTASSIUM 3.8   CHLORIDE 98   CO2 32*      BUN 13   CR 1.17   GFRESTIMATED 62   ANGELY 9.1     Recent Labs   Lab Test 03/22/22  1616 03/16/22  0506 03/15/22  0437   CR 1.17 1.03 1.16          Recent Labs   Lab Test 03/14/22  1454   WBC 6.3   HGB 11.6*   HCT 36.2*   MCV 96        Recent Labs   Lab Test 03/14/22  1454 01/18/22  1526 07/02/21 2010   HGB 11.6* 13.1* 14.8    Recent Labs   Lab Test 03/14/22  2055 03/14/22  1454 02/11/21  1724   TROPONINI 0.09 0.07 0.14     Recent Labs   Lab Test 03/14/22  1454 01/18/22  1526 02/11/21  1724   BNP 1,163* 661* 436*     Recent Labs   Lab Test 03/14/22  1454   TSH 7.62*     Recent Labs   Lab Test 02/11/21  1724 03/24/20  1745 08/15/19  0811   INR 1.03 1.05 0.98         Data Review    ECGs (tracings independently reviewed)  3/14/2022 - ApVp 65bpm, QRS 148ms    4/14/2022  CRT-P check  Normal lead and device function - low atrial sensing (0.1mV bipolar, 1.1mV bipolar)  No atrial fibrillation episodes since 3/8/2022 (DCCV)  98% RA pacing, 99.7% BiV pacing  Estimated remaining battery longevity 6.8yrs    5/19/2021 TTE    Left ventricular cavity size is normal. Severe concentric increase in wall thickness. Ejection fraction is mildly decreased. The calculated left  ventricular ejection fraction is 43%.    Right ventricular cavity size is mildly dilated. Normal right ventricular systolic function.    Severe biatrial enlargement.    Mild aortic regurgitation.    The ascending aorta is mildly dilated measuring 4.1 cm.    Moderate pulmonary hypertension present. The estimated systolic pulmonary artery pressure is 51 mm Hg.    When compared to the previous study dated 9/1/2020, there has been no significant change.    2/10/2022 MPI     The nuclear stress test is probably negative for inducible myocardial ischemia or infarction. Cannot exclude a small non-transmural apical inferior infarct but it is likely diaphragmatic attenuation     The left ventricular ejection fraction at stress is 41% without focal wall motion abnormality.     A prior study was conducted on 10/7/2020.  Prior images were unavailable for comparison review, but the report sounds similar to what is seen today, except that TID is no longer present.       Cc: Soo Mott CNP, Jorge A Leigh MD, Jamie Noguera MD Amila Dilusha William, MD  4/14/2022  1:50 PM      Thank you for allowing me to participate in the care of your patient.      Sincerely,     Ole Michelle MD     [facility]  cc:   Felix Back MD  [address] [place] [address]

## 2022-05-04 PROBLEM — I50.9 CHF (CONGESTIVE HEART FAILURE) (H): Status: RESOLVED | Noted: 2021-09-08 | Resolved: 2022-01-01

## 2022-05-04 PROBLEM — Z95.0 CARDIAC PACEMAKER IN SITU: Chronic | Status: RESOLVED | Noted: 2020-09-29 | Resolved: 2022-01-01

## 2022-05-04 PROBLEM — I50.42 CHRONIC COMBINED SYSTOLIC AND DIASTOLIC HEART FAILURE (H): Chronic | Status: RESOLVED | Noted: 2021-02-11 | Resolved: 2022-01-01

## 2022-05-04 NOTE — PROGRESS NOTES
[facility]  Heart Care Clinic Follow-up Note    Assessment & Plan        (I25.10,  I25.83) Coronary artery disease due to lipid rich plaque  (primary encounter diagnosis)  Comment: 2011 due to shortness of breath underwent angiography showing normal left main, normal LAD, normal circumflex, and significant distal right coronary artery lesion which was intervened upon with a stent. Ejection fraction at that time was 52%. He underwent nuclear stress test in 2020 showing basal inferior wall defect prompting angiography which showed normal left main, normal LAD, normal circumflex, and normal right coronary artery with minimal in-stent restenosis. No significant symptoms and continue to work on prevention and contemplating major surgery underwent nuclear stress test showing no significant ischemia or scar.  Continue chronic aspirin every other day.    (I48.19) Persistent atrial fibrillation (H)  Comment:  Not valvular, paroxysmal, and given advanced [id number] score of 4 on Xarelto.  On amiodarone with successful cardioversion, will continue to check blood work and arrange for PFTs.  Given that he has some lung fibrosis will consider ablation sooner than later. Have lower dose of Toprol to 12 and half milligrams a day. Given bleeding, will consider left atrial appendage occlusion device.    (I35.1) Nonrheumatic aortic valve insufficiency  Comment:  Based on echo in May 2021 mild with mild ascending aorta enlargement.  Recheck echo in about a year.    (I50.41) Acute combined systolic and diastolic congestive heart failure (H)  Comment: No significant signs or symptoms currently.    (I42.0) Dilated cardiomyopathy (H)  Comment: Ejection fraction was 52% initially, 47% on follow-up echo in 2020 with nuclear stress test 32%. Essentially asymptomatic but recheck echo showed 42% last May, continue to monitor..    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    (I10) Primary  hypertension  Comment: Under good control currently.    (N18.2) Stage 2 chronic kidney disease  Comment: Creatinine normalized to 1.17    (Z95.0) Cardiac pacemaker in situ  Comment: Medtronic device with Medtronic right atrial right ventricular leads in the His bundle as well as a right ventricular apical lead. He has 7% atrial pacing with 97% ventricular CRT pacing. Leads stable.      Plan  1.  Arrange for PFTs, if scarring seen will then most likely proceed with ablation of A. fib.  2.  Would favor ablation of A. fib and left atrial appendage occlusion device, daughter would like to wait until [employer] is up and running.  3.  Continue fluid and salt restriction.  4.  Recheck echo in around May.  5.  Follow-up with me thereafter.    Subjective  CC: 78-year-old white gentleman here for follow-up visit.  Since I seen him he is subsequently undergone cardioversion, switch from sotalol to amiodarone and now in sinus rhythm following another cardioversion.  He is still living with his sister-in-law, he is here today with his daughter.  He is physically active around the house.  Does have some mild bipedal edema but no fatigue, syncope, dizziness, chest discomfort, palpitations, shortness of breath, PND or orthopnea.    Medications  Current Outpatient Medications   Medication Sig Dispense Refill     amiodarone (PACERONE) 200 MG tablet Take 1 tablet (200 mg) by mouth daily 90 tablet 3     aspirin (ASA) 81 MG chewable tablet Take 1 tablet (81 mg) by mouth every other day 30 tablet 0     atorvastatin (LIPITOR) 10 MG tablet Take 10 mg by mouth every other day Even days, bedtime       coenzyme Q-10 200 MG CAPS capsule Take 200 mg by mouth daily       diphenhydrAMINE-acetaminophen (TYLENOL PM)  MG tablet Take 1 tablet by mouth nightly as needed for sleep       doxazosin (CARDURA) 8 MG tablet [DOXAZOSIN (CARDURA) 8 MG TABLET] Take 4 mg by mouth at bedtime.              finasteride (PROSCAR) 5 mg tablet [FINASTERIDE  "(PROSCAR) 5 MG TABLET] Take 5 mg by mouth at bedtime.              furosemide (LASIX) 40 MG tablet Take 2 tablets (80 mg) by mouth daily Take extra dose if weight gain, leg swelling, or short of breath 100 tablet 3     gabapentin (NEURONTIN) 100 MG capsule Two capsules in the morning, two capsules in the afternoon, and three capsules in the evening       glucosamine/chondr cole A sod (OSTEO BI-FLEX ORAL) [GLUCOSAMINE/CHONDR COLE A SOD (OSTEO BI-FLEX ORAL)] Take 1 tablet by mouth 2 (two) times a day.              losartan (COZAAR) 25 MG tablet [LOSARTAN (COZAAR) 25 MG TABLET] Take 1 tablet (25 mg total) by mouth daily. 30 tablet 0     melatonin 10 mg Tab Take 10 mg by mouth At Bedtime        metoprolol succinate ER (TOPROL-XL) 25 MG 24 hr tablet Take 1 tablet (25 mg) by mouth At Bedtime 90 tablet 3     multivitamin, therapeutic (THERA-VIT) TABS tablet Take 1 tablet by mouth daily       omeprazole (PRILOSEC) 20 MG capsule [OMEPRAZOLE (PRILOSEC) 20 MG CAPSULE] Take 20 mg by mouth 2 (two) times a day before meals.       rivaroxaban ANTICOAGULANT (XARELTO) 20 MG TABS tablet Take 1 tablet (20 mg) by mouth daily 90 tablet 3     sertraline (ZOLOFT) 50 MG tablet Take 50 mg by mouth 2 times daily        vit A/vit C/vit E/zinc/copper (PRESERVISION AREDS ORAL) [VIT A/VIT C/VIT E/ZINC/COPPER (PRESERVISION AREDS ORAL)] Take 1 tablet by mouth 2 (two) times a day.         Objective  /66 (BP Location: Right arm, Patient Position: Sitting, Cuff Size: Adult Regular)   Pulse 84   Resp 16   Ht 1.803 m (5' 11\")   Wt 85.7 kg (189 lb)   BMI 26.36 kg/m      General Appearance:    Alert, cooperative, no distress, appears stated age   Head:    Normocephalic, without obvious abnormality, atraumatic   Throat:   Lips, mucosa, and tongue normal; teeth and gums normal   Neck:   Supple, symmetrical, trachea midline, no adenopathy;        thyroid:  No enlargement/tenderness/nodules; no carotid    bruit or JVD   Back:     Symmetric, no " curvature, ROM normal, no CVA tenderness   Lungs:     Clear to auscultation bilaterally, respirations unlabored   Chest wall:    No tenderness, right-sided pacemaker   Heart:    Regular rate and rhythm, S1 and S2 normal, no murmur, rub   or gallop   Abdomen:     Soft, non-tender, bowel sounds active all four quadrants,     no masses, no organomegaly   Extremities:   Normal, atraumatic, no cyanosis or edema   Pulses:   2+ and symmetric all extremities   Skin:   Skin color, texture, turgor normal, no rashes or lesions     Results    Lab Results personally reviewed   Lab Results   Component Value Date    CHOL 142 10/23/2020    CHOL 161 05/16/2019     Lab Results   Component Value Date    HDL 51 10/23/2020    HDL 57 05/16/2019     No components found for: LDLCALC  Lab Results   Component Value Date    TRIG 79 10/23/2020    TRIG 77 05/16/2019     Lab Results   Component Value Date    WBC 6.3 03/14/2022    HGB 11.6 (L) 03/14/2022    HCT 36.2 (L) 03/14/2022     03/14/2022     Lab Results   Component Value Date    BUN 13 03/22/2022     03/22/2022    CO2 32 (H) 03/22/2022

## 2022-05-04 NOTE — PATIENT INSTRUCTIONS
Mr Reymundo AMBRIZ Nicola,  I enjoyed visiting with you again today.  I am glad to hear you are doing well.  Per our conversation let us get breathing tests and if shows lung issues now we would consider ablation sooner.  I will plan on seeing you 6 months.  Jorge A Leigh

## 2022-05-04 NOTE — LETTER
5/4/2022    ADDISON BENÍTEZ MD  [facility] [address]  [address] [facility] [address]    RE: Reymundo Petersen       Dear Colleague,     I had the pleasure of seeing Reymundo Petersen in the [facility].      [facility]  Heart Care Clinic Follow-up Note    Assessment & Plan        (I25.10,  I25.83) Coronary artery disease due to lipid rich plaque  (primary encounter diagnosis)  Comment: 2011 due to shortness of breath underwent angiography showing normal left main, normal LAD, normal circumflex, and significant distal right coronary artery lesion which was intervened upon with a stent. Ejection fraction at that time was 52%. He underwent nuclear stress test in 2020 showing basal inferior wall defect prompting angiography which showed normal left main, normal LAD, normal circumflex, and normal right coronary artery with minimal in-stent restenosis. No significant symptoms and continue to work on prevention and contemplating major surgery underwent nuclear stress test showing no significant ischemia or scar.  Continue chronic aspirin every other day.    (I48.19) Persistent atrial fibrillation (H)  Comment:  Not valvular, paroxysmal, and given advanced [id number] score of 4 on Xarelto.  On amiodarone with successful cardioversion, will continue to check blood work and arrange for PFTs.  Given that he has some lung fibrosis will consider ablation sooner than later. Have lower dose of Toprol to 12 and half milligrams a day. Given bleeding, will consider left atrial appendage occlusion device.    (I35.1) Nonrheumatic aortic valve insufficiency  Comment:  Based on echo in May 2021 mild with mild ascending aorta enlargement.  Recheck echo in about a year.    (I50.41) Acute combined systolic and diastolic congestive heart failure (H)  Comment: No significant signs or symptoms currently.    (I42.0) Dilated cardiomyopathy (H)  Comment: Ejection fraction was 52% initially, 47% on follow-up echo  in 2020 with nuclear stress test 32%. Essentially asymptomatic but recheck echo showed 42% last May, continue to monitor..    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    (I10) Primary hypertension  Comment: Under good control currently.    (N18.2) Stage 2 chronic kidney disease  Comment: Creatinine normalized to 1.17    (Z95.0) Cardiac pacemaker in situ  Comment: Medtronic device with Medtronic right atrial right ventricular leads in the His bundle as well as a right ventricular apical lead. He has 7% atrial pacing with 97% ventricular CRT pacing. Leads stable.      Plan  1.  Arrange for PFTs, if scarring seen will then most likely proceed with ablation of A. fib.  2.  Would favor ablation of A. fib and left atrial appendage occlusion device, daughter would like to wait until [facility] is up and running.  3.  Continue fluid and salt restriction.  4.  Recheck echo in around May.  5.  Follow-up with me thereafter.    Subjective  CC: 78-year-old white gentleman here for follow-up visit.  Since I seen him he is subsequently undergone cardioversion, switch from sotalol to amiodarone and now in sinus rhythm following another cardioversion.  He is still living with his sister-in-law, he is here today with his daughter.  He is physically active around the house.  Does have some mild bipedal edema but no fatigue, syncope, dizziness, chest discomfort, palpitations, shortness of breath, PND or orthopnea.    Medications  Current Outpatient Medications   Medication Sig Dispense Refill     amiodarone (PACERONE) 200 MG tablet Take 1 tablet (200 mg) by mouth daily 90 tablet 3     aspirin (ASA) 81 MG chewable tablet Take 1 tablet (81 mg) by mouth every other day 30 tablet 0     atorvastatin (LIPITOR) 10 MG tablet Take 10 mg by mouth every other day Even days, bedtime       coenzyme Q-10 200 MG CAPS capsule Take 200 mg by mouth daily       diphenhydrAMINE-acetaminophen (TYLENOL PM)  MG  "tablet Take 1 tablet by mouth nightly as needed for sleep       doxazosin (CARDURA) 8 MG tablet [DOXAZOSIN (CARDURA) 8 MG TABLET] Take 4 mg by mouth at bedtime.              finasteride (PROSCAR) 5 mg tablet [FINASTERIDE (PROSCAR) 5 MG TABLET] Take 5 mg by mouth at bedtime.              furosemide (LASIX) 40 MG tablet Take 2 tablets (80 mg) by mouth daily Take extra dose if weight gain, leg swelling, or short of breath 100 tablet 3     gabapentin (NEURONTIN) 100 MG capsule Two capsules in the morning, two capsules in the afternoon, and three capsules in the evening       glucosamine/chondr cole A sod (OSTEO BI-FLEX ORAL) [GLUCOSAMINE/CHONDR COLE A SOD (OSTEO BI-FLEX ORAL)] Take 1 tablet by mouth 2 (two) times a day.              losartan (COZAAR) 25 MG tablet [LOSARTAN (COZAAR) 25 MG TABLET] Take 1 tablet (25 mg total) by mouth daily. 30 tablet 0     melatonin 10 mg Tab Take 10 mg by mouth At Bedtime        metoprolol succinate ER (TOPROL-XL) 25 MG 24 hr tablet Take 1 tablet (25 mg) by mouth At Bedtime 90 tablet 3     multivitamin, therapeutic (THERA-VIT) TABS tablet Take 1 tablet by mouth daily       omeprazole (PRILOSEC) 20 MG capsule [OMEPRAZOLE (PRILOSEC) 20 MG CAPSULE] Take 20 mg by mouth 2 (two) times a day before meals.       rivaroxaban ANTICOAGULANT (XARELTO) 20 MG TABS tablet Take 1 tablet (20 mg) by mouth daily 90 tablet 3     sertraline (ZOLOFT) 50 MG tablet Take 50 mg by mouth 2 times daily        vit A/vit C/vit E/zinc/copper (PRESERVISION AREDS ORAL) [VIT A/VIT C/VIT E/ZINC/COPPER (PRESERVISION AREDS ORAL)] Take 1 tablet by mouth 2 (two) times a day.         Objective  /66 (BP Location: Right arm, Patient Position: Sitting, Cuff Size: Adult Regular)   Pulse 84   Resp 16   Ht 1.803 m (5' 11\")   Wt 85.7 kg (189 lb)   BMI 26.36 kg/m      General Appearance:    Alert, cooperative, no distress, appears stated age   Head:    Normocephalic, without obvious abnormality, atraumatic   Throat:   Lips, " mucosa, and tongue normal; teeth and gums normal   Neck:   Supple, symmetrical, trachea midline, no adenopathy;        thyroid:  No enlargement/tenderness/nodules; no carotid    bruit or JVD   Back:     Symmetric, no curvature, ROM normal, no CVA tenderness   Lungs:     Clear to auscultation bilaterally, respirations unlabored   Chest wall:    No tenderness, right-sided pacemaker   Heart:    Regular rate and rhythm, S1 and S2 normal, no murmur, rub   or gallop   Abdomen:     Soft, non-tender, bowel sounds active all four quadrants,     no masses, no organomegaly   Extremities:   Normal, atraumatic, no cyanosis or edema   Pulses:   2+ and symmetric all extremities   Skin:   Skin color, texture, turgor normal, no rashes or lesions     Results    Lab Results personally reviewed   Lab Results   Component Value Date    CHOL 142 10/23/2020    CHOL 161 05/16/2019     Lab Results   Component Value Date    HDL 51 10/23/2020    HDL 57 05/16/2019     No components found for: LDLCALC  Lab Results   Component Value Date    TRIG 79 10/23/2020    TRIG 77 05/16/2019     Lab Results   Component Value Date    WBC 6.3 03/14/2022    HGB 11.6 (L) 03/14/2022    HCT 36.2 (L) 03/14/2022     03/14/2022     Lab Results   Component Value Date    BUN 13 03/22/2022     03/22/2022    CO2 32 (H) 03/22/2022                 Thank you for allowing me to participate in the care of your patient.      Sincerely,     SKYE GARCIA MD     [facility]  cc:   No referring provider defined for this encounter.

## 2022-05-16 NOTE — TELEPHONE ENCOUNTER
[facility] Call Center    Phone Message    May a detailed message be left on voicemail: no     Reason for Call: Other: Juliet called to request an order for a PFT per Dr. Leigh. Please reach out to Juliet when complete.      Action Taken: Other: [facility]    Travel Screening: Not Applicable

## 2022-05-16 NOTE — TELEPHONE ENCOUNTER
This order was placed on 5/12 as part of the [employer] program. Msg back to schedulers with this info.-[facility]

## 2022-05-16 NOTE — TELEPHONE ENCOUNTER
Ordering Provider's NPI: [id number]  SKYE LEIGH        General PFT Lab (Please always keep checked) [[id number]]    Electronically signed by: Skye Leigh MD on 05/12/22 0852 Status: Active   Mode: Ordering in Verbal with readback mode Communicated by: Rylie Stout   Ordering user: Rylie Stout 05/12/22 0849 Ordering provider: Skye Leigh MD   Released by: Rylie Stout 05/12/22 0849         Order History  Outpatient  Date/Time Action Taken User Additional Information   05/12/22 0840 Pend Rylie Stout    05/12/22 0849 Sign Rylie Stout Ordering Mode: Verbal with readback   05/12/22 0852 Verbal Cosign Skye Leigh MD          Future Order Information    Expected Expires      5/26/2022 (Approximate) 5/12/2023                   Comments    Pt new on amiodarone h as hx of pulmonary fibrosis per Dr Leigh           --replaced order.-[facility]

## 2022-05-16 NOTE — TELEPHONE ENCOUNTER
[facility] Call Center    Phone Message    May a detailed message be left on voicemail: yes     Reason for Call: Other: tried to warm transfer pt daughter to [facility] to schedule pulmonary but they said there needs to be 2 orders and they are unable to schedule it. Pt daughter said she was told it has been updated per VM that was left behind. Please review and check to make sure it is order correctly the daughter has the # now to call Pulmonary and will try again in an hour to schedule if orders can be placed.    Action Taken: Message routed to:  Other: Cardiology    Travel Screening: Not Applicable

## 2022-05-18 NOTE — PROGRESS NOTES
RESPIRATORY CARE NOTE     Patient Name: Reymundo Petersen  Today's Date: 5/18/2022     Complete PFT done. Pt performed tests with good effort. Test results meet ATS criteria. Albuterol neb given.Results scanned into epic. Pt left in no distress.       Samia Heard, RT

## 2022-06-05 NOTE — PROGRESS NOTES
Chief Complaint   Patient presents with     Trauma     Bumped Right elbow on something, or scrapped right elbow, currently on blood thinners, so elbow has not stopped bleeding since he hurt it on Friday night / Saturday morning.        ASSESSMENT/PLAN:  Reymundo was seen today for trauma.    Diagnoses and all orders for this visit:    Persistent atrial fibrillation (H)    Abrasion of skin of right elbow    Wound was cleansed by running under tap water for several minutes.  Area dried.  Nonadherent stick pad and pressure dressing applied.  Advised to not take this off for at least 24 hours.  Recheck after that point and educated on applying bandages.  Follow-up if still bleeding at that point.  No changes to anticoagulation medication recommended.    Waldo Augustin PA-C      SUBJECTIVE:  Reymundo is a 81 year old male who presents to urgent care with a cut on the left elbow that happened 2 days ago and is continued to bleed.  He thinks he might of scratched it on a branch because he was doing yard work.  He has had issues with stopping cuts from bleeding before.  He has been taking his Eliquis and aspirin.    ROS: Pertinent ROS neg other than the symptoms noted above in the HPI.     OBJECTIVE:  /66 (BP Location: Left arm, Patient Position: Sitting, Cuff Size: Adult Regular)   Pulse 64   Temp 97.7  F (36.5  C) (Oral)   Resp 18   Wt 85.7 kg (189 lb)   SpO2 96%   BMI 26.36 kg/m     GENERAL: healthy, alert and no distress  SKIN: 2 mm circular superficial abrasion that is bleeding on medial aspect of right elbow    DIAGNOSTICS    No results found for any visits on 06/05/22.     Current Outpatient Medications   Medication     amiodarone (PACERONE) 200 MG tablet     aspirin (ASA) 81 MG chewable tablet     atorvastatin (LIPITOR) 10 MG tablet     coenzyme Q-10 200 MG CAPS capsule     diphenhydrAMINE-acetaminophen (TYLENOL PM)  MG tablet     doxazosin (CARDURA) 8 MG tablet     finasteride (PROSCAR) 5 mg tablet      furosemide (LASIX) 40 MG tablet     gabapentin (NEURONTIN) 100 MG capsule     glucosamine/chondr cole A sod (OSTEO BI-FLEX ORAL)     losartan (COZAAR) 25 MG tablet     melatonin 10 mg Tab     metoprolol succinate ER (TOPROL-XL) 25 MG 24 hr tablet     multivitamin, therapeutic (THERA-VIT) TABS tablet     omeprazole (PRILOSEC) 20 MG capsule     rivaroxaban ANTICOAGULANT (XARELTO) 20 MG TABS tablet     sertraline (ZOLOFT) 50 MG tablet     vit A/vit C/vit E/zinc/copper (PRESERVISION AREDS ORAL)     No current facility-administered medications for this visit.      Patient Active Problem List   Diagnosis     Primary osteoarthritis of knees, bilateral     Complete heart block (H)     Primary hypertension     BPH (benign prostatic hyperplasia)     Bradycardia     Combined hyperlipidemia     Dizziness     DJD (degenerative joint disease)     LBBB (left bundle branch block)     Left ventricular hypertrophy     Troponin level elevated     Acute combined systolic and diastolic congestive heart failure (H)     Pulmonary hypertension (H)     Nonrheumatic aortic valve insufficiency     Coronary artery disease due to lipid rich plaque     MARIAN (generalized anxiety disorder)     GERD (gastroesophageal reflux disease)     Infection due to 2019 novel coronavirus     Dilated cardiomyopathy (H)     Persistent atrial fibrillation (H)     Status post biventricular pacemaker     Stage 2 chronic kidney disease     Hearing loss     Arthritis, lumbar spine     Peripheral edema     Elevated TSH      Past Medical History:   Diagnosis Date     Atrial fibrillation (H)      Atrial fibrillation, transient (H) 8/11/2020     BPH (benign prostatic hyperplasia) 7/11/2018     Chronic combined systolic and diastolic heart failure (H) 2/11/2021     Congestive heart failure (H)      Coronary artery disease      COVID 02/2021     Dilated cardiomyopathy (H) 2/11/2021     MARIAN (generalized anxiety disorder) 2/11/2021     GERD (gastroesophageal reflux disease)       High cholesterol      Hypertension      LBBB (left bundle branch block) 7/11/2018     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     Pulmonary hypertension (H) 4/1/2020     Past Surgical History:   Procedure Laterality Date     ANGIOPLASTY       ARTHROSCOPY KNEE       CARDIOVERSION  2021     CARDIOVERSION  02/03/2022     CV CORONARY ANGIOGRAM N/A 10/23/2020    Procedure: Coronary Angiogram;  Surgeon: Varun Freire MD;  Location: [facility] Cath Lab;  Service: Cardiology     CV LEFT HEART CATHETERIZATION WITHOUT LEFT VENTRICULOGRAM Left 10/23/2020    Procedure: Left Heart Catheterization Without Left Ventriculogram;  Surgeon: Varun Freire MD;  Location: [facility];  Service: Cardiology     EP BIV PACEMAKER INSERT N/A 3/25/2020    Procedure: EP Biventricular Pacemaker Insertion;  Surgeon: Taylor Sandoval MD;  Location: [facility];  Service: Cardiology     RELEASE CARPAL TUNNEL       ZZC TOTAL KNEE ARTHROPLASTY Right 8/15/2019    Procedure: RIGHT  MINIMALLY TOTAL KNEE ARTHROPLASTY;  Surgeon: Keven Cerda MD;  Location: [facility];  Service: Orthopedics     Family History   Problem Relation Age of Onset     Heart Disease Father      Diabetes Type 2  Sister      Alzheimer Disease Mother      Chronic Obstructive Pulmonary Disease Brother      Social History     Tobacco Use     Smoking status: Never Smoker     Smokeless tobacco: Never Used   Substance Use Topics     Alcohol use: Yes     Comment: Alcoholic Drinks/day: rare              The plan of care was discussed with the patient. They understand and agree with the course of treatment prescribed. A printed summary was given including instructions and medications.  The use of Dragon/NewHive dictation services may have been used to construct the content in this note; any grammatical or spelling errors are non-intentional. Please contact the author of this note directly if you are in need of any  clarification.

## 2022-06-06 NOTE — TELEPHONE ENCOUNTER
Rec'd return call from Juliet - informed her of Dr. Leigh's response/recommendations - Juliet verbalized understanding after additional questions addressed and agreed to follow-up with PCP bruno Emmanuel - Juliet reported that  had planned to consult Watchman team r.e. next available consult appt and let her know - reassured Juliet that msg would also be sent to Watchman nurse for follow-up.  mg

## 2022-06-06 NOTE — TELEPHONE ENCOUNTER
Msg rec'd 6-6-22 @ 1405:  Jorge A Leigh MD Gorshe, Maureen, DIAMOND  Given that this gentleman has coronary artery disease with prior stenting he needs to be on indefinite aspirin, I would acquiesce to 3 times a week although this would still cause some bleeding.   In addition, if he does have significant bleeding I would not hesitate to discuss left atrial appendage occlusion device and can set that up if he so desires.   LF

## 2022-06-06 NOTE — TELEPHONE ENCOUNTER
Return call to Juliet who reported that patient was seen in urgent care yesterday for bleeding after injuring himself last week doing yard work - Juliet explained that patient seems to be having more bleeding issues and reported that Watchman and ablation procedures had been discussed in past EP team - Juliet inquired if patient should continue to take ASA 81mg MWF as directed by Dr. Leigh and if he needs to continue taking CoQ10 which patient has stopped intermittently in past for bleeding.  Juliet also had many questions/concerns r.e.  AF procedures including timing.    Informed Juliet that questions related to ASA, CoQ10 would be forwarded to Dr. Leigh to address and instructed her to sched next available follow-up with AF NP to address questions r.e. AF procedures - Juliet verbalized understanding and agreed with plan - call transf to sched.  mg

## 2022-06-06 NOTE — TELEPHONE ENCOUNTER
Please review patient update from daughter and address whether patient should continue ASA and CoQ10.  mg

## 2022-06-06 NOTE — TELEPHONE ENCOUNTER
[facility] Call Center    Phone Message    May a detailed message be left on voicemail: yes     Reason for Call: Pt of Dr. Leigh. Pt's daughter, Juliet has some questions. Please call [phone]. Pt had a wound that would not stop bleeding. Pt went into an urgent care, they got the bleeding to stop.    Pt's daughter needs instructions on medication changes, aspirin and Q10 and other things.    Thank you!

## 2022-06-07 NOTE — CONFIDENTIAL NOTE
Referral 5/4/22 from Dr. Leigh for LAAC.    Please call pts daughter and schedule LAAC consult.    Thanks,  Rama

## 2022-06-07 NOTE — TELEPHONE ENCOUNTER
----- Message from Jenni Tinoco RN sent at 6/6/2022  4:01 PM CDT -----  Regarding: LBF referral  Please contact patient's daughter Juliet to discuss left atrial appendage occlusion device procedure - Thanks  mg

## 2022-06-10 NOTE — TELEPHONE ENCOUNTER
Jorge A Leigh MD Caswell, Mallory J, RN  Caller: Unspecified (Today,  8:35 AM)  She is absolutely correct, usually we see cataracts with amiodarone.   In any event, please decrease amiodarone to every other day, and I would like to refer on to EP for possible ablation.  This way I can get him off amiodarone.              Called Juliet and updated on message above. She willl switch her dad to every other day Amiodarone. They already met with Dr. Michelle in April to discuss ablation. They are still thinking of it as he also considering the watchman and needs some orthopedic surgeries down the line. She is planning to discuss timeline of things further with her dad and then is meeting with the watchman team in July. -[facility]      JB [place]leton- they did meet with Dr. Michelle for abalation back in April and are considering this and then also meeting with Barb with the watchman team in July. They want to discuss timeline of things further at that time.  Mal

## 2022-06-10 NOTE — TELEPHONE ENCOUNTER
[facility] Call Center    Phone Message    May a detailed message be left on voicemail: yes     Reason for Call: Other: Patient's daughter called and stated patient has been complaining of more vision issues. They have an eye appointment set up for tuesday but the patient's daughter noticed one of the side effects with amiodarone (PACERONE)  was blurry vision. Please call back and discuss.      Action Taken: Other: Cardiology    Travel Screening: Not Applicable                                                                    Called Juliet back to address her concerns. She states that Ramirez reported some blurred vision for about the last week. Originally they felt it was due to allergies as he also had a slight red rash underneath his eye sockets. To be safe and due to his Amiodarone use, Juliet was able to get him an eye appointment on Tuesday of next week. She will keep us updated on the findings from this. Will send FYI to Dr. Leigh. She has writer's direct line. -[facility] Dr. Leigh,  JB only and more info yet to come. Ramirez has been experiencing some blurred vision and Juliet worried it could be due to Amiodarone. She is bringing him to the his Ophthalmologist on Tuesday and will keep us updated.   Thanks,  Mal

## 2022-06-17 NOTE — TELEPHONE ENCOUNTER
Jorge A Leigh MD Caswell, Mallory J, RN  Caller: Unspecified (Today, 10:13 AM)  I would suspect that this gentleman is probably in heart failure despite the lack of significant shortness of breath.   Weight up, increased bipedal edema, suspect he needs higher dose of diuretic.   I would not go up on the dose of the amiodarone without documented atrial fibrillation, amiodarone will stay in his system for about 5 weeks and thus going up can only hurt his eyes and not help his heart.   I would recommend if possible come in today for BNP and a BMP and ECG.  Maybe I can squeeze him in and just pop my head into see him.   If this is unable I would recommend Lasix 80 mg p.o. twice daily for the next 3 days and heart failure nurse practitioner early next week.   LF           ==called Juliet and updated on above. She is agreeable to the blood work and such and was transferred to scheduling to get this at  clinic today. -[facility]

## 2022-06-17 NOTE — TELEPHONE ENCOUNTER
Patient's daughter Juliet called to report Reymundo has pitting edema to the knees on both sides today. On Tuesday 6/14, the skin on his legs and feet was tight and he took an extra 40 mg of Lasix. She had taken him to the eye doctor  for blurry vision. He was told by Dr. Leigh to hold his Amiodarone. Wednesday 6/15  and Thursday 6/16 he had increased edema to legs and feet- he took an extra 40 mg of Lasix.  He is drinking 100 ml of water a day. He said even with the extra Lasix he is only putting out about 3-4 inches of urine in his urinal and he used to fill the urinal. He is not eating much either. His weight is up to 200 lb- dry weight is supposed to be 185-189 lb. Juliet and her Dad are trying not to have to go in to the hospital. Please call Juliet back ASA to advise. Her number is [phone]  Reason for Disposition    MILD difficulty breathing (e.g., minimal/no SOB at rest, SOB with walking, pulse < 100) of new onset or worse than normal    Longstanding difficulty breathing and not responding to usual therapy    Longstanding difficulty breathing (e.g., CHF, COPD, emphysema) and worse than normal    Additional Information    Negative: Breathing stopped and hasn't returned    Negative: Choking on something    Negative: SEVERE difficulty breathing (e.g., struggling for each breath, speaks in single words, pulse > 120)    Negative: Bluish (or gray) lips or face    Negative: Difficult to awaken or acting confused (e.g., disoriented, slurred speech)    Negative: Passed out (i.e., fainted, collapsed and was not responding)    Negative: Wheezing started suddenly after medicine, an allergic food, or bee sting    Negative: Stridor    Negative: Slow, shallow and weak breathing    Negative: Sounds like a life-threatening emergency to the triager    Negative: Chest pain    Negative: Wheezing (high pitched whistling sound) and previous asthma attacks or use of asthma medicines    Negative: Difficulty breathing and only  "present when coughing    Negative: Difficulty breathing and only from stuffy or runny nose    Negative: Difficulty breathing and within 14 days of COVID-19 Exposure    Negative: MODERATE difficulty breathing (e.g., speaks in phrases, SOB even at rest, pulse 100-120) of new onset or worse than normal    Negative: Wheezing can be heard across the room    Negative: Drooling or spitting out saliva (because can't swallow)    Negative: Any history of prior \"blood clot\" in leg or lungs    Negative: Illness requiring prolonged bedrest in past month (e.g., immobilization, long hospital stay)    Negative: Hip or leg fracture (broken bone) in past month (or had cast on leg or ankle in past month)    Negative: Major surgery in the past month    Negative: Long-distance travel in past month (e.g., car, bus, train, plane; with trip lasting 6 or more hours)    Negative: Extra heart beats OR irregular heart beating   (i.e., \"palpitations\")    Negative: Fever > 103 F (39.4 C)    Negative: Fever > 101 F (38.3 C) and over 60 years of age    Negative: Fever > 100.0 F (37.8 C) and bedridden (e.g., nursing home patient, stroke, chronic illness, recovering from surgery)    Negative: Fever > 100.0 F (37.8 C) and diabetes mellitus or weak immune system (e.g., HIV positive, cancer chemo, splenectomy, organ transplant, chronic steroids)    Negative: Periods where breathing stops and then resumes normally and bedridden (e.g., nursing home patient, CVA)    Negative: Pregnant or postpartum (from 0 to 6 weeks after delivery)    Negative: Patient sounds very sick or weak to the triager    Negative: Continuous (nonstop) coughing    Negative: Patient wants to be seen    Negative: MODERATE longstanding difficulty breathing (e.g., speaks in phrases, SOB even at rest, pulse 100-120) and SAME as normal    Negative: MILD longstanding difficulty breathing (e.g., speaks in phrases, SOB even at rest, pulse 100-120) and SAME as normal    Answer Assessment - " "Initial Assessment Questions  1. RESPIRATORY STATUS: \"Describe your breathing?\" (e.g., wheezing, shortness of breath, unable to speak, severe coughing)       wheezing  2. ONSET: \"When did this breathing problem begin?\"      About 6/13 (Monday)  3. PATTERN \"Does the difficult breathing come and go, or has it been constant since it started?\"     Constant when lying down   4. SEVERITY: \"How bad is your breathing?\" (e.g., mild, moderate, severe)     - MILD: No SOB at rest, mild SOB with walking, speaks normally in sentences, can lay down, no retractions, pulse < 100.     - MODERATE: SOB at rest, SOB with minimal exertion and prefers to sit, cannot lie down flat, speaks in phrases, mild retractions, audible wheezing, pulse 100-120.     - SEVERE: Very SOB at rest, speaks in single words, struggling to breathe, sitting hunched forward, retractions, pulse > 120      Mild  5. RECURRENT SYMPTOM: \"Have you had difficulty breathing before?\" If so, ask: \"When was the last time?\" and \"What happened that time?\"      Yes, with fluid increase  6. CARDIAC HISTORY: \"Do you have any history of heart disease?\" (e.g., heart attack, angina, bypass surgery, angioplasty)     Heart failure  7. LUNG HISTORY: \"Do you have any history of lung disease?\"  (e.g., pulmonary embolus, asthma, emphysema)     no  8. CAUSE: \"What do you think is causing the breathing problem?\"      Fluid   9. OTHER SYMPTOMS: \"Do you have any other symptoms? (e.g., dizziness, runny nose, cough, chest pain, fever)      Pitting edema to knees bilaterally    Protocols used: BREATHING DIFFICULTY-A-OH    "

## 2022-06-17 NOTE — PROGRESS NOTES
EKG reviewed with patient and LBF in clinic today- in paced rhythm.       Pt was seen with his daughter, Juliet. He does have significant pitting edema. He is not overtly short of breath and admits to not elevating his legs much at all. He was encouraged to do this. Updated LBF and he recommended furosemide 80  Mg twice daily for 3 days and to update us on Monday. They will do this. [place] [place]

## 2022-06-17 NOTE — TELEPHONE ENCOUNTER
"Noted message below. Called Juliet and Ramirez to get an update. Juliet states that her dad is up about 10 lbs from baseline since approximately the start of June. He is on 80 mg of Lasix daily with a PRN order to take 40 mg as needed for weight gain. He took a PRN dose on Tuesday and Wednesday and is still at about 199-200 lbs. He mainly complains of swelling and tightness to his legs and feet. He is a little more wheezy lying flat per Juliet but was able to walk the length of the house to come to the phone without difficulty.     Blood pressures have been around 115-120 systolic. They checked while on the phone with writer and it was 119/76 with HR of 89 ( after walking montero). He does not ever feel when he is in Afib. He is on Amiodarone 200 mg every other day- recently changed last Friday due to visual changes. He saw his eye doctor Tuesday and there are \"deposits\" from amiodarone in his eyes but \"do not seem to be causing him any more visual impairment than prior\". His eyes are dry however and he was prescribed drops. They wonder if he should go back to 200 mg daily.     The main concern is his weight gain over this month. Juliet says he is also not putting out as much urine as previous and she worries that he is not drinking enough. Will route to [facility] for review and recommendation. -[facility] Dr. Leigh,  Ramirez is up about 9-10 lbs since the beginning of the month per his daughter. He has swelling to bilateral lower extremities and some wheeziness from lying flat although he has some baseline breathing issues. He is typically on 80 mg furosemide daily with PRN 40 mg dose. He took the prn dose x2- Tuesday/wednesday this week. His weight remained about the same- 200 lbs this AM. He was not huffing and puffing on the phone and they are trying to avoid the hospital. She says his urine output seems less as well.    They also wonder if he should go back to 200 mg daily of Amiodarone ( see encounter from 6/10- we decreased to " every other day due to visual changes). His eye doctor did not think his vision was any worse since starting Amiodarone but could see deposits in his eyes. Recommendations please?  Thanks,  Mal

## 2022-06-21 NOTE — TELEPHONE ENCOUNTER
[facility] Call Center    Phone Message    May a detailed message be left on voicemail: yes     Reason for Call: Other: Pt daughter would like a call back to get update from lab to discuss the lasix and do they do the extra dose, Please reach out to discuss     Action Taken: Message routed to:  [facility] ([facility]): Cardio    Travel Screening: Not Applicable

## 2022-06-21 NOTE — TELEPHONE ENCOUNTER
Called Juliet and reiterated that increased dose was for 3 days only. They will go back to original dosing of 80 mg daily with PRN 40 mg dosing. They actually have an appt for him with Dr. Noguera on Thursday and will have his fluid level status and BMP drawn at that time. He will stop K supplement pending labs on Thursday. -[facility] [place]

## 2022-07-01 NOTE — PROGRESS NOTES
HEART CARE ENCOUNTER NOTE       [facility]  [phone]      Assessment/Recommendations   1.  Persistent atrial fibrillation: I have personally reviewed this patient's chart and have spoken with the patient about the treatment options, including HAILE device.  He has a [id number] score of 5 for age greater than 75, heart failure, hypertension and coronary artery disease.  He has a HAS-BLED score of 3 for age, bleeding disposition and current use of aspirin.   He is not a candidate for long-term anticoagulation due to degenerative joint disease, fall risk, multiple and recurrent falls in the past with traumatic injury.  He would need screening and could undergo CT pulmonary vein as work-up for both his planned PVI and watchman.    Once we know that his anatomy is amenable for implant, patient could be scheduled, and I would recommend that ablation comes first.  He would then need some recovery time between ablation and scheduling the watchman.  He understands he would stay on blood thinner up until and through watchman implant.  He would also be on his baby aspirin.  Approximately 45 days after implant, he will have a post procedure RAFAL. If the post RAFAL is negative for leaks and no thrombus is seen on the surface of the device, he would be instructed to stop the oral anticoagulation.  At that time he would continue the aspirin 81 mg and add Plavix 75 mg by mouth daily for an additional 4 months.  After being on DAPT for approximately 4 months, he will stop the Plavix and continue on just aspirin 81 mg daily indefinitely.  He understands that the risks of the procedure are <2% and include, but are not limited to device embolization, air embolism, myocardial perforation, device thrombosis, ASD, stroke, or death.  We discussed expected recovery and follow-up.       The patient is a good candidate for proceeding with left atrial appendage screening and implant.  His questions were answered to his  satisfaction.      2.  Coronary artery disease -with prior PCI to the RCA.  Patient currently has no angina.  He is on aspirin Monday Wednesdays and Fridays.  He should continue beta-blocker and statin    3.  Dilated cardiomyopathy, chronic combined systolic and diastolic heart failure, NYHA class II-III -currently compensated.  Patient does have acute episodes of decompensated heart failure and unsure of whether this is related to his atrial fibrillation or some underlying infiltrative process.  Apparently he is undergoing evaluation for possible cardiac amyloid.  He is on appropriate beta-blocker, ARB and diuretic therapy.      4.  Complete heart block -status post CRT-P on March 25, 2020.  Last device check shows 99.7% BiV pacing       History of Present Illness/Subjective    Reymundo Petersen is a 81 year old male who comes in today for discussion regarding his interest in the left atrial appendage occlusion device.  His daughter accompanies him to the visit today.    Reymundo Petersen has a past history of persistent atrial fibrillation, chronic systolic and diastolic heart failure due to nonischemic cardiomyopathy with severe concentric LV thickening (LVEF 41% by 2/10/2022 MPI), CHB, CRT-P in-situ, CAD with prior RCA PCI, HTN and CKD stage 2.  He has been on oral anticoagulation and has high fall risk because of degenerative joint disease.  He has had multiple falls with some traumatic injury, both to his skin in regards to skin tear/bleeding and also with head injury in the past.  He has been in discussion with Dr. Michelle about a possible ablation, however both of his atrium are severely dilated and patient understands that there would likely be recurrence and need for a second ablation.      The patient's daughter is very involved in his care.  They are very good about staying on a low-salt diet and daily weights.  His weight has been stable recently, but when he does go up and weight, he takes extra Lasix.   Patient has had multiple falls with head injuries in the past, skin tears requiring visits to the urgent care or ED.  He also has gait imbalance because of left knee osteoarthritis/degenerative joint disease.  He has very easy bruising and bleeding    He has been on amiodarone and had cardioversion on March 8.  We think he has been in normal sinus rhythm since that time.  He did see his eye doctor recently because his eyes have gotten worse and eye doctor told him that he does have some deposits at the back of his eye, likely due to the amiodarone.  Ophthalmology says this is not dangerous at this time, but has encouraged him to come off the amiodarone at some point.  Pulmonary function test showed borderline function, but no obvious fibrosis    Reymundo Petersen denies chest discomfort, palpitations, shortness of breath, paroxysmal nocturnal dyspnea, orthopnea, lightheadedness, dizziness, pre-syncope, or syncope.  Reymundo Petersen also denies any weight loss, changes in appetite, nausea or vomiting.     Medical, surgical, family, social history, and medications were reviewed and updated as necessary.    NM MPI results (from Feb 2022):  The nuclear stress test is probably negative for inducible myocardial ischemia or infarction. Cannot exclude a small non-transmural apical inferior infarct but it is likely diaphragmatic attenuation     The left ventricular ejection fraction at stress is 41% without focal wall motion abnormality.     A prior study was conducted on 10/7/2020.  Prior images were unavailable for comparison review, but the report sounds similar to what is seen today, except that TID is no longer present.    Echo from June 13:  Interpretation Summary     The left ventricle is mildly dilated.  Left ventricular function is decreased. The ejection fraction is 30-35%  (moderately reduced).  There is moderate global hypokinesia of the left ventricle.  The right ventricle is mildly dilated.  The right  "ventricular systolic function is normal.  The left atrium is severely dilated.  The right atrium is moderately dilated.  There is moderately severe (3+) tricuspid regurgitation.  Right ventricle systolic pressure estimate normal  There is mild to moderate (1-2+) aortic regurgitation.  The ascending aorta is Mildly dilated.  IVC diameter >2.1 cm collapsing <50% with sniff suggests a high RA pressure  estimated at 15 mmHg or greater.  Small pericardial effusion  There are no echocardiographic indications of cardiac tamponade.     Physical Examination Review of Systems   Vitals: BP 94/54 (BP Location: Left arm, Patient Position: Sitting, Cuff Size: Adult Regular)   Pulse 74   Resp 18   Ht 1.778 m (5' 10\")   Wt 84.8 kg (187 lb)   BMI 26.83 kg/m    BMI= Body mass index is 26.83 kg/m .  Wt Readings from Last 3 Encounters:   07/01/22 84.8 kg (187 lb)   06/05/22 85.7 kg (189 lb)   05/04/22 85.7 kg (189 lb)       General Appearance:   Alert, cooperative and in no acute distress   ENT/Mouth: membranes moist, no oral lesions or bleeding gums.      EYES:  no scleral icterus, normal conjunctivae   Neck: Thyroid not visualized   Chest/Lungs:   lungs are clear to auscultation, no rales or wheezing   Cardiovascular:   Regular . Normal first and second heart sounds with no murmurs, rubs or gallops; the carotid, radial and posterior tibial pulses are intact, 1-2+ edema bilaterally    Abdomen:  Soft and nontender. Bowel sounds are present in all quadrants   Extremities: no cyanosis or clubbing   Skin: no xanthelasma, warm.    Neurologic: normal gait, normal  bilateral, no tremors   Psychiatric: Normal mood and affect       Please refer above for cardiac ROS details.      Medical History  Surgical History Family History Social History   Past Medical History:   Diagnosis Date     Atrial fibrillation (H)      Atrial fibrillation, transient (H) 8/11/2020     BPH (benign prostatic hyperplasia) 7/11/2018     Chronic combined " systolic and diastolic heart failure (H) 2/11/2021     Congestive heart failure (H)      Coronary artery disease      COVID 02/2021     Dilated cardiomyopathy (H) 2/11/2021     MARIAN (generalized anxiety disorder) 2/11/2021     GERD (gastroesophageal reflux disease)      High cholesterol      Hypertension      LBBB (left bundle branch block) 7/11/2018     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     Pulmonary hypertension (H) 4/1/2020     Past Surgical History:   Procedure Laterality Date     ANGIOPLASTY       ARTHROSCOPY KNEE       CARDIOVERSION  2021     CARDIOVERSION  02/03/2022     CV CORONARY ANGIOGRAM N/A 10/23/2020    Procedure: Coronary Angiogram;  Surgeon: Varun Freire MD;  Location: [facility];  Service: Cardiology     CV LEFT HEART CATHETERIZATION WITHOUT LEFT VENTRICULOGRAM Left 10/23/2020    Procedure: Left Heart Catheterization Without Left Ventriculogram;  Surgeon: Varun Freire MD;  Location: [facility];  Service: Cardiology     EP BIV PACEMAKER INSERT N/A 3/25/2020    Procedure: EP Biventricular Pacemaker Insertion;  Surgeon: Taylor Sandoval MD;  Location: [facility];  Service: Cardiology     RELEASE CARPAL TUNNEL       ZZC TOTAL KNEE ARTHROPLASTY Right 8/15/2019    Procedure: RIGHT  MINIMALLY TOTAL KNEE ARTHROPLASTY;  Surgeon: Keven Cerda MD;  Location: [facility];  Service: Orthopedics     Family History   Problem Relation Age of Onset     Heart Disease Father      Diabetes Type 2  Sister      Alzheimer Disease Mother      Chronic Obstructive Pulmonary Disease Brother     Social History     Socioeconomic History     Marital status:      Spouse name: Not on file     Number of children: Not on file     Years of education: Not on file     Highest education level: Not on file   Occupational History     Not on file   Tobacco Use     Smoking status: Never Smoker     Smokeless tobacco: Never Used   Substance and Sexual Activity      Alcohol use: Yes     Comment: Alcoholic Drinks/day: rare     Drug use: No     Sexual activity: Not on file   Other Topics Concern     Parent/sibling w/ CABG, MI or angioplasty before 65F 55M? Not Asked   Social History Narrative     Not on file     Social Determinants of Health     Financial Resource Strain: Not on file   Food Insecurity: Not on file   Transportation Needs: Not on file   Physical Activity: Not on file   Stress: Not on file   Social Connections: Not on file   Intimate Partner Violence: Not on file   Housing Stability: Not on file          Medications  Allergies   Current Outpatient Medications   Medication Sig Dispense Refill     acetaminophen (TYLENOL) 500 MG tablet Take 500 mg by mouth as needed for mild pain       amiodarone (PACERONE) 200 MG tablet Take 1 tablet (200 mg) by mouth every other day 45 tablet 0     aspirin (ASA) 81 MG chewable tablet Take 1 tablet (81 mg) by mouth every other day (Patient taking differently: Take 81 mg by mouth every other day Taking Monday, Wednesday and Fridays) 30 tablet 0     atorvastatin (LIPITOR) 10 MG tablet Take 10 mg by mouth every other day Even days, bedtime       coenzyme Q-10 200 MG CAPS capsule Take 200 mg by mouth daily       doxazosin (CARDURA) 8 MG tablet [DOXAZOSIN (CARDURA) 8 MG TABLET] Take 4 mg by mouth at bedtime.              finasteride (PROSCAR) 5 mg tablet [FINASTERIDE (PROSCAR) 5 MG TABLET] Take 5 mg by mouth at bedtime.              furosemide (LASIX) 40 MG tablet Take 2 tablets (80 mg) by mouth daily Take extra dose if weight gain, leg swelling, or short of breath 100 tablet 3     gabapentin (NEURONTIN) 100 MG capsule Two capsules in the morning, two capsules in the afternoon, and three capsules in the evening       glucosamine/chondr cole A sod (OSTEO BI-FLEX ORAL) [GLUCOSAMINE/CHONDR COLE A SOD (OSTEO BI-FLEX ORAL)] Take 1 tablet by mouth 2 (two) times a day.              losartan (COZAAR) 25 MG tablet [LOSARTAN (COZAAR) 25 MG TABLET]  Take 1 tablet (25 mg total) by mouth daily. 30 tablet 0     melatonin 10 mg Tab Take 10 mg by mouth At Bedtime        metoprolol succinate ER (TOPROL-XL) 25 MG 24 hr tablet Take 1 tablet (25 mg) by mouth At Bedtime 90 tablet 3     multivitamin, therapeutic (THERA-VIT) TABS tablet Take 1 tablet by mouth daily       omeprazole (PRILOSEC) 20 MG capsule [OMEPRAZOLE (PRILOSEC) 20 MG CAPSULE] Take 20 mg by mouth 2 (two) times a day before meals.       potassium chloride (KLOR-CON) 20 MEQ packet Take 20 mEq by mouth daily 90 packet 3     rivaroxaban ANTICOAGULANT (XARELTO) 20 MG TABS tablet Take 1 tablet (20 mg) by mouth daily 90 tablet 3     sertraline (ZOLOFT) 50 MG tablet Take 50 mg by mouth 2 times daily        vit A/vit C/vit E/zinc/copper (PRESERVISION AREDS ORAL) [VIT A/VIT C/VIT E/ZINC/COPPER (PRESERVISION AREDS ORAL)] Take 1 tablet by mouth 2 (two) times a day.       diphenhydrAMINE-acetaminophen (TYLENOL PM)  MG tablet Take 1 tablet by mouth nightly as needed for sleep (Patient not taking: Reported on 7/1/2022)      Allergies   Allergen Reactions     No Known Allergies          Lab Results    Chemistry/lipid CBC Cardiac Enzymes/BNP/TSH/INR   Recent Labs   Lab Test 06/23/22  1337   CHOL 107   HDL 38*   LDL 62   TRIG 35     Recent Labs   Lab Test 06/23/22  1337 10/23/20  0918 05/16/19  0950   LDL 62 75 89     Recent Labs   Lab Test 06/23/22  1337      POTASSIUM 3.8   CHLORIDE 100   CO2 32*      BUN 19   CR 1.19   GFRESTIMATED 61   ANGELY 8.9     Recent Labs   Lab Test 06/23/22  1337 06/20/22  1310 06/17/22  1446   CR 1.19 1.18 1.10     No results for input(s): A1C in the last 64741 hours. Recent Labs   Lab Test 05/18/22  0943 03/14/22  1454   WBC  --  6.3   HGB 11.2* 11.6*   HCT  --  36.2*   MCV  --  96   PLT  --  173     Recent Labs   Lab Test 05/18/22  0943 03/14/22  1454 01/18/22  1526   HGB 11.2* 11.6* 13.1*    Recent Labs   Lab Test 03/14/22  2055 03/14/22  1454 02/11/21  1724   TROPONINI 0.09  0.07 0.14     Recent Labs   Lab Test 06/17/22  1446 03/14/22  1454 01/18/22  1526   * 1,163* 661*     Recent Labs   Lab Test 03/14/22  1454   TSH 7.62*     Recent Labs   Lab Test 02/11/21  1724 03/24/20  1745 08/15/19  0811   INR 1.03 1.05 0.98        49 minutes spent on the date of encounter doing education, chart prep/review, review of test results, patient visit, documentation and discussion with family.      This note has been dictated using voice recognition software. Any grammatical or context distortions are unintentional and inherent to the software.

## 2022-07-01 NOTE — LETTER
7/1/2022    ADDISON BENÍTEZ MD  [facility] [address]  [facility] [address]    RE: Reymundo Petersen       Dear Colleague,     I had the pleasure of seeing Reymundo M Nicola in the [facility].  HEART CARE ENCOUNTER NOTE       [facility]  [phone]      Assessment/Recommendations   1.  Persistent atrial fibrillation: I have personally reviewed this patient's chart and have spoken with the patient about the treatment options, including HAILE device.  He has a [id number] score of 5 for age greater than 75, heart failure, hypertension and coronary artery disease.  He has a HAS-BLED score of 3 for age, bleeding disposition and current use of aspirin.   He is not a candidate for long-term anticoagulation due to degenerative joint disease, fall risk, multiple and recurrent falls in the past with traumatic injury.  He would need screening and could undergo CT pulmonary vein as work-up for both his planned PVI and watchman.    Once we know that his anatomy is amenable for implant, patient could be scheduled, and I would recommend that ablation comes first.  He would then need some recovery time between ablation and scheduling the watchman.  He understands he would stay on blood thinner up until and through watchman implant.  He would also be on his baby aspirin.  Approximately 45 days after implant, he will have a post procedure RAFAL. If the post RAFAL is negative for leaks and no thrombus is seen on the surface of the device, he would be instructed to stop the oral anticoagulation.  At that time he would continue the aspirin 81 mg and add Plavix 75 mg by mouth daily for an additional 4 months.  After being on DAPT for approximately 4 months, he will stop the Plavix and continue on just aspirin 81 mg daily indefinitely.  He understands that the risks of the procedure are <2% and include, but are not limited to device embolization, air embolism, myocardial perforation,  device thrombosis, ASD, stroke, or death.  We discussed expected recovery and follow-up.       The patient is a good candidate for proceeding with left atrial appendage screening and implant.  His questions were answered to his satisfaction.      2.  Coronary artery disease -with prior PCI to the RCA.  Patient currently has no angina.  He is on aspirin Monday Wednesdays and Fridays.  He should continue beta-blocker and statin    3.  Dilated cardiomyopathy, chronic combined systolic and diastolic heart failure, NYHA class II-III -currently compensated.  Patient does have acute episodes of decompensated heart failure and unsure of whether this is related to his atrial fibrillation or some underlying infiltrative process.  Apparently he is undergoing evaluation for possible cardiac amyloid.  He is on appropriate beta-blocker, ARB and diuretic therapy.      4.  Complete heart block -status post CRT-P on March 25, 2020.  Last device check shows 99.7% BiV pacing       History of Present Illness/Subjective    Reymundo Petersen is a 81 year old male who comes in today for discussion regarding his interest in the left atrial appendage occlusion device.  His daughter accompanies him to the visit today.    Reymundo Petersen has a past history of persistent atrial fibrillation, chronic systolic and diastolic heart failure due to nonischemic cardiomyopathy with severe concentric LV thickening (LVEF 41% by 2/10/2022 MPI), CHB, CRT-P in-situ, CAD with prior RCA PCI, HTN and CKD stage 2.  He has been on oral anticoagulation and has high fall risk because of degenerative joint disease.  He has had multiple falls with some traumatic injury, both to his skin in regards to skin tear/bleeding and also with head injury in the past.  He has been in discussion with Dr. Michelle about a possible ablation, however both of his atrium are severely dilated and patient understands that there would likely be recurrence and need for a second  ablation.      The patient's daughter is very involved in his care.  They are very good about staying on a low-salt diet and daily weights.  His weight has been stable recently, but when he does go up and weight, he takes extra Lasix.  Patient has had multiple falls with head injuries in the past, skin tears requiring visits to the urgent care or ED.  He also has gait imbalance because of left knee osteoarthritis/degenerative joint disease.  He has very easy bruising and bleeding    He has been on amiodarone and had cardioversion on March 8.  We think he has been in normal sinus rhythm since that time.  He did see his eye doctor recently because his eyes have gotten worse and eye doctor told him that he does have some deposits at the back of his eye, likely due to the amiodarone.  Ophthalmology says this is not dangerous at this time, but has encouraged him to come off the amiodarone at some point.  Pulmonary function test showed borderline function, but no obvious fibrosis    Reymundo Petersen denies chest discomfort, palpitations, shortness of breath, paroxysmal nocturnal dyspnea, orthopnea, lightheadedness, dizziness, pre-syncope, or syncope.  Reymundo Petersen also denies any weight loss, changes in appetite, nausea or vomiting.     Medical, surgical, family, social history, and medications were reviewed and updated as necessary.    NM MPI results (from Feb 2022):  The nuclear stress test is probably negative for inducible myocardial ischemia or infarction. Cannot exclude a small non-transmural apical inferior infarct but it is likely diaphragmatic attenuation     The left ventricular ejection fraction at stress is 41% without focal wall motion abnormality.     A prior study was conducted on 10/7/2020.  Prior images were unavailable for comparison review, but the report sounds similar to what is seen today, except that TID is no longer present.    Echo from June 13:  Interpretation Summary     The left ventricle  "is mildly dilated.  Left ventricular function is decreased. The ejection fraction is 30-35%  (moderately reduced).  There is moderate global hypokinesia of the left ventricle.  The right ventricle is mildly dilated.  The right ventricular systolic function is normal.  The left atrium is severely dilated.  The right atrium is moderately dilated.  There is moderately severe (3+) tricuspid regurgitation.  Right ventricle systolic pressure estimate normal  There is mild to moderate (1-2+) aortic regurgitation.  The ascending aorta is Mildly dilated.  IVC diameter >2.1 cm collapsing <50% with sniff suggests a high RA pressure  estimated at 15 mmHg or greater.  Small pericardial effusion  There are no echocardiographic indications of cardiac tamponade.     Physical Examination Review of Systems   Vitals: BP 94/54 (BP Location: Left arm, Patient Position: Sitting, Cuff Size: Adult Regular)   Pulse 74   Resp 18   Ht 1.778 m (5' 10\")   Wt 84.8 kg (187 lb)   BMI 26.83 kg/m    BMI= Body mass index is 26.83 kg/m .  Wt Readings from Last 3 Encounters:   07/01/22 84.8 kg (187 lb)   06/05/22 85.7 kg (189 lb)   05/04/22 85.7 kg (189 lb)       General Appearance:   Alert, cooperative and in no acute distress   ENT/Mouth: membranes moist, no oral lesions or bleeding gums.      EYES:  no scleral icterus, normal conjunctivae   Neck: Thyroid not visualized   Chest/Lungs:   lungs are clear to auscultation, no rales or wheezing   Cardiovascular:   Regular . Normal first and second heart sounds with no murmurs, rubs or gallops; the carotid, radial and posterior tibial pulses are intact, 1-2+ edema bilaterally    Abdomen:  Soft and nontender. Bowel sounds are present in all quadrants   Extremities: no cyanosis or clubbing   Skin: no xanthelasma, warm.    Neurologic: normal gait, normal  bilateral, no tremors   Psychiatric: Normal mood and affect       Please refer above for cardiac ROS details.      Medical History  Surgical " History Family History Social History   Past Medical History:   Diagnosis Date     Atrial fibrillation (H)      Atrial fibrillation, transient (H) 8/11/2020     BPH (benign prostatic hyperplasia) 7/11/2018     Chronic combined systolic and diastolic heart failure (H) 2/11/2021     Congestive heart failure (H)      Coronary artery disease      COVID 02/2021     Dilated cardiomyopathy (H) 2/11/2021     MARIAN (generalized anxiety disorder) 2/11/2021     GERD (gastroesophageal reflux disease)      High cholesterol      Hypertension      LBBB (left bundle branch block) 7/11/2018     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     Pulmonary hypertension (H) 4/1/2020     Past Surgical History:   Procedure Laterality Date     ANGIOPLASTY       ARTHROSCOPY KNEE       CARDIOVERSION  2021     CARDIOVERSION  02/03/2022     CV CORONARY ANGIOGRAM N/A 10/23/2020    Procedure: Coronary Angiogram;  Surgeon: Varun Freire MD;  Location: [facility] Lab;  Service: Cardiology     CV LEFT HEART CATHETERIZATION WITHOUT LEFT VENTRICULOGRAM Left 10/23/2020    Procedure: Left Heart Catheterization Without Left Ventriculogram;  Surgeon: Varun Freire MD;  Location: [facility];  Service: Cardiology     EP BIV PACEMAKER INSERT N/A 3/25/2020    Procedure: EP Biventricular Pacemaker Insertion;  Surgeon: Taylor Sandoval MD;  Location: [facility];  Service: Cardiology     RELEASE CARPAL TUNNEL       ZZC TOTAL KNEE ARTHROPLASTY Right 8/15/2019    Procedure: RIGHT  MINIMALLY TOTAL KNEE ARTHROPLASTY;  Surgeon: Keven eCrda MD;  Location: [facility];  Service: Orthopedics     Family History   Problem Relation Age of Onset     Heart Disease Father      Diabetes Type 2  Sister      Alzheimer Disease Mother      Chronic Obstructive Pulmonary Disease Brother     Social History     Socioeconomic History     Marital status:      Spouse name: Not on file     Number of children: Not on file      Years of education: Not on file     Highest education level: Not on file   Occupational History     Not on file   Tobacco Use     Smoking status: Never Smoker     Smokeless tobacco: Never Used   Substance and Sexual Activity     Alcohol use: Yes     Comment: Alcoholic Drinks/day: rare     Drug use: No     Sexual activity: Not on file   Other Topics Concern     Parent/sibling w/ CABG, MI or angioplasty before 65F 55M? Not Asked   Social History Narrative     Not on file     Social Determinants of Health     Financial Resource Strain: Not on file   Food Insecurity: Not on file   Transportation Needs: Not on file   Physical Activity: Not on file   Stress: Not on file   Social Connections: Not on file   Intimate Partner Violence: Not on file   Housing Stability: Not on file          Medications  Allergies   Current Outpatient Medications   Medication Sig Dispense Refill     acetaminophen (TYLENOL) 500 MG tablet Take 500 mg by mouth as needed for mild pain       amiodarone (PACERONE) 200 MG tablet Take 1 tablet (200 mg) by mouth every other day 45 tablet 0     aspirin (ASA) 81 MG chewable tablet Take 1 tablet (81 mg) by mouth every other day (Patient taking differently: Take 81 mg by mouth every other day Taking Monday, Wednesday and Fridays) 30 tablet 0     atorvastatin (LIPITOR) 10 MG tablet Take 10 mg by mouth every other day Even days, bedtime       coenzyme Q-10 200 MG CAPS capsule Take 200 mg by mouth daily       doxazosin (CARDURA) 8 MG tablet [DOXAZOSIN (CARDURA) 8 MG TABLET] Take 4 mg by mouth at bedtime.              finasteride (PROSCAR) 5 mg tablet [FINASTERIDE (PROSCAR) 5 MG TABLET] Take 5 mg by mouth at bedtime.              furosemide (LASIX) 40 MG tablet Take 2 tablets (80 mg) by mouth daily Take extra dose if weight gain, leg swelling, or short of breath 100 tablet 3     gabapentin (NEURONTIN) 100 MG capsule Two capsules in the morning, two capsules in the afternoon, and three capsules in the evening        glucosamine/chondr cole A sod (OSTEO BI-FLEX ORAL) [GLUCOSAMINE/CHONDR COLE A SOD (OSTEO BI-FLEX ORAL)] Take 1 tablet by mouth 2 (two) times a day.              losartan (COZAAR) 25 MG tablet [LOSARTAN (COZAAR) 25 MG TABLET] Take 1 tablet (25 mg total) by mouth daily. 30 tablet 0     melatonin 10 mg Tab Take 10 mg by mouth At Bedtime        metoprolol succinate ER (TOPROL-XL) 25 MG 24 hr tablet Take 1 tablet (25 mg) by mouth At Bedtime 90 tablet 3     multivitamin, therapeutic (THERA-VIT) TABS tablet Take 1 tablet by mouth daily       omeprazole (PRILOSEC) 20 MG capsule [OMEPRAZOLE (PRILOSEC) 20 MG CAPSULE] Take 20 mg by mouth 2 (two) times a day before meals.       potassium chloride (KLOR-CON) 20 MEQ packet Take 20 mEq by mouth daily 90 packet 3     rivaroxaban ANTICOAGULANT (XARELTO) 20 MG TABS tablet Take 1 tablet (20 mg) by mouth daily 90 tablet 3     sertraline (ZOLOFT) 50 MG tablet Take 50 mg by mouth 2 times daily        vit A/vit C/vit E/zinc/copper (PRESERVISION AREDS ORAL) [VIT A/VIT C/VIT E/ZINC/COPPER (PRESERVISION AREDS ORAL)] Take 1 tablet by mouth 2 (two) times a day.       diphenhydrAMINE-acetaminophen (TYLENOL PM)  MG tablet Take 1 tablet by mouth nightly as needed for sleep (Patient not taking: Reported on 7/1/2022)      Allergies   Allergen Reactions     No Known Allergies          Lab Results    Chemistry/lipid CBC Cardiac Enzymes/BNP/TSH/INR   Recent Labs   Lab Test 06/23/22  1337   CHOL 107   HDL 38*   LDL 62   TRIG 35     Recent Labs   Lab Test 06/23/22  1337 10/23/20  0918 05/16/19  0950   LDL 62 75 89     Recent Labs   Lab Test 06/23/22  1337      POTASSIUM 3.8   CHLORIDE 100   CO2 32*      BUN 19   CR 1.19   GFRESTIMATED 61   ANGELY 8.9     Recent Labs   Lab Test 06/23/22  1337 06/20/22  1310 06/17/22  1446   CR 1.19 1.18 1.10     No results for input(s): A1C in the last 26105 hours. Recent Labs   Lab Test 05/18/22  0943 03/14/22  1454   WBC  --  6.3   HGB 11.2* 11.6*    HCT  --  36.2*   MCV  --  96   PLT  --  173     Recent Labs   Lab Test 05/18/22  0943 03/14/22  1454 01/18/22  1526   HGB 11.2* 11.6* 13.1*    Recent Labs   Lab Test 03/14/22  2055 03/14/22  1454 02/11/21  1724   TROPONINI 0.09 0.07 0.14     Recent Labs   Lab Test 06/17/22  1446 03/14/22  1454 01/18/22  1526   * 1,163* 661*     Recent Labs   Lab Test 03/14/22  1454   TSH 7.62*     Recent Labs   Lab Test 02/11/21  1724 03/24/20  1745 08/15/19  0811   INR 1.03 1.05 0.98        49 minutes spent on the date of encounter doing education, chart prep/review, review of test results, patient visit, documentation and discussion with family.      This note has been dictated using voice recognition software. Any grammatical or context distortions are unintentional and inherent to the software.            Thank you for allowing me to participate in the care of your patient.      Sincerely,     Barb Guardado PA-C     [facility]  cc:   No referring provider defined for this encounter.

## 2022-07-01 NOTE — Clinical Note
Dr. Michelle,  This gentleman met with you in April.  He is ready to proceed with ablation as a way to get off Amio, as his ophthalmologist told him that he is seeing some deposits, likely from the Amiodarone.    Recommend doing the ablation first and then proceeding with Watchman after, unless you say otherwise.   From what I can tell he has maintained his sinus since CV on March 8.  Next remote device check in end of July   Barb

## 2022-07-01 NOTE — PATIENT INSTRUCTIONS
Reymundo Petersen,    It was a pleasure to see you today in the clinic regarding your interest in the Watchman device.     My recommendations after this visit include:     - schedule ablation  - you will need CT pulm vein in prep for the ablation and we can measure your left atrial appendage from that test to know whether your body will fit one of the device sizes.   - I'll let Dr. Michelle know you'd like to move forward with ablation and make sure we should do the ablation first, rather than the watchman      If you have questions or concerns, please call using the numbers below:    Watchman program coordinators:    Rama Garcia RN  [phone]               OR  Yoko Bedoya RN  [phone]

## 2022-07-05 NOTE — TELEPHONE ENCOUNTER
Ole Michelle MD Heckman, Christa, PA-C; P [facility] Ep Support Pool - [facility]  Understood - thanks Barb.  We can offer PVI (general anesthesia, continue rivaroxaban, hold amiodarone 7 days prior with plan to resume post ablation for at least 6 weeks) - if he would like to reconnect in clinic to discuss prior, I would be happy to do that as well.     Thank you!   Roland             Previous Messages       ----- Message -----   From: Barb Guardado PA-C   Sent: 7/1/2022   4:21 PM CDT   To: Ole Michelle MD, *     Dr. Michelle,     This gentleman met with you in April.  He is ready to proceed with ablation as a way to get off Amio, as his ophthalmologist told him that he is seeing some deposits, likely from the Amiodarone.       Recommend doing the ablation first and then proceeding with Watchman after, unless you say otherwise.     From what I can tell he has maintained his sinus since  on March 8.  Next remote device check in end of July     Barb

## 2022-07-05 NOTE — PROGRESS NOTES
H&P []  Date: Teach []  Date: PVI  Clinic N [x]  Y [] RAFAL N [x] Y[]  Order [] CT  MRI N [x] Y[]  Order []   PVI  Order Case Req []  Order Set [] Lab/EKG   []  Orders Letter [] F/U RN [] Date:  Order NP follow-up [] Date:     COVID FV/EPIC []  Date:    Home [] AC Eliquis []   Xarelot [x]  Pradaxa []  Warfarin [] Start []  Cont [x]  Switch [] to:   INR Reminder EP [] & INR Msg to AC team []     AAD   Amiodarone- hold 7 days prior w/ plan to restart afterwards for 6 weeks    Hold x3 days []  Continue [] PPI Protonix 40mg QD [] 3 days, x 6wks  Pepcid 20mg BID []  Omeprazole 40mg QD [x]  Continue current PPI []  Increase [] :       1940  Home:[phone] (home) Cell:[phone] (mobile)  Emergency Contact: Juliet Call [phone]  PCP: Jamie Noguera, [phone]    Important patient information for CSC/Cath Lab staff : PT WILL BE OVERNIGHT    [facility] EP Cath Lab Procedure Order   Ablation Type:  PVI- Atrial Fibrillation  Diagnosis:  AF  Ordering Provider: Dr Michelle  Ordering Date: 7/5/2022    Scheduling Information:  Anticipated Case Duration:  Dr Michelle 3:1 Day   Scheduling Timeframe:  Next Available  Clinic Arrangements prior to PVI: Schedule pt directly for PVI procedure with designated MD listed below, pt to see EP NP only for H&P and EP RN for education prior  Scheduling Restrictions: Will need to hold antiarrhythmic 7 days prior to procedure-schedule accordingly  Scheduling Contact: Please contact pt to schedule, if you are unable to schedule date within the next 24 hours please contact pt to update on scheduling process  EP RN Follow Up Apt: Schedule telephone visit for post op follow up 3-4 days after abaltion    MD Preference: Dr Michelle    Current Device: BIV Pacemaker  Device Company/Device Rep Needed for Procedure: [employer]    Pre-Procedural Testing needed: PCR COVID-Overnight Stay and BMP, CBC with Plt, and Type and Screen AM of Case  Mapping System Required:  Carto  ICE Needed:   Yes  Anesthesia:  General-Whole Case    [facility] EP Cath Lab Prep   H&P:  Schedule apt with EP NP to complete within 1 wk of scheduled PVI    Pre-op Labs: Ordered AM of procedure    Medical Records Pertinent for Procedure:  Echo 7/13/22 EF 30-35%  Important Past Medical Hx/Diagnosis: CHADS VASC 5, CAD and HTN   Most Recent Lab Results: BMP- Within Acceptable Limits for Procedure Heme- Within Acceptable Limits for Procedure    Patient Education:  Teach with Patient: Teach with pt will be completed same day as pre-op H&P-see additional clinic note    Risks Reviewed:   Pulmonary Vein/AF/Radiofrequency Ablation  In addition to standard risks for Radiofrequency Ablation, there is:    <2% for significant pulmonary vein stenosis    <2% risk for embolic events    <1% risk for esophageal fistula    <1% risk death      Pulmonary Vein Isolation / Cryoablation Risks:    1-2% risk for phrenic nerve paralysis    <1% risk for pulmonary vein stenosis    Risk of esophageal irritation with no incidence of atrial-esophageal fistula    Rare cryoballoon rupture    <1% risk death       Cardiac Catheter Ablation    <1% risk for the following: hypotension, hemorrhage, vascular injury including perforation of vein, artery or heart, thrombophlebitis, systemic or pulmonic emboli; cardiac perforation, (tamponade), infection, pneumothorax, arrhythmias, proarrhythmic effects of drugs, radiation exposure.    1-2% complete heart block (for AVNRT or septol accessory pathway).    <0.5% CVA or MI    <0.1% death    If external defibrillation is needed, 75% risk for superficial burn.    1-2% tamponade and aortic puncture with left sided transeptal approach for left side GAIL - increase risk of CVA to <2%.    Late arrhythmia recurrences depends upon the primary rhythm disturbance.      Pre-Procedure Instructions:  NPO after midnight, Remove all jewelry prior to coming in for procedure, Shower prior to arrival, Notified patient of time and date of procedure by  CV , Transportation arrangements needed s/p procedure, Post-procedure follow up process, Sedation plan/orders and Pre-procedure letter was sent to pt    Pre-Procedure Medication Instructions:  Instructions given to pt regarding anticoagulants: Xarelto- instructed to continue anticoagulation uninterrupted through their procedure  Instructions given to pt regarding antiarrhythmic medication: Amiodarone; Pt instructed to hold 7 days prior to procedure  Instructions given to pt regarding PPI medication:continue current Omeprazole 20mg BID dose  Instructions for medication, other than anticoagulants and antiarrhythmics listed above, given to pt: to hold all morning medications   Allergies: Reviewed allergies, no concerns regarding orders for procedure  Allergies   Allergen Reactions     No Known Allergies        Current Outpatient Medications:      acetaminophen (TYLENOL) 500 MG tablet, Take 500 mg by mouth as needed for mild pain, Disp: , Rfl:      amiodarone (PACERONE) 200 MG tablet, Take 1 tablet (200 mg) by mouth every other day, Disp: 45 tablet, Rfl: 0     aspirin (ASA) 81 MG chewable tablet, Take 1 tablet (81 mg) by mouth every other day (Patient taking differently: Take 81 mg by mouth every other day Taking Monday, Wednesday and Fridays), Disp: 30 tablet, Rfl: 0     atorvastatin (LIPITOR) 10 MG tablet, Take 10 mg by mouth every other day Even days, bedtime, Disp: , Rfl:      coenzyme Q-10 200 MG CAPS capsule, Take 200 mg by mouth daily, Disp: , Rfl:      diphenhydrAMINE-acetaminophen (TYLENOL PM)  MG tablet, Take 1 tablet by mouth nightly as needed for sleep (Patient not taking: Reported on 7/1/2022), Disp: , Rfl:      doxazosin (CARDURA) 8 MG tablet, [DOXAZOSIN (CARDURA) 8 MG TABLET] Take 4 mg by mouth at bedtime.       , Disp: , Rfl:      finasteride (PROSCAR) 5 mg tablet, [FINASTERIDE (PROSCAR) 5 MG TABLET] Take 5 mg by mouth at bedtime.       , Disp: , Rfl:      furosemide (LASIX) 40 MG tablet,  Take 2 tablets (80 mg) by mouth daily Take extra dose if weight gain, leg swelling, or short of breath, Disp: 100 tablet, Rfl: 3     gabapentin (NEURONTIN) 100 MG capsule, Two capsules in the morning, two capsules in the afternoon, and three capsules in the evening, Disp: , Rfl:      glucosamine/chondr cole A sod (OSTEO BI-FLEX ORAL), [GLUCOSAMINE/CHONDR COLE A SOD (OSTEO BI-FLEX ORAL)] Take 1 tablet by mouth 2 (two) times a day.       , Disp: , Rfl:      losartan (COZAAR) 25 MG tablet, [LOSARTAN (COZAAR) 25 MG TABLET] Take 1 tablet (25 mg total) by mouth daily., Disp: 30 tablet, Rfl: 0     melatonin 10 mg Tab, Take 10 mg by mouth At Bedtime , Disp: , Rfl:      metoprolol succinate ER (TOPROL-XL) 25 MG 24 hr tablet, Take 1 tablet (25 mg) by mouth At Bedtime, Disp: 90 tablet, Rfl: 3     multivitamin, therapeutic (THERA-VIT) TABS tablet, Take 1 tablet by mouth daily, Disp: , Rfl:      omeprazole (PRILOSEC) 20 MG capsule, [OMEPRAZOLE (PRILOSEC) 20 MG CAPSULE] Take 20 mg by mouth 2 (two) times a day before meals., Disp: , Rfl:      potassium chloride (KLOR-CON) 20 MEQ packet, Take 20 mEq by mouth daily, Disp: 90 packet, Rfl: 3     rivaroxaban ANTICOAGULANT (XARELTO) 20 MG TABS tablet, Take 1 tablet (20 mg) by mouth daily, Disp: 90 tablet, Rfl: 3     sertraline (ZOLOFT) 50 MG tablet, Take 50 mg by mouth 2 times daily , Disp: , Rfl:      vit A/vit C/vit E/zinc/copper (PRESERVISION AREDS ORAL), [VIT A/VIT C/VIT E/ZINC/COPPER (PRESERVISION AREDS ORAL)] Take 1 tablet by mouth 2 (two) times a day., Disp: , Rfl:     Documentation Date:7/5/2022 1:37 PM  Mary Jo Smith RN

## 2022-07-13 NOTE — TELEPHONE ENCOUNTER
Spent >50 mins on the phone with Juliet regarding pts need for hip/knee surgery or injections, PVI ablation and also watchman.     She was looking for a direct answer regarding what the pt should have done first.  Discussed that per Barb Debby's last office visit she should have PVI done prior to Watchman.  Pt daughter stated that she is in Facebook discussion groups regard Watchman and that was the majority of what people did as well.     She is concerned that the pts back and hip pain will not allow him to lay down during the PVI procedure and for the waiting period after procedure.  Discussed that there are ways the inpatient staff can help alleviate pain with repositioning and also pain medication if needed.  Discussed with pt that they could contact spine specialist to see if they would be able to do the injections prior to PVI so that would also help with pain management, they stated that this is unlikely able to happen due to the amount of time it takes to get in but she will call and see what they think.     Juliet stated that the pt has a device check scheduled for later this month and they will await results of this to decide if they do the hip/knee surgery prior to PVI procedure.  She stated they want to do it once the pt is off of the blood thinner but due to his pain, he may not be able to wait that long.    Juliet stated their main concern right now is bleeding, the pt keeps bumping into things and recently had an urgent care visit due to a cut on his arm that was still bleeding a few days later.    Discussed that we have the pt prepped for PVI and are awaiting available dates in the new cath lab at [place].  Discussed that we are looking at likely August/September timeframe.    Juliet was encouraged to talk with the pt to see what is bothering him the most, pain from hip/knee, or amiodarone/AFIB.  She states right now she's leaning towards PVI so then they can do the watchman.    Juliet states  understanding.  Will await call from scheduling to schedule ablation. At this time all of her questions were answered.     Mary Jo

## 2022-07-13 NOTE — TELEPHONE ENCOUNTER
[facility] Call Center    Phone Message    May a detailed message be left on voicemail: yes     Reason for Call: Other: Juliet called today with some questions regarding the patients Afib. Juliet states they were advised to get an ablation done. Please call Juliet back to discuss further, thank you.    Action Taken: Message routed to:  Other: Cardiology    Travel Screening: Not Applicable

## 2022-07-14 NOTE — TELEPHONE ENCOUNTER
Noted.  7/14/2022 3:41 PM  DIAMOND Solis Pakou P [facility]  Caller: Unspecified (1 week ago)  Hello,     I spoke to this pt's dtr Juliet today and tried to set up pt's ablation, but she states he will be seeing the spine doctor on 7/28 due to back issues. She states pt cannot lay down for more than 20 min without having back pain. After the spine appt, Juliet will update me if pt has decided to go for his ablation or not. She also requested for an early date in Sept to be held in case pt does agree. I've held the third and last PVI slot on 9/6 for this pt and will follow up after 7/28 if I don't hear back from them. Let me know if you guys have any questions.     Thanks!   -Will

## 2022-07-22 NOTE — TELEPHONE ENCOUNTER
[facility] Call Center    Phone Message    May a detailed message be left on voicemail: yes     Reason for Call: Medication Question or concern regarding medication   Prescription Clarification  Name of Medication: amiodarone (PACERONE) 200 MG tablet  Prescribing Provider: Dr Leigh   Pharmacy: N/A   What on the order needs clarification?    Reymundo's daughter Juliet called concerned because he is having vision issues. She stated that in the morning he is having blurry vision and the other day they noticed that he isn't seeing colors correctly. She is hoping that he can ween off the amiodarone medication. She would like more information on getting him off this med and to discuss the vision concerns. Please give Juliet a call back at [phone] to discuss further. Thank you!    Action Taken: Other: Cardiology    Travel Screening: Not Applicable

## 2022-07-27 NOTE — TELEPHONE ENCOUNTER
Noted message. Writer out of the clinic until this day.        Of note, the plan was to get Don off Amiodarone by moving forward with ablation. This is why it is at 200 mg every other day due to visual changes. See telephone encounter from EP team where this was addressed [place] [place]

## 2022-07-27 NOTE — TELEPHONE ENCOUNTER
[facility] Call Center    Phone Message    May a detailed message be left on voicemail: yes     Reason for Call: Other: Please call patients daughter Juliet to discuss questions she has regarding her fathers ablation along with another possible procedures he may have with orthopedics.     Patient has an ablation scheduled for 9/7/22.     Patient has an appointment with orthopedics on  7/28/22 to discuss his hip pain. And scheduling a knee replacement in near future 8/3/22.    Juliet would like to discuss recovery time of the ablation so she know when to schedule the other procedures her father will be having.    Please call Juliet to discuss. She is hoping to have a call back before his appointment tomorrow.    Action Taken: Other: Cardiology    Travel Screening: Not Applicable

## 2022-07-27 NOTE — TELEPHONE ENCOUNTER
Spoke to pt daughter regarding timeframes laid out below. Discussed that for PVI the requirement will be no missed doses of AC for 30 days prior and 3 months post procedure. Typically for hip replacement and knee replacement, they would require hold of his AC.    Spent time discussing multiple other questions about the urgency for PVI, knee replacement, hip replacement, watchman.  Discussed this is ultimately the pts decision.    If he is tolerating amiodarone we can postpone PVI, but daughter states he is not tolerating the medication well.    They plan to keep PVI as scheduled and will call with further questions of any arise.    Mary Jo

## 2022-08-15 NOTE — TELEPHONE ENCOUNTER
Pc back to patient's daughter Juliet.  Reviewed that since we don't anticipate pt staying the night, home covid testing is appropriate.  JW

## 2022-08-15 NOTE — TELEPHONE ENCOUNTER
[facility] Call Center    Phone Message    May a detailed message be left on voicemail: yes     Reason for Call: Other: The patient is having an ablation done 9/7 and they want to know if the patient should have a PCR Covid test on site at one of the facilities in case he ends having to stay overnight  from Procedure? Please call PT's Daughter Julite to discuss     Action Taken: Other: Cardiology    Travel Screening: Not Applicable

## 2022-08-25 PROBLEM — I44.2 COMPLETE HEART BLOCK (H): Status: RESOLVED | Noted: 2020-03-24 | Resolved: 2022-01-01

## 2022-08-25 PROBLEM — I44.7 LBBB (LEFT BUNDLE BRANCH BLOCK): Status: RESOLVED | Noted: 2018-07-11 | Resolved: 2022-01-01

## 2022-08-25 PROBLEM — R00.1 BRADYCARDIA: Status: RESOLVED | Noted: 2018-07-11 | Resolved: 2022-01-01

## 2022-08-25 NOTE — PROGRESS NOTES
Thank you, Dr. Noguera, for asking the [facility] team to see . Reymundo Petersen to evaluate persistent atrial fibrillation.    Assessment/Recommendations     Assessment/Plan:    Diagnoses and all orders for this visit:  Persistent atrial fibrillation (H) and no A. fib on last 2 device checks.  He appears to be having good control of A. fib on amiodarone 200 mg every other day.  To stop amiodarone on August 30.  Discussed today that he will likely stay off of amiodarone post ablation.  I quoted him 70% success rate with ablation procedure.  I discussed risk of repeat ablation.   I reviewed what to expect with ablation, postprocedure recovery and signs and symptoms to watch for and call us with post ablation.  See AVS for more details.  Not placed on PPI protocol as he is on omeprazole 20 mg orally twice daily.  Dilated cardiomyopathy (H) with recent EF 30 to 35% on echo.  Acute combined systolic and diastolic congestive heart failure (H) and has pitting edema on bilateral lower legs, but not in his ankles or feet today.  He takes furosemide 80 mg orally every day and encouraged him to take an extra dose today as retaining fluid.  Fluid retention is new per daughter.  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  He has complete heart block with good BiV pacing.      [id number] score of 5 and on Xarelto.  His daughter  is interested in her dad getting the watchman as easy bruising on combination of Xarelto and aspirin.  Aspirin dose has been decreased to 81 mg Monday Wednesday Friday by Dr. Leigh per daughter.  He has a lower EF, but approved for candidacy by Barb DEL TORO on 7/1/22.  Will need to revisit candidacy with reassessment of EF.  Follow up in A. fib clinic in 6 weeks after ablation with remote device check prior to visit.     History of Present Illness/Subjective     Reymundo Petersen is a very  josephine 81 year old male who comes in today for history and physical prior to pulmonary vein isolation ablation with Dr. Michelle on September 7.  Reymundo Petersen has a known history of persistent atrial fibrillation, chronic systolic and diastolic heart failure due to nonischemic cardiomyopathy with severe concentric LV thickening (LVEF 41% by 2/10/2022 MPI), CHB, CRT-P in-situ, CAD with prior RCA PCI, HTN and CKD stage 2.    He has complete heart block and good BiV pacing.  He has been on amiodarone and had cardioversion on March 8. He has been in normal sinus rhythm since that time.  He did see his eye doctor recently because his eyes have gotten worse and eye doctor told him that he does have some deposits at the back of his eye, likely due to the amiodarone.  Ophthalmology says this is not dangerous at this time, but has encouraged him to come off the amiodarone at some point.  Pulmonary function test showed borderline function, but no obvious fibrosis.  Ramirez comes to this visit with his daughter.  They have a list of questions primarily relating to ablation and medications.  They were all answered to his satisfaction.  Ramirez is not having any side effects on amiodarone that he is aware of.  He has knee and hip pain and to discuss with  Cath Lab nurses at the time of his procedures, so he has gel pads placed.  His daughter got a narcotic prescription from primary provider for use post procedure if needed.  He is frail but walking independently and participates in discussion.  This visit will serve as history and physical for ablation.  See problem list for more details.  Medical, past medical, surgical and social history reviewed and updated.  Meds and allergies reviewed.   No personal or family history of adverse reactions to anesthesia or abnormal bleeding with surgery.    Cardiographics (reviewed):  NM MPI results (from Feb 2022):  The nuclear stress test is probably negative for inducible myocardial ischemia  or infarction. Cannot exclude a small non-transmural apical inferior infarct but it is likely diaphragmatic attenuation     The left ventricular ejection fraction at stress is 41% without focal wall motion abnormality.     A prior study was conducted on 10/7/2020.  Prior images were unavailable for comparison review, but the report sounds similar to what is seen today, except that TID is no longer present.     Echo from June 13, 2022:  Interpretation Summary     The left ventricle is mildly dilated.  Left ventricular function is decreased. The ejection fraction is 30-35%  (moderately reduced).  There is moderate global hypokinesia of the left ventricle.  The right ventricle is mildly dilated.  The right ventricular systolic function is normal.  The left atrium is severely dilated.  The right atrium is moderately dilated.  There is moderately severe (3+) tricuspid regurgitation.  Right ventricle systolic pressure estimate normal  There is mild to moderate (1-2+) aortic regurgitation.  The ascending aorta is Mildly dilated.  IVC diameter >2.1 cm collapsing <50% with sniff suggests a high RA pressure  estimated at 15 mmHg or greater.  Small pericardial effusion  There are no echocardiographic indications of cardiac tamponade.  Cardiac testing personally reviewed:  Remote device check yesterday shows leads stable and battery ok.  Device functioning appropriately.   Atrial pacing 93% and BiVentricular pacing 99.7%.  AT/AFib burden less than 1 % and mode switches could be due to noise on atrial lead. No ventr arrhythmias.    Twelve-lead ECG today shows a paced V paced rhythm at 67 and wide QRS.  QTC of 481 ms       Problem List:  Patient Active Problem List   Diagnosis     Primary osteoarthritis of knees, bilateral     Primary hypertension     BPH (benign prostatic hyperplasia)     Combined hyperlipidemia     Dizziness     DJD (degenerative joint disease)     Left ventricular hypertrophy     Troponin level elevated      Acute combined systolic and diastolic congestive heart failure (H)     Pulmonary hypertension (H)     Nonrheumatic aortic valve insufficiency     Coronary artery disease due to lipid rich plaque     MARIAN (generalized anxiety disorder)     GERD (gastroesophageal reflux disease)     Infection due to 2019 novel coronavirus     Dilated cardiomyopathy (H)     Persistent atrial fibrillation (H)     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  There is no height or weight on file to calculate BMI.  Wt Readings from Last 3 Encounters:   07/01/22 84.8 kg (187 lb)   06/05/22 85.7 kg (189 lb)   05/04/22 85.7 kg (189 lb)     General Appearance:   Alert, well-appearing and in no acute distress.   HEENT: Atraumatic, normocephalic.  No scleral icterus, normal conjunctivae; mucous membranes pink and moist.     Chest: Chest symmetric, spine straight.   Lungs:   Respirations unlabored: Lungs are clear to auscultation.   Cardiovascular:   Normal first and second heart sounds with no murmurs, rubs, or gallops.  Regular, regular.   Normal JVD, 3+ bilateral lower extremity edema.       Extremities: No cyanosis or clubbing   Musculoskeletal: Moves all extremities   Skin: Warm, dry, intact.    Neurologic: Mood and affect are appropriate, alert and oriented to person, place, time, and situation     ROS: 10 point ROS neg other than the symptoms noted above in the HPI.     Medical History  Surgical History Family History Social History     Past Medical History:   Diagnosis Date     Atrial fibrillation (H)      Atrial fibrillation, transient (H) 8/11/2020     BPH (benign prostatic hyperplasia) 7/11/2018     Chronic combined systolic and diastolic heart failure (H) 2/11/2021     Congestive heart failure  (H)      Coronary artery disease      COVID 02/2021     Dilated cardiomyopathy (H) 2/11/2021     MARIAN (generalized anxiety disorder) 2/11/2021     GERD (gastroesophageal reflux disease)      High cholesterol      Hypertension      LBBB (left bundle branch block) 7/11/2018     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     Pulmonary hypertension (H) 4/1/2020    Past Surgical History:   Procedure Laterality Date     ANGIOPLASTY       ARTHROSCOPY KNEE       CARDIOVERSION  2021     CARDIOVERSION  02/03/2022     CV CORONARY ANGIOGRAM N/A 10/23/2020    Procedure: Coronary Angiogram;  Surgeon: Varun Freire MD;  Location: [facility];  Service: Cardiology     CV LEFT HEART CATHETERIZATION WITHOUT LEFT VENTRICULOGRAM Left 10/23/2020    Procedure: Left Heart Catheterization Without Left Ventriculogram;  Surgeon: Varun Freire MD;  Location: [facility];  Service: Cardiology     EP BIV PACEMAKER INSERT N/A 3/25/2020    Procedure: EP Biventricular Pacemaker Insertion;  Surgeon: Taylor Sandoval MD;  Location: [facility];  Service: Cardiology     RELEASE CARPAL TUNNEL       ZZC TOTAL KNEE ARTHROPLASTY Right 8/15/2019    Procedure: RIGHT  MINIMALLY TOTAL KNEE ARTHROPLASTY;  Surgeon: Keven Cerda MD;  Location: [facility];  Service: Orthopedics    Family History   Problem Relation Age of Onset     Heart Disease Father      Diabetes Type 2  Sister      Alzheimer Disease Mother      Chronic Obstructive Pulmonary Disease Brother     History   Smoking Status     Never Smoker   Smokeless Tobacco     Never Used     Social History    Substance and Sexual Activity      Alcohol use: Yes        Comment: Alcoholic Drinks/day: rare       Medications  Allergies     Current Outpatient Medications   Medication Sig Dispense Refill     acetaminophen (TYLENOL) 500 MG tablet Take 500 mg by mouth as needed for mild pain       amiodarone (PACERONE) 200 MG tablet Take 1 tablet (200 mg) by  mouth every other day 45 tablet 0     aspirin (ASA) 81 MG chewable tablet Take 1 tablet (81 mg) by mouth every other day (Patient taking differently: Take 81 mg by mouth every other day Taking Monday, Wednesday and Fridays) 30 tablet 0     atorvastatin (LIPITOR) 10 MG tablet Take 10 mg by mouth every other day Even days, bedtime       coenzyme Q-10 200 MG CAPS capsule Take 200 mg by mouth daily       diphenhydrAMINE-acetaminophen (TYLENOL PM)  MG tablet Take 1 tablet by mouth nightly as needed for sleep (Patient not taking: Reported on 7/1/2022)       doxazosin (CARDURA) 8 MG tablet [DOXAZOSIN (CARDURA) 8 MG TABLET] Take 4 mg by mouth at bedtime.              finasteride (PROSCAR) 5 mg tablet [FINASTERIDE (PROSCAR) 5 MG TABLET] Take 5 mg by mouth at bedtime.              furosemide (LASIX) 40 MG tablet Take 2 tablets (80 mg) by mouth daily Take extra dose if weight gain, leg swelling, or short of breath 100 tablet 3     gabapentin (NEURONTIN) 100 MG capsule Two capsules in the morning, two capsules in the afternoon, and three capsules in the evening       glucosamine/chondr cole A sod (OSTEO BI-FLEX ORAL) [GLUCOSAMINE/CHONDR COLE A SOD (OSTEO BI-FLEX ORAL)] Take 1 tablet by mouth 2 (two) times a day.              losartan (COZAAR) 25 MG tablet [LOSARTAN (COZAAR) 25 MG TABLET] Take 1 tablet (25 mg total) by mouth daily. 30 tablet 0     melatonin 10 mg Tab Take 10 mg by mouth At Bedtime        metoprolol succinate ER (TOPROL-XL) 25 MG 24 hr tablet Take 1 tablet (25 mg) by mouth At Bedtime 90 tablet 3     multivitamin, therapeutic (THERA-VIT) TABS tablet Take 1 tablet by mouth daily       omeprazole (PRILOSEC) 20 MG capsule [OMEPRAZOLE (PRILOSEC) 20 MG CAPSULE] Take 20 mg by mouth 2 (two) times a day before meals.       potassium chloride (KLOR-CON) 20 MEQ packet Take 20 mEq by mouth daily 90 packet 3     rivaroxaban ANTICOAGULANT (XARELTO) 20 MG TABS tablet Take 1 tablet (20 mg) by mouth daily 90 tablet 3      sertraline (ZOLOFT) 50 MG tablet Take 50 mg by mouth 2 times daily        vit A/vit C/vit E/zinc/copper (PRESERVISION AREDS ORAL) [VIT A/VIT C/VIT E/ZINC/COPPER (PRESERVISION AREDS ORAL)] Take 1 tablet by mouth 2 (two) times a day.        Allergies   Allergen Reactions     No Known Allergies       Medical, surgical, family, social history, and medications were all reviewed and updated as necessary.   Lab Results    Chemistry/lipid CBC Cardiac Enzymes/BNP/TSH/INR   Recent Labs   Lab Test 06/23/22  1337   CHOL 107   HDL 38*   LDL 62   TRIG 35     Recent Labs   Lab Test 06/23/22  1337 10/23/20  0918 05/16/19  0950   LDL 62 75 89     Recent Labs   Lab Test 06/23/22  1337      POTASSIUM 3.8   CHLORIDE 100   CO2 32*      BUN 19   CR 1.19   GFRESTIMATED 61   ANGELY 8.9     Recent Labs   Lab Test 06/23/22  1337 06/20/22  1310 06/17/22  1446   CR 1.19 1.18 1.10     No results for input(s): A1C in the last 67508 hours.       Recent Labs   Lab Test 05/18/22  0943 03/14/22  1454   WBC  --  6.3   HGB 11.2* 11.6*   HCT  --  36.2*   MCV  --  96   PLT  --  173     Recent Labs   Lab Test 05/18/22  0943 03/14/22  1454 01/18/22  1526   HGB 11.2* 11.6* 13.1*    Recent Labs   Lab Test 03/14/22  2055 03/14/22  1454 02/11/21  1724   TROPONINI 0.09 0.07 0.14     Recent Labs   Lab Test 06/17/22  1446 03/14/22  1454 01/18/22  1526   * 1,163* 661*     Recent Labs   Lab Test 03/14/22  1454   TSH 7.62*     Recent Labs   Lab Test 02/11/21  1724 03/24/20  1745 08/15/19  0811   INR 1.03 1.05 0.98          Total Time- 57 minutes spent on date of encounter doing chart review, history and exam, documentation and further activities as noted above.  This note has been dictated using voice recognition software. Any grammatical, typographical, or context distortions are unintentional and inherent to the software.

## 2022-08-25 NOTE — PATIENT INSTRUCTIONS
Reymundo Petersen,    It was a pleasure to see you today at the [facility].     My recommendations after this visit include:    No meds the morning of your ablation.    To stop amiodarone starting August 30.        You will have a post op phone visit on:  Sept 9    You need to follow-up clinic appointment in 6 weeks after your ablation with remote device check prior to visit.    After PVI:  1.  It is important to continue your blood thinner of Xarelto before and after procedure and important not to miss any tablets to prevent a stroke.    2.  Please call if you are frequently out of rhythm or episodes are longer than 6 hours.  Also call if you have ankle, facial or hand swelling noted after procedure.  It is not uncommon to have some chest discomfort the first week but if this continues or reoccurs after the first week, please call.  Call if you have a fever, difficulty with heartburn, throat issues beyond the first week or trouble swallowing or tolerating food.      3.  Also call if any abnormal symptoms for you in the first 4 weeks post ablation.        My contact information:  Marek Clark CNP  After Hours or Scheduling  [phone]  Dr. Michelle's Nurse---Cheryle Munoz, RN etc. [phone]

## 2022-08-29 NOTE — LETTER
8/29/2022    ADDISON BENÍTEZ MD  [facility] [address]  [place] [facility] [address]    RE: Reymundo Petersen       Dear Colleague,     I had the pleasure of seeing Reymundo Petersen in the [facility].    Thank you, Dr. Benítez, for asking the [facility] team to see . Reymundo Petersen to evaluate persistent atrial fibrillation.    Assessment/Recommendations     Assessment/Plan:    Diagnoses and all orders for this visit:  Persistent atrial fibrillation (H) and no A. fib on last 2 device checks.  He appears to be having good control of A. fib on amiodarone 200 mg every other day.  To stop amiodarone on August 30.  Discussed today that he will likely stay off of amiodarone post ablation.  I quoted him 70% success rate with ablation procedure.  I discussed risk of repeat ablation.   I reviewed what to expect with ablation, postprocedure recovery and signs and symptoms to watch for and call us with post ablation.  See AVS for more details.  Not placed on PPI protocol as he is on omeprazole 20 mg orally twice daily.  Dilated cardiomyopathy (H) with recent EF 30 to 35% on echo.  Acute combined systolic and diastolic congestive heart failure (H) and has pitting edema on bilateral lower legs, but not in his ankles or feet today.  He takes furosemide 80 mg orally every day and encouraged him to take an extra dose today as retaining fluid.  Fluid retention is new per daughter.  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  He has complete heart block with good BiV pacing.      [id number] score of 5 and on Xarelto.  His daughter  is interested in her dad getting the watchman as easy bruising on combination of Xarelto and aspirin.  Aspirin dose has been decreased to 81 mg Monday Wednesday Friday by Dr. Leigh per daughter.  He has a lower EF, but approved for candidacy by Barb DEL TORO on 7/1/22.   Will need to revisit candidacy with reassessment of EF.  Follow up in A. fib clinic in 6 weeks after ablation with remote device check prior to visit.     History of Present Illness/Subjective     Reymundo Petersen is a very pleasant 81 year old male who comes in today for history and physical prior to pulmonary vein isolation ablation with Dr. Michelle on September 7.  Reymundo Petersen has a known history of persistent atrial fibrillation, chronic systolic and diastolic heart failure due to nonischemic cardiomyopathy with severe concentric LV thickening (LVEF 41% by 2/10/2022 MPI), CHB, CRT-P in-situ, CAD with prior RCA PCI, HTN and CKD stage 2.    He has complete heart block and good BiV pacing.  He has been on amiodarone and had cardioversion on March 8. He has been in normal sinus rhythm since that time.  He did see his eye doctor recently because his eyes have gotten worse and eye doctor told him that he does have some deposits at the back of his eye, likely due to the amiodarone.  Ophthalmology says this is not dangerous at this time, but has encouraged him to come off the amiodarone at some point.  Pulmonary function test showed borderline function, but no obvious fibrosis.  Ramirez comes to this visit with his daughter.  They have a list of questions primarily relating to ablation and medications.  They were all answered to his satisfaction.  Ramirez is not having any side effects on amiodarone that he is aware of.  He has knee and hip pain and to discuss with  Cath Lab nurses at the time of his procedures, so he has gel pads placed.  His daughter got a narcotic prescription from primary provider for use post procedure if needed.  He is frail but walking independently and participates in discussion.  This visit will serve as history and physical for ablation.  See problem list for more details.  Medical, past medical, surgical and social history reviewed and updated.  Meds and allergies reviewed.   No personal or  family history of adverse reactions to anesthesia or abnormal bleeding with surgery.    Cardiographics (reviewed):  NM MPI results (from Feb 2022):  The nuclear stress test is probably negative for inducible myocardial ischemia or infarction. Cannot exclude a small non-transmural apical inferior infarct but it is likely diaphragmatic attenuation     The left ventricular ejection fraction at stress is 41% without focal wall motion abnormality.     A prior study was conducted on 10/7/2020.  Prior images were unavailable for comparison review, but the report sounds similar to what is seen today, except that TID is no longer present.     Echo from June 13, 2022:  Interpretation Summary     The left ventricle is mildly dilated.  Left ventricular function is decreased. The ejection fraction is 30-35%  (moderately reduced).  There is moderate global hypokinesia of the left ventricle.  The right ventricle is mildly dilated.  The right ventricular systolic function is normal.  The left atrium is severely dilated.  The right atrium is moderately dilated.  There is moderately severe (3+) tricuspid regurgitation.  Right ventricle systolic pressure estimate normal  There is mild to moderate (1-2+) aortic regurgitation.  The ascending aorta is Mildly dilated.  IVC diameter >2.1 cm collapsing <50% with sniff suggests a high RA pressure  estimated at 15 mmHg or greater.  Small pericardial effusion  There are no echocardiographic indications of cardiac tamponade.  Cardiac testing personally reviewed:  Remote device check yesterday shows leads stable and battery ok.  Device functioning appropriately.   Atrial pacing 93% and BiVentricular pacing 99.7%.  AT/AFib burden less than 1 % and mode switches could be due to noise on atrial lead. No ventr arrhythmias.    Twelve-lead ECG today shows a paced V paced rhythm at 67 and wide QRS.  QTC of 481 ms       Problem List:  Patient Active Problem List   Diagnosis     Primary osteoarthritis of  knees, bilateral     Primary hypertension     BPH (benign prostatic hyperplasia)     Combined hyperlipidemia     Dizziness     DJD (degenerative joint disease)     Left ventricular hypertrophy     Troponin level elevated     Acute combined systolic and diastolic congestive heart failure (H)     Pulmonary hypertension (H)     Nonrheumatic aortic valve insufficiency     Coronary artery disease due to lipid rich plaque     MARIAN (generalized anxiety disorder)     GERD (gastroesophageal reflux disease)     Infection due to 2019 novel coronavirus     Dilated cardiomyopathy (H)     Persistent atrial fibrillation (H)     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  There is no height or weight on file to calculate BMI.  Wt Readings from Last 3 Encounters:   07/01/22 84.8 kg (187 lb)   06/05/22 85.7 kg (189 lb)   05/04/22 85.7 kg (189 lb)     General Appearance:   Alert, well-appearing and in no acute distress.   HEENT: Atraumatic, normocephalic.  No scleral icterus, normal conjunctivae; mucous membranes pink and moist.     Chest: Chest symmetric, spine straight.   Lungs:   Respirations unlabored: Lungs are clear to auscultation.   Cardiovascular:   Normal first and second heart sounds with no murmurs, rubs, or gallops.  Regular, regular.   Normal JVD, 3+ bilateral lower extremity edema.       Extremities: No cyanosis or clubbing   Musculoskeletal: Moves all extremities   Skin: Warm, dry, intact.    Neurologic: Mood and affect are appropriate, alert and oriented to person, place, time, and situation     ROS: 10 point ROS neg other than the symptoms noted above in the HPI.     Medical History  Surgical History Family History Social History     Past Medical History:   Diagnosis Date      Atrial fibrillation (H)      Atrial fibrillation, transient (H) 8/11/2020     BPH (benign prostatic hyperplasia) 7/11/2018     Chronic combined systolic and diastolic heart failure (H) 2/11/2021     Congestive heart failure (H)      Coronary artery disease      COVID 02/2021     Dilated cardiomyopathy (H) 2/11/2021     MARIAN (generalized anxiety disorder) 2/11/2021     GERD (gastroesophageal reflux disease)      High cholesterol      Hypertension      LBBB (left bundle branch block) 7/11/2018     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     Pulmonary hypertension (H) 4/1/2020    Past Surgical History:   Procedure Laterality Date     ANGIOPLASTY       ARTHROSCOPY KNEE       CARDIOVERSION  2021     CARDIOVERSION  02/03/2022     CV CORONARY ANGIOGRAM N/A 10/23/2020    Procedure: Coronary Angiogram;  Surgeon: Varun Freire MD;  Location: [facility] Cath Lab;  Service: Cardiology     CV LEFT HEART CATHETERIZATION WITHOUT LEFT VENTRICULOGRAM Left 10/23/2020    Procedure: Left Heart Catheterization Without Left Ventriculogram;  Surgeon: Varun Freire MD;  Location: [facility] Cath Lab;  Service: Cardiology     EP BIV PACEMAKER INSERT N/A 3/25/2020    Procedure: EP Biventricular Pacemaker Insertion;  Surgeon: Taylor Sandoval MD;  Location: [facility] Cath Lab;  Service: Cardiology     RELEASE CARPAL TUNNEL       ZZC TOTAL KNEE ARTHROPLASTY Right 8/15/2019    Procedure: RIGHT  MINIMALLY TOTAL KNEE ARTHROPLASTY;  Surgeon: Keven Cerda MD;  Location: [facility];  Service: Orthopedics    Family History   Problem Relation Age of Onset     Heart Disease Father      Diabetes Type 2  Sister      Alzheimer Disease Mother      Chronic Obstructive Pulmonary Disease Brother     History   Smoking Status     Never Smoker   Smokeless Tobacco     Never Used     Social History    Substance and Sexual Activity      Alcohol use: Yes        Comment: Alcoholic Drinks/day: rare       Medications   Allergies     Current Outpatient Medications   Medication Sig Dispense Refill     acetaminophen (TYLENOL) 500 MG tablet Take 500 mg by mouth as needed for mild pain       amiodarone (PACERONE) 200 MG tablet Take 1 tablet (200 mg) by mouth every other day 45 tablet 0     aspirin (ASA) 81 MG chewable tablet Take 1 tablet (81 mg) by mouth every other day (Patient taking differently: Take 81 mg by mouth every other day Taking Monday, Wednesday and Fridays) 30 tablet 0     atorvastatin (LIPITOR) 10 MG tablet Take 10 mg by mouth every other day Even days, bedtime       coenzyme Q-10 200 MG CAPS capsule Take 200 mg by mouth daily       diphenhydrAMINE-acetaminophen (TYLENOL PM)  MG tablet Take 1 tablet by mouth nightly as needed for sleep (Patient not taking: Reported on 7/1/2022)       doxazosin (CARDURA) 8 MG tablet [DOXAZOSIN (CARDURA) 8 MG TABLET] Take 4 mg by mouth at bedtime.              finasteride (PROSCAR) 5 mg tablet [FINASTERIDE (PROSCAR) 5 MG TABLET] Take 5 mg by mouth at bedtime.              furosemide (LASIX) 40 MG tablet Take 2 tablets (80 mg) by mouth daily Take extra dose if weight gain, leg swelling, or short of breath 100 tablet 3     gabapentin (NEURONTIN) 100 MG capsule Two capsules in the morning, two capsules in the afternoon, and three capsules in the evening       glucosamine/chondr cole A sod (OSTEO BI-FLEX ORAL) [GLUCOSAMINE/CHONDR COLE A SOD (OSTEO BI-FLEX ORAL)] Take 1 tablet by mouth 2 (two) times a day.              losartan (COZAAR) 25 MG tablet [LOSARTAN (COZAAR) 25 MG TABLET] Take 1 tablet (25 mg total) by mouth daily. 30 tablet 0     melatonin 10 mg Tab Take 10 mg by mouth At Bedtime        metoprolol succinate ER (TOPROL-XL) 25 MG 24 hr tablet Take 1 tablet (25 mg) by mouth At Bedtime 90 tablet 3     multivitamin, therapeutic (THERA-VIT) TABS tablet Take 1 tablet by mouth daily       omeprazole (PRILOSEC) 20 MG capsule [OMEPRAZOLE (PRILOSEC) 20 MG CAPSULE] Take 20 mg by mouth 2 (two)  times a day before meals.       potassium chloride (KLOR-CON) 20 MEQ packet Take 20 mEq by mouth daily 90 packet 3     rivaroxaban ANTICOAGULANT (XARELTO) 20 MG TABS tablet Take 1 tablet (20 mg) by mouth daily 90 tablet 3     sertraline (ZOLOFT) 50 MG tablet Take 50 mg by mouth 2 times daily        vit A/vit C/vit E/zinc/copper (PRESERVISION AREDS ORAL) [VIT A/VIT C/VIT E/ZINC/COPPER (PRESERVISION AREDS ORAL)] Take 1 tablet by mouth 2 (two) times a day.        Allergies   Allergen Reactions     No Known Allergies       Medical, surgical, family, social history, and medications were all reviewed and updated as necessary.   Lab Results    Chemistry/lipid CBC Cardiac Enzymes/BNP/TSH/INR   Recent Labs   Lab Test 06/23/22  1337   CHOL 107   HDL 38*   LDL 62   TRIG 35     Recent Labs   Lab Test 06/23/22  1337 10/23/20  0918 05/16/19  0950   LDL 62 75 89     Recent Labs   Lab Test 06/23/22  1337      POTASSIUM 3.8   CHLORIDE 100   CO2 32*      BUN 19   CR 1.19   GFRESTIMATED 61   ANGELY 8.9     Recent Labs   Lab Test 06/23/22  1337 06/20/22  1310 06/17/22  1446   CR 1.19 1.18 1.10     No results for input(s): A1C in the last 10706 hours.       Recent Labs   Lab Test 05/18/22  0943 03/14/22  1454   WBC  --  6.3   HGB 11.2* 11.6*   HCT  --  36.2*   MCV  --  96   PLT  --  173     Recent Labs   Lab Test 05/18/22  0943 03/14/22  1454 01/18/22  1526   HGB 11.2* 11.6* 13.1*    Recent Labs   Lab Test 03/14/22  2055 03/14/22  1454 02/11/21  1724   TROPONINI 0.09 0.07 0.14     Recent Labs   Lab Test 06/17/22  1446 03/14/22  1454 01/18/22  1526   * 1,163* 661*     Recent Labs   Lab Test 03/14/22  1454   TSH 7.62*     Recent Labs   Lab Test 02/11/21  1724 03/24/20  1745 08/15/19  0811   INR 1.03 1.05 0.98          Total Time- 57 minutes spent on date of encounter doing chart review, history and exam, documentation and further activities as noted above.  This note has been dictated using voice recognition software. Any  grammatical, typographical, or context distortions are unintentional and inherent to the software.        Thank you for allowing me to participate in the care of your patient.      Sincerely,     [facility]  cc:   No referring provider defined for this encounter.

## 2022-08-29 NOTE — Clinical Note
ADW---3+ bilat leg edema,  doubled diuretic X 1 day.  Should we reassess before procedure?  ? Keep overnight after PVI or see  in HF clinic for diuretic mgmt. Within day or two of procedure  Last EF lower at 30-35%

## 2022-08-29 NOTE — PROGRESS NOTES
Pulmonary Vein Isolation Ablation Patient Education Visit:    Patient and Daughter present for discussion.  Education completed Face to Face in the clinic on 8/29/2022 with Hank Esquivel RN.  Please refer to documented H&P completed by EP provider in [facility] associated to this visit.    Procedural Discussion:  Reviewed pre and post op PVI procedure with pt, pre-procedure letter reviewed and given to pt- see letter in EPIC under documentation, see additional EP PVI prep note for details on procedure/prep and answered pt questions and concerns regarding procedure    Medication Discussion:  Anticoagulation- Reviewed importance of continuing anticoagulation uninterrupted pre and post ablation and pt denies missing any doses of their anticoagulant. Pt continues taking Xarelto 20 mg daily with dinner, his biggest meal of the day. Pt instructed to take his Xarelto the evening prior to PVI as he normally would with dinner and to hold all morning medications the day of his PVI procedure.    PPI- instructions to continue PPI that pt has been previously taking as prescribed. Pt continues taking Omeprazole 20 mg twice daily.    Antiarrythmic- instructions given to pt to stop Amiodarone 7 days prior to PVI starting on Wednesday 8/31/22. Pt instructed to take his last dose of Amiodarone tomorrow 8/30/22.    8/29/2022 3:09 PM  Hank Esquivel RN

## 2022-09-01 NOTE — TELEPHONE ENCOUNTER
Phone call to discuss, Pt daughter stated that she hasnt talked to her dad yet today but she will go over there and assess how he is doing and call us back.    She states that she thinks he is feeling well as he has plans to go to [facility] today.    EPRN number given for call back.    Mary Jo   

## 2022-09-01 NOTE — TELEPHONE ENCOUNTER
Message  Received: Today  Cheryle Munoz, RN  P [facility] Ep Support [facility]  Call on Thursday to see if LE edema has improved... update Jan... PVI on 9-7 w/ADW             Previous Messages       ----- Message -----   From: Laurie Clark APRN CNP   Sent: 8/29/2022   5:31 PM CDT   To: Ole Michelle MD, *     ADW---3+ bilat leg edema,  doubled diuretic X 1 day.  Should we reassess before procedure?     ? Keep overnight after PVI or see  in HF clinic for diuretic mgmt. Within day or two of procedure  Last EF lower at 30-35%

## 2022-09-01 NOTE — TELEPHONE ENCOUNTER
Ramirez's daughter, Juliet called in with report today that her father has taken an extra furosemide 80 mg  on Monday and Tuesday.  He took furosemide 80 mg twice daily these 2 days.  He went back to furosemide 80 mg once a day.  His leg swelling is down significantly.  I discussed with Juliet that Dr. Michelle messaged me back today stating that her father is not as likely to be cured with ablation given enlarged upper chambers of his heart and he recommended work-up for heart failure and to see cardiology (to rule out amyloidosis or other cardiac disease).  He is a patient of Dr. Michaels.  I recommended that he be seen in heart failure clinic next week and that we cancel ablation.  She is in agreement with this.  She tried to conference  her father but he was not answering his phone.  He is hard of hearing.  Juliet will talk to her father and let me know if he wants to talk to me.  I am happy to do that.   I discussed that its important to do ablation when procedure is least risk for her father.  Planning ablation and then admission with acute heart failure  is adding additional risk.  She is in agreement.  Ablation will be cancelled for now.  She did not realize that he had so much fluid until she picked him up for appointment on Monday.  Juliet retired so she could help out with taking care of her father more.  She is willing to bring him in for heart failure appointment next week.  To continue furosemide 80 mg orally every day for now.  He is to go back on amiodarone 200 mg 1 tab orally every other day.

## 2022-09-01 NOTE — TELEPHONE ENCOUNTER
PC back from pts daughter  Daughter notes that pts LE edema was resolved from the increased dose for 1 day  He is back taking his normal scheduled Lasix 80mg Daily  He is also back to his baseline weight  Daughter will continue to monitor his wt and edema  Advised daughter and discussed that if Dr Michelle felt he would need further management of fluid s/p PVI, pt may require overnight stay s/p PVI   Daughter is aware and will communicate this to the pt  Pt verbalized understanding, has no further questions or concerns at this time, and has our contact information if needed.  9/1/2022 11:18 AM  Kacey Alvarado RN

## 2022-09-08 NOTE — TELEPHONE ENCOUNTER
Pt's daughter was called. They are going to work with [employer] to get the rx sent to [facility] for a reduced price. They will reevaluate at the end of the year about medication prices. Encouraged to call if any questions or concerns.    Damian TOMAS RN  BSN

## 2022-09-08 NOTE — PROGRESS NOTES
Patient seen in clinic for HF education s/p recent hospital discharge  Reviewed HF Folder that includes the  HF Sx Awareness & and Weight log booklet highlighting :  __X_patient s type of heart failure _X__Na management in diet  __X_importance of daily wts  _X__Fluid Guidelines, if applicable  __X_medication review and importance of compliance     Instructed patient in signs and sx of heart failure, reiterated when to call clinic - reviewed HF hotline # [phone] and after hours call # [phone].  Majority of time was spent reviewing: low sodium diet.  Patient verbalized understanding of HF discussion.  Plan for f/u with continued HF education reviewed.  No formal f/u scheduled with nurse clinician for continued education - will continue to reinforce HF management education.  Ramirez and his daughter were interested in [employer] and were offered the application today.   He currently lives with his sister, who does the shopping and cooking. Her meal planning is not always low sodium in nature.  He is a fan of popcorn and was advised of Smart Balance low sodium microwave popcorn option today of 135mg for the entire bag.  Ramirez was offered HRS patient home monitoring but declined due to their current practice at home is going well. They will use the tracking booklet offered today.    Kori TOMAS RN BSN, CHFN, PCCN-K

## 2022-09-08 NOTE — TELEPHONE ENCOUNTER
[facility] Call Center    Phone Message    May a detailed message be left on voicemail: yes     Reason for Call: Medication Question or concern regarding medication   Prescription Clarification  Name of Medication: bumetanide (BUMEX) 2 MG tablet  Prescribing Provider: Dr. Vasquez   Pharmacy: [facility] [id number] [facility] - [place], [place] - [address]   What on the order needs clarification? Patient's daughter Juliet calling to speak with a nurse regarding Reymundo's new bumex prescription. She said she went to  the prescription and it was around $145 and she is hoping that there is an alternative to this drug that is less expensive. Please call her back to discuss.        Action Taken: Other: Cardiology    Travel Screening: Not Applicable

## 2022-09-08 NOTE — PROGRESS NOTES
HEART CARE NOTE      Thank you, Dr. Michelle, for asking the [facility] Heart Care team to see Reymundo Petersen to evaluate ADHF.    Assessment/Recommendations     1. HFrEF c/b ADHF  Assessment / Plan    Hypervolemic on physical exam; currently takes 80 mg furosemide daily with PRN doses for fluid retention and reports that he has been requiring the additional dose more frequently over the last few weeks - will transition to bumetanide 2mg BID and [facility] clinic will call on Monday to follow-up HF symptoms and weight trend    GDMT as detailed below     Current Pharmacotherapy AHA Guideline-Directed Medical Therapy   Losartan 25 mg daily Lisinopril 20 mg twice daily   Metoprolol succinate 25 mg daily Carvedilol 25 mg twice daily   Spironolactone not started Spironolactone 25 mg once daily   Hydralazine NA Hydralazine 100 mg three times daily   Isosorbide dinitrate NA Isosorbide dinitrate 40 mg three times daily   SGLT2 inhibitor: not started Dapagliflozin or Empagliflozin 10 mg daily     2. Arrhythmia  Assessment / Plan    Persistent atrial fibrillation s/p ablations and DCCV    Currently on rivaroxaban; being considered for Watchman device - continue amiodarone; plans for PVI in the near future once compensated from a volume standpoint    Complete Heart Block s/p CRT-P    Follows with Dr. Michelle in EP as well as afib clinic    3. CAD  Assessment / Plan    Denies chest pain or anginal equivalents    Continue ASA, atorvastatin, metoprolol succinate, losartan      History of Present Illness/Subjective    Mr. Reymundo Petersen is a 81 year old male with a PMHx significant for  persistent atrial fibrillation, chronic systolic and diastolic heart failure due to nonischemic cardiomyopathy, CHB s/p CRT-P, CAD with prior RCA PCI, HTN and CKD stage 2 who presents to [facility] clinic to establish care.    Today, Mr. Owusu (accompanied by his daughter) endorses fluid retention despite current diuretic regimen and PRN dosing. He  "reports significant LE edema while denying any orthopnea or PND. We discussed HF diet and lifestyle modifications including the 2 g Na and 2L fluid restrictions. He currently tries to adhere, but it does not appear that he's able to keep up with tracking.  Patient was provided with the HF educational binder as well as a book to log his daily weights as well as HF education by our [facility] RN.     ECG: Personally reviewed. Paced rhythm    ECHO (personnaly Reviewed):  The left ventricle is mildly dilated.  Left ventricular function is decreased. The ejection fraction is 30-35%  (moderately reduced).  There is moderate global hypokinesia of the left ventricle.  The right ventricle is mildly dilated.  The right ventricular systolic function is normal.  The left atrium is severely dilated.  The right atrium is moderately dilated.  There is moderately severe (3+) tricuspid regurgitation.  Right ventricle systolic pressure estimate normal  There is mild to moderate (1-2+) aortic regurgitation.  The ascending aorta is Mildly dilated.  IVC diameter >2.1 cm collapsing <50% with sniff suggests a high RA pressure  estimated at 15 mmHg or greater.  Small pericardial effusion  There are no echocardiographic indications of cardiac tamponade.        Physical Examination Review of Systems   /52 (BP Location: Left arm, Patient Position: Sitting, Cuff Size: Adult Regular)   Pulse 64   Resp 16   Ht 1.778 m (5' 10\")   Wt 84.4 kg (186 lb)   BMI 26.69 kg/m    Body mass index is 26.69 kg/m .  Wt Readings from Last 3 Encounters:   09/08/22 84.4 kg (186 lb)   08/29/22 83.5 kg (184 lb)   07/01/22 84.8 kg (187 lb)     General Appearance:   no distress, normal body habitus   ENT/Mouth: membranes moist, no oral lesions or bleeding gums.      EYES:  no scleral icterus, normal conjunctivae   Neck: no carotid bruits or thyromegaly   Chest/Lungs:   lungs are clear to auscultation, no rales or wheezing, equal chest wall expansion  "   Cardiovascular:   Regular. Normal first and second heart sounds with no murmurs, rubs, or gallops; the carotid, radial and posterior tibial pulses are intact, + JVD and LE edema bilaterally    Abdomen:  no organomegaly, masses, bruits, or tenderness; bowel sounds are present   Extremities: no cyanosis or clubbing   Skin: no xanthelasma, warm.    Neurologic: alert and oriented x3, calm     Psychiatric: alert and oriented x3, calm     A complete 10 systems ROS was reviewed  And is negative except what is listed in the HPI.          Medical History  Surgical History Family History Social History   Past Medical History:   Diagnosis Date     Atrial fibrillation (H)      Atrial fibrillation, transient (H) 8/11/2020     BPH (benign prostatic hyperplasia) 7/11/2018     Chronic combined systolic and diastolic heart failure (H) 2/11/2021     Congestive heart failure (H)      Coronary artery disease      COVID 02/2021     Dilated cardiomyopathy (H) 2/11/2021     MARIAN (generalized anxiety disorder) 2/11/2021     GERD (gastroesophageal reflux disease)      High cholesterol      Hypertension      LBBB (left bundle branch block) 7/11/2018     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     Pulmonary hypertension (H) 4/1/2020    Past Surgical History:   Procedure Laterality Date     ANGIOPLASTY       ARTHROSCOPY KNEE       CARDIOVERSION  2021     CARDIOVERSION  02/03/2022     CV CORONARY ANGIOGRAM N/A 10/23/2020    Procedure: Coronary Angiogram;  Surgeon: Varun Freire MD;  Location: [facility] Cath Lab;  Service: Cardiology     CV LEFT HEART CATHETERIZATION WITHOUT LEFT VENTRICULOGRAM Left 10/23/2020    Procedure: Left Heart Catheterization Without Left Ventriculogram;  Surgeon: Varun Freire MD;  Location: [facility] Cath Lab;  Service: Cardiology     EP BIV PACEMAKER INSERT N/A 3/25/2020    Procedure: EP Biventricular Pacemaker Insertion;  Surgeon: Taylor Sandoval MD;  Location: [facility] Cath Lab;   Service: Cardiology     RELEASE CARPAL TUNNEL       ZZC TOTAL KNEE ARTHROPLASTY Right 8/15/2019    Procedure: RIGHT  MINIMALLY TOTAL KNEE ARTHROPLASTY;  Surgeon: Keven Cerda MD;  Location: [facility] Main OR;  Service: Orthopedics    no family history of premature coronary artery disease Social History     Socioeconomic History     Marital status:      Spouse name: Not on file     Number of children: Not on file     Years of education: Not on file     Highest education level: Not on file   Occupational History     Not on file   Tobacco Use     Smoking status: Never Smoker     Smokeless tobacco: Never Used   Substance and Sexual Activity     Alcohol use: Yes     Comment: Alcoholic Drinks/day: rare     Drug use: No     Sexual activity: Not on file   Other Topics Concern     Parent/sibling w/ CABG, MI or angioplasty before 65F 55M? Not Asked   Social History Narrative    Retired.  Good sense of humor     Social Determinants of Health     Financial Resource Strain: Not on file   Food Insecurity: Not on file   Transportation Needs: Not on file   Physical Activity: Not on file   Stress: Not on file   Social Connections: Not on file   Intimate Partner Violence: Not on file   Housing Stability: Not on file           Lab Results    Chemistry/lipid CBC Cardiac Enzymes/BNP/TSH/INR   Lab Results   Component Value Date    CHOL 107 06/23/2022    HDL 38 (L) 06/23/2022    TRIG 35 06/23/2022    BUN 18 08/29/2022     08/29/2022    CO2 32 (H) 08/29/2022    Lab Results   Component Value Date    WBC 4.9 08/29/2022    HGB 12.3 (L) 08/29/2022    HCT 38.2 (L) 08/29/2022    MCV 99 08/29/2022     08/29/2022    Lab Results   Component Value Date    TROPONINI 0.09 03/14/2022     (H) 06/17/2022    TSH 7.62 (H) 03/14/2022    INR 1.03 02/11/2021     Lab Results   Component Value Date    TROPONINI 0.09 03/14/2022          Weight:    Wt Readings from Last 3 Encounters:   08/29/22 83.5 kg (184 lb)   07/01/22 84.8 kg  (187 lb)   06/05/22 85.7 kg (189 lb)       Allergies  Allergies   Allergen Reactions     No Known Allergies          Surgical History  Past Surgical History:   Procedure Laterality Date     ANGIOPLASTY       ARTHROSCOPY KNEE       CARDIOVERSION  2021     CARDIOVERSION  02/03/2022     CV CORONARY ANGIOGRAM N/A 10/23/2020    Procedure: Coronary Angiogram;  Surgeon: Varun Freire MD;  Location: [facility] Cath Lab;  Service: Cardiology     CV LEFT HEART CATHETERIZATION WITHOUT LEFT VENTRICULOGRAM Left 10/23/2020    Procedure: Left Heart Catheterization Without Left Ventriculogram;  Surgeon: Varun Freire MD;  Location: [facility] Cath Lab;  Service: Cardiology     EP BIV PACEMAKER INSERT N/A 3/25/2020    Procedure: EP Biventricular Pacemaker Insertion;  Surgeon: Taylor Sandoval MD;  Location: [facility] Cath Lab;  Service: Cardiology     RELEASE CARPAL TUNNEL       ZZC TOTAL KNEE ARTHROPLASTY Right 8/15/2019    Procedure: RIGHT  MINIMALLY TOTAL KNEE ARTHROPLASTY;  Surgeon: Keven Cerda MD;  Location: [facility];  Service: Orthopedics       Social History  Tobacco:   History   Smoking Status     Never Smoker   Smokeless Tobacco     Never Used    Alcohol:   Social History    Substance and Sexual Activity      Alcohol use: Yes        Comment: Alcoholic Drinks/day: rare   Illicit Drugs:   History   Drug Use No       Family History  Family History   Problem Relation Age of Onset     Alzheimer Disease Mother      Heart Disease Father      Cancer Sister      Diabetes Type 2  Sister      Chronic Obstructive Pulmonary Disease Sister      LUNG DISEASE Brother           John Paul Vasquez MD on 9/8/2022      cc: Jamie Noguera

## 2022-09-08 NOTE — PATIENT INSTRUCTIONS
Thank you for allowing the Heart Care clinic to be a part of your care. Please pay close attention to the following medications as they have been changed during this visit.    1.) Please stop taking furosemide    2.) Please start taking bumetanide 2 mg two times daily

## 2022-09-08 NOTE — PROGRESS NOTES
HEART CARE NOTE          Assessment/Recommendations     1. HFrEF  Assessment / Plan    ? Cardiac amyloid    ***    GDMT as detailed below    Current Pharmacotherapy AHA Guideline-Directed Medical Therapy   Losartan 25 mg daily Lisinopril 20 mg twice daily   Metoprolol succinate 25 mg daily Carvedilol 25 mg twice daily   Spironolactone not started Spironolactone 25 mg once daily   Hydralazine *** mg three times daily Hydralazine 100 mg three times daily   Isosorbide dinitrate *** mg three times daily Isosorbide dinitrate 40 mg three times daily   SGLT2 inhibitor: not started Dapagliflozin or Empagliflozin 10 mg daily       2. Arrhythmia  Assessment / Plan    Persistent atrial fibrillation - currently on rivaroxaban; being considered for Watchman device; continue amiodarone; plans for PVI in the near future    Complete Heart Block s/p CRT-P    3. CAD  Assessment / Plan    ***    ***    ***    4. {NO (DEF)/YES/NA:0436513}  Assessment / Plan    ***    ***    ***    5. {NO (DEF)/YES/NA:8231034}  Assessment / Plan    ***    ***    ***    History of Present Illness/Subjective    Mr. Reymundo Petersen is a 81 year old male with a PMHx significant for ***    Today, ***    ECG: Personally reviewed. Paced rhythm    ECHO (personnaly Reviewed):  The left ventricle is mildly dilated.  Left ventricular function is decreased. The ejection fraction is 30-35%  (moderately reduced).  There is moderate global hypokinesia of the left ventricle.  The right ventricle is mildly dilated.  The right ventricular systolic function is normal.  The left atrium is severely dilated.  The right atrium is moderately dilated.  There is moderately severe (3+) tricuspid regurgitation.  Right ventricle systolic pressure estimate normal  There is mild to moderate (1-2+) aortic regurgitation.  The ascending aorta is Mildly dilated.  IVC diameter >2.1 cm collapsing <50% with sniff suggests a high RA pressure  estimated at 15 mmHg or greater.  Small  "pericardial effusion  There are no echocardiographic indications of cardiac tamponade.        Physical Examination Review of Systems   /52 (BP Location: Left arm, Patient Position: Sitting, Cuff Size: Adult Regular)   Pulse 64   Resp 16   Ht 1.778 m (5' 10\")   Wt 84.4 kg (186 lb)   BMI 26.69 kg/m    Body mass index is 26.69 kg/m .  Wt Readings from Last 3 Encounters:   09/08/22 84.4 kg (186 lb)   08/29/22 83.5 kg (184 lb)   07/01/22 84.8 kg (187 lb)     General Appearance:   no distress, normal body habitus   ENT/Mouth: membranes moist, no oral lesions or bleeding gums.      EYES:  no scleral icterus, normal conjunctivae   Neck: no carotid bruits or thyromegaly   Chest/Lungs:   lungs are clear to auscultation, no rales or wheezing, *** sternal scar, equal chest wall expansion    Cardiovascular:   ***Regular. Normal first and second heart sounds with ***no murmurs, rubs, or gallops; the carotid, radial and posterior tibial pulses are intact, Jugular venous pressure ***, *** edema bilaterally    Abdomen:  no organomegaly, masses, bruits, or tenderness; bowel sounds are present   Extremities: no cyanosis or clubbing   Skin: no xanthelasma, warm.    Neurologic: normal gait, normal  bilateral, no tremors     Psychiatric: alert and oriented x3, calm     A complete 10 systems ROS was reviewed  And is negative except what is listed in the HPI.          Medical History  Surgical History Family History Social History   Past Medical History:   Diagnosis Date     Atrial fibrillation (H)      Atrial fibrillation, transient (H) 8/11/2020     BPH (benign prostatic hyperplasia) 7/11/2018     Chronic combined systolic and diastolic heart failure (H) 2/11/2021     Congestive heart failure (H)      Coronary artery disease      COVID 02/2021     Dilated cardiomyopathy (H) 2/11/2021     MARIAN (generalized anxiety disorder) 2/11/2021     GERD (gastroesophageal reflux disease)      High cholesterol      Hypertension      LBBB " (left bundle branch block) 7/11/2018     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     Pulmonary hypertension (H) 4/1/2020    Past Surgical History:   Procedure Laterality Date     ANGIOPLASTY       ARTHROSCOPY KNEE       CARDIOVERSION  2021     CARDIOVERSION  02/03/2022     CV CORONARY ANGIOGRAM N/A 10/23/2020    Procedure: Coronary Angiogram;  Surgeon: Varun Freire MD;  Location: [facility] Cath Lab;  Service: Cardiology     CV LEFT HEART CATHETERIZATION WITHOUT LEFT VENTRICULOGRAM Left 10/23/2020    Procedure: Left Heart Catheterization Without Left Ventriculogram;  Surgeon: Varun Freire MD;  Location: [facility] Cath Lab;  Service: Cardiology     EP BIV PACEMAKER INSERT N/A 3/25/2020    Procedure: EP Biventricular Pacemaker Insertion;  Surgeon: Taylor Sandoval MD;  Location: [facility] Cath Lab;  Service: Cardiology     RELEASE CARPAL TUNNEL       ZZC TOTAL KNEE ARTHROPLASTY Right 8/15/2019    Procedure: RIGHT  MINIMALLY TOTAL KNEE ARTHROPLASTY;  Surgeon: Keven Cerda MD;  Location: [facility];  Service: Orthopedics    no family history of premature coronary artery disease Social History     Socioeconomic History     Marital status:      Spouse name: Not on file     Number of children: Not on file     Years of education: Not on file     Highest education level: Not on file   Occupational History     Not on file   Tobacco Use     Smoking status: Never Smoker     Smokeless tobacco: Never Used   Substance and Sexual Activity     Alcohol use: Yes     Comment: Alcoholic Drinks/day: rare     Drug use: No     Sexual activity: Not on file   Other Topics Concern     Parent/sibling w/ CABG, MI or angioplasty before 65F 55M? Not Asked   Social History Narrative    Retired.  Good sense of humor     Social Determinants of Health     Financial Resource Strain: Not on file   Food Insecurity: Not on file   Transportation Needs: Not on file   Physical Activity: Not on file    Stress: Not on file   Social Connections: Not on file   Intimate Partner Violence: Not on file   Housing Stability: Not on file           Lab Results    Chemistry/lipid CBC Cardiac Enzymes/BNP/TSH/INR   Lab Results   Component Value Date    CHOL 107 06/23/2022    HDL 38 (L) 06/23/2022    TRIG 35 06/23/2022    BUN 18 08/29/2022     08/29/2022    CO2 32 (H) 08/29/2022    Lab Results   Component Value Date    WBC 4.9 08/29/2022    HGB 12.3 (L) 08/29/2022    HCT 38.2 (L) 08/29/2022    MCV 99 08/29/2022     08/29/2022    Lab Results   Component Value Date    TROPONINI 0.09 03/14/2022     (H) 06/17/2022    TSH 7.62 (H) 03/14/2022    INR 1.03 02/11/2021     Lab Results   Component Value Date    TROPONINI 0.09 03/14/2022          Weight:    Wt Readings from Last 3 Encounters:   08/29/22 83.5 kg (184 lb)   07/01/22 84.8 kg (187 lb)   06/05/22 85.7 kg (189 lb)       Allergies  Allergies   Allergen Reactions     No Known Allergies          Surgical History  Past Surgical History:   Procedure Laterality Date     ANGIOPLASTY       ARTHROSCOPY KNEE       CARDIOVERSION  2021     CARDIOVERSION  02/03/2022     CV CORONARY ANGIOGRAM N/A 10/23/2020    Procedure: Coronary Angiogram;  Surgeon: Varun Freire MD;  Location: [facility] Cath Lab;  Service: Cardiology     CV LEFT HEART CATHETERIZATION WITHOUT LEFT VENTRICULOGRAM Left 10/23/2020    Procedure: Left Heart Catheterization Without Left Ventriculogram;  Surgeon: Varun Ferire MD;  Location:  [facility] Lab;  Service: Cardiology     EP BIV PACEMAKER INSERT N/A 3/25/2020    Procedure: EP Biventricular Pacemaker Insertion;  Surgeon: Taylor Sandoval MD;  Location:  [facility] Cath Lab;  Service: Cardiology     RELEASE CARPAL TUNNEL       ZZC TOTAL KNEE ARTHROPLASTY Right 8/15/2019    Procedure: RIGHT  MINIMALLY TOTAL KNEE ARTHROPLASTY;  Surgeon: Keven Cerda MD;  Location: [facility];  Service: Orthopedics       Social  History  Tobacco:   History   Smoking Status     Never Smoker   Smokeless Tobacco     Never Used    Alcohol:   Social History    Substance and Sexual Activity      Alcohol use: Yes        Comment: Alcoholic Drinks/day: rare   Illicit Drugs:   History   Drug Use No       Family History  Family History   Problem Relation Age of Onset     Alzheimer Disease Mother      Heart Disease Father      Cancer Sister      Diabetes Type 2  Sister      Chronic Obstructive Pulmonary Disease Sister      LUNG DISEASE Brother           John Paul Vasquez MD on 9/8/2022      cc: Jamie Noguera

## 2022-09-08 NOTE — TELEPHONE ENCOUNTER
1st Attempt to contact pt, detailed message discussing the below with contact information was left per pt request.   Advised pt to keep apt in Oct with [facility] for a follow-up to discuss AF management and tx, after seeing HF and general cardiology  Advised pt and daughter if further questions to call back  9/8/2022 2:23 PM  Kacey Alvarado RN

## 2022-09-08 NOTE — TELEPHONE ENCOUNTER
[facility] Call Center    Phone Message    May a detailed message be left on voicemail: yes     Reason for Call: Other: Pt wife would like a call back as they have an appt in Oct for after the ablation and they are wondering if they still need that appt as the ablation did not happen as well as the medicaiton he was suppose to be taking . Please reach out to discuss     Action Taken: Message routed to:  [facility] ([facility] Cardio    Travel Screening: Not Applicable

## 2022-09-08 NOTE — TELEPHONE ENCOUNTER
PC back from daughter  She did clarify that pt will keep apt as is, until seen by Dr Leigh  Also advised pt should remain on Amiodarone as discussed by Marek, until further plan for rhythm management is determined  9/8/2022 3:26 PM  Kacey Alvarado RN

## 2022-09-08 NOTE — LETTER
9/8/2022    ADDISON BENÍTEZ MD  [facility] [address]  [facility] [address]    RE: Reymundo Petersen       Dear Colleague,     I had the pleasure of seeing Reymundo Petersen in the [facility].    HEART CARE NOTE      Thank you, Dr. Michelle, for asking the [facility] Heart Care team to see Reymundo Petersen to evaluate ADHF.    Assessment/Recommendations     1. HFrEF c/b ADHF  Assessment / Plan    Hypervolemic on physical exam; currently takes 80 mg furosemide daily with PRN doses for fluid retention and reports that he has been requiring the additional dose more frequently over the last few weeks - will transition to bumetanide 2mg BID and [facility] clinic will call on Monday to follow-up HF symptoms and weight trend    GDMT as detailed below     Current Pharmacotherapy AHA Guideline-Directed Medical Therapy   Losartan 25 mg daily Lisinopril 20 mg twice daily   Metoprolol succinate 25 mg daily Carvedilol 25 mg twice daily   Spironolactone not started Spironolactone 25 mg once daily   Hydralazine NA Hydralazine 100 mg three times daily   Isosorbide dinitrate NA Isosorbide dinitrate 40 mg three times daily   SGLT2 inhibitor: not started Dapagliflozin or Empagliflozin 10 mg daily     2. Arrhythmia  Assessment / Plan    Persistent atrial fibrillation s/p ablations and DCCV    Currently on rivaroxaban; being considered for Watchman device - continue amiodarone; plans for PVI in the near future once compensated from a volume standpoint    Complete Heart Block s/p CRT-P    Follows with Dr. Michelle in EP as well as afib clinic    3. CAD  Assessment / Plan    Denies chest pain or anginal equivalents    Continue ASA, atorvastatin, metoprolol succinate, losartan      History of Present Illness/Subjective    Mr. Reymundo Petersen is a 81 year old male with a PMHx significant for  persistent atrial fibrillation, chronic systolic and diastolic heart failure due to nonischemic cardiomyopathy, CHB s/p  "CRT-P, CAD with prior RCA PCI, HTN and CKD stage 2 who presents to [facility] clinic to establish care.    Today, Mr. Owusu (accompanied by his daughter) endorses fluid retention despite current diuretic regimen and PRN dosing. He reports significant LE edema while denying any orthopnea or PND. We discussed HF diet and lifestyle modifications including the 2 g Na and 2L fluid restrictions. He currently tries to adhere, but it does not appear that he's able to keep up with tracking.  Patient was provided with the HF educational binder as well as a book to log his daily weights as well as HF education by our [facility] RN.     ECG: Personally reviewed. Paced rhythm    ECHO (personnaly Reviewed):  The left ventricle is mildly dilated.  Left ventricular function is decreased. The ejection fraction is 30-35%  (moderately reduced).  There is moderate global hypokinesia of the left ventricle.  The right ventricle is mildly dilated.  The right ventricular systolic function is normal.  The left atrium is severely dilated.  The right atrium is moderately dilated.  There is moderately severe (3+) tricuspid regurgitation.  Right ventricle systolic pressure estimate normal  There is mild to moderate (1-2+) aortic regurgitation.  The ascending aorta is Mildly dilated.  IVC diameter >2.1 cm collapsing <50% with sniff suggests a high RA pressure  estimated at 15 mmHg or greater.  Small pericardial effusion  There are no echocardiographic indications of cardiac tamponade.        Physical Examination Review of Systems   /52 (BP Location: Left arm, Patient Position: Sitting, Cuff Size: Adult Regular)   Pulse 64   Resp 16   Ht 1.778 m (5' 10\")   Wt 84.4 kg (186 lb)   BMI 26.69 kg/m    Body mass index is 26.69 kg/m .  Wt Readings from Last 3 Encounters:   09/08/22 84.4 kg (186 lb)   08/29/22 83.5 kg (184 lb)   07/01/22 84.8 kg (187 lb)     General Appearance:   no distress, normal body habitus   ENT/Mouth: membranes moist, no oral " lesions or bleeding gums.      EYES:  no scleral icterus, normal conjunctivae   Neck: no carotid bruits or thyromegaly   Chest/Lungs:   lungs are clear to auscultation, no rales or wheezing, equal chest wall expansion    Cardiovascular:   Regular. Normal first and second heart sounds with no murmurs, rubs, or gallops; the carotid, radial and posterior tibial pulses are intact, + JVD and LE edema bilaterally    Abdomen:  no organomegaly, masses, bruits, or tenderness; bowel sounds are present   Extremities: no cyanosis or clubbing   Skin: no xanthelasma, warm.    Neurologic: alert and oriented x3, calm     Psychiatric: alert and oriented x3, calm     A complete 10 systems ROS was reviewed  And is negative except what is listed in the HPI.          Medical History  Surgical History Family History Social History   Past Medical History:   Diagnosis Date     Atrial fibrillation (H)      Atrial fibrillation, transient (H) 8/11/2020     BPH (benign prostatic hyperplasia) 7/11/2018     Chronic combined systolic and diastolic heart failure (H) 2/11/2021     Congestive heart failure (H)      Coronary artery disease      COVID 02/2021     Dilated cardiomyopathy (H) 2/11/2021     MARIAN (generalized anxiety disorder) 2/11/2021     GERD (gastroesophageal reflux disease)      High cholesterol      Hypertension      LBBB (left bundle branch block) 7/11/2018     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     Pulmonary hypertension (H) 4/1/2020    Past Surgical History:   Procedure Laterality Date     ANGIOPLASTY       ARTHROSCOPY KNEE       CARDIOVERSION  2021     CARDIOVERSION  02/03/2022     CV CORONARY ANGIOGRAM N/A 10/23/2020    Procedure: Coronary Angiogram;  Surgeon: Varun Freire MD;  Location: [facility] Cath Lab;  Service: Cardiology     CV LEFT HEART CATHETERIZATION WITHOUT LEFT VENTRICULOGRAM Left 10/23/2020    Procedure: Left Heart Catheterization Without Left Ventriculogram;  Surgeon: Varun Freire  MD Jamie;  Location: [facility] Cath Lab;  Service: Cardiology     EP BIV PACEMAKER INSERT N/A 3/25/2020    Procedure: EP Biventricular Pacemaker Insertion;  Surgeon: Taylor Sandoval MD;  Location: [facility] Cath Lab;  Service: Cardiology     RELEASE CARPAL TUNNEL       ZZC TOTAL KNEE ARTHROPLASTY Right 8/15/2019    Procedure: RIGHT  MINIMALLY TOTAL KNEE ARTHROPLASTY;  Surgeon: Keven Cerda MD;  Location: [facility];  Service: Orthopedics    no family history of premature coronary artery disease Social History     Socioeconomic History     Marital status:      Spouse name: Not on file     Number of children: Not on file     Years of education: Not on file     Highest education level: Not on file   Occupational History     Not on file   Tobacco Use     Smoking status: Never Smoker     Smokeless tobacco: Never Used   Substance and Sexual Activity     Alcohol use: Yes     Comment: Alcoholic Drinks/day: rare     Drug use: No     Sexual activity: Not on file   Other Topics Concern     Parent/sibling w/ CABG, MI or angioplasty before 65F 55M? Not Asked   Social History Narrative    Retired.  Good sense of humor     Social Determinants of Health     Financial Resource Strain: Not on file   Food Insecurity: Not on file   Transportation Needs: Not on file   Physical Activity: Not on file   Stress: Not on file   Social Connections: Not on file   Intimate Partner Violence: Not on file   Housing Stability: Not on file           Lab Results    Chemistry/lipid CBC Cardiac Enzymes/BNP/TSH/INR   Lab Results   Component Value Date    CHOL 107 06/23/2022    HDL 38 (L) 06/23/2022    TRIG 35 06/23/2022    BUN 18 08/29/2022     08/29/2022    CO2 32 (H) 08/29/2022    Lab Results   Component Value Date    WBC 4.9 08/29/2022    HGB 12.3 (L) 08/29/2022    HCT 38.2 (L) 08/29/2022    MCV 99 08/29/2022     08/29/2022    Lab Results   Component Value Date    TROPONINI 0.09 03/14/2022     (H) 06/17/2022     TSH 7.62 (H) 03/14/2022    INR 1.03 02/11/2021     Lab Results   Component Value Date    TROPONINI 0.09 03/14/2022          Weight:    Wt Readings from Last 3 Encounters:   08/29/22 83.5 kg (184 lb)   07/01/22 84.8 kg (187 lb)   06/05/22 85.7 kg (189 lb)       Allergies  Allergies   Allergen Reactions     No Known Allergies          Surgical History  Past Surgical History:   Procedure Laterality Date     ANGIOPLASTY       ARTHROSCOPY KNEE       CARDIOVERSION  2021     CARDIOVERSION  02/03/2022     CV CORONARY ANGIOGRAM N/A 10/23/2020    Procedure: Coronary Angiogram;  Surgeon: Varun Freire MD;  Location: [facility] Cath Lab;  Service: Cardiology     CV LEFT HEART CATHETERIZATION WITHOUT LEFT VENTRICULOGRAM Left 10/23/2020    Procedure: Left Heart Catheterization Without Left Ventriculogram;  Surgeon: Varun Freire MD;  Location: [facility] Cath Lab;  Service: Cardiology     EP BIV PACEMAKER INSERT N/A 3/25/2020    Procedure: EP Biventricular Pacemaker Insertion;  Surgeon: Taylor Sandoval MD;  Location: [facility] Cath Lab;  Service: Cardiology     RELEASE CARPAL TUNNEL       ZZC TOTAL KNEE ARTHROPLASTY Right 8/15/2019    Procedure: RIGHT  MINIMALLY TOTAL KNEE ARTHROPLASTY;  Surgeon: Keven Cerda MD;  Location: [facility];  Service: Orthopedics       Social History  Tobacco:   History   Smoking Status     Never Smoker   Smokeless Tobacco     Never Used    Alcohol:   Social History    Substance and Sexual Activity      Alcohol use: Yes        Comment: Alcoholic Drinks/day: rare   Illicit Drugs:   History   Drug Use No       Family History  Family History   Problem Relation Age of Onset     Alzheimer Disease Mother      Heart Disease Father      Cancer Sister      Diabetes Type 2  Sister      Chronic Obstructive Pulmonary Disease Sister      LUNG DISEASE Brother           John Paul Vasquez MD on 9/8/2022      cc: Jamie Noguera      Patient seen in clinic for HF education s/p  recent hospital discharge  Reviewed HF Folder that includes the  HF Sx Awareness & and Weight log booklet highlighting :  __X_patient s type of heart failure _X__Na management in diet  __X_importance of daily wts  _X__Fluid Guidelines, if applicable  __X_medication review and importance of compliance     Instructed patient in signs and sx of heart failure, reiterated when to call clinic - reviewed HF hotline # [phone] and after hours call # [phone].  Majority of time was spent reviewing: low sodium diet.  Patient verbalized understanding of HF discussion.  Plan for f/u with continued HF education reviewed.  No formal f/u scheduled with nurse clinician for continued education - will continue to reinforce HF management education.  Ramirez and his daughter were interested in [employer] and were offered the application today.   He currently lives with his sister, who does the shopping and cooking. Her meal planning is not always low sodium in nature.  He is a fan of popcorn and was advised of Smart Balance low sodium microwave popcorn option today of 135mg for the entire bag.  Ramirez was offered HRS patient home monitoring but declined due to their current practice at home is going well. They will use the tracking booklet offered today.    Kori TOMAS RN BSN, CHFN, PCCN-K    Thank you for allowing me to participate in the care of your patient.      Sincerely,     Jhon Paul Vasquez MD   [facility]  cc:   John Paul Vasquez MD  [address]  [place] [address]

## 2022-09-12 NOTE — TELEPHONE ENCOUNTER
----- Message from John Paul Vasquez MD sent at 9/8/2022 12:05 PM CDT -----  Regarding: Follow-up  Please contact Reymundo on Monday (9/12/22) to follow-up weights, edema and HF symptoms since starting bumetanide 2 mg BID.     Thanks;  ~ Deidra

## 2022-09-12 NOTE — TELEPHONE ENCOUNTER
"9/8    186lbs  9/9    179lbs  9/10  177lbs  9/11  179lbs  9/12  175lbs    LE edema is \"not really that bad overall. My legs are just a little bit better.\"   When asked if SOB has gotten better, Pt stated, \"I think it has. Some days I can tell when I get up if it's going to  be high or not. Sometimes I feel a little bit wheezy.\" Energy levels have stayed the same. Not having any orthopnea. Following a low sodium and fluid restriction. Taking medications as prescribed.     Yadi, please review in Dr. Vasquez's absence. Thank you!    Damian TOMAS RN  BSN    "

## 2022-09-21 NOTE — PROGRESS NOTES
[facility]  Heart Care Clinic Follow-up Note    Assessment & Plan        (I25.10,  I25.83) Coronary artery disease due to lipid rich plaque  (primary encounter diagnosis)  Comment: 2011 due to shortness of breath underwent angiography showing normal left main, normal LAD, normal circumflex, and significant distal right coronary artery lesion which was intervened upon with a stent. Ejection fraction at that time was 52%. He underwent nuclear stress test in 2020 showing basal inferior wall defect prompting angiography which showed normal left main, normal LAD, normal circumflex, and normal right coronary artery with minimal in-stent restenosis. No significant symptoms and continue to work on prevention  Continue chronic aspirin every other day.    (I48.19) Persistent atrial fibrillation (H)  Comment: Not valvular, paroxysmal, and given advanced [id number] score of 4 on Xarelto.  On amiodarone with successful cardioversion, will continue to check blood work and arrange for PFTs.  Given that he has some lung fibrosis as well as retinal deposits will consider ablation to get him off amiodarone.  Given bleeding, will consider left atrial appendage occlusion device although this is slow down since aspirin is being changed every other day..    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    (I42.0) Dilated cardiomyopathy (H)  Comment: As above ejection fraction 30 to 35% and on appropriate ARB and beta-blocker.    (Z95.0) 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)  Comment: Medtronic device with Medtronic right atrial right ventricular leads in the His bundle as well as a right ventricular apical lead. He has 7% atrial pacing with 97% ventricular CRT pacing. Leads stable.        (I35.1) Nonrheumatic aortic valve insufficiency  Comment: Mild to  moderate aortic insufficiency in the setting of mildly dilated ascending aorta.  Recheck echo in a year.    (I10) Primary hypertension  Comment: Under good control on losartan as well as metoprolol, not orthostatic, might need to back off on these in future.    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    (N18.2) Stage 2 chronic kidney disease  Comment: Creatinine has been stable and normal and will recheck today although 2 weeks ago was 1.06.    (N40.0) Benign prostatic hyperplasia without lower urinary tract symptoms  Comment: Symptoms controlled on Cardura as well as Proscar.    Plan  1.  Add renal profile to blood drawn yesterday to check creatinine.  2.  We will arrange for ablation to get him off amiodarone due to retinal issues as well as pulmonary fibrosis.  3.  We will discuss with EP whether the lower dose of Xarelto given his lower BMI and age in the mid 80s.  4.  At same time since orthopedic issues is his biggest issue will try to proceed with hip injection.  5.  Put left atrial appendage occlusion device on the back burner for now.  6.  Last creatinine significantly elevated continue Bumex twice a day.  7.  Follow-up with me in several months to make sure he does not fall through the cracks.    Subjective  CC: 81-year-old white gentleman here for follow-up visit with his daughter.  He has better breathing wise with no significant heart failure on Bumex twice a day.  He does get little short of breath after going up a flight of steps.  He does complain of significant hip discomfort on the left side.  No chest pains, palpitations, PND, orthopnea, syncope, dizziness or peripheral edema or orthostasis.    Medications  Current Outpatient Medications   Medication Sig Dispense Refill     acetaminophen (TYLENOL) 500 MG tablet Take 500 mg by mouth as needed for mild pain       amiodarone (PACERONE) 200 MG tablet Take 1 tablet (200 mg) by mouth every other  day 45 tablet 0     aspirin (ASA) 81 MG chewable tablet Take 1 tablet (81 mg) by mouth every other day (Patient taking differently: Take 81 mg by mouth every other day Taking Monday, Wednesday and Fridays) 30 tablet 0     atorvastatin (LIPITOR) 10 MG tablet Take 10 mg by mouth every other day Even days, bedtime       bumetanide (BUMEX) 2 MG tablet Take 1 tablet (2 mg) by mouth 2 times daily 180 tablet 3     coenzyme Q-10 200 MG CAPS capsule Take 300 mg by mouth daily       doxazosin (CARDURA) 8 MG tablet [DOXAZOSIN (CARDURA) 8 MG TABLET] Take 4 mg by mouth at bedtime.              finasteride (PROSCAR) 5 mg tablet [FINASTERIDE (PROSCAR) 5 MG TABLET] Take 5 mg by mouth at bedtime.              gabapentin (NEURONTIN) 100 MG capsule Two capsules in the morning, two capsules in the afternoon, and three capsules in the evening       glucosamine/chondr cole A sod (OSTEO BI-FLEX ORAL) [GLUCOSAMINE/CHONDR COLE A SOD (OSTEO BI-FLEX ORAL)] Take 1 tablet by mouth 2 (two) times a day.              losartan (COZAAR) 25 MG tablet [LOSARTAN (COZAAR) 25 MG TABLET] Take 1 tablet (25 mg total) by mouth daily. 30 tablet 0     melatonin 10 mg Tab Take 10 mg by mouth At Bedtime        metoprolol succinate ER (TOPROL-XL) 25 MG 24 hr tablet Take 1 tablet (25 mg) by mouth At Bedtime 90 tablet 3     multivitamin, therapeutic (THERA-VIT) TABS tablet Take 1 tablet by mouth daily       omeprazole (PRILOSEC) 20 MG capsule [OMEPRAZOLE (PRILOSEC) 20 MG CAPSULE] Take 20 mg by mouth 2 (two) times a day before meals.       potassium chloride (KLOR-CON) 20 MEQ packet Take 20 mEq by mouth daily 90 packet 3     rivaroxaban ANTICOAGULANT (XARELTO) 20 MG TABS tablet Take 1 tablet (20 mg) by mouth daily 90 tablet 3     sertraline (ZOLOFT) 50 MG tablet Take 50 mg by mouth 2 times daily        vit A/vit C/vit E/zinc/copper (PRESERVISION AREDS ORAL) [VIT A/VIT C/VIT E/ZINC/COPPER (PRESERVISION AREDS ORAL)] Take 1 tablet by mouth 2 (two) times a day.          Objective  BP 94/52 (BP Location: Right arm, Patient Position: Sitting, Cuff Size: Adult Regular)   Pulse 90   Resp 16   Wt 79.4 kg (175 lb 1.6 oz)   BMI 25.12 kg/m      General Appearance:    Alert, cooperative, no distress, appears stated age   Head:    Normocephalic, without obvious abnormality, atraumatic   Throat:   Lips, mucosa, and tongue normal; teeth and gums normal   Neck:   Supple, symmetrical, trachea midline, no adenopathy;        thyroid:  No enlargement/tenderness/nodules; no carotid    bruit or JVD   Back:     Symmetric, no curvature, ROM normal, no CVA tenderness   Lungs:     Clear to auscultation bilaterally, respirations unlabored   Chest wall:    No tenderness, left-sided ICD   Heart:    Regular rate and rhythm, S1 and S2 normal, no murmur, rub   or gallop   Abdomen:     Soft, non-tender, bowel sounds active all four quadrants,     no masses, no organomegaly   Extremities:   Normal, atraumatic, no cyanosis or edema   Pulses:   2+ and symmetric all extremities   Skin:   Skin color, texture, turgor normal, no rashes or lesions     Results    Lab Results personally reviewed   Lab Results   Component Value Date    CHOL 107 06/23/2022    CHOL 142 10/23/2020     Lab Results   Component Value Date    HDL 38 (L) 06/23/2022    HDL 51 10/23/2020     No components found for: LDLCALC  Lab Results   Component Value Date    TRIG 35 06/23/2022    TRIG 79 10/23/2020     Lab Results   Component Value Date    WBC 4.9 08/29/2022    HGB 12.3 (L) 08/29/2022    HCT 38.2 (L) 08/29/2022     08/29/2022     Lab Results   Component Value Date    BUN 21 09/15/2022     09/15/2022    CO2 31 09/15/2022       Reviewed electrocardiogram sequential AV paced rhythm

## 2022-09-21 NOTE — PATIENT INSTRUCTIONS
Mr Reymundo AMBRIZ Nicola,  I enjoyed visiting with you again today.  I am glad to hear you are doing well except for the hip pain.  Per our conversation let us try to go forward with the ablation as well as hip injection.  I will plan on seeing you 4 months.  Jorge A Leigh

## 2022-09-21 NOTE — LETTER
9/21/2022    ADDISON BENÍTEZ MD  [facility] [address]  [address] [facility] [address]    RE: Reymundo Petersen       Dear Colleague,     I had the pleasure of seeing Reymundo Petersen in the [facility].      [facility]  Heart Care Clinic Follow-up Note    Assessment & Plan        (I25.10,  I25.83) Coronary artery disease due to lipid rich plaque  (primary encounter diagnosis)  Comment: 2011 due to shortness of breath underwent angiography showing normal left main, normal LAD, normal circumflex, and significant distal right coronary artery lesion which was intervened upon with a stent. Ejection fraction at that time was 52%. He underwent nuclear stress test in 2020 showing basal inferior wall defect prompting angiography which showed normal left main, normal LAD, normal circumflex, and normal right coronary artery with minimal in-stent restenosis. No significant symptoms and continue to work on prevention  Continue chronic aspirin every other day.    (I48.19) Persistent atrial fibrillation (H)  Comment: Not valvular, paroxysmal, and given advanced [id number] score of 4 on Xarelto.  On amiodarone with successful cardioversion, will continue to check blood work and arrange for PFTs.  Given that he has some lung fibrosis as well as retinal deposits will consider ablation to get him off amiodarone.  Given bleeding, will consider left atrial appendage occlusion device although this is slow down since aspirin is being changed every other day..    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    (I42.0) Dilated cardiomyopathy (H)  Comment: As above ejection fraction 30 to 35% and on appropriate ARB and beta-blocker.    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 He has 7% atrial pacing with 97% ventricular CRT pacing. Leads stable.        (I35.1) Nonrheumatic aortic valve insufficiency  Comment: Mild to moderate aortic insufficiency in the setting of mildly dilated ascending aorta.  Recheck echo in a year.    (I10) Primary hypertension  Comment: Under good control on losartan as well as metoprolol, not orthostatic, might need to back off on these in future.    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    (N18.2) Stage 2 chronic kidney disease  Comment: Creatinine has been stable and normal and will recheck today although 2 weeks ago was 1.06.    (N40.0) Benign prostatic hyperplasia without lower urinary tract symptoms  Comment: Symptoms controlled on Cardura as well as Proscar.    Plan  1.  Add renal profile to blood drawn yesterday to check creatinine.  2.  We will arrange for ablation to get him off amiodarone due to retinal issues as well as pulmonary fibrosis.  3.  We will discuss with EP whether the lower dose of Xarelto given his lower BMI and age in the mid 80s.  4.  At same time since orthopedic issues is his biggest issue will try to proceed with hip injection.  5.  Put left atrial appendage occlusion device on the back burner for now.  6.  Last creatinine significantly elevated continue Bumex twice a day.  7.  Follow-up with me in several months to make sure he does not fall through the cracks.    Subjective  CC: 81-year-old white gentleman here for follow-up visit with his daughter.  He has better breathing wise with no significant heart failure on Bumex twice a day.  He does get little short of breath after going up a flight of steps.  He does complain of significant hip discomfort on the left side.  No chest pains, palpitations, PND, orthopnea, syncope, dizziness or peripheral edema or  orthostasis.    Medications  Current Outpatient Medications   Medication Sig Dispense Refill     acetaminophen (TYLENOL) 500 MG tablet Take 500 mg by mouth as needed for mild pain       amiodarone (PACERONE) 200 MG tablet Take 1 tablet (200 mg) by mouth every other day 45 tablet 0     aspirin (ASA) 81 MG chewable tablet Take 1 tablet (81 mg) by mouth every other day (Patient taking differently: Take 81 mg by mouth every other day Taking Monday, Wednesday and Fridays) 30 tablet 0     atorvastatin (LIPITOR) 10 MG tablet Take 10 mg by mouth every other day Even days, bedtime       bumetanide (BUMEX) 2 MG tablet Take 1 tablet (2 mg) by mouth 2 times daily 180 tablet 3     coenzyme Q-10 200 MG CAPS capsule Take 300 mg by mouth daily       doxazosin (CARDURA) 8 MG tablet [DOXAZOSIN (CARDURA) 8 MG TABLET] Take 4 mg by mouth at bedtime.              finasteride (PROSCAR) 5 mg tablet [FINASTERIDE (PROSCAR) 5 MG TABLET] Take 5 mg by mouth at bedtime.              gabapentin (NEURONTIN) 100 MG capsule Two capsules in the morning, two capsules in the afternoon, and three capsules in the evening       glucosamine/chondr cole A sod (OSTEO BI-FLEX ORAL) [GLUCOSAMINE/CHONDR COLE A SOD (OSTEO BI-FLEX ORAL)] Take 1 tablet by mouth 2 (two) times a day.              losartan (COZAAR) 25 MG tablet [LOSARTAN (COZAAR) 25 MG TABLET] Take 1 tablet (25 mg total) by mouth daily. 30 tablet 0     melatonin 10 mg Tab Take 10 mg by mouth At Bedtime        metoprolol succinate ER (TOPROL-XL) 25 MG 24 hr tablet Take 1 tablet (25 mg) by mouth At Bedtime 90 tablet 3     multivitamin, therapeutic (THERA-VIT) TABS tablet Take 1 tablet by mouth daily       omeprazole (PRILOSEC) 20 MG capsule [OMEPRAZOLE (PRILOSEC) 20 MG CAPSULE] Take 20 mg by mouth 2 (two) times a day before meals.       potassium chloride (KLOR-CON) 20 MEQ packet Take 20 mEq by mouth daily 90 packet 3     rivaroxaban ANTICOAGULANT (XARELTO) 20 MG TABS tablet Take 1 tablet (20 mg) by mouth  daily 90 tablet 3     sertraline (ZOLOFT) 50 MG tablet Take 50 mg by mouth 2 times daily        vit A/vit C/vit E/zinc/copper (PRESERVISION AREDS ORAL) [VIT A/VIT C/VIT E/ZINC/COPPER (PRESERVISION AREDS ORAL)] Take 1 tablet by mouth 2 (two) times a day.         Objective  BP 94/52 (BP Location: Right arm, Patient Position: Sitting, Cuff Size: Adult Regular)   Pulse 90   Resp 16   Wt 79.4 kg (175 lb 1.6 oz)   BMI 25.12 kg/m      General Appearance:    Alert, cooperative, no distress, appears stated age   Head:    Normocephalic, without obvious abnormality, atraumatic   Throat:   Lips, mucosa, and tongue normal; teeth and gums normal   Neck:   Supple, symmetrical, trachea midline, no adenopathy;        thyroid:  No enlargement/tenderness/nodules; no carotid    bruit or JVD   Back:     Symmetric, no curvature, ROM normal, no CVA tenderness   Lungs:     Clear to auscultation bilaterally, respirations unlabored   Chest wall:    No tenderness, left-sided ICD   Heart:    Regular rate and rhythm, S1 and S2 normal, no murmur, rub   or gallop   Abdomen:     Soft, non-tender, bowel sounds active all four quadrants,     no masses, no organomegaly   Extremities:   Normal, atraumatic, no cyanosis or edema   Pulses:   2+ and symmetric all extremities   Skin:   Skin color, texture, turgor normal, no rashes or lesions     Results    Lab Results personally reviewed   Lab Results   Component Value Date    CHOL 107 06/23/2022    CHOL 142 10/23/2020     Lab Results   Component Value Date    HDL 38 (L) 06/23/2022    HDL 51 10/23/2020     No components found for: LDLCALC  Lab Results   Component Value Date    TRIG 35 06/23/2022    TRIG 79 10/23/2020     Lab Results   Component Value Date    WBC 4.9 08/29/2022    HGB 12.3 (L) 08/29/2022    HCT 38.2 (L) 08/29/2022     08/29/2022     Lab Results   Component Value Date    BUN 21 09/15/2022     09/15/2022    CO2 31 09/15/2022       Reviewed electrocardiogram sequential AV  paced rhythm              Thank you for allowing me to participate in the care of your patient.      Sincerely,     SKYE LEIGH MD     [facility]  cc:   Skye Leigh MD  [address], [address]  [place] [address]

## 2022-09-28 NOTE — TELEPHONE ENCOUNTER
----- Message from Ole Michelle MD sent at 9/28/2022  3:02 PM CDT -----  Regarding: RE: PVI ablation  Ramon Jules and Marek,    Thanks for the notes.  We can certainly consider ablation and likely could safely get him through this with possible planned inpatient stay for diuresis post procedure.  Long term odds of maintenance of SR are on the lower side given the severity of his LA dilation, but if it would help get him off amiodarone given the observed toxicities, we can certainly attempt.      It seems like he may have a primary cardiomyopathy with severe LV thickening and declining LV systolic function - I wonder if a pyrophosphate MPI/cardiac MRI may be helpful to further characterize?  Let me know your thoughts.    Team - we can 1) offer Mr. Petersen a tentative date for PVI with plan for a cardiology or CORE/HF visit 1 week prior to optimize HF status and diuresis laura-procedurally and 2) outpatient visit with me over the upcoming weeks to re-connect and review.    Thank you,  Roland    ----- Message -----  From: Jorge A Leigh MD  Sent: 9/22/2022   9:46 AM CDT  To: Ole Michelle MD  Subject: FW: PVI ablation                                 D,  Patient of mine with CHF due to low EF of 30-35%.  Has had a fib and controlled on amio but now with lung issues and eye issues.  I suspect CHF exacerbated by a fib and very stable in NSR on amiodarone and bumex.  Would like to get him off amio at age of 78 with lung and eye issues, thus would like PVI.  Your thoughts for when you return?  He was also being considered for LAAO due to bleeding which I have stopped by going to ASA every other day while on Xarelto so putting this off for now.  Jorge A  ----- Message -----  From: Laurie Clark APRN CNP  Sent: 9/21/2022   6:06 PM CDT  To: Ole Michelle MD, Jorge A Leigh MD  Subject: PVI ablation                                     Les-I don't know Don very well but I think he is in repetitive  cycles of acute heart failure with diminishing baseline.  I cancelled his PVI as in acute heart failure.  He has to be staying out of heart failure in my estimation to undergo ablation.  I don't do ablation and make this call though.  Roland is on vacation this week.  He is back next week and I recommend you talk to him.    Marek  ----- Message -----  From: Jorge A Leigh MD  Sent: 9/21/2022   3:01 PM CDT  To: SALVADOR Luong CNP,  Can we proceed with his ablation, his amiodarone is every other day but his daughter is extremely concerned with pulmonary fibrosis as well as retinal issues.  He is currently a paced V paced.  Question for you, his BMI is low and is older than 80 years old although his overall weight is not low would you consider lowering dose of Xarelto to 15 mg?  Let us put left atrial appendage occlusion device and back burner.  His biggest issue however right now is hip pain and have asked him to see his orthopedist for possible injection which might upset timing of ablation.  Lastly, I do feel A. fib is symptomatic and that it caused him to go into worsening heart failure.  LF

## 2022-09-28 NOTE — TELEPHONE ENCOUNTER
pc to daughter Juliet reviewed need for visit with Dr. Michelle, CHF/core clinic and a tentative PVI date.  Her questions are answered and she is agreeable to above.  Orders placed for OVs.  Scheduling notified.  KEYLA

## 2022-09-29 NOTE — TELEPHONE ENCOUNTER
----- Message from Jorge A Leigh MD sent at 9/28/2022  3:28 PM CDT -----  Regarding: FW: PVI ablation  I believe I sent this to you already but can we arrange for technician ttr scan to rule out amyloid?  LF  ----- Message -----  From: Ole Michelle MD  Sent: 9/28/2022   3:14 PM CDT  To: SALVADOR Luong CNP, #  Subject: RE: PVI ablation                                 Hi Jorge A and Marek,    Thanks for the notes.  We can certainly consider ablation and likely could safely get him through this with possible planned inpatient stay for diuresis post procedure.  Long term odds of maintenance of SR are on the lower side given the severity of his LA dilation, but if it would help get him off amiodarone given the observed toxicities, we can certainly attempt.      It seems like he may have a primary cardiomyopathy with severe LV thickening and declining LV systolic function - I wonder if a pyrophosphate MPI/cardiac MRI may be helpful to further characterize?  Let me know your thoughts.    Team - we can 1) offer Mr. Petersen a tentative date for PVI with plan for a cardiology or CORE/HF visit 1 week prior to optimize HF status and diuresis laura-procedurally and 2) outpatient visit with me over the upcoming weeks to re-connect and review.    Thank you,  Roland    ----- Message -----  From: Jorge A Leigh MD  Sent: 9/22/2022   9:46 AM CDT  To: Ole Michelle MD  Subject: FW: PVI ablation                                 D,  Patient of mine with CHF due to low EF of 30-35%.  Has had a fib and controlled on amio but now with lung issues and eye issues.  I suspect CHF exacerbated by a fib and very stable in NSR on amiodarone and bumex.  Would like to get him off amio at age of 78 with lung and eye issues, thus would like PVI.  Your thoughts for when you return?  He was also being considered for LAAO due to bleeding which I have stopped by going to ASA every other day while on Xarelto so putting this off  for now.  Jorge A  ----- Message -----  From: Laurie Clark APRN CNP  Sent: 9/21/2022   6:06 PM CDT  To: Ole Michelle MD, Jorge A Leigh MD  Subject: PVI ablation                                     Les-I don't know Don very well but I think he is in repetitive cycles of acute heart failure with diminishing baseline.  I cancelled his PVI as in acute heart failure.  He has to be staying out of heart failure in my estimation to undergo ablation.  I don't do ablation and make this call though.  Roland is on vacation this week.  He is back next week and I recommend you talk to him.    Marek  ----- Message -----  From: Jorge A Leigh MD  Sent: 9/21/2022   3:01 PM CDT  To: SALVADOR Luong CNP    Marek,  Can we proceed with his ablation, his amiodarone is every other day but his daughter is extremely concerned with pulmonary fibrosis as well as retinal issues.  He is currently a paced V paced.  Question for you, his BMI is low and is older than 80 years old although his overall weight is not low would you consider lowering dose of Xarelto to 15 mg?  Let us put left atrial appendage occlusion device and back burner.  His biggest issue however right now is hip pain and have asked him to see his orthopedist for possible injection which might upset timing of ablation.  Lastly, I do feel A. fib is symptomatic and that it caused him to go into worsening heart failure.  LF

## 2022-09-30 NOTE — TELEPHONE ENCOUNTER
Dr. Leigh,  I do not believe I was sent anything prior to this message, but I just placed the order for a NM Tc PYP scan. Let me know if this was not what you had wanted.   Thanks,  Mal

## 2022-09-30 NOTE — TELEPHONE ENCOUNTER
Phone call to patients daughterJuliet.  Explained that pt should keep the apt with Dr. Vasquez on 10-18 and ok to cancel 10-19 apt with Marek (this was a post PVI follow-up, PVI was cancelled).   She notes that the pt was to follow-up with Dr. Vasquez on 10-8 but 10-18 is the soonest he could get in.    She would like to know, if the follow-up with Dr. Vasquez on 10-18 is scheduled too far out?  Should he continue with Bumex 2 mg, two tablets daily until that time?  Last bmp 9-15, ok to wait for repeat labs?  A renal profile was to be added, per Dr. Leigh, to the 9-20 blood drawn and this was not added.    Will review with the Core team and ask that they follow up with the patients daughter.  Letter sent to patient with upcoming PVI scheduling information.

## 2022-09-30 NOTE — TELEPHONE ENCOUNTER
Awesome, thanks Mary Jo! Pt is scheduled to see ADW on 10/31 for a pre-op/discuss PVI ablation, pt is seeing Dr. Vasquez and getting labs, EKG and nurse visit on 11/16 and PVI is scheduled for 11/23!     Pt's dtr Juliet wanted to know if they should still keep pt's appt with Dr. Vasquez on 10/18 even though they're seeing Marek the day after on 10/19? She also had quite a few medication questions that she requested a nurse to call her back on. Can someone please advise?     Thank you,   Will

## 2022-10-03 NOTE — TELEPHONE ENCOUNTER
Reached patient's daughter Juliet and advised that the 10/18/22 appt is reasonable given his stable management of his heart failure at this time. Advised she call if any progressive symptoms or concerns arise..  Kori TOMAS RN BSN, CHFN, PCCN-K

## 2022-10-06 NOTE — TELEPHONE ENCOUNTER
Referred back to [facility]. Per Dr. Vasquez verbal discussion, oral bumex does not typically cause back/hip/joint pains and to refer to PCP. Pt has [facility] appt next week. Daughter has no further questions or concerns.    Damian TOMAS RN  BSN

## 2022-10-06 NOTE — TELEPHONE ENCOUNTER
[facility] Call Center    Phone Message    May a detailed message be left on voicemail: yes     Reason for Call: Symptoms or Concerns     If patient has red-flag symptoms, warm transfer to triage line    Current symptom or concern: Daughter is calling: Her father has been stating he has back, hip and joint pain that has increased from his normal level. Is this a Bumex side affect?    Also the patient states his weight is between 165-170    Bumex started in September.    Symptoms have been present for:  Increased over the last month    Has patient previously been seen for this? Yes / 9/26 he went to [facility] for pain received Cortizone shot in his hip    Are there any new or worsening symptoms? Yes: New      Action Taken: Other: Cardiology    Travel Screening: Not Applicable

## 2022-10-18 NOTE — PROGRESS NOTES
HEART CARE NOTE          Assessment/Recommendations     1. HFrEF c/b ADHF  Assessment / Plan    Near euvoelmia on physical exam; denies HF symptoms of orthopnea, PND, fluid retention/edema; weight down ~ 10 lbs since switching to bumex - no changes to regimen at this time    GDMT as detailed below      Current Pharmacotherapy AHA Guideline-Directed Medical Therapy   Losartan 25 mg daily Lisinopril 20 mg twice daily   Metoprolol succinate 25 mg daily Carvedilol 25 mg twice daily   Spironolactone not started Spironolactone 25 mg once daily   Hydralazine NA Hydralazine 100 mg three times daily   Isosorbide dinitrate NA Isosorbide dinitrate 40 mg three times daily   SGLT2 inhibitor: not started Dapagliflozin or Empagliflozin 10 mg daily      2. Arrhythmia  Assessment / Plan    Persistent atrial fibrillation s/p ablations and DCCV    Currently on rivaroxaban; being considered for Watchman device - continue amiodarone; PVI scheduled 11/23/22    Complete Heart Block s/p CRT-P    Follows with Dr. Michelle in EP as well as afib clinic     3. CAD  Assessment / Plan    Denies chest pain or anginal equivalents    Continue ASA, atorvastatin, metoprolol succinate, losartan      History of Present Illness/Subjective      Mr. Reymundo Petersen is a 81 year old male with a PMHx significant for  persistent atrial fibrillation, chronic systolic and diastolic heart failure due to nonischemic cardiomyopathy, CHB s/p CRT-P, CAD with prior RCA PCI, HTN and CKD stage 2 who presents to [facility] to establish care.     Today, Mr. Owusu (accompanied by his daughter) denies any acute cardiac events or complaints including orthopnea, PND, fluid retention, edema, chest pain, palpitations; Management plan as detailed above     ECG: Personally reviewed. Paced rhythm     ECHO (personnaly Reviewed):  The left ventricle is mildly dilated.  Left ventricular function is decreased. The ejection fraction is 30-35%  (moderately reduced).  There is  moderate global hypokinesia of the left ventricle.  The right ventricle is mildly dilated.  The right ventricular systolic function is normal.  The left atrium is severely dilated.  The right atrium is moderately dilated.  There is moderately severe (3+) tricuspid regurgitation.  Right ventricle systolic pressure estimate normal  There is mild to moderate (1-2+) aortic regurgitation.  The ascending aorta is Mildly dilated.  IVC diameter >2.1 cm collapsing <50% with sniff suggests a high RA pressure  estimated at 15 mmHg or greater.  Small pericardial effusion  There are no echocardiographic indications of cardiac tamponade.          Physical Examination Review of Systems   BP 94/52 (BP Location: Left arm, Patient Position: Sitting, Cuff Size: Adult Regular)   Pulse 66   Resp 16   Wt 79.8 kg (176 lb)   BMI 25.25 kg/m    Body mass index is 25.25 kg/m .  Wt Readings from Last 3 Encounters:   10/18/22 79.8 kg (176 lb)   09/21/22 79.4 kg (175 lb 1.6 oz)   09/08/22 84.4 kg (186 lb)     General Appearance:   no distress, normal body habitus   ENT/Mouth: membranes moist, no oral lesions or bleeding gums.      EYES:  no scleral icterus, normal conjunctivae   Neck: no carotid bruits or thyromegaly   Chest/Lungs:   lungs are clear to auscultation, no rales or wheezing, equal chest wall expansion    Cardiovascular:   Regular. Normal first and second heart sounds with no murmurs, rubs, or gallops; the carotid, radial and posterior tibial pulses are intact, no JVD and trace LE edema bilaterally    Abdomen:  no organomegaly, masses, bruits, or tenderness; bowel sounds are present   Extremities: no cyanosis or clubbing   Skin: no xanthelasma, warm.    Neurologic: alert and oriented x3, calm     Psychiatric: alert and oriented x3, calm     A complete 10 systems ROS was reviewed  And is negative except what is listed in the HPI.          Medical History  Surgical History Family History Social History   Past Medical History:    Diagnosis Date     Atrial fibrillation (H)      Atrial fibrillation, transient (H) 8/11/2020     BPH (benign prostatic hyperplasia) 7/11/2018     Chronic combined systolic and diastolic heart failure (H) 2/11/2021     Congestive heart failure (H)      Coronary artery disease      COVID 02/2021     Dilated cardiomyopathy (H) 2/11/2021     MARIAN (generalized anxiety disorder) 2/11/2021     GERD (gastroesophageal reflux disease)      High cholesterol      Hypertension      LBBB (left bundle branch block) 7/11/2018     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     Pulmonary hypertension (H) 4/1/2020    Past Surgical History:   Procedure Laterality Date     ANGIOPLASTY       ARTHROSCOPY KNEE       CARDIOVERSION  2021     CARDIOVERSION  02/03/2022     CV CORONARY ANGIOGRAM N/A 10/23/2020    Procedure: Coronary Angiogram;  Surgeon: Varun Freire MD;  Location: [facility];  Service: Cardiology     CV LEFT HEART CATHETERIZATION WITHOUT LEFT VENTRICULOGRAM Left 10/23/2020    Procedure: Left Heart Catheterization Without Left Ventriculogram;  Surgeon: Varun Freire MD;  Location: [facility];  Service: Cardiology     EP BIV PACEMAKER INSERT N/A 3/25/2020    Procedure: EP Biventricular Pacemaker Insertion;  Surgeon: Taylor Sandoval MD;  Location: [facility];  Service: Cardiology     RELEASE CARPAL TUNNEL       ZZC TOTAL KNEE ARTHROPLASTY Right 8/15/2019    Procedure: RIGHT  MINIMALLY TOTAL KNEE ARTHROPLASTY;  Surgeon: Keven Cerda MD;  Location: [facility];  Service: Orthopedics    no family history of premature coronary artery disease Social History     Socioeconomic History     Marital status:      Spouse name: Not on file     Number of children: Not on file     Years of education: Not on file     Highest education level: Not on file   Occupational History     Not on file   Tobacco Use     Smoking status: Never     Smokeless tobacco: Never   Substance  and Sexual Activity     Alcohol use: Yes     Comment: Alcoholic Drinks/day: rare     Drug use: No     Sexual activity: Not on file   Other Topics Concern     Parent/sibling w/ CABG, MI or angioplasty before 65F 55M? Not Asked   Social History Narrative    Retired.  Good sense of humor     Social Determinants of Health     Financial Resource Strain: Not on file   Food Insecurity: Not on file   Transportation Needs: Not on file   Physical Activity: Not on file   Stress: Not on file   Social Connections: Not on file   Intimate Partner Violence: Not on file   Housing Stability: Not on file           Lab Results    Chemistry/lipid CBC Cardiac Enzymes/BNP/TSH/INR   Lab Results   Component Value Date    CHOL 107 06/23/2022    HDL 38 (L) 06/23/2022    TRIG 35 06/23/2022    BUN 27 10/17/2022     10/17/2022    CO2 30 10/17/2022    Lab Results   Component Value Date    WBC 4.9 08/29/2022    HGB 12.3 (L) 08/29/2022    HCT 38.2 (L) 08/29/2022    MCV 99 08/29/2022     08/29/2022    Lab Results   Component Value Date    TROPONINI 0.09 03/14/2022     (H) 06/17/2022    TSH 4.26 10/17/2022    INR 1.03 02/11/2021     Lab Results   Component Value Date    TROPONINI 0.09 03/14/2022          Weight:    Wt Readings from Last 3 Encounters:   10/18/22 79.8 kg (176 lb)   09/21/22 79.4 kg (175 lb 1.6 oz)   09/08/22 84.4 kg (186 lb)       Allergies  Allergies   Allergen Reactions     No Known Allergies          Surgical History  Past Surgical History:   Procedure Laterality Date     ANGIOPLASTY       ARTHROSCOPY KNEE       CARDIOVERSION  2021     CARDIOVERSION  02/03/2022     CV CORONARY ANGIOGRAM N/A 10/23/2020    Procedure: Coronary Angiogram;  Surgeon: Varun Freire MD;  Location: [facility];  Service: Cardiology     CV LEFT HEART CATHETERIZATION WITHOUT LEFT VENTRICULOGRAM Left 10/23/2020    Procedure: Left Heart Catheterization Without Left Ventriculogram;  Surgeon: Varun Freire MD;   Location: [facility];  Service: Cardiology     EP BIV PACEMAKER INSERT N/A 3/25/2020    Procedure: EP Biventricular Pacemaker Insertion;  Surgeon: Taylor Sandoval MD;  Location: [facility];  Service: Cardiology     RELEASE CARPAL TUNNEL       ZZC TOTAL KNEE ARTHROPLASTY Right 8/15/2019    Procedure: RIGHT  MINIMALLY TOTAL KNEE ARTHROPLASTY;  Surgeon: Keven Cerda MD;  Location: [facility];  Service: Orthopedics       Social History  Tobacco:   History   Smoking Status     Never   Smokeless Tobacco     Never    Alcohol:   Social History    Substance and Sexual Activity      Alcohol use: Yes        Comment: Alcoholic Drinks/day: rare   Illicit Drugs:   History   Drug Use No       Family History  Family History   Problem Relation Age of Onset     Alzheimer Disease Mother      Heart Disease Father      Cancer Sister      Diabetes Type 2  Sister      Chronic Obstructive Pulmonary Disease Sister      LUNG DISEASE Brother           John Paul Vasquez MD on 10/18/2022      cc: Jamie Noguera

## 2022-10-18 NOTE — LETTER
10/18/2022    ADDISON BENÍTEZ  [address]  [address] [facility] [address]    RE: Reymundo Petersen       Dear Colleague,     I had the pleasure of seeing Reymundo Petersen in the [facility].    HEART CARE NOTE          Assessment/Recommendations     1. HFrEF c/b ADHF  Assessment / Plan    Near euvoelmia on physical exam; denies HF symptoms of orthopnea, PND, fluid retention/edema; weight down ~ 10 lbs since switching to bumex - no changes to regimen at this time    GDMT as detailed below      Current Pharmacotherapy AHA Guideline-Directed Medical Therapy   Losartan 25 mg daily Lisinopril 20 mg twice daily   Metoprolol succinate 25 mg daily Carvedilol 25 mg twice daily   Spironolactone not started Spironolactone 25 mg once daily   Hydralazine NA Hydralazine 100 mg three times daily   Isosorbide dinitrate NA Isosorbide dinitrate 40 mg three times daily   SGLT2 inhibitor: not started Dapagliflozin or Empagliflozin 10 mg daily      2. Arrhythmia  Assessment / Plan    Persistent atrial fibrillation s/p ablations and DCCV    Currently on rivaroxaban; being considered for Watchman device - continue amiodarone; PVI scheduled 11/23/22    Complete Heart Block s/p CRT-P    Follows with Dr. Michelle in EP as well as afib clinic     3. CAD  Assessment / Plan    Denies chest pain or anginal equivalents    Continue ASA, atorvastatin, metoprolol succinate, losartan      History of Present Illness/Subjective      Mr. Reymundo Petersen is a 81 year old male with a PMHx significant for  persistent atrial fibrillation, chronic systolic and diastolic heart failure due to nonischemic cardiomyopathy, CHB s/p CRT-P, CAD with prior RCA PCI, HTN and CKD stage 2 who presents to [facility] to establish care.     Today, Mr. Owusu (accompanied by his daughter) denies any acute cardiac events or complaints including orthopnea, PND, fluid retention, edema, chest pain, palpitations; Management plan as detailed above     ECG:  Personally reviewed. Paced rhythm     ECHO (personnaly Reviewed):  The left ventricle is mildly dilated.  Left ventricular function is decreased. The ejection fraction is 30-35%  (moderately reduced).  There is moderate global hypokinesia of the left ventricle.  The right ventricle is mildly dilated.  The right ventricular systolic function is normal.  The left atrium is severely dilated.  The right atrium is moderately dilated.  There is moderately severe (3+) tricuspid regurgitation.  Right ventricle systolic pressure estimate normal  There is mild to moderate (1-2+) aortic regurgitation.  The ascending aorta is Mildly dilated.  IVC diameter >2.1 cm collapsing <50% with sniff suggests a high RA pressure  estimated at 15 mmHg or greater.  Small pericardial effusion  There are no echocardiographic indications of cardiac tamponade.          Physical Examination Review of Systems   BP 94/52 (BP Location: Left arm, Patient Position: Sitting, Cuff Size: Adult Regular)   Pulse 66   Resp 16   Wt 79.8 kg (176 lb)   BMI 25.25 kg/m    Body mass index is 25.25 kg/m .  Wt Readings from Last 3 Encounters:   10/18/22 79.8 kg (176 lb)   09/21/22 79.4 kg (175 lb 1.6 oz)   09/08/22 84.4 kg (186 lb)     General Appearance:   no distress, normal body habitus   ENT/Mouth: membranes moist, no oral lesions or bleeding gums.      EYES:  no scleral icterus, normal conjunctivae   Neck: no carotid bruits or thyromegaly   Chest/Lungs:   lungs are clear to auscultation, no rales or wheezing, equal chest wall expansion    Cardiovascular:   Regular. Normal first and second heart sounds with no murmurs, rubs, or gallops; the carotid, radial and posterior tibial pulses are intact, no JVD and trace LE edema bilaterally    Abdomen:  no organomegaly, masses, bruits, or tenderness; bowel sounds are present   Extremities: no cyanosis or clubbing   Skin: no xanthelasma, warm.    Neurologic: alert and oriented x3, calm     Psychiatric: alert and  oriented x3, calm     A complete 10 systems ROS was reviewed  And is negative except what is listed in the HPI.          Medical History  Surgical History Family History Social History   Past Medical History:   Diagnosis Date     Atrial fibrillation (H)      Atrial fibrillation, transient (H) 8/11/2020     BPH (benign prostatic hyperplasia) 7/11/2018     Chronic combined systolic and diastolic heart failure (H) 2/11/2021     Congestive heart failure (H)      Coronary artery disease      COVID 02/2021     Dilated cardiomyopathy (H) 2/11/2021     MARIAN (generalized anxiety disorder) 2/11/2021     GERD (gastroesophageal reflux disease)      High cholesterol      Hypertension      LBBB (left bundle branch block) 7/11/2018     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     Pulmonary hypertension (H) 4/1/2020    Past Surgical History:   Procedure Laterality Date     ANGIOPLASTY       ARTHROSCOPY KNEE       CARDIOVERSION  2021     CARDIOVERSION  02/03/2022     CV CORONARY ANGIOGRAM N/A 10/23/2020    Procedure: Coronary Angiogram;  Surgeon: Varun Freire MD;  Location: [facility];  Service: Cardiology     CV LEFT HEART CATHETERIZATION WITHOUT LEFT VENTRICULOGRAM Left 10/23/2020    Procedure: Left Heart Catheterization Without Left Ventriculogram;  Surgeon: Varun Freire MD;  Location: [facility];  Service: Cardiology     EP BIV PACEMAKER INSERT N/A 3/25/2020    Procedure: EP Biventricular Pacemaker Insertion;  Surgeon: Taylor Sandoval MD;  Location: [facility];  Service: Cardiology     RELEASE CARPAL TUNNEL       ZZC TOTAL KNEE ARTHROPLASTY Right 8/15/2019    Procedure: RIGHT  MINIMALLY TOTAL KNEE ARTHROPLASTY;  Surgeon: Keven Cerda MD;  Location: [facility];  Service: Orthopedics    no family history of premature coronary artery disease Social History     Socioeconomic History     Marital status:      Spouse name: Not on file     Number of children:  Not on file     Years of education: Not on file     Highest education level: Not on file   Occupational History     Not on file   Tobacco Use     Smoking status: Never     Smokeless tobacco: Never   Substance and Sexual Activity     Alcohol use: Yes     Comment: Alcoholic Drinks/day: rare     Drug use: No     Sexual activity: Not on file   Other Topics Concern     Parent/sibling w/ CABG, MI or angioplasty before 65F 55M? Not Asked   Social History Narrative    Retired.  Good sense of humor     Social Determinants of Health     Financial Resource Strain: Not on file   Food Insecurity: Not on file   Transportation Needs: Not on file   Physical Activity: Not on file   Stress: Not on file   Social Connections: Not on file   Intimate Partner Violence: Not on file   Housing Stability: Not on file           Lab Results    Chemistry/lipid CBC Cardiac Enzymes/BNP/TSH/INR   Lab Results   Component Value Date    CHOL 107 06/23/2022    HDL 38 (L) 06/23/2022    TRIG 35 06/23/2022    BUN 27 10/17/2022     10/17/2022    CO2 30 10/17/2022    Lab Results   Component Value Date    WBC 4.9 08/29/2022    HGB 12.3 (L) 08/29/2022    HCT 38.2 (L) 08/29/2022    MCV 99 08/29/2022     08/29/2022    Lab Results   Component Value Date    TROPONINI 0.09 03/14/2022     (H) 06/17/2022    TSH 4.26 10/17/2022    INR 1.03 02/11/2021     Lab Results   Component Value Date    TROPONINI 0.09 03/14/2022          Weight:    Wt Readings from Last 3 Encounters:   10/18/22 79.8 kg (176 lb)   09/21/22 79.4 kg (175 lb 1.6 oz)   09/08/22 84.4 kg (186 lb)       Allergies  Allergies   Allergen Reactions     No Known Allergies          Surgical History  Past Surgical History:   Procedure Laterality Date     ANGIOPLASTY       ARTHROSCOPY KNEE       CARDIOVERSION  2021     CARDIOVERSION  02/03/2022     CV CORONARY ANGIOGRAM N/A 10/23/2020    Procedure: Coronary Angiogram;  Surgeon: Varun Freire MD;  Location: [facility];   Service: Cardiology     CV LEFT HEART CATHETERIZATION WITHOUT LEFT VENTRICULOGRAM Left 10/23/2020    Procedure: Left Heart Catheterization Without Left Ventriculogram;  Surgeon: Varun Freire MD;  Location: [facility];  Service: Cardiology     EP BIV PACEMAKER INSERT N/A 3/25/2020    Procedure: EP Biventricular Pacemaker Insertion;  Surgeon: Taylor Sandoval MD;  Location: [facility];  Service: Cardiology     RELEASE CARPAL TUNNEL       ZZC TOTAL KNEE ARTHROPLASTY Right 8/15/2019    Procedure: RIGHT  MINIMALLY TOTAL KNEE ARTHROPLASTY;  Surgeon: Keven Cerda MD;  Location: [facility];  Service: Orthopedics       Social History  Tobacco:   History   Smoking Status     Never   Smokeless Tobacco     Never    Alcohol:   Social History    Substance and Sexual Activity      Alcohol use: Yes        Comment: Alcoholic Drinks/day: rare   Illicit Drugs:   History   Drug Use No       Family History  Family History   Problem Relation Age of Onset     Alzheimer Disease Mother      Heart Disease Father      Cancer Sister      Diabetes Type 2  Sister      Chronic Obstructive Pulmonary Disease Sister      LUNG DISEASE Brother       John Paul Vasquez MD on 10/18/2022      cc: Jamie Noguera    Thank you for allowing me to participate in the care of your patient.      Sincerely,     John Paul Vasquez MD     [facility]  cc:   No referring provider defined for this encounter.

## 2022-10-24 NOTE — TELEPHONE ENCOUNTER
[facility] Call Center    Phone Message    May a detailed message be left on voicemail: yes     Reason for Call: Medication Refill Request    Has the patient contacted the pharmacy for the refill? Yes   Name of medication being requested: Potassium 20 MEQ  Provider who prescribed the medication: Dr. Leigh  Pharmacy: [id number] - [place], [place] - [address]    Date medication is needed: 10/28/22  Patient had Potassium increased from once to twice a day. They will be running out the first part of next week. Please call new rx for pharmacy.         Action Taken: Other: caridology    Travel Screening: Not Applicable   Thank you!  [facility]

## 2022-10-24 NOTE — TELEPHONE ENCOUNTER
[facility]    Phone Message    May a detailed message be left on voicemail: yes     Reason for Call: Other:     Juliet is calling to inform that the wrong form of potassium was ordered.  Please fill with extended release tablets.  She stated that this change was made in June.  Please call to verify.    Action Taken: Other: cardio    Travel Screening: Not Applicable     Thank you!  [facility]

## 2022-10-31 NOTE — LETTER
10/31/2022    ADDISON BENÍTEZ MD  [address]  [address] [facility] [address]    RE: Reymundo Petersen       Dear Colleague,     I had the pleasure of seeing Reymundo Petersen in the [facility].     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    Persistent atrial fibrillation - associated with recurrent episodes of decompensated heart failure, though unclear if primary or secondary  [id number], severe biatrial dilation in the setting of chronic systolic and diastolic heart failure with severe concentric LV thickening  We reviewed the pathophysiology of atrial fibrillation and management considerations including stroke risk and anticoagulation, rate control, cardioversion, antiarrhythmic drug therapy, and catheter ablation.  We discussed atrial fibrillation ablation procedures, anticipated success rates in light of his severe atrial dilation and possibly co-existing infiltrative cardiomyopathy, the potential need for re-do ablation vs addition of anti-arrhythmic drugs, procedural  risks (including groin bleeding, tamponade, phrenic or esophageal injury, stroke, pulmonary vein stenosis, lead dislodgement) and recovery expectations.  He is planned for atrial fibrillation ablation 11/23/2022.  - proceed with PVI, assessment for non-PV triggers, general anesthesia, hold amiodarone approximately 10d prior (plan to resume for 6-12 weeks post ablation), continue rivaroxaban, minimize intraprocedural fluid load and plan for post-procedure diuresis - he and his daughter would prefer an overnight stay post ablation  - continue amiodarone 200mg every other day for now - if he remains on this long term, will need interval follow up of potential toxicities including liver, thyroid, lung, eye function  - continue metoprolol XL 25mg daily  - continue rivaroxaban 20mg daily (10/17/2022 GFR 64ml/min) - given prior traumatic fall, recommend evaluation for percutaneous HAILE occlusion - we reviewed this today, and can discuss further following PVI    Chronic systolic and diastolic heart failure - LVEF 30-35% by 6/13/2022 TTE, 41% by 2/10/2022 MPI, MPI LVEF 41% 2/10/2022.  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  Low atrial sensing.  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    Mr. Petersen was noted to have complete heart block with junctional escape rhythm with note of LVEF 47% and underwent right chest wall HBP-P placement following unsuccessful CS lead placement 3/25/2020 (Dr. Taylor Sandoval).was noted to have onset of atrial fibrillation around 8/2021.  He was noted to have persistent AF with increasing OptiVol 8/29/2021 - he underwent DCCV 9/14/2021 after which OptiVol improved for a period.  He had rising OptiVol while in SR through 12/2021.  He redeveloped AF, was started on sotalol around 1/24/2022 and underwent DCCV 2/3/2022 with AF recurrence 2/6/2022.  He was started on amiodarone around mid 2/2022 and underwent DCCV 3/8/2022.  He was admitted 3/14/2022 to 3/16/2022 with decompensated heart failure treated with lasix IV diuresis.  Device check today 10/31/2022 shows general maintenance of sinus rhythm since 3/8/2022.  He was noted to have some corneal deposits by his ophthalmologist.  He is scheduled for atrial fibrillation ablation 11/23/2022.      He notes no present limiting exertional tolerance and denies significant lower extremity edema.  His baseline weight is around 185lbs.    He had a fall 7/2021with traumatic head injury and hematoma.  He denies syncope, chest pain.       Physical Examination  Review of Systems   VITALS: BP (!) 82/52 (BP Location: Right arm, Patient Position: Sitting, Cuff Size: Adult Regular)   Pulse 65   Resp 16   Ht 1.778 m (5' 10\")   Wt 79.3 kg (174 lb 12.8 oz)   BMI 25.08 kg/m    Wt Readings from Last 3 Encounters:   10/18/22 79.8 kg (176 lb)   09/21/22 79.4 kg (175 lb 1.6 oz)   09/08/22 84.4 kg (186 lb) "     CONSTITUTIONAL: well nourished, comfortable, no distress  EYES:  Conjunctivae pink, sclerae clear.    E/N/T:  Oral mucosa pink  RESPIRATORY:  Respiratory effort is normal  CARDIOVASCULAR:  normal S1 and S2  GASTROINTESTINAL:  Abdomen without masses or tenderness  EXTREMITIES:  No clubbing or cyanosis.    MUSCULOSKELETAL:  Overall grossly normal muscle strength  SKIN:  Overall, skin warm and dry, no lesions.  NEURO/PSYCH:  Oriented x 3 with normal affect.   Constitutional:  No weight loss or loss of appetite    Eyes:  No difficulty with vision, no double vision, no dry eyes  ENT:  No sore throat, difficulty swallowing; changes in hearing or tinnitus  Cardiovascular: As detailed above  Respiratory:  No cough  Musculoskeletal  No joint pain, muscle aches  Neurologic:  No syncope, lightheadedness, fainting spells   Hematologic: No easy bruising, excessive bleeding tendency   Gastrointestinal:  No jaundice, abdominal pain or abdominal bloating  Genitourinary: No changes in urinary habits, no trouble urinating    Psychiatric: No anxiety or depression      Medical History  Surgical History   Past Medical History:   Diagnosis Date     Atrial fibrillation (H)      Atrial fibrillation, transient (H) 8/11/2020     BPH (benign prostatic hyperplasia) 7/11/2018     Chronic combined systolic and diastolic heart failure (H) 2/11/2021     Congestive heart failure (H)      Coronary artery disease      COVID 02/2021     Dilated cardiomyopathy (H) 2/11/2021     MARIAN (generalized anxiety disorder) 2/11/2021     GERD (gastroesophageal reflux disease)      High cholesterol      Hypertension      LBBB (left bundle branch block) 7/11/2018     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     Pulmonary hypertension (H) 4/1/2020    Past Surgical History:   Procedure Laterality Date     ANGIOPLASTY       ARTHROSCOPY KNEE       CARDIOVERSION  2021     CARDIOVERSION  02/03/2022     CV CORONARY ANGIOGRAM N/A 10/23/2020    Procedure:  Coronary Angiogram;  Surgeon: Varun Freire MD;  Location: [facility] Lab;  Service: Cardiology     CV LEFT HEART CATHETERIZATION WITHOUT LEFT VENTRICULOGRAM Left 10/23/2020    Procedure: Left Heart Catheterization Without Left Ventriculogram;  Surgeon: Varun Freire MD;  Location: [facility];  Service: Cardiology     EP BIV PACEMAKER INSERT N/A 3/25/2020    Procedure: EP Biventricular Pacemaker Insertion;  Surgeon: Taylor Sandoval MD;  Location: [facility] Cath Lab;  Service: Cardiology     RELEASE CARPAL TUNNEL       ZZC TOTAL KNEE ARTHROPLASTY Right 8/15/2019    Procedure: RIGHT  MINIMALLY TOTAL KNEE ARTHROPLASTY;  Surgeon: Keven Cerda MD;  Location: [facility] OR;  Service: Orthopedics         Family History Social History   Family History   Problem Relation Age of Onset     Alzheimer Disease Mother      Heart Disease Father      Cancer Sister      Diabetes Type 2  Sister      Chronic Obstructive Pulmonary Disease Sister      LUNG DISEASE Brother         Social History     Tobacco Use     Smoking status: Never     Smokeless tobacco: Never   Substance Use Topics     Alcohol use: Yes     Comment: Alcoholic Drinks/day: rare     Drug use: No         Medications  Allergies     Current Outpatient Medications:      acetaminophen (TYLENOL) 500 MG tablet, Take 500 mg by mouth as needed for mild pain, Disp: , Rfl:      amiodarone (PACERONE) 200 MG tablet, Take 1 tablet (200 mg) by mouth every other day, Disp: 45 tablet, Rfl: 0     aspirin (ASA) 81 MG chewable tablet, Take 1 tablet (81 mg) by mouth every other day (Patient taking differently: Take 81 mg by mouth every other day Taking Monday, Wednesday and Fridays), Disp: 30 tablet, Rfl: 0     atorvastatin (LIPITOR) 10 MG tablet, Take 10 mg by mouth every other day Even days, bedtime, Disp: , Rfl:      bumetanide (BUMEX) 2 MG tablet, Take 1 tablet (2 mg) by mouth 2 times daily, Disp: 180 tablet, Rfl: 3     coenzyme Q-10 200 MG  CAPS capsule, Take 300 mg by mouth daily, Disp: , Rfl:      doxazosin (CARDURA) 8 MG tablet, [DOXAZOSIN (CARDURA) 8 MG TABLET] Take 4 mg by mouth at bedtime.       , Disp: , Rfl:      finasteride (PROSCAR) 5 mg tablet, [FINASTERIDE (PROSCAR) 5 MG TABLET] Take 5 mg by mouth at bedtime.       , Disp: , Rfl:      gabapentin (NEURONTIN) 100 MG capsule, Two capsules in the morning, two capsules in the afternoon, and three capsules in the evening, Disp: , Rfl:      glucosamine/chondr cole A sod (OSTEO BI-FLEX ORAL), [GLUCOSAMINE/CHONDR COLE A SOD (OSTEO BI-FLEX ORAL)] Take 1 tablet by mouth 2 (two) times a day.       , Disp: , Rfl:      losartan (COZAAR) 25 MG tablet, [LOSARTAN (COZAAR) 25 MG TABLET] Take 1 tablet (25 mg total) by mouth daily., Disp: 30 tablet, Rfl: 0     melatonin 10 mg Tab, Take 10 mg by mouth At Bedtime , Disp: , Rfl:      metoprolol succinate ER (TOPROL-XL) 25 MG 24 hr tablet, Take 1 tablet (25 mg) by mouth At Bedtime, Disp: 90 tablet, Rfl: 3     multivitamin, therapeutic (THERA-VIT) TABS tablet, Take 1 tablet by mouth daily, Disp: , Rfl:      omeprazole (PRILOSEC) 20 MG capsule, [OMEPRAZOLE (PRILOSEC) 20 MG CAPSULE] Take 20 mg by mouth 2 (two) times a day before meals., Disp: , Rfl:      potassium chloride ER (KLOR-CON M) 20 MEQ CR tablet, Take 1 tablet (20 mEq) by mouth 2 times daily, Disp: 180 tablet, Rfl: 1     rivaroxaban ANTICOAGULANT (XARELTO) 20 MG TABS tablet, Take 1 tablet (20 mg) by mouth daily, Disp: 90 tablet, Rfl: 3     sertraline (ZOLOFT) 50 MG tablet, Take 50 mg by mouth 2 times daily , Disp: , Rfl:      vit A/vit C/vit E/zinc/copper (PRESERVISION AREDS ORAL), [VIT A/VIT C/VIT E/ZINC/COPPER (PRESERVISION AREDS ORAL)] Take 1 tablet by mouth 2 (two) times a day., Disp: , Rfl:      Allergies   Allergen Reactions     No Known Allergies           Lab Results    Chemistry CBC Cardiac Enzymes/BNP/TSH/INR   Recent Labs   Lab Test 03/22/22  1616      POTASSIUM 3.8   CHLORIDE 98   CO2 32*       BUN 13   CR 1.17   GFRESTIMATED 62   ANGELY 9.1     Recent Labs   Lab Test 03/22/22  1616 03/16/22  0506 03/15/22  0437   CR 1.17 1.03 1.16          Recent Labs   Lab Test 03/14/22  1454   WBC 6.3   HGB 11.6*   HCT 36.2*   MCV 96        Recent Labs   Lab Test 03/14/22  1454 01/18/22  1526 07/02/21  2010   HGB 11.6* 13.1* 14.8    Recent Labs   Lab Test 03/14/22  2055 03/14/22  1454 02/11/21  1724   TROPONINI 0.09 0.07 0.14     Recent Labs   Lab Test 03/14/22  1454 01/18/22  1526 02/11/21  1724   BNP 1,163* 661* 436*     Recent Labs   Lab Test 03/14/22  1454   TSH 7.62*     Recent Labs   Lab Test 02/11/21  1724 03/24/20  1745 08/15/19  0811   INR 1.03 1.05 0.98         Data Review    ECGs (tracings independently reviewed)  9/21/2022 - ApVp 64bpm, QRS 154ms  3/14/2022 - ApVp 65bpm, QRS 148ms    10/31/2022 HBP-P check  Normal lead and device function - presenting ApVp 65bpm  No arrhythmia episodes  91.5% RAp, 99.8% BiVp  Estimated remaining battery longevity 5.8yrs    4/14/2022  HBP-P check  Normal lead and device function - low atrial sensing (0.1mV bipolar, 1.1mV bipolar)  No atrial fibrillation episodes since 3/8/2022 (DCCV)  98% RA pacing, 99.7% BiV pacing  Estimated remaining battery longevity 6.8yrs    5/19/2021 TTE    Left ventricular cavity size is normal. Severe concentric increase in wall thickness. Ejection fraction is mildly decreased. The calculated left ventricular ejection fraction is 43%.    Right ventricular cavity size is mildly dilated. Normal right ventricular systolic function.    Severe biatrial enlargement.    Mild aortic regurgitation.    The ascending aorta is mildly dilated measuring 4.1 cm.    Moderate pulmonary hypertension present. The estimated systolic pulmonary artery pressure is 51 mm Hg.    When compared to the previous study dated 9/1/2020, there has been no significant change.    2/10/2022 MPI     The nuclear stress test is probably negative for inducible myocardial  ischemia or infarction. Cannot exclude a small non-transmural apical inferior infarct but it is likely diaphragmatic attenuation     The left ventricular ejection fraction at stress is 41% without focal wall motion abnormality.     A prior study was conducted on 10/7/2020.  Prior images were unavailable for comparison review, but the report sounds similar to what is seen today, except that TID is no longer present.       Cc: Marek Clark CNP, Jorge A Leigh MD, Jamie Noguera MD Amila Dilusha William, MD  10/31/2022  2:19 PM        Thank you for allowing me to participate in the care of your patient.      Sincerely,     Ole Michelle MD     [facility]  cc:   Ole Michelle MD  [address]  [place] [address]

## 2022-10-31 NOTE — H&P (VIEW-ONLY)
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    Persistent atrial fibrillation - associated with recurrent episodes of decompensated heart failure, though unclear if primary or secondary  [id number], severe biatrial dilation in the setting of chronic systolic and diastolic heart failure with severe concentric LV thickening  We reviewed the pathophysiology of atrial fibrillation and management considerations including stroke risk and anticoagulation, rate control, cardioversion, antiarrhythmic drug therapy, and catheter ablation.  We discussed atrial fibrillation ablation procedures, anticipated success rates in light of his severe atrial dilation and possibly co-existing infiltrative cardiomyopathy, the potential need for re-do ablation vs addition of anti-arrhythmic drugs, procedural risks (including groin bleeding, tamponade, phrenic or esophageal injury, stroke, pulmonary vein stenosis, lead dislodgement) and recovery expectations.  He is planned for atrial fibrillation ablation  11/23/2022.  - proceed with PVI, assessment for non-PV triggers, general anesthesia, hold amiodarone approximately 10d prior (plan to resume for 6-12 weeks post ablation), continue rivaroxaban, minimize intraprocedural fluid load and plan for post-procedure diuresis - he and his daughter would prefer an overnight stay post ablation  - continue amiodarone 200mg every other day for now - if he remains on this long term, will need interval follow up of potential toxicities including liver, thyroid, lung, eye function  - continue metoprolol XL 25mg daily  - continue rivaroxaban 20mg daily (10/17/2022 GFR 64ml/min) - given prior traumatic fall, recommend evaluation for percutaneous HAILE occlusion - we reviewed this today, and can discuss further following PVI    Chronic systolic and diastolic heart failure - LVEF 30-35% by 6/13/2022 TTE, 41% by 2/10/2022 MPI, MPI LVEF 41% 2/10/2022.  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  Low atrial sensing.  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    Mr. Petersen was noted to have complete heart block with junctional escape rhythm with note of LVEF 47% and underwent right chest wall HBP-P placement following unsuccessful CS lead placement 3/25/2020 (Dr. Taylor Sandoval).was noted to have onset of atrial fibrillation around 8/2021.  He was noted to have persistent AF with increasing OptiVol 8/29/2021 - he underwent DCCV 9/14/2021 after which OptiVol improved for a period.  He had rising OptiVol while in SR through 12/2021.  He redeveloped AF, was started on sotalol around 1/24/2022 and underwent DCCV 2/3/2022 with AF recurrence 2/6/2022.  He was started on amiodarone around mid 2/2022 and underwent DCCV 3/8/2022.  He was admitted 3/14/2022 to 3/16/2022 with decompensated heart failure treated with lasix IV diuresis.  Device check today 10/31/2022 shows general maintenance of sinus rhythm since 3/8/2022.  He was noted to have some corneal deposits by his ophthalmologist.  He is scheduled for atrial fibrillation ablation 11/23/2022.      He notes no present limiting exertional tolerance and denies significant lower extremity edema.  His baseline weight is around 185lbs.    He had a fall 7/2021with traumatic head injury and hematoma.  He denies syncope, chest pain.       Physical Examination  Review of Systems   VITALS: BP (!) 82/52 (BP Location: Right arm, Patient Position: Sitting, Cuff Size: Adult Regular)   Pulse 65   Resp 16   Ht 1.778 m (5' 10\")   Wt 79.3 kg (174 lb 12.8 oz)   BMI 25.08 kg/m    Wt Readings from Last 3 Encounters:   10/18/22 79.8 kg (176 lb)   09/21/22 79.4 kg (175 lb 1.6 oz)   09/08/22 84.4 kg (186 lb)     CONSTITUTIONAL: well nourished, comfortable, no distress  EYES:  Conjunctivae pink, sclerae clear.    E/N/T:  Oral mucosa pink  RESPIRATORY:  Respiratory effort is normal  CARDIOVASCULAR:  normal S1 and " S2  GASTROINTESTINAL:  Abdomen without masses or tenderness  EXTREMITIES:  No clubbing or cyanosis.    MUSCULOSKELETAL:  Overall grossly normal muscle strength  SKIN:  Overall, skin warm and dry, no lesions.  NEURO/PSYCH:  Oriented x 3 with normal affect.   Constitutional:  No weight loss or loss of appetite    Eyes:  No difficulty with vision, no double vision, no dry eyes  ENT:  No sore throat, difficulty swallowing; changes in hearing or tinnitus  Cardiovascular: As detailed above  Respiratory:  No cough  Musculoskeletal  No joint pain, muscle aches  Neurologic:  No syncope, lightheadedness, fainting spells   Hematologic: No easy bruising, excessive bleeding tendency   Gastrointestinal:  No jaundice, abdominal pain or abdominal bloating  Genitourinary: No changes in urinary habits, no trouble urinating    Psychiatric: No anxiety or depression      Medical History  Surgical History   Past Medical History:   Diagnosis Date     Atrial fibrillation (H)      Atrial fibrillation, transient (H) 8/11/2020     BPH (benign prostatic hyperplasia) 7/11/2018     Chronic combined systolic and diastolic heart failure (H) 2/11/2021     Congestive heart failure (H)      Coronary artery disease      COVID 02/2021     Dilated cardiomyopathy (H) 2/11/2021     MARIAN (generalized anxiety disorder) 2/11/2021     GERD (gastroesophageal reflux disease)      High cholesterol      Hypertension      LBBB (left bundle branch block) 7/11/2018     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     Pulmonary hypertension (H) 4/1/2020    Past Surgical History:   Procedure Laterality Date     ANGIOPLASTY       ARTHROSCOPY KNEE       CARDIOVERSION  2021     CARDIOVERSION  02/03/2022     CV CORONARY ANGIOGRAM N/A 10/23/2020    Procedure: Coronary Angiogram;  Surgeon: Varun Freire MD;  Location: [facility];  Service: Cardiology     CV LEFT HEART CATHETERIZATION WITHOUT LEFT VENTRICULOGRAM Left 10/23/2020    Procedure: Left Heart  Catheterization Without Left Ventriculogram;  Surgeon: Varun Freire MD;  Location: [facility] Lab;  Service: Cardiology     EP BIV PACEMAKER INSERT N/A 3/25/2020    Procedure: EP Biventricular Pacemaker Insertion;  Surgeon: Taylor Sandoval MD;  Location: [facility] Cath Lab;  Service: Cardiology     RELEASE CARPAL TUNNEL       ZZC TOTAL KNEE ARTHROPLASTY Right 8/15/2019    Procedure: RIGHT  MINIMALLY TOTAL KNEE ARTHROPLASTY;  Surgeon: Keven Cerda MD;  Location: [facility];  Service: Orthopedics         Family History Social History   Family History   Problem Relation Age of Onset     Alzheimer Disease Mother      Heart Disease Father      Cancer Sister      Diabetes Type 2  Sister      Chronic Obstructive Pulmonary Disease Sister      LUNG DISEASE Brother         Social History     Tobacco Use     Smoking status: Never     Smokeless tobacco: Never   Substance Use Topics     Alcohol use: Yes     Comment: Alcoholic Drinks/day: rare     Drug use: No         Medications  Allergies     Current Outpatient Medications:      acetaminophen (TYLENOL) 500 MG tablet, Take 500 mg by mouth as needed for mild pain, Disp: , Rfl:      amiodarone (PACERONE) 200 MG tablet, Take 1 tablet (200 mg) by mouth every other day, Disp: 45 tablet, Rfl: 0     aspirin (ASA) 81 MG chewable tablet, Take 1 tablet (81 mg) by mouth every other day (Patient taking differently: Take 81 mg by mouth every other day Taking Monday, Wednesday and Fridays), Disp: 30 tablet, Rfl: 0     atorvastatin (LIPITOR) 10 MG tablet, Take 10 mg by mouth every other day Even days, bedtime, Disp: , Rfl:      bumetanide (BUMEX) 2 MG tablet, Take 1 tablet (2 mg) by mouth 2 times daily, Disp: 180 tablet, Rfl: 3     coenzyme Q-10 200 MG CAPS capsule, Take 300 mg by mouth daily, Disp: , Rfl:      doxazosin (CARDURA) 8 MG tablet, [DOXAZOSIN (CARDURA) 8 MG TABLET] Take 4 mg by mouth at bedtime.       , Disp: , Rfl:      finasteride (PROSCAR) 5 mg  tablet, [FINASTERIDE (PROSCAR) 5 MG TABLET] Take 5 mg by mouth at bedtime.       , Disp: , Rfl:      gabapentin (NEURONTIN) 100 MG capsule, Two capsules in the morning, two capsules in the afternoon, and three capsules in the evening, Disp: , Rfl:      glucosamine/chondr cole A sod (OSTEO BI-FLEX ORAL), [GLUCOSAMINE/CHONDR COLE A SOD (OSTEO BI-FLEX ORAL)] Take 1 tablet by mouth 2 (two) times a day.       , Disp: , Rfl:      losartan (COZAAR) 25 MG tablet, [LOSARTAN (COZAAR) 25 MG TABLET] Take 1 tablet (25 mg total) by mouth daily., Disp: 30 tablet, Rfl: 0     melatonin 10 mg Tab, Take 10 mg by mouth At Bedtime , Disp: , Rfl:      metoprolol succinate ER (TOPROL-XL) 25 MG 24 hr tablet, Take 1 tablet (25 mg) by mouth At Bedtime, Disp: 90 tablet, Rfl: 3     multivitamin, therapeutic (THERA-VIT) TABS tablet, Take 1 tablet by mouth daily, Disp: , Rfl:      omeprazole (PRILOSEC) 20 MG capsule, [OMEPRAZOLE (PRILOSEC) 20 MG CAPSULE] Take 20 mg by mouth 2 (two) times a day before meals., Disp: , Rfl:      potassium chloride ER (KLOR-CON M) 20 MEQ CR tablet, Take 1 tablet (20 mEq) by mouth 2 times daily, Disp: 180 tablet, Rfl: 1     rivaroxaban ANTICOAGULANT (XARELTO) 20 MG TABS tablet, Take 1 tablet (20 mg) by mouth daily, Disp: 90 tablet, Rfl: 3     sertraline (ZOLOFT) 50 MG tablet, Take 50 mg by mouth 2 times daily , Disp: , Rfl:      vit A/vit C/vit E/zinc/copper (PRESERVISION AREDS ORAL), [VIT A/VIT C/VIT E/ZINC/COPPER (PRESERVISION AREDS ORAL)] Take 1 tablet by mouth 2 (two) times a day., Disp: , Rfl:      Allergies   Allergen Reactions     No Known Allergies           Lab Results    Chemistry CBC Cardiac Enzymes/BNP/TSH/INR   Recent Labs   Lab Test 03/22/22  1616      POTASSIUM 3.8   CHLORIDE 98   CO2 32*      BUN 13   CR 1.17   GFRESTIMATED 62   ANGELY 9.1     Recent Labs   Lab Test 03/22/22  1616 03/16/22  0506 03/15/22  0437   CR 1.17 1.03 1.16          Recent Labs   Lab Test 03/14/22  1454   WBC 6.3   HGB  11.6*   HCT 36.2*   MCV 96        Recent Labs   Lab Test 03/14/22  1454 01/18/22  1526 07/02/21 2010   HGB 11.6* 13.1* 14.8    Recent Labs   Lab Test 03/14/22  2055 03/14/22  1454 02/11/21  1724   TROPONINI 0.09 0.07 0.14     Recent Labs   Lab Test 03/14/22  1454 01/18/22  1526 02/11/21  1724   BNP 1,163* 661* 436*     Recent Labs   Lab Test 03/14/22  1454   TSH 7.62*     Recent Labs   Lab Test 02/11/21  1724 03/24/20  1745 08/15/19  0811   INR 1.03 1.05 0.98         Data Review    ECGs (tracings independently reviewed)  9/21/2022 - ApVp 64bpm, QRS 154ms  3/14/2022 - ApVp 65bpm, QRS 148ms    10/31/2022 HBP-P check  Normal lead and device function - presenting ApVp 65bpm  No arrhythmia episodes  91.5% RAp, 99.8% BiVp  Estimated remaining battery longevity 5.8yrs    4/14/2022  HBP-P check  Normal lead and device function - low atrial sensing (0.1mV bipolar, 1.1mV bipolar)  No atrial fibrillation episodes since 3/8/2022 (DCCV)  98% RA pacing, 99.7% BiV pacing  Estimated remaining battery longevity 6.8yrs    5/19/2021 TTE    Left ventricular cavity size is normal. Severe concentric increase in wall thickness. Ejection fraction is mildly decreased. The calculated left ventricular ejection fraction is 43%.    Right ventricular cavity size is mildly dilated. Normal right ventricular systolic function.    Severe biatrial enlargement.    Mild aortic regurgitation.    The ascending aorta is mildly dilated measuring 4.1 cm.    Moderate pulmonary hypertension present. The estimated systolic pulmonary artery pressure is 51 mm Hg.    When compared to the previous study dated 9/1/2020, there has been no significant change.    2/10/2022 MPI     The nuclear stress test is probably negative for inducible myocardial ischemia or infarction. Cannot exclude a small non-transmural apical inferior infarct but it is likely diaphragmatic attenuation     The left ventricular ejection fraction at stress is 41% without focal wall motion  abnormality.     A prior study was conducted on 10/7/2020.  Prior images were unavailable for comparison review, but the report sounds similar to what is seen today, except that TID is no longer present.       Cc: Marek Clark [facility], Jorge A Leigh MD, Jamie Noguera MD Amila Dilusha William, MD  10/31/2022  2:19 PM

## 2022-10-31 NOTE — PROGRESS NOTES
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    Persistent atrial fibrillation - associated with recurrent episodes of decompensated heart failure, though unclear if primary or secondary  [id number] 5, severe biatrial dilation in the setting of chronic systolic and diastolic heart failure with severe concentric LV thickening  We reviewed the pathophysiology of atrial fibrillation and management considerations including stroke risk and anticoagulation, rate control, cardioversion, antiarrhythmic drug therapy, and catheter ablation.  We discussed atrial fibrillation ablation procedures, anticipated success rates in light of his severe atrial dilation and possibly co-existing infiltrative cardiomyopathy, the potential need for re-do ablation vs addition of anti-arrhythmic drugs, procedural risks (including groin bleeding, tamponade, phrenic or esophageal injury, stroke, pulmonary vein stenosis, lead dislodgement) and recovery expectations.  He is planned for atrial fibrillation ablation  11/23/2022.  - proceed with PVI, assessment for non-PV triggers, general anesthesia, hold amiodarone approximately 10d prior (plan to resume for 6-12 weeks post ablation), continue rivaroxaban, minimize intraprocedural fluid load and plan for post-procedure diuresis - he and his daughter would prefer an overnight stay post ablation  - continue amiodarone 200mg every other day for now - if he remains on this long term, will need interval follow up of potential toxicities including liver, thyroid, lung, eye function  - continue metoprolol XL 25mg daily  - continue rivaroxaban 20mg daily (10/17/2022 GFR 64ml/min) - given prior traumatic fall, recommend evaluation for percutaneous HAILE occlusion - we reviewed this today, and can discuss further following PVI    Chronic systolic and diastolic heart failure - LVEF 30-35% by 6/13/2022 TTE, 41% by 2/10/2022 MPI, MPI LVEF 41% 2/10/2022.  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  Low atrial sensing.  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    Mr. Petersen was noted to have complete heart block with junctional escape rhythm with note of LVEF 47% and underwent right chest wall HBP-P placement following unsuccessful CS lead placement 3/25/2020 (Dr. Taylor Sandoval).was noted to have onset of atrial fibrillation around 8/2021.  He was noted to have persistent AF with increasing OptiVol 8/29/2021 - he underwent DCCV 9/14/2021 after which OptiVol improved for a period.  He had rising OptiVol while in SR through 12/2021.  He redeveloped AF, was started on sotalol around 1/24/2022 and underwent DCCV 2/3/2022 with AF recurrence 2/6/2022.  He was started on amiodarone around mid 2/2022 and underwent DCCV 3/8/2022.  He was admitted 3/14/2022 to 3/16/2022 with decompensated heart failure treated with lasix IV diuresis.  Device check today 10/31/2022 shows general maintenance of sinus rhythm since 3/8/2022.  He was noted to have some corneal deposits by his ophthalmologist.  He is scheduled for atrial fibrillation ablation 11/23/2022.      He notes no present limiting exertional tolerance and denies significant lower extremity edema.  His baseline weight is around 185lbs.    He had a fall 7/2021with traumatic head injury and hematoma.  He denies syncope, chest pain.       Physical Examination  Review of Systems   VITALS: BP (!) 82/52 (BP Location: Right arm, Patient Position: Sitting, Cuff Size: Adult Regular)   Pulse 65   Resp 16   Ht 1.778 m (5' 10\")   Wt 79.3 kg (174 lb 12.8 oz)   BMI 25.08 kg/m    Wt Readings from Last 3 Encounters:   10/18/22 79.8 kg (176 lb)   09/21/22 79.4 kg (175 lb 1.6 oz)   09/08/22 84.4 kg (186 lb)     CONSTITUTIONAL: well nourished, comfortable, no distress  EYES:  Conjunctivae pink, sclerae clear.    E/N/T:  Oral mucosa pink  RESPIRATORY:  Respiratory effort is normal  CARDIOVASCULAR:  normal S1 and " S2  GASTROINTESTINAL:  Abdomen without masses or tenderness  EXTREMITIES:  No clubbing or cyanosis.    MUSCULOSKELETAL:  Overall grossly normal muscle strength  SKIN:  Overall, skin warm and dry, no lesions.  NEURO/PSYCH:  Oriented x 3 with normal affect.   Constitutional:  No weight loss or loss of appetite    Eyes:  No difficulty with vision, no double vision, no dry eyes  ENT:  No sore throat, difficulty swallowing; changes in hearing or tinnitus  Cardiovascular: As detailed above  Respiratory:  No cough  Musculoskeletal  No joint pain, muscle aches  Neurologic:  No syncope, lightheadedness, fainting spells   Hematologic: No easy bruising, excessive bleeding tendency   Gastrointestinal:  No jaundice, abdominal pain or abdominal bloating  Genitourinary: No changes in urinary habits, no trouble urinating    Psychiatric: No anxiety or depression      Medical History  Surgical History   Past Medical History:   Diagnosis Date     Atrial fibrillation (H)      Atrial fibrillation, transient (H) 8/11/2020     BPH (benign prostatic hyperplasia) 7/11/2018     Chronic combined systolic and diastolic heart failure (H) 2/11/2021     Congestive heart failure (H)      Coronary artery disease      COVID 02/2021     Dilated cardiomyopathy (H) 2/11/2021     MARIAN (generalized anxiety disorder) 2/11/2021     GERD (gastroesophageal reflux disease)      High cholesterol      Hypertension      LBBB (left bundle branch block) 7/11/2018     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     Pulmonary hypertension (H) 4/1/2020    Past Surgical History:   Procedure Laterality Date     ANGIOPLASTY       ARTHROSCOPY KNEE       CARDIOVERSION  2021     CARDIOVERSION  02/03/2022     CV CORONARY ANGIOGRAM N/A 10/23/2020    Procedure: Coronary Angiogram;  Surgeon: Varun Freire MD;  Location: [facility];  Service: Cardiology     CV LEFT HEART CATHETERIZATION WITHOUT LEFT VENTRICULOGRAM Left 10/23/2020    Procedure: Left Heart  Catheterization Without Left Ventriculogram;  Surgeon: Varun Freire MD;  Location: [facility];  Service: Cardiology     EP BIV PACEMAKER INSERT N/A 3/25/2020    Procedure: EP Biventricular Pacemaker Insertion;  Surgeon: Taylor Sandoval MD;  Location: [facility];  Service: Cardiology     RELEASE CARPAL TUNNEL       ZZC TOTAL KNEE ARTHROPLASTY Right 8/15/2019    Procedure: RIGHT  MINIMALLY TOTAL KNEE ARTHROPLASTY;  Surgeon: Keven Cerda MD;  Location: [facility];  Service: Orthopedics         Family History Social History   Family History   Problem Relation Age of Onset     Alzheimer Disease Mother      Heart Disease Father      Cancer Sister      Diabetes Type 2  Sister      Chronic Obstructive Pulmonary Disease Sister      LUNG DISEASE Brother         Social History     Tobacco Use     Smoking status: Never     Smokeless tobacco: Never   Substance Use Topics     Alcohol use: Yes     Comment: Alcoholic Drinks/day: rare     Drug use: No         Medications  Allergies     Current Outpatient Medications:      acetaminophen (TYLENOL) 500 MG tablet, Take 500 mg by mouth as needed for mild pain, Disp: , Rfl:      amiodarone (PACERONE) 200 MG tablet, Take 1 tablet (200 mg) by mouth every other day, Disp: 45 tablet, Rfl: 0     aspirin (ASA) 81 MG chewable tablet, Take 1 tablet (81 mg) by mouth every other day (Patient taking differently: Take 81 mg by mouth every other day Taking Monday, Wednesday and Fridays), Disp: 30 tablet, Rfl: 0     atorvastatin (LIPITOR) 10 MG tablet, Take 10 mg by mouth every other day Even days, bedtime, Disp: , Rfl:      bumetanide (BUMEX) 2 MG tablet, Take 1 tablet (2 mg) by mouth 2 times daily, Disp: 180 tablet, Rfl: 3     coenzyme Q-10 200 MG CAPS capsule, Take 300 mg by mouth daily, Disp: , Rfl:      doxazosin (CARDURA) 8 MG tablet, [DOXAZOSIN (CARDURA) 8 MG TABLET] Take 4 mg by mouth at bedtime.       , Disp: , Rfl:      finasteride (PROSCAR) 5 mg  tablet, [FINASTERIDE (PROSCAR) 5 MG TABLET] Take 5 mg by mouth at bedtime.       , Disp: , Rfl:      gabapentin (NEURONTIN) 100 MG capsule, Two capsules in the morning, two capsules in the afternoon, and three capsules in the evening, Disp: , Rfl:      glucosamine/chondr cole A sod (OSTEO BI-FLEX ORAL), [GLUCOSAMINE/CHONDR COLE A SOD (OSTEO BI-FLEX ORAL)] Take 1 tablet by mouth 2 (two) times a day.       , Disp: , Rfl:      losartan (COZAAR) 25 MG tablet, [LOSARTAN (COZAAR) 25 MG TABLET] Take 1 tablet (25 mg total) by mouth daily., Disp: 30 tablet, Rfl: 0     melatonin 10 mg Tab, Take 10 mg by mouth At Bedtime , Disp: , Rfl:      metoprolol succinate ER (TOPROL-XL) 25 MG 24 hr tablet, Take 1 tablet (25 mg) by mouth At Bedtime, Disp: 90 tablet, Rfl: 3     multivitamin, therapeutic (THERA-VIT) TABS tablet, Take 1 tablet by mouth daily, Disp: , Rfl:      omeprazole (PRILOSEC) 20 MG capsule, [OMEPRAZOLE (PRILOSEC) 20 MG CAPSULE] Take 20 mg by mouth 2 (two) times a day before meals., Disp: , Rfl:      potassium chloride ER (KLOR-CON M) 20 MEQ CR tablet, Take 1 tablet (20 mEq) by mouth 2 times daily, Disp: 180 tablet, Rfl: 1     rivaroxaban ANTICOAGULANT (XARELTO) 20 MG TABS tablet, Take 1 tablet (20 mg) by mouth daily, Disp: 90 tablet, Rfl: 3     sertraline (ZOLOFT) 50 MG tablet, Take 50 mg by mouth 2 times daily , Disp: , Rfl:      vit A/vit C/vit E/zinc/copper (PRESERVISION AREDS ORAL), [VIT A/VIT C/VIT E/ZINC/COPPER (PRESERVISION AREDS ORAL)] Take 1 tablet by mouth 2 (two) times a day., Disp: , Rfl:      Allergies   Allergen Reactions     No Known Allergies           Lab Results    Chemistry CBC Cardiac Enzymes/BNP/TSH/INR   Recent Labs   Lab Test 03/22/22  1616      POTASSIUM 3.8   CHLORIDE 98   CO2 32*      BUN 13   CR 1.17   GFRESTIMATED 62   ANGELY 9.1     Recent Labs   Lab Test 03/22/22  1616 03/16/22  0506 03/15/22  0437   CR 1.17 1.03 1.16          Recent Labs   Lab Test 03/14/22  1454   WBC 6.3   HGB  11.6*   HCT 36.2*   MCV 96        Recent Labs   Lab Test 03/14/22  1454 01/18/22  1526 07/02/21 2010   HGB 11.6* 13.1* 14.8    Recent Labs   Lab Test 03/14/22  2055 03/14/22  1454 02/11/21  1724   TROPONINI 0.09 0.07 0.14     Recent Labs   Lab Test 03/14/22  1454 01/18/22  1526 02/11/21  1724   BNP 1,163* 661* 436*     Recent Labs   Lab Test 03/14/22  1454   TSH 7.62*     Recent Labs   Lab Test 02/11/21  1724 03/24/20  1745 08/15/19  0811   INR 1.03 1.05 0.98         Data Review    ECGs (tracings independently reviewed)  9/21/2022 - ApVp 64bpm, QRS 154ms  3/14/2022 - ApVp 65bpm, QRS 148ms    10/31/2022 HBP-P check  Normal lead and device function - presenting ApVp 65bpm  No arrhythmia episodes  91.5% RAp, 99.8% BiVp  Estimated remaining battery longevity 5.8yrs    4/14/2022  HBP-P check  Normal lead and device function - low atrial sensing (0.1mV bipolar, 1.1mV bipolar)  No atrial fibrillation episodes since 3/8/2022 (DCCV)  98% RA pacing, 99.7% BiV pacing  Estimated remaining battery longevity 6.8yrs    5/19/2021 TTE    Left ventricular cavity size is normal. Severe concentric increase in wall thickness. Ejection fraction is mildly decreased. The calculated left ventricular ejection fraction is 43%.    Right ventricular cavity size is mildly dilated. Normal right ventricular systolic function.    Severe biatrial enlargement.    Mild aortic regurgitation.    The ascending aorta is mildly dilated measuring 4.1 cm.    Moderate pulmonary hypertension present. The estimated systolic pulmonary artery pressure is 51 mm Hg.    When compared to the previous study dated 9/1/2020, there has been no significant change.    2/10/2022 MPI     The nuclear stress test is probably negative for inducible myocardial ischemia or infarction. Cannot exclude a small non-transmural apical inferior infarct but it is likely diaphragmatic attenuation     The left ventricular ejection fraction at stress is 41% without focal wall motion  abnormality.     A prior study was conducted on 10/7/2020.  Prior images were unavailable for comparison review, but the report sounds similar to what is seen today, except that TID is no longer present.       Cc: Marek Clark [facility], Jorge A Leigh MD, Jamie Noguera MD Amila Dilusha William, MD  10/31/2022  2:19 PM

## 2022-10-31 NOTE — PATIENT INSTRUCTIONS
[facility]  Cardiac Electrophysiology  [address]  [place] [address]   Office: [phone]  Fax: [phone]       Thank you for seeing us in clinic today - it is a pleasure to be a part of your care team.  Below is a summary of our plan from today's visit.       You have persistent atrial fibrillation, presently being managed with amiodarone and Xarelto.  We reviewed physiology and management options including antiarrhythmic drug therapy and catheter ablation.  We are scheduled for atrial fibrillation ablation 11/23/2022.    - I will ask our team to query overnight stay post ablation and nature of necessary COVID testing  - continue amiodarone 200mg every other day, stopping on 11/14/2022 (with plan to restart thereafter)  - continue Xarelto  - follow up on timing of [place] Tc PYP Imaging for Transthyretin Cardiac Amyloidosis     Please do not hesitate to be in touch with our office at [phone] with any questions that may arise.       Thank you for trusting us with your care,    Ole Michelle MD  Clinical Cardiac Electrophysiology  [facility]  [address]  [place] [address]   Office: [phone]  Fax: [phone]

## 2022-11-13 NOTE — TELEPHONE ENCOUNTER
Call from the patient's daughter about flank pain.  Patient is concerned that he has bilateral flank pain.  Worse with taking Bumex.  Has appointment today at 1 PM at urgent care through \[facility]\"".  Patient will hold morning dose of Bumex.  He has history of kidney stones.  They will follow-up with primary care team today

## 2022-11-16 NOTE — PROGRESS NOTES
HEART CARE NOTE          Assessment/Recommendations     1. HFrEF c/b ADHF  Assessment / Plan    Near euvoelmia on physical exam; denies HF symptoms of orthopnea, PND, fluid retention/edema; weight continues to trend down - no changes to regimen at this time    GDMT as detailed below      Current Pharmacotherapy AHA Guideline-Directed Medical Therapy   Losartan 25 mg daily Lisinopril 20 mg twice daily   Metoprolol succinate 25 mg daily Carvedilol 25 mg twice daily   Spironolactone not started Spironolactone 25 mg once daily   Hydralazine NA Hydralazine 100 mg three times daily   Isosorbide dinitrate NA Isosorbide dinitrate 40 mg three times daily   SGLT2 inhibitor: not started Dapagliflozin or Empagliflozin 10 mg daily      2. Arrhythmia  Assessment / Plan    Persistent atrial fibrillation s/p ablations and DCCV    Currently on rivaroxaban; being considered for Watchman device - continue amiodarone; PVI scheduled 11/23/22    Complete Heart Block s/p CRT-P    Follows with Dr. Michelle in EP as well as afib clinic     3. CAD  Assessment / Plan    Denies chest pain or anginal equivalents    Continue ASA, atorvastatin, metoprolol succinate, losartan      History of Present Illness/Subjective      Mr. Reymundo Petersen is a 81 year old male with a PMHx significant for  persistent atrial fibrillation, chronic systolic and diastolic heart failure due to nonischemic cardiomyopathy, CHB s/p CRT-P, CAD with prior RCA PCI, HTN and CKD stage 2 who presents to [facility] to establish care.     Today, Mr. Owusu (accompanied by his daughter) denies any acute cardiac events or complaints including orthopnea, PND, fluid retention, edema, chest pain, palpitations; Management plan as detailed above     ECG: Personally reviewed. Paced rhythm     ECHO (personnaly Reviewed):  The left ventricle is mildly dilated.  Left ventricular function is decreased. The ejection fraction is 30-35%  (moderately reduced).  There is moderate global  "hypokinesia of the left ventricle.  The right ventricle is mildly dilated.  The right ventricular systolic function is normal.  The left atrium is severely dilated.  The right atrium is moderately dilated.  There is moderately severe (3+) tricuspid regurgitation.  Right ventricle systolic pressure estimate normal  There is mild to moderate (1-2+) aortic regurgitation.  The ascending aorta is Mildly dilated.  IVC diameter >2.1 cm collapsing <50% with sniff suggests a high RA pressure  estimated at 15 mmHg or greater.  Small pericardial effusion  There are no echocardiographic indications of cardiac tamponade.          Physical Examination Review of Systems   /50 (BP Location: Right arm, Patient Position: Sitting, Cuff Size: Adult Regular)   Pulse 72   Resp 16   Ht 1.778 m (5' 10\")   Wt 78 kg (172 lb)   BMI 24.68 kg/m    Body mass index is 24.68 kg/m .  Wt Readings from Last 3 Encounters:   11/16/22 78 kg (172 lb)   10/31/22 79.3 kg (174 lb 12.8 oz)   10/18/22 79.8 kg (176 lb)     General Appearance:   no distress, normal body habitus   ENT/Mouth: membranes moist, no oral lesions or bleeding gums.      EYES:  no scleral icterus, normal conjunctivae   Neck: no carotid bruits or thyromegaly   Chest/Lungs:   lungs are clear to auscultation, no rales or wheezing, equal chest wall expansion    Cardiovascular:   Regular. Normal first and second heart sounds with no murmurs, rubs, or gallops; the carotid, radial and posterior tibial pulses are intact, no JVD and trace LE edema bilaterally    Abdomen:  no organomegaly, masses, bruits, or tenderness; bowel sounds are present   Extremities: no cyanosis or clubbing   Skin: no xanthelasma, warm.    Neurologic: alert and oriented x3, calm     Psychiatric: alert and oriented x3, calm     A complete 10 systems ROS was reviewed  And is negative except what is listed in the HPI.          Medical History  Surgical History Family History Social History   Past Medical History: "   Diagnosis Date     Atrial fibrillation (H)      Atrial fibrillation, transient (H) 8/11/2020     BPH (benign prostatic hyperplasia) 7/11/2018     Chronic combined systolic and diastolic heart failure (H) 2/11/2021     Congestive heart failure (H)      Coronary artery disease      COVID 02/2021     Dilated cardiomyopathy (H) 2/11/2021     MARIAN (generalized anxiety disorder) 2/11/2021     GERD (gastroesophageal reflux disease)      High cholesterol      Hypertension      LBBB (left bundle branch block) 7/11/2018     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     Pulmonary hypertension (H) 4/1/2020    Past Surgical History:   Procedure Laterality Date     ANGIOPLASTY       ARTHROSCOPY KNEE       CARDIOVERSION  2021     CARDIOVERSION  02/03/2022     CV CORONARY ANGIOGRAM N/A 10/23/2020    Procedure: Coronary Angiogram;  Surgeon: Varun Freire MD;  Location: [facility];  Service: Cardiology     CV LEFT HEART CATHETERIZATION WITHOUT LEFT VENTRICULOGRAM Left 10/23/2020    Procedure: Left Heart Catheterization Without Left Ventriculogram;  Surgeon: Varun Freire MD;  Location: [facility];  Service: Cardiology     EP BIV PACEMAKER INSERT N/A 3/25/2020    Procedure: EP Biventricular Pacemaker Insertion;  Surgeon: Taylor Sandoval MD;  Location: [facility];  Service: Cardiology     RELEASE CARPAL TUNNEL       ZZC TOTAL KNEE ARTHROPLASTY Right 8/15/2019    Procedure: RIGHT  MINIMALLY TOTAL KNEE ARTHROPLASTY;  Surgeon: Keven Cerda MD;  Location: [facility];  Service: Orthopedics    no family history of premature coronary artery disease Social History     Socioeconomic History     Marital status:      Spouse name: Not on file     Number of children: Not on file     Years of education: Not on file     Highest education level: Not on file   Occupational History     Not on file   Tobacco Use     Smoking status: Never     Smokeless tobacco: Never   Substance  and Sexual Activity     Alcohol use: Yes     Comment: Alcoholic Drinks/day: rare     Drug use: No     Sexual activity: Not on file   Other Topics Concern     Parent/sibling w/ CABG, MI or angioplasty before 65F 55M? Not Asked   Social History Narrative    Retired.  Good sense of humor     Social Determinants of Health     Financial Resource Strain: Not on file   Food Insecurity: Not on file   Transportation Needs: Not on file   Physical Activity: Not on file   Stress: Not on file   Social Connections: Not on file   Intimate Partner Violence: Not on file   Housing Stability: Not on file           Lab Results    Chemistry/lipid CBC Cardiac Enzymes/BNP/TSH/INR   Lab Results   Component Value Date    CHOL 107 06/23/2022    HDL 38 (L) 06/23/2022    TRIG 35 06/23/2022    BUN 27 10/17/2022     10/17/2022    CO2 30 10/17/2022    Lab Results   Component Value Date    WBC 4.9 08/29/2022    HGB 12.3 (L) 08/29/2022    HCT 38.2 (L) 08/29/2022    MCV 99 08/29/2022     08/29/2022    Lab Results   Component Value Date    TROPONINI 0.09 03/14/2022     (H) 06/17/2022    TSH 4.26 10/17/2022    INR 1.03 02/11/2021     Lab Results   Component Value Date    TROPONINI 0.09 03/14/2022          Weight:    Wt Readings from Last 3 Encounters:   11/16/22 78 kg (172 lb)   10/31/22 79.3 kg (174 lb 12.8 oz)   10/18/22 79.8 kg (176 lb)       Allergies  Allergies   Allergen Reactions     No Known Allergies          Surgical History  Past Surgical History:   Procedure Laterality Date     ANGIOPLASTY       ARTHROSCOPY KNEE       CARDIOVERSION  2021     CARDIOVERSION  02/03/2022     CV CORONARY ANGIOGRAM N/A 10/23/2020    Procedure: Coronary Angiogram;  Surgeon: Varun Freire MD;  Location: [facility] Lab;  Service: Cardiology     CV LEFT HEART CATHETERIZATION WITHOUT LEFT VENTRICULOGRAM Left 10/23/2020    Procedure: Left Heart Catheterization Without Left Ventriculogram;  Surgeon: Varun Freire MD;   Location: [facility];  Service: Cardiology     EP BIV PACEMAKER INSERT N/A 3/25/2020    Procedure: EP Biventricular Pacemaker Insertion;  Surgeon: Taylor Sandoval MD;  Location: [facility] Lab;  Service: Cardiology     RELEASE CARPAL TUNNEL       ZZC TOTAL KNEE ARTHROPLASTY Right 8/15/2019    Procedure: RIGHT  MINIMALLY TOTAL KNEE ARTHROPLASTY;  Surgeon: Keven Cerda MD;  Location: [facility];  Service: Orthopedics       Social History  Tobacco:   History   Smoking Status     Never   Smokeless Tobacco     Never    Alcohol:   Social History    Substance and Sexual Activity      Alcohol use: Yes        Comment: Alcoholic Drinks/day: rare   Illicit Drugs:   History   Drug Use No       Family History  Family History   Problem Relation Age of Onset     Alzheimer Disease Mother      Heart Disease Father      Cancer Sister      Diabetes Type 2  Sister      Chronic Obstructive Pulmonary Disease Sister      LUNG DISEASE Brother           John Paul Vasquez MD on 11/16/2022      cc: Jamie Noguera

## 2022-11-16 NOTE — PROGRESS NOTES
Pre-Procedure Pulmonary Vein Ablation (AF) Education    Procedure: PVI with Dr Michelle on 11/23/22 with arrival time 5:45 am    COVID: scheduled on 11/21/22 at a  facility, results will be viewable in EPIC    Pre-Op H&P: Completed Dr Michelle on 10/31 with Dr Michelle, and HF apt today 11/16    Education:   Reviewed with pt in Clinic today  Pre-Procedure Instruction: NPO after midnight pre procedure, Defined NPO, Remove all jewelry and leave all valuables at home, Shower prior to arrival, Anesthesia and intubation plan/orders, Intra-procedure PVI process, Post- PVI procedure expectations/recovery, Transportation requirements and arrangements post procedure, Post-procedure follow up process, Letter sent to pt via [employer] and mail with written instructions (Refer to letters tab), Lab results would be called to pt if abnormal  Risks Reviewed:   Pulmonary Vein/AF/Radiofrequency Ablation  In addition to standard risks for Radiofrequency Ablation, there is:    <2% for significant pulmonary vein stenosis    <2% risk for embolic events    <1% risk for esophageal fistula    <1% risk death      Pulmonary Vein Isolation / Cryoablation Risks:    1-2% risk for phrenic nerve paralysis    <1% risk for pulmonary vein stenosis    Risk of esophageal irritation with no incidence of atrial-esophageal fistula    Rare cryoballoon rupture    <1% risk death       Cardiac Catheter Ablation    <1% risk for the following: hypotension, hemorrhage, vascular injury including perforation of vein, artery or heart, thrombophlebitis, systemic or pulmonic emboli; cardiac perforation, (tamponade), infection, pneumothorax, arrhythmias, proarrhythmic effects of drugs, radiation exposure.    1-2% complete heart block (for AVNRT or septol accessory pathway).    <0.5% CVA or MI    <0.1% death    If external defibrillation is needed, 75% risk for superficial burn.    1-2% tamponade and aortic puncture with left sided transeptal approach for left side GAIL -  increase risk of CVA to <2%.    Late arrhythmia recurrences depends upon the primary rhythm disturbance.    Medication:   Instructions regarding anticoagulants: Xarelto- Continue anticoagulation uninterrupted through their procedure, do not miss any doses of AC prior to procedure, importance of taking AC for stroke prevention, taking AC as prescribed, to call prior to PVI if missed a dose of AC, and if upon arrival pt reports missing a dose of AC PVI will potentially be cnx/postponed  Instructions given to pt regarding antiarrhythmic medication: Amiodarone- hold 7 days prior to procedure  Instructions given to pt regarding PPI medication: Continue current PPI, Omeprazole 40mg Daily  Instructions for medication, other than anticoagulants and antiarrhythmics listed above, given to pt: hold all medication AM of procedure     Important patient information for staff: Pt daughter prefers to have the pt spend the night in the hospital, due to concerns of pts hip pain, care s/p PVI, and HF diuretics. This was discussed with Dr Michelle, he is aware. CV lab was also made aware. I did discuss with the daughter the risk of cnx the PVI due to room availability, daughter did discuss that they would organize care if needed to avoid cnx PVI.    11/16/2022 12:43 PM  Kacey Alvarado RN

## 2022-11-16 NOTE — LETTER
11/16/2022    ADDISON BENÍTEZ  [address]  [address] [facility] [id number]    RE: Reymundo Petersen       Dear Colleague,     I had the pleasure of seeing Reymundo Petersen in the [facility].    HEART CARE NOTE          Assessment/Recommendations     1. HFrEF c/b ADHF  Assessment / Plan    Near euvoelmia on physical exam; denies HF symptoms of orthopnea, PND, fluid retention/edema; weight continues to trend down - no changes to regimen at this time    GDMT as detailed below      Current Pharmacotherapy AHA Guideline-Directed Medical Therapy   Losartan 25 mg daily Lisinopril 20 mg twice daily   Metoprolol succinate 25 mg daily Carvedilol 25 mg twice daily   Spironolactone not started Spironolactone 25 mg once daily   Hydralazine NA Hydralazine 100 mg three times daily   Isosorbide dinitrate NA Isosorbide dinitrate 40 mg three times daily   SGLT2 inhibitor: not started Dapagliflozin or Empagliflozin 10 mg daily      2. Arrhythmia  Assessment / Plan    Persistent atrial fibrillation s/p ablations and DCCV    Currently on rivaroxaban; being considered for Watchman device - continue amiodarone; PVI scheduled 11/23/22    Complete Heart Block s/p CRT-P    Follows with Dr. Michelle in EP as well as afib clinic     3. CAD  Assessment / Plan    Denies chest pain or anginal equivalents    Continue ASA, atorvastatin, metoprolol succinate, losartan      History of Present Illness/Subjective      Mr. Reymundo Petersen is a 81 year old male with a PMHx significant for  persistent atrial fibrillation, chronic systolic and diastolic heart failure due to nonischemic cardiomyopathy, CHB s/p CRT-P, CAD with prior RCA PCI, HTN and CKD stage 2 who presents to [facility] to establish care.     Today, Mr. Owusu (accompanied by his daughter) denies any acute cardiac events or complaints including orthopnea, PND, fluid retention, edema, chest pain, palpitations; Management plan as detailed above     ECG: Personally  "reviewed. Paced rhythm     ECHO (personnaly Reviewed):  The left ventricle is mildly dilated.  Left ventricular function is decreased. The ejection fraction is 30-35%  (moderately reduced).  There is moderate global hypokinesia of the left ventricle.  The right ventricle is mildly dilated.  The right ventricular systolic function is normal.  The left atrium is severely dilated.  The right atrium is moderately dilated.  There is moderately severe (3+) tricuspid regurgitation.  Right ventricle systolic pressure estimate normal  There is mild to moderate (1-2+) aortic regurgitation.  The ascending aorta is Mildly dilated.  IVC diameter >2.1 cm collapsing <50% with sniff suggests a high RA pressure  estimated at 15 mmHg or greater.  Small pericardial effusion  There are no echocardiographic indications of cardiac tamponade.          Physical Examination Review of Systems   /50 (BP Location: Right arm, Patient Position: Sitting, Cuff Size: Adult Regular)   Pulse 72   Resp 16   Ht 1.778 m (5' 10\")   Wt 78 kg (172 lb)   BMI 24.68 kg/m    Body mass index is 24.68 kg/m .  Wt Readings from Last 3 Encounters:   11/16/22 78 kg (172 lb)   10/31/22 79.3 kg (174 lb 12.8 oz)   10/18/22 79.8 kg (176 lb)     General Appearance:   no distress, normal body habitus   ENT/Mouth: membranes moist, no oral lesions or bleeding gums.      EYES:  no scleral icterus, normal conjunctivae   Neck: no carotid bruits or thyromegaly   Chest/Lungs:   lungs are clear to auscultation, no rales or wheezing, equal chest wall expansion    Cardiovascular:   Regular. Normal first and second heart sounds with no murmurs, rubs, or gallops; the carotid, radial and posterior tibial pulses are intact, no JVD and trace LE edema bilaterally    Abdomen:  no organomegaly, masses, bruits, or tenderness; bowel sounds are present   Extremities: no cyanosis or clubbing   Skin: no xanthelasma, warm.    Neurologic: alert and oriented x3, calm     Psychiatric: " alert and oriented x3, calm     A complete 10 systems ROS was reviewed  And is negative except what is listed in the HPI.          Medical History  Surgical History Family History Social History   Past Medical History:   Diagnosis Date     Atrial fibrillation (H)      Atrial fibrillation, transient (H) 8/11/2020     BPH (benign prostatic hyperplasia) 7/11/2018     Chronic combined systolic and diastolic heart failure (H) 2/11/2021     Congestive heart failure (H)      Coronary artery disease      COVID 02/2021     Dilated cardiomyopathy (H) 2/11/2021     MARIAN (generalized anxiety disorder) 2/11/2021     GERD (gastroesophageal reflux disease)      High cholesterol      Hypertension      LBBB (left bundle branch block) 7/11/2018     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     Pulmonary hypertension (H) 4/1/2020    Past Surgical History:   Procedure Laterality Date     ANGIOPLASTY       ARTHROSCOPY KNEE       CARDIOVERSION  2021     CARDIOVERSION  02/03/2022     CV CORONARY ANGIOGRAM N/A 10/23/2020    Procedure: Coronary Angiogram;  Surgeon: Varun Freire MD;  Location: [facility] Lab;  Service: Cardiology     CV LEFT HEART CATHETERIZATION WITHOUT LEFT VENTRICULOGRAM Left 10/23/2020    Procedure: Left Heart Catheterization Without Left Ventriculogram;  Surgeon: Varun Freire MD;  Location: [facility];  Service: Cardiology     EP BIV PACEMAKER INSERT N/A 3/25/2020    Procedure: EP Biventricular Pacemaker Insertion;  Surgeon: Taylor Sandoval MD;  Location: [facility];  Service: Cardiology     RELEASE CARPAL TUNNEL       ZZC TOTAL KNEE ARTHROPLASTY Right 8/15/2019    Procedure: RIGHT  MINIMALLY TOTAL KNEE ARTHROPLASTY;  Surgeon: Keven Cerda MD;  Location: [facility];  Service: Orthopedics    no family history of premature coronary artery disease Social History     Socioeconomic History     Marital status:      Spouse name: Not on file     Number of  children: Not on file     Years of education: Not on file     Highest education level: Not on file   Occupational History     Not on file   Tobacco Use     Smoking status: Never     Smokeless tobacco: Never   Substance and Sexual Activity     Alcohol use: Yes     Comment: Alcoholic Drinks/day: rare     Drug use: No     Sexual activity: Not on file   Other Topics Concern     Parent/sibling w/ CABG, MI or angioplasty before 65F 55M? Not Asked   Social History Narrative    Retired.  Good sense of humor     Social Determinants of Health     Financial Resource Strain: Not on file   Food Insecurity: Not on file   Transportation Needs: Not on file   Physical Activity: Not on file   Stress: Not on file   Social Connections: Not on file   Intimate Partner Violence: Not on file   Housing Stability: Not on file           Lab Results    Chemistry/lipid CBC Cardiac Enzymes/BNP/TSH/INR   Lab Results   Component Value Date    CHOL 107 06/23/2022    HDL 38 (L) 06/23/2022    TRIG 35 06/23/2022    BUN 27 10/17/2022     10/17/2022    CO2 30 10/17/2022    Lab Results   Component Value Date    WBC 4.9 08/29/2022    HGB 12.3 (L) 08/29/2022    HCT 38.2 (L) 08/29/2022    MCV 99 08/29/2022     08/29/2022    Lab Results   Component Value Date    TROPONINI 0.09 03/14/2022     (H) 06/17/2022    TSH 4.26 10/17/2022    INR 1.03 02/11/2021     Lab Results   Component Value Date    TROPONINI 0.09 03/14/2022          Weight:    Wt Readings from Last 3 Encounters:   11/16/22 78 kg (172 lb)   10/31/22 79.3 kg (174 lb 12.8 oz)   10/18/22 79.8 kg (176 lb)       Allergies  Allergies   Allergen Reactions     No Known Allergies          Surgical History  Past Surgical History:   Procedure Laterality Date     ANGIOPLASTY       ARTHROSCOPY KNEE       CARDIOVERSION  2021     CARDIOVERSION  02/03/2022     CV CORONARY ANGIOGRAM N/A 10/23/2020    Procedure: Coronary Angiogram;  Surgeon: Vraun Freire MD;  Location: [facility]  Lab;  Service: Cardiology     CV LEFT HEART CATHETERIZATION WITHOUT LEFT VENTRICULOGRAM Left 10/23/2020    Procedure: Left Heart Catheterization Without Left Ventriculogram;  Surgeon: Varun Freire MD;  Location: [facility] Lab;  Service: Cardiology     EP BIV PACEMAKER INSERT N/A 3/25/2020    Procedure: EP Biventricular Pacemaker Insertion;  Surgeon: Taylor Sandoval MD;  Location: [facility] Lab;  Service: Cardiology     RELEASE CARPAL TUNNEL       ZZC TOTAL KNEE ARTHROPLASTY Right 8/15/2019    Procedure: RIGHT  MINIMALLY TOTAL KNEE ARTHROPLASTY;  Surgeon: Keven Cerda MD;  Location: [facility] OR;  Service: Orthopedics       Social History  Tobacco:   History   Smoking Status     Never   Smokeless Tobacco     Never    Alcohol:   Social History    Substance and Sexual Activity      Alcohol use: Yes        Comment: Alcoholic Drinks/day: rare   Illicit Drugs:   History   Drug Use No       Family History  Family History   Problem Relation Age of Onset     Alzheimer Disease Mother      Heart Disease Father      Cancer Sister      Diabetes Type 2  Sister      Chronic Obstructive Pulmonary Disease Sister      LUNG DISEASE Brother           John Paul Vasquez MD on 11/16/2022      cc: Jamie Noguera    Thank you for allowing me to participate in the care of your patient.      Sincerely,     John Paul Vasquez MD     [facility]  cc:   Ole Michelle MD  [address] [place] [address]

## 2022-11-22 NOTE — TELEPHONE ENCOUNTER
Noted.  Phone call to patients daughter, Juliet. Reviewed the below information from Dr. Michelle.  Pt is scheduled to see Dr. Leigh at the end of January, suggest that pt complete this test prior to the follow up.  She states understanding.  No further questions.

## 2022-11-22 NOTE — TELEPHONE ENCOUNTER
Health Call Center    Phone Message    May a detailed message be left on voicemail: no     Reason for Call: Other: Juliet called to request clarification if the imaging/ NM Tc PYP Imaging for Transthyretin Cardiac Amyloidosis was supposed to be scheduled prior to the cardiac ablation scheduled for tomorrow 11/23/22 or if this was just additional imaging that is necessary. Please F/U with Juliet at [phone].     Action Taken: Other: [facility]    Travel Screening: Not Applicable

## 2022-11-22 NOTE — TELEPHONE ENCOUNTER
Ole Michelle MD  You 56 minutes ago (11:32 AM)     AW  Ramon Lobato,     Agreed - very okay to do this study after ablation.     Thank you!   Ole Bowser MD 1 hour ago (10:35 AM)     CJ  Dr. Michelle,   Can you please see the below concern from patients daughterShae  Reese ordered the NM to r/o amyloid in September, it has not yet been scheduled.  Ok to schedule post procedure?   Thank you!   Cheryle

## 2022-11-23 NOTE — Clinical Note
Arrhythmia Type: atrial fibrillation.   Method of Cardioversion: synchronous.   The arrhythmia was terminated.   Energy shock delivered: 200 joules.   Time shock delivered: 09:08 CST.

## 2022-11-23 NOTE — ANESTHESIA PREPROCEDURE EVALUATION
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     Congestive heart failure (H)      Coronary artery disease      COVID 2021     Dilated cardiomyopathy (H) 2021     MARIAN (generalized anxiety disorder) 2021     GERD (gastroesophageal reflux disease)      High cholesterol      Hypertension      LBBB (left bundle branch block) 2018     Nonrheumatic aortic valve insufficiency 2020     Pulmonary hypertension (H) 2020      Past Surgical History:   Procedure Laterality Date     ANGIOPLASTY       ARTHROSCOPY KNEE       CARDIOVERSION       CARDIOVERSION  2022     CV CORONARY ANGIOGRAM N/A 10/23/2020    Procedure: Coronary Angiogram;  Surgeon: Varun Freire MD;  Location: [facility];  Service: Cardiology     CV LEFT HEART CATHETERIZATION WITHOUT LEFT VENTRICULOGRAM Left 10/23/2020    Procedure: Left Heart Catheterization Without Left Ventriculogram;  Surgeon: Varun Freire MD;  Location: [facility];  Service: Cardiology     EP BIV PACEMAKER INSERT N/A 3/25/2020    Procedure: EP Biventricular Pacemaker Insertion;  Surgeon: Taylor Sandoval MD;  Location: [facility];  Service: Cardiology     RELEASE CARPAL TUNNEL       ZZC TOTAL KNEE ARTHROPLASTY Right 8/15/2019    Procedure: RIGHT  MINIMALLY TOTAL KNEE ARTHROPLASTY;  Surgeon: Keven Cerda MD;  Location: [facility];  Service: Orthopedics      Allergies   Allergen Reactions     No Known Allergies       Social History     Tobacco Use     Smoking status: Never     Smokeless tobacco: Never   Substance Use Topics     Alcohol  use: Yes     Comment: Alcoholic Drinks/day: rare      Wt Readings from Last 1 Encounters:   11/23/22 73.9 kg (163 lb)        Anesthesia Evaluation   Pt has had prior anesthetic.     No history of anesthetic complications       ROS/MED HX  ENT/Pulmonary:     (+) mild,  COPD,     Neurologic:  - neg neurologic ROS     Cardiovascular:     (+) hypertension--CAD ---CHF etiology: dilated CMO Last EF: 30-35 HIGUERA. pacemaker, valvular problems/murmurs type: AI pulmonary hypertension,     METS/Exercise Tolerance: >4 METS    Hematologic:  - neg hematologic  ROS     Musculoskeletal:   (+) arthritis,     GI/Hepatic:     (+) GERD, Asymptomatic on medication,     Renal/Genitourinary:     (+) renal disease, type: CRI,     Endo:  - neg endo ROS     Psychiatric/Substance Use:     (+) psychiatric history anxiety     Infectious Disease:  - neg infectious disease ROS     Malignancy:  - neg malignancy ROS     Other:  - neg other ROS          Physical Exam    Airway  airway exam normal      Mallampati: II   TM distance: > 3 FB   Neck ROM: full   Mouth opening: > 3 cm    Respiratory Devices and Support         Dental       (+) upper dentures and lower dentures      Cardiovascular   cardiovascular exam normal          Pulmonary   pulmonary exam normal                OUTSIDE LABS:  CBC:   Lab Results   Component Value Date    WBC 8.1 11/23/2022    WBC 7.7 11/16/2022    HGB 13.0 (L) 11/23/2022    HGB 12.2 (L) 11/16/2022    HCT 39.4 (L) 11/23/2022    HCT 37.9 (L) 11/16/2022     11/23/2022     11/16/2022     BMP:   Lab Results   Component Value Date     11/23/2022     11/16/2022    POTASSIUM 4.3 11/23/2022    POTASSIUM 4.1 11/16/2022    CHLORIDE 96 (L) 11/23/2022    CHLORIDE 98 11/16/2022    CO2 30 (H) 11/23/2022    CO2 29 11/16/2022    BUN 21.7 11/23/2022    BUN 18.5 11/16/2022    CR 1.09 11/23/2022    CR 0.97 11/16/2022     (H) 11/23/2022     (H) 11/16/2022     COAGS:   Lab Results   Component Value Date     PTT 36 02/11/2021    INR 1.03 02/11/2021    FIBR 474 (H) 02/15/2021     POC: No results found for: BGM, HCG, HCGS  HEPATIC:   Lab Results   Component Value Date    ALBUMIN 3.4 (L) 03/14/2022    PROTTOTAL 6.7 03/14/2022    ALT 18 10/17/2022    AST 27 03/14/2022    ALKPHOS 71 03/14/2022    BILITOTAL 1.1 (H) 03/14/2022     OTHER:   Lab Results   Component Value Date    LACT 1.5 09/21/2020    ANGELY 9.3 11/23/2022    MAG 2.2 03/16/2022    LIPASE <9 07/02/2021    TSH 4.26 10/17/2022    T4 0.91 03/14/2022    CRP 0.4 07/02/2021       Anesthesia Plan    ASA Status:  4   NPO Status:  NPO Appropriate    Anesthesia Type: General.     - Airway: ETT   Induction: Intravenous, Propofol.   Maintenance: Balanced.   Techniques and Equipment:       - Drips/Meds: Phenylephrine, Vasopressin, Ketamine     Consents    Anesthesia Plan(s) and associated risks, benefits, and realistic alternatives discussed. Questions answered and patient/representative(s) expressed understanding.    - Discussed:     - Discussed with:  Patient, Other (See Comment)      - Extended Intubation/Ventilatory Support Discussed: No.      - Patient is DNR/DNI Status: No    Use of blood products discussed: No .     Postoperative Care    Pain management: IV analgesics, Multi-modal analgesia.   PONV prophylaxis: Ondansetron (or other 5HT-3), Dexamethasone or Solumedrol, Droperidol or Haldol     Comments:    Other Comments: 50 mg ketamine IV on induction.            Waldo Rubi MD

## 2022-11-23 NOTE — ANESTHESIA POSTPROCEDURE EVALUATION
Patient: Reymundo Petersen    Procedure: Procedure(s):  Ablation Pulmonary Belinda Isolation       Anesthesia Type:  General    Note:  Disposition: Outpatient   Postop Pain Control: Uneventful            Sign Out: Well controlled pain   PONV: No   Neuro/Psych: Uneventful            Sign Out: Acceptable/Baseline neuro status   Airway/Respiratory: Uneventful            Sign Out: Acceptable/Baseline resp. status; O2 supplementation               Oxygen: Nasal Cannula   CV/Hemodynamics: Uneventful            Sign Out: Acceptable CV status; No obvious hypovolemia; No obvious fluid overload   Other NRE: NONE   DID A NON-ROUTINE EVENT OCCUR?            Last vitals:  Vitals Value Taken Time   /57 11/23/22 1200   Temp 36.8  C (98.2  F) 11/23/22 1034   Pulse 71 11/23/22 1212   Resp 20 11/23/22 1212   SpO2 94 % 11/23/22 1212   Vitals shown include unvalidated device data.    Electronically Signed By: Waldo Rubi MD  November 23, 2022  12:13 PM

## 2022-11-23 NOTE — ANESTHESIA CARE TRANSFER NOTE
Patient: Reymundo Petersen    Procedure: Procedure(s):  Ablation Pulmonary Belinda Isolation       Diagnosis: Persistent atrial fibrillation  Diagnosis Additional Information: No value filed.    Anesthesia Type:   General     Note:    Oropharynx: oropharynx clear of all foreign objects and spontaneously breathing  Level of Consciousness: drowsy  Oxygen Supplementation: face mask  Level of Supplemental Oxygen (L/min / FiO2): 8  Independent Airway: airway patency satisfactory and stable  Dentition: dentition unchanged  Vital Signs Stable: post-procedure vital signs reviewed and stable  Report to RN Given: handoff report given  Patient transferred to: [facility]  Comments: Volatile agents turned off, muscle relaxant reversed, 4/4 twitches with sustained tetany. Pt breathing spontaneously with adequate tidal volumes, following commands, gently suctioned oropharynx, extubated without issue. 10LPM O2 applied via face mask.Transported by CRNA and RN to recovery.          Vitals:  Vitals Value Taken Time   BP     Temp     Pulse     Resp     SpO2         Electronically Signed By: SALVADOR Morris CRNA  November 23, 2022  10:21 AM

## 2022-11-23 NOTE — PLAN OF CARE
"Goal Outcome Evaluation:         Pt admitted for PVI due to atrial fibrillation. Pt is [place] and has 2 hearing aides in. Pt has pain in his left hip and knee. Pt rates his pain at a \"2\". Pt prepped and ready for procedure. Daughter Juliet is here for support.    Annika Blunt RN                 "

## 2022-11-23 NOTE — Clinical Note
[employer] Med system 12 lead EKG, hemodynamics 5 lead, pulse oximetery, NIBP, [employer] hands off defibrillator/external pacer, with 3 monitoring leads to patient. Baseline assessment done.

## 2022-11-23 NOTE — Clinical Note
Arrhythmia Type: atrial fibrillation.   Method of Cardioversion: synchronous.   The arrhythmia was terminated.   Energy shock delivered: 200 joules.   Time shock delivered: 09:07 CST.   Early return to atrial fibrillation (ERAF).

## 2022-11-23 NOTE — Clinical Note
Arrhythmia Type: atrial fibrillation.   Method of Cardioversion: synchronous.   The arrhythmia was terminated.   Energy shock delivered: 200 joules.   Time shock delivered: 08:37 CST.   Post cardioversion rhythm: sinus rhythm.   No immediate return to atrial fibrillation (IRAF).

## 2022-11-23 NOTE — DISCHARGE INSTRUCTIONS
[facility]  Cardiac Electrophysiology  [address]  [place] [address]   Office: [phone]  Fax: [phone]     Cardiac Electrophysiology - Post Ablation Discharge Instructions      PROCEDURE   Atrial fibrillation ablation         MEDICATION INSTRUCTIONS   Continue taking you prior to procedure medications, including amiodarone for now - we will assess for cessation at your 6 week follow-up visit.  Continue taking your blood thinner rivaroxaban (Xarelto).          DISCHARGE INSTRUCTIONS   General instructions  Have an adult stay with you until tomorrow.  You may resume your normal diet.    You may shower tomorrow.  Do NOT take a bath, or use a hot tub or pool for at least 1 week. Do not scrub the site. Do not use lotion or powder near the puncture site.    Groin care instructions  For the first 24 hrs - check the puncture site every 1-2 hours while awake.  You may keep a bandaid over the puncture sites for 1 or 2 days post-procedure and thereafter may keep these sites uncovered.  Change the bandaid daily.  If there is minor oozing, apply another bandaid and remove it after 12 hours.  For 2 days, when you cough, sneeze, laugh or move your bowels, hold your hand over the puncture site and press firmly.  Mild bruising at the access sites is normal.  If you notice increased swelling, external bleeding, or have other concerns regarding your access sites please consider emergency department evaluation and call your electrophysiology team's office    Activity recommendations  Do not drive for 3 days.  Avoid stooping or squatting more than 90 degrees at the hips for 7 days  Avoid repetitive motions such as loading , vacuuming, raking or shoveling  Avoid heavy lifting (greater than 25lbs) for 1 week    Post ablation instructions  You may have some irregular heartbeats following your ablation.  These may feel very strong and feel like atrial fibrillation re-initiation  is imminent - these episodes should occur less frequently over time.  Recurrent atrial fibrillation can occur within the first 3 months post ablation while your heart recovers from the procedure.  Pleuritic chest discomfort (chest pain worse with taking deep breaths, worse with laying flat on your back) can occur after ablation, usually coming about within the first 24-48hrs post ablation.  If this occurs and is severe enough to be troublesome to you, please call us and consider starting a course of ibuprofen 400mg three times daily for 5 to 7 days    Things to watch for  As with any type of procedure, please be more attentive to unusual symptoms post ablation (eg. fever, neurologic changes, pain with swallowing, loss of consciousness, etc) - we recommend ER evaluation for any such symptoms in the first few weeks post procedure.    Consider ER evaluation for the following:  Severe chest pain not relieved by Tylenol or Ibuprofen  You have chills or a fever greater than 101 F (38 C)  Neurologic changes (eg. leg, arm or face weakness or numbness, difficulties with speech or word finding, problems walking or with your balance, vision changes)  Severe difficulty swallowing and/or you are coughing up blood  Shortness of breath  Increased groin pain or a large or growing hard lump around the site  Groin is red, swollen, hot or tender  Blood or fluid is draining from the groin site  Any numbness, coolness or changes in color in your extremities  Groin pain not relieved by Tylenol or Advil  Recurrent atrial fibrillation associated with sustained rapid heart rates or associated with additional concerning symptoms.    Our office will have a follow-up visit scheduled for you in approximately 6 weeks.  Please do not hesitate to call us before that time should issues arise.        [facility]    [phone]    If you are calling after hours, please listen to the entire voicemail,   a live   will answer at the end of the message.    [phone] to reach the EP nurses working with Dr Michelle

## 2022-11-23 NOTE — Clinical Note
Arrhythmia Type: atrial fibrillation.   Method of Cardioversion: synchronous.   The arrhythmia was terminated.   Energy shock delivered: 200 joules.   Time shock delivered: 10:00 CST.

## 2022-11-23 NOTE — PHARMACY-ADMISSION MEDICATION HISTORY
Pharmacy Note - Admission Medication History    Pertinent Provider Information: n/a   ______________________________________________________________________    Prior To Admission (PTA) med list completed and updated in EMR.       PTA Med List   Medication Sig Note Last Dose     acetaminophen (TYLENOL) 500 MG tablet Take 500 mg by mouth every 6 hours as needed for mild pain  11/22/2022     amiodarone (PACERONE) 200 MG tablet Take 1 tablet (200 mg) by mouth every other day   at 1800     aspirin (ASA) 81 MG chewable tablet Take 1 tablet (81 mg) by mouth every other day (Patient taking differently: Take 81 mg by mouth Every Mon, Wed, Fri Morning Taking Monday, Wednesday and Fridays)  11/21/2022     atorvastatin (LIPITOR) 10 MG tablet Take 10 mg by mouth four times a week Sunday, Tuesday, Thursday, & Saturday at bedtime  11/22/2022 at HS     bumetanide (BUMEX) 2 MG tablet Take 1 tablet (2 mg) by mouth 2 times daily 11/23/2022: Takes doses about 6 hours apart 11/22/2022 at 1800     Coenzyme Q10 300 MG CAPS Take 300 mg by mouth daily  11/22/2022 at AM     doxazosin (CARDURA) 8 MG tablet [DOXAZOSIN (CARDURA) 8 MG TABLET] Take 4 mg by mouth at bedtime.         11/22/2022 at HS     finasteride (PROSCAR) 5 mg tablet [FINASTERIDE (PROSCAR) 5 MG TABLET] Take 5 mg by mouth at bedtime.         11/22/2022 at HS     gabapentin (NEURONTIN) 100 MG capsule Two capsules in the morning, two capsules in the afternoon, and three capsules in the evening  11/22/2022 at HS     glucosamine/chondr cole A sod (OSTEO BI-FLEX ORAL) [GLUCOSAMINE/CHONDR COLE A SOD (OSTEO BI-FLEX ORAL)] Take 1 tablet by mouth 2 (two) times a day.         11/22/2022 at HS     HYDROcodone-acetaminophen (NORCO) 5-325 MG tablet Take 1 tablet by mouth every 6 hours as needed for severe pain (7-10) For post procedure if needed for back, hip, and knee pain  Past Month at 3 weeks ago     losartan (COZAAR) 25 MG tablet [LOSARTAN (COZAAR) 25 MG TABLET] Take 1 tablet (25 mg total)  by mouth daily.  11/22/2022 at AM     melatonin 10 mg Tab Take 10 mg by mouth At Bedtime   11/22/2022 at HS     metoprolol succinate ER (TOPROL-XL) 25 MG 24 hr tablet Take 1 tablet (25 mg) by mouth At Bedtime  11/22/2022 at HS     multivitamin, therapeutic (THERA-VIT) TABS tablet Take 1 tablet by mouth daily  11/22/2022     omeprazole (PRILOSEC) 20 MG capsule [OMEPRAZOLE (PRILOSEC) 20 MG CAPSULE] Take 20 mg by mouth 2 (two) times a day before meals.  11/22/2022 at PM     potassium chloride ER (KLOR-CON M) 20 MEQ CR tablet Take 1 tablet (20 mEq) by mouth 2 times daily 11/23/2022: Takes with Bumex doses 11/22/2022 at 1800     rivaroxaban ANTICOAGULANT (XARELTO) 20 MG TABS tablet Take 1 tablet (20 mg) by mouth daily (Patient taking differently: Take 20 mg by mouth daily (with dinner)) 11/23/2022: Buys from [employer] due to cost 11/22/2022 at 1800     sertraline (ZOLOFT) 50 MG tablet Take 50 mg by mouth 2 times daily   11/22/2022 at HS     vit A/vit C/vit E/zinc/copper (PRESERVISION AREDS ORAL) [VIT A/VIT C/VIT E/ZINC/COPPER (PRESERVISION AREDS ORAL)] Take 1 tablet by mouth 2 (two) times a day.  11/22/2022 at HS       Information source(s): [facility] Family member and [facility]    Method of interview communication: in-person    Patient was asked about OTC/herbal products specifically.  PTA med list reflects this.    Based on the pharmacist's assessment, the PTA med list information appears reliable    Allergies were reviewed, assessed, and updated with the patient.      Patient does not use any multi-dose medications prior to admission.     Thank you for the opportunity to participate in the care of this patient.      Kacey Myers, [facility]     11/23/2022     3:05 PM

## 2022-11-23 NOTE — ANESTHESIA PROCEDURE NOTES
Airway       Patient location during procedure: OR       Procedure Start/Stop Times: 11/23/2022 7:13 AM  Staff -        Anesthesiologist:  Waldo Rubi MD       CRNA: Miguel Angel Perez APRN CRNA       Performed By: CRNA  Consent for Airway        Urgency: elective  Indications and Patient Condition       Indications for airway management: laura-procedural        Final Airway Details       Final airway type: endotracheal airway       Successful airway: ETT - single  Endotracheal Airway Details        ETT size (mm): 7.5       Cuffed: yes       Successful intubation technique: direct laryngoscopy       DL Blade Type: Brink 3       Grade View of Cords: 2 (2b)       Adjucts: stylet       Position: Right       Measured from: lips       Secured at (cm): 23       Bite block used: Soft (at end of case)    Post intubation assessment        Placement verified by: capnometry, equal breath sounds and chest rise        Number of attempts at approach: 2 (First with Brink 2, second with Brink 3)       Secured with: silk tape       Ease of procedure: easy (with Brink 3, pt with deeper airway than expected)       Dentition: Intact and Unchanged       Dental guard used and removed.    Medication(s) Administered   Medication Administration Time: 11/23/2022 7:13 AM

## 2022-11-24 NOTE — PLAN OF CARE
Problem: Plan of Care - These are the overarching goals to be used throughout the patient stay.    Goal: Absence of Hospital-Acquired Illness or Injury  Intervention: Identify and Manage Fall Risk  Recent Flowsheet Documentation  Taken 11/23/2022 1600 by Minnie Delgadillo RN  Safety Promotion/Fall Prevention: activity supervised  Intervention: Prevent Skin Injury  Recent Flowsheet Documentation  Taken 11/23/2022 1600 by Minnie Delgadillo RN  Body Position: position changed independently  Goal: Optimal Comfort and Wellbeing  Outcome: Adequate for Care Transition   Goal Outcome Evaluation:       Pt denies pain, angio site is soft and intact, cms intact.  Anticipating discontinue tomorrow if no issues.  Pt is v paced.

## 2022-11-24 NOTE — PLAN OF CARE
Problem: Cardiac Catheterization (Diagnostic/Interventional)  Goal: Stable Heart Rate and Rhythm  Outcome: Progressing     Problem: Cardiac Catheterization (Diagnostic/Interventional)  Goal: Absence of Bleeding  Outcome: Progressing     Problem: Cardiac Catheterization (Diagnostic/Interventional)  Goal: Acceptable Pain Control  Outcome: Progressing     Problem: Cardiac Catheterization (Diagnostic/Interventional)  Goal: Absence of Vascular Access Complication  Outcome: Progressing  Intervention: Prevent Access Site Complications  Recent Flowsheet Documentation  Taken 11/24/2022 0428 by Nel Canchola, RN  Activity Management: activity adjusted per tolerance  Taken 11/23/2022 2327 by Nel Canchola, RN  Activity Management: activity adjusted per tolerance     Goal Outcome Evaluation:  VSS overnight. Pt. Complained some pain this shift that was well controlled with PRN pain medication. No signs or symptoms of bleeding from Right groin access site.  CMS intact.  Has a 6 beat run of v tach at 2338 last night, Pt was sleeping at the time asymptomatic. Otherwise rhythm remains v-paced.

## 2022-11-24 NOTE — DISCHARGE SUMMARY
Physician Discharge Summary     Patient ID:  Reymundo Petersen  [id number]  82 year old  1940    Admit date: 11/23/2022    Discharge date and time: 11/24/2022     Admitting Physician: Ole Michelle MD     Discharge Physician: Joe Perez MD    Admission Diagnoses: Persistent atrial fibrillation (H) [I48.19]    Discharge Diagnoses: Persistent atrial fibrillation    Admission Condition: good    Discharged Condition: good    Indication for Admission: Afib ablation    Hospital Course:   The patient underwent a successful and uncomplicated Afib ablation. Details of theprocedure are under the cardiology section. He was admitted for overnight observation.  On day of discharge the patient reports he is doing well and has no cardiovascular complaints.     Consults: none    Discharge Exam:  GENERAL APPEARANCE: healthy, alert and no distress  EYES: Eyes grossly normal to inspection, PERRL and conjunctivae and sclerae normal  HENT:  oral mucous membranes moist  NECK: no asymmetry, masses  RESP: lungs clear to auscultation - no rales, rhonchi or wheezes  CV: regular rates and rhythm, normal S1 S2, no S3 or S4, no murmur, click or rub, no peripheral edema and peripheral pulses strong  ABDOMEN: soft, nontender, no masses and bowel sounds normal  SKIN: no suspicious lesions or rashes  NEURO: Normal strength and tone, sensory exam grossly normal, mentation intact and speech normal  PSYCH: mentation appears normal and affect normal/bright    Disposition: home    Patient Instructions:   Current Discharge Medication List      CONTINUE these medications which have NOT CHANGED    Details   acetaminophen (TYLENOL) 500 MG tablet Take 500 mg by mouth every 6 hours as needed for mild pain      amiodarone (PACERONE) 200 MG tablet Take 1 tablet (200 mg) by mouth every other day  Qty: 45 tablet, Refills: 0    Associated Diagnoses: Persistent atrial fibrillation (H)      aspirin (ASA) 81 MG chewable tablet Take 1 tablet (81  mg) by mouth every other day  Qty: 30 tablet, Refills: 0    Associated Diagnoses: Coronary artery disease due to lipid rich plaque      atorvastatin (LIPITOR) 10 MG tablet Take 10 mg by mouth four times a week Sunday, Tuesday, Thursday, & Saturday at bedtime      bumetanide (BUMEX) 2 MG tablet Take 1 tablet (2 mg) by mouth 2 times daily  Qty: 180 tablet, Refills: 3    Associated Diagnoses: Acute decompensated heart failure (H)      Coenzyme Q10 300 MG CAPS Take 300 mg by mouth daily      doxazosin (CARDURA) 8 MG tablet [DOXAZOSIN (CARDURA) 8 MG TABLET] Take 4 mg by mouth at bedtime.             finasteride (PROSCAR) 5 mg tablet [FINASTERIDE (PROSCAR) 5 MG TABLET] Take 5 mg by mouth at bedtime.             gabapentin (NEURONTIN) 100 MG capsule Two capsules in the morning, two capsules in the afternoon, and three capsules in the evening      glucosamine/chondr cole A sod (OSTEO BI-FLEX ORAL) [GLUCOSAMINE/CHONDR COLE A SOD (OSTEO BI-FLEX ORAL)] Take 1 tablet by mouth 2 (two) times a day.             HYDROcodone-acetaminophen (NORCO) 5-325 MG tablet Take 1 tablet by mouth every 6 hours as needed for severe pain (7-10) For post procedure if needed for back, hip, and knee pain      losartan (COZAAR) 25 MG tablet [LOSARTAN (COZAAR) 25 MG TABLET] Take 1 tablet (25 mg total) by mouth daily.  Qty: 30 tablet, Refills: 0    Associated Diagnoses: Complete heart block (H); Mild left ventricular systolic dysfunction      melatonin 10 mg Tab Take 10 mg by mouth At Bedtime       metoprolol succinate ER (TOPROL-XL) 25 MG 24 hr tablet Take 1 tablet (25 mg) by mouth At Bedtime  Qty: 90 tablet, Refills: 3    Associated Diagnoses: Coronary artery disease due to lipid rich plaque      multivitamin, therapeutic (THERA-VIT) TABS tablet Take 1 tablet by mouth daily      omeprazole (PRILOSEC) 20 MG capsule [OMEPRAZOLE (PRILOSEC) 20 MG CAPSULE] Take 20 mg by mouth 2 (two) times a day before meals.      potassium chloride ER (KLOR-CON M) 20 MEQ CR  tablet Take 1 tablet (20 mEq) by mouth 2 times daily  Qty: 180 tablet, Refills: 1    Associated Diagnoses: Chronic systolic heart failure (H)      rivaroxaban ANTICOAGULANT (XARELTO) 20 MG TABS tablet Take 1 tablet (20 mg) by mouth daily  Qty: 90 tablet, Refills: 3    Associated Diagnoses: Atrial fibrillation, transient (H)      sertraline (ZOLOFT) 50 MG tablet Take 50 mg by mouth 2 times daily       vit A/vit C/vit E/zinc/copper (PRESERVISION AREDS ORAL) [VIT A/VIT C/VIT E/ZINC/COPPER (PRESERVISION AREDS ORAL)] Take 1 tablet by mouth 2 (two) times a day.           Activity: As directed  Diet: cardiac diet  Wound Care: as directed      Signed:  Joe Perez MD  11/24/2022  9:19 AM

## 2022-11-24 NOTE — PLAN OF CARE
Problem: Plan of Care - These are the overarching goals to be used throughout the patient stay.    Goal: Plan of Care Review  Description: The Plan of Care Review/Shift note should be completed every shift.  The Outcome Evaluation is a brief statement about your assessment that the patient is improving, declining, or no change.  This information will be displayed automatically on your shift note.  Outcome: Adequate for Care Transition   Goal Outcome Evaluation:  Patient alert and oriented x 4. Daughter bedside and writer instructed and reviewed discharge post ablation and meds with the patient and the daughter who helps him. Patient and family familiar with meds and patient wanted to take bumex at home so he won't urinate on the way home. BP 97/53 and Losartan held just for today. Right groin site C/D/I. No c/o pain. V paced. Belongings packed and ready to go home with the daughter an grand daughter.

## 2022-11-25 NOTE — PROGRESS NOTES
Pt is s/p PVI PO day 2, today 11/25/2022 , PVI was completed on 11-23-22 with Dr. Michelle and pt was discharged the same day.  PC was placed to pt, spoke to pt and daughter, Juliet    General Assessment:     Weight: Pt reports pt is unable to report weight, but denies s/s of fluid retention    Vital signs:  Pt is not able to check heart rate, bp was low yesterday, 90's/50's.  He will recheck later today and call with continued low bp. and Pt has been afebrile.    Pain: Pt denies generalized or localized pain abnormal to healing s/p     /GI: Pt denies difficulty swallowing, denies heart burn, denies constipation, denies urinary retention/difficulty, reports decreased appetite and reports staying hydrated.    Respiratory: Pt denies SOB, denies difficulty breathing and denies throat pain.    Activity: Pt is gradually working into baseline activity.     Neurological:  Pt denies vision changes, changes in speech, changes in balance and changes in strength or motor function.    Rhythm Assessment:   Pt denies palpitations, denies irregularities in HR or rhythm and denies symptoms or sustained AF episodes.    Procedure Site Assessment:   Pt reports some bruising around sites without significant change from hospital discharge and normal to PVI recovery    Anticoagulation/Medication:  Pt remain on Xarelto without interruption  Pt Confirms taking all medications as prescribed.    Education completed with pt at this visit:  Reviewed normal post-op PVI healing process, when to contact EP-RN/EP-MD, contact information was given to the pt for further concerns or questions, after hours contact was reviewed with patient and pt verbalized understanding    Follow up:  AVS mailed to patient and is available through my chart.  Pt Does have a 6 week follow up scheduled with Marek Clark  on 1-9-23.    11/25/2022 11:44 AM  Cheryle Munoz RN

## 2022-11-25 NOTE — PATIENT INSTRUCTIONS
Thank you for choosing [facility] for your Cardiology needs    After your phone call today with the nursing team, please keep in mind:      Please keep well hydrated, eat foods high in protein for wound healing.    Gradually increase your activity over the next 7-10 days, back to baseline, no aggressive activity for 7-10 days after the procedure.    No lifting more than 20 lb for 7-10 days after procedure.    No baths or hot tubs for 7-10 days after the procedure, showering is ok.    You are ok to drive.  Keep your incision sites clean, dry and open to air.    You may use ice packs to your groin sites for mild pain or swelling;  20 minutes on and 20 minutes off.    CONTINUE to take your anticoagulant (Xarelto) as directed to prevent clot formation and possible stroke.            Call 911 with any stroke symptoms:                    Difficulty with speech or speech changes                     Problems walking or with your balance                 Problems with your vision or vision changes     One side of your face droops or feels numb     Muscle weakness on one side of your body and not the other    If you experience any of the following in the next 6 weeks, please call Dr. Michelle's nursing team as soon as possible:    Difficulty swallowing or throat pain  Dizziness or shortness of breath  Heart burn   Episodes of afib lasting 4-6 hours or if frequently out of rhythm (daily episodes)  Swelling in your hands, feet or abdomen  ANY fever of >100.5 for any reason  Any CHEST PAIN after procedure especially in the 2-6 weeks after the procedure  Changes to your groin sites including an increase in pain or swelling, any redness or drainage, an increase in bruising or hardening of the site.    Follow up with EP Nurse Practitioner at 4-6 weeks.   We will call you to set up appointment for 3 month office visit with Dr. Michelle or the EP Nurse Practitioner.    Thank you,  Cheryle Munoz RN  [phone]  /After hours or scheduling [phone]

## 2022-12-02 NOTE — TELEPHONE ENCOUNTER
PC to patient to verify which [facility] pharmacy.  Signed Rx should be faxed to:  Pharm Store in [facility]    Fax [phone].     Will obtain Dr Leigh's signature for printed Rx and fax.  Patient verbalized understanding, and reported he has 2 mos supply left at home, to get by until this arrives. jl

## 2022-12-02 NOTE — TELEPHONE ENCOUNTER
[facility] Call Center    Phone Message    May a detailed message be left on voicemail: yes     Reason for Call: Medication Refill Request    Has the patient contacted the pharmacy for the refill? Yes   Name of medication being requested: rivaroxaban ANTICOAGULANT (XARELTO) 20 MG TABS     Provider who prescribed the medication: Dr Leigh  Pharmacy: [facility]      Date medication is needed: 12/2/2022 Please reach out to his daughter for Pharm info        Action Taken: Other: Cardio    Travel Screening: Not Applicable     Thank you!  [facility]

## 2022-12-05 NOTE — TELEPHONE ENCOUNTER
[facility] Call Center    Phone Message    May a detailed message be left on voicemail: yes     Reason for Call: Other: call pt's daughter Juliet to schedule- prefer [facility] if possible.  Needs it done prior to seeing Dr. Leigh.  Please explaine to Juliet what this test is and if it's noramally covered by insurance.     Action Taken: Message routed to:  [facility]): cardio    Travel Screening: Not Applicable     Thank you!  [facility]

## 2022-12-06 NOTE — TELEPHONE ENCOUNTER
Called Juliet and addressed her questions. She sort of already had them answered. We discussed purpose and she verbalized understanding. Ramirez is scheduled in January. [place]

## 2022-12-19 PROBLEM — R79.89 ELEVATED TROPONIN: Status: ACTIVE | Noted: 2022-01-01

## 2022-12-19 PROBLEM — J90 PLEURAL EFFUSION ON RIGHT: Status: ACTIVE | Noted: 2022-01-01

## 2022-12-19 PROBLEM — I50.9 ACUTE ON CHRONIC CONGESTIVE HEART FAILURE, UNSPECIFIED HEART FAILURE TYPE (H): Status: ACTIVE | Noted: 2022-01-01

## 2022-12-19 NOTE — ED NOTES
ED Triage Provider Note  [facility] EMERGENCY DEPARTMENT  Encounter Date: Dec 19, 2022    History:  No chief complaint on file.    Reymundo Petersen is a 82 year old male who presents to the ED with daughter for evaluation of hypoxia into the 80s while at clinic earlier today. Pt is not on oxygen chronically.  Daughter reports wheezing, dyspnea and increased leg swelling past couple days, mild cough. Pt hypoxic here.  Hx of ChF.    Review of Systems:  +dyspnea    Exam:  There were no vitals taken for this visit.  General: No acute distress. Appears stated age.   Cardio: tachycardic rate, extremities well perfused  Resp: Normal work of breathing, grossly normal respiratory rate  Neuro: Alert. Facial movement grossly symmetric. Grossly intact strength.       Medical Decision Making:  Patient arriving to the ED with problem as above. A medical screening exam was performed. Initial differential diagnosis includes but not limited to chf, pna, viral illness.    Labs, ekg, imaging orders initiated from Triage. The patient is most appropriate to return to the waiting room.       Shawn Ramirez MD  12/19/2022 at 4:55 PM     Shawn Ramirez MD  12/19/22 1700

## 2022-12-19 NOTE — TELEPHONE ENCOUNTER
[facility] Call Center    Phone Message    May a detailed message be left on voicemail: yes     Reason for Call: Symptoms or Concerns     If patient has red-flag symptoms, warm transfer to triage line    Current symptom or concern: low Oxygen, around mid to upper 80's, currently it is 86 with a pulse of 78    Symptoms have been present for: since 1pm    Has patient previously been seen for this? Yes    By : PCP     Date: 12/19/2022 - was given Oxygen at PCP appt    Are there any new or worsening symptoms? No      Action Taken: Message routed to:  Other: Cardiology    Travel Screening: Not Applicable

## 2022-12-19 NOTE — ED PROVIDER NOTES
EMERGENCY DEPARTMENT ENCOUNTER      NAME: Reymundo Petersen  AGE: 82 year old male  YOB: 1940  MRN: [id number]  EVALUATION DATE & TIME: 12/19/2022  5:12 PM    PCP: Jamie Noguera    ED PROVIDER: Deja Urbina MD    Chief Complaint   Patient presents with     Shortness of Breath     Weight Gain         FINAL IMPRESSION:  1. Acute on chronic congestive heart failure, unspecified heart failure type (H)    2. Pleural effusion on right    3. Elevated troponin    4. Acute respiratory failure with hypoxia (H)          ED COURSE & MEDICAL DECISION MAKING:    Pertinent Labs & Imaging studies reviewed. (See chart for details)  82 year old male with history of HTN, HLD, CHF with most recent EF of 30 to 35%, A. fib status post recent ablation, pulmonary hypertension who presents to the Emergency Department for evaluation of increasing dyspnea with cough and some lower extremity edema.  Compliant with anticoagulation.  Hypoxic at clinic and upon arrival to ED.  Suspect this is related to CHF exacerbation.  Because of anticoagulation and compliance unlikely pulmonary embolism.  Differential includes symptomatic anemia, arrhythmia.  No associated chest pain to suspect ACS.  COVID, influenza or pneumonia also on the differential with cough, but has been afebrile.    Patient placed on monitor, IV established and blood obtained.  Placed on supplemental oxygen.  Again this is new.  Twelve-lead EKG shows dual AV paced rhythm unchanged from previous.  Again he is status post recent ablation.  COVID/influenza/RSV negative.  CBC, BMP, LFTs, magnesium, VBG unremarkable.  BNP elevated at 7078.  Troponin elevated at 94.  Again with recent ablation this may affect troponin.  Will repeat in 2 hours but again no chest pain at this time.  Chest x-ray obtained with a moderate right-sided pleural effusion.  This is consistent with decreased breath sounds on exam.  This is new compared to previous imaging.  With his hypoxia he would  likely benefit from thoracentesis.  Ultrasound-guided thoracentesis ordered.  Unsure if there is any concurrent pneumonia.  Procalcitonin added on.  Given low output CHF Case discussed with cardiology who agrees patient would likely benefit from dobutamine assisted diuresis.  Placed on dobutamine at 5 mics per kilogram per minute and given home dose of Bumex IV.  Admitted to hospitalist medicine service for further management.      ED Course as of 12/19/22 1958   Mon Dec 19, 2022   1722 BP: 95/53   1722 SBP 90-100s baseline   1803 Chest x-ray reviewed by myself revealing large right-sided pleural effusion compared to March 2022 EKG effusion is entirely new.   1832 N-Terminal Pro BNP Inpatient(!): 7,078   1832 Troponin T, High Sensitivity(!): 94  Recent ablation may have contributed        5:23 PM Introduced myself to the patient, obtained history of present illness, and performed initial physical exam at this time. PPE: Provider wore gloves, N95 mask  6:41 PM Spoke with Dr. Day, Cardiology, regarding the patient.  6:43 PM Discussed the case with Dr. Anthony, Hospitalist, who agrees to accept the patient at this time.  7:07 PM Updated the patient and his daughter on the current treatment plan.    Medical Decision Making    History:    Supplemental history from: Family Member/Significant Other    External Record(s) reviewed: Documented in HPI, if applicable.    Work Up:    Chart documentation includes differential considered and any EKGs or imaging interpreted by provider.    In additional to work up documented, I considered the following work up: See chart documentation, if applicable.    External consultation:    Discussion of management with another provider: See chart documentation, if applicable    Complicating factors:    Care impacted by chronic illness: Heart Disease, Hyperlipidemia and Hypertension    Care affected by social determinants of health: N/A    Disposition considerations: Admit.        At the  conclusion of the encounter I discussed the results of all of the tests and the disposition. The questions were answered. The patient or family acknowledged understanding and was agreeable with the care plan.    CONSULTS:  Cardiology    CRITICAL CARE:  Critical Care  Performed by: Deja Urbina MD  Authorized by: Deja Urbina MD     Total critical care time: 58 minutes  Criticalcare time was exclusive of separately billable procedures and treating other patients.  Critical care was necessary to treat or prevent imminent or life-threatening deterioration of the following conditions: Cardiopulmonary decompensation, death, disability  Critical care was time spent personally by me on the following activities: development of treatment plan with patient or surrogate, discussions with consultants, examination of patient, evaluation of patient's response totreatment, obtaining history from patient or surrogate, ordering and performing treatments and interventions, ordering and review of laboratory studies, ordering and review of radiographic studies and re-evaluation ofpatient's condition, this excludes any separately billable procedures.      MEDICATIONS GIVEN IN THE EMERGENCY:  Medications   DOBUTamine (DOBUTREX) 500 mg in D5W 250 mL infusion (adult std conc) (5 mcg/kg/min × 78.3 kg Intravenous New Bag 12/19/22 1931)   bumetanide (BUMEX) injection 2 mg (2 mg Intravenous Given 12/19/22 1928)       NEW PRESCRIPTIONS STARTED AT TODAY'S ER VISIT  New Prescriptions    No medications on file          =================================================================    HPI    Patient information was obtained from: the patient and his daughter    Use of Intrepreter: N/A      Reymundo Petersen is a 82 year old male with pertinent medical history of pulmonary hypertension, nonrheumatic aortic valve insufficiency, hypertension, hyperlipidemia, left ventricular hypertrophy, stage 2 CKD, CHF, persistent atrial fibrillation, and  peripheral edema, who presents for evaluation of shortness of breath.     Per chart review, the patient was had an ECHO done on 6/13/22 with results as follows: The left ventricle is mildly dilated.   Left ventricular function is decreased. The ejection fraction is 30-35%   (moderately reduced).   There is moderate global hypokinesia of the left ventricle.   The right ventricle is mildly dilated.   The right ventricular systolic function is normal.   The left atrium is severely dilated.   The right atrium is moderately dilated.   There is moderately severe (3+) tricuspid regurgitation.   Right ventricle systolic pressure estimate normal   There is mild to moderate (1-2+) aortic regurgitation.   The ascending aorta is Mildly dilated.   IVC diameter >2.1 cm collapsing <50% with sniff suggests a high RA pressure   estimated at 15 mmHg or greater.   Small pericardial effusion   There are no echocardiographic indications of cardiac tamponade.     The patient was then admitted from 11/23-11/24/22 for evaluation of persistent atrial fibrillation. During this visit, the patient had an ablation for atrial fibrillation. He was admitted following this procedure, as he became hypoxic. Patient was titrated off of oxygen prior to being discharged home in good condition.    Lastly, the patient was seen by Dr. Vasquez, Cardiology, on 11/16/22 for persistent atrial fibrillation. At this time, the patient's ECG showed a paced rhythm. Weight at this time was 78 kg (172 lbs).     For the past couple days, the patient has had flu-like symptoms including a cough that is productive with phlegm. The patient says that it is sometimes dry, but his daughter states that it is productive. He is also experiencing increased shortness of breath. At his PCP visit today ([facility]), the patient was hypoxic and referred to the ED for further evaluation. The patient also notes that he may have gained a few pounds, but is unsure. He has not noticed any  more swelling than normal. Compliant with blood thinners. He reports a recent right nose bleed, but otherwise no bleeding.     REVIEW OF SYSTEMS  Constitutional:  Denies fever, chills, weight loss or weakness  HENT:  Denies sore throat, ear pain, congestion  Respiratory: No wheeze Positive for shortness of breath and cough  Cardiovascular:  No CP, palpitations  GI:  Denies abdominal pain, nausea, vomiting, diarrhea  : Denies dysuria, denies hematuria  Musculoskeletal:  Denies any new muscle/joint pain, swelling or loss of function.  All other systems negative unless noted in HPI.      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) 2/11/2021     Congestive heart failure (H)      Coronary artery disease      COVID 02/2021     Dilated cardiomyopathy (H) 2/11/2021     MARIAN (generalized anxiety disorder) 2/11/2021     GERD (gastroesophageal reflux disease)      High cholesterol      Hypertension      LBBB (left bundle branch block) 7/11/2018     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     Pulmonary hypertension (H) 4/1/2020       PAST SURGICAL HISTORY:  Past Surgical History:   Procedure Laterality Date     ANGIOPLASTY       ARTHROSCOPY KNEE       CARDIOVERSION  2021     CARDIOVERSION  02/03/2022     CV CORONARY ANGIOGRAM N/A 10/23/2020    Procedure: Coronary Angiogram;  Surgeon: Varun Freire MD;  Location: [facility] Cath Lab;  Service: Cardiology     CV LEFT HEART CATHETERIZATION WITHOUT LEFT VENTRICULOGRAM Left 10/23/2020    Procedure: Left Heart Catheterization Without Left Ventriculogram;  Surgeon: Varun Freire MD;  Location: [facility] Cath Lab;  Service: Cardiology     EP BIV PACEMAKER INSERT N/A 3/25/2020    Procedure: EP Biventricular Pacemaker Insertion;  Surgeon: Lori  MD Taylor;  Location: [facility] Cath Lab;  Service: Cardiology     RELEASE CARPAL TUNNEL       ZZC TOTAL KNEE ARTHROPLASTY Right 8/15/2019    Procedure: RIGHT  MINIMALLY TOTAL KNEE ARTHROPLASTY;  Surgeon: Keven Cerda MD;  Location: [facility];  Service: Orthopedics       CURRENT MEDICATIONS:    Prior to Admission Medications   Prescriptions Last Dose Informant Patient Reported? Taking?   Coenzyme Q10 300 MG CAPS 12/19/2022  Yes Yes   Sig: Take 300 mg by mouth daily   HYDROcodone-acetaminophen (NORCO) 5-325 MG tablet Unknown  Yes Yes   Sig: Take 1 tablet by mouth every 6 hours as needed for severe pain (7-10) For post procedure if needed for back, hip, and knee pain   acetaminophen (TYLENOL) 500 MG tablet Unknown  Yes Yes   Sig: Take 500 mg by mouth every 6 hours as needed for mild pain   amiodarone (PACERONE) 200 MG tablet 12/18/2022  No Yes   Sig: Take 1 tablet (200 mg) by mouth every other day   aspirin (ASA) 81 MG chewable tablet 12/19/2022  No Yes   Sig: Take 1 tablet (81 mg) by mouth every other day   Patient taking differently: Take 81 mg by mouth Every Mon, Wed, Fri Morning Taking Monday, Wednesday and Fridays   atorvastatin (LIPITOR) 10 MG tablet 12/18/2022  Yes Yes   Sig: Take 10 mg by mouth four times a week Sunday, Tuesday, Thursday, & Saturday at bedtime   bumetanide (BUMEX) 2 MG tablet 12/19/2022 at x2  No Yes   Sig: Take 1 tablet (2 mg) by mouth 2 times daily   doxazosin (CARDURA) 8 MG tablet 12/18/2022  Yes Yes   Sig: [DOXAZOSIN (CARDURA) 8 MG TABLET] Take 4 mg by mouth at bedtime.          finasteride (PROSCAR) 5 mg tablet 12/18/2022  Yes Yes   Sig: [FINASTERIDE (PROSCAR) 5 MG TABLET] Take 5 mg by mouth at bedtime.          gabapentin (NEURONTIN) 100 MG capsule 12/19/2022 at x2  Yes Yes   Sig: Two capsules in the morning, two capsules in the afternoon, and three capsules in the evening   glucosamine/chondr ocle A sod (OSTEO BI-FLEX ORAL) 12/19/2022 at x1  Yes Yes   Sig: Take 1 tablet by  mouth 2 times daily (with meals)   losartan (COZAAR) 25 MG tablet 12/19/2022  No Yes   Sig: [LOSARTAN (COZAAR) 25 MG TABLET] Take 1 tablet (25 mg total) by mouth daily.   melatonin 10 mg Tab 12/18/2022  Yes Yes   Sig: Take 10 mg by mouth At Bedtime    metoprolol succinate ER (TOPROL-XL) 25 MG 24 hr tablet 12/18/2022  No Yes   Sig: Take 1 tablet (25 mg) by mouth At Bedtime   multivitamin, therapeutic (THERA-VIT) TABS tablet 12/19/2022  Yes Yes   Sig: Take 1 tablet by mouth daily   omeprazole (PRILOSEC) 20 MG capsule 12/19/2022 at x1  Yes Yes   Sig: [OMEPRAZOLE (PRILOSEC) 20 MG CAPSULE] Take 20 mg by mouth 2 (two) times a day before meals.   potassium chloride ER (KLOR-CON M) 20 MEQ CR tablet 12/19/2022 at x2  No Yes   Sig: Take 1 tablet (20 mEq) by mouth 2 times daily   rivaroxaban ANTICOAGULANT (XARELTO) 20 MG TABS tablet 12/18/2022  No Yes   Sig: Take 1 tablet (20 mg) by mouth daily (with dinner)   sertraline (ZOLOFT) 50 MG tablet 12/19/2022 at x1  Yes Yes   Sig: Take 50 mg by mouth 2 times daily    vit A/vit C/vit E/zinc/copper (PRESERVISION AREDS ORAL) 12/19/2022 at x1  Yes Yes   Sig: Take 1 tablet by mouth 2 times daily (with meals)      Facility-Administered Medications: None       ALLERGIES:  No Known Allergies    FAMILY HISTORY:  Family History   Problem Relation Age of Onset     Alzheimer Disease Mother      Heart Disease Father      Cancer Sister      Diabetes Type 2  Sister      Chronic Obstructive Pulmonary Disease Sister      LUNG DISEASE Brother        SOCIAL HISTORY:  Social History     Tobacco Use     Smoking status: Never     Smokeless tobacco: Never   Substance Use Topics     Alcohol use: Yes     Comment: Alcoholic Drinks/day: rare     Drug use: No        VITALS:  Patient Vitals for the past 24 hrs:   BP Temp Temp src Pulse Resp SpO2 Height Weight   12/19/22 1915 102/58 -- -- 69 25 94 % -- --   12/19/22 1900 105/58 -- -- 70 23 94 % -- --   12/19/22 1818 98/57 -- -- 69 28 93 % -- --   12/19/22 5809  "-- -- -- 70 20 95 % -- --   12/19/22 1800 108/56 -- -- 70 22 95 % -- --   12/19/22 1701 95/53 99  F (37.2  C) Oral 82 24 94 % 1.803 m (5' 11\") 78.3 kg (172 lb 9.6 oz)       PHYSICAL EXAM    General Appearance: Well-appearing, well-nourished, no acute distress warm to the touch  Head:  Normocephalic  Eyes:  PERRL, conjunctiva/corneas clear, EOM's intact  ENT:   membranes are moist without pallor  Neck:  Supple  Cardio:  Regular rate and rhythm, no murmur/gallop/rub, 2+ pulses symmetric in all extremities   Pulm:  No respiratory distress, breath sounds decreased in right base  Back:  No CVA tenderness, normal ROM  Abdomen:  Soft, non-tender, non distended,no rebound or guarding.  Extremities:  Extremities normal, atraumatic, no cyanosis, full ROM and motor tone intact, bilateral pulses intact upper and lower Trace edema, wearing compression stocking bilaterally  Skin:  Skin warm, dry, no rashes  Neuro:  Alert and oriented ×3, moving all extremities, no gross sensory defects     RADIOLOGY/LABS:  Reviewed all pertinent imaging. Please see official radiology report. All pertinent labs reviewed and interpreted.    Results for orders placed or performed during the hospital encounter of 12/19/22   XR Chest Port 1 View    Impression    IMPRESSION: A moderate right-sided pleural effusion with associated atelectasis. Superimposed pneumonia is not excluded. The left lung is grossly clear. There is no pneumothorax. The heart remains enlarged. A right subclavian pacing device is unchanged.       Basic metabolic panel   Result Value Ref Range    Sodium 137 136 - 145 mmol/L    Potassium 4.5 3.4 - 5.3 mmol/L    Chloride 94 (L) 98 - 107 mmol/L    Carbon Dioxide (CO2) 32 (H) 22 - 29 mmol/L    Anion Gap 11 7 - 15 mmol/L    Urea Nitrogen 17.0 8.0 - 23.0 mg/dL    Creatinine 1.03 0.67 - 1.17 mg/dL    Calcium 9.3 8.8 - 10.2 mg/dL    Glucose 115 (H) 70 - 99 mg/dL    GFR Estimate 72 >60 mL/min/1.73m2   Result Value Ref Range    Troponin T, " High Sensitivity 94 (H) <=22 ng/L   Result Value Ref Range    Magnesium 1.9 1.7 - 2.3 mg/dL   Nt probnp inpatient (BNP)   Result Value Ref Range    N terminal Pro BNP Inpatient 7,078 (H) 0 - 1,800 pg/mL   Blood gas venous   Result Value Ref Range    pH Venous 7.42 7.35 - 7.45    pCO2 Venous 56 (H) 35 - 50 mm Hg    pO2 Venous 20 (L) 25 - 47 mm Hg    Bicarbonate Venous 36 (H) 24 - 30 mmol/L    Base Excess/Deficit (+/-) 11.9   mmol/L    Oxyhemoglobin Venous 27.6 (L) 70.0 - 75.0 %    O2 Sat, Venous 28.2 (L) 70.0 - 75.0 %   Hepatic function panel   Result Value Ref Range    Protein Total 7.6 6.4 - 8.3 g/dL    Albumin 3.6 3.5 - 5.2 g/dL    Bilirubin Total 1.1 <=1.2 mg/dL    Alkaline Phosphatase 113 40 - 129 U/L    AST 24 10 - 50 U/L    ALT 8 (L) 10 - 50 U/L    Bilirubin Direct 0.34 (H) 0.00 - 0.30 mg/dL   Symptomatic Influenza A/B & SARS-CoV2 (COVID-19) Virus PCR Multiplex Nose    Specimen: Nose; Swab   Result Value Ref Range    Influenza A PCR Negative Negative    Influenza B PCR Negative Negative    RSV PCR Negative Negative    SARS CoV2 PCR Negative Negative   CBC with platelets and differential   Result Value Ref Range    WBC Count 11.9 (H) 4.0 - 11.0 10e3/uL    RBC Count 3.93 (L) 4.40 - 5.90 10e6/uL    Hemoglobin 12.2 (L) 13.3 - 17.7 g/dL    Hematocrit 38.1 (L) 40.0 - 53.0 %    MCV 97 78 - 100 fL    MCH 31.0 26.5 - 33.0 pg    MCHC 32.0 31.5 - 36.5 g/dL    RDW 14.1 10.0 - 15.0 %    Platelet Count 288 150 - 450 10e3/uL   Extra Blue Top Tube   Result Value Ref Range    Hold Specimen JIC    Extra Red Top Tube   Result Value Ref Range    Hold Specimen JIC    Extra Green Top (Lithium Heparin) ON ICE   Result Value Ref Range    Hold Specimen JIC    Result Value Ref Range    Procalcitonin 0.22 (H) <0.05 ng/mL   Manual Differential   Result Value Ref Range    % Neutrophils 92 %    % Lymphocytes 2 %    % Monocytes 6 %    % Eosinophils 0 %    % Basophils 0 %    Absolute Neutrophils 10.9 (H) 1.6 - 8.3 10e3/uL    Absolute  Lymphocytes 0.2 (L) 0.8 - 5.3 10e3/uL    Absolute Monocytes 0.7 0.0 - 1.3 10e3/uL    Absolute Eosinophils 0.0 0.0 - 0.7 10e3/uL    Absolute Basophils 0.0 0.0 - 0.2 10e3/uL    RBC Morphology Confirmed RBC Indices     Platelet Assessment  Automated Count Confirmed. Platelet morphology is normal.     Automated Count Confirmed. Platelet morphology is normal.       EKG:  Performed at: 1816    Impression: Dual AV-Paced rhythm    Rate: 72 bpm  Axis: 85   87   248  AL Interval: 206 ms  QRS Interval: 142 ms  QTc Interval: 466 ms  Comparison: No change since ECG on 23-NOV-2022  I have independently reviewed and interpreted the EKG(s) documented above.      The creation of this record is based on the scribe s observations of the work being performed by Deja Urbina MD and the provider s statements to them. It was created on her behalf by Valencia Barboza, a trained medical scribe. This document has been checked and approved by the attending provider.    Deja Urbina MD  Emergency Medicine  [facility] EMERGENCY DEPARTMENT  [facility] [facility] [id number] [phone]  Dept: [phone]       Deja Urbina MD  12/19/22 1959

## 2022-12-19 NOTE — TELEPHONE ENCOUNTER
Called patient and daughter, Juliet. They are thankfully on their way to [facility] ER, which is what writer would recommend at this time. Low O2 warrants urgent evaluation and rule out for multifactorial causes. PMD office placed O2 at visit which is concerning as he is not on O2 chronically.  Reassured Juliet that this is the right move and [facility] is actually rounding this week. They will be there shortly. -[facility] [place]

## 2022-12-19 NOTE — ED TRIAGE NOTES
Patient was at clinic today and it was noted that O2 sats were in the low 80s, O2 was applied in clinic which raised sats. Patient later checked O2 sats when he was at home and again his sats were low. O2 applied in triage due to sats at 79%. Patient also has noted some swelling in bilateral lower extremities.      Triage Assessment     Row Name 12/19/22 7391       Triage Assessment (Adult)    Airway WDL WDL       Respiratory WDL    Respiratory WDL WDL       Skin Circulation/Temperature WDL    Skin Circulation/Temperature WDL WDL       Cardiac WDL    Cardiac WDL WDL       Peripheral/Neurovascular WDL    Peripheral Neurovascular WDL WDL       Cognitive/Neuro/Behavioral WDL    Cognitive/Neuro/Behavioral WDL WDL

## 2022-12-20 NOTE — PROGRESS NOTES
Detail Level: Zone RESPIRATORY CARE NOTE     Patient Name: Reymundo Petersen  Today's Date: 12/20/2022       Pt continues to receive duo neb. BS are faint crackles on RT. Pt is on 2lpm of oxygen via NC, SpO2 is 97%. Post treatment there is increased aeration. Pt also perceives improvement.  RT encouraged deep breathing and coughing techniques .  RT will continue to monitor and assess.      Detail Level: Generalized

## 2022-12-20 NOTE — ED NOTES
Pt and pt's family stated pt received heart ablation on November. History of knee replacement on right side. Pt is on 2 L o2 with 96%.

## 2022-12-20 NOTE — CONSULTS
Care Management Initial Consult    General Information  Assessment completed with: Patient, Children, dtr Juliet  Type of CM/SW Visit: Initial Assessment    Primary Care Provider verified and updated as needed: Yes   Readmission within the last 30 days: no previous admission in last 30 days      Reason for Consult: discharge planning  Advance Care Planning: Advance Care Planning Reviewed: no concerns identified, verified with patient          Communication Assessment  Patient's communication style: spoken language (English or Bilingual)             Cognitive  Cognitive/Neuro/Behavioral: WDL                      Living Environment:   People in home: other relative(s), other (see comments) (sister in law)  pt, sister in law Stephanie  Current living Arrangements: house      Able to return to prior arrangements: yes  Living Arrangement Comments: House is a hoarding home    Family/Social Support:  Care provided by: self  Provides care for: no one  Marital Status:   Children          Description of Support System: Supportive, Involved    Support Assessment: Adequate family and caregiver support, Patient communicates needs well met    Current Resources:   Patient receiving home care services: No     Community Resources: None  Equipment currently used at home: walker, rolling, grab bar, toilet, grab bar, tub/shower, shower chair  Supplies currently used at home: Hearing Aid Batteries    Employment/Financial:  Employment Status: retired        Financial Concerns:             Lifestyle & Psychosocial Needs:  Social Determinants of Health     Tobacco Use: Low Risk      Smoking Tobacco Use: Never     Smokeless Tobacco Use: Never     Passive Exposure: Not on file   Alcohol Use: Not on file   Financial Resource Strain: Not on file   Food Insecurity: Not on file   Transportation Needs: Not on file   Physical Activity: Not on file   Stress: Not on file   Social Connections: Not on file   Intimate Partner Violence: Not on file  "  Depression: Not on file   Housing Stability: Not on file       Functional Status:  Prior to admission patient needed assistance:   Dependent ADLs:: Independent, Ambulation-walker  Dependent IADLs:: Independent       Mental Health Status:          Chemical Dependency Status:                Values/Beliefs:  Spiritual, Cultural Beliefs, Restorationist Practices, Values that affect care:                 Additional Information:  Met with pt and dtr Juliet, introduced self and care management role. Pt lives in his own home. His sister-in-law Stephanie moved into the home 18 years ago, shortly after Ramirez's wife . Per Juliet, initially Stephanie moved in to help with keeping the \"house in order\" and because she was going through a divorce. Juliet states that soon it was evident that Stephanie \"is a hoarder\". House has paths for walking from room to room.     Pt is independent with ADL's at baseline, still drives. Juliet visits often, sets up meds for pt and \"makes sure they are both eating the way they are suppose to\". Per Juliet, both pt and Stephanie should be on a low sodium diet and both are non compliant with this.     CM to follow hospital progression and treatment team recommendations and assist with any discharge needs. Family to transport at discharge.      Farnaz Long RN      "

## 2022-12-20 NOTE — PROGRESS NOTES
RESPIRATORY CARE NOTE     Patient Name: Reymundo Petersen  Today's Date: 12/20/2022       Pt continues to receive duo neb. BS are dem. Pt is on 3lpm of oxygen via NC, SpO2 is 97%. Post treatment there is increased aeration. Pt also perceives improvement.  RT encouraged deep breathing and coughing techniques .  RT will continue to monitor and assess.

## 2022-12-20 NOTE — CONSULTS
HEART CARE NOTE        Thank you, Dr. Mosqueda, for asking the [facility] team to see Reymundo Petersen to evaluate ADHF.      Assessment/Recommendations     1. HFrEF c/b cardiogenic shock  Assessment / Plan    Hypervolemic on physical exam - diuresing well on current regimen; no changes at this time    Continue dobutamine gtt to augment cardiac output and renal perfusion    Current Pharmacotherapy AHA Guideline-Directed Medical Therapy   Losartan 25 mg daily - on hold Lisinopril 20 mg twice daily   Metoprolol succinate 25 mg daily - on hold Carvedilol 25 mg twice daily   Spironolactone not started Spironolactone 25 mg once daily   Hydralazine NA Hydralazine 100 mg three times daily   Isosorbide dinitrate NA Isosorbide dinitrate 40 mg three times daily   SGLT2 inhibitor not started Dapagliflozin or Empagliflozin 10 mg daily     2. Arrhythmia  Assessment / Plan    Persistent atrial fibrillation s/p ablations and DCCV    Currently on rivaroxaban; being considered for Watchman device - continue amiodarone    s/p PVI 11/22    Complete Heart Block s/p CRT-P    Follows with Dr. Michelle in EP as well as afib clinic     3. CAD  Assessment / Plan    Denies chest pain or anginal equivalents    Continue ASA, atorvastatin, metoprolol succinate, losartan       Clinically Significant Risk Factors Present on Admission               # Drug Induced Coagulation Defect: home medication list includes an anticoagulant medication      Cardiovascular: Cardiac Arrhythmia: Atrial fibrillation: Unspecified    Fluid & Electrolyte Disorders: Fluid overload, unspecified, Other fluid overload and Other disorders of electrolyte and fluid balance, not elsewhere classified      History of Present Illness/Subjective    Mr. Reymundo Petersen is a 82 year old male with a PMHx significant for PMHx significant for  persistent atrial fibrillation, chronic systolic and diastolic heart failure due to nonischemic cardiomyopathy,  "CHB s/p CRT-P, CAD with prior RCA PCI, HTN and CKD stage 2 who presents in ADHF    Today, Mr. Petersen endorses progressive dyspnea on exertion and fluid retention; Management plan as detailed above    ECG: Personally reviewed. Paced rhythm    ECHO (personnaly Reviewed):   The left ventricle is mildly dilated.  Left ventricular function is decreased. The ejection fraction is 30-35%  (moderately reduced).  There is moderate global hypokinesia of the left ventricle.  The right ventricle is mildly dilated.  The right ventricular systolic function is normal.  The left atrium is severely dilated.  The right atrium is moderately dilated.  There is moderately severe (3+) tricuspid regurgitation.  Right ventricle systolic pressure estimate normal  There is mild to moderate (1-2+) aortic regurgitation.  The ascending aorta is Mildly dilated.  IVC diameter >2.1 cm collapsing <50% with sniff suggests a high RA pressure  estimated at 15 mmHg or greater.  Small pericardial effusion  There are no echocardiographic indications of cardiac tamponade.        Physical Examination Review of Systems   /57   Pulse 66   Temp 99  F (37.2  C) (Oral)   Resp 17   Ht 1.803 m (5' 11\")   Wt 78.3 kg (172 lb 9.6 oz)   SpO2 97%   BMI 24.07 kg/m    Body mass index is 24.07 kg/m .  Wt Readings from Last 3 Encounters:   12/19/22 78.3 kg (172 lb 9.6 oz)   11/24/22 77.7 kg (171 lb 4.8 oz)   11/16/22 78 kg (172 lb)     General Appearance:   no distress, normal body habitus   ENT/Mouth: membranes moist, no oral lesions or bleeding gums.      EYES:  no scleral icterus, normal conjunctivae   Neck: no carotid bruits or thyromegaly   Chest/Lungs:   lungs are clear to auscultation, no rales or wheezing, equal chest wall expansion    Cardiovascular:   Regular. Normal first and second heart sounds with no murmurs, rubs, or gallops; the carotid, radial and posterior tibial pulses are intact, + JVD and LE edema bilaterally    Abdomen:  no " organomegaly, masses, bruits, or tenderness; bowel sounds are present   Extremities: no cyanosis or clubbing   Skin: no xanthelasma, warm.    Neurologic: alert and calm     Psychiatric: alert and calm     A complete 10 systems ROS was reviewed  And is negative except what is listed in the HPI.          Medical History  Surgical History Family History Social History   Past Medical History:   Diagnosis Date     Atrial fibrillation (H)      Atrial fibrillation, transient (H) 8/11/2020     BPH (benign prostatic hyperplasia) 7/11/2018     Chronic combined systolic and diastolic heart failure (H) 2/11/2021     Congestive heart failure (H)      Coronary artery disease      COVID 02/2021     Dilated cardiomyopathy (H) 2/11/2021     MARIAN (generalized anxiety disorder) 2/11/2021     GERD (gastroesophageal reflux disease)      High cholesterol      Hypertension      LBBB (left bundle branch block) 7/11/2018     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     Pulmonary hypertension (H) 4/1/2020    Past Surgical History:   Procedure Laterality Date     ANGIOPLASTY       ARTHROSCOPY KNEE       CARDIOVERSION  2021     CARDIOVERSION  02/03/2022     CV CORONARY ANGIOGRAM N/A 10/23/2020    Procedure: Coronary Angiogram;  Surgeon: Varun Freire MD;  Location: [facility] Cath Lab;  Service: Cardiology     CV LEFT HEART CATHETERIZATION WITHOUT LEFT VENTRICULOGRAM Left 10/23/2020    Procedure: Left Heart Catheterization Without Left Ventriculogram;  Surgeon: Varun Freire MD;  Location: [facility] Cath Lab;  Service: Cardiology     EP BIV PACEMAKER INSERT N/A 3/25/2020    Procedure: EP Biventricular Pacemaker Insertion;  Surgeon: Taylor Sandoval MD;  Location: [facility] Cath Lab;  Service: Cardiology     RELEASE CARPAL TUNNEL       ZZC TOTAL KNEE ARTHROPLASTY Right 8/15/2019    Procedure: RIGHT  MINIMALLY TOTAL KNEE ARTHROPLASTY;  Surgeon: Keven Cerda MD;  Location: [facility];  Service:  Orthopedics    no family history of premature coronary artery disease Social History     Socioeconomic History     Marital status:      Spouse name: Not on file     Number of children: Not on file     Years of education: Not on file     Highest education level: Not on file   Occupational History     Not on file   Tobacco Use     Smoking status: Never     Smokeless tobacco: Never   Substance and Sexual Activity     Alcohol use: Yes     Comment: Alcoholic Drinks/day: rare     Drug use: No     Sexual activity: Not on file   Other Topics Concern     Parent/sibling w/ CABG, MI or angioplasty before 65F 55M? Not Asked   Social History Narrative    Retired.  Good sense of humor     Social Determinants of Health     Financial Resource Strain: Not on file   Food Insecurity: Not on file   Transportation Needs: Not on file   Physical Activity: Not on file   Stress: Not on file   Social Connections: Not on file   Intimate Partner Violence: Not on file   Housing Stability: Not on file           Lab Results    Chemistry/lipid CBC Cardiac Enzymes/BNP/TSH/INR   Lab Results   Component Value Date    CHOL 107 06/23/2022    HDL 38 (L) 06/23/2022    TRIG 35 06/23/2022    BUN 17.0 12/19/2022     12/19/2022    CO2 32 (H) 12/19/2022    Lab Results   Component Value Date    WBC 11.9 (H) 12/19/2022    HGB 12.2 (L) 12/19/2022    HCT 38.1 (L) 12/19/2022    MCV 97 12/19/2022     12/19/2022    Lab Results   Component Value Date    TROPONINI 0.09 03/14/2022     (H) 06/17/2022    TSH 4.26 10/17/2022    INR 1.03 02/11/2021     Lab Results   Component Value Date    TROPONINI 0.09 03/14/2022          Weight:    Wt Readings from Last 3 Encounters:   12/19/22 78.3 kg (172 lb 9.6 oz)   11/24/22 77.7 kg (171 lb 4.8 oz)   11/16/22 78 kg (172 lb)       Allergies  No Known Allergies      Surgical History  Past Surgical History:   Procedure Laterality Date     ANGIOPLASTY       ARTHROSCOPY KNEE       CARDIOVERSION  2021      CARDIOVERSION  02/03/2022     CV CORONARY ANGIOGRAM N/A 10/23/2020    Procedure: Coronary Angiogram;  Surgeon: Varun Freire MD;  Location: [facility] Cath Lab;  Service: Cardiology     CV LEFT HEART CATHETERIZATION WITHOUT LEFT VENTRICULOGRAM Left 10/23/2020    Procedure: Left Heart Catheterization Without Left Ventriculogram;  Surgeon: Varun Freire MD;  Location: [facility] Cath Lab;  Service: Cardiology     EP BIV PACEMAKER INSERT N/A 3/25/2020    Procedure: EP Biventricular Pacemaker Insertion;  Surgeon: Taylor Sandoval MD;  Location: [facility] Cath Lab;  Service: Cardiology     RELEASE CARPAL TUNNEL       ZZC TOTAL KNEE ARTHROPLASTY Right 8/15/2019    Procedure: RIGHT  MINIMALLY TOTAL KNEE ARTHROPLASTY;  Surgeon: Keven Cerda MD;  Location: [facility];  Service: Orthopedics       Social History  Tobacco:   History   Smoking Status     Never   Smokeless Tobacco     Never    Alcohol:   Social History    Substance and Sexual Activity      Alcohol use: Yes        Comment: Alcoholic Drinks/day: rare   Illicit Drugs:   History   Drug Use No       Family History  Family History   Problem Relation Age of Onset     Alzheimer Disease Mother      Heart Disease Father      Cancer Sister      Diabetes Type 2  Sister      Chronic Obstructive Pulmonary Disease Sister      LUNG DISEASE Brother           John Paul Vasquez MD on 12/20/2022      cc: Jamie Noguera,

## 2022-12-20 NOTE — ED NOTES
Pt up to use bedside urinal, approximately 5mL UO. Assisted pt back to bed, primo fit placed. Pt placed back on monitor.

## 2022-12-20 NOTE — H&P
[facility]    History and Physical - Hospitalist Service       Date of Admission:  12/19/2022    Assessment & Plan      Reymundo Petersen is a 82 year old male with PMH significant for CHF who is admitted on 12/19/2022 Acute Hypoxemic Respiratory Failure, Acute on Chronic CHF exacerbation and Moderate Right Pleural Effusion.     1. Acute Hypoxemic Respiratory Failure- Admit patient. 2/2 CHF exacerbation and Moderate Right Pleural Effusion. CXR cannot rule out superimposed PNA. Continue diuresis and supplemental O2. Awaiting Thoracentesis tomorrow. Empiric Rocephin & Azithromycin. Monitor vitals closely. Repeat labs in a.m. Will make further recommendations based on clinical course.    2. Acute on Chronic CHF exacerbation- Continue Bumex IV. Currently on Dobutamine drip. Supplemental O2. Strict I/O, daily weight. Check 2D echo. Continue telemetry monitoring. Consult Cardiology.    3. Moderate Right Pleural Effusion- 2/2 CHF exacerbation. On Bumex. Awaiting Thoracentesis tomorrow. Supplemental O2. Monitor vitals closely.    4. Elevated Troponin- Trending down, will repeat in 6 hours. Intermittent chest discomfort. EKG Sinus rhythm with PACs and LBBB. ( He reportedly has hx of LBBB; however not seen on recent EKGs from 9/2022 to present). Continue supplemental O2. Trial of NTG and PRN morphine. Continue telemetry monitoring. Follow up Cardiology recommendations.    5. Community Acquired Pneumonia- CXR reviewed. Elevated procalcitonin. + coughing, wheezing and hypoxia. Trial of empiric Rocephin and Azithromycin. Duonebs. Supplemental O2. Monitor vitals closely.    6. Leukocytosis- Afebrile. CXR reviewed. Rocephin and Azithromycin for possible CAP. Repeat CBC in a.m.     Diet:   Cardiac  DVT Prophylaxis: Pneumatic Compression Devices  Adorno Catheter: Not present  Central Lines: None  Cardiac Monitoring: None  Code Status:   Full code for this admission per patient and POA-daughter Juliet Petersen  [phone].    Clinically Significant Risk Factors Present on Admission               # Drug Induced Coagulation Defect: home medication list includes an anticoagulant medication    # Hypertension: home medication list includes antihypertensive(s)  # Acute systolic heart failure: last echo with EF <40% and receiving IV diuretics             Disposition Plan      Expected Discharge Date: 12/21/2022      Destination: home with family          The patient's care was discussed with the Patient and Patient's Family.    Charlene Graham MD  Hospitalist [facility]  Securely message with the Vocera Web Console (learn more here)  Text page via [facility] Paging/Directory         ______________________________________________________________________    Chief Complaint   Shortness of breath, coughing, wheezing    History is obtained from the patient  And Daughter-Juliet Petersen in person.    History of Present Illness   Ryemundo Petersen is a 82 year old male with PMH significant for CHF, dilated cardiomyopathy, CAD, HTN, hypercholesterolemia, LBBB and A.fib s/p recent cardiac ablation 11/23/22 who presents to ED from outpatient clinic due to hypoxia down to the 80s. Patient defers to daughter to answer most questions. He says that he has bee short of breath (worse with activity) for the past week. Daughter arrived earlier today to take patient to PCP for routine 6 month follow up and noticed that he was coughing, wheezing and visibly short of breath. Associate symptoms include intermittent chest pain and lower extremity swelling. CXR reveals moderate right pleural effusion. BNP elevated. Patient denies fever, chills, nausea, vomiting, diarrhea or abdominal pain. Patient has to take frequent breaks to catch his breath during assessment.    Review of Systems    The 10 point Review of Systems is negative other than noted in the HPI.    Past Medical History    I have reviewed this patient's medical  history and updated it with pertinent information if needed.   Past Medical History:   Diagnosis Date     Atrial fibrillation (H)      Atrial fibrillation, transient (H) 8/11/2020     BPH (benign prostatic hyperplasia) 7/11/2018     Chronic combined systolic and diastolic heart failure (H) 2/11/2021     Congestive heart failure (H)      Coronary artery disease      COVID 02/2021     Dilated cardiomyopathy (H) 2/11/2021     MARIAN (generalized anxiety disorder) 2/11/2021     GERD (gastroesophageal reflux disease)      High cholesterol      Hypertension      LBBB (left bundle branch block) 7/11/2018     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     Pulmonary hypertension (H) 4/1/2020       Past Surgical History   I have reviewed this patient's surgical history and updated it with pertinent information if needed.  Past Surgical History:   Procedure Laterality Date     ANGIOPLASTY       ARTHROSCOPY KNEE       CARDIOVERSION  2021     CARDIOVERSION  02/03/2022     CV CORONARY ANGIOGRAM N/A 10/23/2020    Procedure: Coronary Angiogram;  Surgeon: Varun Freire MD;  Location: [facility] Cath Lab;  Service: Cardiology     CV LEFT HEART CATHETERIZATION WITHOUT LEFT VENTRICULOGRAM Left 10/23/2020    Procedure: Left Heart Catheterization Without Left Ventriculogram;  Surgeon: Varun Freire MD;  Location: [facility] Cath Lab;  Service: Cardiology     EP BIV PACEMAKER INSERT N/A 3/25/2020    Procedure: EP Biventricular Pacemaker Insertion;  Surgeon: Taylor Sandoval MD;  Location: [facility] Cath Lab;  Service: Cardiology     RELEASE CARPAL TUNNEL       ZZC TOTAL KNEE ARTHROPLASTY Right 8/15/2019    Procedure: RIGHT  MINIMALLY TOTAL KNEE ARTHROPLASTY;  Surgeon: Keven Cerda MD;  Location: [facility];  Service: Orthopedics       Social History   I have reviewed this patient's social history and updated it with pertinent information if needed.  Social History     Tobacco Use     Smoking status:  Never     Smokeless tobacco: Never   Substance Use Topics     Alcohol use: Yes     Comment: Alcoholic Drinks/day: rare     Drug use: No       Family History   I have reviewed this patient's family history and updated it with pertinent information if needed.  Family History   Problem Relation Age of Onset     Alzheimer Disease Mother      Heart Disease Father      Cancer Sister      Diabetes Type 2  Sister      Chronic Obstructive Pulmonary Disease Sister      LUNG DISEASE Brother        Prior to Admission Medications   Prior to Admission Medications   Prescriptions Last Dose Informant Patient Reported? Taking?   Coenzyme Q10 300 MG CAPS 12/19/2022  Yes Yes   Sig: Take 300 mg by mouth daily   HYDROcodone-acetaminophen (NORCO) 5-325 MG tablet Unknown  Yes Yes   Sig: Take 1 tablet by mouth every 6 hours as needed for severe pain (7-10) For post procedure if needed for back, hip, and knee pain   acetaminophen (TYLENOL) 500 MG tablet Unknown  Yes Yes   Sig: Take 500 mg by mouth every 6 hours as needed for mild pain   amiodarone (PACERONE) 200 MG tablet 12/18/2022  No Yes   Sig: Take 1 tablet (200 mg) by mouth every other day   aspirin (ASA) 81 MG chewable tablet 12/19/2022  No Yes   Sig: Take 1 tablet (81 mg) by mouth every other day   Patient taking differently: Take 81 mg by mouth Every Mon, Wed, Fri Morning Taking Monday, Wednesday and Fridays   atorvastatin (LIPITOR) 10 MG tablet 12/18/2022  Yes Yes   Sig: Take 10 mg by mouth four times a week Sunday, Tuesday, Thursday, & Saturday at bedtime   bumetanide (BUMEX) 2 MG tablet 12/19/2022 at x2  No Yes   Sig: Take 1 tablet (2 mg) by mouth 2 times daily   doxazosin (CARDURA) 8 MG tablet 12/18/2022  Yes Yes   Sig: [DOXAZOSIN (CARDURA) 8 MG TABLET] Take 4 mg by mouth at bedtime.          finasteride (PROSCAR) 5 mg tablet 12/18/2022  Yes Yes   Sig: [FINASTERIDE (PROSCAR) 5 MG TABLET] Take 5 mg by mouth at bedtime.          gabapentin (NEURONTIN) 100 MG capsule 12/19/2022 at  x2  Yes Yes   Sig: Two capsules in the morning, two capsules in the afternoon, and three capsules in the evening   glucosamine/chondr cole A sod (OSTEO BI-FLEX ORAL) 12/19/2022 at x1  Yes Yes   Sig: Take 1 tablet by mouth 2 times daily (with meals)   losartan (COZAAR) 25 MG tablet 12/19/2022  No Yes   Sig: [LOSARTAN (COZAAR) 25 MG TABLET] Take 1 tablet (25 mg total) by mouth daily.   melatonin 10 mg Tab 12/18/2022  Yes Yes   Sig: Take 10 mg by mouth At Bedtime    metoprolol succinate ER (TOPROL-XL) 25 MG 24 hr tablet 12/18/2022  No Yes   Sig: Take 1 tablet (25 mg) by mouth At Bedtime   multivitamin, therapeutic (THERA-VIT) TABS tablet 12/19/2022  Yes Yes   Sig: Take 1 tablet by mouth daily   omeprazole (PRILOSEC) 20 MG capsule 12/19/2022 at x1  Yes Yes   Sig: [OMEPRAZOLE (PRILOSEC) 20 MG CAPSULE] Take 20 mg by mouth 2 (two) times a day before meals.   potassium chloride ER (KLOR-CON M) 20 MEQ CR tablet 12/19/2022 at x2  No Yes   Sig: Take 1 tablet (20 mEq) by mouth 2 times daily   rivaroxaban ANTICOAGULANT (XARELTO) 20 MG TABS tablet 12/18/2022  No Yes   Sig: Take 1 tablet (20 mg) by mouth daily (with dinner)   sertraline (ZOLOFT) 50 MG tablet 12/19/2022 at x1  Yes Yes   Sig: Take 50 mg by mouth 2 times daily    vit A/vit C/vit E/zinc/copper (PRESERVISION AREDS ORAL) 12/19/2022 at x1  Yes Yes   Sig: Take 1 tablet by mouth 2 times daily (with meals)      Facility-Administered Medications: None     Allergies   No Known Allergies    Physical Exam   Vital Signs: Temp: 99  F (37.2  C) Temp src: Oral BP: 102/58 Pulse: 69   Resp: 25 SpO2: 94 % O2 Device: Nasal cannula Oxygen Delivery: 2 LPM  Weight: 172 lbs 9.6 oz    General Appearance: Awake, alert  Eyes: EOMI  HEENT: Moist mucous membranes  Respiratory: Poor inspiratory effort. +Rales. Diminished breath sounds in bases.  Cardiovascular: Regular rate, s1s2  GI: +BS, soft, NT, ND  Musculoskeletal: Bilateral pedal edema      Data   Data reviewed today: I reviewed all  medications, new labs and imaging results over the last 24 hours. I personally reviewed the EKG tracing showing Sinus rhythm with PACs and LBBB and the chest x-ray image(s) showing Moderate right pleural effusion, superimposed pneumonia cannot be excluded..    Recent Labs   Lab 12/19/22  1741   WBC 11.9*   HGB 12.2*   MCV 97         POTASSIUM 4.5   CHLORIDE 94*   CO2 32*   BUN 17.0   CR 1.03   ANIONGAP 11   ANGELY 9.3   *   ALBUMIN 3.6   PROTTOTAL 7.6   BILITOTAL 1.1   ALKPHOS 113   ALT 8*   AST 24

## 2022-12-20 NOTE — ED NOTES
Pt complained of stomach pain but stated that pain is getting better and pt is comfortable. Pt denies chest pain

## 2022-12-20 NOTE — PHARMACY-ADMISSION MEDICATION HISTORY
Pharmacy Note - Admission Medication History    Pertinent Provider Information: None     ______________________________________________________________________    Prior To Admission (PTA) med list completed and updated in EMR.       PTA Med List   Medication Sig Last Dose     acetaminophen (TYLENOL) 500 MG tablet Take 500 mg by mouth every 6 hours as needed for mild pain Unknown     amiodarone (PACERONE) 200 MG tablet Take 1 tablet (200 mg) by mouth every other day 12/18/2022     aspirin (ASA) 81 MG chewable tablet Take 1 tablet (81 mg) by mouth every other day (Patient taking differently: Take 81 mg by mouth Every Mon, Wed, Fri Morning Taking Monday, Wednesday and Fridays) 12/19/2022     atorvastatin (LIPITOR) 10 MG tablet Take 10 mg by mouth four times a week Sunday, Tuesday, Thursday, & Saturday at bedtime 12/18/2022     bumetanide (BUMEX) 2 MG tablet Take 1 tablet (2 mg) by mouth 2 times daily 12/19/2022 at x2     Coenzyme Q10 300 MG CAPS Take 300 mg by mouth daily 12/19/2022     doxazosin (CARDURA) 8 MG tablet [DOXAZOSIN (CARDURA) 8 MG TABLET] Take 4 mg by mouth at bedtime.        12/18/2022     finasteride (PROSCAR) 5 mg tablet [FINASTERIDE (PROSCAR) 5 MG TABLET] Take 5 mg by mouth at bedtime.        12/18/2022     gabapentin (NEURONTIN) 100 MG capsule Two capsules in the morning, two capsules in the afternoon, and three capsules in the evening 12/19/2022 at x2     glucosamine/chondr cole A sod (OSTEO BI-FLEX ORAL) Take 1 tablet by mouth 2 times daily (with meals) 12/19/2022 at x1     HYDROcodone-acetaminophen (NORCO) 5-325 MG tablet Take 1 tablet by mouth every 6 hours as needed for severe pain (7-10) For post procedure if needed for back, hip, and knee pain Unknown     losartan (COZAAR) 25 MG tablet [LOSARTAN (COZAAR) 25 MG TABLET] Take 1 tablet (25 mg total) by mouth daily. 12/19/2022     melatonin 10 mg Tab Take 10 mg by mouth At Bedtime  12/18/2022     metoprolol succinate ER (TOPROL-XL) 25 MG 24 hr tablet  Take 1 tablet (25 mg) by mouth At Bedtime 12/18/2022     multivitamin, therapeutic (THERA-VIT) TABS tablet Take 1 tablet by mouth daily 12/19/2022     omeprazole (PRILOSEC) 20 MG capsule [OMEPRAZOLE (PRILOSEC) 20 MG CAPSULE] Take 20 mg by mouth 2 (two) times a day before meals. 12/19/2022 at x1     potassium chloride ER (KLOR-CON M) 20 MEQ CR tablet Take 1 tablet (20 mEq) by mouth 2 times daily 12/19/2022 at x2     rivaroxaban ANTICOAGULANT (XARELTO) 20 MG TABS tablet Take 1 tablet (20 mg) by mouth daily (with dinner) 12/18/2022     sertraline (ZOLOFT) 50 MG tablet Take 50 mg by mouth 2 times daily  12/19/2022 at x1     vit A/vit C/vit E/zinc/copper (PRESERVISION AREDS ORAL) Take 1 tablet by mouth 2 times daily (with meals) 12/19/2022 at x1       Information source(s): Family member and CareEverywhere/SureScripts  Method of interview communication: phone    Summary of Changes to PTA Med List  None    Patient was asked about OTC/herbal products specifically.  PTA med list reflects this.    In the past week, patient estimated taking medication this percent of the time:  greater than 90%.    Patient does not use any multi-dose medications prior to admission.    The information provided in this note is only as accurate as the sources available at the time of the update(s).    Thank you for the opportunity to participate in the care of this patient.    Jesica Bazzi [facility]  12/19/2022 6:30 PM

## 2022-12-20 NOTE — PROGRESS NOTES
RCAT Treatment Plan    Patient Score: 6    Patient Acuity: 4    Clinical Indication for Therapy: pleural effusion    Therapy Ordered: duo nebs    Assessment Summary: Donato Costello, RT    12/20/2022

## 2022-12-21 NOTE — PROGRESS NOTES
[facility]    Medicine Progress Note - Hospitalist Service       Date of Admission:  12/19/2022    Assessment & Plan        Reymundo Petersen is a 82 year old male with PMH significant for CHF who is admitted on 12/19/2022 Acute Hypoxemic Respiratory Failure, Acute on Chronic CHF exacerbation and Moderate Right Pleural Effusion.      Acute Hypoxemic Respiratory Failure  - 2/2 CHF exacerbation and Moderate Right Pleural Effusion. CXR cannot rule out superimposed PNA. Continue diuresis and supplemental O2.   - s/p Thoracentesis on 12/20- exudative picture, with hemorrhage. Cytology suggestive of chronic inflammation  - Empiric Rocephin & Azithromycin. Monitor vitals closely.   - pulmonology consult requested, appreciate recommendations     Acute on Chronic HFrEF exacerbation  - Continue Bumex IV. S/p Dobutamine drip.   - Supplemental O2. Strict I/O, daily weight.  - Echo: LVEF: 35-40%  - Continue telemetry monitoring. Cardiology consult appreciated     Moderate Right Pleural Effusion  - 2/2 CHF exacerbation. On Bumex. S/p Thoracentesis on 12/20. Supplemental O2. Monitor vitals closely. exudative picture, with hemorrhage. Cytology suggestive of chronic inflammation.    Possible Community Acquired Pneumonia  - CXR reviewed. Elevated procalcitonin.   - coughing, wheezing and hypoxia. empiric Rocephin and Azithromycin.   - Duonebs. Supplemental O2. Monitor vitals closely.     Elevated Troponin  - Trending down. EKG Sinus rhythm with PACs and LBBB. ( He reportedly has hx of LBBB; however not seen on recent EKGs from 9/2022 to present).  - likely due to demand  - Continue supplemental O2. Trial of NTG and PRN morphine. Continue telemetry monitoring. Cardiology recommendations appreciated    CAD  - c/w PTA ASA, Lipitor    Persistent afib  - s/p ablations and cardioversion  - complete heart black s/p CRT-P  - c/w amiodarone, rivaroxaban    Hyponatremia, mild  - likely due to CHF  - monitor  BMP    Hypokalemia   - replete per protocol    Essential Hypertension  - BP meds on hold due to soft BP  - Monitor vital signs per protocol       Hospital Day: 3        Diet: Combination Diet Low Saturated Fat Na <2400mg Diet, No Caffeine Diet    DVT Prophylaxis: Moderate risk. Xarelto   Adorno Catheter: Not present  Central Lines: None  Code Status: Full Code        Disposition Plan   Disposition: Home    Expected Discharge Date: 12/21/2022      Destination: home with family       Medically ready to discharge today: No     The patient's care was discussed with the Patient.    Kristen Keita MD  Hospitalist Service  [facility]  Securely message with the Vocera Web Console (learn more here)  Text page via [employer] Paging/Directory         Clinically Significant Risk Factors        # Hypokalemia: Lowest K = 3.3 mmol/L in last 2 days, will replace as needed  # Hyponatremia: Lowest Na = 135 mmol/L in last 2 days, will monitor as appropriate      # Hypoalbuminemia: Lowest albumin = 3 g/dL at 12/20/2022  7:17 AM, will monitor as appropriate                   ____________        Physical Exam   Vital Signs: Temp: 98  F (36.7  C) Temp src: Oral BP: 90/52 Pulse: 74   Resp: 20 SpO2: 97 % O2 Device: Nasal cannula Oxygen Delivery: 2 LPM  Weight: 172 lbs 9.6 oz  Physical Exam:  GEN: Alert and oriented. Not in acute distress.  HEENT: Atraumatic, mucous membrane- moist and pink.  Chest: Bilateral air entry.  CVS: S1S2 regular. No murmurs, rubs or gallops.  Abdomen: Soft. Non-tender, non-distended. No organomegaly. No guarding or rigidity. Bowel sounds active.   Extremities: + LE edema.  CNS: No focal neurologic deficit. No involuntary movements.  Skin: No skin rash, no cyanosis or clubbing.     Data   Recent Results (from the past 24 hour(s))   Basic metabolic panel    Collection Time: 12/21/22  8:13 AM   Result Value Ref Range    Sodium 136 136 - 145 mmol/L    Potassium 3.3 (L) 3.4 - 5.3 mmol/L     Chloride 97 (L) 98 - 107 mmol/L    Carbon Dioxide (CO2) 31 (H) 22 - 29 mmol/L    Anion Gap 8 7 - 15 mmol/L    Urea Nitrogen 16.4 8.0 - 23.0 mg/dL    Creatinine 0.83 0.67 - 1.17 mg/dL    Calcium 8.6 (L) 8.8 - 10.2 mg/dL    Glucose 115 (H) 70 - 99 mg/dL    GFR Estimate 86 >60 mL/min/1.73m2   Procalcitonin    Collection Time: 12/21/22  8:13 AM   Result Value Ref Range    Procalcitonin 0.78 (H) <0.05 ng/mL   Magnesium    Collection Time: 12/21/22  8:13 AM   Result Value Ref Range    Magnesium 2.0 1.7 - 2.3 mg/dL   CBC with platelets and differential    Collection Time: 12/21/22  8:13 AM   Result Value Ref Range    WBC Count 8.3 4.0 - 11.0 10e3/uL    RBC Count 3.53 (L) 4.40 - 5.90 10e6/uL    Hemoglobin 10.8 (L) 13.3 - 17.7 g/dL    Hematocrit 33.6 (L) 40.0 - 53.0 %    MCV 95 78 - 100 fL    MCH 30.6 26.5 - 33.0 pg    MCHC 32.1 31.5 - 36.5 g/dL    RDW 14.4 10.0 - 15.0 %    Platelet Count 250 150 - 450 10e3/uL    % Neutrophils 84 %    % Lymphocytes 6 %    % Monocytes 9 %    % Eosinophils 0 %    % Basophils 0 %    % Immature Granulocytes 1 %    NRBCs per 100 WBC 0 <1 /100    Absolute Neutrophils 7.0 1.6 - 8.3 10e3/uL    Absolute Lymphocytes 0.5 (L) 0.8 - 5.3 10e3/uL    Absolute Monocytes 0.8 0.0 - 1.3 10e3/uL    Absolute Eosinophils 0.0 0.0 - 0.7 10e3/uL    Absolute Basophils 0.0 0.0 - 0.2 10e3/uL    Absolute Immature Granulocytes 0.1 <=0.4 10e3/uL    Absolute NRBCs 0.0 10e3/uL   Lactic Acid STAT    Collection Time: 12/21/22  1:38 PM   Result Value Ref Range    Lactic Acid 1.3 0.7 - 2.0 mmol/L     ____________  Interval History    Patient seen and examined at bedside. Per patient, shortness of breath is improving. Peuritic chest pain improing. No fever, nausea, or vomiting.   Data reviewed today: I reviewed all medications, new labs and imaging results over the last 24 hours. I personally reviewed no images or EKG's today.

## 2022-12-21 NOTE — PLAN OF CARE
Problem: Plan of Care - These are the overarching goals to be used throughout the patient stay.    Goal: Optimal Comfort and Wellbeing  Outcome: Progressing   Goal Outcome Evaluation:    Patient calm and pleasant. Complained for left hip pain, chronic. Now on an eggcrate topper over the ED cart, for comfort regarding his hip. Able to make needs known, primofit in place and working well for him.

## 2022-12-21 NOTE — PROGRESS NOTES
Patient on dobutamine drip, tolerating well. VSS. Denies chest pain. AV-Paced on tele. Continuing to monitor.

## 2022-12-21 NOTE — PROGRESS NOTES
[facility]    Medicine Progress Note - Hospitalist Service       Date of Admission:  12/19/2022    Assessment & Plan        Reymundo Petersen is a 82 year old male with PMH significant for CHF who is admitted on 12/19/2022 Acute Hypoxemic Respiratory Failure, Acute on Chronic CHF exacerbation and Moderate Right Pleural Effusion.      Acute Hypoxemic Respiratory Failure  - 2/2 CHF exacerbation and Moderate Right Pleural Effusion. CXR cannot rule out superimposed PNA. Continue diuresis and supplemental O2.   - s/p Thoracentesis on 12/20  - Empiric Rocephin & Azithromycin. Monitor vitals closely.      Acute on Chronic HFrEF exacerbation  - Continue Bumex IV. Currently on Dobutamine drip.   - Supplemental O2. Strict I/O, daily weight.  - Echo: LVEF: 35-40%  - Continue telemetry monitoring. Cardiology consult appreciated     Moderate Right Pleural Effusion  - 2/2 CHF exacerbation. On Bumex. S/p Thoracentesis on 12/20... Supplemental O2. Monitor vitals closely.     Elevated Troponin  - Trending down. EKG Sinus rhythm with PACs and LBBB. ( He reportedly has hx of LBBB; however not seen on recent EKGs from 9/2022 to present).  - likely due to demand  - Continue supplemental O2. Trial of NTG and PRN morphine. Continue telemetry monitoring. Cardiology recommendations appreciated    CAD  - c/w PTA ASA, Lipitor    Persistent afib  - s/p ablations and cardioversion  - complete heart black s/p CRT-P  - c/w amiodarone, rivaroxaban    Possible Community Acquired Pneumonia  - CXR reviewed. Elevated procalcitonin.   - coughing, wheezing and hypoxia. empiric Rocephin and Azithromycin.   - Duonebs. Supplemental O2. Monitor vitals closely.    Hyponatremia  - likely due to CHF  - monitor BMP       Hospital Day: 2        Diet: Combination Diet Low Saturated Fat Na <2400mg Diet, No Caffeine Diet    DVT Prophylaxis: Moderate risk. Xarelto   Adorno Catheter: Not present  Central Lines: None  Code Status: Full Code         Disposition Plan   Disposition: Home    Expected Discharge Date: 12/21/2022      Destination: home with family       Medically ready to discharge today: No     The patient's care was discussed with the Patient.    Kristen Keita MD  Hospitalist Service  [facility]  Securely message with the Vocera Web Console (learn more here)  Text page via [email or web address] Paging/Directory         Clinically Significant Risk Factors         # Hyponatremia: Lowest Na = 135 mmol/L in last 2 days, will monitor as appropriate      # Hypoalbuminemia: Lowest albumin = 3 g/dL at 12/20/2022  7:17 AM, will monitor as appropriate                   ____________        Physical Exam   Vital Signs: Temp: 98.4  F (36.9  C) Temp src: Oral BP: 115/59 Pulse: 79   Resp: 22 SpO2: 97 % O2 Device: Nasal cannula Oxygen Delivery: 2 LPM  Weight: 172 lbs 9.6 oz  Physical Exam:  GEN: Alert and oriented. Not in acute distress.  HEENT: Atraumatic, mucous membrane- moist and pink.  Chest: Bilateral air entry.  CVS: S1S2 regular. No murmurs, rubs or gallops.  Abdomen: Soft. Non-tender, non-distended. No organomegaly. No guarding or rigidity. Bowel sounds active.   Extremities: + LE edema.  CNS: No focal neurologic deficit. No involuntary movements.  Skin: No skin rash, no cyanosis or clubbing.     Data   Recent Results (from the past 24 hour(s))   Troponin T, High Sensitivity (now)    Collection Time: 12/19/22  7:42 PM   Result Value Ref Range    Troponin T, High Sensitivity 87 (H) <=22 ng/L   Troponin T, High Sensitivity    Collection Time: 12/20/22  1:33 AM   Result Value Ref Range    Troponin T, High Sensitivity 88 (H) <=22 ng/L   CBC with platelets    Collection Time: 12/20/22  7:17 AM   Result Value Ref Range    WBC Count 10.2 4.0 - 11.0 10e3/uL    RBC Count 3.47 (L) 4.40 - 5.90 10e6/uL    Hemoglobin 10.6 (L) 13.3 - 17.7 g/dL    Hematocrit 33.4 (L) 40.0 - 53.0 %    MCV 96 78 - 100 fL    MCH 30.5 26.5 - 33.0 pg    MCHC 31.7 31.5 -  36.5 g/dL    RDW 14.4 10.0 - 15.0 %    Platelet Count 243 150 - 450 10e3/uL   Comprehensive metabolic panel    Collection Time: 12/20/22  7:17 AM   Result Value Ref Range    Sodium 135 (L) 136 - 145 mmol/L    Potassium 4.0 3.4 - 5.3 mmol/L    Chloride 95 (L) 98 - 107 mmol/L    Carbon Dioxide (CO2) 31 (H) 22 - 29 mmol/L    Anion Gap 9 7 - 15 mmol/L    Urea Nitrogen 18.8 8.0 - 23.0 mg/dL    Creatinine 0.99 0.67 - 1.17 mg/dL    Calcium 8.9 8.8 - 10.2 mg/dL    Glucose 114 (H) 70 - 99 mg/dL    Alkaline Phosphatase 102 40 - 129 U/L    AST 24 10 - 50 U/L    ALT 7 (L) 10 - 50 U/L    Protein Total 6.7 6.4 - 8.3 g/dL    Albumin 3.0 (L) 3.5 - 5.2 g/dL    Bilirubin Total 1.1 <=1.2 mg/dL    GFR Estimate 75 >60 mL/min/1.73m2   Magnesium    Collection Time: 12/20/22  7:17 AM   Result Value Ref Range    Magnesium 2.0 1.7 - 2.3 mg/dL   Lactate Dehydrogenase    Collection Time: 12/20/22  7:17 AM   Result Value Ref Range    Lactate Dehydrogenase 231 0 - 250 U/L   Protein total    Collection Time: 12/20/22  7:17 AM   Result Value Ref Range    Protein Total 6.7 6.4 - 8.3 g/dL   Echocardiogram Complete    Collection Time: 12/20/22  9:54 AM   Result Value Ref Range    LVEF  35-40% (moderately reduced)    Pleural fluid Aerobic Bacterial Culture Routine with Gram Stain    Collection Time: 12/20/22 10:11 AM    Specimen: Pleural Cavity, Right; Pleural fluid   Result Value Ref Range    Gram Stain Result No organisms seen     Gram Stain Result 2+ WBC seen     Gram Stain Result 3+ Red blood cells seen    Glucose fluid    Collection Time: 12/20/22 10:11 AM   Result Value Ref Range    Glucose Fluid Source Pleural Cavity, Right     Glucose fluid 105 mg/dL   Lactate dehydrogenase fluid    Collection Time: 12/20/22 10:11 AM   Result Value Ref Range    LD Fluid Source Pleural Cavity, Right     Lactate dehydrogenase fluid 215 U/L   Protein fluid    Collection Time: 12/20/22 10:11 AM   Result Value Ref Range    Protein Fluid Source Pleural Cavity, Right      Protein Total Fluid 3.5 g/dL   Cell Count Body Fluid    Collection Time: 12/20/22 10:11 AM   Result Value Ref Range    Color Orange (A) Colorless, Yellow    Clarity Turbid (A) Clear    Total Nucleated Cells 663 /uL    RBC Count 23,000 /uL    Cell Count Fluid Source Pleural Cavity, Right    Differential Body Fluid    Collection Time: 12/20/22 10:11 AM   Result Value Ref Range    % Neutrophils 2 %    % Lymphocytes 81 %    % Monocyte/Macrophages 11 %    % Eosinophils 6 %     ____________  Interval History    Patient seen and examined at bedside. Shortness of breath is improving. Has slight pleuritic chest pain. No fever, nausea, or vomiting.   Data reviewed today: I reviewed all medications, new labs and imaging results over the last 24 hours. I personally reviewed no images or EKG's today.

## 2022-12-21 NOTE — PROGRESS NOTES
HEART CARE NOTE          Assessment/Recommendations     1. HFrEF c/b cardiogenic shock  Assessment / Plan    Hypervolemic on physical exam, but volume status improved - no changes to regimen at this time    Continue dobutamine gtt to augment cardiac output and renal perfusion     Current Pharmacotherapy AHA Guideline-Directed Medical Therapy   Losartan 25 mg daily - on hold Lisinopril 20 mg twice daily   Metoprolol succinate 25 mg daily - on hold Carvedilol 25 mg twice daily   Spironolactone not started Spironolactone 25 mg once daily   Hydralazine NA Hydralazine 100 mg three times daily   Isosorbide dinitrate NA Isosorbide dinitrate 40 mg three times daily   SGLT2 inhibitor not started Dapagliflozin or Empagliflozin 10 mg daily      2. Arrhythmia  Assessment / Plan    Persistent atrial fibrillation s/p ablations and DCCV    Currently on rivaroxaban; being considered for Watchman device - continue amiodarone    s/p PVI 11/22    Complete Heart Block s/p CRT-P    Follows with Dr. Michelle in EP as well as afib clinic     3. CAD  Assessment / Plan    Denies chest pain or anginal equivalents    Continue ASA, atorvastatin, metoprolol succinate, losartan    History of Present Illness/Subjective      Mr. 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 Petersen denies any acute cardiac events or complaints; Management plan as detailed above     ECG: Personally reviewed. Paced rhythm     ECHO (personnaly Reviewed):   The left ventricle is mildly dilated.  Left ventricular function is decreased. The ejection fraction is 30-35%  (moderately reduced).  There is moderate global hypokinesia of the left ventricle.  The right ventricle is mildly dilated.  The right ventricular systolic  "function is normal.  The left atrium is severely dilated.  The right atrium is moderately dilated.  There is moderately severe (3+) tricuspid regurgitation.  Right ventricle systolic pressure estimate normal  There is mild to moderate (1-2+) aortic regurgitation.  The ascending aorta is Mildly dilated.  IVC diameter >2.1 cm collapsing <50% with sniff suggests a high RA pressure  estimated at 15 mmHg or greater.  Small pericardial effusion  There are no echocardiographic indications of cardiac tamponade.          Physical Examination Review of Systems   /57   Pulse 76   Temp 98.1  F (36.7  C) (Oral)   Resp 24   Ht 1.803 m (5' 11\")   Wt 78.3 kg (172 lb 9.6 oz)   SpO2 96%   BMI 24.07 kg/m    Body mass index is 24.07 kg/m .  Wt Readings from Last 3 Encounters:   12/19/22 78.3 kg (172 lb 9.6 oz)   11/24/22 77.7 kg (171 lb 4.8 oz)   11/16/22 78 kg (172 lb)     General Appearance:   no distress, normal body habitus   ENT/Mouth: membranes moist, no oral lesions or bleeding gums.      EYES:  no scleral icterus, normal conjunctivae   Neck: no carotid bruits or thyromegaly   Chest/Lungs:   lungs are clear to auscultation, no rales or wheezing, equal chest wall expansion    Cardiovascular:   Regular. Normal first and second heart sounds with no murmurs, rubs, or gallops; the carotid, radial and posterior tibial pulses are intact, + JVD and LE edema bilaterally    Abdomen:  no organomegaly, masses, bruits, or tenderness; bowel sounds are present   Extremities: no cyanosis or clubbing   Skin: no xanthelasma, warm.    Neurologic: alert and oriented x3, calm     Psychiatric: alert and oriented x3, calm     A complete 10 systems ROS was reviewed  And is negative except what is listed in the HPI.          Medical History  Surgical History Family History Social History   Past Medical History:   Diagnosis Date     Atrial fibrillation (H)      Atrial fibrillation, transient (H) 8/11/2020     BPH (benign prostatic " hyperplasia) 7/11/2018     Chronic combined systolic and diastolic heart failure (H) 2/11/2021     Congestive heart failure (H)      Coronary artery disease      COVID 02/2021     Dilated cardiomyopathy (H) 2/11/2021     MARIAN (generalized anxiety disorder) 2/11/2021     GERD (gastroesophageal reflux disease)      High cholesterol      Hypertension      LBBB (left bundle branch block) 7/11/2018     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     Pulmonary hypertension (H) 4/1/2020    Past Surgical History:   Procedure Laterality Date     ANGIOPLASTY       ARTHROSCOPY KNEE       CARDIOVERSION  2021     CARDIOVERSION  02/03/2022     CV CORONARY ANGIOGRAM N/A 10/23/2020    Procedure: Coronary Angiogram;  Surgeon: Varun Freire MD;  Location: [facility] Cath Lab;  Service: Cardiology     CV LEFT HEART CATHETERIZATION WITHOUT LEFT VENTRICULOGRAM Left 10/23/2020    Procedure: Left Heart Catheterization Without Left Ventriculogram;  Surgeon: Varun Freire MD;  Location: [facility] Cath Lab;  Service: Cardiology     EP BIV PACEMAKER INSERT N/A 3/25/2020    Procedure: EP Biventricular Pacemaker Insertion;  Surgeon: Taylor Sandoval MD;  Location: [facility] Cath Lab;  Service: Cardiology     RELEASE CARPAL TUNNEL       ZZC TOTAL KNEE ARTHROPLASTY Right 8/15/2019    Procedure: RIGHT  MINIMALLY TOTAL KNEE ARTHROPLASTY;  Surgeon: Keven Cerda MD;  Location: [facility];  Service: Orthopedics    no family history of premature coronary artery disease Social History     Socioeconomic History     Marital status:      Spouse name: Not on file     Number of children: Not on file     Years of education: Not on file     Highest education level: Not on file   Occupational History     Not on file   Tobacco Use     Smoking status: Never     Smokeless tobacco: Never   Substance and Sexual Activity     Alcohol use: Yes     Comment: Alcoholic Drinks/day: rare     Drug use: No     Sexual activity:  Not on file   Other Topics Concern     Parent/sibling w/ CABG, MI or angioplasty before 65F 55M? Not Asked   Social History Narrative    Retired.  Good sense of humor     Social Determinants of Health     Financial Resource Strain: Not on file   Food Insecurity: Not on file   Transportation Needs: Not on file   Physical Activity: Not on file   Stress: Not on file   Social Connections: Not on file   Intimate Partner Violence: Not on file   Housing Stability: Not on file           Lab Results    Chemistry/lipid CBC Cardiac Enzymes/BNP/TSH/INR   Lab Results   Component Value Date    CHOL 107 06/23/2022    HDL 38 (L) 06/23/2022    TRIG 35 06/23/2022    BUN 18.8 12/20/2022     (L) 12/20/2022    CO2 31 (H) 12/20/2022    Lab Results   Component Value Date    WBC 10.2 12/20/2022    HGB 10.6 (L) 12/20/2022    HCT 33.4 (L) 12/20/2022    MCV 96 12/20/2022     12/20/2022    Lab Results   Component Value Date    TROPONINI 0.09 03/14/2022     (H) 06/17/2022    TSH 4.26 10/17/2022    INR 1.03 02/11/2021     Lab Results   Component Value Date    TROPONINI 0.09 03/14/2022          Weight:    Wt Readings from Last 3 Encounters:   12/19/22 78.3 kg (172 lb 9.6 oz)   11/24/22 77.7 kg (171 lb 4.8 oz)   11/16/22 78 kg (172 lb)       Allergies  No Known Allergies      Surgical History  Past Surgical History:   Procedure Laterality Date     ANGIOPLASTY       ARTHROSCOPY KNEE       CARDIOVERSION  2021     CARDIOVERSION  02/03/2022     CV CORONARY ANGIOGRAM N/A 10/23/2020    Procedure: Coronary Angiogram;  Surgeon: Varun Freire MD;  Location: [facility] Cath Lab;  Service: Cardiology     CV LEFT HEART CATHETERIZATION WITHOUT LEFT VENTRICULOGRAM Left 10/23/2020    Procedure: Left Heart Catheterization Without Left Ventriculogram;  Surgeon: Varun Freire MD;  Location: [facility] Cath Lab;  Service: Cardiology     EP BIV PACEMAKER INSERT N/A 3/25/2020    Procedure: EP Biventricular Pacemaker Insertion;   Surgeon: Taylor Sandoval MD;  Location: [facility] Cath Lab;  Service: Cardiology     RELEASE CARPAL TUNNEL       ZZC TOTAL KNEE ARTHROPLASTY Right 8/15/2019    Procedure: RIGHT  MINIMALLY TOTAL KNEE ARTHROPLASTY;  Surgeon: Keven Cerda MD;  Location: [facility];  Service: Orthopedics       Social History  Tobacco:   History   Smoking Status     Never   Smokeless Tobacco     Never    Alcohol:   Social History    Substance and Sexual Activity      Alcohol use: Yes        Comment: Alcoholic Drinks/day: rare   Illicit Drugs:   History   Drug Use No       Family History  Family History   Problem Relation Age of Onset     Alzheimer Disease Mother      Heart Disease Father      Cancer Sister      Diabetes Type 2  Sister      Chronic Obstructive Pulmonary Disease Sister      LUNG DISEASE Brother           John Paul Vasquez MD on 12/21/2022      cc: Jamie Noguera

## 2022-12-22 NOTE — PLAN OF CARE
Problem: Sleep Disturbance  Goal: Adequate Sleep/Rest  Outcome: Progressing   Goal Outcome Evaluation:    Melatonin given.  Complained also of some indigestion- TUMS given.  Chronic left hip and left knee pain- 3/10- tylenol po given.

## 2022-12-22 NOTE — PLAN OF CARE
Problem: Heart Failure  Goal: Optimal Coping  Outcome: Progressing     Problem: Heart Failure  Goal: Optimal Cardiac Output  Outcome: Progressing     Problem: Heart Failure  Goal: Fluid and Electrolyte Balance  Outcome: Progressing   Goal Outcome Evaluation:       VSS. Good output this shift. Patient is oriented but forgetful at times. Ax1 with activity. Reported heartburn, MD notified, medication ordered. PRN eye drops ordered as well for dry eye.

## 2022-12-22 NOTE — PROGRESS NOTES
12/22/22 1500   Appointment Info   Signing Clinician's Name / Credentials (OT) Adali España OT OTD   Living Environment   People in Home other relative(s)  (Sister in law)   Current Living Arrangements house   Number of Stairs, Main Entrance greater than 10 stairs   Living Environment Comments tub/shower with grab bars, shower stool, comfort height toilet with counter to left side   Self-Care   Equipment Currently Used at Home walker, rolling;cane, straight  (utilizes 4WW most times)   Fall history within last six months yes   Number of times patient has fallen within last six months 1   Activity/Exercise/Self-Care Comment pt reports ind with all ADLs, splits IADLs with sister in law, daughter assists with IADLs intermittently   General Information   Onset of Illness/Injury or Date of Surgery 12/19/22   Referring Physician Kristen Keita MD   Patient/Family Therapy Goal Statement (OT) To return home   Additional Occupational Profile Info/Pertinent History of Current Problem PMH of CHF, HTN, a fib, CKD2, peripheral edema, elevated troponins   Existing Precautions/Restrictions fall   Cognitive Status Examination   Orientation Status orientation to person, place and time   Affect/Mental Status (Cognitive) WFL   Follows Commands WFL   Visual Perception   Visual Impairment/Limitations WFL   Posture   Posture forward head position   Range of Motion Comprehensive   General Range of Motion bilateral upper extremity ROM WFL   Strength Comprehensive (MMT)   General Manual Muscle Testing (MMT) Assessment no strength deficits identified   Coordination   Upper Extremity Coordination No deficits were identified   Bed Mobility   Comment (Bed Mobility) Not assessed   Transfers   Transfers sit-stand transfer   Sit-Stand Transfer   Sit-Stand [place] (Transfers) contact guard   Assistive Device (Sit-Stand Transfers) walker, standard   Activities of Daily Living   BADL Assessment/Intervention lower body dressing   Lower Body  Dressing Assessment/Training   Position (Lower Body Dressing) supported sitting   Comment, (Lower Body Dressing) Donning/doffing socks from bedside chair   [place] Level (Lower Body Dressing) contact guard assist   Clinical Impression   Criteria for Skilled Therapeutic Interventions Met (OT) Yes, treatment indicated   OT Diagnosis Decreased ind with ADLs and safety   Influenced by the following impairments Elevated troponin, pleural effusion, CHF   OT Problem List-Impairments impacting ADL activity tolerance impaired;balance   Assessment of Occupational Performance 1-3 Performance Deficits   Identified Performance Deficits dressing, activity tolerance, fx transfers (shower)   Planned Therapy Interventions (OT) ADL retraining;cognition;strengthening;transfer training   Clinical Decision Making Complexity (OT) low complexity   Risk & Benefits of therapy have been explained evaluation/treatment results reviewed;care plan/treatment goals reviewed;risks/benefits reviewed;current/potential barriers reviewed;patient   OT Total Evaluation Time   OT Eval, Low Complexity Minutes (01792) 10   OT Goals   Therapy Frequency (OT) Daily   OT Predicted Duration/Target Date for Goal Attainment 12/29/22   OT Goals Hygiene/Grooming;Lower Body Dressing;Toilet Transfer/Toileting;Aerobic Activity   OT: Hygiene/Grooming modified independent;while standing  (at least 5 minutes)   OT: Lower Body Dressing Modified independent;including set-up/clothing retrieval   OT: Toilet Transfer/Toileting Modified independent;toilet transfer;cleaning and garment management   OT: Perform aerobic activity with stable cardiovascular response intermittent activity;10 minutes   Interventions   Interventions Quick Adds Self-Care/Home Management   Self-Care/Home Management   Self-Care/Home Mgmt/ADL, Compensatory, Meal Prep Minutes (67614) 10   Symptoms Noted During/After Treatment (Meal Preparation/Planning Training) fatigue   Treatment Detail/Skilled  Intervention Fx mob in room to simulate household distances CGA progressing to SBA, demos some SOB during activity. HR around 115 at rest, does not increase with activity. Toilet transfer CGA, min cueing for use of grab bar for controlled descent. SBA to stand from toilet, cueing for hand placement. One rest break throughout session, Spo2 at 89, recovered to 90's in 1 min with PLB cues. STS SBA, mod cueing for hand placement on bed surface rather than walker. Returned to bedside chair - educated in use of urinal or toilet rather than male gregor elias understanding.   OT Discharge Planning   OT Plan g/h standing, UE exer/calesthenics, activity tolerance, dressing, tub/shower transfer   OT Discharge Recommendation (DC Rec) home with home care occupational therapy;home with assist   OT Rationale for DC Rec Pt limited by decreased activity tolerance, pending progress with this and stairs pt would be safe to D/c home, recommending assist from family as pt reports sister in law has her own health issues - recommending home OT to enhance ind with ADLs and fx strength   OT Brief overview of current status SBA mobility in room, CGA toileting   Total Session Time   Timed Code Treatment Minutes 10   Total Session Time (sum of timed and untimed services) 20

## 2022-12-22 NOTE — PLAN OF CARE
Goal Outcome Evaluation:      Plan of Care Reviewed With: patient, family    Overall Patient Progress: improvingOverall Patient Progress: improving    Outcome Evaluation: Decreasd work of breathing.

## 2022-12-22 NOTE — PROGRESS NOTES
12/22/22 7396   Appointment Info   Signing Clinician's Name / Credentials (PT) Emilie Randolph,PT   Living Environment   People in Home other relative(s)  (sister in law)   Current Living Arrangements house  (can stay on 1 level)   Home Accessibility stairs to enter home   Number of Stairs, Main Entrance greater than 10 stairs   Stair Railings, Main Entrance other (see comments)  (1 railing)   Living Environment Comments I with ADLs, MARK performs most IADLS, pt reports she has some health issues   Self-Care   Equipment Currently Used at Home walker, rolling;other (see comments);cane, straight;shower chair;grab bar, tub/shower  (4WW- uses all times)   Fall history within last six months yes   Number of times patient has fallen within last six months 1  (this summer-hit head)   General Information   Onset of Illness/Injury or Date of Surgery 12/19/22   Referring Physician Dr. Kristen Keita   Patient/Family Therapy Goals Statement (PT) none stated   Pertinent History of Current Problem (include personal factors and/or comorbidities that impact the POC) admit SOB, acute hypoxemic resp failure, elevated troponin, CAP, R pleural effusion, pt has h/o COVID, LBBB, CHF, HTN, A-fib s/p ablation 11/23/22, L hip pain   General Observations pt up in chair, pt agreable to therapy   Cognition   Affect/Mental Status (Cognition) WFL   Pain Assessment   Patient Currently in Pain Yes, see Vital Sign flowsheet  (L hip and knee)   Range of Motion (ROM)   ROM Comment BLEs WFL for transfers   Strength (Manual Muscle Testing)   Strength Comments BLEs WFL for transfers   Bed Mobility   Comment, (Bed Mobility) pt up in recliner at start/end of session   Transfers   Impairments Contributing to Impaired Transfers pain   Comment, (Transfers) cues for hand placement, SBA with FWW   Gait/Stairs (Locomotion)   [place] Level (Gait) contact guard   Assistive Device (Gait) walker, front-wheeled   Distance in Feet 12'   Distance in Feet  (Gait) 30'10'   Pattern (Gait) swing-through   Deviations/Abnormal Patterns (Gait) left sided deviations;antalgic   Comment, (Gait/Stairs) SOB with activity   Balance   Balance Comments no LOB   Clinical Impression   Criteria for Skilled Therapeutic Intervention Yes, treatment indicated   PT Diagnosis (PT) decreased independent functional mobility   Influenced by the following impairments pain ,fatigue, SOB   Functional limitations due to impairments trasnfes, gait w/ FWW and stairs   Clinical Presentation (PT Evaluation Complexity) Stable/Uncomplicated   Clinical Presentation Rationale presents as medically diagnosed   Clinical Decision Making (Complexity) low complexity   Planned Therapy Interventions (PT) gait training;stair training;strengthening;transfer training   Anticipated Equipment Needs at Discharge (PT)   (pt has 4WW andFWW at home)   Risk & Benefits of therapy have been explained evaluation/treatment results reviewed   PT Total Evaluation Time   PT Eval, Low Complexity Minutes (56861) 12   Physical Therapy Goals   PT Frequency Daily   PT Predicted Duration/Target Date for Goal Attainment 12/29/22   PT Goals Transfers;Gait;Stairs   PT: Transfers Modified independent;Assistive device;Sit to/from stand;Bed to/from chair   PT: Gait Modified independent;150 feet  (4WW)   PT: Stairs Supervision/stand-by assist;Greater than 10 stairs;Rail on right;Assistive device  (using SEcane)   Interventions   Interventions Quick Adds Gait Training   Gait Training   Gait Training Minutes (23670) 8   Symptoms Noted During/After Treatment (Gait Training) shortness of breath;fatigue   Treatment Detail/Skilled Intervention pt has sore Lhip can hear and feel crepitus in L hip when walking, pt SOB with gait in room, after walking sat 89-90%on RA, -116 throughout session   [place] Level (Gait Training) other (see comments)  (CGA/SBA)   Physical Assistance Level (Gait Training) 1 person assist   Weight Bearing (Gait  Training) full weight-bearing   Assistive Device (Gait Training) rolling walker   Pattern Analysis (Gait Training) swing-through gait   Gait Analysis Deviations decreased pernell;decreased weight-shifting ability   Impairments (Gait Analysis/Training) pain;other (see comments)  (fatigue)   PT Discharge Planning   PT Plan bring 4WW for gait, bring to PT gym to trial stairs for entering home( 13 stairs w/ 1 rail and SEcane)   PT Discharge Recommendation (DC Rec) home with assist;home with home care physical therapy  (pending ability to perform stairs to enter home)   PT Rationale for DC Rec pt CGA, would benefit from home PT and further A at home   PT Brief overview of current status pt CGA for trasnfers and gait   Total Session Time   Timed Code Treatment Minutes 8   Total Session Time (sum of timed and untimed services) 20

## 2022-12-22 NOTE — PLAN OF CARE
Problem: Plan of Care - These are the overarching goals to be used throughout the patient stay.    Goal: Plan of Care Review  Outcome: Progressing  Flowsheets (Taken 12/22/2022 1415)  Plan of Care Reviewed With:    patient    family  Overall Patient Progress: improving  Updated patient and daughter at bedside. Switched from IV Bumex to PO today. Titrated off 2L nc, SpO2 >90%. Pending PT/OT eval. Up with SBA with walker. Patient is hoping to go home tomorrow.      Problem: Heart Failure  Goal: Stable Heart Rate and Rhythm  Outcome: Progressing  100% V-Paced, 's.   Sepsis Protocol run: Lactic 1.2. A-febrile. WBC WNL. On     Goal: Fluid and Electrolyte Balance  Outcome: Progressing  Wt Readings from Last 3 Encounters:   12/22/22 73.5 kg (162 lb 1.6 oz)   11/24/22 77.7 kg (171 lb 4.8 oz)   11/16/22 78 kg (172 lb)   K 3.6, recheck am.  Mg 2.0, recheck am.   Cr. 0.89  800cc UOP this shift.   1500cc fluid restriction/24h, compliant.  2G Na diet. Poor appetite.   PO Bumex bid.  On PO Zithromax and IV Rocephin for possible PNA.    Goal: Effective Oxygenation and Ventilation  Outcome: Progressing  Intervention: Promote Airway Secretion Clearance  Recent Flowsheet Documentation  Taken 12/22/2022 1412 by Maren Andre RN  Activity Management: ambulated to bathroom  Taken 12/22/2022 0900 by Maren Andre RN  Activity Management: activity encouraged  Intervention: Optimize Oxygenation and Ventilation  Recent Flowsheet Documentation  Taken 12/22/2022 0900 by Maren Andre RN  Head of Bed (HOB) Positioning: HOB at 20-30 degrees  Patient reports breathing is better today. Still has some HIGUERA. LS diminished. Denied CP, dizziness.     Goal Outcome Evaluation:      Plan of Care Reviewed With: patient, family    Overall Patient Progress: improvingOverall Patient Progress: improving

## 2022-12-22 NOTE — PROGRESS NOTES
HEART CARE NOTE          Assessment/Recommendations     1. HFrEF c/b cardiogenic shock  Assessment / Plan    Diuresing well on current regimen and nearing euvolemia - will transition to oral diuretic regimen; tolerating dobutamine wean     Current Pharmacotherapy AHA Guideline-Directed Medical Therapy   Losartan 25 mg daily - on hold Lisinopril 20 mg twice daily   Metoprolol succinate 25 mg daily Carvedilol 25 mg twice daily   Spironolactone not started Spironolactone 25 mg once daily   Hydralazine NA Hydralazine 100 mg three times daily   Isosorbide dinitrate NA Isosorbide dinitrate 40 mg three times daily   SGLT2 inhibitor not started Dapagliflozin or Empagliflozin 10 mg daily      2. Arrhythmia  Assessment / Plan    Persistent atrial fibrillation s/p ablations and DCCV    Currently on rivaroxaban; being considered for Watchman device - continue amiodarone    s/p PVI 11/22    Complete Heart Block s/p CRT-P    Follows with Dr. Michelle in EP as well as afib clinic     3. CAD  Assessment / Plan    Denies chest pain or anginal equivalents    Continue ASA, atorvastatin, metoprolol succinate, losartan    History of Present Illness/Subjective      Mr. 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 Petersen denies any acute cardiac events or complaints; Management plan as detailed above     ECG: Personally reviewed. Paced rhythm     ECHO (personnaly Reviewed):   The left ventricle is mildly dilated.  Left ventricular function is decreased. The ejection fraction is 30-35%  (moderately reduced).  There is moderate global hypokinesia of the left ventricle.  The right ventricle is mildly dilated.  The right ventricular systolic function is normal.  The left atrium is severely  "dilated.  The right atrium is moderately dilated.  There is moderately severe (3+) tricuspid regurgitation.  Right ventricle systolic pressure estimate normal  There is mild to moderate (1-2+) aortic regurgitation.  The ascending aorta is Mildly dilated.  IVC diameter >2.1 cm collapsing <50% with sniff suggests a high RA pressure  estimated at 15 mmHg or greater.  Small pericardial effusion  There are no echocardiographic indications of cardiac tamponade.          Physical Examination Review of Systems   BP 93/62 (BP Location: Right arm)   Pulse 114   Temp 99.5  F (37.5  C) (Oral)   Resp 18   Ht 1.803 m (5' 11\")   Wt 73.5 kg (162 lb 1.6 oz)   SpO2 92%   BMI 22.61 kg/m    Body mass index is 22.61 kg/m .  Wt Readings from Last 3 Encounters:   12/22/22 73.5 kg (162 lb 1.6 oz)   11/24/22 77.7 kg (171 lb 4.8 oz)   11/16/22 78 kg (172 lb)     General Appearance:   no distress, normal body habitus   ENT/Mouth: membranes moist, no oral lesions or bleeding gums.      EYES:  no scleral icterus, normal conjunctivae   Neck: no carotid bruits or thyromegaly   Chest/Lungs:   lungs are clear to auscultation, no rales or wheezing, equal chest wall expansion    Cardiovascular:   Regular. Normal first and second heart sounds with no murmurs, rubs, or gallops; the carotid, radial and posterior tibial pulses are intact, no JVD and trace LE edema bilaterally    Abdomen:  no organomegaly, masses, bruits, or tenderness; bowel sounds are present   Extremities: no cyanosis or clubbing   Skin: no xanthelasma, warm.    Neurologic: alert and calm     Psychiatric: alert and calm     A complete 10 systems ROS was reviewed  And is negative except what is listed in the HPI.          Medical History  Surgical History Family History Social History   Past Medical History:   Diagnosis Date     Atrial fibrillation (H)      Atrial fibrillation, transient (H) 8/11/2020     BPH (benign prostatic hyperplasia) 7/11/2018     Chronic combined systolic " and diastolic heart failure (H) 2/11/2021     Congestive heart failure (H)      Coronary artery disease      COVID 02/2021     Dilated cardiomyopathy (H) 2/11/2021     MARIAN (generalized anxiety disorder) 2/11/2021     GERD (gastroesophageal reflux disease)      High cholesterol      Hypertension      LBBB (left bundle branch block) 7/11/2018     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     Pulmonary hypertension (H) 4/1/2020    Past Surgical History:   Procedure Laterality Date     ANGIOPLASTY       ARTHROSCOPY KNEE       CARDIOVERSION  2021     CARDIOVERSION  02/03/2022     CV CORONARY ANGIOGRAM N/A 10/23/2020    Procedure: Coronary Angiogram;  Surgeon: Varun Freire MD;  Location: [facility] Cath Lab;  Service: Cardiology     CV LEFT HEART CATHETERIZATION WITHOUT LEFT VENTRICULOGRAM Left 10/23/2020    Procedure: Left Heart Catheterization Without Left Ventriculogram;  Surgeon: Varun Freire MD;  Location: [facility] Cath Lab;  Service: Cardiology     EP BIV PACEMAKER INSERT N/A 3/25/2020    Procedure: EP Biventricular Pacemaker Insertion;  Surgeon: Taylor Sandoval MD;  Location: [facility] Cath Lab;  Service: Cardiology     RELEASE CARPAL TUNNEL       ZZC TOTAL KNEE ARTHROPLASTY Right 8/15/2019    Procedure: RIGHT  MINIMALLY TOTAL KNEE ARTHROPLASTY;  Surgeon: Keven Cerda MD;  Location: [facility];  Service: Orthopedics    no family history of premature coronary artery disease Social History     Socioeconomic History     Marital status:      Spouse name: Not on file     Number of children: Not on file     Years of education: Not on file     Highest education level: Not on file   Occupational History     Not on file   Tobacco Use     Smoking status: Never     Smokeless tobacco: Never   Substance and Sexual Activity     Alcohol use: Yes     Comment: Alcoholic Drinks/day: rare     Drug use: No     Sexual activity: Not on file   Other Topics Concern     Parent/sibling  w/ CABG, MI or angioplasty before 65F 55M? Not Asked   Social History Narrative    Retired.  Good sense of humor     Social Determinants of Health     Financial Resource Strain: Not on file   Food Insecurity: Not on file   Transportation Needs: Not on file   Physical Activity: Not on file   Stress: Not on file   Social Connections: Not on file   Intimate Partner Violence: Not on file   Housing Stability: Not on file           Lab Results    Chemistry/lipid CBC Cardiac Enzymes/BNP/TSH/INR   Lab Results   Component Value Date    CHOL 107 06/23/2022    HDL 38 (L) 06/23/2022    TRIG 35 06/23/2022    BUN 15.9 12/22/2022     12/22/2022    CO2 34 (H) 12/22/2022    Lab Results   Component Value Date    WBC 7.9 12/22/2022    HGB 10.7 (L) 12/22/2022    HCT 33.6 (L) 12/22/2022    MCV 96 12/22/2022     12/22/2022    Lab Results   Component Value Date    TROPONINI 0.09 03/14/2022     (H) 06/17/2022    TSH 4.26 10/17/2022    INR 1.03 02/11/2021     Lab Results   Component Value Date    TROPONINI 0.09 03/14/2022          Weight:    Wt Readings from Last 3 Encounters:   12/22/22 73.5 kg (162 lb 1.6 oz)   11/24/22 77.7 kg (171 lb 4.8 oz)   11/16/22 78 kg (172 lb)       Allergies  No Known Allergies      Surgical History  Past Surgical History:   Procedure Laterality Date     ANGIOPLASTY       ARTHROSCOPY KNEE       CARDIOVERSION  2021     CARDIOVERSION  02/03/2022     CV CORONARY ANGIOGRAM N/A 10/23/2020    Procedure: Coronary Angiogram;  Surgeon: Varun Freire MD;  Location: [facility] Cath Lab;  Service: Cardiology     CV LEFT HEART CATHETERIZATION WITHOUT LEFT VENTRICULOGRAM Left 10/23/2020    Procedure: Left Heart Catheterization Without Left Ventriculogram;  Surgeon: Varun Freire MD;  Location: [facility] Cath Lab;  Service: Cardiology     EP BIV PACEMAKER INSERT N/A 3/25/2020    Procedure: EP Biventricular Pacemaker Insertion;  Surgeon: Taylor Sandoval MD;  Location: [facility] Cath  Lab;  Service: Cardiology     RELEASE CARPAL TUNNEL       ZZC TOTAL KNEE ARTHROPLASTY Right 8/15/2019    Procedure: RIGHT  MINIMALLY TOTAL KNEE ARTHROPLASTY;  Surgeon: Keven Cerda MD;  Location: [facility];  Service: Orthopedics       Social History  Tobacco:   History   Smoking Status     Never   Smokeless Tobacco     Never    Alcohol:   Social History    Substance and Sexual Activity      Alcohol use: Yes        Comment: Alcoholic Drinks/day: rare   Illicit Drugs:   History   Drug Use No       Family History  Family History   Problem Relation Age of Onset     Alzheimer Disease Mother      Heart Disease Father      Cancer Sister      Diabetes Type 2  Sister      Chronic Obstructive Pulmonary Disease Sister      LUNG DISEASE Brother           John Paul Vasquez MD on 12/22/2022      cc: Jamie Noguera

## 2022-12-22 NOTE — CONSULTS
"[facility]:  PULMONARY CONSULT NOTE     Date / Time of Admission:  12/19/2022  5:12 PM    ID: Reymundo Petersen is a 82 year old male, never smoker, with chronic systolic CHF (LVEF 30-35% on TTE 6/13/22), atrial fibrillation s/p PVI and ablation 11/23/22, chronic anticoagulation on Xarelto, essential hypertension, dyslipidemia, ?presumed pulmonary hypertension who presented to [facility] ED on 12/19/2022 with dyspnea, cough, and lower extremity edema and was admitted for and acute on chronic CHF exacerbation with a moderate right-sided pleural effusion.    Reason for Consult:  Right pleural effusion         Assessment:   Acute respiratory failure with hypoxia - resolved. Exudative, lymphocytic-predominant right pleural effusion s/p thoracentesis 12/20.  Patient with new onset right pleural effusion, which was not visible on prior CXR 03/2022. He sustained a fall/injury in July 2022 and possible this caused localized effusion to start developing at that time, particularly given his use of anticoagulation. However, more likely is that he developed pleural effusion following the recent ablation procedure in 11/2022.  While pleural effusions are most often found with postpericardial injury syndrome, he may also have had some arrhythmia/tachcyardia that induced further CHF exacerbation.  In review of his fluid, it is lymphocytic predominant. There are relatively few causes of lymphocytic fluid including TB, fungal disease, malignancy, rheumatoid arthritis. In certain situations, it can also be induced by cardiac failure.  While heart failure is the most likely etiology given his history, he also endorsed \"40 lb weight loss\" in the last few months. IN review of records, appears that he may have lost ~ 20 lbs since September 2022.  Given the unintentional weight loss component, would be prudent to evaluate for any mass/lesions to the area. Will determine this by CT study.    Otherwise, pleural " effusion is exudative by Light's criteria - Protein(pleural)/Protein(serum) = 3.5/6.7 = 52%. LDHpleural/LDHserum = 215/231 = 93%. Lymphocytic predominant effusion with 81% lymphocytes.  Pathology most consistent with chronic inflammation, found to have reactive mesothelial cells but negative for malignant cells.          Plan:     Supplemental O2 as needed for goal O2 sat > 92%.  Currently on room air.     Will try to add on AFB studies to pleural fluid, but may not be feasible given several days have passed.     Reviewed outpatient records - seems that patient has 20+ pounds in the last 3 months, appears to be unintentional.     Will check CT chest with IV contrast to see if there are any underlying lesions/masses remaining, particularly to that right lower lung zone.    If there is an abnormality visible, then we can work with patient as outpatient to obtain a body PET/CT or identify alternative evaluation strategies.     Pulmonary hygiene: OOB to chair, IS Q2H while awake (ordered), PT/OT eval    Empiric Abx for community acquired pneumonia (azithromycin/ceftriaxone) per primary team.  Start date 12/19.    PPI for GI prophylaxis, rivaroxaban for DVT prophylaxis.    Outpatient follow-up:     To be arranged pending CT scan findings.      Patient requests that Clinic staff contact his daughter, Juliet, when scheduling his appointment.    Patient hopeful that he will be discharged tomorrow.    Patient and/or family were educated on the above recommendations.   Thank you for allowing our service to participate in the care of this patient.  Please call with any questions or concerns. Dr. Swan will resume care of the Pulmonary Service on 12/23/22.    Total patient care time: 85 minutes, with over 50% spent in counseling/coordination of care.      Chelsea Smith MD, MPH  Pulmonary/Critical Care Medicine  12/22/2022  3:34 PM       Chief Complaint Chief Complaint   Patient presents with     Shortness of Breath     Weight  Gain               HPI:   Reymundo Petersen is a 82 year old male, never smoker, with chronic systolic CHF (LVEF 30-35% on TTE 6/13/22), atrial fibrillation s/p PVI and ablation 11/23/22, chronic anticoagulation on Xarelto, essential hypertension, dyslipidemia, ?presumed pulmonary hypertension who presented to [facility] ED on 12/19/2022 with dyspnea, cough, and lower extremity edema and was admitted for and acute on chronic CHF exacerbation with a moderate right-sided pleural effusion.  History is provided by the patient, review of medical records.    The patient is independent and lives alone. In the past 5 days prior to presentation, he noticed intermittent cough with clear sputum production.  He also noticed shortness of breath with exertion, particularly if walking up steps or bending over. Denied any fevers, chills, N/V, chest pain/pressure, palpitations, wheezing, hemoptysis, abdominal pain, numbness/tingling in the extremities. Presented to [facility] ED on 12/19, found to have a right sided moderate pleural effusion; unable to exclude underlying infection. NTproBNP elevated at 7078, troponin T 94, procalcitonin 0.22.  Presumed that the effusion was secondary to CHF.  He underwent a thoracentesis on 12/20, 1 liter of blood-tinged flood was removed. Studies showed a lymphocytic predominant effusion, exudative by Light's criteria. Otherwise, He has been treated empirically with antibiotics since 12/19 with ceftriaxone and azithromycin for possible superimposed pneumonia. He feels that he has improved significantly but not yet at baseline.  He is hopeful he will be discharged home tomorrow.     Of note, patient reports that he fell in July 2022.  He bruised his right arm and hit his forehead leading to injury locally that required drainage.  He does not recall if he injured his right chest but maybe recalls bruising here.  Separately, he underwent a pulmonary vein isolation and atrial fibrillation  ablation on 11/23/22. States no issues after this.          Review of Systems:   Review of Systems:  Pertinent items are noted in HPI.  The Review of Systems is negative other than noted in the HPI        Medical/Surgical History:     Past Medical History:   Diagnosis Date     Atrial fibrillation (H)      Atrial fibrillation, transient (H) 8/11/2020     BPH (benign prostatic hyperplasia) 7/11/2018     Chronic combined systolic and diastolic heart failure (H) 2/11/2021     Congestive heart failure (H)      Coronary artery disease      COVID 02/2021     Dilated cardiomyopathy (H) 2/11/2021     MARIAN (generalized anxiety disorder) 2/11/2021     GERD (gastroesophageal reflux disease)      High cholesterol      Hypertension      LBBB (left bundle branch block) 7/11/2018     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     Pulmonary hypertension (H) 4/1/2020      Past Surgical History:   Procedure Laterality Date     ANGIOPLASTY       ARTHROSCOPY KNEE       CARDIOVERSION  2021     CARDIOVERSION  02/03/2022     CV CORONARY ANGIOGRAM N/A 10/23/2020    Procedure: Coronary Angiogram;  Surgeon: Varun Freire MD;  Location: [facility] Cath Lab;  Service: Cardiology     CV LEFT HEART CATHETERIZATION WITHOUT LEFT VENTRICULOGRAM Left 10/23/2020    Procedure: Left Heart Catheterization Without Left Ventriculogram;  Surgeon: Varun Freire MD;  Location: [facility] Cath Lab;  Service: Cardiology     EP BIV PACEMAKER INSERT N/A 3/25/2020    Procedure: EP Biventricular Pacemaker Insertion;  Surgeon: Taylor Sandoval MD;  Location: [facility] Cath Lab;  Service: Cardiology     RELEASE CARPAL TUNNEL       ZZC TOTAL KNEE ARTHROPLASTY Right 8/15/2019    Procedure: RIGHT  MINIMALLY TOTAL KNEE ARTHROPLASTY;  Surgeon: Keven Cerda MD;  Location: [facility];  Service: Orthopedics            Allergies/Medications:   Allergies:   No Known Allergies    OUTPATIENT Medications:  Medications Prior to Admission    Medication Sig Dispense Refill Last Dose     acetaminophen (TYLENOL) 500 MG tablet Take 500 mg by mouth every 6 hours as needed for mild pain   Unknown     amiodarone (PACERONE) 200 MG tablet Take 1 tablet (200 mg) by mouth every other day 45 tablet 0 12/18/2022     aspirin (ASA) 81 MG chewable tablet Take 1 tablet (81 mg) by mouth every other day (Patient taking differently: Take 81 mg by mouth Every Mon, Wed, Fri Morning Taking Monday, Wednesday and Fridays) 30 tablet 0 12/19/2022     atorvastatin (LIPITOR) 10 MG tablet Take 10 mg by mouth four times a week Sunday, Tuesday, Thursday, & Saturday at bedtime   12/18/2022     bumetanide (BUMEX) 2 MG tablet Take 1 tablet (2 mg) by mouth 2 times daily 180 tablet 3 12/19/2022 at x2     Coenzyme Q10 300 MG CAPS Take 300 mg by mouth daily   12/19/2022     doxazosin (CARDURA) 8 MG tablet [DOXAZOSIN (CARDURA) 8 MG TABLET] Take 4 mg by mouth at bedtime.          12/18/2022     finasteride (PROSCAR) 5 mg tablet [FINASTERIDE (PROSCAR) 5 MG TABLET] Take 5 mg by mouth at bedtime.          12/18/2022     gabapentin (NEURONTIN) 100 MG capsule Two capsules in the morning, two capsules in the afternoon, and three capsules in the evening   12/19/2022 at x2     glucosamine/chondr cole A sod (OSTEO BI-FLEX ORAL) Take 1 tablet by mouth 2 times daily (with meals)   12/19/2022 at x1     HYDROcodone-acetaminophen (NORCO) 5-325 MG tablet Take 1 tablet by mouth every 6 hours as needed for severe pain (7-10) For post procedure if needed for back, hip, and knee pain   Unknown     losartan (COZAAR) 25 MG tablet [LOSARTAN (COZAAR) 25 MG TABLET] Take 1 tablet (25 mg total) by mouth daily. 30 tablet 0 12/19/2022     melatonin 10 mg Tab Take 10 mg by mouth At Bedtime    12/18/2022     metoprolol succinate ER (TOPROL-XL) 25 MG 24 hr tablet Take 1 tablet (25 mg) by mouth At Bedtime 90 tablet 3 12/18/2022     multivitamin, therapeutic (THERA-VIT) TABS tablet Take 1 tablet by mouth daily   12/19/2022      omeprazole (PRILOSEC) 20 MG capsule [OMEPRAZOLE (PRILOSEC) 20 MG CAPSULE] Take 20 mg by mouth 2 (two) times a day before meals.   12/19/2022 at x1     potassium chloride ER (KLOR-CON M) 20 MEQ CR tablet Take 1 tablet (20 mEq) by mouth 2 times daily 180 tablet 1 12/19/2022 at x2     rivaroxaban ANTICOAGULANT (XARELTO) 20 MG TABS tablet Take 1 tablet (20 mg) by mouth daily (with dinner) 90 tablet 3 12/18/2022     sertraline (ZOLOFT) 50 MG tablet Take 50 mg by mouth 2 times daily    12/19/2022 at x1     vit A/vit C/vit E/zinc/copper (PRESERVISION AREDS ORAL) Take 1 tablet by mouth 2 times daily (with meals)   12/19/2022 at x1       INPATIENT Medications: Continuous Infusions:    INPATIENT Medications: Scheduled Medications:    amiodarone  200 mg Oral Every Other Day     aspirin  81 mg Oral Q Mon Wed Fri AM     atorvastatin  10 mg Oral Once per day on Sun Tue Thu Sat     azithromycin  500 mg Oral At Bedtime     bumetanide  3 mg Oral BID     cefTRIAXone  1 g Intravenous Q24H     [Held by provider] doxazosin  4 mg Oral At Bedtime     finasteride  5 mg Oral At Bedtime     gabapentin  100 mg Oral TID     [Held by provider] losartan  25 mg Oral Daily     metoprolol succinate ER  25 mg Oral At Bedtime     pantoprazole  40 mg Oral BID AC     potassium chloride ER  20 mEq Oral BID     rivaroxaban ANTICOAGULANT  20 mg Oral Daily with supper     sertraline  50 mg Oral BID     sodium chloride (PF)  3 mL Intracatheter Q8H             Family History:        Social History:      Reviewed:    Brother had lung disease, emphysema; was a heavy smoker.  Family History   Problem Relation Age of Onset     Alzheimer Disease Mother      Heart Disease Father      Cancer Sister      Diabetes Type 2  Sister      Chronic Obstructive Pulmonary Disease Sister      LUNG DISEASE Brother     Reviewed:    Tobacco: Denies.    Alcohol: Denies.    Drugs: Denies.    Other: Lives alone, independent. Has 4 steps in the back of the house and 13 steps in  "the front of the house.   Social History     Socioeconomic History     Marital status:      Spouse name: Not on file     Number of children: Not on file     Years of education: Not on file     Highest education level: Not on file   Occupational History     Not on file   Tobacco Use     Smoking status: Never     Smokeless tobacco: Never   Substance and Sexual Activity     Alcohol use: Yes     Comment: Alcoholic Drinks/day: rare     Drug use: No     Sexual activity: Not on file   Other Topics Concern     Parent/sibling w/ CABG, MI or angioplasty before 65F 55M? Not Asked   Social History Narrative    Retired.  Good sense of humor     Social Determinants of Health     Financial Resource Strain: Not on file   Food Insecurity: Not on file   Transportation Needs: Not on file   Physical Activity: Not on file   Stress: Not on file   Social Connections: Not on file   Intimate Partner Violence: Not on file   Housing Stability: Not on file              Objective:   Vitals:  /67 (BP Location: Right arm)   Pulse 112   Temp 98.5  F (36.9  C) (Oral)   Resp 20   Ht 1.803 m (5' 11\")   Wt 73.5 kg (162 lb 1.6 oz)   SpO2 95%   BMI 22.61 kg/m    GEN: Pleasant male, sitting in a chair in no acute distress.   HEENT: Normocephalic, atraumatic.  Extraoccular eye movements intact, anicteric sclera. Moist mucous membranes.   NECK: Supple.    PULM: Non-labored breathing.  No use of accessory muscles.  Diminished breath sounds bilateral bases, L > R.    CVS: Regular rate and rhythm.  Normal S1, S2.  No rubs, murmurs, or gallops.    ABDOMEN: Normoactive bowel sounds.  Non-tender to palpation.  Non-distended.    EXTREMITES:  No clubbing, cyanosis, or edema.    NEURO:  Awake.  Oriented to person, place, time and situation.  Cranial nerves 2-12 grossly intact.  Moving all extremities.      Intake/Output:  I/O last 3 completed shifts:  In: 754 [P.O.:748; I.V.:6]  Out: 2400 [Urine:2400]        Pertinent Studies:   All laboratory " data reviewed  Serum Glucose range:   Recent Labs   Lab 12/22/22  0450 12/21/22  0813 12/20/22  0717 12/19/22  1741   * 115* 114* 115*     ABG: No lab results found in last 7 days.  CBC:   Recent Labs   Lab 12/22/22  0450 12/21/22  0813 12/20/22  0717 12/19/22  1741   WBC 7.9 8.3 10.2 11.9*   HGB 10.7* 10.8* 10.6* 12.2*   HCT 33.6* 33.6* 33.4* 38.1*   MCV 96 95 96 97    250 243 288   NEUTROPHIL 82 84  --  92   LYMPH 8 6  --  2   MONOCYTE 8 9  --  6   EOSINOPHIL 1 0  --  0     Chemistry:   Recent Labs   Lab 12/22/22  0450 12/21/22  1647 12/21/22  0813 12/20/22  0717 12/19/22  1741     --  136 135* 137   POTASSIUM 3.6 4.1 3.3* 4.0 4.5   CHLORIDE 95*  --  97* 95* 94*   CO2 34*  --  31* 31* 32*   BUN 15.9  --  16.4 18.8 17.0   CR 0.89  --  0.83 0.99 1.03   GFRESTIMATED 85  --  86 75 72   ANGELY 8.7*  --  8.6* 8.9 9.3   MAG 2.0  --  2.0 2.0 1.9   PROTTOTAL  --   --   --  6.7  6.7 7.6   ALBUMIN  --   --   --  3.0* 3.6   AST  --   --   --  24 24   ALT  --   --   --  7* 8*   ALKPHOS  --   --   --  102 113   BILITOTAL  --   --   --  1.1 1.1     Coags:  No results for input(s): INR, PROTIME, PTT in the last 168 hours.    Invalid input(s): APTT  Cardiac Markers:  No results for input(s): CKTOTAL, TROPONINI in the last 168 hours.     Serum protein, 12/20: 6.7.  Serum LDH, 12/20: 231    Right pleural fluid, 12/20:      68870 RBC, 663 WBC. 2% neutrophils, 81% lymphocytes, 11% mono/macrocytes, 6% eosinophils.     Glucose 105, , protein 3.5.      Pathology:  1.  REACTIVE MESOTHELIAL CELLS  2.  NEGATIVE FOR MALIGNANT CELLS. Other findings: Chronic inflammation present.     Latest Reference Range & Units 12/19/22 17:41 12/21/22 08:13   Procalcitonin <0.05 ng/mL 0.22 (H) 0.78 (H)   (H): Data is abnormally high    Microbiology:  Right pleural fluid, 12/20: GS: 2+ WBC, 3+ RBC, no organisms. Cx: NGTD  COVID19 PCR, 12/19: Negative  Influenza A/B swab, RSV nasal swab, 12/19: Negative        Cardiology/Radiology:    Cardiac: All cardiac studies reviewed by me.    EKG:  Reviewed    TTE, 12/20/22:  Summary:  1. The left ventricle is normal in size.Left ventricular function is decreased. The ejection fraction is 35-40% (moderately reduced). There is severe concentric left ventricular hypertrophy. There is moderate global hypokinesia of the left ventricle. 2. The right ventricle is normal size. There is mild to moderate right ventricular hypertrophy. The right ventricular systolic function is normal. 3. The left atrium is severely dilated. The right atrium is severely dilated. 4. There is mild to moderate (1-2+) mitral regurgitation. IVC diameter >2.1 cm collapsing <50% with sniff suggests a high RA pressure estimated at 15 mmHg or greater. 5. There is mild to moderate (1-2+) tricuspid regurgitation. 6. Small pericardial effusion. There are no echocardiographic indications of cardiac tamponade.    Radiology: All imaging studies reviewed by me.    Chest X-Ray, 12/22/22:  Pacemaker with leads in stable position. Heart size moderately enlarged. Small to moderate right pleural effusion mildly decreased since 12/19/2022. Associated atelectasis right lung base. Tiny left pleural effusion has slightly increased. No  significant new findings.    Chest X-Ray, 12/19/22:  A moderate right-sided pleural effusion with associated atelectasis. Superimposed pneumonia is not excluded. The left lung is grossly clear. There is no pneumothorax. The heart remains enlarged. A right subclavian pacing device is unchanged.

## 2022-12-22 NOTE — PROGRESS NOTES
[facility]    Medicine Progress Note - Hospitalist Service       Date of Admission:  12/19/2022    Assessment & Plan        Reymundo Petersen is a 82 year old male with PMH significant for CHF who is admitted on 12/19/2022 Acute Hypoxemic Respiratory Failure, Acute on Chronic CHF exacerbation and Moderate Right Pleural Effusion.      Acute Hypoxemic Respiratory Failure  - 2/2 CHF exacerbation and Moderate Right Pleural Effusion. CXR cannot rule out superimposed PNA. Continue diuresis and supplemental O2.   - s/p Thoracentesis on 12/20- exudative picture, with hemorrhage. Cytology suggestive of chronic inflammation  - Empiric Rocephin & Azithromycin. Monitor vitals closely.   - pulmonology consult appreciated    Acute on Chronic HFrEF exacerbation  - Continue Bumex IV. S/p Dobutamine drip.   - Supplemental O2. Strict I/O, daily weight.  - Echo: LVEF: 35-40%  - Continue telemetry monitoring. Cardiology consult appreciated     Moderate Right Pleural Effusion  - 2/2 CHF exacerbation. On Bumex. S/p Thoracentesis on 12/20. Supplemental O2. Monitor vitals closely. exudative picture, with hemorrhage. Cytology suggestive of chronic inflammation.  - Pulmonary consult appreciated  - CT-chest: no mass.    Possible Community Acquired Pneumonia  - CXR reviewed. Elevated procalcitonin.   - coughing, wheezing and hypoxia. empiric Rocephin and Azithromycin started on 12/19  - Duonebs. Supplemental O2. Monitor vitals closely.     Elevated Troponin  - Trending down. EKG Sinus rhythm with PACs and LBBB. ( He reportedly has hx of LBBB; however not seen on recent EKGs from 9/2022 to present).  - likely due to demand  - Continue supplemental O2. Trial of NTG and PRN morphine. Continue telemetry monitoring. Cardiology recommendations appreciated    CAD  - c/w PTA ASA, Lipitor    Persistent afib  - s/p ablations and cardioversion  - complete heart black s/p CRT-P  - c/w amiodarone, metoprolol,  rivaroxaban    Hyponatremia, mild  - likely due to CHF  - monitor BMP    Hypokalemia   - replete per protocol    Essential Hypertension  - BP meds on hold due to soft BP  - Monitor vital signs per protocol    Chronic anemia  - stable  - follow-up cbc       Hospital Day: 4        Diet: Combination Diet Low Saturated Fat Na <2400mg Diet, No Caffeine Diet  Fluid restriction 1500 ML FLUID    DVT Prophylaxis: Moderate risk. Xarelto   Adorno Catheter: Not present  Central Lines: None  Code Status: Full Code        Disposition Plan   Disposition: Home     Expected Discharge Date: 12/23/2022      Destination: home with family  Discharge Comments: IV ABX.     Medically ready to discharge today: No     The patient's care was discussed with the Patient.    Kristen Keita MD  Hospitalist Service  [facility]  Securely message with the Vocera Web Console (learn more here)  Text page via [employer] Paging/Directory         Clinically Significant Risk Factors        # Hypokalemia: Lowest K = 3.3 mmol/L in last 2 days, will replace as needed       # Hypoalbuminemia: Lowest albumin = 3 g/dL at 12/20/2022  7:17 AM, will monitor as appropriate                   ____________        Physical Exam   Vital Signs: Temp: 98.3  F (36.8  C) Temp src: Oral BP: 90/51 Pulse: 113   Resp: 20 SpO2: 92 % O2 Device: None (Room air) Oxygen Delivery: 1 LPM  Weight: 162 lbs 1.6 oz  Physical Exam:  GEN: Alert and oriented. Not in acute distress.  HEENT: Atraumatic, mucous membrane- moist and pink.  Chest: Bilateral air entry.  CVS: S1S2 regular. No murmurs, rubs or gallops.  Abdomen: Soft. Non-tender, non-distended. No organomegaly. No guarding or rigidity. Bowel sounds active.   Extremities: + LE edema.  CNS: No focal neurologic deficit. No involuntary movements.  Skin: No skin rash, no cyanosis or clubbing.     Data   Recent Results (from the past 24 hour(s))   Potassium    Collection Time: 12/21/22  4:47 PM   Result Value Ref  Range    Potassium 4.1 3.4 - 5.3 mmol/L   Magnesium    Collection Time: 12/22/22  4:50 AM   Result Value Ref Range    Magnesium 2.0 1.7 - 2.3 mg/dL   Basic metabolic panel    Collection Time: 12/22/22  4:50 AM   Result Value Ref Range    Sodium 136 136 - 145 mmol/L    Potassium 3.6 3.4 - 5.3 mmol/L    Chloride 95 (L) 98 - 107 mmol/L    Carbon Dioxide (CO2) 34 (H) 22 - 29 mmol/L    Anion Gap 7 7 - 15 mmol/L    Urea Nitrogen 15.9 8.0 - 23.0 mg/dL    Creatinine 0.89 0.67 - 1.17 mg/dL    Calcium 8.7 (L) 8.8 - 10.2 mg/dL    Glucose 123 (H) 70 - 99 mg/dL    GFR Estimate 85 >60 mL/min/1.73m2   CBC with platelets and differential    Collection Time: 12/22/22  4:50 AM   Result Value Ref Range    WBC Count 7.9 4.0 - 11.0 10e3/uL    RBC Count 3.49 (L) 4.40 - 5.90 10e6/uL    Hemoglobin 10.7 (L) 13.3 - 17.7 g/dL    Hematocrit 33.6 (L) 40.0 - 53.0 %    MCV 96 78 - 100 fL    MCH 30.7 26.5 - 33.0 pg    MCHC 31.8 31.5 - 36.5 g/dL    RDW 14.0 10.0 - 15.0 %    Platelet Count 252 150 - 450 10e3/uL    % Neutrophils 82 %    % Lymphocytes 8 %    % Monocytes 8 %    % Eosinophils 1 %    % Basophils 0 %    % Immature Granulocytes 1 %    NRBCs per 100 WBC 0 <1 /100    Absolute Neutrophils 6.5 1.6 - 8.3 10e3/uL    Absolute Lymphocytes 0.7 (L) 0.8 - 5.3 10e3/uL    Absolute Monocytes 0.6 0.0 - 1.3 10e3/uL    Absolute Eosinophils 0.1 0.0 - 0.7 10e3/uL    Absolute Basophils 0.0 0.0 - 0.2 10e3/uL    Absolute Immature Granulocytes 0.0 <=0.4 10e3/uL    Absolute NRBCs 0.0 10e3/uL   Lactic Acid STAT    Collection Time: 12/22/22 10:01 AM   Result Value Ref Range    Lactic Acid 1.2 0.7 - 2.0 mmol/L     ____________  Interval History    Patient seen and examined at bedside. Shortness of breath is improving. No fever, nausea, or vomiting.   Data reviewed today: I reviewed all medications, new labs and imaging results over the last 24 hours. I personally reviewed no images or EKG's today.

## 2022-12-22 NOTE — PROGRESS NOTES
Care Management Follow Up    Length of Stay (days): 3    Expected Discharge Date: 12/23/2022     Concerns to be Addressed: discharge planning     Patient plan of care discussed at interdisciplinary rounds: Yes    Anticipated Discharge Disposition:       Anticipated Discharge Services:    Anticipated Discharge DME:      Patient/family educated on Medicare website which has current facility and service quality ratings:    Education Provided on the Discharge Plan:    Patient/Family in Agreement with the Plan:      Referrals Placed by CM/SW:    Private pay costs discussed: Not applicable    Additional Information:  CM following for needs at discharge. Lives with sister in law. Therapy eval ordered today, pending.       Kristie Devi RN

## 2022-12-23 NOTE — PLAN OF CARE
Occupational Therapy Discharge Summary    Reason for therapy discharge:    Discharged to home with home therapy.    Progress towards therapy goal(s). See goals on Care Plan in [facility] electronic health record for goal details.  Goals partially met.  Barriers to achieving goals:   discharge from facility.    Therapy recommendation(s):    Continued therapy is recommended.  Rationale/Recommendations:  Home safety evaluation and return to baseline ADL status.

## 2022-12-23 NOTE — PROGRESS NOTES
"Care Management Follow Up    Length of Stay (days): 4    Expected Discharge Date: 2022    Concerns to be Addressed:   IV Rocephin; pulmonology and cardiology following.   Patient plan of care discussed at interdisciplinary rounds: Yes    Anticipated Discharge Disposition:  Home     Anticipated Discharge Services:  Home Therapy  Anticipated Discharge DME:  Per therapy (if indicated).    Patient/family educated on Medicare website which has current facility and service quality ratings:  Yes  Education Provided on the Discharge Plan:  Per team  Patient/Family in Agreement with the Plan:  Yes    Referrals Placed by CM/SW:  [facility]  Private pay costs discussed: Not applicable     Additional Information:  Patient lives in his own home but his sister-in-law, Stephanie, moved into the home 18 years ago (shortly after Ramirez's wife ). Initially, Stephanie moved in to help with keeping the \"house in order\" and because she was going through a divorce. Patient's daughter Juliet reported that 'soon it was evident that Stephanie \"is a hoarder\"'. House has paths for walking from room to room.   Patient is independent with activities of daily living at baseline and he still drives. Juliet visits often and sets up meds for patient nd \"makes sure they are both eating the way they are suppose to\". Per Juliet, both patient and Stephanie should be on a low sodium diet but both are non-compliant with this.   Therapy recommends home therapy. Writer met with patient who will consider this. Initial referral sent to [facility].     Kristie Hernández RN      "

## 2022-12-23 NOTE — PROGRESS NOTES
HEART CARE NOTE          Assessment/Recommendations     1. HFrEF c/b cardiogenic shock  Assessment / Plan    Tolerating oral diuretic regimen - no changes at this time    GDMT as detailed below     Current Pharmacotherapy AHA Guideline-Directed Medical Therapy   Losartan 25 mg daily - on hold given borderline BP Lisinopril 20 mg twice daily   Metoprolol succinate 25 mg daily Carvedilol 25 mg twice daily   Spironolactone not started Spironolactone 25 mg once daily   Hydralazine NA Hydralazine 100 mg three times daily   Isosorbide dinitrate NA Isosorbide dinitrate 40 mg three times daily   SGLT2 inhibitor not started Dapagliflozin or Empagliflozin 10 mg daily      2. Arrhythmia  Assessment / Plan    Persistent atrial fibrillation s/p ablations and DCCV    Currently on rivaroxaban; being considered for Watchman device - continue amiodarone    s/p PVI 11/22    Complete Heart Block s/p CRT-P    Follows with Dr. Michelle in EP as well as afib clinic     3. CAD  Assessment / Plan    Denies chest pain or anginal equivalents    Continue ASA, atorvastatin, metoprolol succinate, losartan    Ok for discharge from a cardiology standpoint. Cardiology team will sign-off for now. Please do not hesitate to consult us again if new questions or concerns arise. Follow-up appointment will be arranged by CORE/HF clinic.       History of Present Illness/Subjective      Mr. 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 Petersen denies any acute cardiac events or complaints; Management plan as detailed above     ECG: Personally reviewed. Paced rhythm     ECHO (personnaly Reviewed):   The left ventricle is mildly dilated.  Left ventricular function is decreased. The ejection fraction is  "30-35%  (moderately reduced).  There is moderate global hypokinesia of the left ventricle.  The right ventricle is mildly dilated.  The right ventricular systolic function is normal.  The left atrium is severely dilated.  The right atrium is moderately dilated.  There is moderately severe (3+) tricuspid regurgitation.  Right ventricle systolic pressure estimate normal  There is mild to moderate (1-2+) aortic regurgitation.  The ascending aorta is Mildly dilated.  IVC diameter >2.1 cm collapsing <50% with sniff suggests a high RA pressure  estimated at 15 mmHg or greater.  Small pericardial effusion  There are no echocardiographic indications of cardiac tamponade.          Physical Examination Review of Systems   /71 (BP Location: Right arm, Patient Position: Sitting, Cuff Size: Adult Regular)   Pulse 76   Temp 97.9  F (36.6  C) (Oral)   Resp 18   Ht 1.803 m (5' 11\")   Wt 73.1 kg (161 lb 1.6 oz)   SpO2 91%   BMI 22.47 kg/m    Body mass index is 22.47 kg/m .  Wt Readings from Last 3 Encounters:   12/23/22 73.1 kg (161 lb 1.6 oz)   11/24/22 77.7 kg (171 lb 4.8 oz)   11/16/22 78 kg (172 lb)     General Appearance:   no distress, normal body habitus   ENT/Mouth: membranes moist, no oral lesions or bleeding gums.      EYES:  no scleral icterus, normal conjunctivae   Neck: no carotid bruits or thyromegaly   Chest/Lungs:   lungs are clear to auscultation, no rales or wheezing, equal chest wall expansion    Cardiovascular:   Regular. Normal first and second heart sounds with no murmurs, rubs, or gallops; the carotid, radial and posterior tibial pulses are intact, no JVD or LE edema bilaterally    Abdomen:  no organomegaly, masses, bruits, or tenderness; bowel sounds are present   Extremities: no cyanosis or clubbing   Skin: no xanthelasma, warm.    Neurologic: alert and oriented x3, calm     Psychiatric: alert and oriented x3, calm     A complete 10 systems ROS was reviewed  And is negative except what is listed " in the HPI.          Medical History  Surgical History Family History Social History   Past Medical History:   Diagnosis Date     Atrial fibrillation (H)      Atrial fibrillation, transient (H) 8/11/2020     BPH (benign prostatic hyperplasia) 7/11/2018     Chronic combined systolic and diastolic heart failure (H) 2/11/2021     Congestive heart failure (H)      Coronary artery disease      COVID 02/2021     Dilated cardiomyopathy (H) 2/11/2021     MARIAN (generalized anxiety disorder) 2/11/2021     GERD (gastroesophageal reflux disease)      High cholesterol      Hypertension      LBBB (left bundle branch block) 7/11/2018     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     Pulmonary hypertension (H) 4/1/2020    Past Surgical History:   Procedure Laterality Date     ANGIOPLASTY       ARTHROSCOPY KNEE       CARDIOVERSION  2021     CARDIOVERSION  02/03/2022     CV CORONARY ANGIOGRAM N/A 10/23/2020    Procedure: Coronary Angiogram;  Surgeon: Varun Freire MD;  Location: [facility];  Service: Cardiology     CV LEFT HEART CATHETERIZATION WITHOUT LEFT VENTRICULOGRAM Left 10/23/2020    Procedure: Left Heart Catheterization Without Left Ventriculogram;  Surgeon: Varun Freire MD;  Location: [facility];  Service: Cardiology     EP BIV PACEMAKER INSERT N/A 3/25/2020    Procedure: EP Biventricular Pacemaker Insertion;  Surgeon: Taylor Sandoval MD;  Location: [facility];  Service: Cardiology     RELEASE CARPAL TUNNEL       ZZC TOTAL KNEE ARTHROPLASTY Right 8/15/2019    Procedure: RIGHT  MINIMALLY TOTAL KNEE ARTHROPLASTY;  Surgeon: Keven Cerda MD;  Location: [facility];  Service: Orthopedics    no family history of premature coronary artery disease Social History     Socioeconomic History     Marital status:      Spouse name: Not on file     Number of children: Not on file     Years of education: Not on file     Highest education level: Not on file    Occupational History     Not on file   Tobacco Use     Smoking status: Never     Smokeless tobacco: Never   Substance and Sexual Activity     Alcohol use: Yes     Comment: Alcoholic Drinks/day: rare     Drug use: No     Sexual activity: Not on file   Other Topics Concern     Parent/sibling w/ CABG, MI or angioplasty before 65F 55M? Not Asked   Social History Narrative    Retired.  Good sense of humor     Social Determinants of Health     Financial Resource Strain: Not on file   Food Insecurity: Not on file   Transportation Needs: Not on file   Physical Activity: Not on file   Stress: Not on file   Social Connections: Not on file   Intimate Partner Violence: Not on file   Housing Stability: Not on file           Lab Results    Chemistry/lipid CBC Cardiac Enzymes/BNP/TSH/INR   Lab Results   Component Value Date    CHOL 107 06/23/2022    HDL 38 (L) 06/23/2022    TRIG 35 06/23/2022    BUN 15.9 12/22/2022     12/22/2022    CO2 34 (H) 12/22/2022    Lab Results   Component Value Date    WBC 7.9 12/22/2022    HGB 10.7 (L) 12/22/2022    HCT 33.6 (L) 12/22/2022    MCV 96 12/22/2022     12/22/2022    Lab Results   Component Value Date    TROPONINI 0.09 03/14/2022     (H) 06/17/2022    TSH 4.26 10/17/2022    INR 1.03 02/11/2021     Lab Results   Component Value Date    TROPONINI 0.09 03/14/2022          Weight:    Wt Readings from Last 3 Encounters:   12/23/22 73.1 kg (161 lb 1.6 oz)   11/24/22 77.7 kg (171 lb 4.8 oz)   11/16/22 78 kg (172 lb)       Allergies  No Known Allergies      Surgical History  Past Surgical History:   Procedure Laterality Date     ANGIOPLASTY       ARTHROSCOPY KNEE       CARDIOVERSION  2021     CARDIOVERSION  02/03/2022     CV CORONARY ANGIOGRAM N/A 10/23/2020    Procedure: Coronary Angiogram;  Surgeon: Varun Freire MD;  Location: [facility];  Service: Cardiology     CV LEFT HEART CATHETERIZATION WITHOUT LEFT VENTRICULOGRAM Left 10/23/2020    Procedure: Left  Heart Catheterization Without Left Ventriculogram;  Surgeon: Varun Freire MD;  Location: [facility] Lab;  Service: Cardiology     EP BIV PACEMAKER INSERT N/A 3/25/2020    Procedure: EP Biventricular Pacemaker Insertion;  Surgeon: Taylor Sandoval MD;  Location: [facility] Cath Lab;  Service: Cardiology     RELEASE CARPAL TUNNEL       ZZC TOTAL KNEE ARTHROPLASTY Right 8/15/2019    Procedure: RIGHT  MINIMALLY TOTAL KNEE ARTHROPLASTY;  Surgeon: Keven Cerda MD;  Location: [facility];  Service: Orthopedics       Social History  Tobacco:   History   Smoking Status     Never   Smokeless Tobacco     Never    Alcohol:   Social History    Substance and Sexual Activity      Alcohol use: Yes        Comment: Alcoholic Drinks/day: rare   Illicit Drugs:   History   Drug Use No       Family History  Family History   Problem Relation Age of Onset     Alzheimer Disease Mother      Heart Disease Father      Cancer Sister      Diabetes Type 2  Sister      Chronic Obstructive Pulmonary Disease Sister      LUNG DISEASE Brother           John Paul Vasquez MD on 12/23/2022      cc: Jamie Noguera

## 2022-12-23 NOTE — DISCHARGE INSTRUCTIONS
A referral has been made for you to [facility] for home Nursing, Physical Therapy and Occupational Therapy.  They should call you to arrange the first visit but, if you have questions or concerns, you can call them at [phone], choose option #1 for Home Health, then choose option #1 for scheduling. Thank you.

## 2022-12-23 NOTE — PLAN OF CARE
Physical Therapy Discharge Summary    Reason for therapy discharge:    Discharged to home with home therapy.    Progress towards therapy goal(s). See goals on Care Plan in [facility] electronic health record for goal details.  Goals met    Therapy recommendation(s):    Continued therapy is recommended.  Rationale/Recommendations:  to improve strength, balance, and activity tolerance to improve functional mobility and decrease fall risk.

## 2022-12-23 NOTE — PROGRESS NOTES
Care Management Discharge Note    Discharge Date: 12/23/2022     Discharge Disposition: Home Care    Discharge Services: None    Discharge DME: None    Discharge Transportation:  (Family)    Private pay costs discussed: Not applicable    PAS Confirmation Code:  (NA)  Patient/family educated on Medicare website which has current facility and service quality ratings: yes    Education Provided on the Discharge Plan:    Persons Notified of Discharge Plans: Nursing; home care;   Patient/Family in Agreement with the Plan: yes    Handoff Referral Completed: Yes    Additional Information:  Discharging to home with family support and [facility] for RN, PT and OT. Orders faxed.     Kristie Hernández RN

## 2022-12-23 NOTE — DISCHARGE SUMMARY
[facility] MEDICINE  DISCHARGE SUMMARY     Primary Care Physician: ADDISON BENÍTEZ  Admission Date: 12/19/2022   Discharge Provider: Kristen Keita MD Discharge Date: 12/23/2022   Diet:   Active Diet and Nourishment Order   Procedures     Fluid restriction 1500 ML FLUID     Combination Diet Low Saturated Fat Na <2400mg Diet, No Caffeine Diet     Diet       Code Status: Full Code   Activity: DCACTIVITY: Activity as tolerated        Condition at Discharge: Stable     REASON FOR PRESENTATION(See Admission Note for Details)   Acute hypoxic respiratory failure, acute on chronic HFrEF exacerbation.     PRINCIPAL & ACTIVE DISCHARGE DIAGNOSES     Principal Problem:    Elevated troponin  Active Problems:    Pleural effusion on right    Acute on chronic congestive heart failure, unspecified heart failure type (H)      PENDING LABS     Unresulted Labs Ordered in the Past 30 Days of this Admission     Date and Time Order Name Status Description    12/23/2022 11:32 AM Fungal or Yeast Culture Routine In process     12/23/2022 11:30 AM Acid-Fast Bacilli Culture and Stain with AFB Stain In process     12/22/2022  5:52 PM ALEJANDRO Preparation In process     12/22/2022  5:52 PM Acid-Fast Bacilli Culture and Stain with AFB Stain In process     12/20/2022 10:01 AM Pleural fluid Aerobic Bacterial Culture Routine with Gram Stain Preliminary           PROCEDURES ( this hospitalization only)          RECOMMENDATIONS TO OUTPATIENT PROVIDER FOR F/U VISIT     Follow-up Appointments     Follow-up and recommended labs and tests       Follow up with primary care provider (PCP), ADDISON BENÍTEZ, within 7 days   for hospital follow- up.  The following labs/tests are recommended:   follow-up AFB, KOH from right thoracentesis by PCP and/or pulmonology.   Follow- up with pulmonology and out patient  right thoracentesis   (Pulmonology clinic will call patient). Cardiology follow-up as scheduled.   Follow with Dr. Michelle  as scheduled.             DISPOSITION     Home    SUMMARY OF HOSPITAL COURSE:    Reymundo Petersen is a 82 year old male with PMH significant for CHF who is admitted on 12/19/2022 Acute Hypoxemic Respiratory Failure, Acute on Chronic CHF exacerbation and Moderate Right Pleural Effusion.   Acute Hypoxemic Respiratory Failure  - 2/2 CHF exacerbation and Moderate Right Pleural Effusion. CXR cannot rule out superimposed PNA. Continue diuresis and supplemental O2.   - s/p Thoracentesis on 12/20- exudative picture, with hemorrhage. Cytology suggestive of chronic inflammation  - Empiric Rocephin & Azithromycin. Monitor vitals closely.   - pulmonology consult appreciated\  - 12/23: Discussed with pulmonology regarding OP follow-up.  Acute on Chronic HFrEF exacerbation  - Continue Bumex IV. S/p Dobutamine drip.   - Supplemental O2. Strict I/O, daily weight.  - Echo: LVEF: 35-40%  - Continue telemetry monitoring. Cardiology consult appreciated  Moderate Right Pleural Effusion  - 2/2 CHF exacerbation. On Bumex. S/p Thoracentesis on 12/20. Supplemental O2. Monitor vitals closely. exudative picture, with hemorrhage. Cytology suggestive of chronic inflammation.  - Pulmonary consult appreciated  - CT-chest: no mass.  Possible Community Acquired Pneumonia  - CXR reviewed. Elevated procalcitonin.   - coughing, wheezing and hypoxia. empiric Rocephin and Azithromycin started on 12/19  - Duonebs. Supplemental O2. Monitor vitals closely.  Elevated Troponin  - Type 2 MI  - Trending down. EKG Sinus rhythm with PACs and LBBB. ( He reportedly has hx of LBBB; however not seen on recent EKGs from 9/2022 to present).  - likely due to demand  - Continue supplemental O2. Trial of NTG and PRN morphine. Continue telemetry monitoring. Cardiology recommendations appreciated  CAD  - c/w PTA ASA, Lipitor  Persistent afib  - s/p ablations and cardioversion  - complete heart black s/p CRT-P  - c/w amiodarone, metoprolol, rivaroxaban  Hyponatremia,  mild  - likely due to CHF  - monitor BMP  Hypokalemia   - replete per protocol  Essential Hypertension  - BP meds on hold due to soft BP  - Monitor vital signs per protocol  Chronic anemia  - stable  Patient was clinically and hemodynamically stable for discharge to home. Medication reconciliation was done. Losartan was hold due to borderline BP. Follow-up as seen below. Plan of care discussed with patient and family. They verbalized understanding and agreed to plan of care.     Discharge Medications with Med changes:     Current Discharge Medication List      START taking these medications    Details   amoxicillin-clavulanate (AUGMENTIN) 875-125 MG tablet Take 1 tablet by mouth 2 times daily for 3 days  Qty: 6 tablet, Refills: 0    Associated Diagnoses: Pneumonia of right lower lobe due to infectious organism      nitroGLYcerin (NITROSTAT) 0.4 MG sublingual tablet For chest pain place 1 tablet under the tongue every 5 minutes for 3 doses. If symptoms persist 5 minutes after 1st dose call 911.  Qty: 9 tablet, Refills: 0    Associated Diagnoses: Chest pain, unspecified type         CONTINUE these medications which have CHANGED    Details   bumetanide (BUMEX) 1 MG tablet Take 3 tablets (3 mg) by mouth 2 times daily for 30 days  Qty: 180 tablet, Refills: 0    Associated Diagnoses: Acute on chronic congestive heart failure, unspecified heart failure type (H)         CONTINUE these medications which have NOT CHANGED    Details   acetaminophen (TYLENOL) 500 MG tablet Take 500 mg by mouth every 6 hours as needed for mild pain      amiodarone (PACERONE) 200 MG tablet Take 1 tablet (200 mg) by mouth every other day  Qty: 45 tablet, Refills: 0    Associated Diagnoses: Persistent atrial fibrillation (H)      aspirin (ASA) 81 MG chewable tablet Take 1 tablet (81 mg) by mouth every other day  Qty: 30 tablet, Refills: 0    Associated Diagnoses: Coronary artery disease due to lipid rich plaque      atorvastatin (LIPITOR) 10 MG  tablet Take 10 mg by mouth four times a week Sunday, Tuesday, Thursday, & Saturday at bedtime      Coenzyme Q10 300 MG CAPS Take 300 mg by mouth daily      finasteride (PROSCAR) 5 mg tablet [FINASTERIDE (PROSCAR) 5 MG TABLET] Take 5 mg by mouth at bedtime.             gabapentin (NEURONTIN) 100 MG capsule Two capsules in the morning, two capsules in the afternoon, and three capsules in the evening      glucosamine/chondr cole A sod (OSTEO BI-FLEX ORAL) Take 1 tablet by mouth 2 times daily (with meals)      HYDROcodone-acetaminophen (NORCO) 5-325 MG tablet Take 1 tablet by mouth every 6 hours as needed for severe pain (7-10) For post procedure if needed for back, hip, and knee pain      melatonin 10 mg Tab Take 10 mg by mouth At Bedtime       metoprolol succinate ER (TOPROL-XL) 25 MG 24 hr tablet Take 1 tablet (25 mg) by mouth At Bedtime  Qty: 90 tablet, Refills: 3    Associated Diagnoses: Coronary artery disease due to lipid rich plaque      multivitamin, therapeutic (THERA-VIT) TABS tablet Take 1 tablet by mouth daily      omeprazole (PRILOSEC) 20 MG capsule [OMEPRAZOLE (PRILOSEC) 20 MG CAPSULE] Take 20 mg by mouth 2 (two) times a day before meals.      potassium chloride ER (KLOR-CON M) 20 MEQ CR tablet Take 1 tablet (20 mEq) by mouth 2 times daily  Qty: 180 tablet, Refills: 1    Associated Diagnoses: Chronic systolic heart failure (H)      rivaroxaban ANTICOAGULANT (XARELTO) 20 MG TABS tablet Take 1 tablet (20 mg) by mouth daily (with dinner)  Qty: 90 tablet, Refills: 3    Associated Diagnoses: Atrial fibrillation, transient (H)      sertraline (ZOLOFT) 50 MG tablet Take 50 mg by mouth 2 times daily       vit A/vit C/vit E/zinc/copper (PRESERVISION AREDS ORAL) Take 1 tablet by mouth 2 times daily (with meals)         STOP taking these medications       doxazosin (CARDURA) 8 MG tablet Comments:   Reason for Stopping:         losartan (COZAAR) 25 MG tablet Comments:   Reason for Stopping:                 Consults      CARE MANAGEMENT / SOCIAL WORK IP CONSULT  CARDIOLOGY IP CONSULT  PULMONARY IP CONSULT  VASCULAR ACCESS ADULT IP CONSULT  PHYSICAL THERAPY ADULT IP CONSULT  OCCUPATIONAL THERAPY ADULT IP CONSULT       Anticoagulation Information      Recent INR results: No results for input(s): INR in the last 168 hours.  Warfarin doses (if applicable) or name of other anticoagulant:       SIGNIFICANT IMAGING FINDINGS     Results for orders placed or performed during the hospital encounter of 12/19/22   XR Chest Port 1 View    Impression    IMPRESSION: A moderate right-sided pleural effusion with associated atelectasis. Superimposed pneumonia is not excluded. The left lung is grossly clear. There is no pneumothorax. The heart remains enlarged. A right subclavian pacing device is unchanged.       US Thoracentesis    Impression    IMPRESSION:  Status post right ultrasound-guided thoracentesis.    Reference CPT Code: 02626   XR Chest Port 1 View    Impression    IMPRESSION: Pacemaker with leads in stable position. Heart size moderately enlarged. Small to moderate right pleural effusion mildly decreased since 12/19/2022. Associated atelectasis right lung base. Tiny left pleural effusion has slightly increased. No   significant new findings.   CT Chest w Contrast    Impression    IMPRESSION:   1.  Moderate right and small left pleural effusions with associated compressive atelectasis, correlate to exclude superimposed infection.  2.  Markedly enlarged heart with moderate pericardial effusion.  3.  No CT evidence of pulmonary malignancy.  4.  Atherosclerotic vascular disease and coronary artery disease.  5.  Mild emphysema.   Echocardiogram Complete   Result Value Ref Range    LVEF  35-40% (moderately reduced)        SIGNIFICANT LABORATORY FINDINGS     Most Recent 3 CBC's:Recent Labs   Lab Test 12/23/22  0436 12/22/22  0450 12/21/22  0813   WBC 7.3 7.9 8.3   HGB 12.2* 10.7* 10.8*   MCV 97 96 95    252 250           Discharge  Orders        Home Care Referral      Reason for your hospital stay    Acute Hypoxemic Respiratory Failure     Follow-up and recommended labs and tests     Follow up with primary care provider (PCP), ADDISON BENÍTEZ, within 7 days for hospital follow- up.  The following labs/tests are recommended: follow-up AFB, KOH from right thoracentesis by PCP and/or pulmonology. Follow- up with pulmonology and out patient  right thoracentesis (Pulmonology clinic will call patient). Cardiology follow-up as scheduled. Follow with Dr. Michelle as scheduled.     Activity    Your activity upon discharge: activity as tolerated     Diet    Follow this diet upon discharge: Orders Placed This Encounter      Fluid restriction 1500 ML FLUID      Combination Diet Low Saturated Fat Na <2400mg Diet, No Caffeine Diet       Examination   Physical Exam   Temp:  [97.8  F (36.6  C)-98.3  F (36.8  C)] 97.8  F (36.6  C)  Pulse:  [] 82  Resp:  [18-20] 20  BP: ()/(51-81) 111/81  SpO2:  [91 %-96 %] 94 %  Wt Readings from Last 1 Encounters:   12/23/22 73.1 kg (161 lb 1.6 oz)       GEN: Alert and oriented. Not in acute distress.  HEENT: Atraumatic, mucous membrane- moist and pink.  Chest: Bilateral air entry.  CVS: S1S2 regular. No murmurs, rubs or gallops.  Abdomen: Soft. Non-tender, non-distended. No organomegaly. No guarding or rigidity. Bowel sounds active.   Extremities: + LE edema.  CNS: No focal neurologic deficit. No involuntary movements.  Skin: No skin rash, no cyanosis or clubbing.     Please see EMR for more detailed significant labs, imaging, consultant notes etc.    Kristen CHADWICK MD, personally saw the patient today and spent greater than 30 minutes discharging this patient.    Kristen Keita MD  [facility]    CC:Addison Benítez

## 2022-12-23 NOTE — PLAN OF CARE
Goal Outcome Evaluation:      Plan of Care Reviewed With: patient    Overall Patient Progress: improvingOverall Patient Progress: improving    Outcome Evaluation: A&Ox4.  Denied pain for most of shift.  Tolerated room air for most of shift - required 1 LPM O2 NC after going to bed.  SBA with walker and gait belt - ambulates to bathroom fairly well.  Primofit applied for night time.  100% V-paced, but sometimes HR 110s.  Poor appetite    Problem: Heart Failure  Goal: Fluid and Electrolyte Balance  Outcome: Progressing     Problem: Heart Failure  Goal: Effective Oxygenation and Ventilation  Outcome: Progressing     PRIMARY DIAGNOSIS: CONGESTIVE HEART FAILURE  OUTPATIENT/OBSERVATION GOALS TO BE MET BEFORE DISCHARGE:  Dyspnea improved and O2 sats >88% at RA or at prior home O2 therapy level: Yes        SpO2: 96 %, O2 Device: Nasal cannula  Vitals:    12/19/22 1701 12/21/22 1714 12/22/22 0355   Weight: 78.3 kg (172 lb 9.6 oz) 74.6 kg (164 lb 6.4 oz) 73.5 kg (162 lb 1.6 oz)        ECHO and other diagnostic testing complete (if applicable): Yes    Return to near baseline physical activity: Yes    Discharge Planner Nurse   Safe discharge environment identified: Yes  Barriers to discharge: Yes - Needs PT clearance.       Entered by: Josephine Clay RN 12/22/2022 11:47 PM     Please review provider order for any additional goals.   Nurse to notify provider when observation goals have been met and patient is ready for discharge.

## 2022-12-23 NOTE — PLAN OF CARE
Goal Outcome Evaluation:      Plan of Care Reviewed With: patient    Overall Patient Progress: no changeOverall Patient Progress: no change  Vitals:    12/22/22 2134 12/22/22 2331 12/23/22 0313 12/23/22 0336   BP: 120/74 114/80 107/71    BP Location:  Right arm Right arm    Patient Position:   Sitting    Cuff Size:   Adult Regular    Pulse: 115 81 76    Resp:  18 18    Temp:  98.2  F (36.8  C) 97.9  F (36.6  C)    TempSrc:  Oral Oral    SpO2:  96% 93% 91%   Weight:   73.1 kg (161 lb 1.6 oz)    Height:           Denies pain.SBA with walker. Primofit was on most of the night. Now up in the chair. Iv antibiotics. Weaned to room air 91%. Plan is to discharge with family today. Lorraine Zayas RN

## 2022-12-26 NOTE — TELEPHONE ENCOUNTER
[facility] Call Center    Phone Message    May a detailed message be left on voicemail: yes     Reason for Call: Other: Pt Daughter would like a call back to discuss pt as his medication was stopped as his prostate is enlarged and he is having a really hard time urinating and she would like a call back to discuss     Action Taken: Message routed to:  [facility] ([facility]): Cardio    Travel Screening: Not Applicable                                                                       Subjective   Patient ID: Lilian is a 63 year old female.    Chief Complaint   Patient presents with   • Sinus Problem     per pt is hiaving sinus issues    • Knee Pain     and right knee pain for several weeks     64 yo here today for concern for sinus infection. Reports congestion at baseline but seems worse now and increasing drainage at night causing cough. Reports mild b/l facial pressure at maxillary sinuses. Denies sore throat. Reports some ear pain associated with congestion.     Denies fever or chills. Denies nausea or vomiting.       Patient's medications, allergies, past medical, surgical, social and family histories were reviewed and updated as appropriate.    No past medical history on file.  No family history on file.  Social History     Tobacco Use   • Smoking status: Never Smoker   • Smokeless tobacco: Never Used   Substance Use Topics   • Alcohol use: Yes   • Drug use: Not on file       Review of Systems   Constitutional: Negative for chills, fatigue and fever.   HENT: Positive for congestion, rhinorrhea and sinus pressure. Negative for ear pain, sinus pain and sore throat.    Eyes: Negative for redness.   Respiratory: Negative for cough and wheezing.    Cardiovascular: Negative for chest pain.   Gastrointestinal: Negative for abdominal pain and diarrhea.   Musculoskeletal: Positive for arthralgias. Negative for joint swelling.   Neurological: Negative for dizziness, weakness and headaches.   Psychiatric/Behavioral: Negative for agitation.       Objective   Physical Exam   Constitutional: She appears well-developed and well-nourished.   HENT:   Head: Normocephalic and atraumatic.   Right Ear: Tympanic membrane normal.   Left Ear: Tympanic membrane normal.   Eyes: EOM are normal.   Neck: Neck supple.   Cardiovascular: Normal rate and regular rhythm. Exam reveals no gallop and no friction rub.   No murmur heard.  Pulmonary/Chest: Effort normal and breath sounds normal. She has no wheezes.   Abdominal:  Soft. Bowel sounds are normal. There is no tenderness.   Musculoskeletal: Normal range of motion.   R knee without pain at joint line, no effusion, FROM of A and P motion without tenderness. Some pain with resisted quadriceps contraction.   Lymphadenopathy:     She has no cervical adenopathy.   Neurological: She is alert.   Skin: Skin is warm.   Psychiatric: She has a normal mood and affect. Her behavior is normal.     Visit Vitals  /71 (BP Location: [facility], Patient Position: Sitting, Cuff Size: Regular)   Pulse 83   Temp 97.5 °F (36.4 °C) (Oral)   Resp 18   Ht 5' 4.57\" (1.64 m)   Wt 78 kg (172 lb)   BMI 29.01 kg/m²       Current Outpatient Medications   Medication Sig Dispense Refill   • atorvastatin (LIPITOR) 10 MG tablet TAKE ONE TABLET BY MOUTH AT BEDTIME     • hydrochlorothiazide (HYDRODIURIL) 25 MG tablet TAKE 1 TABLET BY MOUTH EVERY DAY     • montelukast (SINGULAIR) 10 MG tablet TAKE 1 TABLET BY MOUTH AT BEDTIME     • olopatadine (PATANOL) 0.1 % ophthalmic solution INSTILL 1 DROP TO AFFECTED EYE(S) TWO TIMES A DAY     • lisinopril (ZESTRIL) 5 MG tablet TAKE 1 TABLET DAILY       No current facility-administered medications for this visit.        There is no problem list on file for this patient.    Assessment    (J01.00) Acute maxillary sinusitis, recurrence not specified  Comment: congestion for 3 weeks  Plan:   - No acute signs of bacterial infection at this time, course of viral infection discussed with pt  - Pt is hesitant to take antibiotics at this time, but due to prolonged course will give amoxicillin (AMOXIL) 500 MG capsule BID for 10 days to  if symptoms not improving or worsening (reviewed fevers and facial pain) in 3-5 days  - Supportive care reviewed:  - Tylenol/Aleve for pain/fever  - Humidity/hot showers, steaming  - Rest, sleep  - Push fluids, soups, tea with honey  - Can try nasal saline rinses  - RTC If worsening/no improvement     (M17.11) Osteoarthritis of right knee, unspecified  osteoarthritis type  (primary encounter diagnosis)  - likely due to osteoarthritis and weak quadriceps muscles   Plan: SERVICE TO PHYSICAL THERAPY  - currently receiving treatment from chiropractor  - NSAIDs PRN    Schedule follow up: as needed   Pt discussed with Dr. Herb Wynn,   PGY-2

## 2022-12-26 NOTE — TELEPHONE ENCOUNTER
Doxazosin stopped in the hospital. Advised to follow up with PCP about restarting medication as it was prescribed for the Pt's prostate.    Damian ABREU RN  BSN

## 2022-12-29 NOTE — TELEPHONE ENCOUNTER
[facility] Call Center    Phone Message    May a detailed message be left on voicemail: yes     Reason for Call: Other: RE: Weight Scale set up assistance.     Patients daughter is calling: Her father received a scale, the daughter thought she would receive a nurse call today to assist in setting it up.    Please call to assist.    Action Taken: Other: Cardiology    Travel Screening: Not Applicable

## 2022-12-30 NOTE — TELEPHONE ENCOUNTER
Patient is notified that [employer] staff will reach out to assist him with guided set up of his home monitoring kit later today.  Kori TOMAS RN BSN, CHFN, PCCN-K

## 2023-01-01 ENCOUNTER — APPOINTMENT (OUTPATIENT)
Dept: RADIOLOGY | Facility: HOSPITAL | Age: 83
DRG: 640 | End: 2023-01-01
Attending: HOSPITALIST
Payer: MEDICARE

## 2023-01-01 ENCOUNTER — APPOINTMENT (OUTPATIENT)
Dept: RADIOLOGY | Facility: HOSPITAL | Age: 83
DRG: 640 | End: 2023-01-01
Attending: INTERNAL MEDICINE
Payer: MEDICARE

## 2023-01-01 ENCOUNTER — TRANSITIONAL CARE UNIT VISIT (OUTPATIENT)
Dept: GERIATRICS | Facility: CLINIC | Age: 83
End: 2023-01-01
Payer: MEDICARE

## 2023-01-01 ENCOUNTER — OFFICE VISIT (OUTPATIENT)
Dept: CARDIOLOGY | Facility: CLINIC | Age: 83
End: 2023-01-01
Attending: INTERNAL MEDICINE
Payer: COMMERCIAL

## 2023-01-01 ENCOUNTER — DOCUMENTATION ONLY (OUTPATIENT)
Dept: OTHER | Facility: CLINIC | Age: 83
End: 2023-01-01
Payer: MEDICARE

## 2023-01-01 ENCOUNTER — OFFICE VISIT (OUTPATIENT)
Dept: CARDIOLOGY | Facility: CLINIC | Age: 83
End: 2023-01-01
Payer: MEDICARE

## 2023-01-01 ENCOUNTER — APPOINTMENT (OUTPATIENT)
Dept: SPEECH THERAPY | Facility: HOSPITAL | Age: 83
DRG: 640 | End: 2023-01-01
Payer: MEDICARE

## 2023-01-01 ENCOUNTER — APPOINTMENT (OUTPATIENT)
Dept: OCCUPATIONAL THERAPY | Facility: HOSPITAL | Age: 83
DRG: 640 | End: 2023-01-01
Payer: MEDICARE

## 2023-01-01 ENCOUNTER — TELEPHONE (OUTPATIENT)
Dept: CARDIOLOGY | Facility: CLINIC | Age: 83
End: 2023-01-01
Payer: MEDICARE

## 2023-01-01 ENCOUNTER — LAB REQUISITION (OUTPATIENT)
Dept: LAB | Facility: CLINIC | Age: 83
End: 2023-01-01
Payer: MEDICARE

## 2023-01-01 ENCOUNTER — ANTICOAGULATION THERAPY VISIT (OUTPATIENT)
Dept: ANTICOAGULATION | Facility: CLINIC | Age: 83
End: 2023-01-01
Payer: MEDICARE

## 2023-01-01 ENCOUNTER — APPOINTMENT (OUTPATIENT)
Dept: PHYSICAL THERAPY | Facility: HOSPITAL | Age: 83
DRG: 640 | End: 2023-01-01
Payer: MEDICARE

## 2023-01-01 ENCOUNTER — APPOINTMENT (OUTPATIENT)
Dept: RADIOLOGY | Facility: HOSPITAL | Age: 83
DRG: 640 | End: 2023-01-01
Attending: EMERGENCY MEDICINE
Payer: MEDICARE

## 2023-01-01 ENCOUNTER — DOCUMENTATION ONLY (OUTPATIENT)
Dept: ANTICOAGULATION | Facility: CLINIC | Age: 83
End: 2023-01-01
Payer: MEDICARE

## 2023-01-01 ENCOUNTER — DISCHARGE SUMMARY NURSING HOME (OUTPATIENT)
Dept: GERIATRICS | Facility: CLINIC | Age: 83
End: 2023-01-01
Payer: MEDICARE

## 2023-01-01 ENCOUNTER — TELEPHONE (OUTPATIENT)
Dept: CARDIOLOGY | Facility: CLINIC | Age: 83
End: 2023-01-01

## 2023-01-01 ENCOUNTER — APPOINTMENT (OUTPATIENT)
Dept: SPEECH THERAPY | Facility: HOSPITAL | Age: 83
DRG: 640 | End: 2023-01-01
Attending: HOSPITALIST
Payer: MEDICARE

## 2023-01-01 ENCOUNTER — HOSPITAL ENCOUNTER (INPATIENT)
Facility: HOSPITAL | Age: 83
LOS: 10 days | Discharge: NURSING, REHAB OR LONG-TERM CARE | DRG: 640 | End: 2023-02-02
Attending: EMERGENCY MEDICINE | Admitting: HOSPITALIST
Payer: MEDICARE

## 2023-01-01 ENCOUNTER — APPOINTMENT (OUTPATIENT)
Dept: PHYSICAL THERAPY | Facility: HOSPITAL | Age: 83
DRG: 640 | End: 2023-01-01
Attending: HOSPITALIST
Payer: MEDICARE

## 2023-01-01 ENCOUNTER — ANCILLARY PROCEDURE (OUTPATIENT)
Dept: CARDIOLOGY | Facility: CLINIC | Age: 83
End: 2023-01-01
Attending: INTERNAL MEDICINE
Payer: MEDICARE

## 2023-01-01 ENCOUNTER — TRANSITIONAL CARE UNIT VISIT (OUTPATIENT)
Dept: GERIATRICS | Facility: CLINIC | Age: 83
End: 2023-01-01
Payer: COMMERCIAL

## 2023-01-01 ENCOUNTER — APPOINTMENT (OUTPATIENT)
Dept: CARDIOLOGY | Facility: CLINIC | Age: 83
End: 2023-01-01
Payer: MEDICARE

## 2023-01-01 ENCOUNTER — APPOINTMENT (OUTPATIENT)
Dept: OCCUPATIONAL THERAPY | Facility: HOSPITAL | Age: 83
DRG: 640 | End: 2023-01-01
Attending: HOSPITALIST
Payer: MEDICARE

## 2023-01-01 ENCOUNTER — ALLIED HEALTH/NURSE VISIT (OUTPATIENT)
Dept: CARDIOLOGY | Facility: CLINIC | Age: 83
End: 2023-01-01
Payer: MEDICARE

## 2023-01-01 VITALS
OXYGEN SATURATION: 91 % | DIASTOLIC BLOOD PRESSURE: 65 MMHG | WEIGHT: 162.7 LBS | RESPIRATION RATE: 18 BRPM | SYSTOLIC BLOOD PRESSURE: 106 MMHG | HEART RATE: 72 BPM | BODY MASS INDEX: 23.29 KG/M2 | TEMPERATURE: 99.3 F | HEIGHT: 70 IN

## 2023-01-01 VITALS
SYSTOLIC BLOOD PRESSURE: 94 MMHG | RESPIRATION RATE: 16 BRPM | WEIGHT: 170.2 LBS | HEIGHT: 70 IN | OXYGEN SATURATION: 93 % | TEMPERATURE: 98 F | BODY MASS INDEX: 24.37 KG/M2 | DIASTOLIC BLOOD PRESSURE: 63 MMHG | HEART RATE: 69 BPM

## 2023-01-01 VITALS
BODY MASS INDEX: 22.88 KG/M2 | HEIGHT: 70 IN | RESPIRATION RATE: 16 BRPM | WEIGHT: 159.8 LBS | HEART RATE: 73 BPM | SYSTOLIC BLOOD PRESSURE: 106 MMHG | DIASTOLIC BLOOD PRESSURE: 58 MMHG

## 2023-01-01 VITALS
WEIGHT: 161 LBS | RESPIRATION RATE: 16 BRPM | HEIGHT: 70 IN | BODY MASS INDEX: 23.05 KG/M2 | SYSTOLIC BLOOD PRESSURE: 84 MMHG | HEART RATE: 104 BPM | DIASTOLIC BLOOD PRESSURE: 54 MMHG

## 2023-01-01 VITALS
RESPIRATION RATE: 18 BRPM | BODY MASS INDEX: 24.97 KG/M2 | SYSTOLIC BLOOD PRESSURE: 112 MMHG | HEIGHT: 70 IN | WEIGHT: 174.4 LBS | DIASTOLIC BLOOD PRESSURE: 82 MMHG | HEART RATE: 61 BPM | OXYGEN SATURATION: 90 % | TEMPERATURE: 97.3 F

## 2023-01-01 VITALS
HEART RATE: 73 BPM | RESPIRATION RATE: 24 BRPM | HEIGHT: 70 IN | WEIGHT: 170 LBS | SYSTOLIC BLOOD PRESSURE: 113 MMHG | TEMPERATURE: 96.2 F | OXYGEN SATURATION: 89 % | DIASTOLIC BLOOD PRESSURE: 71 MMHG | BODY MASS INDEX: 24.34 KG/M2

## 2023-01-01 VITALS
OXYGEN SATURATION: 91 % | BODY MASS INDEX: 24.28 KG/M2 | RESPIRATION RATE: 16 BRPM | WEIGHT: 169.6 LBS | HEART RATE: 73 BPM | TEMPERATURE: 97.5 F | DIASTOLIC BLOOD PRESSURE: 73 MMHG | SYSTOLIC BLOOD PRESSURE: 116 MMHG | HEIGHT: 70 IN

## 2023-01-01 VITALS
SYSTOLIC BLOOD PRESSURE: 118 MMHG | BODY MASS INDEX: 23.88 KG/M2 | DIASTOLIC BLOOD PRESSURE: 78 MMHG | HEART RATE: 73 BPM | OXYGEN SATURATION: 88 % | HEIGHT: 70 IN | TEMPERATURE: 98.1 F | WEIGHT: 166.8 LBS | RESPIRATION RATE: 16 BRPM

## 2023-01-01 VITALS
BODY MASS INDEX: 24.25 KG/M2 | RESPIRATION RATE: 14 BRPM | WEIGHT: 169 LBS | HEART RATE: 60 BPM | SYSTOLIC BLOOD PRESSURE: 104 MMHG | DIASTOLIC BLOOD PRESSURE: 60 MMHG

## 2023-01-01 VITALS
RESPIRATION RATE: 16 BRPM | BODY MASS INDEX: 22.62 KG/M2 | HEART RATE: 105 BPM | DIASTOLIC BLOOD PRESSURE: 64 MMHG | SYSTOLIC BLOOD PRESSURE: 94 MMHG | HEIGHT: 70 IN | WEIGHT: 158 LBS

## 2023-01-01 VITALS
WEIGHT: 171.2 LBS | DIASTOLIC BLOOD PRESSURE: 62 MMHG | RESPIRATION RATE: 18 BRPM | SYSTOLIC BLOOD PRESSURE: 122 MMHG | TEMPERATURE: 98.4 F | OXYGEN SATURATION: 92 % | HEART RATE: 87 BPM | BODY MASS INDEX: 24.51 KG/M2 | HEIGHT: 70 IN

## 2023-01-01 VITALS
RESPIRATION RATE: 18 BRPM | OXYGEN SATURATION: 85 % | HEART RATE: 120 BPM | SYSTOLIC BLOOD PRESSURE: 110 MMHG | DIASTOLIC BLOOD PRESSURE: 70 MMHG | HEIGHT: 70 IN | WEIGHT: 160.2 LBS | BODY MASS INDEX: 22.94 KG/M2 | TEMPERATURE: 97.8 F

## 2023-01-01 VITALS
OXYGEN SATURATION: 92 % | DIASTOLIC BLOOD PRESSURE: 69 MMHG | SYSTOLIC BLOOD PRESSURE: 113 MMHG | RESPIRATION RATE: 18 BRPM | HEART RATE: 70 BPM

## 2023-01-01 VITALS
SYSTOLIC BLOOD PRESSURE: 122 MMHG | WEIGHT: 170 LBS | OXYGEN SATURATION: 92 % | HEART RATE: 72 BPM | RESPIRATION RATE: 18 BRPM | BODY MASS INDEX: 24.34 KG/M2 | HEIGHT: 70 IN | TEMPERATURE: 97.6 F | DIASTOLIC BLOOD PRESSURE: 69 MMHG

## 2023-01-01 VITALS
OXYGEN SATURATION: 98 % | RESPIRATION RATE: 17 BRPM | HEART RATE: 82 BPM | HEIGHT: 70 IN | DIASTOLIC BLOOD PRESSURE: 74 MMHG | BODY MASS INDEX: 23.42 KG/M2 | TEMPERATURE: 98.6 F | SYSTOLIC BLOOD PRESSURE: 143 MMHG | WEIGHT: 163.6 LBS

## 2023-01-01 DIAGNOSIS — I48.19 PERSISTENT ATRIAL FIBRILLATION (H): ICD-10-CM

## 2023-01-01 DIAGNOSIS — I44.2 COMPLETE HEART BLOCK (H): ICD-10-CM

## 2023-01-01 DIAGNOSIS — N40.0 BENIGN PROSTATIC HYPERPLASIA WITHOUT LOWER URINARY TRACT SYMPTOMS: ICD-10-CM

## 2023-01-01 DIAGNOSIS — R57.0 CARDIOGENIC SHOCK (H): Primary | ICD-10-CM

## 2023-01-01 DIAGNOSIS — I45.5 SINUS ARREST: ICD-10-CM

## 2023-01-01 DIAGNOSIS — F06.4 ANXIETY DISORDER DUE TO KNOWN PHYSIOLOGICAL CONDITION: ICD-10-CM

## 2023-01-01 DIAGNOSIS — R13.10 DYSPHAGIA, UNSPECIFIED TYPE: ICD-10-CM

## 2023-01-01 DIAGNOSIS — I50.9 ACUTE ON CHRONIC CONGESTIVE HEART FAILURE, UNSPECIFIED HEART FAILURE TYPE (H): Primary | ICD-10-CM

## 2023-01-01 DIAGNOSIS — G31.84 MILD COGNITIVE IMPAIRMENT: ICD-10-CM

## 2023-01-01 DIAGNOSIS — Z98.890 STATUS POST CATHETER ABLATION OF ATRIAL FIBRILLATION: ICD-10-CM

## 2023-01-01 DIAGNOSIS — Z95.0 STATUS POST BIVENTRICULAR PACEMAKER: ICD-10-CM

## 2023-01-01 DIAGNOSIS — N18.2 STAGE 2 CHRONIC KIDNEY DISEASE: ICD-10-CM

## 2023-01-01 DIAGNOSIS — I10 ESSENTIAL (PRIMARY) HYPERTENSION: ICD-10-CM

## 2023-01-01 DIAGNOSIS — I42.8 NONISCHEMIC CARDIOMYOPATHY (H): ICD-10-CM

## 2023-01-01 DIAGNOSIS — I25.10 CORONARY ARTERY DISEASE DUE TO LIPID RICH PLAQUE: Chronic | ICD-10-CM

## 2023-01-01 DIAGNOSIS — E87.5 HYPERKALEMIA: ICD-10-CM

## 2023-01-01 DIAGNOSIS — I50.41 ACUTE COMBINED SYSTOLIC (CONGESTIVE) AND DIASTOLIC (CONGESTIVE) HEART FAILURE (H): ICD-10-CM

## 2023-01-01 DIAGNOSIS — I48.19 PERSISTENT ATRIAL FIBRILLATION (H): Primary | ICD-10-CM

## 2023-01-01 DIAGNOSIS — I10 PRIMARY HYPERTENSION: ICD-10-CM

## 2023-01-01 DIAGNOSIS — R06.02 SHORTNESS OF BREATH: ICD-10-CM

## 2023-01-01 DIAGNOSIS — I50.9 ACUTE ON CHRONIC CONGESTIVE HEART FAILURE, UNSPECIFIED HEART FAILURE TYPE (H): ICD-10-CM

## 2023-01-01 DIAGNOSIS — I25.83 CORONARY ARTERY DISEASE DUE TO LIPID RICH PLAQUE: Chronic | ICD-10-CM

## 2023-01-01 DIAGNOSIS — Z95.0 PRESENCE OF CARDIAC RESYNCHRONIZATION THERAPY PACEMAKER (CRT-P): ICD-10-CM

## 2023-01-01 DIAGNOSIS — R57.0 CARDIOGENIC SHOCK (H): ICD-10-CM

## 2023-01-01 DIAGNOSIS — R13.10 DYSPHAGIA, UNSPECIFIED: ICD-10-CM

## 2023-01-01 DIAGNOSIS — Z48.812 ENCOUNTER FOR SURGICAL AFTERCARE FOLLOWING SURGERY ON THE CIRCULATORY SYSTEM: ICD-10-CM

## 2023-01-01 DIAGNOSIS — I25.5 ISCHEMIC CARDIOMYOPATHY: ICD-10-CM

## 2023-01-01 DIAGNOSIS — R41.0 DISORIENTATION, UNSPECIFIED: ICD-10-CM

## 2023-01-01 DIAGNOSIS — I27.20 PULMONARY HYPERTENSION (H): Chronic | ICD-10-CM

## 2023-01-01 DIAGNOSIS — I50.41 ACUTE COMBINED SYSTOLIC AND DIASTOLIC CONGESTIVE HEART FAILURE (H): Primary | ICD-10-CM

## 2023-01-01 DIAGNOSIS — M17.0 OSTEOARTHRITIS OF BOTH KNEES, UNSPECIFIED OSTEOARTHRITIS TYPE: ICD-10-CM

## 2023-01-01 DIAGNOSIS — I50.22 CHRONIC SYSTOLIC HEART FAILURE (H): ICD-10-CM

## 2023-01-01 DIAGNOSIS — I50.41 ACUTE COMBINED SYSTOLIC AND DIASTOLIC CONGESTIVE HEART FAILURE (H): ICD-10-CM

## 2023-01-01 DIAGNOSIS — Z95.0 BIVENTRICULAR CARDIAC PACEMAKER IN SITU: Primary | ICD-10-CM

## 2023-01-01 DIAGNOSIS — I50.20 HEART FAILURE WITH REDUCED EJECTION FRACTION (H): Primary | ICD-10-CM

## 2023-01-01 DIAGNOSIS — I50.9 CHF (CONGESTIVE HEART FAILURE) (H): ICD-10-CM

## 2023-01-01 DIAGNOSIS — I50.22 CHRONIC SYSTOLIC HEART FAILURE (H): Primary | ICD-10-CM

## 2023-01-01 DIAGNOSIS — I50.9 ACUTE DECOMPENSATED HEART FAILURE (H): Primary | ICD-10-CM

## 2023-01-01 LAB
ABO/RH(D): NORMAL
ACID FAST STAIN (ARUP): NORMAL
ALBUMIN SERPL BCG-MCNC: 3 G/DL (ref 3.5–5.2)
ALBUMIN UR-MCNC: NEGATIVE MG/DL
ALBUMIN UR-MCNC: NEGATIVE MG/DL
ANION GAP SERPL CALCULATED.3IONS-SCNC: 10 MMOL/L (ref 7–15)
ANION GAP SERPL CALCULATED.3IONS-SCNC: 11 MMOL/L (ref 7–15)
ANION GAP SERPL CALCULATED.3IONS-SCNC: 12 MMOL/L (ref 7–15)
ANION GAP SERPL CALCULATED.3IONS-SCNC: 12 MMOL/L (ref 7–15)
ANION GAP SERPL CALCULATED.3IONS-SCNC: 13 MMOL/L (ref 7–15)
ANION GAP SERPL CALCULATED.3IONS-SCNC: 15 MMOL/L (ref 7–15)
ANION GAP SERPL CALCULATED.3IONS-SCNC: 4 MMOL/L (ref 7–15)
ANION GAP SERPL CALCULATED.3IONS-SCNC: 6 MMOL/L (ref 7–15)
ANION GAP SERPL CALCULATED.3IONS-SCNC: 7 MMOL/L (ref 7–15)
ANION GAP SERPL CALCULATED.3IONS-SCNC: 8 MMOL/L (ref 7–15)
ANION GAP SERPL CALCULATED.3IONS-SCNC: 9 MMOL/L (ref 7–15)
ANION GAP SERPL CALCULATED.3IONS-SCNC: 9 MMOL/L (ref 7–15)
ANTIBODY SCREEN: NEGATIVE
APPEARANCE UR: CLEAR
APPEARANCE UR: CLEAR
ATRIAL RATE - MUSE: 68 BPM
BACTERIA #/AREA URNS HPF: ABNORMAL /HPF
BACTERIA #/AREA URNS HPF: ABNORMAL /HPF
BACTERIA PLR CULT: NO GROWTH
BACTERIA UR CULT: NO GROWTH
BACTERIA UR CULT: NORMAL
BASE EXCESS BLDV CALC-SCNC: 5.2 MMOL/L
BASE EXCESS BLDV CALC-SCNC: 5.8 MMOL/L
BASOPHILS # BLD AUTO: 0 10E3/UL (ref 0–0.2)
BASOPHILS NFR BLD AUTO: 0 %
BILIRUB UR QL STRIP: NEGATIVE
BILIRUB UR QL STRIP: NEGATIVE
BUN SERPL-MCNC: 10.7 MG/DL (ref 8–23)
BUN SERPL-MCNC: 14.9 MG/DL (ref 8–23)
BUN SERPL-MCNC: 17.5 MG/DL (ref 8–23)
BUN SERPL-MCNC: 18.2 MG/DL (ref 8–23)
BUN SERPL-MCNC: 18.6 MG/DL (ref 8–23)
BUN SERPL-MCNC: 18.6 MG/DL (ref 8–23)
BUN SERPL-MCNC: 19 MG/DL (ref 8–23)
BUN SERPL-MCNC: 20.2 MG/DL (ref 8–23)
BUN SERPL-MCNC: 21.1 MG/DL (ref 8–23)
BUN SERPL-MCNC: 21.4 MG/DL (ref 8–23)
BUN SERPL-MCNC: 27.4 MG/DL (ref 8–23)
BUN SERPL-MCNC: 29.3 MG/DL (ref 8–23)
BUN SERPL-MCNC: 37.5 MG/DL (ref 8–23)
BUN SERPL-MCNC: 59.5 MG/DL (ref 8–23)
BUN SERPL-MCNC: 84.5 MG/DL (ref 8–23)
BUN SERPL-MCNC: 93.3 MG/DL (ref 8–23)
BUN SERPL-MCNC: 95.9 MG/DL (ref 8–23)
BUN SERPL-MCNC: 96.9 MG/DL (ref 8–23)
CALCIUM SERPL-MCNC: 8.7 MG/DL (ref 8.8–10.2)
CALCIUM SERPL-MCNC: 8.8 MG/DL (ref 8.8–10.2)
CALCIUM SERPL-MCNC: 8.8 MG/DL (ref 8.8–10.2)
CALCIUM SERPL-MCNC: 8.9 MG/DL (ref 8.8–10.2)
CALCIUM SERPL-MCNC: 9 MG/DL (ref 8.8–10.2)
CALCIUM SERPL-MCNC: 9.1 MG/DL (ref 8.8–10.2)
CALCIUM SERPL-MCNC: 9.3 MG/DL (ref 8.8–10.2)
CALCIUM SERPL-MCNC: 9.3 MG/DL (ref 8.8–10.2)
CALCIUM SERPL-MCNC: 9.4 MG/DL (ref 8.8–10.2)
CALCIUM SERPL-MCNC: 9.5 MG/DL (ref 8.8–10.2)
CALCIUM SERPL-MCNC: 9.6 MG/DL (ref 8.8–10.2)
CHLORIDE SERPL-SCNC: 86 MMOL/L (ref 98–107)
CHLORIDE SERPL-SCNC: 89 MMOL/L (ref 98–107)
CHLORIDE SERPL-SCNC: 89 MMOL/L (ref 98–107)
CHLORIDE SERPL-SCNC: 90 MMOL/L (ref 98–107)
CHLORIDE SERPL-SCNC: 91 MMOL/L (ref 98–107)
CHLORIDE SERPL-SCNC: 92 MMOL/L (ref 98–107)
CHLORIDE SERPL-SCNC: 92 MMOL/L (ref 98–107)
CHLORIDE SERPL-SCNC: 93 MMOL/L (ref 98–107)
CHLORIDE SERPL-SCNC: 94 MMOL/L (ref 98–107)
CHLORIDE SERPL-SCNC: 94 MMOL/L (ref 98–107)
CHLORIDE SERPL-SCNC: 96 MMOL/L (ref 98–107)
CHLORIDE SERPL-SCNC: 98 MMOL/L (ref 98–107)
CHLORIDE SERPL-SCNC: 99 MMOL/L (ref 98–107)
CHLORIDE UR-SCNC: 32 MMOL/L
COLOR UR AUTO: YELLOW
COLOR UR AUTO: YELLOW
CREAT SERPL-MCNC: 0.72 MG/DL (ref 0.67–1.17)
CREAT SERPL-MCNC: 0.74 MG/DL (ref 0.67–1.17)
CREAT SERPL-MCNC: 0.77 MG/DL (ref 0.67–1.17)
CREAT SERPL-MCNC: 0.8 MG/DL (ref 0.67–1.17)
CREAT SERPL-MCNC: 0.8 MG/DL (ref 0.67–1.17)
CREAT SERPL-MCNC: 0.81 MG/DL (ref 0.67–1.17)
CREAT SERPL-MCNC: 0.83 MG/DL (ref 0.67–1.17)
CREAT SERPL-MCNC: 0.86 MG/DL (ref 0.67–1.17)
CREAT SERPL-MCNC: 0.86 MG/DL (ref 0.67–1.17)
CREAT SERPL-MCNC: 0.88 MG/DL (ref 0.67–1.17)
CREAT SERPL-MCNC: 0.89 MG/DL (ref 0.67–1.17)
CREAT SERPL-MCNC: 0.92 MG/DL (ref 0.67–1.17)
CREAT SERPL-MCNC: 0.93 MG/DL (ref 0.67–1.17)
CREAT SERPL-MCNC: 1 MG/DL (ref 0.67–1.17)
CREAT SERPL-MCNC: 1.62 MG/DL (ref 0.67–1.17)
CREAT SERPL-MCNC: 1.85 MG/DL (ref 0.67–1.17)
CREAT SERPL-MCNC: 1.98 MG/DL (ref 0.67–1.17)
CREAT SERPL-MCNC: 2.09 MG/DL (ref 0.67–1.17)
DEPRECATED HCO3 PLAS-SCNC: 22 MMOL/L (ref 22–29)
DEPRECATED HCO3 PLAS-SCNC: 25 MMOL/L (ref 22–29)
DEPRECATED HCO3 PLAS-SCNC: 25 MMOL/L (ref 22–29)
DEPRECATED HCO3 PLAS-SCNC: 26 MMOL/L (ref 22–29)
DEPRECATED HCO3 PLAS-SCNC: 30 MMOL/L (ref 22–29)
DEPRECATED HCO3 PLAS-SCNC: 31 MMOL/L (ref 22–29)
DEPRECATED HCO3 PLAS-SCNC: 32 MMOL/L (ref 22–29)
DEPRECATED HCO3 PLAS-SCNC: 34 MMOL/L (ref 22–29)
DEPRECATED HCO3 PLAS-SCNC: 34 MMOL/L (ref 22–29)
DEPRECATED HCO3 PLAS-SCNC: 35 MMOL/L (ref 22–29)
DEPRECATED HCO3 PLAS-SCNC: 36 MMOL/L (ref 22–29)
DEPRECATED HCO3 PLAS-SCNC: 37 MMOL/L (ref 22–29)
DEPRECATED HCO3 PLAS-SCNC: 38 MMOL/L (ref 22–29)
DEPRECATED HCO3 PLAS-SCNC: 38 MMOL/L (ref 22–29)
DEPRECATED HCO3 PLAS-SCNC: 39 MMOL/L (ref 22–29)
DEPRECATED HCO3 PLAS-SCNC: 40 MMOL/L (ref 22–29)
DIASTOLIC BLOOD PRESSURE - MUSE: NORMAL MMHG
EOSINOPHIL # BLD AUTO: 0 10E3/UL (ref 0–0.7)
EOSINOPHIL NFR BLD AUTO: 0 %
ERYTHROCYTE [DISTWIDTH] IN BLOOD BY AUTOMATED COUNT: 15.6 % (ref 10–15)
ERYTHROCYTE [DISTWIDTH] IN BLOOD BY AUTOMATED COUNT: 15.8 % (ref 10–15)
ERYTHROCYTE [DISTWIDTH] IN BLOOD BY AUTOMATED COUNT: 15.9 % (ref 10–15)
ERYTHROCYTE [DISTWIDTH] IN BLOOD BY AUTOMATED COUNT: 16 % (ref 10–15)
ERYTHROCYTE [DISTWIDTH] IN BLOOD BY AUTOMATED COUNT: 16.1 % (ref 10–15)
FLUAV RNA SPEC QL NAA+PROBE: NEGATIVE
FLUBV RNA RESP QL NAA+PROBE: NEGATIVE
GFR SERPL CREATININE-BSD FRML MDRD: 31 ML/MIN/1.73M2
GFR SERPL CREATININE-BSD FRML MDRD: 33 ML/MIN/1.73M2
GFR SERPL CREATININE-BSD FRML MDRD: 35 ML/MIN/1.73M2
GFR SERPL CREATININE-BSD FRML MDRD: 42 ML/MIN/1.73M2
GFR SERPL CREATININE-BSD FRML MDRD: 74 ML/MIN/1.73M2
GFR SERPL CREATININE-BSD FRML MDRD: 81 ML/MIN/1.73M2
GFR SERPL CREATININE-BSD FRML MDRD: 82 ML/MIN/1.73M2
GFR SERPL CREATININE-BSD FRML MDRD: 85 ML/MIN/1.73M2
GFR SERPL CREATININE-BSD FRML MDRD: 86 ML/MIN/1.73M2
GFR SERPL CREATININE-BSD FRML MDRD: 87 ML/MIN/1.73M2
GFR SERPL CREATININE-BSD FRML MDRD: 88 ML/MIN/1.73M2
GFR SERPL CREATININE-BSD FRML MDRD: 89 ML/MIN/1.73M2
GFR SERPL CREATININE-BSD FRML MDRD: 90 ML/MIN/1.73M2
GLUCOSE BLDC GLUCOMTR-MCNC: 107 MG/DL (ref 70–99)
GLUCOSE BLDC GLUCOMTR-MCNC: 113 MG/DL (ref 70–99)
GLUCOSE BLDC GLUCOMTR-MCNC: 123 MG/DL (ref 70–99)
GLUCOSE BLDC GLUCOMTR-MCNC: 196 MG/DL (ref 70–99)
GLUCOSE BLDC GLUCOMTR-MCNC: 248 MG/DL (ref 70–99)
GLUCOSE BLDC GLUCOMTR-MCNC: 82 MG/DL (ref 70–99)
GLUCOSE BLDC GLUCOMTR-MCNC: 82 MG/DL (ref 70–99)
GLUCOSE BLDC GLUCOMTR-MCNC: 89 MG/DL (ref 70–99)
GLUCOSE BLDC GLUCOMTR-MCNC: 94 MG/DL (ref 70–99)
GLUCOSE BLDC GLUCOMTR-MCNC: 95 MG/DL (ref 70–99)
GLUCOSE BLDC GLUCOMTR-MCNC: 95 MG/DL (ref 70–99)
GLUCOSE SERPL-MCNC: 100 MG/DL (ref 70–99)
GLUCOSE SERPL-MCNC: 105 MG/DL (ref 70–99)
GLUCOSE SERPL-MCNC: 107 MG/DL (ref 70–99)
GLUCOSE SERPL-MCNC: 109 MG/DL (ref 70–99)
GLUCOSE SERPL-MCNC: 112 MG/DL (ref 70–99)
GLUCOSE SERPL-MCNC: 113 MG/DL (ref 70–99)
GLUCOSE SERPL-MCNC: 120 MG/DL (ref 70–99)
GLUCOSE SERPL-MCNC: 123 MG/DL (ref 70–99)
GLUCOSE SERPL-MCNC: 127 MG/DL (ref 70–99)
GLUCOSE SERPL-MCNC: 131 MG/DL (ref 70–99)
GLUCOSE SERPL-MCNC: 134 MG/DL (ref 70–99)
GLUCOSE SERPL-MCNC: 135 MG/DL (ref 70–99)
GLUCOSE SERPL-MCNC: 138 MG/DL (ref 70–99)
GLUCOSE SERPL-MCNC: 140 MG/DL (ref 70–99)
GLUCOSE SERPL-MCNC: 142 MG/DL (ref 70–99)
GLUCOSE SERPL-MCNC: 77 MG/DL (ref 70–99)
GLUCOSE SERPL-MCNC: 82 MG/DL (ref 70–99)
GLUCOSE SERPL-MCNC: 86 MG/DL (ref 70–99)
GLUCOSE UR STRIP-MCNC: NEGATIVE MG/DL
GLUCOSE UR STRIP-MCNC: NEGATIVE MG/DL
HCO3 BLDV-SCNC: 27 MMOL/L (ref 24–30)
HCO3 BLDV-SCNC: 28 MMOL/L (ref 24–30)
HCT VFR BLD AUTO: 37.3 % (ref 40–53)
HCT VFR BLD AUTO: 38.9 % (ref 40–53)
HCT VFR BLD AUTO: 40.5 % (ref 40–53)
HCT VFR BLD AUTO: 41.3 % (ref 40–53)
HCT VFR BLD AUTO: 43.9 % (ref 40–53)
HGB BLD-MCNC: 11.5 G/DL (ref 13.3–17.7)
HGB BLD-MCNC: 12 G/DL (ref 13.3–17.7)
HGB BLD-MCNC: 12.3 G/DL (ref 13.3–17.7)
HGB BLD-MCNC: 12.6 G/DL (ref 13.3–17.7)
HGB BLD-MCNC: 13.8 G/DL (ref 13.3–17.7)
HGB UR QL STRIP: NEGATIVE
HGB UR QL STRIP: NEGATIVE
HOLD SPECIMEN: NORMAL
HYALINE CASTS: 28 /LPF
HYALINE CASTS: 53 /LPF
IMM GRANULOCYTES # BLD: 0.1 10E3/UL
IMM GRANULOCYTES NFR BLD: 1 %
INTERPRETATION ECG - MUSE: NORMAL
KETONES UR STRIP-MCNC: NEGATIVE MG/DL
KETONES UR STRIP-MCNC: NEGATIVE MG/DL
LACTATE SERPL-SCNC: 1.2 MMOL/L (ref 0.7–2)
LACTATE SERPL-SCNC: 2 MMOL/L (ref 0.7–2)
LACTATE SERPL-SCNC: 2.7 MMOL/L (ref 0.7–2)
LEUKOCYTE ESTERASE UR QL STRIP: ABNORMAL
LEUKOCYTE ESTERASE UR QL STRIP: ABNORMAL
LIPASE SERPL-CCNC: 55 U/L (ref 13–60)
LYMPHOCYTES # BLD AUTO: 0.3 10E3/UL (ref 0.8–5.3)
LYMPHOCYTES # BLD AUTO: 0.5 10E3/UL (ref 0.8–5.3)
LYMPHOCYTES # BLD AUTO: 0.6 10E3/UL (ref 0.8–5.3)
LYMPHOCYTES NFR BLD AUTO: 4 %
LYMPHOCYTES NFR BLD AUTO: 5 %
LYMPHOCYTES NFR BLD AUTO: 6 %
MAGNESIUM SERPL-MCNC: 1.9 MG/DL (ref 1.7–2.3)
MAGNESIUM SERPL-MCNC: 2 MG/DL (ref 1.7–2.3)
MAGNESIUM SERPL-MCNC: 2 MG/DL (ref 1.7–2.3)
MAGNESIUM SERPL-MCNC: 2.1 MG/DL (ref 1.7–2.3)
MAGNESIUM SERPL-MCNC: 2.1 MG/DL (ref 1.7–2.3)
MAGNESIUM SERPL-MCNC: 2.2 MG/DL (ref 1.7–2.3)
MAGNESIUM SERPL-MCNC: 2.4 MG/DL (ref 1.7–2.3)
MAGNESIUM SERPL-MCNC: 2.4 MG/DL (ref 1.7–2.3)
MCH RBC QN AUTO: 28.8 PG (ref 26.5–33)
MCH RBC QN AUTO: 29.6 PG (ref 26.5–33)
MCH RBC QN AUTO: 29.9 PG (ref 26.5–33)
MCH RBC QN AUTO: 29.9 PG (ref 26.5–33)
MCH RBC QN AUTO: 30.4 PG (ref 26.5–33)
MCHC RBC AUTO-ENTMCNC: 29.1 G/DL (ref 31.5–36.5)
MCHC RBC AUTO-ENTMCNC: 30.4 G/DL (ref 31.5–36.5)
MCHC RBC AUTO-ENTMCNC: 30.8 G/DL (ref 31.5–36.5)
MCHC RBC AUTO-ENTMCNC: 31.4 G/DL (ref 31.5–36.5)
MCHC RBC AUTO-ENTMCNC: 32.4 G/DL (ref 31.5–36.5)
MCV RBC AUTO: 94 FL (ref 78–100)
MCV RBC AUTO: 95 FL (ref 78–100)
MCV RBC AUTO: 97 FL (ref 78–100)
MCV RBC AUTO: 98 FL (ref 78–100)
MCV RBC AUTO: 99 FL (ref 78–100)
MDC_IDC_EPISODE_DTM: NORMAL
MDC_IDC_EPISODE_DURATION: 1 S
MDC_IDC_EPISODE_DURATION: 100 S
MDC_IDC_EPISODE_DURATION: 104 S
MDC_IDC_EPISODE_DURATION: 109 S
MDC_IDC_EPISODE_DURATION: 11 S
MDC_IDC_EPISODE_DURATION: 118 S
MDC_IDC_EPISODE_DURATION: 12 S
MDC_IDC_EPISODE_DURATION: 120 S
MDC_IDC_EPISODE_DURATION: 125 S
MDC_IDC_EPISODE_DURATION: 126 S
MDC_IDC_EPISODE_DURATION: 13 S
MDC_IDC_EPISODE_DURATION: 13 S
MDC_IDC_EPISODE_DURATION: 14 S
MDC_IDC_EPISODE_DURATION: 15 S
MDC_IDC_EPISODE_DURATION: 15 S
MDC_IDC_EPISODE_DURATION: 151 S
MDC_IDC_EPISODE_DURATION: 16 S
MDC_IDC_EPISODE_DURATION: 16 S
MDC_IDC_EPISODE_DURATION: 168 S
MDC_IDC_EPISODE_DURATION: 17 S
MDC_IDC_EPISODE_DURATION: 17 S
MDC_IDC_EPISODE_DURATION: 173 S
MDC_IDC_EPISODE_DURATION: 180 S
MDC_IDC_EPISODE_DURATION: 181 S
MDC_IDC_EPISODE_DURATION: 182 S
MDC_IDC_EPISODE_DURATION: 183 S
MDC_IDC_EPISODE_DURATION: 183 S
MDC_IDC_EPISODE_DURATION: 184 S
MDC_IDC_EPISODE_DURATION: 184 S
MDC_IDC_EPISODE_DURATION: 185 S
MDC_IDC_EPISODE_DURATION: 186 S
MDC_IDC_EPISODE_DURATION: 188 S
MDC_IDC_EPISODE_DURATION: 19 S
MDC_IDC_EPISODE_DURATION: 190 S
MDC_IDC_EPISODE_DURATION: 191 S
MDC_IDC_EPISODE_DURATION: 196 S
MDC_IDC_EPISODE_DURATION: 207 S
MDC_IDC_EPISODE_DURATION: 21 S
MDC_IDC_EPISODE_DURATION: 22 S
MDC_IDC_EPISODE_DURATION: 220 S
MDC_IDC_EPISODE_DURATION: 24 S
MDC_IDC_EPISODE_DURATION: 25 S
MDC_IDC_EPISODE_DURATION: 25 S
MDC_IDC_EPISODE_DURATION: 254 S
MDC_IDC_EPISODE_DURATION: 26 S
MDC_IDC_EPISODE_DURATION: 27 S
MDC_IDC_EPISODE_DURATION: 278 S
MDC_IDC_EPISODE_DURATION: 28 S
MDC_IDC_EPISODE_DURATION: 29 S
MDC_IDC_EPISODE_DURATION: 3 S
MDC_IDC_EPISODE_DURATION: 3 S
MDC_IDC_EPISODE_DURATION: 30 S
MDC_IDC_EPISODE_DURATION: 30 S
MDC_IDC_EPISODE_DURATION: 31 S
MDC_IDC_EPISODE_DURATION: 32 S
MDC_IDC_EPISODE_DURATION: 32 S
MDC_IDC_EPISODE_DURATION: 33 S
MDC_IDC_EPISODE_DURATION: 34 S
MDC_IDC_EPISODE_DURATION: 34 S
MDC_IDC_EPISODE_DURATION: 35 S
MDC_IDC_EPISODE_DURATION: 35 S
MDC_IDC_EPISODE_DURATION: 36 S
MDC_IDC_EPISODE_DURATION: 37 S
MDC_IDC_EPISODE_DURATION: 37 S
MDC_IDC_EPISODE_DURATION: 41 S
MDC_IDC_EPISODE_DURATION: 42 S
MDC_IDC_EPISODE_DURATION: 42 S
MDC_IDC_EPISODE_DURATION: 43 S
MDC_IDC_EPISODE_DURATION: 49 S
MDC_IDC_EPISODE_DURATION: 5 S
MDC_IDC_EPISODE_DURATION: 5 S
MDC_IDC_EPISODE_DURATION: 56 S
MDC_IDC_EPISODE_DURATION: 58 S
MDC_IDC_EPISODE_DURATION: 59 S
MDC_IDC_EPISODE_DURATION: 6 S
MDC_IDC_EPISODE_DURATION: 61 S
MDC_IDC_EPISODE_DURATION: 63 S
MDC_IDC_EPISODE_DURATION: 7 S
MDC_IDC_EPISODE_DURATION: 71 S
MDC_IDC_EPISODE_DURATION: 73 S
MDC_IDC_EPISODE_DURATION: 81 S
MDC_IDC_EPISODE_DURATION: 84 S
MDC_IDC_EPISODE_DURATION: 87 S
MDC_IDC_EPISODE_DURATION: 9 S
MDC_IDC_EPISODE_DURATION: 92 S
MDC_IDC_EPISODE_ID: 1
MDC_IDC_EPISODE_ID: 188
MDC_IDC_EPISODE_ID: 189
MDC_IDC_EPISODE_ID: 190
MDC_IDC_EPISODE_ID: 191
MDC_IDC_EPISODE_ID: 192
MDC_IDC_EPISODE_ID: 2
MDC_IDC_EPISODE_ID: 5066
MDC_IDC_EPISODE_ID: 5137
MDC_IDC_EPISODE_ID: 5138
MDC_IDC_EPISODE_ID: 5139
MDC_IDC_EPISODE_ID: 5140
MDC_IDC_EPISODE_ID: 5141
MDC_IDC_EPISODE_ID: 5142
MDC_IDC_EPISODE_ID: 5143
MDC_IDC_EPISODE_ID: 5144
MDC_IDC_EPISODE_ID: 5145
MDC_IDC_EPISODE_ID: 5146
MDC_IDC_EPISODE_ID: 5147
MDC_IDC_EPISODE_ID: 5148
MDC_IDC_EPISODE_ID: 5149
MDC_IDC_EPISODE_ID: 5150
MDC_IDC_EPISODE_ID: 5151
MDC_IDC_EPISODE_ID: 5152
MDC_IDC_EPISODE_ID: 5153
MDC_IDC_EPISODE_ID: 5154
MDC_IDC_EPISODE_ID: 5155
MDC_IDC_EPISODE_ID: 5156
MDC_IDC_EPISODE_ID: 5157
MDC_IDC_EPISODE_ID: 5158
MDC_IDC_EPISODE_ID: 5159
MDC_IDC_EPISODE_ID: 5160
MDC_IDC_EPISODE_ID: 5161
MDC_IDC_EPISODE_ID: 5162
MDC_IDC_EPISODE_ID: 5163
MDC_IDC_EPISODE_ID: 5164
MDC_IDC_EPISODE_ID: 5165
MDC_IDC_EPISODE_ID: 5166
MDC_IDC_EPISODE_ID: 5167
MDC_IDC_EPISODE_ID: 5168
MDC_IDC_EPISODE_ID: 5169
MDC_IDC_EPISODE_ID: 5170
MDC_IDC_EPISODE_ID: 5171
MDC_IDC_EPISODE_ID: 5172
MDC_IDC_EPISODE_ID: 5173
MDC_IDC_EPISODE_ID: 5174
MDC_IDC_EPISODE_ID: 5175
MDC_IDC_EPISODE_ID: 5176
MDC_IDC_EPISODE_ID: 5177
MDC_IDC_EPISODE_ID: 5178
MDC_IDC_EPISODE_ID: 5179
MDC_IDC_EPISODE_ID: 5180
MDC_IDC_EPISODE_ID: 5181
MDC_IDC_EPISODE_ID: 5182
MDC_IDC_EPISODE_ID: 5183
MDC_IDC_EPISODE_ID: 5184
MDC_IDC_EPISODE_ID: 5185
MDC_IDC_EPISODE_ID: 5186
MDC_IDC_EPISODE_ID: 5235
MDC_IDC_EPISODE_ID: 5236
MDC_IDC_EPISODE_ID: 5237
MDC_IDC_EPISODE_ID: 5238
MDC_IDC_EPISODE_ID: 5239
MDC_IDC_EPISODE_ID: 5240
MDC_IDC_EPISODE_ID: 5241
MDC_IDC_EPISODE_ID: 5242
MDC_IDC_EPISODE_ID: 5243
MDC_IDC_EPISODE_ID: 5244
MDC_IDC_EPISODE_ID: 5245
MDC_IDC_EPISODE_ID: 5246
MDC_IDC_EPISODE_ID: 5247
MDC_IDC_EPISODE_ID: 5248
MDC_IDC_EPISODE_ID: 5249
MDC_IDC_EPISODE_ID: 5250
MDC_IDC_EPISODE_ID: 5251
MDC_IDC_EPISODE_ID: 5252
MDC_IDC_EPISODE_ID: 5253
MDC_IDC_EPISODE_ID: 5254
MDC_IDC_EPISODE_ID: 5255
MDC_IDC_EPISODE_ID: 5256
MDC_IDC_EPISODE_ID: 5257
MDC_IDC_EPISODE_ID: 5258
MDC_IDC_EPISODE_ID: 5259
MDC_IDC_EPISODE_ID: 5260
MDC_IDC_EPISODE_ID: 5261
MDC_IDC_EPISODE_ID: 5262
MDC_IDC_EPISODE_ID: 5263
MDC_IDC_EPISODE_ID: 5264
MDC_IDC_EPISODE_ID: 5265
MDC_IDC_EPISODE_ID: 5266
MDC_IDC_EPISODE_ID: 5267
MDC_IDC_EPISODE_ID: 5268
MDC_IDC_EPISODE_ID: 5269
MDC_IDC_EPISODE_ID: 5270
MDC_IDC_EPISODE_ID: 5271
MDC_IDC_EPISODE_ID: 5272
MDC_IDC_EPISODE_ID: 5273
MDC_IDC_EPISODE_ID: 5274
MDC_IDC_EPISODE_ID: 5275
MDC_IDC_EPISODE_ID: 5276
MDC_IDC_EPISODE_ID: 5277
MDC_IDC_EPISODE_ID: 5278
MDC_IDC_EPISODE_ID: 5279
MDC_IDC_EPISODE_ID: 5280
MDC_IDC_EPISODE_ID: 5281
MDC_IDC_EPISODE_ID: 5282
MDC_IDC_EPISODE_ID: 5283
MDC_IDC_EPISODE_ID: 5284
MDC_IDC_EPISODE_TYPE: NORMAL
MDC_IDC_LEAD_IMPLANT_DT: NORMAL
MDC_IDC_LEAD_LOCATION: NORMAL
MDC_IDC_LEAD_LOCATION_DETAIL_1: NORMAL
MDC_IDC_LEAD_MFG: NORMAL
MDC_IDC_LEAD_MODEL: NORMAL
MDC_IDC_LEAD_POLARITY_TYPE: NORMAL
MDC_IDC_LEAD_SERIAL: NORMAL
MDC_IDC_LEAD_SPECIAL_FUNCTION: NORMAL
MDC_IDC_MSMT_BATTERY_DTM: NORMAL
MDC_IDC_MSMT_BATTERY_DTM: NORMAL
MDC_IDC_MSMT_BATTERY_REMAINING_LONGEVITY: 55 MO
MDC_IDC_MSMT_BATTERY_REMAINING_LONGEVITY: 56 MO
MDC_IDC_MSMT_BATTERY_RRT_TRIGGER: 2.6
MDC_IDC_MSMT_BATTERY_RRT_TRIGGER: 2.6
MDC_IDC_MSMT_BATTERY_STATUS: NORMAL
MDC_IDC_MSMT_BATTERY_STATUS: NORMAL
MDC_IDC_MSMT_BATTERY_VOLTAGE: 2.97 V
MDC_IDC_MSMT_BATTERY_VOLTAGE: 2.98 V
MDC_IDC_MSMT_LEADCHNL_LV_IMPEDANCE_VALUE: 209 OHM
MDC_IDC_MSMT_LEADCHNL_LV_IMPEDANCE_VALUE: 209 OHM
MDC_IDC_MSMT_LEADCHNL_LV_IMPEDANCE_VALUE: 266 OHM
MDC_IDC_MSMT_LEADCHNL_LV_IMPEDANCE_VALUE: 266 OHM
MDC_IDC_MSMT_LEADCHNL_LV_IMPEDANCE_VALUE: 285 OHM
MDC_IDC_MSMT_LEADCHNL_LV_IMPEDANCE_VALUE: 304 OHM
MDC_IDC_MSMT_LEADCHNL_LV_IMPEDANCE_VALUE: 361 OHM
MDC_IDC_MSMT_LEADCHNL_LV_IMPEDANCE_VALUE: 361 OHM
MDC_IDC_MSMT_LEADCHNL_LV_IMPEDANCE_VALUE: 399 OHM
MDC_IDC_MSMT_LEADCHNL_LV_PACING_THRESHOLD_AMPLITUDE: 1.62 V
MDC_IDC_MSMT_LEADCHNL_LV_PACING_THRESHOLD_AMPLITUDE: 1.75 V
MDC_IDC_MSMT_LEADCHNL_LV_PACING_THRESHOLD_AMPLITUDE: 1.75 V
MDC_IDC_MSMT_LEADCHNL_LV_PACING_THRESHOLD_PULSEWIDTH: 0.4 MS
MDC_IDC_MSMT_LEADCHNL_RA_IMPEDANCE_VALUE: 266 OHM
MDC_IDC_MSMT_LEADCHNL_RA_IMPEDANCE_VALUE: 266 OHM
MDC_IDC_MSMT_LEADCHNL_RA_IMPEDANCE_VALUE: 418 OHM
MDC_IDC_MSMT_LEADCHNL_RA_IMPEDANCE_VALUE: 437 OHM
MDC_IDC_MSMT_LEADCHNL_RA_IMPEDANCE_VALUE: 437 OHM
MDC_IDC_MSMT_LEADCHNL_RA_PACING_THRESHOLD_AMPLITUDE: 0.75 V
MDC_IDC_MSMT_LEADCHNL_RA_PACING_THRESHOLD_AMPLITUDE: 0.75 V
MDC_IDC_MSMT_LEADCHNL_RA_PACING_THRESHOLD_AMPLITUDE: 1 V
MDC_IDC_MSMT_LEADCHNL_RA_PACING_THRESHOLD_PULSEWIDTH: 0.4 MS
MDC_IDC_MSMT_LEADCHNL_RA_SENSING_INTR_AMPL: 0.3 MV
MDC_IDC_MSMT_LEADCHNL_RA_SENSING_INTR_AMPL: 0.38 MV
MDC_IDC_MSMT_LEADCHNL_RA_SENSING_INTR_AMPL: 0.6 MV
MDC_IDC_MSMT_LEADCHNL_RA_SENSING_INTR_AMPL: 0.62 MV
MDC_IDC_MSMT_LEADCHNL_RA_SENSING_INTR_AMPL: 0.75 MV
MDC_IDC_MSMT_LEADCHNL_RA_SENSING_INTR_AMPL: 0.75 MV
MDC_IDC_MSMT_LEADCHNL_RV_IMPEDANCE_VALUE: 247 OHM
MDC_IDC_MSMT_LEADCHNL_RV_IMPEDANCE_VALUE: 247 OHM
MDC_IDC_MSMT_LEADCHNL_RV_IMPEDANCE_VALUE: 304 OHM
MDC_IDC_MSMT_LEADCHNL_RV_IMPEDANCE_VALUE: 323 OHM
MDC_IDC_MSMT_LEADCHNL_RV_IMPEDANCE_VALUE: 323 OHM
MDC_IDC_MSMT_LEADCHNL_RV_PACING_THRESHOLD_AMPLITUDE: 1 V
MDC_IDC_MSMT_LEADCHNL_RV_PACING_THRESHOLD_AMPLITUDE: 1.38 V
MDC_IDC_MSMT_LEADCHNL_RV_PACING_THRESHOLD_AMPLITUDE: 1.38 V
MDC_IDC_MSMT_LEADCHNL_RV_PACING_THRESHOLD_PULSEWIDTH: 0.4 MS
MDC_IDC_MSMT_LEADCHNL_RV_SENSING_INTR_AMPL: 6.88 MV
MDC_IDC_PG_IMPLANT_DTM: NORMAL
MDC_IDC_PG_IMPLANT_DTM: NORMAL
MDC_IDC_PG_MFG: NORMAL
MDC_IDC_PG_MFG: NORMAL
MDC_IDC_PG_MODEL: NORMAL
MDC_IDC_PG_MODEL: NORMAL
MDC_IDC_PG_SERIAL: NORMAL
MDC_IDC_PG_SERIAL: NORMAL
MDC_IDC_PG_TYPE: NORMAL
MDC_IDC_PG_TYPE: NORMAL
MDC_IDC_SESS_CLINIC_NAME: NORMAL
MDC_IDC_SESS_CLINIC_NAME: NORMAL
MDC_IDC_SESS_DTM: NORMAL
MDC_IDC_SESS_DTM: NORMAL
MDC_IDC_SESS_TYPE: NORMAL
MDC_IDC_SESS_TYPE: NORMAL
MDC_IDC_SET_BRADY_AT_MODE_SWITCH_RATE: 171 {BEATS}/MIN
MDC_IDC_SET_BRADY_AT_MODE_SWITCH_RATE: 171 {BEATS}/MIN
MDC_IDC_SET_BRADY_LOWRATE: 60 {BEATS}/MIN
MDC_IDC_SET_BRADY_LOWRATE: 60 {BEATS}/MIN
MDC_IDC_SET_BRADY_MAX_SENSOR_RATE: 120 {BEATS}/MIN
MDC_IDC_SET_BRADY_MAX_SENSOR_RATE: 120 {BEATS}/MIN
MDC_IDC_SET_BRADY_MAX_TRACKING_RATE: 120 {BEATS}/MIN
MDC_IDC_SET_BRADY_MAX_TRACKING_RATE: 120 {BEATS}/MIN
MDC_IDC_SET_BRADY_MODE: NORMAL
MDC_IDC_SET_BRADY_MODE: NORMAL
MDC_IDC_SET_BRADY_PAV_DELAY_HIGH: 100 MS
MDC_IDC_SET_BRADY_PAV_DELAY_HIGH: 100 MS
MDC_IDC_SET_BRADY_PAV_DELAY_LOW: 170 MS
MDC_IDC_SET_BRADY_PAV_DELAY_LOW: 170 MS
MDC_IDC_SET_BRADY_SAV_DELAY_HIGH: 70 MS
MDC_IDC_SET_BRADY_SAV_DELAY_HIGH: 70 MS
MDC_IDC_SET_BRADY_SAV_DELAY_LOW: 140 MS
MDC_IDC_SET_BRADY_SAV_DELAY_LOW: 140 MS
MDC_IDC_SET_CRT_LVRV_DELAY: 80 MS
MDC_IDC_SET_CRT_LVRV_DELAY: 80 MS
MDC_IDC_SET_CRT_PACED_CHAMBERS: NORMAL
MDC_IDC_SET_CRT_PACED_CHAMBERS: NORMAL
MDC_IDC_SET_LEADCHNL_LV_PACING_AMPLITUDE: 2.75 V
MDC_IDC_SET_LEADCHNL_LV_PACING_AMPLITUDE: 2.75 V
MDC_IDC_SET_LEADCHNL_LV_PACING_ANODE_ELECTRODE_1: NORMAL
MDC_IDC_SET_LEADCHNL_LV_PACING_ANODE_ELECTRODE_1: NORMAL
MDC_IDC_SET_LEADCHNL_LV_PACING_ANODE_LOCATION_1: NORMAL
MDC_IDC_SET_LEADCHNL_LV_PACING_ANODE_LOCATION_1: NORMAL
MDC_IDC_SET_LEADCHNL_LV_PACING_CAPTURE_MODE: NORMAL
MDC_IDC_SET_LEADCHNL_LV_PACING_CAPTURE_MODE: NORMAL
MDC_IDC_SET_LEADCHNL_LV_PACING_CATHODE_ELECTRODE_1: NORMAL
MDC_IDC_SET_LEADCHNL_LV_PACING_CATHODE_ELECTRODE_1: NORMAL
MDC_IDC_SET_LEADCHNL_LV_PACING_CATHODE_LOCATION_1: NORMAL
MDC_IDC_SET_LEADCHNL_LV_PACING_CATHODE_LOCATION_1: NORMAL
MDC_IDC_SET_LEADCHNL_LV_PACING_POLARITY: NORMAL
MDC_IDC_SET_LEADCHNL_LV_PACING_POLARITY: NORMAL
MDC_IDC_SET_LEADCHNL_LV_PACING_PULSEWIDTH: 0.4 MS
MDC_IDC_SET_LEADCHNL_LV_PACING_PULSEWIDTH: 0.4 MS
MDC_IDC_SET_LEADCHNL_RA_PACING_AMPLITUDE: 2 V
MDC_IDC_SET_LEADCHNL_RA_PACING_AMPLITUDE: 2 V
MDC_IDC_SET_LEADCHNL_RA_PACING_ANODE_ELECTRODE_1: NORMAL
MDC_IDC_SET_LEADCHNL_RA_PACING_ANODE_ELECTRODE_1: NORMAL
MDC_IDC_SET_LEADCHNL_RA_PACING_ANODE_LOCATION_1: NORMAL
MDC_IDC_SET_LEADCHNL_RA_PACING_ANODE_LOCATION_1: NORMAL
MDC_IDC_SET_LEADCHNL_RA_PACING_CAPTURE_MODE: NORMAL
MDC_IDC_SET_LEADCHNL_RA_PACING_CAPTURE_MODE: NORMAL
MDC_IDC_SET_LEADCHNL_RA_PACING_CATHODE_ELECTRODE_1: NORMAL
MDC_IDC_SET_LEADCHNL_RA_PACING_CATHODE_ELECTRODE_1: NORMAL
MDC_IDC_SET_LEADCHNL_RA_PACING_CATHODE_LOCATION_1: NORMAL
MDC_IDC_SET_LEADCHNL_RA_PACING_CATHODE_LOCATION_1: NORMAL
MDC_IDC_SET_LEADCHNL_RA_PACING_POLARITY: NORMAL
MDC_IDC_SET_LEADCHNL_RA_PACING_POLARITY: NORMAL
MDC_IDC_SET_LEADCHNL_RA_PACING_PULSEWIDTH: 0.4 MS
MDC_IDC_SET_LEADCHNL_RA_PACING_PULSEWIDTH: 0.4 MS
MDC_IDC_SET_LEADCHNL_RA_SENSING_ANODE_ELECTRODE_1: NORMAL
MDC_IDC_SET_LEADCHNL_RA_SENSING_ANODE_ELECTRODE_1: NORMAL
MDC_IDC_SET_LEADCHNL_RA_SENSING_ANODE_LOCATION_1: NORMAL
MDC_IDC_SET_LEADCHNL_RA_SENSING_CATHODE_ELECTRODE_1: NORMAL
MDC_IDC_SET_LEADCHNL_RA_SENSING_CATHODE_ELECTRODE_1: NORMAL
MDC_IDC_SET_LEADCHNL_RA_SENSING_CATHODE_LOCATION_1: NORMAL
MDC_IDC_SET_LEADCHNL_RA_SENSING_CATHODE_LOCATION_1: NORMAL
MDC_IDC_SET_LEADCHNL_RA_SENSING_POLARITY: NORMAL
MDC_IDC_SET_LEADCHNL_RA_SENSING_POLARITY: NORMAL
MDC_IDC_SET_LEADCHNL_RA_SENSING_SENSITIVITY: 0.15 MV
MDC_IDC_SET_LEADCHNL_RA_SENSING_SENSITIVITY: 0.15 MV
MDC_IDC_SET_LEADCHNL_RV_PACING_AMPLITUDE: 2.75 V
MDC_IDC_SET_LEADCHNL_RV_PACING_AMPLITUDE: 2.75 V
MDC_IDC_SET_LEADCHNL_RV_PACING_ANODE_ELECTRODE_1: NORMAL
MDC_IDC_SET_LEADCHNL_RV_PACING_ANODE_ELECTRODE_1: NORMAL
MDC_IDC_SET_LEADCHNL_RV_PACING_ANODE_LOCATION_1: NORMAL
MDC_IDC_SET_LEADCHNL_RV_PACING_ANODE_LOCATION_1: NORMAL
MDC_IDC_SET_LEADCHNL_RV_PACING_CAPTURE_MODE: NORMAL
MDC_IDC_SET_LEADCHNL_RV_PACING_CAPTURE_MODE: NORMAL
MDC_IDC_SET_LEADCHNL_RV_PACING_CATHODE_ELECTRODE_1: NORMAL
MDC_IDC_SET_LEADCHNL_RV_PACING_CATHODE_ELECTRODE_1: NORMAL
MDC_IDC_SET_LEADCHNL_RV_PACING_CATHODE_LOCATION_1: NORMAL
MDC_IDC_SET_LEADCHNL_RV_PACING_CATHODE_LOCATION_1: NORMAL
MDC_IDC_SET_LEADCHNL_RV_PACING_POLARITY: NORMAL
MDC_IDC_SET_LEADCHNL_RV_PACING_POLARITY: NORMAL
MDC_IDC_SET_LEADCHNL_RV_PACING_PULSEWIDTH: 0.4 MS
MDC_IDC_SET_LEADCHNL_RV_PACING_PULSEWIDTH: 0.4 MS
MDC_IDC_SET_LEADCHNL_RV_SENSING_ANODE_ELECTRODE_1: NORMAL
MDC_IDC_SET_LEADCHNL_RV_SENSING_ANODE_ELECTRODE_1: NORMAL
MDC_IDC_SET_LEADCHNL_RV_SENSING_ANODE_LOCATION_1: NORMAL
MDC_IDC_SET_LEADCHNL_RV_SENSING_ANODE_LOCATION_1: NORMAL
MDC_IDC_SET_LEADCHNL_RV_SENSING_CATHODE_ELECTRODE_1: NORMAL
MDC_IDC_SET_LEADCHNL_RV_SENSING_CATHODE_ELECTRODE_1: NORMAL
MDC_IDC_SET_LEADCHNL_RV_SENSING_CATHODE_LOCATION_1: NORMAL
MDC_IDC_SET_LEADCHNL_RV_SENSING_CATHODE_LOCATION_1: NORMAL
MDC_IDC_SET_LEADCHNL_RV_SENSING_POLARITY: NORMAL
MDC_IDC_SET_LEADCHNL_RV_SENSING_POLARITY: NORMAL
MDC_IDC_SET_LEADCHNL_RV_SENSING_SENSITIVITY: 0.9 MV
MDC_IDC_SET_LEADCHNL_RV_SENSING_SENSITIVITY: 0.9 MV
MDC_IDC_SET_ZONE_DETECTION_INTERVAL: 350 MS
MDC_IDC_SET_ZONE_DETECTION_INTERVAL: 350 MS
MDC_IDC_SET_ZONE_DETECTION_INTERVAL: 400 MS
MDC_IDC_SET_ZONE_DETECTION_INTERVAL: 400 MS
MDC_IDC_SET_ZONE_TYPE: NORMAL
MDC_IDC_STAT_AT_BURDEN_PERCENT: 0.5 %
MDC_IDC_STAT_AT_BURDEN_PERCENT: 1 %
MDC_IDC_STAT_AT_DTM_END: NORMAL
MDC_IDC_STAT_AT_DTM_END: NORMAL
MDC_IDC_STAT_AT_DTM_START: NORMAL
MDC_IDC_STAT_AT_DTM_START: NORMAL
MDC_IDC_STAT_BRADY_AP_VP_PERCENT: 39.14 %
MDC_IDC_STAT_BRADY_AP_VP_PERCENT: 50.13 %
MDC_IDC_STAT_BRADY_AP_VS_PERCENT: 0.03 %
MDC_IDC_STAT_BRADY_AP_VS_PERCENT: 0.05 %
MDC_IDC_STAT_BRADY_AS_VP_PERCENT: 49.35 %
MDC_IDC_STAT_BRADY_AS_VP_PERCENT: 60.26 %
MDC_IDC_STAT_BRADY_AS_VS_PERCENT: 0.47 %
MDC_IDC_STAT_BRADY_AS_VS_PERCENT: 0.57 %
MDC_IDC_STAT_BRADY_DTM_END: NORMAL
MDC_IDC_STAT_BRADY_DTM_END: NORMAL
MDC_IDC_STAT_BRADY_DTM_START: NORMAL
MDC_IDC_STAT_BRADY_DTM_START: NORMAL
MDC_IDC_STAT_BRADY_RA_PERCENT_PACED: 39.24 %
MDC_IDC_STAT_BRADY_RA_PERCENT_PACED: 50.02 %
MDC_IDC_STAT_BRADY_RV_PERCENT_PACED: 99.39 %
MDC_IDC_STAT_BRADY_RV_PERCENT_PACED: 99.47 %
MDC_IDC_STAT_CRT_DTM_END: NORMAL
MDC_IDC_STAT_CRT_DTM_END: NORMAL
MDC_IDC_STAT_CRT_DTM_START: NORMAL
MDC_IDC_STAT_CRT_DTM_START: NORMAL
MDC_IDC_STAT_CRT_LV_PERCENT_PACED: 99.36 %
MDC_IDC_STAT_CRT_LV_PERCENT_PACED: 99.44 %
MDC_IDC_STAT_CRT_PERCENT_PACED: 99.36 %
MDC_IDC_STAT_CRT_PERCENT_PACED: 99.44 %
MDC_IDC_STAT_EPISODE_RECENT_COUNT: 0
MDC_IDC_STAT_EPISODE_RECENT_COUNT: 237
MDC_IDC_STAT_EPISODE_RECENT_COUNT: 3
MDC_IDC_STAT_EPISODE_RECENT_COUNT: 3
MDC_IDC_STAT_EPISODE_RECENT_COUNT: 335
MDC_IDC_STAT_EPISODE_RECENT_COUNT_DTM_END: NORMAL
MDC_IDC_STAT_EPISODE_RECENT_COUNT_DTM_START: NORMAL
MDC_IDC_STAT_EPISODE_TOTAL_COUNT: 0
MDC_IDC_STAT_EPISODE_TOTAL_COUNT: 0
MDC_IDC_STAT_EPISODE_TOTAL_COUNT: 1
MDC_IDC_STAT_EPISODE_TOTAL_COUNT: 1
MDC_IDC_STAT_EPISODE_TOTAL_COUNT: 5133
MDC_IDC_STAT_EPISODE_TOTAL_COUNT: 52
MDC_IDC_STAT_EPISODE_TOTAL_COUNT: 52
MDC_IDC_STAT_EPISODE_TOTAL_COUNT: 5231
MDC_IDC_STAT_EPISODE_TOTAL_COUNT_DTM_END: NORMAL
MDC_IDC_STAT_EPISODE_TOTAL_COUNT_DTM_START: NORMAL
MDC_IDC_STAT_EPISODE_TYPE: NORMAL
MONOCYTES # BLD AUTO: 0.5 10E3/UL (ref 0–1.3)
MONOCYTES # BLD AUTO: 0.6 10E3/UL (ref 0–1.3)
MONOCYTES # BLD AUTO: 0.8 10E3/UL (ref 0–1.3)
MONOCYTES NFR BLD AUTO: 7 %
MUCOUS THREADS #/AREA URNS LPF: PRESENT /LPF
NEUTROPHILS # BLD AUTO: 6.6 10E3/UL (ref 1.6–8.3)
NEUTROPHILS # BLD AUTO: 7.8 10E3/UL (ref 1.6–8.3)
NEUTROPHILS # BLD AUTO: 9.5 10E3/UL (ref 1.6–8.3)
NEUTROPHILS NFR BLD AUTO: 86 %
NEUTROPHILS NFR BLD AUTO: 87 %
NEUTROPHILS NFR BLD AUTO: 88 %
NITRATE UR QL: NEGATIVE
NITRATE UR QL: NEGATIVE
NRBC # BLD AUTO: 0 10E3/UL
NRBC BLD AUTO-RTO: 0 /100
NT-PROBNP SERPL-MCNC: 7324 PG/ML (ref 0–1800)
NT-PROBNP SERPL-MCNC: 7671 PG/ML (ref 0–1800)
NT-PROBNP SERPL-MCNC: 9951 PG/ML (ref 0–1800)
NT-PROBNP SERPL-MCNC: ABNORMAL PG/ML (ref 0–1800)
OSMOLALITY SERPL: 304 MMOL/KG (ref 280–301)
OSMOLALITY UR: 336 MMOL/KG (ref 100–1200)
P AXIS - MUSE: NORMAL DEGREES
PCO2 BLDV: 61 MM HG (ref 35–50)
PCO2 BLDV: 61 MM HG (ref 35–50)
PH BLDV: 7.32 [PH] (ref 7.35–7.45)
PH BLDV: 7.32 [PH] (ref 7.35–7.45)
PH UR STRIP: 5 [PH] (ref 5–7)
PH UR STRIP: 5 [PH] (ref 5–7)
PHOSPHATE SERPL-MCNC: 5 MG/DL (ref 2.5–4.5)
PLATELET # BLD AUTO: 274 10E3/UL (ref 150–450)
PLATELET # BLD AUTO: 282 10E3/UL (ref 150–450)
PLATELET # BLD AUTO: 289 10E3/UL (ref 150–450)
PLATELET # BLD AUTO: 305 10E3/UL (ref 150–450)
PLATELET # BLD AUTO: 379 10E3/UL (ref 150–450)
PO2 BLDV: 35 MM HG (ref 25–47)
PO2 BLDV: 35 MM HG (ref 25–47)
POTASSIUM SERPL-SCNC: 3.2 MMOL/L (ref 3.4–5.3)
POTASSIUM SERPL-SCNC: 3.3 MMOL/L (ref 3.4–5.3)
POTASSIUM SERPL-SCNC: 3.5 MMOL/L (ref 3.4–5.3)
POTASSIUM SERPL-SCNC: 3.5 MMOL/L (ref 3.4–5.3)
POTASSIUM SERPL-SCNC: 3.6 MMOL/L (ref 3.4–5.3)
POTASSIUM SERPL-SCNC: 3.6 MMOL/L (ref 3.4–5.3)
POTASSIUM SERPL-SCNC: 3.7 MMOL/L (ref 3.4–5.3)
POTASSIUM SERPL-SCNC: 3.7 MMOL/L (ref 3.4–5.3)
POTASSIUM SERPL-SCNC: 3.8 MMOL/L (ref 3.4–5.3)
POTASSIUM SERPL-SCNC: 3.9 MMOL/L (ref 3.4–5.3)
POTASSIUM SERPL-SCNC: 4 MMOL/L (ref 3.4–5.3)
POTASSIUM SERPL-SCNC: 4.1 MMOL/L (ref 3.4–5.3)
POTASSIUM SERPL-SCNC: 4.2 MMOL/L (ref 3.4–5.3)
POTASSIUM SERPL-SCNC: 4.2 MMOL/L (ref 3.4–5.3)
POTASSIUM SERPL-SCNC: 4.4 MMOL/L (ref 3.4–5.3)
POTASSIUM SERPL-SCNC: 4.5 MMOL/L (ref 3.4–5.3)
POTASSIUM SERPL-SCNC: 4.5 MMOL/L (ref 3.4–5.3)
POTASSIUM SERPL-SCNC: 4.7 MMOL/L (ref 3.4–5.3)
POTASSIUM SERPL-SCNC: 4.8 MMOL/L (ref 3.4–5.3)
POTASSIUM SERPL-SCNC: 4.9 MMOL/L (ref 3.4–5.3)
POTASSIUM SERPL-SCNC: 5.1 MMOL/L (ref 3.4–5.3)
POTASSIUM SERPL-SCNC: 5.2 MMOL/L (ref 3.4–5.3)
POTASSIUM SERPL-SCNC: 5.5 MMOL/L (ref 3.4–5.3)
POTASSIUM SERPL-SCNC: 6.9 MMOL/L (ref 3.4–5.3)
POTASSIUM SERPL-SCNC: 7.1 MMOL/L (ref 3.4–5.3)
POTASSIUM UR-SCNC: 77.6 MMOL/L
PR INTERVAL - MUSE: 224 MS
QRS DURATION - MUSE: 174 MS
QT - MUSE: 444 MS
QTC - MUSE: 472 MS
R AXIS - MUSE: -2 DEGREES
RBC # BLD AUTO: 3.85 10E6/UL (ref 4.4–5.9)
RBC # BLD AUTO: 4.15 10E6/UL (ref 4.4–5.9)
RBC # BLD AUTO: 4.15 10E6/UL (ref 4.4–5.9)
RBC # BLD AUTO: 4.16 10E6/UL (ref 4.4–5.9)
RBC # BLD AUTO: 4.62 10E6/UL (ref 4.4–5.9)
RBC URINE: 1 /HPF
RBC URINE: <1 /HPF
RSV RNA SPEC NAA+PROBE: NEGATIVE
SARS-COV-2 RNA RESP QL NAA+PROBE: NEGATIVE
SATV LHE POCT: 63.1 % (ref 70–75)
SATV LHE POCT: 63.1 % (ref 70–75)
SODIUM SERPL-SCNC: 123 MMOL/L (ref 136–145)
SODIUM SERPL-SCNC: 126 MMOL/L (ref 136–145)
SODIUM SERPL-SCNC: 126 MMOL/L (ref 136–145)
SODIUM SERPL-SCNC: 128 MMOL/L (ref 136–145)
SODIUM SERPL-SCNC: 129 MMOL/L (ref 136–145)
SODIUM SERPL-SCNC: 129 MMOL/L (ref 136–145)
SODIUM SERPL-SCNC: 132 MMOL/L (ref 136–145)
SODIUM SERPL-SCNC: 133 MMOL/L (ref 136–145)
SODIUM SERPL-SCNC: 133 MMOL/L (ref 136–145)
SODIUM SERPL-SCNC: 134 MMOL/L (ref 136–145)
SODIUM SERPL-SCNC: 136 MMOL/L (ref 136–145)
SODIUM SERPL-SCNC: 136 MMOL/L (ref 136–145)
SODIUM SERPL-SCNC: 137 MMOL/L (ref 136–145)
SODIUM SERPL-SCNC: 138 MMOL/L (ref 136–145)
SODIUM SERPL-SCNC: 138 MMOL/L (ref 136–145)
SODIUM SERPL-SCNC: 139 MMOL/L (ref 136–145)
SODIUM SERPL-SCNC: 140 MMOL/L (ref 136–145)
SODIUM SERPL-SCNC: 141 MMOL/L (ref 136–145)
SODIUM UR-SCNC: <20 MMOL/L
SP GR UR STRIP: 1.01 (ref 1–1.03)
SP GR UR STRIP: 1.01 (ref 1–1.03)
SPECIMEN EXPIRATION DATE: NORMAL
SQUAMOUS EPITHELIAL: <1 /HPF
SQUAMOUS EPITHELIAL: <1 /HPF
SYSTOLIC BLOOD PRESSURE - MUSE: NORMAL MMHG
T AXIS - MUSE: 211 DEGREES
TROPONIN T SERPL HS-MCNC: 98 NG/L
UROBILINOGEN UR STRIP-MCNC: <2 MG/DL
UROBILINOGEN UR STRIP-MCNC: <2 MG/DL
VENTRICULAR RATE- MUSE: 68 BPM
WBC # BLD AUTO: 10.9 10E3/UL (ref 4–11)
WBC # BLD AUTO: 6.2 10E3/UL (ref 4–11)
WBC # BLD AUTO: 7.5 10E3/UL (ref 4–11)
WBC # BLD AUTO: 8.4 10E3/UL (ref 4–11)
WBC # BLD AUTO: 8.9 10E3/UL (ref 4–11)
WBC URINE: 2 /HPF
WBC URINE: 5 /HPF

## 2023-01-01 PROCEDURE — 99309 SBSQ NF CARE MODERATE MDM 30: CPT | Performed by: NURSE PRACTITIONER

## 2023-01-01 PROCEDURE — 250N000013 HC RX MED GY IP 250 OP 250 PS 637: Performed by: CLINICAL NURSE SPECIALIST

## 2023-01-01 PROCEDURE — 83605 ASSAY OF LACTIC ACID: CPT | Performed by: HOSPITALIST

## 2023-01-01 PROCEDURE — 36415 COLL VENOUS BLD VENIPUNCTURE: CPT | Performed by: FAMILY MEDICINE

## 2023-01-01 PROCEDURE — 250N000013 HC RX MED GY IP 250 OP 250 PS 637: Performed by: HOSPITALIST

## 2023-01-01 PROCEDURE — 80048 BASIC METABOLIC PNL TOTAL CA: CPT | Performed by: NURSE PRACTITIONER

## 2023-01-01 PROCEDURE — 83735 ASSAY OF MAGNESIUM: CPT | Performed by: HOSPITALIST

## 2023-01-01 PROCEDURE — 82962 GLUCOSE BLOOD TEST: CPT

## 2023-01-01 PROCEDURE — 99232 SBSQ HOSP IP/OBS MODERATE 35: CPT | Performed by: NURSE PRACTITIONER

## 2023-01-01 PROCEDURE — 84132 ASSAY OF SERUM POTASSIUM: CPT | Performed by: INTERNAL MEDICINE

## 2023-01-01 PROCEDURE — 250N000011 HC RX IP 250 OP 636: Performed by: INTERNAL MEDICINE

## 2023-01-01 PROCEDURE — G0463 HOSPITAL OUTPT CLINIC VISIT: HCPCS

## 2023-01-01 PROCEDURE — 258N000003 HC RX IP 258 OP 636: Performed by: EMERGENCY MEDICINE

## 2023-01-01 PROCEDURE — 93451 RIGHT HEART CATH: CPT | Mod: 26 | Performed by: INTERNAL MEDICINE

## 2023-01-01 PROCEDURE — 84132 ASSAY OF SERUM POTASSIUM: CPT | Performed by: EMERGENCY MEDICINE

## 2023-01-01 PROCEDURE — 250N000013 HC RX MED GY IP 250 OP 250 PS 637: Performed by: INTERNAL MEDICINE

## 2023-01-01 PROCEDURE — 210N000001 HC R&B IMCU HEART CARE

## 2023-01-01 PROCEDURE — 97530 THERAPEUTIC ACTIVITIES: CPT | Mod: GO

## 2023-01-01 PROCEDURE — 85025 COMPLETE CBC W/AUTO DIFF WBC: CPT | Performed by: HOSPITALIST

## 2023-01-01 PROCEDURE — 272N000001 HC OR GENERAL SUPPLY STERILE: Performed by: INTERNAL MEDICINE

## 2023-01-01 PROCEDURE — 99232 SBSQ HOSP IP/OBS MODERATE 35: CPT | Performed by: HOSPITALIST

## 2023-01-01 PROCEDURE — 97530 THERAPEUTIC ACTIVITIES: CPT | Mod: GP

## 2023-01-01 PROCEDURE — 36592 COLLECT BLOOD FROM PICC: CPT | Performed by: INTERNAL MEDICINE

## 2023-01-01 PROCEDURE — P9604 ONE-WAY ALLOW PRORATED TRIP: HCPCS | Performed by: NURSE PRACTITIONER

## 2023-01-01 PROCEDURE — 99285 EMERGENCY DEPT VISIT HI MDM: CPT | Mod: 25,CS

## 2023-01-01 PROCEDURE — 120N000004 HC R&B MS OVERFLOW

## 2023-01-01 PROCEDURE — 36415 COLL VENOUS BLD VENIPUNCTURE: CPT | Performed by: HOSPITALIST

## 2023-01-01 PROCEDURE — 84295 ASSAY OF SERUM SODIUM: CPT | Performed by: INTERNAL MEDICINE

## 2023-01-01 PROCEDURE — P9047 ALBUMIN (HUMAN), 25%, 50ML: HCPCS | Performed by: INTERNAL MEDICINE

## 2023-01-01 PROCEDURE — 99223 1ST HOSP IP/OBS HIGH 75: CPT | Performed by: CLINICAL NURSE SPECIALIST

## 2023-01-01 PROCEDURE — 97116 GAIT TRAINING THERAPY: CPT | Mod: GP

## 2023-01-01 PROCEDURE — 84295 ASSAY OF SERUM SODIUM: CPT | Performed by: NURSE PRACTITIONER

## 2023-01-01 PROCEDURE — 83735 ASSAY OF MAGNESIUM: CPT | Performed by: NURSE PRACTITIONER

## 2023-01-01 PROCEDURE — 85025 COMPLETE CBC W/AUTO DIFF WBC: CPT | Performed by: EMERGENCY MEDICINE

## 2023-01-01 PROCEDURE — 80048 BASIC METABOLIC PNL TOTAL CA: CPT | Mod: ORL | Performed by: FAMILY MEDICINE

## 2023-01-01 PROCEDURE — 86901 BLOOD TYPING SEROLOGIC RH(D): CPT | Performed by: NURSE PRACTITIONER

## 2023-01-01 PROCEDURE — 74230 X-RAY XM SWLNG FUNCJ C+: CPT

## 2023-01-01 PROCEDURE — 99239 HOSP IP/OBS DSCHRG MGMT >30: CPT | Performed by: HOSPITALIST

## 2023-01-01 PROCEDURE — 97110 THERAPEUTIC EXERCISES: CPT | Mod: GP

## 2023-01-01 PROCEDURE — 82805 BLOOD GASES W/O2 SATURATION: CPT

## 2023-01-01 PROCEDURE — 99310 SBSQ NF CARE HIGH MDM 45: CPT | Performed by: NURSE PRACTITIONER

## 2023-01-01 PROCEDURE — 36415 COLL VENOUS BLD VENIPUNCTURE: CPT | Performed by: INTERNAL MEDICINE

## 2023-01-01 PROCEDURE — 80048 BASIC METABOLIC PNL TOTAL CA: CPT | Performed by: FAMILY MEDICINE

## 2023-01-01 PROCEDURE — 83605 ASSAY OF LACTIC ACID: CPT | Performed by: EMERGENCY MEDICINE

## 2023-01-01 PROCEDURE — 250N000011 HC RX IP 250 OP 636: Performed by: EMERGENCY MEDICINE

## 2023-01-01 PROCEDURE — 85025 COMPLETE CBC W/AUTO DIFF WBC: CPT | Performed by: STUDENT IN AN ORGANIZED HEALTH CARE EDUCATION/TRAINING PROGRAM

## 2023-01-01 PROCEDURE — 36415 COLL VENOUS BLD VENIPUNCTURE: CPT | Performed by: STUDENT IN AN ORGANIZED HEALTH CARE EDUCATION/TRAINING PROGRAM

## 2023-01-01 PROCEDURE — 99233 SBSQ HOSP IP/OBS HIGH 50: CPT | Performed by: INTERNAL MEDICINE

## 2023-01-01 PROCEDURE — 250N000013 HC RX MED GY IP 250 OP 250 PS 637: Performed by: PHYSICIAN ASSISTANT

## 2023-01-01 PROCEDURE — 99214 OFFICE O/P EST MOD 30 MIN: CPT | Performed by: INTERNAL MEDICINE

## 2023-01-01 PROCEDURE — 93005 ELECTROCARDIOGRAM TRACING: CPT | Performed by: EMERGENCY MEDICINE

## 2023-01-01 PROCEDURE — 83935 ASSAY OF URINE OSMOLALITY: CPT | Performed by: INTERNAL MEDICINE

## 2023-01-01 PROCEDURE — 87086 URINE CULTURE/COLONY COUNT: CPT | Performed by: EMERGENCY MEDICINE

## 2023-01-01 PROCEDURE — C1769 GUIDE WIRE: HCPCS | Performed by: INTERNAL MEDICINE

## 2023-01-01 PROCEDURE — 36415 COLL VENOUS BLD VENIPUNCTURE: CPT | Performed by: NURSE PRACTITIONER

## 2023-01-01 PROCEDURE — 99223 1ST HOSP IP/OBS HIGH 75: CPT | Performed by: HOSPITALIST

## 2023-01-01 PROCEDURE — 99214 OFFICE O/P EST MOD 30 MIN: CPT | Performed by: NURSE PRACTITIONER

## 2023-01-01 PROCEDURE — 81001 URINALYSIS AUTO W/SCOPE: CPT | Performed by: EMERGENCY MEDICINE

## 2023-01-01 PROCEDURE — 82310 ASSAY OF CALCIUM: CPT | Performed by: HOSPITALIST

## 2023-01-01 PROCEDURE — 74220 X-RAY XM ESOPHAGUS 1CNTRST: CPT

## 2023-01-01 PROCEDURE — 36415 COLL VENOUS BLD VENIPUNCTURE: CPT | Performed by: EMERGENCY MEDICINE

## 2023-01-01 PROCEDURE — 250N000009 HC RX 250: Performed by: EMERGENCY MEDICINE

## 2023-01-01 PROCEDURE — 83880 ASSAY OF NATRIURETIC PEPTIDE: CPT | Performed by: FAMILY MEDICINE

## 2023-01-01 PROCEDURE — 92526 ORAL FUNCTION THERAPY: CPT | Mod: GN

## 2023-01-01 PROCEDURE — 80048 BASIC METABOLIC PNL TOTAL CA: CPT | Performed by: HOSPITALIST

## 2023-01-01 PROCEDURE — 80069 RENAL FUNCTION PANEL: CPT | Performed by: INTERNAL MEDICINE

## 2023-01-01 PROCEDURE — 97166 OT EVAL MOD COMPLEX 45 MIN: CPT | Mod: GO

## 2023-01-01 PROCEDURE — 99232 SBSQ HOSP IP/OBS MODERATE 35: CPT | Performed by: INTERNAL MEDICINE

## 2023-01-01 PROCEDURE — 84300 ASSAY OF URINE SODIUM: CPT | Performed by: INTERNAL MEDICINE

## 2023-01-01 PROCEDURE — 93294 REM INTERROG EVL PM/LDLS PM: CPT | Performed by: INTERNAL MEDICINE

## 2023-01-01 PROCEDURE — P9603 ONE-WAY ALLOW PRORATED MILES: HCPCS | Performed by: FAMILY MEDICINE

## 2023-01-01 PROCEDURE — 99309 SBSQ NF CARE MODERATE MDM 30: CPT | Mod: GV | Performed by: NURSE PRACTITIONER

## 2023-01-01 PROCEDURE — 99231 SBSQ HOSP IP/OBS SF/LOW 25: CPT | Performed by: INTERNAL MEDICINE

## 2023-01-01 PROCEDURE — 81001 URINALYSIS AUTO W/SCOPE: CPT | Performed by: HOSPITALIST

## 2023-01-01 PROCEDURE — 97535 SELF CARE MNGMENT TRAINING: CPT | Mod: GO

## 2023-01-01 PROCEDURE — C1887 CATHETER, GUIDING: HCPCS | Performed by: INTERNAL MEDICINE

## 2023-01-01 PROCEDURE — C1894 INTRO/SHEATH, NON-LASER: HCPCS | Performed by: INTERNAL MEDICINE

## 2023-01-01 PROCEDURE — P9604 ONE-WAY ALLOW PRORATED TRIP: HCPCS | Performed by: FAMILY MEDICINE

## 2023-01-01 PROCEDURE — 96375 TX/PRO/DX INJ NEW DRUG ADDON: CPT

## 2023-01-01 PROCEDURE — 250N000012 HC RX MED GY IP 250 OP 636 PS 637: Performed by: EMERGENCY MEDICINE

## 2023-01-01 PROCEDURE — 250N000013 HC RX MED GY IP 250 OP 250 PS 637: Performed by: EMERGENCY MEDICINE

## 2023-01-01 PROCEDURE — 80048 BASIC METABOLIC PNL TOTAL CA: CPT | Performed by: EMERGENCY MEDICINE

## 2023-01-01 PROCEDURE — 83930 ASSAY OF BLOOD OSMOLALITY: CPT | Performed by: INTERNAL MEDICINE

## 2023-01-01 PROCEDURE — 92611 MOTION FLUOROSCOPY/SWALLOW: CPT | Mod: GN

## 2023-01-01 PROCEDURE — 36569 INSJ PICC 5 YR+ W/O IMAGING: CPT

## 2023-01-01 PROCEDURE — 93296 REM INTERROG EVL PM/IDS: CPT | Performed by: INTERNAL MEDICINE

## 2023-01-01 PROCEDURE — 84484 ASSAY OF TROPONIN QUANT: CPT | Performed by: EMERGENCY MEDICINE

## 2023-01-01 PROCEDURE — 92610 EVALUATE SWALLOWING FUNCTION: CPT | Mod: GN

## 2023-01-01 PROCEDURE — 99454 REM MNTR PHYSIOL PARAM 16-30: CPT | Performed by: INTERNAL MEDICINE

## 2023-01-01 PROCEDURE — 258N000001 HC RX 258: Performed by: EMERGENCY MEDICINE

## 2023-01-01 PROCEDURE — 999N000065 XR CHEST PORT 1 VIEW

## 2023-01-01 PROCEDURE — 99223 1ST HOSP IP/OBS HIGH 75: CPT | Performed by: INTERNAL MEDICINE

## 2023-01-01 PROCEDURE — 99232 SBSQ HOSP IP/OBS MODERATE 35: CPT | Performed by: CLINICAL NURSE SPECIALIST

## 2023-01-01 PROCEDURE — 83690 ASSAY OF LIPASE: CPT | Performed by: EMERGENCY MEDICINE

## 2023-01-01 PROCEDURE — C9803 HOPD COVID-19 SPEC COLLECT: HCPCS

## 2023-01-01 PROCEDURE — 96374 THER/PROPH/DIAG INJ IV PUSH: CPT

## 2023-01-01 PROCEDURE — 82436 ASSAY OF URINE CHLORIDE: CPT | Performed by: INTERNAL MEDICINE

## 2023-01-01 PROCEDURE — 99222 1ST HOSP IP/OBS MODERATE 55: CPT | Performed by: INTERNAL MEDICINE

## 2023-01-01 PROCEDURE — 250N000013 HC RX MED GY IP 250 OP 250 PS 637: Performed by: NURSE PRACTITIONER

## 2023-01-01 PROCEDURE — 4A023N6 MEASUREMENT OF CARDIAC SAMPLING AND PRESSURE, RIGHT HEART, PERCUTANEOUS APPROACH: ICD-10-PCS | Performed by: INTERNAL MEDICINE

## 2023-01-01 PROCEDURE — 85027 COMPLETE CBC AUTOMATED: CPT | Performed by: FAMILY MEDICINE

## 2023-01-01 PROCEDURE — 85014 HEMATOCRIT: CPT | Performed by: HOSPITALIST

## 2023-01-01 PROCEDURE — 84133 ASSAY OF URINE POTASSIUM: CPT | Performed by: INTERNAL MEDICINE

## 2023-01-01 PROCEDURE — 83880 ASSAY OF NATRIURETIC PEPTIDE: CPT | Performed by: EMERGENCY MEDICINE

## 2023-01-01 PROCEDURE — 93281 PM DEVICE PROGR EVAL MULTI: CPT | Performed by: INTERNAL MEDICINE

## 2023-01-01 PROCEDURE — 999N000197 HC STATISTIC WOC PT EDUCATION, 0-15 MIN

## 2023-01-01 PROCEDURE — 83880 ASSAY OF NATRIURETIC PEPTIDE: CPT | Performed by: NURSE PRACTITIONER

## 2023-01-01 PROCEDURE — 71046 X-RAY EXAM CHEST 2 VIEWS: CPT

## 2023-01-01 PROCEDURE — 87086 URINE CULTURE/COLONY COUNT: CPT | Performed by: HOSPITALIST

## 2023-01-01 PROCEDURE — 80048 BASIC METABOLIC PNL TOTAL CA: CPT | Performed by: STUDENT IN AN ORGANIZED HEALTH CARE EDUCATION/TRAINING PROGRAM

## 2023-01-01 PROCEDURE — 82310 ASSAY OF CALCIUM: CPT | Performed by: INTERNAL MEDICINE

## 2023-01-01 PROCEDURE — 250N000009 HC RX 250: Performed by: INTERNAL MEDICINE

## 2023-01-01 PROCEDURE — 272N000452 HC KIT SHRLOCK 5FR POWER PICC TRIPLE LUMEN

## 2023-01-01 PROCEDURE — 93451 RIGHT HEART CATH: CPT | Performed by: INTERNAL MEDICINE

## 2023-01-01 PROCEDURE — 99233 SBSQ HOSP IP/OBS HIGH 50: CPT | Mod: 25 | Performed by: INTERNAL MEDICINE

## 2023-01-01 PROCEDURE — 3E033XZ INTRODUCTION OF VASOPRESSOR INTO PERIPHERAL VEIN, PERCUTANEOUS APPROACH: ICD-10-PCS | Performed by: INTERNAL MEDICINE

## 2023-01-01 PROCEDURE — 97162 PT EVAL MOD COMPLEX 30 MIN: CPT | Mod: GP

## 2023-01-01 PROCEDURE — 87637 SARSCOV2&INF A&B&RSV AMP PRB: CPT | Performed by: EMERGENCY MEDICINE

## 2023-01-01 PROCEDURE — 250N000011 HC RX IP 250 OP 636: Performed by: HOSPITALIST

## 2023-01-01 PROCEDURE — P9604 ONE-WAY ALLOW PRORATED TRIP: HCPCS | Mod: ORL | Performed by: FAMILY MEDICINE

## 2023-01-01 RX ORDER — BARIUM SULFATE 400 MG/ML
SUSPENSION ORAL ONCE
Status: COMPLETED | OUTPATIENT
Start: 2023-01-01 | End: 2023-01-01

## 2023-01-01 RX ORDER — POLYETHYLENE GLYCOL 3350 17 G/17G
17 POWDER, FOR SOLUTION ORAL 2 TIMES DAILY PRN
Status: DISCONTINUED | OUTPATIENT
Start: 2023-01-01 | End: 2023-01-01 | Stop reason: HOSPADM

## 2023-01-01 RX ORDER — BUMETANIDE 2 MG/1
2 TABLET ORAL 2 TIMES DAILY
COMMUNITY
Start: 2023-01-01

## 2023-01-01 RX ORDER — ACETAZOLAMIDE 500 MG/5ML
500 INJECTION, POWDER, LYOPHILIZED, FOR SOLUTION INTRAVENOUS ONCE
Status: COMPLETED | OUTPATIENT
Start: 2023-01-01 | End: 2023-01-01

## 2023-01-01 RX ORDER — ACETAMINOPHEN 500 MG
500 TABLET ORAL EVERY 6 HOURS PRN
Status: ON HOLD | COMMUNITY
End: 2023-01-01

## 2023-01-01 RX ORDER — GABAPENTIN 100 MG/1
200 CAPSULE ORAL
COMMUNITY
Start: 2023-01-01 | End: 2023-01-01

## 2023-01-01 RX ORDER — ALBUMIN (HUMAN) 12.5 G/50ML
50 SOLUTION INTRAVENOUS ONCE
Status: COMPLETED | OUTPATIENT
Start: 2023-01-01 | End: 2023-01-01

## 2023-01-01 RX ORDER — SODIUM CHLORIDE 9 MG/ML
INJECTION, SOLUTION INTRAVENOUS CONTINUOUS
Status: DISCONTINUED | OUTPATIENT
Start: 2023-01-01 | End: 2023-01-01

## 2023-01-01 RX ORDER — ONDANSETRON 4 MG/1
4 TABLET, ORALLY DISINTEGRATING ORAL EVERY 6 HOURS PRN
Status: DISCONTINUED | OUTPATIENT
Start: 2023-01-01 | End: 2023-01-01 | Stop reason: HOSPADM

## 2023-01-01 RX ORDER — BUMETANIDE 1 MG/1
1 TABLET ORAL DAILY
Status: DISCONTINUED | OUTPATIENT
Start: 2023-01-01 | End: 2023-01-01

## 2023-01-01 RX ORDER — BUMETANIDE 0.25 MG/ML
2 INJECTION INTRAMUSCULAR; INTRAVENOUS ONCE
Status: COMPLETED | OUTPATIENT
Start: 2023-01-01 | End: 2023-01-01

## 2023-01-01 RX ORDER — LIDOCAINE 40 MG/G
CREAM TOPICAL
Status: DISCONTINUED | OUTPATIENT
Start: 2023-01-01 | End: 2023-01-01

## 2023-01-01 RX ORDER — HYDROCORTISONE 2.5 %
CREAM (GRAM) TOPICAL 2 TIMES DAILY
Status: ON HOLD | COMMUNITY
End: 2023-01-01

## 2023-01-01 RX ORDER — ACETAMINOPHEN 325 MG/1
650 TABLET ORAL EVERY 6 HOURS PRN
COMMUNITY
Start: 2023-01-01

## 2023-01-01 RX ORDER — GABAPENTIN 300 MG/1
300 CAPSULE ORAL EVERY EVENING
Status: DISCONTINUED | OUTPATIENT
Start: 2023-01-01 | End: 2023-01-01 | Stop reason: HOSPADM

## 2023-01-01 RX ORDER — CALCIUM GLUCONATE 20 MG/ML
1 INJECTION, SOLUTION INTRAVENOUS ONCE
Status: COMPLETED | OUTPATIENT
Start: 2023-01-01 | End: 2023-01-01

## 2023-01-01 RX ORDER — MAGNESIUM HYDROXIDE/ALUMINUM HYDROXICE/SIMETHICONE 120; 1200; 1200 MG/30ML; MG/30ML; MG/30ML
30 SUSPENSION ORAL EVERY 4 HOURS PRN
Status: DISCONTINUED | OUTPATIENT
Start: 2023-01-01 | End: 2023-01-01 | Stop reason: HOSPADM

## 2023-01-01 RX ORDER — SENNOSIDES 8.6 MG
8.6 TABLET ORAL 2 TIMES DAILY
Status: DISCONTINUED | OUTPATIENT
Start: 2023-01-01 | End: 2023-01-01 | Stop reason: HOSPADM

## 2023-01-01 RX ORDER — ASPIRIN 81 MG/1
81 TABLET, CHEWABLE ORAL
Status: DISCONTINUED | OUTPATIENT
Start: 2023-01-01 | End: 2023-01-01 | Stop reason: HOSPADM

## 2023-01-01 RX ORDER — UREA 8.5 G/85G
CREAM TOPICAL 2 TIMES DAILY PRN
Status: DISCONTINUED | OUTPATIENT
Start: 2023-01-01 | End: 2023-01-01

## 2023-01-01 RX ORDER — MELATONIN 10 MG
10 CAPSULE ORAL DAILY
COMMUNITY

## 2023-01-01 RX ORDER — FUROSEMIDE 10 MG/ML
40 INJECTION INTRAMUSCULAR; INTRAVENOUS ONCE
Status: DISCONTINUED | OUTPATIENT
Start: 2023-01-01 | End: 2023-01-01

## 2023-01-01 RX ORDER — BUMETANIDE 2 MG/1
2 TABLET ORAL DAILY
Status: DISCONTINUED | OUTPATIENT
Start: 2023-01-01 | End: 2023-01-01

## 2023-01-01 RX ORDER — MIDODRINE HYDROCHLORIDE 5 MG/1
5 TABLET ORAL
Status: DISCONTINUED | OUTPATIENT
Start: 2023-01-01 | End: 2023-01-01

## 2023-01-01 RX ORDER — FUROSEMIDE 10 MG/ML
20 INJECTION INTRAMUSCULAR; INTRAVENOUS ONCE
Status: COMPLETED | OUTPATIENT
Start: 2023-01-01 | End: 2023-01-01

## 2023-01-01 RX ORDER — PANTOPRAZOLE SODIUM 40 MG/1
40 TABLET, DELAYED RELEASE ORAL 2 TIMES DAILY
COMMUNITY
Start: 2023-01-01

## 2023-01-01 RX ORDER — GABAPENTIN 300 MG/1
300 CAPSULE ORAL AT BEDTIME
COMMUNITY
Start: 2023-01-01

## 2023-01-01 RX ORDER — POTASSIUM CHLORIDE 1500 MG/1
40 TABLET, EXTENDED RELEASE ORAL ONCE
Status: COMPLETED | OUTPATIENT
Start: 2023-01-01 | End: 2023-01-01

## 2023-01-01 RX ORDER — BUMETANIDE 2 MG/1
2 TABLET ORAL 2 TIMES DAILY
Qty: 180 TABLET | Refills: 3 | Status: ON HOLD | OUTPATIENT
Start: 2023-01-01 | End: 2023-01-01

## 2023-01-01 RX ORDER — DOXAZOSIN 4 MG/1
4 TABLET ORAL AT BEDTIME
Status: DISCONTINUED | OUTPATIENT
Start: 2023-01-01 | End: 2023-01-01 | Stop reason: HOSPADM

## 2023-01-01 RX ORDER — DEXTROSE MONOHYDRATE 25 G/50ML
25 INJECTION, SOLUTION INTRAVENOUS ONCE
Status: COMPLETED | OUTPATIENT
Start: 2023-01-01 | End: 2023-01-01

## 2023-01-01 RX ORDER — AMIODARONE HYDROCHLORIDE 200 MG/1
200 TABLET ORAL EVERY OTHER DAY
Qty: 45 TABLET | Refills: 0 | Status: SHIPPED | OUTPATIENT
Start: 2023-01-01 | End: 2023-01-01

## 2023-01-01 RX ORDER — DEXTROSE MONOHYDRATE 25 G/50ML
25-50 INJECTION, SOLUTION INTRAVENOUS
Status: DISCONTINUED | OUTPATIENT
Start: 2023-01-01 | End: 2023-01-01 | Stop reason: HOSPADM

## 2023-01-01 RX ORDER — AMIODARONE HYDROCHLORIDE 200 MG/1
200 TABLET ORAL EVERY OTHER DAY
Status: DISCONTINUED | OUTPATIENT
Start: 2023-01-01 | End: 2023-01-01 | Stop reason: HOSPADM

## 2023-01-01 RX ORDER — ONDANSETRON 2 MG/ML
4 INJECTION INTRAMUSCULAR; INTRAVENOUS EVERY 6 HOURS PRN
Status: DISCONTINUED | OUTPATIENT
Start: 2023-01-01 | End: 2023-01-01 | Stop reason: HOSPADM

## 2023-01-01 RX ORDER — POTASSIUM CHLORIDE 7.45 MG/ML
10 INJECTION INTRAVENOUS
Status: COMPLETED | OUTPATIENT
Start: 2023-01-01 | End: 2023-01-01

## 2023-01-01 RX ORDER — GABAPENTIN 100 MG/1
200 CAPSULE ORAL
Status: DISCONTINUED | OUTPATIENT
Start: 2023-01-01 | End: 2023-01-01 | Stop reason: HOSPADM

## 2023-01-01 RX ORDER — LIDOCAINE 40 MG/G
CREAM TOPICAL
Status: ACTIVE | OUTPATIENT
Start: 2023-01-01 | End: 2023-01-01

## 2023-01-01 RX ORDER — POTASSIUM CHLORIDE 1500 MG/1
20 TABLET, EXTENDED RELEASE ORAL DAILY
Status: DISCONTINUED | OUTPATIENT
Start: 2023-01-01 | End: 2023-01-01 | Stop reason: HOSPADM

## 2023-01-01 RX ORDER — SENNOSIDES 8.6 MG
TABLET ORAL 2 TIMES DAILY
COMMUNITY
Start: 2023-01-01

## 2023-01-01 RX ORDER — NITROGLYCERIN 0.4 MG/1
0.4 TABLET SUBLINGUAL EVERY 5 MIN PRN
Status: DISCONTINUED | OUTPATIENT
Start: 2023-01-01 | End: 2023-01-01 | Stop reason: HOSPADM

## 2023-01-01 RX ORDER — POTASSIUM CHLORIDE 1500 MG/1
20 TABLET, EXTENDED RELEASE ORAL DAILY
COMMUNITY
Start: 2023-01-01

## 2023-01-01 RX ORDER — POTASSIUM CHLORIDE 1500 MG/1
20 TABLET, EXTENDED RELEASE ORAL DAILY
Status: DISCONTINUED | OUTPATIENT
Start: 2023-01-01 | End: 2023-01-01

## 2023-01-01 RX ORDER — DOXAZOSIN 8 MG/1
TABLET ORAL
Status: ON HOLD | COMMUNITY
Start: 2023-01-01 | End: 2023-01-01

## 2023-01-01 RX ORDER — CARBOXYMETHYLCELLULOSE SODIUM 5 MG/ML
1-2 SOLUTION/ DROPS OPHTHALMIC
Status: DISCONTINUED | OUTPATIENT
Start: 2023-01-01 | End: 2023-01-01 | Stop reason: HOSPADM

## 2023-01-01 RX ORDER — GABAPENTIN 250 MG/5ML
300 SOLUTION ORAL AT BEDTIME
Status: COMPLETED | OUTPATIENT
Start: 2023-01-01 | End: 2023-01-01

## 2023-01-01 RX ORDER — FUROSEMIDE 10 MG/ML
40 INJECTION INTRAMUSCULAR; INTRAVENOUS EVERY 12 HOURS
Status: DISCONTINUED | OUTPATIENT
Start: 2023-01-01 | End: 2023-01-01

## 2023-01-01 RX ORDER — DOXAZOSIN 4 MG/1
4 TABLET ORAL AT BEDTIME
Refills: 0 | COMMUNITY
Start: 2023-01-01

## 2023-01-01 RX ORDER — SENNOSIDES 8.6 MG
2 TABLET ORAL ONCE
Status: COMPLETED | OUTPATIENT
Start: 2023-01-01 | End: 2023-01-01

## 2023-01-01 RX ORDER — BUMETANIDE 0.25 MG/ML
1 INJECTION INTRAMUSCULAR; INTRAVENOUS ONCE
Status: DISCONTINUED | OUTPATIENT
Start: 2023-01-01 | End: 2023-01-01

## 2023-01-01 RX ORDER — PANTOPRAZOLE SODIUM 40 MG/1
40 TABLET, DELAYED RELEASE ORAL 2 TIMES DAILY
Status: DISCONTINUED | OUTPATIENT
Start: 2023-01-01 | End: 2023-01-01 | Stop reason: HOSPADM

## 2023-01-01 RX ORDER — FUROSEMIDE 10 MG/ML
60 INJECTION INTRAMUSCULAR; INTRAVENOUS ONCE
Status: DISCONTINUED | OUTPATIENT
Start: 2023-01-01 | End: 2023-01-01

## 2023-01-01 RX ORDER — ACETAMINOPHEN 650 MG/1
650 SUPPOSITORY RECTAL EVERY 6 HOURS PRN
Status: DISCONTINUED | OUTPATIENT
Start: 2023-01-01 | End: 2023-01-01 | Stop reason: HOSPADM

## 2023-01-01 RX ORDER — BISACODYL 10 MG
10 SUPPOSITORY, RECTAL RECTAL ONCE
Status: COMPLETED | OUTPATIENT
Start: 2023-01-01 | End: 2023-01-01

## 2023-01-01 RX ORDER — CALCIUM GLUCONATE 94 MG/ML
1 INJECTION, SOLUTION INTRAVENOUS ONCE
Status: COMPLETED | OUTPATIENT
Start: 2023-01-01 | End: 2023-01-01

## 2023-01-01 RX ORDER — DOBUTAMINE HYDROCHLORIDE 200 MG/100ML
2.5 INJECTION INTRAVENOUS CONTINUOUS
Status: DISCONTINUED | OUTPATIENT
Start: 2023-01-01 | End: 2023-01-01

## 2023-01-01 RX ORDER — POTASSIUM CHLORIDE 1500 MG/1
40 TABLET, EXTENDED RELEASE ORAL ONCE
Status: DISCONTINUED | OUTPATIENT
Start: 2023-01-01 | End: 2023-01-01

## 2023-01-01 RX ORDER — BUMETANIDE 2 MG/1
2 TABLET ORAL DAILY
Status: DISCONTINUED | OUTPATIENT
Start: 2023-01-01 | End: 2023-01-01 | Stop reason: HOSPADM

## 2023-01-01 RX ORDER — ACETAMINOPHEN 325 MG/1
650 TABLET ORAL EVERY 6 HOURS PRN
Status: DISCONTINUED | OUTPATIENT
Start: 2023-01-01 | End: 2023-01-01 | Stop reason: HOSPADM

## 2023-01-01 RX ORDER — CALCIUM CARBONATE 500 MG/1
500 TABLET, CHEWABLE ORAL 3 TIMES DAILY PRN
Status: DISCONTINUED | OUTPATIENT
Start: 2023-01-01 | End: 2023-01-01 | Stop reason: HOSPADM

## 2023-01-01 RX ORDER — BUMETANIDE 1 MG/1
1.5 TABLET ORAL DAILY
COMMUNITY
End: 2023-01-01

## 2023-01-01 RX ORDER — ATORVASTATIN CALCIUM 10 MG/1
10 TABLET, FILM COATED ORAL
Status: DISCONTINUED | OUTPATIENT
Start: 2023-01-01 | End: 2023-01-01

## 2023-01-01 RX ORDER — GABAPENTIN 100 MG/1
300 CAPSULE ORAL EVERY EVENING
Status: ON HOLD | COMMUNITY
End: 2023-01-01

## 2023-01-01 RX ORDER — UREA 200 MG/G
CREAM TOPICAL 2 TIMES DAILY PRN
Status: DISCONTINUED | OUTPATIENT
Start: 2023-01-01 | End: 2023-01-01 | Stop reason: HOSPADM

## 2023-01-01 RX ORDER — FINASTERIDE 5 MG/1
5 TABLET, FILM COATED ORAL AT BEDTIME
Status: DISCONTINUED | OUTPATIENT
Start: 2023-01-01 | End: 2023-01-01 | Stop reason: HOSPADM

## 2023-01-01 RX ORDER — NICOTINE POLACRILEX 4 MG
15-30 LOZENGE BUCCAL
Status: DISCONTINUED | OUTPATIENT
Start: 2023-01-01 | End: 2023-01-01 | Stop reason: HOSPADM

## 2023-01-01 RX ORDER — METOPROLOL SUCCINATE 25 MG/1
25 TABLET, EXTENDED RELEASE ORAL AT BEDTIME
Status: DISCONTINUED | OUTPATIENT
Start: 2023-01-01 | End: 2023-01-01

## 2023-01-01 RX ORDER — GABAPENTIN 100 MG/1
200 CAPSULE ORAL
COMMUNITY
Start: 2023-01-01

## 2023-01-01 RX ORDER — FUROSEMIDE 10 MG/ML
40 INJECTION INTRAMUSCULAR; INTRAVENOUS ONCE
Status: COMPLETED | OUTPATIENT
Start: 2023-01-01 | End: 2023-01-01

## 2023-01-01 RX ORDER — POTASSIUM CHLORIDE 1500 MG/1
20 TABLET, EXTENDED RELEASE ORAL DAILY
Status: COMPLETED | OUTPATIENT
Start: 2023-01-01 | End: 2023-01-01

## 2023-01-01 RX ADMIN — Medication 2 DROP: at 20:29

## 2023-01-01 RX ADMIN — ALBUMIN HUMAN 50 G: 0.25 SOLUTION INTRAVENOUS at 06:25

## 2023-01-01 RX ADMIN — BUMETANIDE 2 MG: 2 TABLET ORAL at 08:50

## 2023-01-01 RX ADMIN — SERTRALINE HYDROCHLORIDE 50 MG: 50 TABLET ORAL at 08:50

## 2023-01-01 RX ADMIN — SERTRALINE HYDROCHLORIDE 50 MG: 50 TABLET ORAL at 08:48

## 2023-01-01 RX ADMIN — SENNOSIDES 2 TABLET: 8.6 TABLET, FILM COATED ORAL at 12:06

## 2023-01-01 RX ADMIN — GABAPENTIN 300 MG: 300 CAPSULE ORAL at 20:12

## 2023-01-01 RX ADMIN — FUROSEMIDE 40 MG: 10 INJECTION, SOLUTION INTRAVENOUS at 06:08

## 2023-01-01 RX ADMIN — AMIODARONE HYDROCHLORIDE 200 MG: 200 TABLET ORAL at 08:18

## 2023-01-01 RX ADMIN — PANTOPRAZOLE SODIUM 40 MG: 40 TABLET, DELAYED RELEASE ORAL at 13:07

## 2023-01-01 RX ADMIN — BUMETANIDE 2 MG: 0.25 INJECTION INTRAMUSCULAR; INTRAVENOUS at 10:04

## 2023-01-01 RX ADMIN — AMIODARONE HYDROCHLORIDE 200 MG: 200 TABLET ORAL at 08:50

## 2023-01-01 RX ADMIN — CALCIUM CARBONATE (ANTACID) CHEW TAB 500 MG 500 MG: 500 CHEW TAB at 00:31

## 2023-01-01 RX ADMIN — FINASTERIDE 5 MG: 5 TABLET, FILM COATED ORAL at 20:42

## 2023-01-01 RX ADMIN — DEXTROSE MONOHYDRATE 25 G: 25 INJECTION, SOLUTION INTRAVENOUS at 19:56

## 2023-01-01 RX ADMIN — Medication 1 DROP: at 20:15

## 2023-01-01 RX ADMIN — ALBUMIN HUMAN 50 G: 0.25 SOLUTION INTRAVENOUS at 06:17

## 2023-01-01 RX ADMIN — RIVAROXABAN 15 MG: 15 TABLET, FILM COATED ORAL at 17:35

## 2023-01-01 RX ADMIN — CALCIUM GLUCONATE 1 G: 20 INJECTION, SOLUTION INTRAVENOUS at 19:46

## 2023-01-01 RX ADMIN — SERTRALINE HYDROCHLORIDE 50 MG: 50 TABLET ORAL at 21:08

## 2023-01-01 RX ADMIN — RIVAROXABAN 20 MG: 10 TABLET, FILM COATED ORAL at 17:39

## 2023-01-01 RX ADMIN — PANTOPRAZOLE SODIUM 40 MG: 40 TABLET, DELAYED RELEASE ORAL at 20:38

## 2023-01-01 RX ADMIN — FINASTERIDE 5 MG: 5 TABLET, FILM COATED ORAL at 21:12

## 2023-01-01 RX ADMIN — SENNOSIDES 8.6 MG: 8.6 TABLET ORAL at 20:38

## 2023-01-01 RX ADMIN — SODIUM CHLORIDE 500 ML: 9 INJECTION, SOLUTION INTRAVENOUS at 19:47

## 2023-01-01 RX ADMIN — SENNOSIDES 8.6 MG: 8.6 TABLET ORAL at 20:04

## 2023-01-01 RX ADMIN — CALCIUM CARBONATE (ANTACID) CHEW TAB 500 MG 500 MG: 500 CHEW TAB at 04:30

## 2023-01-01 RX ADMIN — CALCIUM CARBONATE (ANTACID) CHEW TAB 500 MG 500 MG: 500 CHEW TAB at 20:10

## 2023-01-01 RX ADMIN — CALCIUM CARBONATE (ANTACID) CHEW TAB 500 MG 500 MG: 500 CHEW TAB at 22:43

## 2023-01-01 RX ADMIN — GABAPENTIN 200 MG: 100 CAPSULE ORAL at 12:07

## 2023-01-01 RX ADMIN — BUMETANIDE 2 MG: 2 TABLET ORAL at 08:30

## 2023-01-01 RX ADMIN — PANTOPRAZOLE SODIUM 40 MG: 40 TABLET, DELAYED RELEASE ORAL at 20:12

## 2023-01-01 RX ADMIN — ASPIRIN 81 MG: 81 TABLET, CHEWABLE ORAL at 08:08

## 2023-01-01 RX ADMIN — BISACODYL 10 MG: 10 SUPPOSITORY RECTAL at 10:05

## 2023-01-01 RX ADMIN — SERTRALINE HYDROCHLORIDE 50 MG: 50 TABLET ORAL at 21:42

## 2023-01-01 RX ADMIN — GABAPENTIN 200 MG: 100 CAPSULE ORAL at 06:28

## 2023-01-01 RX ADMIN — FUROSEMIDE 40 MG: 10 INJECTION, SOLUTION INTRAVENOUS at 15:21

## 2023-01-01 RX ADMIN — SERTRALINE HYDROCHLORIDE 50 MG: 50 TABLET ORAL at 08:25

## 2023-01-01 RX ADMIN — MIDODRINE HYDROCHLORIDE 5 MG: 5 TABLET ORAL at 12:03

## 2023-01-01 RX ADMIN — SERTRALINE HYDROCHLORIDE 50 MG: 50 TABLET ORAL at 20:31

## 2023-01-01 RX ADMIN — POTASSIUM CHLORIDE 10 MEQ: 7.46 INJECTION, SOLUTION INTRAVENOUS at 14:13

## 2023-01-01 RX ADMIN — SENNOSIDES 8.6 MG: 8.6 TABLET ORAL at 21:08

## 2023-01-01 RX ADMIN — GABAPENTIN 300 MG: 300 CAPSULE ORAL at 20:04

## 2023-01-01 RX ADMIN — FINASTERIDE 5 MG: 5 TABLET, FILM COATED ORAL at 20:08

## 2023-01-01 RX ADMIN — ATORVASTATIN CALCIUM 10 MG: 10 TABLET, FILM COATED ORAL at 08:48

## 2023-01-01 RX ADMIN — POTASSIUM CHLORIDE 20 MEQ: 1500 TABLET, EXTENDED RELEASE ORAL at 08:02

## 2023-01-01 RX ADMIN — Medication 1 APPLICATOR: at 18:15

## 2023-01-01 RX ADMIN — SERTRALINE HYDROCHLORIDE 50 MG: 50 TABLET ORAL at 19:12

## 2023-01-01 RX ADMIN — ASPIRIN 81 MG: 81 TABLET, CHEWABLE ORAL at 08:17

## 2023-01-01 RX ADMIN — ACETAMINOPHEN 650 MG: 325 TABLET ORAL at 18:45

## 2023-01-01 RX ADMIN — SERTRALINE HYDROCHLORIDE 50 MG: 50 TABLET ORAL at 20:32

## 2023-01-01 RX ADMIN — POTASSIUM CHLORIDE 10 MEQ: 7.46 INJECTION, SOLUTION INTRAVENOUS at 10:56

## 2023-01-01 RX ADMIN — FINASTERIDE 5 MG: 5 TABLET, FILM COATED ORAL at 21:42

## 2023-01-01 RX ADMIN — BUMETANIDE 2 MG: 2 TABLET ORAL at 08:05

## 2023-01-01 RX ADMIN — ALBUMIN (HUMAN) 50 G: 0.25 INJECTION, SOLUTION INTRAVENOUS at 06:39

## 2023-01-01 RX ADMIN — Medication 2 DROP: at 14:39

## 2023-01-01 RX ADMIN — Medication 2 DROP: at 20:37

## 2023-01-01 RX ADMIN — ACETAMINOPHEN 650 MG: 325 TABLET ORAL at 20:14

## 2023-01-01 RX ADMIN — POLYETHYLENE GLYCOL 3350 17 G: 17 POWDER, FOR SOLUTION ORAL at 22:03

## 2023-01-01 RX ADMIN — ATORVASTATIN CALCIUM 10 MG: 10 TABLET, FILM COATED ORAL at 08:50

## 2023-01-01 RX ADMIN — Medication 1 MG: at 20:44

## 2023-01-01 RX ADMIN — POTASSIUM CHLORIDE 20 MEQ: 1500 TABLET, EXTENDED RELEASE ORAL at 08:50

## 2023-01-01 RX ADMIN — SERTRALINE HYDROCHLORIDE 50 MG: 50 TABLET ORAL at 08:35

## 2023-01-01 RX ADMIN — FINASTERIDE 5 MG: 5 TABLET, FILM COATED ORAL at 21:09

## 2023-01-01 RX ADMIN — SENNOSIDES 8.6 MG: 8.6 TABLET ORAL at 08:25

## 2023-01-01 RX ADMIN — GABAPENTIN 200 MG: 100 CAPSULE ORAL at 11:41

## 2023-01-01 RX ADMIN — ASPIRIN 81 MG: 81 TABLET, CHEWABLE ORAL at 08:55

## 2023-01-01 RX ADMIN — DEXTROSE MONOHYDRATE 300 ML: 100 INJECTION, SOLUTION INTRAVENOUS at 20:08

## 2023-01-01 RX ADMIN — BUMETANIDE 2 MG: 2 TABLET ORAL at 08:55

## 2023-01-01 RX ADMIN — DOBUTAMINE HYDROCHLORIDE 5 MCG/KG/MIN: 200 INJECTION INTRAVENOUS at 06:08

## 2023-01-01 RX ADMIN — CALCIUM CARBONATE (ANTACID) CHEW TAB 500 MG 500 MG: 500 CHEW TAB at 00:06

## 2023-01-01 RX ADMIN — CALCIUM CARBONATE (ANTACID) CHEW TAB 500 MG 500 MG: 500 CHEW TAB at 02:10

## 2023-01-01 RX ADMIN — FINASTERIDE 5 MG: 5 TABLET, FILM COATED ORAL at 21:31

## 2023-01-01 RX ADMIN — PANTOPRAZOLE SODIUM 40 MG: 40 TABLET, DELAYED RELEASE ORAL at 08:24

## 2023-01-01 RX ADMIN — Medication 2 DROP: at 05:07

## 2023-01-01 RX ADMIN — Medication 1 MG: at 00:32

## 2023-01-01 RX ADMIN — ASPIRIN 81 MG: 81 TABLET, CHEWABLE ORAL at 08:30

## 2023-01-01 RX ADMIN — SERTRALINE HYDROCHLORIDE 50 MG: 50 TABLET ORAL at 08:08

## 2023-01-01 RX ADMIN — SERTRALINE HYDROCHLORIDE 50 MG: 50 TABLET ORAL at 20:08

## 2023-01-01 RX ADMIN — ALUMINUM HYDROXIDE, MAGNESIUM HYDROXIDE, AND DIMETHICONE 30 ML: 200; 20; 200 SUSPENSION ORAL at 05:11

## 2023-01-01 RX ADMIN — BUMETANIDE 2 MG: 0.25 INJECTION INTRAMUSCULAR; INTRAVENOUS at 06:26

## 2023-01-01 RX ADMIN — MIDODRINE HYDROCHLORIDE 5 MG: 5 TABLET ORAL at 09:48

## 2023-01-01 RX ADMIN — GABAPENTIN 200 MG: 100 CAPSULE ORAL at 11:00

## 2023-01-01 RX ADMIN — PANTOPRAZOLE SODIUM 40 MG: 40 TABLET, DELAYED RELEASE ORAL at 08:15

## 2023-01-01 RX ADMIN — DOXAZOSIN 4 MG: 4 TABLET ORAL at 21:56

## 2023-01-01 RX ADMIN — Medication 2 DROP: at 20:10

## 2023-01-01 RX ADMIN — SERTRALINE HYDROCHLORIDE 50 MG: 50 TABLET ORAL at 08:30

## 2023-01-01 RX ADMIN — PANTOPRAZOLE SODIUM 40 MG: 40 TABLET, DELAYED RELEASE ORAL at 20:04

## 2023-01-01 RX ADMIN — ACETAZOLAMIDE SODIUM 500 MG: 500 INJECTION, POWDER, LYOPHILIZED, FOR SOLUTION INTRAVENOUS at 06:48

## 2023-01-01 RX ADMIN — RIVAROXABAN 20 MG: 10 TABLET, FILM COATED ORAL at 17:01

## 2023-01-01 RX ADMIN — DOXYLAMINE SUCCINATE 25 MG: 25 TABLET ORAL at 21:12

## 2023-01-01 RX ADMIN — PANTOPRAZOLE SODIUM 40 MG: 40 TABLET, DELAYED RELEASE ORAL at 21:08

## 2023-01-01 RX ADMIN — GABAPENTIN 300 MG: 300 CAPSULE ORAL at 21:08

## 2023-01-01 RX ADMIN — GABAPENTIN 200 MG: 100 CAPSULE ORAL at 10:33

## 2023-01-01 RX ADMIN — FINASTERIDE 5 MG: 5 TABLET, FILM COATED ORAL at 21:56

## 2023-01-01 RX ADMIN — BUMETANIDE 2 MG: 2 TABLET ORAL at 08:33

## 2023-01-01 RX ADMIN — Medication 2 DROP: at 08:35

## 2023-01-01 RX ADMIN — CALCIUM CARBONATE (ANTACID) CHEW TAB 500 MG 500 MG: 500 CHEW TAB at 09:57

## 2023-01-01 RX ADMIN — PANTOPRAZOLE SODIUM 40 MG: 40 TABLET, DELAYED RELEASE ORAL at 08:30

## 2023-01-01 RX ADMIN — POTASSIUM CHLORIDE 40 MEQ: 1500 TABLET, EXTENDED RELEASE ORAL at 08:29

## 2023-01-01 RX ADMIN — Medication 2 DROP: at 08:50

## 2023-01-01 RX ADMIN — LIDOCAINE HYDROCHLORIDE 3 ML: 10 INJECTION, SOLUTION EPIDURAL; INFILTRATION; INTRACAUDAL; PERINEURAL at 07:45

## 2023-01-01 RX ADMIN — POLYETHYLENE GLYCOL 3350 17 G: 17 POWDER, FOR SOLUTION ORAL at 11:41

## 2023-01-01 RX ADMIN — SENNOSIDES 8.6 MG: 8.6 TABLET ORAL at 08:04

## 2023-01-01 RX ADMIN — CALCIUM GLUCONATE 1 G: 98 INJECTION, SOLUTION INTRAVENOUS at 21:51

## 2023-01-01 RX ADMIN — GABAPENTIN 200 MG: 100 CAPSULE ORAL at 10:31

## 2023-01-01 RX ADMIN — Medication 2 DROP: at 07:58

## 2023-01-01 RX ADMIN — BARIUM SULFATE 20 ML: 400 SUSPENSION ORAL at 10:07

## 2023-01-01 RX ADMIN — DOBUTAMINE HYDROCHLORIDE 5 MCG/KG/MIN: 200 INJECTION INTRAVENOUS at 03:46

## 2023-01-01 RX ADMIN — RIVAROXABAN 20 MG: 10 TABLET, FILM COATED ORAL at 17:09

## 2023-01-01 RX ADMIN — SENNOSIDES 8.6 MG: 8.6 TABLET ORAL at 08:18

## 2023-01-01 RX ADMIN — POTASSIUM CHLORIDE 10 MEQ: 7.46 INJECTION, SOLUTION INTRAVENOUS at 13:03

## 2023-01-01 RX ADMIN — POTASSIUM CHLORIDE 20 MEQ: 1500 TABLET, EXTENDED RELEASE ORAL at 08:51

## 2023-01-01 RX ADMIN — FINASTERIDE 5 MG: 5 TABLET, FILM COATED ORAL at 21:06

## 2023-01-01 RX ADMIN — SERTRALINE HYDROCHLORIDE 50 MG: 50 TABLET ORAL at 08:33

## 2023-01-01 RX ADMIN — GABAPENTIN 300 MG: 250 SOLUTION ORAL at 20:45

## 2023-01-01 RX ADMIN — POTASSIUM CHLORIDE 10 MEQ: 7.46 INJECTION, SOLUTION INTRAVENOUS at 12:05

## 2023-01-01 RX ADMIN — AMIODARONE HYDROCHLORIDE 200 MG: 200 TABLET ORAL at 08:30

## 2023-01-01 RX ADMIN — BUMETANIDE 2 MG: 2 TABLET ORAL at 08:25

## 2023-01-01 RX ADMIN — SERTRALINE HYDROCHLORIDE 50 MG: 50 TABLET ORAL at 20:37

## 2023-01-01 RX ADMIN — SODIUM CHLORIDE 500 ML: 9 INJECTION, SOLUTION INTRAVENOUS at 21:51

## 2023-01-01 RX ADMIN — SERTRALINE HYDROCHLORIDE 50 MG: 50 TABLET ORAL at 08:17

## 2023-01-01 RX ADMIN — ACETAZOLAMIDE SODIUM 500 MG: 500 INJECTION, POWDER, LYOPHILIZED, FOR SOLUTION INTRAVENOUS at 12:07

## 2023-01-01 RX ADMIN — ALUMINUM HYDROXIDE, MAGNESIUM HYDROXIDE, AND DIMETHICONE 30 ML: 200; 20; 200 SUSPENSION ORAL at 00:32

## 2023-01-01 RX ADMIN — ACETAMINOPHEN 650 MG: 325 TABLET ORAL at 16:24

## 2023-01-01 RX ADMIN — PANTOPRAZOLE SODIUM 40 MG: 40 TABLET, DELAYED RELEASE ORAL at 08:04

## 2023-01-01 RX ADMIN — SERTRALINE HYDROCHLORIDE 50 MG: 50 TABLET ORAL at 08:56

## 2023-01-01 RX ADMIN — SODIUM CHLORIDE 7.3 UNITS: 9 INJECTION, SOLUTION INTRAVENOUS at 20:01

## 2023-01-01 RX ADMIN — RIVAROXABAN 20 MG: 10 TABLET, FILM COATED ORAL at 17:52

## 2023-01-01 RX ADMIN — POTASSIUM CHLORIDE 20 MEQ: 1500 TABLET, EXTENDED RELEASE ORAL at 08:55

## 2023-01-01 RX ADMIN — ACETAMINOPHEN 650 MG: 325 TABLET ORAL at 21:42

## 2023-01-01 RX ADMIN — Medication 2 DROP: at 08:56

## 2023-01-01 RX ADMIN — CALCIUM CARBONATE (ANTACID) CHEW TAB 500 MG 500 MG: 500 CHEW TAB at 06:10

## 2023-01-01 RX ADMIN — GABAPENTIN 200 MG: 100 CAPSULE ORAL at 08:20

## 2023-01-01 RX ADMIN — RIVAROXABAN 20 MG: 10 TABLET, FILM COATED ORAL at 16:28

## 2023-01-01 RX ADMIN — ATORVASTATIN CALCIUM 10 MG: 10 TABLET, FILM COATED ORAL at 08:34

## 2023-01-01 RX ADMIN — RIVAROXABAN 20 MG: 10 TABLET, FILM COATED ORAL at 16:25

## 2023-01-01 RX ADMIN — Medication 1 MG: at 21:08

## 2023-01-01 RX ADMIN — DOXAZOSIN 4 MG: 4 TABLET ORAL at 21:13

## 2023-01-01 RX ADMIN — FUROSEMIDE 20 MG: 10 INJECTION, SOLUTION INTRAMUSCULAR; INTRAVENOUS at 22:09

## 2023-01-01 RX ADMIN — BARIUM SULFATE 60 ML: 400 SUSPENSION ORAL at 10:08

## 2023-01-01 RX ADMIN — BUMETANIDE 2 MG: 2 TABLET ORAL at 08:48

## 2023-01-01 RX ADMIN — BUMETANIDE 2 MG: 2 TABLET ORAL at 08:19

## 2023-01-01 RX ADMIN — DOXYLAMINE SUCCINATE 25 MG: 25 TABLET ORAL at 21:07

## 2023-01-01 RX ADMIN — GABAPENTIN 200 MG: 100 CAPSULE ORAL at 08:30

## 2023-01-01 RX ADMIN — ACETAMINOPHEN 650 MG: 325 TABLET ORAL at 08:35

## 2023-01-01 RX ADMIN — SODIUM ZIRCONIUM CYCLOSILICATE 10 G: 10 POWDER, FOR SUSPENSION ORAL at 21:59

## 2023-01-01 RX ADMIN — SERTRALINE HYDROCHLORIDE 50 MG: 50 TABLET ORAL at 20:13

## 2023-01-01 RX ADMIN — MIDODRINE HYDROCHLORIDE 5 MG: 5 TABLET ORAL at 17:32

## 2023-01-01 RX ADMIN — SERTRALINE HYDROCHLORIDE 50 MG: 50 TABLET ORAL at 20:05

## 2023-01-01 RX ADMIN — RIVAROXABAN 15 MG: 15 TABLET, FILM COATED ORAL at 17:40

## 2023-01-01 RX ADMIN — SERTRALINE HYDROCHLORIDE 50 MG: 50 TABLET ORAL at 07:58

## 2023-01-01 RX ADMIN — FUROSEMIDE 40 MG: 10 INJECTION, SOLUTION INTRAVENOUS at 03:46

## 2023-01-01 ASSESSMENT — ACTIVITIES OF DAILY LIVING (ADL)
ADLS_ACUITY_SCORE: 56
ADLS_ACUITY_SCORE: 54
ADLS_ACUITY_SCORE: 48
WALKING_OR_CLIMBING_STAIRS: AMBULATION DIFFICULTY, REQUIRES EQUIPMENT
ADLS_ACUITY_SCORE: 41
ADLS_ACUITY_SCORE: 52
ADLS_ACUITY_SCORE: 54
ADLS_ACUITY_SCORE: 56
ADLS_ACUITY_SCORE: 45
ADLS_ACUITY_SCORE: 52
ADLS_ACUITY_SCORE: 56
ADLS_ACUITY_SCORE: 41
FALL_HISTORY_WITHIN_LAST_SIX_MONTHS: NO
ADLS_ACUITY_SCORE: 54
ADLS_ACUITY_SCORE: 52
ADLS_ACUITY_SCORE: 35
ADLS_ACUITY_SCORE: 54
ADLS_ACUITY_SCORE: 35
ADLS_ACUITY_SCORE: 52
EQUIPMENT_CURRENTLY_USED_AT_HOME: WALKER, STANDARD
DRESSING/BATHING: BATHING DIFFICULTY, ASSISTANCE 1 PERSON
ADLS_ACUITY_SCORE: 52
TOILETING_ASSISTANCE: TOILETING DIFFICULTY, REQUIRES EQUIPMENT
EATING/SWALLOWING: SWALLOWING SOLID FOOD
ADLS_ACUITY_SCORE: 56
ADLS_ACUITY_SCORE: 45
ADLS_ACUITY_SCORE: 52
ADLS_ACUITY_SCORE: 52
ADLS_ACUITY_SCORE: 41
ADLS_ACUITY_SCORE: 54
ADLS_ACUITY_SCORE: 52
ADLS_ACUITY_SCORE: 52
ADLS_ACUITY_SCORE: 54
ADLS_ACUITY_SCORE: 52
ADLS_ACUITY_SCORE: 52
ADLS_ACUITY_SCORE: 48
CONCENTRATING,_REMEMBERING_OR_MAKING_DECISIONS_DIFFICULTY: YES
ADLS_ACUITY_SCORE: 52
ADLS_ACUITY_SCORE: 56
ADLS_ACUITY_SCORE: 52
ADLS_ACUITY_SCORE: 56
ADLS_ACUITY_SCORE: 48
ADLS_ACUITY_SCORE: 52
ADLS_ACUITY_SCORE: 56
ADLS_ACUITY_SCORE: 54
ADLS_ACUITY_SCORE: 52
EATING: 0-->INDEPENDENT
ADLS_ACUITY_SCORE: 54
ADLS_ACUITY_SCORE: 35
ADLS_ACUITY_SCORE: 52
ADLS_ACUITY_SCORE: 56
ADLS_ACUITY_SCORE: 52
TOILETING: 0-->NOT TOILET TRAINED OR ASSISTANCE NEEDED (DEVELOPMENTALLY APPROPRIATE)
ADLS_ACUITY_SCORE: 48
ADLS_ACUITY_SCORE: 52
TOILETING_ISSUES: YES
ADLS_ACUITY_SCORE: 52
ADLS_ACUITY_SCORE: 48
TOILETING: 1-->ASSISTANCE (EQUIPMENT/PERSON) NEEDED
ADLS_ACUITY_SCORE: 52
ADLS_ACUITY_SCORE: 48
ADLS_ACUITY_SCORE: 48
DRESS: 1-->ASSISTANCE (EQUIPMENT/PERSON) NEEDED
EATING: 0-->INDEPENDENT
ADLS_ACUITY_SCORE: 52
ADLS_ACUITY_SCORE: 56
ADLS_ACUITY_SCORE: 56
ADLS_ACUITY_SCORE: 41
ADLS_ACUITY_SCORE: 54
ADLS_ACUITY_SCORE: 52
DOING_ERRANDS_INDEPENDENTLY_DIFFICULTY: NO
ADLS_ACUITY_SCORE: 52
TRANSFERRING: 1-->ASSISTANCE (EQUIPMENT/PERSON) NEEDED
ADLS_ACUITY_SCORE: 41
ADLS_ACUITY_SCORE: 37
ADLS_ACUITY_SCORE: 56
ADLS_ACUITY_SCORE: 48
ADLS_ACUITY_SCORE: 52
ADLS_ACUITY_SCORE: 52
WEAR_GLASSES_OR_BLIND: YES
ADLS_ACUITY_SCORE: 54
ADLS_ACUITY_SCORE: 56
ADLS_ACUITY_SCORE: 54
ADLS_ACUITY_SCORE: 35
ADLS_ACUITY_SCORE: 41
ADLS_ACUITY_SCORE: 52
ADLS_ACUITY_SCORE: 56
ADLS_ACUITY_SCORE: 54
ADLS_ACUITY_SCORE: 56
ADLS_ACUITY_SCORE: 54
ADLS_ACUITY_SCORE: 56
ADLS_ACUITY_SCORE: 54
ADLS_ACUITY_SCORE: 52
TRANSFERRING: 0-->ASSISTANCE NEEDED (DEVELOPMENTALLY APPROPRIATE)
DRESSING/BATHING_DIFFICULTY: YES
ADLS_ACUITY_SCORE: 41
ADLS_ACUITY_SCORE: 52
ADLS_ACUITY_SCORE: 48
WALKING_OR_CLIMBING_STAIRS_DIFFICULTY: YES
SWALLOWING: 0-->SWALLOWS FOODS/LIQUIDS WITHOUT DIFFICULTY
ADLS_ACUITY_SCORE: 54
ADLS_ACUITY_SCORE: 54
ADLS_ACUITY_SCORE: 56
ADLS_ACUITY_SCORE: 41
ADLS_ACUITY_SCORE: 54
ADLS_ACUITY_SCORE: 52
ADLS_ACUITY_SCORE: 56
ADLS_ACUITY_SCORE: 52
ADLS_ACUITY_SCORE: 54
ADLS_ACUITY_SCORE: 52
ADLS_ACUITY_SCORE: 52
SWALLOWING: 0-->SWALLOWS FOODS/LIQUIDS WITHOUT DIFFICULTY (DEVELOPMENTALLY APPROPRIATE)
ADLS_ACUITY_SCORE: 52
ADLS_ACUITY_SCORE: 56
ADLS_ACUITY_SCORE: 52
ADLS_ACUITY_SCORE: 54
ADLS_ACUITY_SCORE: 41
ADLS_ACUITY_SCORE: 48
BATHING: 1-->ASSISTANCE NEEDED
ADLS_ACUITY_SCORE: 52
ADLS_ACUITY_SCORE: 54
ADLS_ACUITY_SCORE: 54
ADLS_ACUITY_SCORE: 45
ADLS_ACUITY_SCORE: 54
ADLS_ACUITY_SCORE: 54
CHANGE_IN_FUNCTIONAL_STATUS_SINCE_ONSET_OF_CURRENT_ILLNESS/INJURY: YES
ADLS_ACUITY_SCORE: 54
ADLS_ACUITY_SCORE: 52
DRESS: 0-->ASSISTANCE NEEDED (DEVELOPMENTALLY APPROPRIATE)
DIFFICULTY_EATING/SWALLOWING: YES

## 2023-01-04 PROBLEM — I48.19 PERSISTENT ATRIAL FIBRILLATION (H): Status: ACTIVE | Noted: 2020-08-11

## 2023-01-04 PROBLEM — Z98.890 STATUS POST CATHETER ABLATION OF ATRIAL FIBRILLATION: Status: ACTIVE | Noted: 2023-01-01

## 2023-01-04 NOTE — LETTER
1/4/2023    ADDISON BENÍTEZ MD  [address]  [facility] [address]    RE: Reymundo Petersen       Dear Colleague,     I had the pleasure of seeing Reymundo Petersen in the [facility].    Thank you, Dr. Benítez, for asking the [facility] team to see . Reymundo Petersen to evaluate persistent atrial fibrillation.    Assessment/Recommendations     Assessment/Plan:    Diagnoses and all orders for this visit:  Persistent atrial fibrillation (H) and 1% burden on very low-dose amiodarone of 200 mg orally every other day.  They are concerned about deposits seen in his eyes and discussed that this is normal on amiodarone and will clear when he comes off medication.  I discussed use of amiodarone for 3 months post ablation and then will do a trial off.  I do not anticipate long-term use of amiodarone.  He has good BiV pacing despite atrial arrhythmias in the past and findings at time of ablation reflect not likely to maintain normal rhythm long-term and discussed today.  Questions answered.  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  Acute on chronic congestive heart failure, unspecified heart failure type (H) and no signs or symptoms of decompensated heart failure.  No peripheral edema.  Weight stable since discharge at 158 to 162 pounds.  Poor appetite.  Adhering to low-sodium diet.  Recommended protein supplement between meals to support calorie needs.  Mucous membranes are slightly dry today.  I am reluctant to decrease diuretic which is Bumex 3 mg p.o. twice daily given hospitalization 2 weeks ago for acute heart failure.  No change in meds for now and to be seen in heart failure next week as planned.  Primary hypertension and blood pressure well below goal.  Pulmonary hypertension (H) history  Status post catheter ablation of atrial fibrillation and complaining of the following problems.  He is 5 weeks post ablation and  on questioning he is lost a significant amount of weight in the last few years.  His dentures are loose.  He is getting a lot of food underneath them and unable to adequately chew food.  This is why I think he is having difficulty swallowing.  Patient agrees dentures are problem and will see dentist.      [id number] score of 5 and on Xarelto.  Follow up in clinic in heart failure as planned and see Dr. Michelle in 6 to 8 weeks.     History of Present Illness/Subjective     Reymundo Petersen is a very pleasant 82 year old male who comes in today for EP follow-up post ablation and did remote device check prior to visit.  Reymundo Petersen has a known history of of persistent atrial fibrillation, chronic systolic and diastolic heart failure due to nonischemic cardiomyopathy with severe concentric LV thickening (LVEF 41% by 2/10/2022 MPI), CHB, CRT-P in-situ, CAD with prior RCA PCI, HTN and CKD stage 2.    He has complete heart block and good BiV pacing.  He has been on amiodarone and had cardioversion on March 8. He has been in normal sinus rhythm since that time.      On 11/23/2022, Ramirez had PVI with Dr. Michelle with RF to pulmonary veins and posterior wall.  Left atrial tachycardia versus atrial flutter treated.  Empirically right CTI and JERAMIE I ablations done.  Noted to have severe biatrial enlargement with severe widespread fibrosis noted.    Ramirez comes to clinic with his daughter.  He lives with his sister-in-law who has some psych issues and hoarding.  She has been more difficult to live with.  Ramirez is currently on home care with PT, OT and nursing visits.  He complains of difficulty swallowing meats.  He has no trouble with soft foods.  See above.  He denies any peripheral edema, orthopnea or weight gain.  He has a history of falls.  He uses walker all the time.  He had a mechanical fall last week.  No injury from this.  He is sleeping poorly at times.  Daughter is going to restarted him on Tylenol  PM.    Cardiographics (reviewed):  December 2022 echo showed:  Interpretation Summary     1. The left ventricle is normal in size.Left ventricular function is  decreased. The ejection fraction is 35-40% (moderately reduced). There is  severe concentric left ventricular hypertrophy. There is moderate global  hypokinesia of the left ventricle.  2. The right ventricle is normal size. There is mild to moderate right  ventricular hypertrophy. The right ventricular systolic function is normal.  3. The left atrium is severely dilated. The right atrium is severely dilated.  4. There is mild to moderate (1-2+) mitral regurgitation.  IVC diameter >2.1 cm collapsing <50% with sniff suggests a high RA pressure  estimated at 15 mmHg or greater.  5. There is mild to moderate (1-2+) tricuspid regurgitation.  6. Small pericardial effusion. There are no echocardiographic indications of  cardiac tamponade.    Echo from June 13, 2022:  Interpretation Summary     The left ventricle is mildly dilated.  Left ventricular function is decreased. The ejection fraction is 30-35%  (moderately reduced).  There is moderate global hypokinesia of the left ventricle.  The right ventricle is mildly dilated.  The right ventricular systolic function is normal.  The left atrium is severely dilated.  The right atrium is moderately dilated.  There is moderately severe (3+) tricuspid regurgitation.  Right ventricle systolic pressure estimate normal  There is mild to moderate (1-2+) aortic regurgitation.  The ascending aorta is Mildly dilated.  IVC diameter >2.1 cm collapsing <50% with sniff suggests a high RA pressure  estimated at 15 mmHg or greater.  Small pericardial effusion  There are no echocardiographic indications of cardiac tamponade.  Cardiac testing personally reviewed:  Device check shows leads stable and battery ok.  Device functioning appropriately.   Atrial pacing 50% and bi ventricular pacing 99%.  AT/AFib burden 1% with ventricular  "response well controlled.  3 nonsustained VT episodes seen and longest was 13 beats..           Problem List:  Patient Active Problem List   Diagnosis     Primary osteoarthritis of knees, bilateral     Primary hypertension     BPH (benign prostatic hyperplasia)     Combined hyperlipidemia     Dizziness     DJD (degenerative joint disease)     Left ventricular hypertrophy     Troponin level elevated     Acute combined systolic and diastolic congestive heart failure (H)     Pulmonary hypertension (H)     Nonrheumatic aortic valve insufficiency     Coronary artery disease due to lipid rich plaque     MARIAN (generalized anxiety disorder)     GERD (gastroesophageal reflux disease)     Infection due to 2019 novel coronavirus     Dilated cardiomyopathy (H)     Persistent atrial fibrillation (H)     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  Wt Readings from Last 3 Encounters:   01/04/23 72.5 kg (159 lb 12.8 oz)   12/23/22 73.1 kg (161 lb 1.6 oz)   11/24/22 77.7 kg (171 lb 4.8 oz)     General Appearance:   Alert, well-appearing and in no acute distress.   HEENT: Atraumatic, normocephalic.  No scleral icterus, normal conjunctivae; mucous membranes pink and moist.     Chest: Chest symmetric, spine straight.   Lungs:   Respirations unlabored.   Cardiovascular:   Normal first and second heart sounds with no murmurs, rubs, or " gallops.  Regular, regular.   Normal JVD, no edema.       Extremities: No cyanosis or clubbing   Musculoskeletal: Moves all extremities   Skin: Warm, dry, intact.    Neurologic: Mood and affect are appropriate, alert and oriented to person, place, time, and situation     ROS: 10 point ROS neg other than the symptoms noted above in the HPI.     Medical History  Surgical History Family History Social History     Past Medical History:   Diagnosis Date     Atrial fibrillation (H)      Atrial fibrillation, transient (H) 8/11/2020     BPH (benign prostatic hyperplasia) 7/11/2018     Chronic combined systolic and diastolic heart failure (H) 2/11/2021     Congestive heart failure (H)      Coronary artery disease      COVID 02/2021     Dilated cardiomyopathy (H) 2/11/2021     MARIAN (generalized anxiety disorder) 2/11/2021     GERD (gastroesophageal reflux disease)      High cholesterol      Hypertension      LBBB (left bundle branch block) 7/11/2018     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     Pulmonary hypertension (H) 4/1/2020    Past Surgical History:   Procedure Laterality Date     ANGIOPLASTY       ARTHROSCOPY KNEE       CARDIOVERSION  2021     CARDIOVERSION  02/03/2022     CV CORONARY ANGIOGRAM N/A 10/23/2020    Procedure: Coronary Angiogram;  Surgeon: Varun Freire MD;  Location: [facility];  Service: Cardiology     CV LEFT HEART CATHETERIZATION WITHOUT LEFT VENTRICULOGRAM Left 10/23/2020    Procedure: Left Heart Catheterization Without Left Ventriculogram;  Surgeon: Varun Freire MD;  Location: [facility];  Service: Cardiology     EP BIV PACEMAKER INSERT N/A 3/25/2020    Procedure: EP Biventricular Pacemaker Insertion;  Surgeon: Taylor Sandoval MD;  Location: [facility] Lab;  Service: Cardiology     RELEASE CARPAL TUNNEL       ZZC TOTAL KNEE ARTHROPLASTY Right 8/15/2019    Procedure: RIGHT  MINIMALLY TOTAL KNEE ARTHROPLASTY;  Surgeon: Keven Cerda MD;   Location: [facility] Main OR;  Service: Orthopedics    Family History   Problem Relation Age of Onset     Alzheimer Disease Mother      Heart Disease Father      Cancer Sister      Diabetes Type 2  Sister      Chronic Obstructive Pulmonary Disease Sister      LUNG DISEASE Brother     History   Smoking Status     Never   Smokeless Tobacco     Never     Social History    Substance and Sexual Activity      Alcohol use: Yes        Comment: Alcoholic Drinks/day: rare       Medications  Allergies     Current Outpatient Medications   Medication Sig Dispense Refill     amiodarone (PACERONE) 200 MG tablet Take 1 tablet (200 mg) by mouth every other day 45 tablet 0     aspirin (ASA) 81 MG chewable tablet Take 1 tablet (81 mg) by mouth every other day (Patient taking differently: Take 81 mg by mouth Every Mon, Wed, Fri Morning Taking Monday, Wednesday and Fridays) 30 tablet 0     atorvastatin (LIPITOR) 10 MG tablet Take 10 mg by mouth four times a week Sunday, Tuesday, Thursday, & Saturday at bedtime       bumetanide (BUMEX) 1 MG tablet Take 3 tablets (3 mg) by mouth 2 times daily for 30 days 180 tablet 0     Coenzyme Q10 300 MG CAPS Take 300 mg by mouth daily       diphenhydrAMINE-acetaminophen (TYLENOL PM)  MG tablet        doxazosin (CARDURA) 8 MG tablet TAKE 1/2 TABLET BY MOUTH ONCE DAILY IN THE EVENING TO SHRINK PROSTATE AND LOWER BLOOD PRESSURE       finasteride (PROSCAR) 5 mg tablet [FINASTERIDE (PROSCAR) 5 MG TABLET] Take 5 mg by mouth at bedtime.              gabapentin (NEURONTIN) 100 MG capsule Two capsules in the morning, two capsules in the afternoon, and three capsules in the evening       glucosamine/chondr cole A sod (OSTEO BI-FLEX ORAL) Take 1 tablet by mouth 2 times daily (with meals)       melatonin 10 mg Tab Take 10 mg by mouth At Bedtime        metoprolol succinate ER (TOPROL-XL) 25 MG 24 hr tablet Take 1 tablet (25 mg) by mouth At Bedtime 90 tablet 3     multivitamin, therapeutic (THERA-VIT) TABS  tablet Take 1 tablet by mouth daily       nitroGLYcerin (NITROSTAT) 0.4 MG sublingual tablet For chest pain place 1 tablet under the tongue every 5 minutes for 3 doses. If symptoms persist 5 minutes after 1st dose call 911. 9 tablet 0     omeprazole (PRILOSEC) 20 MG capsule [OMEPRAZOLE (PRILOSEC) 20 MG CAPSULE] Take 20 mg by mouth 2 (two) times a day before meals.       potassium chloride ER (KLOR-CON M) 20 MEQ CR tablet Take 1 tablet (20 mEq) by mouth 2 times daily 180 tablet 1     rivaroxaban ANTICOAGULANT (XARELTO) 20 MG TABS tablet Take 1 tablet (20 mg) by mouth daily (with dinner) 90 tablet 3     sertraline (ZOLOFT) 50 MG tablet Take 50 mg by mouth 2 times daily        vit A/vit C/vit E/zinc/copper (PRESERVISION AREDS ORAL) Take 1 tablet by mouth 2 times daily (with meals)        No Known Allergies   Medical, surgical, family, social history, and medications were all reviewed and updated as necessary.   Lab Results    Chemistry/lipid CBC Cardiac Enzymes/BNP/TSH/INR   Recent Labs   Lab Test 06/23/22  1337   CHOL 107   HDL 38*   LDL 62   TRIG 35     Recent Labs   Lab Test 06/23/22  1337 10/23/20  0918 05/16/19  0950   LDL 62 75 89     Recent Labs   Lab Test 12/23/22  0436      POTASSIUM 3.7   CHLORIDE 94*   CO2 33*   *   BUN 18.3   CR 0.88   GFRESTIMATED 85   ANGELY 9.4     Recent Labs   Lab Test 12/23/22  0436 12/22/22  0450 12/21/22  0813   CR 0.88 0.89 0.83     No results for input(s): A1C in the last 89448 hours.       Recent Labs   Lab Test 12/23/22  0436   WBC 7.3   HGB 12.2*   HCT 38.7*   MCV 97        Recent Labs   Lab Test 12/23/22  0436 12/22/22  0450 12/21/22  0813   HGB 12.2* 10.7* 10.8*    Recent Labs   Lab Test 03/14/22  2055 03/14/22  1454 02/11/21  1724   TROPONINI 0.09 0.07 0.14     Recent Labs   Lab Test 12/19/22  1741 06/17/22  1446 03/14/22  1454 01/18/22  1526   BNP  --  819* 1,163* 661*   NTBNPI 7,078*  --   --   --      Recent Labs   Lab Test 10/17/22  1348   TSH 4.26      Recent Labs   Lab Test 02/11/21  1724 03/24/20  1745 08/15/19  0811   INR 1.03 1.05 0.98          Total Time- 30 minutes spent on date of encounter doing chart review, history and exam, documentation and further activities as noted above.  This note has been dictated using voice recognition software. Any grammatical, typographical, or context distortions are unintentional and inherent to the software.      Thank you for allowing me to participate in the care of your patient.      Sincerely,     SALVADOR Luong [facility]  cc:   No referring provider defined for this encounter.

## 2023-01-04 NOTE — PATIENT INSTRUCTIONS
Reymundo Petersen,    It was a pleasure to see you today at the [facility].     My recommendations after this visit include:    No med changes.    Protein supplement 1 per day.    To followup with remote device check and see Dr. Michelle in 6-8 weeks.      My contact information:  Marek Clark, SYMONE  After Hours or Scheduling  [phone]  My Nurse---Rylie Stout [phone]

## 2023-01-04 NOTE — PROGRESS NOTES
Thank you, Dr. Noguera, for asking the [facility] team to see . Reymundo Petersen to evaluate persistent atrial fibrillation.    Assessment/Recommendations     Assessment/Plan:    Diagnoses and all orders for this visit:  Persistent atrial fibrillation (H) and 1% burden on very low-dose amiodarone of 200 mg orally every other day.  They are concerned about deposits seen in his eyes and discussed that this is normal on amiodarone and will clear when he comes off medication.  I discussed use of amiodarone for 3 months post ablation and then will do a trial off.  I do not anticipate long-term use of amiodarone.  He has good BiV pacing despite atrial arrhythmias in the past and findings at time of ablation reflect not likely to maintain normal rhythm long-term and discussed today.  Questions answered.  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  Acute on chronic congestive heart failure, unspecified heart failure type (H) and no signs or symptoms of decompensated heart failure.  No peripheral edema.  Weight stable since discharge at 158 to 162 pounds.  Poor appetite.  Adhering to low-sodium diet.  Recommended protein supplement between meals to support calorie needs.  Mucous membranes are slightly dry today.  I am reluctant to decrease diuretic which is Bumex 3 mg p.o. twice daily given hospitalization 2 weeks ago for acute heart failure.  No change in meds for now and to be seen in heart failure next week as planned.  Primary hypertension and blood pressure well below goal.  Pulmonary hypertension (H) history  Status post catheter ablation of atrial fibrillation and complaining of the following problems.  He is 5 weeks post ablation and on questioning he is lost a significant amount of weight in the last few years.  His dentures are loose.  He is getting a lot of food underneath them and unable to adequately chew food.  This is why I think  he is having difficulty swallowing.  Patient agrees dentures are problem and will see dentist.      [id number] score of 5 and on Xarelto.  Follow up in clinic in heart failure as planned and see Dr. Michelle in 6 to 8 weeks.     History of Present Illness/Subjective     Reymundo Petersen is a very pleasant 82 year old male who comes in today for EP follow-up post ablation and did remote device check prior to visit.  Reymundo Petersen has a known history of of persistent atrial fibrillation, chronic systolic and diastolic heart failure due to nonischemic cardiomyopathy with severe concentric LV thickening (LVEF 41% by 2/10/2022 MPI), CHB, CRT-P in-situ, CAD with prior RCA PCI, HTN and CKD stage 2.    He has complete heart block and good BiV pacing.  He has been on amiodarone and had cardioversion on March 8. He has been in normal sinus rhythm since that time.      On 11/23/2022, Ramirez had PVI with Dr. Michelle with RF to pulmonary veins and posterior wall.  Left atrial tachycardia versus atrial flutter treated.  Empirically right CTI and JERAMIE I ablations done.  Noted to have severe biatrial enlargement with severe widespread fibrosis noted.    Ramirez comes to clinic with his daughter.  He lives with his sister-in-law who has some psych issues and hoarding.  She has been more difficult to live with.  Ramirez is currently on home care with PT, OT and nursing visits.  He complains of difficulty swallowing meats.  He has no trouble with soft foods.  See above.  He denies any peripheral edema, orthopnea or weight gain.  He has a history of falls.  He uses walker all the time.  He had a mechanical fall last week.  No injury from this.  He is sleeping poorly at times.  Daughter is going to restarted him on Tylenol PM.    Cardiographics (reviewed):  December 2022 echo showed:  Interpretation Summary     1. The left ventricle is normal in size.Left ventricular function is  decreased. The ejection fraction is 35-40% (moderately reduced).  There is  severe concentric left ventricular hypertrophy. There is moderate global  hypokinesia of the left ventricle.  2. The right ventricle is normal size. There is mild to moderate right  ventricular hypertrophy. The right ventricular systolic function is normal.  3. The left atrium is severely dilated. The right atrium is severely dilated.  4. There is mild to moderate (1-2+) mitral regurgitation.  IVC diameter >2.1 cm collapsing <50% with sniff suggests a high RA pressure  estimated at 15 mmHg or greater.  5. There is mild to moderate (1-2+) tricuspid regurgitation.  6. Small pericardial effusion. There are no echocardiographic indications of  cardiac tamponade.    Echo from June 13, 2022:  Interpretation Summary     The left ventricle is mildly dilated.  Left ventricular function is decreased. The ejection fraction is 30-35%  (moderately reduced).  There is moderate global hypokinesia of the left ventricle.  The right ventricle is mildly dilated.  The right ventricular systolic function is normal.  The left atrium is severely dilated.  The right atrium is moderately dilated.  There is moderately severe (3+) tricuspid regurgitation.  Right ventricle systolic pressure estimate normal  There is mild to moderate (1-2+) aortic regurgitation.  The ascending aorta is Mildly dilated.  IVC diameter >2.1 cm collapsing <50% with sniff suggests a high RA pressure  estimated at 15 mmHg or greater.  Small pericardial effusion  There are no echocardiographic indications of cardiac tamponade.  Cardiac testing personally reviewed:  Device check shows leads stable and battery ok.  Device functioning appropriately.   Atrial pacing 50% and bi ventricular pacing 99%.  AT/AFib burden 1% with ventricular response well controlled.  3 nonsustained VT episodes seen and longest was 13 beats..           Problem List:  Patient Active Problem List   Diagnosis     Primary osteoarthritis of knees, bilateral     Primary hypertension     BPH  "(benign prostatic hyperplasia)     Combined hyperlipidemia     Dizziness     DJD (degenerative joint disease)     Left ventricular hypertrophy     Troponin level elevated     Acute combined systolic and diastolic congestive heart failure (H)     Pulmonary hypertension (H)     Nonrheumatic aortic valve insufficiency     Coronary artery disease due to lipid rich plaque     MARIAN (generalized anxiety disorder)     GERD (gastroesophageal reflux disease)     Infection due to 2019 novel coronavirus     Dilated cardiomyopathy (H)     Persistent atrial fibrillation (H)     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)     Stage 2 chronic kidney disease     Hearing loss     Arthritis, lumbar spine     Peripheral edema     Elevated TSH     Elevated troponin     Pleural effusion on right     Acute on chronic congestive heart failure, unspecified heart failure type (H)     Status post catheter ablation of atrial fibrillation     Revi  e  Physical Examination Review of Systems   w Daily Pics  /58 (BP Location: Left arm, Patient Position: Sitting, Cuff Size: Adult Regular)   Pulse 73   Resp 16   Ht 1.778 m (5' 10\")   Wt 72.5 kg (159 lb 12.8 oz)   BMI 22.93 kg/m    Body mass index is 22.93 kg/m .  Wt Readings from Last 3 Encounters:   01/04/23 72.5 kg (159 lb 12.8 oz)   12/23/22 73.1 kg (161 lb 1.6 oz)   11/24/22 77.7 kg (171 lb 4.8 oz)     General Appearance:   Alert, well-appearing and in no acute distress.   HEENT: Atraumatic, normocephalic.  No scleral icterus, normal conjunctivae; mucous membranes pink and moist.     Chest: Chest symmetric, spine straight.   Lungs:   Respirations unlabored.   Cardiovascular:   Normal first and second heart sounds with no murmurs, rubs, or gallops.  Regular, regular.   Normal JVD, no edema.       Extremities: No cyanosis or clubbing   Musculoskeletal: Moves all extremities   Skin: Warm, dry, intact.    Neurologic: Mood and affect are appropriate, alert and oriented to " person, place, time, and situation     ROS: 10 point ROS neg other than the symptoms noted above in the HPI.     Medical History  Surgical History Family History Social History     Past Medical History:   Diagnosis Date     Atrial fibrillation (H)      Atrial fibrillation, transient (H) 8/11/2020     BPH (benign prostatic hyperplasia) 7/11/2018     Chronic combined systolic and diastolic heart failure (H) 2/11/2021     Congestive heart failure (H)      Coronary artery disease      COVID 02/2021     Dilated cardiomyopathy (H) 2/11/2021     MARIAN (generalized anxiety disorder) 2/11/2021     GERD (gastroesophageal reflux disease)      High cholesterol      Hypertension      LBBB (left bundle branch block) 7/11/2018     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     Pulmonary hypertension (H) 4/1/2020    Past Surgical History:   Procedure Laterality Date     ANGIOPLASTY       ARTHROSCOPY KNEE       CARDIOVERSION  2021     CARDIOVERSION  02/03/2022     CV CORONARY ANGIOGRAM N/A 10/23/2020    Procedure: Coronary Angiogram;  Surgeon: Varun Freire MD;  Location: [facility];  Service: Cardiology     CV LEFT HEART CATHETERIZATION WITHOUT LEFT VENTRICULOGRAM Left 10/23/2020    Procedure: Left Heart Catheterization Without Left Ventriculogram;  Surgeon: Varun Freire MD;  Location: [facility];  Service: Cardiology     EP BIV PACEMAKER INSERT N/A 3/25/2020    Procedure: EP Biventricular Pacemaker Insertion;  Surgeon: Taylor Sandoval MD;  Location: [facility];  Service: Cardiology     RELEASE CARPAL TUNNEL       ZZC TOTAL KNEE ARTHROPLASTY Right 8/15/2019    Procedure: RIGHT  MINIMALLY TOTAL KNEE ARTHROPLASTY;  Surgeon: Keven Cerda MD;  Location: [facility];  Service: Orthopedics    Family History   Problem Relation Age of Onset     Alzheimer Disease Mother      Heart Disease Father      Cancer Sister      Diabetes Type 2  Sister      Chronic Obstructive  Pulmonary Disease Sister      LUNG DISEASE Brother     History   Smoking Status     Never   Smokeless Tobacco     Never     Social History    Substance and Sexual Activity      Alcohol use: Yes        Comment: Alcoholic Drinks/day: rare       Medications  Allergies     Current Outpatient Medications   Medication Sig Dispense Refill     amiodarone (PACERONE) 200 MG tablet Take 1 tablet (200 mg) by mouth every other day 45 tablet 0     aspirin (ASA) 81 MG chewable tablet Take 1 tablet (81 mg) by mouth every other day (Patient taking differently: Take 81 mg by mouth Every Mon, Wed, Fri Morning Taking Monday, Wednesday and Fridays) 30 tablet 0     atorvastatin (LIPITOR) 10 MG tablet Take 10 mg by mouth four times a week Sunday, Tuesday, Thursday, & Saturday at bedtime       bumetanide (BUMEX) 1 MG tablet Take 3 tablets (3 mg) by mouth 2 times daily for 30 days 180 tablet 0     Coenzyme Q10 300 MG CAPS Take 300 mg by mouth daily       diphenhydrAMINE-acetaminophen (TYLENOL PM)  MG tablet        doxazosin (CARDURA) 8 MG tablet TAKE 1/2 TABLET BY MOUTH ONCE DAILY IN THE EVENING TO SHRINK PROSTATE AND LOWER BLOOD PRESSURE       finasteride (PROSCAR) 5 mg tablet [FINASTERIDE (PROSCAR) 5 MG TABLET] Take 5 mg by mouth at bedtime.              gabapentin (NEURONTIN) 100 MG capsule Two capsules in the morning, two capsules in the afternoon, and three capsules in the evening       glucosamine/chondr cole A sod (OSTEO BI-FLEX ORAL) Take 1 tablet by mouth 2 times daily (with meals)       melatonin 10 mg Tab Take 10 mg by mouth At Bedtime        metoprolol succinate ER (TOPROL-XL) 25 MG 24 hr tablet Take 1 tablet (25 mg) by mouth At Bedtime 90 tablet 3     multivitamin, therapeutic (THERA-VIT) TABS tablet Take 1 tablet by mouth daily       nitroGLYcerin (NITROSTAT) 0.4 MG sublingual tablet For chest pain place 1 tablet under the tongue every 5 minutes for 3 doses. If symptoms persist 5 minutes after 1st dose call 911. 9 tablet 0      omeprazole (PRILOSEC) 20 MG capsule [OMEPRAZOLE (PRILOSEC) 20 MG CAPSULE] Take 20 mg by mouth 2 (two) times a day before meals.       potassium chloride ER (KLOR-CON M) 20 MEQ CR tablet Take 1 tablet (20 mEq) by mouth 2 times daily 180 tablet 1     rivaroxaban ANTICOAGULANT (XARELTO) 20 MG TABS tablet Take 1 tablet (20 mg) by mouth daily (with dinner) 90 tablet 3     sertraline (ZOLOFT) 50 MG tablet Take 50 mg by mouth 2 times daily        vit A/vit C/vit E/zinc/copper (PRESERVISION AREDS ORAL) Take 1 tablet by mouth 2 times daily (with meals)        No Known Allergies   Medical, surgical, family, social history, and medications were all reviewed and updated as necessary.   Lab Results    Chemistry/lipid CBC Cardiac Enzymes/BNP/TSH/INR   Recent Labs   Lab Test 06/23/22  1337   CHOL 107   HDL 38*   LDL 62   TRIG 35     Recent Labs   Lab Test 06/23/22  1337 10/23/20  0918 05/16/19  0950   LDL 62 75 89     Recent Labs   Lab Test 12/23/22  0436      POTASSIUM 3.7   CHLORIDE 94*   CO2 33*   *   BUN 18.3   CR 0.88   GFRESTIMATED 85   ANGELY 9.4     Recent Labs   Lab Test 12/23/22  0436 12/22/22  0450 12/21/22  0813   CR 0.88 0.89 0.83     No results for input(s): A1C in the last 83340 hours.       Recent Labs   Lab Test 12/23/22  0436   WBC 7.3   HGB 12.2*   HCT 38.7*   MCV 97        Recent Labs   Lab Test 12/23/22  0436 12/22/22  0450 12/21/22  0813   HGB 12.2* 10.7* 10.8*    Recent Labs   Lab Test 03/14/22  2055 03/14/22  1454 02/11/21  1724   TROPONINI 0.09 0.07 0.14     Recent Labs   Lab Test 12/19/22  1741 06/17/22  1446 03/14/22  1454 01/18/22  1526   BNP  --  819* 1,163* 661*   NTBNPI 7,078*  --   --   --      Recent Labs   Lab Test 10/17/22  1348   TSH 4.26     Recent Labs   Lab Test 02/11/21  1724 03/24/20  1745 08/15/19  0811   INR 1.03 1.05 0.98          Total Time- 30 minutes spent on date of encounter doing chart review, history and exam, documentation and further activities as noted  above.  This note has been dictated using voice recognition software. Any grammatical, typographical, or context distortions are unintentional and inherent to the software.

## 2023-01-12 PROBLEM — I50.20 HEART FAILURE WITH REDUCED EJECTION FRACTION (H): Status: ACTIVE | Noted: 2023-01-01

## 2023-01-12 NOTE — LETTER
1/12/2023    DADISON BENÍTEZ MD  [address]  [facility] [address]    RE: Reymundo Petersen       Dear Colleague,     I had the pleasure of seeing Reymundo Petersen in the [facility].        Assessment/Recommendations   Assessment:    1. Nonischemic cardiomyopathy, heart failure with reduced ejection fraction, ejection fraction 35-40%, NYHA class III: Compensated.  Might be a little dry.  His weight has been pretty stable at home.  He is on HRS.  He follows a low-sodium diet and his daughters help him with meals.  He has tried open arms in the past and did not like them.  Remote device check showed 99.4% BiV pacing.  2.  CAD: Denies chest pain.  History of PCI to RCA.  He continues aspirin and atorvastatin  3.  Persistent atrial fibrillation: Status post PVI November 2022.  Last remote device check showed burden of 1%.  He continues amiodarone 200 mg every other day and Xarelto for anticoagulation.  4.  Hypertension: Controlled.  Blood pressure 94/64    Plan:  1.   Heart failure medications:  - Beta blocker therapy with metoprolol succinate 25 mg daily  - ARB therapy with on hold due to hypotension  - SGLT2 inhibitor not started  - Vasodilator therapy not started  - Aldosterone antagonist not started  - Diuretic therapy with bumetanide 3 mg twice a day  2.  BMP pending  3.  Continue monitoring weights through HRS  4.  Continue low-sodium diet  5.  Continue current medications    Reymundo Petersen will follow up with Dr. Vasquez January 18, Dr. Leigh January 24, Dr. Michelle February 23     History of Present Illness/Subjective    Mr. Reymundo Petersen is a 82 year old male seen at [facility] heart failure clinic today for continued follow-up.  His daughter Juliet accompanies him today.  He follows up for nonischemic cardiomyopathy, heart failure with reduced ejection fraction.  He was hospitalized December 19 to December 23 with acute heart failure.  He was noted to have a moderate  "right pleural effusion.  He underwent thoracentesis on December 20 and had 1 L of fluid removed.  He was started on IV Bumex and a dobutamine drip and symptoms improved.  He had an echocardiogram which showed ejection fraction of 35 to 40%.  Losartan was on hold due to borderline blood pressure.  He has a past medical history significant for persistent atrial fibrillation, CRT-P, status post PVI November 2022, CAD with PCI to RCA, hypertension, anemia.    Today, he has fatigue, dyspnea on exertion and weakness.  He denies lightheadedness, orthopnea, PND, palpitations, chest pain, abdominal fullness/bloating and lower extremity edema.      He is monitoring home weights which are stable between 151-155 pounds.  He is following a low sodium diet.      ECHOCARDIOGRAM: 12/20/2022  Interpretation Summary     1. The left ventricle is normal in size.Left ventricular function is  decreased. The ejection fraction is 35-40% (moderately reduced). There is  severe concentric left ventricular hypertrophy. There is moderate global  hypokinesia of the left ventricle.  2. The right ventricle is normal size. There is mild to moderate right  ventricular hypertrophy. The right ventricular systolic function is normal.  3. The left atrium is severely dilated. The right atrium is severely dilated.  4. There is mild to moderate (1-2+) mitral regurgitation.  IVC diameter >2.1 cm collapsing <50% with sniff suggests a high RA pressure  estimated at 15 mmHg or greater.  5. There is mild to moderate (1-2+) tricuspid regurgitation.  6. Small pericardial effusion. There are no echocardiographic indications of  cardiac tamponade.     Physical Examination Review of Systems   BP 94/64 (BP Location: Left arm, Patient Position: Sitting, Cuff Size: Adult Small)   Pulse 105   Resp 16   Ht 1.778 m (5' 10\")   Wt 71.7 kg (158 lb)   BMI 22.67 kg/m    Body mass index is 22.67 kg/m .  Wt Readings from Last 3 Encounters:   01/12/23 71.7 kg (158 lb) "   01/04/23 72.5 kg (159 lb 12.8 oz)   12/23/22 73.1 kg (161 lb 1.6 oz)       General Appearance:   no acute distress   ENT/Mouth: Wearing mask   EYES:  no scleral icterus, normal conjunctivae   Neck: no thyromegaly   Chest/Lungs:   lungs are clear to auscultation, no rales or wheezing, equal chest wall expansion    Cardiovascular:   Regular. Normal first and second heart sounds with no murmurs, rubs, or gallops, no edema bilaterally    Abdomen:  bowel sounds are present   Extremities: no cyanosis or clubbing   Skin: no xanthelasma, warm.    Neurologic: normal  bilateral, no tremors     Psychiatric: alert and oriented x3                                              Medical History  Surgical History Family History Social History   Past Medical History:   Diagnosis Date     Atrial fibrillation (H)      Atrial fibrillation, transient (H) 08/11/2020     BPH (benign prostatic hyperplasia) 07/11/2018     Chronic combined systolic and diastolic heart failure (H) 02/11/2021     Congestive heart failure (H)      Coronary artery disease      COVID 02/2021     Dilated cardiomyopathy (H) 02/11/2021     MARIAN (generalized anxiety disorder) 02/11/2021     GERD (gastroesophageal reflux disease)      High cholesterol      Hyperlipidemia      Hypertension      LBBB (left bundle branch block) 07/11/2018     Nonrheumatic aortic valve insufficiency 04/01/2020     Pulmonary hypertension (H) 04/01/2020    Past Surgical History:   Procedure Laterality Date     ANGIOPLASTY       ARTHROSCOPY KNEE       CARDIOVERSION  2021     CARDIOVERSION  02/03/2022     CV CORONARY ANGIOGRAM N/A 10/23/2020    Procedure: Coronary Angiogram;  Surgeon: Varun Freire MD;  Location: [facility] Cath Lab;  Service: Cardiology     CV LEFT HEART CATHETERIZATION WITHOUT LEFT VENTRICULOGRAM Left 10/23/2020    Procedure: Left Heart Catheterization Without Left Ventriculogram;  Surgeon: Varun Freire MD;  Location: [facility] Cath Lab;  Service:  Cardiology     EP BIV PACEMAKER INSERT N/A 03/25/2020    Procedure: EP Biventricular Pacemaker Insertion;  Surgeon: Taylor Sandoval MD;  Location: [facility] Cath Lab;  Service: Cardiology     H ABLATION FOCAL AFIB       HEART CATH, ANGIOPLASTY       IMPLANT PACEMAKER       RELEASE CARPAL TUNNEL       ZZC TOTAL KNEE ARTHROPLASTY Right 08/15/2019    Procedure: RIGHT  MINIMALLY TOTAL KNEE ARTHROPLASTY;  Surgeon: Keven Cerda MD;  Location: [facility] Main OR;  Service: Orthopedics    Family History   Problem Relation Age of Onset     Alzheimer Disease Mother      Heart Disease Father      Cancer Sister      Diabetes Type 2  Sister      Chronic Obstructive Pulmonary Disease Sister      LUNG DISEASE Brother     Social History     Socioeconomic History     Marital status:      Spouse name: Not on file     Number of children: Not on file     Years of education: Not on file     Highest education level: Not on file   Occupational History     Not on file   Tobacco Use     Smoking status: Never     Smokeless tobacco: Never   Substance and Sexual Activity     Alcohol use: Yes     Comment: Alcoholic Drinks/day: rare     Drug use: No     Sexual activity: Not on file   Other Topics Concern     Parent/sibling w/ CABG, MI or angioplasty before 65F 55M? Not Asked   Social History Narrative    Retired.  Good sense of humor     Social Determinants of Health     Financial Resource Strain: Not on file   Food Insecurity: Not on file   Transportation Needs: Not on file   Physical Activity: Not on file   Stress: Not on file   Social Connections: Not on file   Intimate Partner Violence: Not on file   Housing Stability: Not on file          Medications  Allergies   Current Outpatient Medications   Medication Sig Dispense Refill     amiodarone (PACERONE) 200 MG tablet Take 1 tablet (200 mg) by mouth every other day 45 tablet 0     aspirin (ASA) 81 MG chewable tablet Take 1 tablet (81 mg) by mouth every other day (Patient taking  differently: Take 81 mg by mouth Every Mon, Wed, Fri Morning Taking Monday, Wednesday and Fridays) 30 tablet 0     atorvastatin (LIPITOR) 10 MG tablet Take 10 mg by mouth four times a week Sunday, Tuesday, Thursday, & Saturday at bedtime       bumetanide (BUMEX) 1 MG tablet Take 3 tablets (3 mg) by mouth 2 times daily for 30 days 180 tablet 0     Coenzyme Q10 300 MG CAPS Take 300 mg by mouth daily       diphenhydrAMINE-acetaminophen (TYLENOL PM)  MG tablet Take 2 tablets by mouth At Bedtime       doxazosin (CARDURA) 8 MG tablet TAKE 1/2 TABLET BY MOUTH ONCE DAILY IN THE EVENING TO SHRINK PROSTATE AND LOWER BLOOD PRESSURE       finasteride (PROSCAR) 5 mg tablet [FINASTERIDE (PROSCAR) 5 MG TABLET] Take 5 mg by mouth at bedtime.              gabapentin (NEURONTIN) 100 MG capsule Two capsules in the morning, two capsules in the afternoon, and three capsules in the evening       glucosamine/chondr cole A sod (OSTEO BI-FLEX ORAL) Take 1 tablet by mouth 2 times daily (with meals)       melatonin 10 mg Tab Take 10 mg by mouth At Bedtime        metoprolol succinate ER (TOPROL-XL) 25 MG 24 hr tablet Take 1 tablet (25 mg) by mouth At Bedtime 90 tablet 3     multivitamin, therapeutic (THERA-VIT) TABS tablet Take 1 tablet by mouth daily       nitroGLYcerin (NITROSTAT) 0.4 MG sublingual tablet For chest pain place 1 tablet under the tongue every 5 minutes for 3 doses. If symptoms persist 5 minutes after 1st dose call 911. 9 tablet 0     omeprazole (PRILOSEC) 20 MG capsule [OMEPRAZOLE (PRILOSEC) 20 MG CAPSULE] Take 20 mg by mouth 2 (two) times a day before meals.       potassium chloride ER (KLOR-CON M) 20 MEQ CR tablet Take 1 tablet (20 mEq) by mouth 2 times daily 180 tablet 1     rivaroxaban ANTICOAGULANT (XARELTO) 20 MG TABS tablet Take 1 tablet (20 mg) by mouth daily (with dinner) 90 tablet 3     sertraline (ZOLOFT) 50 MG tablet Take 50 mg by mouth 2 times daily        vit A/vit C/vit E/zinc/copper (PRESERVISION AREDS  ORAL) Take 1 tablet by mouth 2 times daily (with meals)      No Known Allergies      Lab Results    Chemistry/lipid CBC Cardiac Enzymes/BNP/TSH/INR   Lab Results   Component Value Date    CHOL 107 06/23/2022    HDL 38 (L) 06/23/2022    TRIG 35 06/23/2022    BUN 18.3 12/23/2022     12/23/2022    CO2 33 (H) 12/23/2022    Lab Results   Component Value Date    WBC 7.3 12/23/2022    HGB 12.2 (L) 12/23/2022    HCT 38.7 (L) 12/23/2022    MCV 97 12/23/2022     12/23/2022    Lab Results   Component Value Date    TROPONINI 0.09 03/14/2022     (H) 06/17/2022    TSH 4.26 10/17/2022    INR 1.03 02/11/2021             This note has been dictated using voice recognition software. Any grammatical, typographical, or context distortions are unintentional and inherent to the software    30 minutes spent on the date of encounter doing chart review, review of outside records, review of test results, interpretation with above tests, patient visit, documentation and discussion with family.                  Thank you for allowing me to participate in the care of your patient.      Sincerely,     SALVADOR Hernandez [facility]  cc:   John Paul Vasquez MD  [address]  [place] [address]

## 2023-01-12 NOTE — PROGRESS NOTES
Assessment/Recommendations   Assessment:    1. Nonischemic cardiomyopathy, heart failure with reduced ejection fraction, ejection fraction 35-40%, NYHA class III: Compensated.  Might be a little dry.  His weight has been pretty stable at home.  He is on HRS.  He follows a low-sodium diet and his daughters help him with meals.  He has tried [facility] arms in the past and did not like them.  Remote device check showed 99.4% BiV pacing.  2.  CAD: Denies chest pain.  History of PCI to RCA.  He continues aspirin and atorvastatin  3.  Persistent atrial fibrillation: Status post PVI November 2022.  Last remote device check showed burden of 1%.  He continues amiodarone 200 mg every other day and Xarelto for anticoagulation.  4.  Hypertension: Controlled.  Blood pressure 94/64    Plan:  1.   Heart failure medications:  - Beta blocker therapy with metoprolol succinate 25 mg daily  - ARB therapy with on hold due to hypotension  - SGLT2 inhibitor not started  - Vasodilator therapy not started  - Aldosterone antagonist not started  - Diuretic therapy with bumetanide 3 mg twice a day  2.  BMP pending  3.  Continue monitoring weights through HRS  4.  Continue low-sodium diet  5.  Continue current medications    Reymundo Petersen will follow up with Dr. Vasquez January 18, Dr. Leigh January 24, Dr. Michelle February 23     History of Present Illness/Subjective    Mr. Reymundo Petersen is a 82 year old male seen at [facility] heart failure clinic today for continued follow-up.  His daughter Juliet accompanies him today.  He follows up for nonischemic cardiomyopathy, heart failure with reduced ejection fraction.  He was hospitalized December 19 to December 23 with acute heart failure.  He was noted to have a moderate right pleural effusion.  He underwent thoracentesis on December 20 and had 1 L of fluid removed.  He was started on IV Bumex and a dobutamine drip and symptoms improved.  He had an echocardiogram which showed  "ejection fraction of 35 to 40%.  Losartan was on hold due to borderline blood pressure.  He has a past medical history significant for persistent atrial fibrillation, CRT-P, status post PVI November 2022, CAD with PCI to RCA, hypertension, anemia.    Today, he has fatigue, dyspnea on exertion and weakness.  He denies lightheadedness, orthopnea, PND, palpitations, chest pain, abdominal fullness/bloating and lower extremity edema.      He is monitoring home weights which are stable between 151-155 pounds.  He is following a low sodium diet.      ECHOCARDIOGRAM: 12/20/2022  Interpretation Summary     1. The left ventricle is normal in size.Left ventricular function is  decreased. The ejection fraction is 35-40% (moderately reduced). There is  severe concentric left ventricular hypertrophy. There is moderate global  hypokinesia of the left ventricle.  2. The right ventricle is normal size. There is mild to moderate right  ventricular hypertrophy. The right ventricular systolic function is normal.  3. The left atrium is severely dilated. The right atrium is severely dilated.  4. There is mild to moderate (1-2+) mitral regurgitation.  IVC diameter >2.1 cm collapsing <50% with sniff suggests a high RA pressure  estimated at 15 mmHg or greater.  5. There is mild to moderate (1-2+) tricuspid regurgitation.  6. Small pericardial effusion. There are no echocardiographic indications of  cardiac tamponade.     Physical Examination Review of Systems   BP 94/64 (BP Location: Left arm, Patient Position: Sitting, Cuff Size: Adult Small)   Pulse 105   Resp 16   Ht 1.778 m (5' 10\")   Wt 71.7 kg (158 lb)   BMI 22.67 kg/m    Body mass index is 22.67 kg/m .  Wt Readings from Last 3 Encounters:   01/12/23 71.7 kg (158 lb)   01/04/23 72.5 kg (159 lb 12.8 oz)   12/23/22 73.1 kg (161 lb 1.6 oz)       General Appearance:   no acute distress   ENT/Mouth: Wearing mask   EYES:  no scleral icterus, normal conjunctivae   Neck: no thyromegaly "   Chest/Lungs:   lungs are clear to auscultation, no rales or wheezing, equal chest wall expansion    Cardiovascular:   Regular. Normal first and second heart sounds with no murmurs, rubs, or gallops, no edema bilaterally    Abdomen:  bowel sounds are present   Extremities: no cyanosis or clubbing   Skin: no xanthelasma, warm.    Neurologic: normal  bilateral, no tremors     Psychiatric: alert and oriented x3                                              Medical History  Surgical History Family History Social History   Past Medical History:   Diagnosis Date     Atrial fibrillation (H)      Atrial fibrillation, transient (H) 08/11/2020     BPH (benign prostatic hyperplasia) 07/11/2018     Chronic combined systolic and diastolic heart failure (H) 02/11/2021     Congestive heart failure (H)      Coronary artery disease      COVID 02/2021     Dilated cardiomyopathy (H) 02/11/2021     MARIAN (generalized anxiety disorder) 02/11/2021     GERD (gastroesophageal reflux disease)      High cholesterol      Hyperlipidemia      Hypertension      LBBB (left bundle branch block) 07/11/2018     Nonrheumatic aortic valve insufficiency 04/01/2020     Pulmonary hypertension (H) 04/01/2020    Past Surgical History:   Procedure Laterality Date     ANGIOPLASTY       ARTHROSCOPY KNEE       CARDIOVERSION  2021     CARDIOVERSION  02/03/2022     CV CORONARY ANGIOGRAM N/A 10/23/2020    Procedure: Coronary Angiogram;  Surgeon: Varun Freire MD;  Location: [facility] Cath Lab;  Service: Cardiology     CV LEFT HEART CATHETERIZATION WITHOUT LEFT VENTRICULOGRAM Left 10/23/2020    Procedure: Left Heart Catheterization Without Left Ventriculogram;  Surgeon: Varun Freire MD;  Location: [facility] Cath Lab;  Service: Cardiology     EP BIV PACEMAKER INSERT N/A 03/25/2020    Procedure: EP Biventricular Pacemaker Insertion;  Surgeon: Taylor Sandoval MD;  Location: [facility] Cath Lab;  Service: Cardiology     H ABLATION FOCAL  AFIB       HEART CATH, ANGIOPLASTY       IMPLANT PACEMAKER       RELEASE CARPAL TUNNEL       ZZC TOTAL KNEE ARTHROPLASTY Right 08/15/2019    Procedure: RIGHT  MINIMALLY TOTAL KNEE ARTHROPLASTY;  Surgeon: Keven Cerda MD;  Location: [facility] Main OR;  Service: Orthopedics    Family History   Problem Relation Age of Onset     Alzheimer Disease Mother      Heart Disease Father      Cancer Sister      Diabetes Type 2  Sister      Chronic Obstructive Pulmonary Disease Sister      LUNG DISEASE Brother     Social History     Socioeconomic History     Marital status:      Spouse name: Not on file     Number of children: Not on file     Years of education: Not on file     Highest education level: Not on file   Occupational History     Not on file   Tobacco Use     Smoking status: Never     Smokeless tobacco: Never   Substance and Sexual Activity     Alcohol use: Yes     Comment: Alcoholic Drinks/day: rare     Drug use: No     Sexual activity: Not on file   Other Topics Concern     Parent/sibling w/ CABG, MI or angioplasty before 65F 55M? Not Asked   Social History Narrative    Retired.  Good sense of humor     Social Determinants of Health     Financial Resource Strain: Not on file   Food Insecurity: Not on file   Transportation Needs: Not on file   Physical Activity: Not on file   Stress: Not on file   Social Connections: Not on file   Intimate Partner Violence: Not on file   Housing Stability: Not on file          Medications  Allergies   Current Outpatient Medications   Medication Sig Dispense Refill     amiodarone (PACERONE) 200 MG tablet Take 1 tablet (200 mg) by mouth every other day 45 tablet 0     aspirin (ASA) 81 MG chewable tablet Take 1 tablet (81 mg) by mouth every other day (Patient taking differently: Take 81 mg by mouth Every Mon, Wed, Fri Morning Taking Monday, Wednesday and Fridays) 30 tablet 0     atorvastatin (LIPITOR) 10 MG tablet Take 10 mg by mouth four times a week Sunday, Tuesday,  Thursday, & Saturday at bedtime       bumetanide (BUMEX) 1 MG tablet Take 3 tablets (3 mg) by mouth 2 times daily for 30 days 180 tablet 0     Coenzyme Q10 300 MG CAPS Take 300 mg by mouth daily       diphenhydrAMINE-acetaminophen (TYLENOL PM)  MG tablet Take 2 tablets by mouth At Bedtime       doxazosin (CARDURA) 8 MG tablet TAKE 1/2 TABLET BY MOUTH ONCE DAILY IN THE EVENING TO SHRINK PROSTATE AND LOWER BLOOD PRESSURE       finasteride (PROSCAR) 5 mg tablet [FINASTERIDE (PROSCAR) 5 MG TABLET] Take 5 mg by mouth at bedtime.              gabapentin (NEURONTIN) 100 MG capsule Two capsules in the morning, two capsules in the afternoon, and three capsules in the evening       glucosamine/chondr cole A sod (OSTEO BI-FLEX ORAL) Take 1 tablet by mouth 2 times daily (with meals)       melatonin 10 mg Tab Take 10 mg by mouth At Bedtime        metoprolol succinate ER (TOPROL-XL) 25 MG 24 hr tablet Take 1 tablet (25 mg) by mouth At Bedtime 90 tablet 3     multivitamin, therapeutic (THERA-VIT) TABS tablet Take 1 tablet by mouth daily       nitroGLYcerin (NITROSTAT) 0.4 MG sublingual tablet For chest pain place 1 tablet under the tongue every 5 minutes for 3 doses. If symptoms persist 5 minutes after 1st dose call 911. 9 tablet 0     omeprazole (PRILOSEC) 20 MG capsule [OMEPRAZOLE (PRILOSEC) 20 MG CAPSULE] Take 20 mg by mouth 2 (two) times a day before meals.       potassium chloride ER (KLOR-CON M) 20 MEQ CR tablet Take 1 tablet (20 mEq) by mouth 2 times daily 180 tablet 1     rivaroxaban ANTICOAGULANT (XARELTO) 20 MG TABS tablet Take 1 tablet (20 mg) by mouth daily (with dinner) 90 tablet 3     sertraline (ZOLOFT) 50 MG tablet Take 50 mg by mouth 2 times daily        vit A/vit C/vit E/zinc/copper (PRESERVISION AREDS ORAL) Take 1 tablet by mouth 2 times daily (with meals)      No Known Allergies      Lab Results    Chemistry/lipid CBC Cardiac Enzymes/BNP/TSH/INR   Lab Results   Component Value Date    CHOL 107 06/23/2022     HDL 38 (L) 06/23/2022    TRIG 35 06/23/2022    BUN 18.3 12/23/2022     12/23/2022    CO2 33 (H) 12/23/2022    Lab Results   Component Value Date    WBC 7.3 12/23/2022    HGB 12.2 (L) 12/23/2022    HCT 38.7 (L) 12/23/2022    MCV 97 12/23/2022     12/23/2022    Lab Results   Component Value Date    TROPONINI 0.09 03/14/2022     (H) 06/17/2022    TSH 4.26 10/17/2022    INR 1.03 02/11/2021             This note has been dictated using voice recognition software. Any grammatical, typographical, or context distortions are unintentional and inherent to the software    30 minutes spent on the date of encounter doing chart review, review of outside records, review of test results, interpretation with above tests, patient visit, documentation and discussion with family.

## 2023-01-12 NOTE — PATIENT INSTRUCTIONS
Reymundo Petersen,    It was a pleasure to see you today at [facility].     My recommendations after this visit include:  - Please follow up with Dr Vasquez January 18   - Please follow up with Dr Leigh January 24  - Please follow up with Dr Michelle February 23  - I have checked labs and will contact you with results  - Continue current medications    Aleena Buitrago, CNP

## 2023-01-12 NOTE — Clinical Note
Just JB.  Patient is unable to answer survey questions on a daily basis.  His daughters will help him when they are at his home.

## 2023-01-12 NOTE — Clinical Note
Just FYI. It is difficult for patient to do survey questions on HRS every day. Daughters will help him when they are there. Thank you

## 2023-01-13 NOTE — TELEPHONE ENCOUNTER
[facility] Call Center    Phone Message    May a detailed message be left on voicemail: yes     Reason for Call: Other: Based on pt labs should he reduce the Bumex? He is due for his 2nd dose and doesn't want to take it if he doesnt have to. Thank you     Action Taken: Message routed to:  Other: Cardiology    Travel Screening: Not Applicable       Thank you!  [facility]

## 2023-01-13 NOTE — TELEPHONE ENCOUNTER
Advised to continue taking as prescribed. Can visit conversation about reducing dose 1/18 with Dr. Vasquez.    Damian ABREU RN  BSN

## 2023-01-18 NOTE — PROGRESS NOTES
HEART CARE NOTE          Assessment/Recommendations     1. HFrEF c/b cardiogenic shock  Assessment / Plan    Dry on physical exam with vitals notable for borderline BP and some tachycardia; denies HF symptoms of orthopnea, PND, edema - will hold the pm dose of bumex this evening and started reduced dosing of bumex to 2 mg BID starting tomorrow    GDMT as detailed below     Current Pharmacotherapy AHA Guideline-Directed Medical Therapy   Losartan 25 mg daily - on hold given borderline BP Lisinopril 20 mg twice daily   Metoprolol succinate 25 mg daily Carvedilol 25 mg twice daily   Spironolactone not started Spironolactone 25 mg once daily   Hydralazine NA Hydralazine 100 mg three times daily   Isosorbide dinitrate NA Isosorbide dinitrate 40 mg three times daily   SGLT2 inhibitor not started Dapagliflozin or Empagliflozin 10 mg daily      2. Arrhythmia  Assessment / Plan    Persistent atrial fibrillation s/p ablations and DCCV    Currently on rivaroxaban; being considered for Watchman device - continue amiodarone    s/p PVI 11/22    Complete Heart Block s/p CRT-P (on the right as patient is left handed)    Follows with Dr. Michelle in EP as well as afib clinic     3. CAD  Assessment / Plan    Denies chest pain or anginal equivalents    Continue ASA, atorvastatin, metoprolol succinate, losartan    History of Present Illness/Subjective      Mr. 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 Petersen denies any acute cardiac events or complaints despite borderline BP; Management plan as detailed above     ECG: Personally reviewed. Paced rhythm     ECHO (personnaly Reviewed):   The left ventricle is mildly dilated.  Left ventricular function is decreased. The ejection  "fraction is 30-35%  (moderately reduced).  There is moderate global hypokinesia of the left ventricle.  The right ventricle is mildly dilated.  The right ventricular systolic function is normal.  The left atrium is severely dilated.  The right atrium is moderately dilated.  There is moderately severe (3+) tricuspid regurgitation.  Right ventricle systolic pressure estimate normal  There is mild to moderate (1-2+) aortic regurgitation.  The ascending aorta is Mildly dilated.  IVC diameter >2.1 cm collapsing <50% with sniff suggests a high RA pressure  estimated at 15 mmHg or greater.  Small pericardial effusion  There are no echocardiographic indications of cardiac tamponade.          Physical Examination Review of Systems   BP (!) 84/54 (BP Location: Left arm, Patient Position: Sitting, Cuff Size: Adult Regular)   Pulse 104   Resp 16   Ht 1.778 m (5' 10\")   Wt 73 kg (161 lb)   BMI 23.10 kg/m    Body mass index is 23.1 kg/m .  Wt Readings from Last 3 Encounters:   01/18/23 73 kg (161 lb)   01/12/23 71.7 kg (158 lb)   01/04/23 72.5 kg (159 lb 12.8 oz)     General Appearance:   no distress, normal body habitus   ENT/Mouth: membranes moist, no oral lesions or bleeding gums.      EYES:  no scleral icterus, normal conjunctivae   Neck: no carotid bruits or thyromegaly   Chest/Lungs:   lungs are clear to auscultation, no rales or wheezing, equal chest wall expansion    Cardiovascular:   Regular. Normal first and second heart sounds with no murmurs, rubs, or gallops; the carotid, radial and posterior tibial pulses are intact, no JVD and trace LE edema bilaterally    Abdomen:  no organomegaly, masses, bruits, or tenderness; bowel sounds are present   Extremities: no cyanosis or clubbing   Skin: no xanthelasma, warm.    Neurologic: alert and oriented x3, calm     Psychiatric: alert and oriented x3, calm     A complete 10 systems ROS was reviewed  And is negative except what is listed in the HPI.          Medical History  " Surgical History Family History Social History   Past Medical History:   Diagnosis Date     Atrial fibrillation (H)      Atrial fibrillation, transient (H) 08/11/2020     BPH (benign prostatic hyperplasia) 07/11/2018     Chronic combined systolic and diastolic heart failure (H) 02/11/2021     Congestive heart failure (H)      Coronary artery disease      COVID 02/2021     Dilated cardiomyopathy (H) 02/11/2021     MARIAN (generalized anxiety disorder) 02/11/2021     GERD (gastroesophageal reflux disease)      High cholesterol      Hyperlipidemia      Hypertension      LBBB (left bundle branch block) 07/11/2018     Nonrheumatic aortic valve insufficiency 04/01/2020     Pulmonary hypertension (H) 04/01/2020    Past Surgical History:   Procedure Laterality Date     ANGIOPLASTY       ARTHROSCOPY KNEE       CARDIOVERSION  2021     CARDIOVERSION  02/03/2022     CV CORONARY ANGIOGRAM N/A 10/23/2020    Procedure: Coronary Angiogram;  Surgeon: Varun Freire MD;  Location: [facility] Cath Lab;  Service: Cardiology     CV LEFT HEART CATHETERIZATION WITHOUT LEFT VENTRICULOGRAM Left 10/23/2020    Procedure: Left Heart Catheterization Without Left Ventriculogram;  Surgeon: Varun Freire MD;  Location: [facility] Cath Lab;  Service: Cardiology     EP BIV PACEMAKER INSERT N/A 03/25/2020    Procedure: EP Biventricular Pacemaker Insertion;  Surgeon: Taylor Sandoval MD;  Location: [facility] Cath Lab;  Service: Cardiology     H ABLATION FOCAL AFIB       HEART CATH, ANGIOPLASTY       IMPLANT PACEMAKER       RELEASE CARPAL TUNNEL       ZZC TOTAL KNEE ARTHROPLASTY Right 08/15/2019    Procedure: RIGHT  MINIMALLY TOTAL KNEE ARTHROPLASTY;  Surgeon: Keven Cerda MD;  Location: [facility];  Service: Orthopedics    no family history of premature coronary artery disease Social History     Socioeconomic History     Marital status:      Spouse name: Not on file     Number of children: Not on file     Years of  education: Not on file     Highest education level: Not on file   Occupational History     Not on file   Tobacco Use     Smoking status: Never     Smokeless tobacco: Never   Substance and Sexual Activity     Alcohol use: Yes     Comment: Alcoholic Drinks/day: rare     Drug use: No     Sexual activity: Not on file   Other Topics Concern     Parent/sibling w/ CABG, MI or angioplasty before 65F 55M? Not Asked   Social History Narrative    Retired.  Good sense of humor     Social Determinants of Health     Financial Resource Strain: Not on file   Food Insecurity: Not on file   Transportation Needs: Not on file   Physical Activity: Not on file   Stress: Not on file   Social Connections: Not on file   Intimate Partner Violence: Not on file   Housing Stability: Not on file           Lab Results    Chemistry/lipid CBC Cardiac Enzymes/BNP/TSH/INR   Lab Results   Component Value Date    CHOL 107 06/23/2022    HDL 38 (L) 06/23/2022    TRIG 35 06/23/2022    BUN 29.3 (H) 01/12/2023     01/12/2023    CO2 34 (H) 01/12/2023    Lab Results   Component Value Date    WBC 7.3 12/23/2022    HGB 12.2 (L) 12/23/2022    HCT 38.7 (L) 12/23/2022    MCV 97 12/23/2022     12/23/2022    Lab Results   Component Value Date    TROPONINI 0.09 03/14/2022     (H) 06/17/2022    TSH 4.26 10/17/2022    INR 1.03 02/11/2021     Lab Results   Component Value Date    TROPONINI 0.09 03/14/2022          Weight:    Wt Readings from Last 3 Encounters:   01/12/23 71.7 kg (158 lb)   01/04/23 72.5 kg (159 lb 12.8 oz)   12/23/22 73.1 kg (161 lb 1.6 oz)       Allergies  No Known Allergies      Surgical History  Past Surgical History:   Procedure Laterality Date     ANGIOPLASTY       ARTHROSCOPY KNEE       CARDIOVERSION  2021     CARDIOVERSION  02/03/2022     CV CORONARY ANGIOGRAM N/A 10/23/2020    Procedure: Coronary Angiogram;  Surgeon: Varun Freire MD;  Location: [facility] Cath Lab;  Service: Cardiology     CV LEFT HEART  CATHETERIZATION WITHOUT LEFT VENTRICULOGRAM Left 10/23/2020    Procedure: Left Heart Catheterization Without Left Ventriculogram;  Surgeon: Varun Freire MD;  Location: [facility] Cath Lab;  Service: Cardiology     EP BIV PACEMAKER INSERT N/A 03/25/2020    Procedure: EP Biventricular Pacemaker Insertion;  Surgeon: Taylor Sandoval MD;  Location: [facility] Cath Lab;  Service: Cardiology     H ABLATION FOCAL AFIB       HEART CATH, ANGIOPLASTY       IMPLANT PACEMAKER       RELEASE CARPAL TUNNEL       ZZC TOTAL KNEE ARTHROPLASTY Right 08/15/2019    Procedure: RIGHT  MINIMALLY TOTAL KNEE ARTHROPLASTY;  Surgeon: Keven Cerda MD;  Location: [facility];  Service: Orthopedics       Social History  Tobacco:   History   Smoking Status     Never   Smokeless Tobacco     Never    Alcohol:   Social History    Substance and Sexual Activity      Alcohol use: Yes        Comment: Alcoholic Drinks/day: rare   Illicit Drugs:   History   Drug Use No       Family History  Family History   Problem Relation Age of Onset     Alzheimer Disease Mother      Heart Disease Father      Cancer Sister      Diabetes Type 2  Sister      Chronic Obstructive Pulmonary Disease Sister      LUNG DISEASE Brother           John Paul Vasquez MD on 1/18/2023      cc: Jamie Noguera

## 2023-01-18 NOTE — LETTER
1/18/2023    ADDISON BENÍTEZ  [address]  [address] [facility] [id number]    RE: Reymundo Petersen       Dear Colleague,     I had the pleasure of seeing Reymundo Petersen in the [facility].    HEART CARE NOTE          Assessment/Recommendations     1. HFrEF c/b cardiogenic shock  Assessment / Plan    Dry on physical exam with vitals notable for borderline BP and some tachycardia; denies HF symptoms of orthopnea, PND, edema - will hold the pm dose of bumex this evening and started reduced dosing of bumex to 2 mg BID starting tomorrow    GDMT as detailed below     Current Pharmacotherapy AHA Guideline-Directed Medical Therapy   Losartan 25 mg daily - on hold given borderline BP Lisinopril 20 mg twice daily   Metoprolol succinate 25 mg daily Carvedilol 25 mg twice daily   Spironolactone not started Spironolactone 25 mg once daily   Hydralazine NA Hydralazine 100 mg three times daily   Isosorbide dinitrate NA Isosorbide dinitrate 40 mg three times daily   SGLT2 inhibitor not started Dapagliflozin or Empagliflozin 10 mg daily      2. Arrhythmia  Assessment / Plan    Persistent atrial fibrillation s/p ablations and DCCV    Currently on rivaroxaban; being considered for Watchman device - continue amiodarone    s/p PVI 11/22    Complete Heart Block s/p CRT-P (on the right as patient is left handed)    Follows with Dr. Michelle in EP as well as afib clinic     3. CAD  Assessment / Plan    Denies chest pain or anginal equivalents    Continue ASA, atorvastatin, metoprolol succinate, losartan    History of Present Illness/Subjective      Mr. 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 Petersen denies any acute cardiac events or complaints despite borderline BP;  "Management plan as detailed above     ECG: Personally reviewed. Paced rhythm     ECHO (personnaly Reviewed):   The left ventricle is mildly dilated.  Left ventricular function is decreased. The ejection fraction is 30-35%  (moderately reduced).  There is moderate global hypokinesia of the left ventricle.  The right ventricle is mildly dilated.  The right ventricular systolic function is normal.  The left atrium is severely dilated.  The right atrium is moderately dilated.  There is moderately severe (3+) tricuspid regurgitation.  Right ventricle systolic pressure estimate normal  There is mild to moderate (1-2+) aortic regurgitation.  The ascending aorta is Mildly dilated.  IVC diameter >2.1 cm collapsing <50% with sniff suggests a high RA pressure  estimated at 15 mmHg or greater.  Small pericardial effusion  There are no echocardiographic indications of cardiac tamponade.          Physical Examination Review of Systems   BP (!) 84/54 (BP Location: Left arm, Patient Position: Sitting, Cuff Size: Adult Regular)   Pulse 104   Resp 16   Ht 1.778 m (5' 10\")   Wt 73 kg (161 lb)   BMI 23.10 kg/m    Body mass index is 23.1 kg/m .  Wt Readings from Last 3 Encounters:   01/18/23 73 kg (161 lb)   01/12/23 71.7 kg (158 lb)   01/04/23 72.5 kg (159 lb 12.8 oz)     General Appearance:   no distress, normal body habitus   ENT/Mouth: membranes moist, no oral lesions or bleeding gums.      EYES:  no scleral icterus, normal conjunctivae   Neck: no carotid bruits or thyromegaly   Chest/Lungs:   lungs are clear to auscultation, no rales or wheezing, equal chest wall expansion    Cardiovascular:   Regular. Normal first and second heart sounds with no murmurs, rubs, or gallops; the carotid, radial and posterior tibial pulses are intact, no JVD and trace LE edema bilaterally    Abdomen:  no organomegaly, masses, bruits, or tenderness; bowel sounds are present   Extremities: no cyanosis or clubbing   Skin: no xanthelasma, warm.  "   Neurologic: alert and oriented x3, calm     Psychiatric: alert and oriented x3, calm     A complete 10 systems ROS was reviewed  And is negative except what is listed in the HPI.          Medical History  Surgical History Family History Social History   Past Medical History:   Diagnosis Date     Atrial fibrillation (H)      Atrial fibrillation, transient (H) 08/11/2020     BPH (benign prostatic hyperplasia) 07/11/2018     Chronic combined systolic and diastolic heart failure (H) 02/11/2021     Congestive heart failure (H)      Coronary artery disease      COVID 02/2021     Dilated cardiomyopathy (H) 02/11/2021     MARIAN (generalized anxiety disorder) 02/11/2021     GERD (gastroesophageal reflux disease)      High cholesterol      Hyperlipidemia      Hypertension      LBBB (left bundle branch block) 07/11/2018     Nonrheumatic aortic valve insufficiency 04/01/2020     Pulmonary hypertension (H) 04/01/2020    Past Surgical History:   Procedure Laterality Date     ANGIOPLASTY       ARTHROSCOPY KNEE       CARDIOVERSION  2021     CARDIOVERSION  02/03/2022     CV CORONARY ANGIOGRAM N/A 10/23/2020    Procedure: Coronary Angiogram;  Surgeon: Varun Freire MD;  Location: [facility] Cath Lab;  Service: Cardiology     CV LEFT HEART CATHETERIZATION WITHOUT LEFT VENTRICULOGRAM Left 10/23/2020    Procedure: Left Heart Catheterization Without Left Ventriculogram;  Surgeon: Varun Freire MD;  Location: [facility] Cath Lab;  Service: Cardiology     EP BIV PACEMAKER INSERT N/A 03/25/2020    Procedure: EP Biventricular Pacemaker Insertion;  Surgeon: Taylor Sandoval MD;  Location: [facility] Cath Lab;  Service: Cardiology     H ABLATION FOCAL AFIB       HEART CATH, ANGIOPLASTY       IMPLANT PACEMAKER       RELEASE CARPAL TUNNEL       ZZC TOTAL KNEE ARTHROPLASTY Right 08/15/2019    Procedure: RIGHT  MINIMALLY TOTAL KNEE ARTHROPLASTY;  Surgeon: Keven Cerda MD;  Location: [facility];  Service:  Orthopedics    no family history of premature coronary artery disease Social History     Socioeconomic History     Marital status:      Spouse name: Not on file     Number of children: Not on file     Years of education: Not on file     Highest education level: Not on file   Occupational History     Not on file   Tobacco Use     Smoking status: Never     Smokeless tobacco: Never   Substance and Sexual Activity     Alcohol use: Yes     Comment: Alcoholic Drinks/day: rare     Drug use: No     Sexual activity: Not on file   Other Topics Concern     Parent/sibling w/ CABG, MI or angioplasty before 65F 55M? Not Asked   Social History Narrative    Retired.  Good sense of humor     Social Determinants of Health     Financial Resource Strain: Not on file   Food Insecurity: Not on file   Transportation Needs: Not on file   Physical Activity: Not on file   Stress: Not on file   Social Connections: Not on file   Intimate Partner Violence: Not on file   Housing Stability: Not on file           Lab Results    Chemistry/lipid CBC Cardiac Enzymes/BNP/TSH/INR   Lab Results   Component Value Date    CHOL 107 06/23/2022    HDL 38 (L) 06/23/2022    TRIG 35 06/23/2022    BUN 29.3 (H) 01/12/2023     01/12/2023    CO2 34 (H) 01/12/2023    Lab Results   Component Value Date    WBC 7.3 12/23/2022    HGB 12.2 (L) 12/23/2022    HCT 38.7 (L) 12/23/2022    MCV 97 12/23/2022     12/23/2022    Lab Results   Component Value Date    TROPONINI 0.09 03/14/2022     (H) 06/17/2022    TSH 4.26 10/17/2022    INR 1.03 02/11/2021     Lab Results   Component Value Date    TROPONINI 0.09 03/14/2022          Weight:    Wt Readings from Last 3 Encounters:   01/12/23 71.7 kg (158 lb)   01/04/23 72.5 kg (159 lb 12.8 oz)   12/23/22 73.1 kg (161 lb 1.6 oz)       Allergies  No Known Allergies      Surgical History  Past Surgical History:   Procedure Laterality Date     ANGIOPLASTY       ARTHROSCOPY KNEE       CARDIOVERSION  2021      CARDIOVERSION  02/03/2022     CV CORONARY ANGIOGRAM N/A 10/23/2020    Procedure: Coronary Angiogram;  Surgeon: Varun Freire MD;  Location: [facility] Cath Lab;  Service: Cardiology     CV LEFT HEART CATHETERIZATION WITHOUT LEFT VENTRICULOGRAM Left 10/23/2020    Procedure: Left Heart Catheterization Without Left Ventriculogram;  Surgeon: Varun Freire MD;  Location: [facility] Cath Lab;  Service: Cardiology     EP BIV PACEMAKER INSERT N/A 03/25/2020    Procedure: EP Biventricular Pacemaker Insertion;  Surgeon: Talyor Sandoval MD;  Location: [facility] Cath Lab;  Service: Cardiology     H ABLATION FOCAL AFIB       HEART CATH, ANGIOPLASTY       IMPLANT PACEMAKER       RELEASE CARPAL TUNNEL       ZZC TOTAL KNEE ARTHROPLASTY Right 08/15/2019    Procedure: RIGHT  MINIMALLY TOTAL KNEE ARTHROPLASTY;  Surgeon: Keven Cerda MD;  Location: [facility];  Service: Orthopedics       Social History  Tobacco:   History   Smoking Status     Never   Smokeless Tobacco     Never    Alcohol:   Social History    Substance and Sexual Activity      Alcohol use: Yes        Comment: Alcoholic Drinks/day: rare   Illicit Drugs:   History   Drug Use No       Family History  Family History   Problem Relation Age of Onset     Alzheimer Disease Mother      Heart Disease Father      Cancer Sister      Diabetes Type 2  Sister      Chronic Obstructive Pulmonary Disease Sister      LUNG DISEASE Brother       John Paul Vasquez MD on 1/18/2023      cc: Jamie Noguera    Thank you for allowing me to participate in the care of your patient.      Sincerely,     John Paul Vasquez MD     [facility]  cc:   John Paul Vasquez MD  [address] [place] [address]

## 2023-01-18 NOTE — PATIENT INSTRUCTIONS
Thank you for allowing the Heart Care clinic to be a part of your care. Please pay close attention to the following medications as they have been changed during this visit.    1.) Please decrease your bumetanide (Bumex) to 2 mg (one 2 mg tablet) two time daily

## 2023-01-20 NOTE — TELEPHONE ENCOUNTER
Patient's daughter Juliet called early this morning. Said patient was up most of the night, which she thinks is due to discomfort from a hemorrhoid issue. Short of breath, but no worse since having his diuretic reduced earlier this week. He still feels some dry mouth but she thinks he is overall better on the lower diuretic dose. No orthopnea or PND. BP stable, but temperature was 96.4F which concerned Juleit. I indicated that temperature is still within normal limits and in the absence of infective symptoms, does not need any additional attention. If he starts to feel worse, he should let his primary team know.     Jj Dawn MD [facility]  Non-Invasive Cardiologist  [facility] [place]  Pager [phone]

## 2023-01-23 PROBLEM — E87.1 HYPONATREMIA: Status: ACTIVE | Noted: 2023-01-01

## 2023-01-23 PROBLEM — I25.10 CORONARY ARTERY DISEASE DUE TO LIPID RICH PLAQUE: Chronic | Status: ACTIVE | Noted: 2020-10-15

## 2023-01-23 PROBLEM — E87.5 HYPERKALEMIA: Status: ACTIVE | Noted: 2023-01-01

## 2023-01-23 PROBLEM — I50.41 ACUTE COMBINED SYSTOLIC AND DIASTOLIC CONGESTIVE HEART FAILURE (H): Status: ACTIVE | Noted: 2020-03-26

## 2023-01-23 PROBLEM — M19.90 DJD (DEGENERATIVE JOINT DISEASE): Status: ACTIVE | Noted: 2018-07-11

## 2023-01-23 PROBLEM — I10 PRIMARY HYPERTENSION: Status: ACTIVE | Noted: 2018-07-11

## 2023-01-23 PROBLEM — N40.0 BPH (BENIGN PROSTATIC HYPERPLASIA): Status: ACTIVE | Noted: 2018-07-11

## 2023-01-23 PROBLEM — F41.1 GAD (GENERALIZED ANXIETY DISORDER): Status: ACTIVE | Noted: 2021-02-11

## 2023-01-23 PROBLEM — I25.83 CORONARY ARTERY DISEASE DUE TO LIPID RICH PLAQUE: Chronic | Status: ACTIVE | Noted: 2020-10-15

## 2023-01-23 NOTE — ED TRIAGE NOTES
He comes in by ambulance with generalized fatigue and poor apitite for the last few days. Denies pain at this time. Denies nausea vomiting or diarrhea at this time. He live with his sister-in-law who cares for him. Today he had PT at home and they suggested he come in. EMS started oxygen as they found him to be low. Here in the ED he was 95% on room air so I stopped th oxygen. He is here with his family.

## 2023-01-23 NOTE — TELEPHONE ENCOUNTER
"155.4lbs --> 158.6lbs    Both daughters state they have discovered Pt tends to hold wt using the bars. They have reinforced with the Pt to not \"cheat\" and put full wt on scale. They do not believe it is fluid the Pt is holding on to. Daughter Juliet states his skin was tenting just the day before last. No swelling or increased SOB. He is however more fatigued, weak, and confused. \"He's really shaky and has been having a hard time understanding what you want him to do.\" They voiced concern about gagging reflex r/t anxiety. Deferred to their PCP and speech therapist for these concerns. They state they have been watching and tracking the sodium very closely in his diet. Additionally, they have been tracking the fluids as well. He has been getting around 52oz daily. Advised they increase to but no more than 60oz to see if that will help with borderline BP. Additionally, encouraged them to encourage Pt to eat more as they report his appetite has been really low. Continue to monitor fatigue and weakness. Instructed to call with any new or worsening symptoms.     15mins    Damian ABREU RN  BSN    "

## 2023-01-23 NOTE — ED PROVIDER NOTES
EMERGENCY DEPARTMENT ENCOUNTER            IMPRESSION:  Hypotension  Acute kidney injury  Acute renal failure  Hyperkalemia  CHF with hypoxia      MEDICAL DECISION MAKING:  Patient brought in by ambulance for general fatigue.  He has a history of heart failure and has a pacemaker.  He is prescribed a potassium supplement and diuretics.    On exam he is slightly hypertensive.  No fever.  He appears chronically ill.  He is on nasal cannula oxygen.    EKG shows a paced rhythm    He was placed on cardiac monitor    Chemistry showed critically elevated potassium at 7.1 and acute renal failure; he was given IV fluids and hyperkalemia reversal medications    Viral panel is negative    Troponin is slightly elevated; BNP is elevated    Chest x-ray showed evidence of heart failure with effusions.;  Patient was given IV Lasix    Nephrology was consulted.  Recommended IV fluid, and Lokelma.  Nephrology will see the patient in the morning.  No need for emergent dialysis    Hospitalist consulted for admission          =================================================================  CHIEF COMPLAINT:  Chief Complaint   Patient presents with     Fatigue         HPI  Reymundo Petersne is a 82 year old male with a history of CHF, CAD, HTN, atrial fibrillation, HLD, nonrheumatic aortic valve insufficiency, PE, pulmonary HTN, CKD, SHERLYN, and s/p pacemaker, who presents to the ED by by ambulance for evaluation of fatigue.     Per daughter, the patient has had generalized weakness, fatigue, and tremors for the past couple of days. The daughter adds that the patient has had a decreased appetite, but has been able to drink sufficient fluids. Today when the patient's OT visited, they saw that the patient had become significantly weaker, prompting them to be present in the ED. Here in the ED, the patient states that he has been fatigue for the past couple of days. He denies falls/injuries. No abdominal pain. The patient is not on oxygen  normally per baseline. He otherwise denies any other symptoms or complaints at this time.    Of note, the daughter mentions that the patient's PCP is withholding 1 Lasix from his usual 2; this change was recent.    I, Hubert Angel am serving as a scribe to document services personally performed by Dr. Job Mar MD, based on my observation and the provider's statements to me. I, Dr. Job Mar MD attest that Hubert Angel is acting in a scribe capacity, has observed my performance of the services and has documented them in accordance with my direction.      REVIEW OF SYSTEMS   Constitutional: Positive for generalized weakness, fatigue, and decreased appetite. Denies fever, chills, unintentional weight loss  Eyes: Denies visual changes or discharge    HENT: Denies sore throat, ear pain or neck pain  Respiratory: Denies cough or shortness of breath    Cardiovascular: Denies chest pain, palpitations or leg swelling  GI: Denies abdominal pain, nausea, vomiting, or dark, bloody stools.    : Denies hematuria, dysuria, or flank pain  Musculoskeletal: Denies any falls/injuries, new back pain, or new muscle/joint pains  Skin: Denies rash or wound  Neurologic: Denies current headache, new weakness, focal weakness, or sensory changes    Lymphatic: Denies swollen glands    Psychiatric: Denies depression, suicidal ideation or homicidal ideation.    Remainder of systems reviewed, unless noted in HPI all others negative.      PAST MEDICAL HISTORY:  Past Medical History:   Diagnosis Date     Atrial fibrillation (H)      Atrial fibrillation, transient (H) 08/11/2020     BPH (benign prostatic hyperplasia) 07/11/2018     Chronic combined systolic and diastolic heart failure (H) 02/11/2021     Congestive heart failure (H)      Coronary artery disease      COVID 02/2021     Dilated cardiomyopathy (H) 02/11/2021     MARIAN (generalized anxiety disorder) 02/11/2021     GERD (gastroesophageal reflux disease)      High cholesterol       Hyperlipidemia      Hypertension      LBBB (left bundle branch block) 07/11/2018     Nonrheumatic aortic valve insufficiency 04/01/2020     Pulmonary hypertension (H) 04/01/2020       PAST SURGICAL HISTORY:  Past Surgical History:   Procedure Laterality Date     ANGIOPLASTY       ARTHROSCOPY KNEE       CARDIOVERSION  2021     CARDIOVERSION  02/03/2022     CV CORONARY ANGIOGRAM N/A 10/23/2020    Procedure: Coronary Angiogram;  Surgeon: Varun Freire MD;  Location: [facility] Cath Lab;  Service: Cardiology     CV LEFT HEART CATHETERIZATION WITHOUT LEFT VENTRICULOGRAM Left 10/23/2020    Procedure: Left Heart Catheterization Without Left Ventriculogram;  Surgeon: Varun Freire MD;  Location: [facility] Cath Lab;  Service: Cardiology     EP BIV PACEMAKER INSERT N/A 03/25/2020    Procedure: EP Biventricular Pacemaker Insertion;  Surgeon: Taylor Sandoval MD;  Location: [facility] Cath Lab;  Service: Cardiology     H ABLATION FOCAL AFIB       HEART CATH, ANGIOPLASTY       IMPLANT PACEMAKER       RELEASE CARPAL TUNNEL       ZZC TOTAL KNEE ARTHROPLASTY Right 08/15/2019    Procedure: RIGHT  MINIMALLY TOTAL KNEE ARTHROPLASTY;  Surgeon: Keven Cerda MD;  [facility]: [facility];  Service: Orthopedics         CURRENT MEDICATIONS:    acetaminophen (TYLENOL) 500 MG tablet  amiodarone (PACERONE) 200 MG tablet  aspirin (ASA) 81 MG chewable tablet  atorvastatin (LIPITOR) 10 MG tablet  bumetanide (BUMEX) 2 MG tablet  Coenzyme Q10 300 MG CAPS  diphenhydrAMINE-acetaminophen (TYLENOL PM)  MG tablet  doxazosin (CARDURA) 8 MG tablet  finasteride (PROSCAR) 5 mg tablet  gabapentin (NEURONTIN) 100 MG capsule  gabapentin (NEURONTIN) 100 MG capsule  glucosamine/chondr cole A sod (OSTEO BI-FLEX ORAL)  hydrocortisone 2.5 % cream  melatonin 10 mg Tab  metoprolol succinate ER (TOPROL-XL) 25 MG 24 hr tablet  multivitamin, therapeutic (THERA-VIT) TABS tablet  nitroGLYcerin (NITROSTAT) 0.4 MG sublingual  "tablet  omeprazole (PRILOSEC) 20 MG capsule  potassium chloride ER (KLOR-CON M) 20 MEQ CR tablet  rivaroxaban ANTICOAGULANT (XARELTO) 20 MG TABS tablet  sertraline (ZOLOFT) 50 MG tablet  vit A/vit C/vit E/zinc/copper (PRESERVISION AREDS ORAL)        ALLERGIES:  No Known Allergies    FAMILY HISTORY:  Family History   Problem Relation Age of Onset     Alzheimer Disease Mother      Heart Disease Father      Cancer Sister      Diabetes Type 2  Sister      Chronic Obstructive Pulmonary Disease Sister      LUNG DISEASE Brother        SOCIAL HISTORY:   Social History     Socioeconomic History     Marital status:    Tobacco Use     Smoking status: Never     Smokeless tobacco: Never   Substance and Sexual Activity     Alcohol use: Yes     Comment: Alcoholic Drinks/day: rare     Drug use: No   Social History Narrative    Retired.  Good sense of humor       PHYSICAL EXAM:    BP 97/62   Pulse 60   Resp 17   Ht 1.778 m (5' 10\")   Wt 71.7 kg (158 lb)   SpO2 99%   BMI 22.67 kg/m      Constitutional: Awake, alert, in no acute distress  Head: Normocephalic, atraumatic.  ENT: Mucous membranes moist. Posterior oropharynx appears normal.  Eyes: Pupils midrange and reactive ,no conjunctival discharge  Neck: No lymphadenopathy, no stridor, supple, no soft tissue swelling  Chest: No tenderness   Respiratory: Respirations even, unlabored. Lungs clear to ascultation bilaterally, in no acute respiratory distress.  Cardiovascular: Regular rate and rhythm.+2 radial pulses, equal bilaterally.  No murmurs.   GI: Abdomen soft, non-tender to palpation in all 4 quadrants. No guarding or rebound. Bowel sounds intact on all 4 quadrants.   Back: No CVA tenderness.    Musculoskeletal: Moves all 4 extremities equally, strength symmetrical on bilateral uppers and lowers.  No peripheral edema  Integument: Warm, dry. No rash. No bruising or petechiae.  Lymphatic: No cervical lymphadenopathy  Neurologic: Alert & oriented x 3. Normal speech. " Grossly normal motor and sensory function. No focal deficits noted.  NIHSS = 0  Psychiatric: Normal mood and affect. Normal judgement.    ED COURSE:    5:37 PM I met with the patient, obtained history, performed an initial exam, and discussed options and plan for diagnostics and treatment here in the ED. PPE worn: n95 mask, gloves  6:36 PM I spoke to Dr. Yarbrough, nephrology.  7:42 PM I updated the patient.  7:54 PM I discussed the case with hospitalist, Dr Reddy, who accepts the patient.    Medical Decision Making    History:    Supplemental history from: Family    External Record(s) reviewed: External medical records care everywhere    Work Up:    Chart documentation includes differential considered and any EKGs or imaging independently interpreted by provider.  Laboratory, EKG, radiology independently reviewed    In additional to work up documented, I considered the following work up:    External consultation:    Discussion of management with another provider: Nephrology, Hospitalist     Complicating factors:    Care impacted by chronic illness: CHF, CAD, HTN, atrial fibrillation, HLD, CKD    Care affected by social determinants of health: Access to care    Disposition considerations: Admit.          LAB:  Laboratory results were independently reviewed and interpreted  Results for orders placed or performed during the hospital encounter of 01/23/23   XR Chest 2 Views    Impression    IMPRESSION:    Small to moderate bilateral pleural effusions, larger on the right. There are adjacent airspace opacities which likely reflect atelectasis, although infection is not excluded. Upper lungs are clear. No pneumothorax.    Stable enlarged cardiac silhouette. Unchanged right subclavian approach pacemaker.   Extra Blue Top Tube   Result Value Ref Range    Hold Specimen JIC    Extra Red Top Tube   Result Value Ref Range    Hold Specimen JIC    Extra Green Top (Lithium Heparin) Tube   Result Value Ref Range    Hold Specimen JIC     Extra Purple Top Tube   Result Value Ref Range    Hold Specimen [facility]    Basic metabolic panel   Result Value Ref Range    Sodium 123 (L) 136 - 145 mmol/L    Potassium 7.1 (HH) 3.4 - 5.3 mmol/L    Chloride 86 (L) 98 - 107 mmol/L    Carbon Dioxide (CO2) 22 22 - 29 mmol/L    Anion Gap 15 7 - 15 mmol/L    Urea Nitrogen 95.9 (H) 8.0 - 23.0 mg/dL    Creatinine 2.09 (H) 0.67 - 1.17 mg/dL    Calcium 9.5 8.8 - 10.2 mg/dL    Glucose 134 (H) 70 - 99 mg/dL    GFR Estimate 31 (L) >60 mL/min/1.73m2   CBC with platelets and differential   Result Value Ref Range    WBC Count 10.9 4.0 - 11.0 10e3/uL    RBC Count 4.62 4.40 - 5.90 10e6/uL    Hemoglobin 13.8 13.3 - 17.7 g/dL    Hematocrit 43.9 40.0 - 53.0 %    MCV 95 78 - 100 fL    MCH 29.9 26.5 - 33.0 pg    MCHC 31.4 (L) 31.5 - 36.5 g/dL    RDW 16.0 (H) 10.0 - 15.0 %    Platelet Count 379 150 - 450 10e3/uL    % Neutrophils 86 %    % Lymphocytes 6 %    % Monocytes 7 %    % Eosinophils 0 %    % Basophils 0 %    % Immature Granulocytes 1 %    NRBCs per 100 WBC 0 <1 /100    Absolute Neutrophils 9.5 (H) 1.6 - 8.3 10e3/uL    Absolute Lymphocytes 0.6 (L) 0.8 - 5.3 10e3/uL    Absolute Monocytes 0.8 0.0 - 1.3 10e3/uL    Absolute Eosinophils 0.0 0.0 - 0.7 10e3/uL    Absolute Basophils 0.0 0.0 - 0.2 10e3/uL    Absolute Immature Granulocytes 0.1 <=0.4 10e3/uL    Absolute NRBCs 0.0 10e3/uL   Result Value Ref Range    Troponin T, High Sensitivity 98 (H) <=22 ng/L   Lactic acid whole blood   Result Value Ref Range    Lactic Acid 2.7 (H) 0.7 - 2.0 mmol/L   Result Value Ref Range    Lipase 55 13 - 60 U/L   UA with Microscopic reflex to Culture    Specimen: Urine, Clean Catch   Result Value Ref Range    Color Urine Yellow Colorless, Straw, Light Yellow, Yellow    Appearance Urine Clear Clear    Glucose Urine Negative Negative mg/dL    Bilirubin Urine Negative Negative    Ketones Urine Negative Negative mg/dL    Specific Gravity Urine 1.013 1.001 - 1.030    Blood Urine Negative Negative    pH Urine  5.0 5.0 - 7.0    Protein Albumin Urine Negative Negative mg/dL    Urobilinogen Urine <2.0 <2.0 mg/dL    Nitrite Urine Negative Negative    Leukocyte Esterase Urine 250 Susan/uL (A) Negative    Bacteria Urine Few (A) None Seen /HPF    RBC Urine <1 <=2 /HPF    WBC Urine 5 <=5 /HPF    Squamous Epithelials Urine <1 <=1 /HPF    Hyaline Casts Urine 28 (H) <=2 /LPF   Result Value Ref Range    Potassium 6.9 (HH) 3.4 - 5.3 mmol/L   Symptomatic Influenza A/B & SARS-CoV2 (COVID-19) Virus PCR Multiplex Nasopharyngeal    Specimen: Nasopharyngeal; Swab   Result Value Ref Range    Influenza A PCR Negative Negative    Influenza B PCR Negative Negative    RSV PCR Negative Negative    SARS CoV2 PCR Negative Negative   Nt probnp inpatient (BNP)   Result Value Ref Range    N terminal Pro BNP Inpatient 22,197 (H) 0 - 1,800 pg/mL   Basic metabolic panel   Result Value Ref Range    Sodium 126 (L) 136 - 145 mmol/L    Potassium 5.5 (H) 3.4 - 5.3 mmol/L    Chloride 90 (L) 98 - 107 mmol/L    Carbon Dioxide (CO2) 25 22 - 29 mmol/L    Anion Gap 11 7 - 15 mmol/L    Urea Nitrogen 96.9 (H) 8.0 - 23.0 mg/dL    Creatinine 1.98 (H) 0.67 - 1.17 mg/dL    Calcium 9.6 8.8 - 10.2 mg/dL    Glucose 77 70 - 99 mg/dL    GFR Estimate 33 (L) >60 mL/min/1.73m2   Lactic acid whole blood   Result Value Ref Range    Lactic Acid 2.0 0.7 - 2.0 mmol/L   Glucose by meter   Result Value Ref Range    GLUCOSE BY METER POCT 113 (H) 70 - 99 mg/dL   Sodium random urine   Result Value Ref Range    Sodium Urine mmol/L <20 mmol/L   Glucose by meter   Result Value Ref Range    GLUCOSE BY METER POCT 196 (H) 70 - 99 mg/dL   Glucose by meter   Result Value Ref Range    GLUCOSE BY METER POCT 123 (H) 70 - 99 mg/dL   Glucose by meter   Result Value Ref Range    GLUCOSE BY METER POCT 107 (H) 70 - 99 mg/dL   Glucose by meter   Result Value Ref Range    GLUCOSE BY METER POCT 94 70 - 99 mg/dL   Glucose by meter   Result Value Ref Range    GLUCOSE BY METER POCT 89 70 - 99 mg/dL          RADIOLOGY:  Radiology reports were independently reviewed and interpreted  XR Chest 2 Views   Final Result   IMPRESSION:      Small to moderate bilateral pleural effusions, larger on the right. There are adjacent airspace opacities which likely reflect atelectasis, although infection is not excluded. Upper lungs are clear. No pneumothorax.      Stable enlarged cardiac silhouette. Unchanged right subclavian approach pacemaker.           EKG:    Performed at: 23-JAN-2023 18:09:00    Impression: Abnormal ECG     Rate: 68 BPM  Rhythm: Sinus rhythm with 1 degree AV block  Axis: -2  WV Interval: 224 ms   QRS Interval: 174 ms   QTc Interval: 472 ms   ST Changes: Nonspecific T wave abnormality, worse in Inferior leads. Nonspecific T wave abnormality no longer evident in Anterior leads.  Comparison: When compared with ECG of 13-SEP-2018 18:06, QRS duration has increased.    I have independently reviewed and interpreted the EKG(s) documented above.    PROCEDURES:   Critical Care Time 120 minutes      MEDICATIONS GIVEN IN THE EMERGENCY:  Medications   glucose gel 15-30 g (has no administration in time range)     Or   dextrose 50 % injection 25-50 mL (has no administration in time range)     Or   glucagon injection 1 mg (has no administration in time range)   dextrose 5% BOLUS (has no administration in time range)   aspirin (ASA) chewable tablet 81 mg (has no administration in time range)   atorvastatin (LIPITOR) tablet 10 mg (has no administration in time range)   doxazosin (CARDURA) tablet 4 mg (has no administration in time range)   finasteride (PROSCAR) tablet 5 mg (has no administration in time range)   metoprolol succinate ER (TOPROL XL) 24 hr tablet 25 mg (25 mg Oral Not Given 1/23/23 7530)   nitroGLYcerin (NITROSTAT) sublingual tablet 0.4 mg (has no administration in time range)   sertraline (ZOLOFT) tablet 50 mg (has no administration in time range)   rivaroxaban ANTICOAGULANT (XARELTO) tablet 20 mg (has no  administration in time range)   lidocaine 1 % 0.1-1 mL (has no administration in time range)   lidocaine (LMX4) cream (has no administration in time range)   sodium chloride (PF) 0.9% PF flush 3 mL (has no administration in time range)   sodium chloride (PF) 0.9% PF flush 3 mL (has no administration in time range)   melatonin tablet 1 mg (has no administration in time range)   Patient is already receiving anticoagulation with heparin, enoxaparin (LOVENOX), warfarin (COUMADIN)  or other anticoagulant medication (has no administration in time range)   sodium chloride 0.9% infusion (has no administration in time range)   polyethylene glycol (MIRALAX) Packet 17 g (has no administration in time range)   ondansetron (ZOFRAN ODT) ODT tab 4 mg (has no administration in time range)     Or   ondansetron (ZOFRAN) injection 4 mg (has no administration in time range)   acetaminophen (TYLENOL) tablet 650 mg (has no administration in time range)     Or   acetaminophen (TYLENOL) Suppository 650 mg (has no administration in time range)   lidocaine 1 % 0.1-5 mL (has no administration in time range)   lidocaine (LMX4) cream (has no administration in time range)   sodium chloride (PF) 0.9% PF flush 10-40 mL (has no administration in time range)   0.9% sodium chloride BOLUS (0 mLs Intravenous Stopped 1/23/23 2047)   calcium gluconate 1 g in 50 mL sodium chloride intermittent infusion (premix) (1 g Intravenous Given 1/23/23 1946)   dextrose 10% BOLUS ( Intravenous Rate/Dose Verify 1/23/23 2209)   dextrose 50 % injection 25 g (25 g Intravenous Given 1/23/23 1956)   insulin regular 1 unit/mL injection 7.3 Units (7.3 Units Intravenous Given 1/23/23 2001)   furosemide (LASIX) injection 20 mg (20 mg Intravenous Given 1/23/23 2209)   0.9% sodium chloride BOLUS (0 mLs Intravenous Stopped 1/23/23 2322)   calcium gluconate 10 % injection 1 g (0 g Intravenous Stopped 1/23/23 2322)   sodium zirconium cyclosilicate (LOKELMA) packet 10 g (10 g Oral  Given 1/23/23 2159)           NEW PRESCRIPTIONS STARTED AT TODAY'S ER VISIT:  New Prescriptions    No medications on file          FINAL DIAGNOSIS:    ICD-10-CM    1. Hyperkalemia  E87.5                At the conclusion of the encounter I discussed the results of all of the tests and the disposition. The questions were answered. The patient or family acknowledged understanding and was agreeable with the care plan.     NAME: Reymundo Petersen  AGE: 82 year old male  YOB: 1940  MRN: [id number]  EVALUATION DATE & TIME: 1/23/2023  3:50 PM    PCP: Jamie Noguera    ED PROVIDER: Job Mar M.D.      I, Hubert Angel, am serving as a scribe to document services personally performed by Dr. Job Mar based on my observation and the provider's statements to me. I, Job Mar MD attest that Hubert Angel is acting in a scribe capacity, has observed my performance of the services and has documented them in accordance with my direction.    Job Mar M.D.  Emergency Medicine  [facility] EMERGENCY DEPARTMENT  [facility] [facility] [address]  [phone]  Dept: [phone]  1/23/2023       Job Mar MD  01/24/23 0019

## 2023-01-23 NOTE — Clinical Note
Potential access sites were evaluated for patency using ultrasound.   The internal jugular vein was selected. Access was obtained under with Sonosite guidance using a micropuncture 21 gauge needle with direct visualization of needle entry.

## 2023-01-24 NOTE — ED NOTES
Patient MAP 65 and less since 0630. Provider, (Dr. Reddy) notified per patient care order. No new orders placed, provider stated he would come assess patient. Will continue to monitor.

## 2023-01-24 NOTE — PHARMACY-ADMISSION MEDICATION HISTORY
Pharmacy Note - Admission Medication History    Pertinent Provider Information: Pt's daughter-in-law Juliet manages his medications. ______________________________________________________________________    Prior To Admission (PTA) med list completed and updated in EMR.       PTA Med List   Medication Sig Note Last Dose     acetaminophen (TYLENOL) 500 MG tablet Take 500 mg by mouth every 6 hours as needed for pain  Past Week     amiodarone (PACERONE) 200 MG tablet Take 1 tablet (200 mg) by mouth every other day  1/22/2023     aspirin (ASA) 81 MG chewable tablet Take 1 tablet (81 mg) by mouth every other day (Patient taking differently: Take 81 mg by mouth Every Mon, Wed, Fri Morning Taking Monday, Wednesday and Fridays)  1/23/2023 at am     atorvastatin (LIPITOR) 10 MG tablet Take 10 mg by mouth four times a week Sunday, Tuesday, Thursday, & Saturday at bedtime  1/22/2023 at hs     bumetanide (BUMEX) 2 MG tablet Take 1 tablet (2 mg) by mouth 2 times daily  1/23/2023 at x1     Coenzyme Q10 300 MG CAPS Take 300 mg by mouth daily  1/23/2023 at am     diphenhydrAMINE-acetaminophen (TYLENOL PM)  MG tablet Take 1-2 tablets by mouth nightly as needed for sleep  Past Week at Sat     doxazosin (CARDURA) 8 MG tablet TAKE 1/2 TABLET BY MOUTH ONCE DAILY IN THE EVENING TO SHRINK PROSTATE AND LOWER BLOOD PRESSURE  1/22/2023 at pm     finasteride (PROSCAR) 5 mg tablet [FINASTERIDE (PROSCAR) 5 MG TABLET] Take 5 mg by mouth at bedtime.         1/22/2023 at pm     gabapentin (NEURONTIN) 100 MG capsule Take 300 mg by mouth every evening  1/22/2023 at pm     gabapentin (NEURONTIN) 100 MG capsule Take 200 mg by mouth 2 times daily Two capsules in the morning, two capsules in the afternoon, and three capsules in the evening  1/23/2023 at x2     glucosamine/chondr cole A sod (OSTEO BI-FLEX ORAL) Take 1 tablet by mouth 2 times daily (with meals)  1/23/2023 at x1     hydrocortisone 2.5 % cream Apply topically 2 times daily  1/23/2023 at  x1     melatonin 10 mg Tab Take 10 mg by mouth At Bedtime   1/22/2023 at hs     metoprolol succinate ER (TOPROL-XL) 25 MG 24 hr tablet Take 1 tablet (25 mg) by mouth At Bedtime  1/22/2023 at hs     multivitamin, therapeutic (THERA-VIT) TABS tablet Take 1 tablet by mouth daily  1/23/2023 at am     nitroGLYcerin (NITROSTAT) 0.4 MG sublingual tablet For chest pain place 1 tablet under the tongue every 5 minutes for 3 doses. If symptoms persist 5 minutes after 1st dose call 911.  Unknown at never     omeprazole (PRILOSEC) 20 MG capsule [OMEPRAZOLE (PRILOSEC) 20 MG CAPSULE] Take 20 mg by mouth 2 (two) times a day before meals.  1/23/2023 at x1     potassium chloride ER (KLOR-CON M) 20 MEQ CR tablet Take 1 tablet (20 mEq) by mouth 2 times daily 1/23/2023: With meals 1/23/2023 at am     rivaroxaban ANTICOAGULANT (XARELTO) 20 MG TABS tablet Take 1 tablet (20 mg) by mouth daily (with dinner)  1/22/2023 at pm     sertraline (ZOLOFT) 50 MG tablet Take 50 mg by mouth 2 times daily   1/23/2023 at x1     vit A/vit C/vit E/zinc/copper (PRESERVISION AREDS ORAL) Take 1 tablet by mouth 2 times daily (with meals)  1/23/2023 at x1       Information source(s): Family member and CareEverywhere/[facility]  Method of interview communication: in-person    Summary of Changes to PTA Med List  New: hydrocortisone, APAP  Discontinued: none  Changed: none    Family member was asked about OTC/herbal products specifically.  PTA med list reflects this.    In the past week, patient estimated taking medication this percent of the time:  greater than 90%.    Allergies were reviewed, assessed, and updated with the family member.      Patient did not bring any medications to the hospital and can't retrieve from home. No multi-dose medications are available for use during hospital stay.     The information provided in this note is only as accurate as the sources available at the time of the update(s).    Thank you,  Frances Bustos, [facility]  1/23/2023 8:10  PM

## 2023-01-24 NOTE — ED NOTES
Patient MAP 65 or less since 0445. Provider (Dr. Sood) notified. Patient MAP now 68 while speaking with provider and provider ok to continue to monitor patient at this time. Will notify provider if MAP again 65 or less.

## 2023-01-24 NOTE — PROGRESS NOTES
Received page from ED regarding patient came in to ED for fatigue and found SHERLYN, hyperkalemia, hyponatremia. He has been feeling fatigue and more confuse over 2 days.   Baseline Cr 0.8-1.0. Initially on losartan which was discontinued for hypotension. Per med review, he is also on K CL 20 meq bid.   Recommendation:   NS bolus at 500 mg x1.   Start lokelma 10 g tid  Ordered UA, Fiona, UK and U Cl  Urine and serum osmolality.   K q 6 hr and shift for > 5.5.   Na q 4 hr and give D5W 250 ml over 1 hr prn for Na above 129 prior to 1600 of 1/24/23.   Strict Is and Os.   Daily weight.   Avoid extreme hypo or hypertension  Bladder scan to rule out obstruction  Please let nephrology know with additional question or concern.  Full consult in AM.     Wing Yarbrough MD  [facility]

## 2023-01-24 NOTE — PROGRESS NOTES
Sputtering/coughing noted when patient attempted to drink sips of water. Will attempt to help patient with fluids when more awake.

## 2023-01-24 NOTE — CONSULTS
RENAL CONSULT NOTE    REQUESTING PHYSICIAN: Elliot Reddy MD.     REASON FOR CONSULT: SHERLYN, Hyperkalemia, Hyponatremia    ASSESSMENT/PLAN:    SHERLYN: Baseline Cr in 0.8 -1.0 with eGFR in 70-80%. He was receiving aggressive diuresis as an outpatient. Also outpatient note stated he was hypotensive too. Most likely SHERLYN is from pre renal and ? ATN for pro long pre renal state. Cr up to 2.09 upon admission and now trending down to 1.85 today with improving hemodynamic/volume status.   -avoid extreme hypo/hypertension  - avoid nephrotoxic medications.   - renally dosing medications  - hold off diuretic for now.   - no indication for renal replacement therapy.   - drink to thirst.     Iso osmolar moderate hyponatremia: Urine Na is < 20 and c/w aldosterone action vs deficient Na store from urinary loss from diuretic. Serum osmo 304, urine osmo 336,   - No need to concern about over correction  - ok to give NS for management of hemodynamic instability  - would prefer LR (balance solution) in stead for volume resuscitation.     Hyperkalemia: likely from K replacement (PTA 20 meq bid) in the setting of SHERLYN with decrease K excretion. Now K in Urine is 77 and showing removal of K by kidney.   - hold off K replacement  - continue lokelma for now.     Hypochloremia: Cl 32 yesterday. Bicarb 25.   - ok to hold of chloride replacement.     Will follow.     HPI: 82y M with PMHX of HFrEF, a fib on amidarone, metoprolol and rivaroxaban who was also previously on losartan which was discontinued due to hypotension, has been having light headed at home and was on bumex 2 mg bid.   He was brought in for more lethargic to ED.   He was found to have K  of 7.1, Na 123 and Cr 2.09 from baseline ~1.0.   Nephrology consult for management of SHERLYN, hyponatremia and hyperkalemia.   Denies smoking, rash or joint pain. He might be on NSAID in the past.   Cr in 0.8-1 and eGFR in 70-80% at baseline.     REVIEW OF SYSTEMS: a 12 point review of systems  was reviewed and negative other than noted in the HPI above.      Past Medical History:   Diagnosis Date     Atrial fibrillation (H)      Atrial fibrillation, transient (H) 08/11/2020     BPH (benign prostatic hyperplasia) 07/11/2018     Chronic combined systolic and diastolic heart failure (H) 02/11/2021     Congestive heart failure (H)      Coronary artery disease      COVID 02/2021     Dilated cardiomyopathy (H) 02/11/2021     MARIAN (generalized anxiety disorder) 02/11/2021     GERD (gastroesophageal reflux disease)      High cholesterol      Hyperlipidemia      Hypertension      LBBB (left bundle branch block) 07/11/2018     Nonrheumatic aortic valve insufficiency 04/01/2020     Pulmonary hypertension (H) 04/01/2020       No current facility-administered medications on file prior to encounter.  acetaminophen (TYLENOL) 500 MG tablet, Take 500 mg by mouth every 6 hours as needed for pain  amiodarone (PACERONE) 200 MG tablet, Take 1 tablet (200 mg) by mouth every other day  aspirin (ASA) 81 MG chewable tablet, Take 1 tablet (81 mg) by mouth every other day (Patient taking differently: Take 81 mg by mouth Every Mon, Wed, Fri Morning Taking Monday, Wednesday and Fridays)  atorvastatin (LIPITOR) 10 MG tablet, Take 10 mg by mouth four times a week Sunday, Tuesday, Thursday, & Saturday at bedtime  bumetanide (BUMEX) 2 MG tablet, Take 1 tablet (2 mg) by mouth 2 times daily  Coenzyme Q10 300 MG CAPS, Take 300 mg by mouth daily  diphenhydrAMINE-acetaminophen (TYLENOL PM)  MG tablet, Take 1-2 tablets by mouth nightly as needed for sleep  doxazosin (CARDURA) 8 MG tablet, TAKE 1/2 TABLET BY MOUTH ONCE DAILY IN THE EVENING TO SHRINK PROSTATE AND LOWER BLOOD PRESSURE  finasteride (PROSCAR) 5 mg tablet, [FINASTERIDE (PROSCAR) 5 MG TABLET] Take 5 mg by mouth at bedtime.         gabapentin (NEURONTIN) 100 MG capsule, Take 300 mg by mouth every evening  gabapentin (NEURONTIN) 100 MG capsule, Take 200 mg by mouth 2 times daily  Two capsules in the morning, two capsules in the afternoon, and three capsules in the evening  glucosamine/chondr cole A sod (OSTEO BI-FLEX ORAL), Take 1 tablet by mouth 2 times daily (with meals)  hydrocortisone 2.5 % cream, Apply topically 2 times daily  melatonin 10 mg Tab, Take 10 mg by mouth At Bedtime   metoprolol succinate ER (TOPROL-XL) 25 MG 24 hr tablet, Take 1 tablet (25 mg) by mouth At Bedtime  multivitamin, therapeutic (THERA-VIT) TABS tablet, Take 1 tablet by mouth daily  nitroGLYcerin (NITROSTAT) 0.4 MG sublingual tablet, For chest pain place 1 tablet under the tongue every 5 minutes for 3 doses. If symptoms persist 5 minutes after 1st dose call 911.  omeprazole (PRILOSEC) 20 MG capsule, [OMEPRAZOLE (PRILOSEC) 20 MG CAPSULE] Take 20 mg by mouth 2 (two) times a day before meals.  potassium chloride ER (KLOR-CON M) 20 MEQ CR tablet, Take 1 tablet (20 mEq) by mouth 2 times daily  rivaroxaban ANTICOAGULANT (XARELTO) 20 MG TABS tablet, Take 1 tablet (20 mg) by mouth daily (with dinner)  sertraline (ZOLOFT) 50 MG tablet, Take 50 mg by mouth 2 times daily   vit A/vit C/vit E/zinc/copper (PRESERVISION AREDS ORAL), Take 1 tablet by mouth 2 times daily (with meals)        acetaminophen (TYLENOL) 500 MG tablet  amiodarone (PACERONE) 200 MG tablet  aspirin (ASA) 81 MG chewable tablet  atorvastatin (LIPITOR) 10 MG tablet  bumetanide (BUMEX) 2 MG tablet  Coenzyme Q10 300 MG CAPS  diphenhydrAMINE-acetaminophen (TYLENOL PM)  MG tablet  doxazosin (CARDURA) 8 MG tablet  finasteride (PROSCAR) 5 mg tablet  gabapentin (NEURONTIN) 100 MG capsule  gabapentin (NEURONTIN) 100 MG capsule  glucosamine/chondr cole A sod (OSTEO BI-FLEX ORAL)  hydrocortisone 2.5 % cream  melatonin 10 mg Tab  metoprolol succinate ER (TOPROL-XL) 25 MG 24 hr tablet  multivitamin, therapeutic (THERA-VIT) TABS tablet  nitroGLYcerin (NITROSTAT) 0.4 MG sublingual tablet  omeprazole (PRILOSEC) 20 MG capsule  potassium chloride ER (KLOR-CON M) 20 MEQ CR  tablet  rivaroxaban ANTICOAGULANT (XARELTO) 20 MG TABS tablet  sertraline (ZOLOFT) 50 MG tablet  vit A/vit C/vit E/zinc/copper (PRESERVISION AREDS ORAL)        ALLERGIES/SENSITIVITIES:  No Known Allergies  Social History     Tobacco Use     Smoking status: Never     Smokeless tobacco: Never   Substance Use Topics     Alcohol use: Yes     Comment: Alcoholic Drinks/day: rare     Drug use: No     I have reviewed this patient's family history and updated it with pertinent information if needed.  Family History   Problem Relation Age of Onset     Alzheimer Disease Mother      Heart Disease Father      Cancer Sister      Diabetes Type 2  Sister      Chronic Obstructive Pulmonary Disease Sister      LUNG DISEASE Brother          PHYSICAL EXAM:  Physical Exam   Temp: 97.6  F (36.4  C) Temp src: Axillary BP: 93/56 Pulse: 61   Resp: 24 SpO2: 96 % O2 Device: Nasal cannula Oxygen Delivery: 2 LPM  Vitals:    01/23/23 1559 01/23/23 1846   Weight: 73 kg (160 lb 15 oz) 71.7 kg (158 lb)     Vital Signs with Ranges  Temp:  [96.8  F (36  C)-97.6  F (36.4  C)] 97.6  F (36.4  C)  Pulse:  [59-71] 61  Resp:  [10-27] 24  BP: ()/(50-66) 93/56  SpO2:  [89 %-100 %] 96 %  I/O last 3 completed shifts:  In: -   Out: 700 [Urine:700]    @TMAXR(24)@    Patient Vitals for the past 72 hrs:   Weight   01/23/23 1846 71.7 kg (158 lb)   01/23/23 1559 73 kg (160 lb 15 oz)       General - A & O x 3, NAD  HEENT - PERRLA, no scleral icterus  Neck - no carotid bruits, no JVD  Respiratory - Lungs CTA bilat without wheeze, rhonchi, rales  Cardiovascular - AP RRR without murmur  Abdomen - soft, BS present, no bruits, no fluid wave  Extremities - well perfused, no edema  Integumentary - intact, good turgor, no rash/lesions  Neurologic - grossly intact  Psych:  Judgement intact, affect WNL  :  No mccartney's catheter.     Laboratory:     Recent Labs   Lab 01/24/23  0520 01/23/23  1601   WBC 8.4 10.9   RBC 4.15* 4.62   HGB 12.6* 13.8   HCT 38.9* 43.9     379       Basic Metabolic Panel:  Recent Labs   Lab 01/24/23  0520 01/24/23  0459 01/24/23  0201 01/24/23  0123 01/24/23  0016 01/23/23  2304 01/23/23  2221 01/23/23  1948 01/23/23  1845 01/23/23  1601   *  --   --   --   --   --  126*  --   --  123*   POTASSIUM 5.1  --   --   --   --   --  5.5*  --  6.9* 7.1*   CHLORIDE 91*  --   --   --   --   --  90*  --   --  86*   CO2 25  --   --   --   --   --  25  --   --  22   BUN 93.3*  --   --   --   --   --  96.9*  --   --  95.9*   CR 1.85*  --   --   --   --   --  1.98*  --   --  2.09*   GLC 82 95 82 82 95 89 77   < >  --  134*   ANGELY 9.1  --   --   --   --   --  9.6  --   --  9.5    < > = values in this interval not displayed.       INRNo lab results found in last 7 days.    Recent Labs   Lab Test 01/24/23 0520 01/23/23 2221   POTASSIUM 5.1 5.5*   CHLORIDE 91* 90*   BUN 93.3* 96.9*      Recent Labs   Lab Test 01/24/23  0521 01/24/23  0520 01/23/23  2139 12/20/22  0717 12/19/22  1741   ALBUMIN  --  3.0*  --  3.0* 3.6   BILITOTAL  --   --   --  1.1 1.1   ALT  --   --   --  7* 8*   AST  --   --   --  24 24   PROTEIN Negative  --  Negative  --   --        Personally reviewed today's laboratory studies      Thank you for involving us in the care of this patient. We will continue to follow along with you.      Wing Yarbrough MD  [facility]  [phone]

## 2023-01-24 NOTE — PROCEDURES
"PICC Line Insertion Procedure Note  Pt. Name: Reymundo Petersen  MRN:        [id number]    Procedure: Insertion of a  triple Lumen  5 fr  Bard SOLO (valved) Power PICC, Lot number [id number]    Indications: vasopressors    Contraindications : Right side pace maker    Procedure Details     Patient identified with 2 identifiers and \"Time Out\" conducted.  .     Central line insertion bundle followed: hand hygiene performed prior to procedure, site cleansed with cholraprep, hat, mask, sterile gloves, sterile gown worn, patient draped with maximum barrier head to toe drape, sterile field maintained.    The vein was assessed and found to be compressible and of adequate size.     Lidocaine 1% 3 ml administered sq to the insertion site. A 5 Fr PICC was inserted into the BASILIC vein of the left arm with ultrasound guidance. 1 attempt(s) required to access vein.   Catheter threaded without difficulty. Good blood return noted.    Modified Seldinger Technique used for insertion.    The 8 sharps that are included in the PICC insertion kit were accounted for and disposed of in the sharps container prior to breakdown of the sterile field.    Catheter secured with Statlock, biopatch and Tegaderm dressing applied.    Findings:    Total catheter length  45 cm, with 0 cm exposed. Mid upper arm circumference is 22 cm. Catheter was flushed with 30 cc NS. Patient  tolerated procedure well.    Tip placement verified by xray. Xray read by Dr. Ramires . Tip placement in the SVC near CAJ.    CLABSI prevention brochure left at bedside.    Patient's primary RN notified PICC is ready for use.      Comments:      Zeny Hubbard RN    [facility] - [facility]        "

## 2023-01-24 NOTE — ED NOTES
Hyperkalemia protocol completed per orders. Pt continues to be hypotensive SBP 80-90's, notified MD. Second dose of 500cc fluid bolus given and to be followed with ordered Lasix. Another dose of Calcium Gluconate also ordered for hypotension. Glucose checks monitored per orders. Will update providers as needed.

## 2023-01-24 NOTE — CONSULTS
HEART CARE NOTE        Thank you, Dr. Reddy, for asking the [facility] team to see Reymundo Petersen to evaluate hypovolemia in the setting of HFrEF    Assessment/Recommendations     1. HFrEF c/b by hypovolemis  Assessment / Plan    Continue to hold diuretics - will given albumin bolus and continue to monitor    GDMT as detailed below     Current Pharmacotherapy AHA Guideline-Directed Medical Therapy   Losartan 25 mg daily - on hold given borderline BP and HSERLYN Lisinopril 20 mg twice daily   Metoprolol succinate 25 mg daily Carvedilol 25 mg twice daily   Spironolactone not started Spironolactone 25 mg once daily   Hydralazine NA Hydralazine 100 mg three times daily   Isosorbide dinitrate NA Isosorbide dinitrate 40 mg three times daily   SGLT2 inhibitor not started Dapagliflozin or Empagliflozin 10 mg daily      2. Arrhythmia  Assessment / Plan    Persistent atrial fibrillation s/p ablations and DCCV    Currently on rivaroxaban; being considered for Watchman device - continue amiodarone    s/p PVI 11/22    Complete Heart Block s/p CRT-P (on the right as patient is left handed)    Follows with Dr. Michelle in EP as well as afib clinic     3. CAD  Assessment / Plan    Denies chest pain or anginal equivalents    Continue ASA, atorvastatin, metoprolol succinate      Clinically Significant Risk Factors Present on Admission       # Hyperkalemia: Highest K = 7.1 mmol/L in last 2 days, will monitor as appropriate  # Hyponatremia: Lowest Na = 123 mmol/L in last 2 days, will monitor as appropriate       # Acute Kidney Injury, unspecified: based on a >150% or 0.3 mg/dL increase in last creatinine compared to past 90 day average, will monitor renal function  # Drug Induced Coagulation Defect: home medication list includes an anticoagulant medication      Cardiovascular: Cardiac Arrhythmia: Atrial fibrillation: Unspecified    Fluid & Electrolyte Disorders: Hypovolemia and Other disorders of electrolyte and fluid  "balance, not elsewhere classified    Nephrology: Acute kidney failure, unspecified         History of Present Illness/Subjective      Mr. 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 Petersen denies any acute cardiac events; bur does endorse significant fatigue; Management plan as detailed above     ECG: Personally reviewed. Paced rhythm     ECHO (personnaly Reviewed):   The left ventricle is mildly dilated.  Left ventricular function is decreased. The ejection fraction is 30-35%  (moderately reduced).  There is moderate global hypokinesia of the left ventricle.  The right ventricle is mildly dilated.  The right ventricular systolic function is normal.  The left atrium is severely dilated.  The right atrium is moderately dilated.  There is moderately severe (3+) tricuspid regurgitation.  Right ventricle systolic pressure estimate normal  There is mild to moderate (1-2+) aortic regurgitation.  The ascending aorta is Mildly dilated.  IVC diameter >2.1 cm collapsing <50% with sniff suggests a high RA pressure  estimated at 15 mmHg or greater.  Small pericardial effusion  There are no echocardiographic indications of cardiac tamponade.          Physical Examination Review of Systems   BP 90/56 (BP Location: Left arm)   Pulse 61   Temp 97  F (36.1  C) (Axillary)   Resp 18   Ht 1.778 m (5' 10\")   Wt 71.7 kg (158 lb)   SpO2 97%   BMI 22.67 kg/m    Body mass index is 22.67 kg/m .  Wt Readings from Last 3 Encounters:   01/23/23 71.7 kg (158 lb)   01/18/23 73 kg (161 lb)   01/12/23 71.7 kg (158 lb)     General Appearance:   no distress, normal body habitus   ENT/Mouth: membranes moist, no oral lesions or bleeding gums.      EYES:  no scleral icterus, normal " conjunctivae   Neck: no carotid bruits or thyromegaly   Chest/Lungs:   lungs are clear to auscultation, no rales or wheezing, equal chest wall expansion    Cardiovascular:   Regular. Normal first and second heart sounds with no murmurs, rubs, or gallops; the carotid, radial and posterior tibial pulses are intact, no JVD and trace LE edema bilaterally    Abdomen:  no organomegaly, masses, bruits, or tenderness; bowel sounds are present   Extremities: no cyanosis or clubbing   Skin: no xanthelasma, warm.    Neurologic: alert and calm     Psychiatric: alert and calm     A complete 10 systems ROS was reviewed  And is negative except what is listed in the HPI.          Medical History  Surgical History Family History Social History   Past Medical History:   Diagnosis Date     Atrial fibrillation (H)      Atrial fibrillation, transient (H) 08/11/2020     BPH (benign prostatic hyperplasia) 07/11/2018     Chronic combined systolic and diastolic heart failure (H) 02/11/2021     Congestive heart failure (H)      Coronary artery disease      COVID 02/2021     Dilated cardiomyopathy (H) 02/11/2021     MARIAN (generalized anxiety disorder) 02/11/2021     GERD (gastroesophageal reflux disease)      High cholesterol      Hyperlipidemia      Hypertension      LBBB (left bundle branch block) 07/11/2018     Nonrheumatic aortic valve insufficiency 04/01/2020     Pulmonary hypertension (H) 04/01/2020    Past Surgical History:   Procedure Laterality Date     ANGIOPLASTY       ARTHROSCOPY KNEE       CARDIOVERSION  2021     CARDIOVERSION  02/03/2022     CV CORONARY ANGIOGRAM N/A 10/23/2020    Procedure: Coronary Angiogram;  Surgeon: Varun Freire MD;  Location: [facility] Cath Lab;  Service: Cardiology     CV LEFT HEART CATHETERIZATION WITHOUT LEFT VENTRICULOGRAM Left 10/23/2020    Procedure: Left Heart Catheterization Without Left Ventriculogram;  Surgeon: Varun Freire MD;  Location: [facility] Cath Lab;  Service:  Cardiology     EP BIV PACEMAKER INSERT N/A 03/25/2020    Procedure: EP Biventricular Pacemaker Insertion;  Surgeon: Taylor Sandoval MD;  Location: [facility] Cath Lab;  Service: Cardiology     H ABLATION FOCAL AFIB       HEART CATH, ANGIOPLASTY       IMPLANT PACEMAKER       RELEASE CARPAL TUNNEL       ZZC TOTAL KNEE ARTHROPLASTY Right 08/15/2019    Procedure: RIGHT  MINIMALLY TOTAL KNEE ARTHROPLASTY;  Surgeon: Keven Cerda MD;  Location: [facility] Main OR;  Service: Orthopedics    no family history of premature coronary artery disease Social History     Socioeconomic History     Marital status:      Spouse name: Not on file     Number of children: Not on file     Years of education: Not on file     Highest education level: Not on file   Occupational History     Not on file   Tobacco Use     Smoking status: Never     Smokeless tobacco: Never   Substance and Sexual Activity     Alcohol use: Yes     Comment: Alcoholic Drinks/day: rare     Drug use: No     Sexual activity: Not on file   Other Topics Concern     Parent/sibling w/ CABG, MI or angioplasty before 65F 55M? Not Asked   Social History Narrative    Retired.  Good sense of humor     Social Determinants of Health     Financial Resource Strain: Not on file   Food Insecurity: Not on file   Transportation Needs: Not on file   Physical Activity: Not on file   Stress: Not on file   Social Connections: Not on file   Intimate Partner Violence: Not on file   Housing Stability: Not on file           Lab Results    Chemistry/lipid CBC Cardiac Enzymes/BNP/TSH/INR   Lab Results   Component Value Date    CHOL 107 06/23/2022    HDL 38 (L) 06/23/2022    TRIG 35 06/23/2022    BUN 96.9 (H) 01/23/2023     (L) 01/23/2023    CO2 25 01/23/2023    Lab Results   Component Value Date    WBC 8.4 01/24/2023    HGB 12.6 (L) 01/24/2023    HCT 38.9 (L) 01/24/2023    MCV 94 01/24/2023     01/24/2023    Lab Results   Component Value Date    TROPONINI 0.09 03/14/2022      (H) 06/17/2022    TSH 4.26 10/17/2022    INR 1.03 02/11/2021     Lab Results   Component Value Date    TROPONINI 0.09 03/14/2022          Weight:    Wt Readings from Last 3 Encounters:   01/23/23 71.7 kg (158 lb)   01/18/23 73 kg (161 lb)   01/12/23 71.7 kg (158 lb)       Allergies  No Known Allergies      Surgical History  Past Surgical History:   Procedure Laterality Date     ANGIOPLASTY       ARTHROSCOPY KNEE       CARDIOVERSION  2021     CARDIOVERSION  02/03/2022     CV CORONARY ANGIOGRAM N/A 10/23/2020    Procedure: Coronary Angiogram;  Surgeon: Varun Freire MD;  Location: [facility] Cath Lab;  Service: Cardiology     CV LEFT HEART CATHETERIZATION WITHOUT LEFT VENTRICULOGRAM Left 10/23/2020    Procedure: Left Heart Catheterization Without Left Ventriculogram;  Surgeon: Varun Freire MD;  Location: [facility] Cath Lab;  Service: Cardiology     EP BIV PACEMAKER INSERT N/A 03/25/2020    Procedure: EP Biventricular Pacemaker Insertion;  Surgeon: Taylor Sandoval MD;  Location: [facility] Cath Lab;  Service: Cardiology     H ABLATION FOCAL AFIB       HEART CATH, ANGIOPLASTY       IMPLANT PACEMAKER       RELEASE CARPAL TUNNEL       ZZC TOTAL KNEE ARTHROPLASTY Right 08/15/2019    Procedure: RIGHT  MINIMALLY TOTAL KNEE ARTHROPLASTY;  Surgeon: Keven Cerda MD;  Location: [facility];  Service: Orthopedics       Social History  Tobacco:   History   Smoking Status     Never   Smokeless Tobacco     Never    Alcohol:   Social History    Substance and Sexual Activity      Alcohol use: Yes        Comment: Alcoholic Drinks/day: rare   Illicit Drugs:   History   Drug Use No       Family History  Family History   Problem Relation Age of Onset     Alzheimer Disease Mother      Heart Disease Father      Cancer Sister      Diabetes Type 2  Sister      Chronic Obstructive Pulmonary Disease Sister      LUNG DISEASE Brother           John Paul Vasquez MD on 1/24/2023      cc: Chuckie  Jamie,

## 2023-01-24 NOTE — PROGRESS NOTES
[facility]    Medicine Progress Note - Hospitalist Service    Date of Admission:  1/23/2023    Assessment & Plan     Reymundo Petersen is a 82 year old male admitted on 1/23/2023. He lives at home with family.  He presented via EMS with fatigue, confusion and dehydration.  On presentation labs showed hyponatremia, hyperkalemia and acute kidney injury.     Hyperkalemia  -Resolved  -Likely multifactorial from potassium supplements, SHERLYN  -Patient has received Lokelma and a loop diuretic  -Hold potassium supplements  -Nephrology consulted, recommendations appreciated     Hyponatremia  -Likely hypovolemic hyponatremia  -Improved with normal saline from 123-128  -Urine sodium, urine and serum osmolality noted     Acute kidney injury  -Suspect prerenal/dehydration from overdiuresis  -Continue to hold Bumex  -Continue normal saline as above  -Avoid nephrotoxic agent     Cardiomyopathy  Heart failure with reduced EF  Hypotension  -Stable  -Diuretics held and albumin bolus given  -Most recent transthoracic echocardiogram December 19, showed a left ventricular ejection fraction of 35 to 40%  -Weigh patient daily and monitor I's and O's  -Continue metoprolol, hydralazine, isosorbide mononitrate if blood pressure tolerates  -Consult cardiology, recommendations appreciated     Hypotension  -Presently not requiring vasopressors  -Given his cardiac history etiology likely cardiogenic  -Holding parameters to blood pressure meds  -Start midodrine 5 mg 3 times daily  -Initiate vasopressors to keep MAP greater than 65 if indicated.     Persistent atrial fibrillation  Complete heart block  -S/p ablation and DCCV  -S/p CRT-P  -Continue rivaroxaban and amiodarone  -Currently being considered for watchman     BPH  -Continue doxazosin with holding parameters     General anxiety disorder  -Continue sertraline     Degenerative joint disease  -Stable, Tylenol for pain  -PT and OT in a.m.          Diet: Combination Diet  Low Saturated Fat Na <2400mg Diet, No Caffeine Diet    DVT Prophylaxis: DOAC  Adorno Catheter: Not present  Lines: PRESENT      PICC 01/24/23 Triple Lumen Left Basilic-Site Assessment: WDL      Cardiac Monitoring: ACTIVE order. Indication: Acute decompensated heart failure (48 hours)  Code Status: Full Code      Clinically Significant Risk Factors Present on Admission        # Hyperkalemia: Highest K = 7.1 mmol/L in last 2 days, will monitor as appropriate  # Hyponatremia: Lowest Na = 123 mmol/L in last 2 days, will monitor as appropriate   # Hypercalcemia: corrected calcium is >10.1, will monitor as appropriate    # Hypoalbuminemia: Lowest albumin = 3 g/dL at 1/24/2023  5:20 AM, will monitor as appropriate  # Drug Induced Coagulation Defect: home medication list includes an anticoagulant medication   # Acute Kidney Injury, unspecified: based on a >150% or 0.3 mg/dL increase in last creatinine compared to past 90 day average, will monitor renal function  # Hypertension: home medication list includes antihypertensive(s)  # Acute systolic heart failure: last echo with EF <40% and receiving IV diuretics  # Acute Respiratory Failure: Documented O2 saturation < 91%.  Continue supplemental oxygen as needed             Disposition Plan     Expected Discharge Date: 01/25/2023                  Elliot Reddy MD  Hospitalist Service  [facility]  Securely message with [email or web address] (more info)  Text page via [employer] Paging/Directory   ______________________________________________________________________    Interval History     Patient reportedly did not get much sleep last night and would like to sleep today.  No acute events recorded overnight.  According to daughter he appears close to baseline.  Afebrile.    Physical Exam   Vital Signs: Temp: 97.7  F (36.5  C) Temp src: Axillary BP: 95/54 Pulse: 60   Resp: 22 SpO2: 96 % O2 Device: Nasal cannula Oxygen Delivery: 1 LPM  Weight: 158 lbs 0 oz      Physical  Exam  HENT:      Head: Normocephalic.      Nose: Nose normal.      Mouth/Throat:      Mouth: Mucous membranes are moist.   Eyes:      Pupils: Pupils are equal, round, and reactive to light.   Cardiovascular:      Rate and Rhythm: Normal rate.      Pulses: Normal pulses.   Pulmonary:      Effort: Pulmonary effort is normal.   Abdominal:      General: Abdomen is flat.   Musculoskeletal:         General: Normal range of motion.      Cervical back: Normal range of motion.   Skin:     General: Skin is warm.      Capillary Refill: Capillary refill takes less than 2 seconds.   Neurological:      General: No focal deficit present.      Mental Status: He is alert.           Medical Decision Making       20 MINUTES SPENT BY ME on the date of service doing chart review, history, exam, documentation & further activities per the note.      Data     I have personally reviewed the following data over the past 24 hrs:    8.4  \   12.6 (L)   / 305     129 (L) 91 (L) 93.3 (H) /  82   5.2 25 1.85 (H) \       ALT: N/A AST: N/A AP: N/A TBILI: N/A   ALB: 3.0 (L) TOT PROTEIN: N/A LIPASE: 55       Trop: 98 (H) BNP: 22,197 (H)       Procal: N/A CRP: N/A Lactic Acid: 2.0         Imaging results reviewed over the past 24 hrs:   Recent Results (from the past 24 hour(s))   XR Chest 2 Views    Narrative    EXAM: XR CHEST 2 VIEWS  LOCATION: [facility]  DATE/TIME: 1/23/2023 6:33 PM    INDICATION: Shortness of breath.  COMPARISON: Chest CT 02/22/2022. Chest radiograph 02/22/2022.      Impression    IMPRESSION:    Small to moderate bilateral pleural effusions, larger on the right. There are adjacent airspace opacities which likely reflect atelectasis, although infection is not excluded. Upper lungs are clear. No pneumothorax.    Stable enlarged cardiac silhouette. Unchanged right subclavian approach pacemaker.   XR Chest Port 1 View    Narrative    EXAM: XR CHEST PORT 1 VIEW  LOCATION: [facility]  HOSPITAL  DATE/TIME: 1/24/2023 8:18 AM    INDICATION: RN placed PICC   verify tip placement  COMPARISON: 01/23/2023 at 1824 hours.      Impression    IMPRESSION: There is a new PICC catheter from a left upper extremity approach with tip in the superior vena cava near its junction with the right atrium. There is no pneumothorax. Bilateral pleural effusions mild cardiomegaly are unchanged. Right   subclavian approach pacemaker with leads over the right atrium, right ventricle and coronary sinus is unchanged. Bibasilar atelectasis or consolidation is stable. There are degenerative changes in both shoulders.

## 2023-01-24 NOTE — PROCEDURES
S: PICC team paged at 2335 to assess patient for PICC placement. Order placed at 2338.    B: Patient needs PICC for possible vasopressors.    A: Pt has Hx of right sided pacer and chronic A-Fib. Patient arrived in ED at 1600. PICC team will not be able to place PICC as it is after our teams cut off time for PICC placement. Primary RN Lotus brooke. This RN offered to place multiple PIV's but they were denied by primary RN.     R: Defer to Primary MD to place central line or I/O if central access is needed. PICC team will reassess patient at 0600 for PICC placement.

## 2023-01-24 NOTE — H&P
[facility]    History and Physical - Hospitalist Service       Date of Admission:  1/23/2023    Assessment & Plan      Reymundo Petersen is a 82 year old male admitted on 1/23/2023. He lives at home with family.  He presented via EMS with fatigue, confusion and dehydration.  On presentation labs showed hyponatremia, hyperkalemia and acute kidney injury.    Hyperkalemia  -Likely multifactorial from potassium supplements, SHERLYN  -Patient has received Lokelma and a loop diuretic  -We will monitor potassium every 4-6 hours and follow nephrology recommendations below  -Hold potassium supplements  -Nephrology consulted, recommendations appreciated    Hyponatremia  -Likely hypovolemic hyponatremia  -Continue normal saline at 75 mL/h  -Avoid overcorrection.  -Target 6 to 8 mEq/day.  -Follow urine sodium, urine and serum osmolality    Acute kidney injury  -Suspect prerenal/dehydration from overdiuresis  -Bumex held  -Continue normal saline as above    Cardiomyopathy  Heart failure with reduced EF  Hypotension  -Stable  -Diuretics held  -Most recent transthoracic echocardiogram December 19, showed a left ventricular ejection fraction of 35 to 40%  -Weigh patient daily and monitor I's and O's  -Continue metoprolol, hydralazine, isosorbide mononitrate if blood pressure tolerates  -Consult cardiology in a.m.    Hypotension  -Presently not requiring vasopressors  -Given his cardiac history etiology likely cardiogenic  -Holding parameters to blood pressure meds  -Initiate vasopressors to keep MAP greater than 65 if indicated.    Persistent atrial fibrillation  Complete heart block  -S/p ablation and DCCV  -S/p CRT-P  -Continue rivaroxaban and amiodarone  -Currently being considered for watchman    BPH  -Continue doxazosin    General anxiety disorder  -Continue sertraline    Degenerative joint disease  -Stable, Tylenol for pain  -PT and OT in a.m.                   Diet: Combination Diet Low Saturated Fat Na  <2400mg Diet, No Caffeine DietHeart healthy  DVT Prophylaxis: DOAC  Adorno Catheter: Not present  Lines: None     Cardiac Monitoring: None  Code Status: Full Code Full Code    Clinically Significant Risk Factors Present on Admission        # Hyperkalemia: Highest K = 7.1 mmol/L in last 2 days, will monitor as appropriate  # Hyponatremia: Lowest Na = 123 mmol/L in last 2 days, will monitor as appropriate       # Drug Induced Coagulation Defect: home medication list includes an anticoagulant medication   # Acute Kidney Injury, unspecified: based on a >150% or 0.3 mg/dL increase in last creatinine compared to past 90 day average, will monitor renal function  # Hypertension: home medication list includes antihypertensive(s)  # Acute systolic heart failure: last echo with EF <40% and receiving IV diuretics             Disposition Plan      Expected Discharge Date: 01/25/2023                  Elliot Reddy MD  Hospitalist Service  [facility]  Securely message with [employer] (more info)  Text page via [email or web address] Paging/Directory     ______________________________________________________________________    Chief Complaint     Fatigue and confusion    History is obtained from the patient  And daughters    History of Present Illness   Reymundo Petersen is a 82 year old male who lives at home with family.  He has a history of heart failure with reduced EF, atrial fibrillation.  Over the past several days his daughter noted that he appears more dehydrated.  His blood pressure has been low.  They reached out to cardiology office and were told to decrease the diuretic dose which they did.  Patient was originally on 3 mg of Bumex twice daily which has since been decreased.  His daughter was also concerned about increasing weakness and fatigue and occasionally appeared confused and disoriented.  Has been reports of fever.  No chest pain.  The patient voices occasional shortness of breath.  According to daughters he  lives with his sister-in-law and they do not have a harmonious relationship as sister-in-law suffers from mental health problems.  He was brought to the emergency room for the above complaints.    On presentation labs in  the emergency room revealed hyperkalemia with a potassium of 7.1.  Sodium was 123.  Creatinine 2.09.  Chest x-ray shows small to moderate bilateral pleural effusions.      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) 02/11/2021     Congestive heart failure (H)      Coronary artery disease      COVID 02/2021     Dilated cardiomyopathy (H) 02/11/2021     MARIAN (generalized anxiety disorder) 02/11/2021     GERD (gastroesophageal reflux disease)      High cholesterol      Hyperlipidemia      Hypertension      LBBB (left bundle branch block) 07/11/2018     Nonrheumatic aortic valve insufficiency 04/01/2020     Pulmonary hypertension (H) 04/01/2020       Past Surgical History   Past Surgical History:   Procedure Laterality Date     ANGIOPLASTY       ARTHROSCOPY KNEE       CARDIOVERSION  2021     CARDIOVERSION  02/03/2022     CV CORONARY ANGIOGRAM N/A 10/23/2020    Procedure: Coronary Angiogram;  Surgeon: Varun Freire MD;  Location: [facility] Cath Lab;  Service: Cardiology     CV LEFT HEART CATHETERIZATION WITHOUT LEFT VENTRICULOGRAM Left 10/23/2020    Procedure: Left Heart Catheterization Without Left Ventriculogram;  Surgeon: Varun Freire MD;  Location: [facility] Cath Lab;  Service: Cardiology     EP BIV PACEMAKER INSERT N/A 03/25/2020    Procedure: EP Biventricular Pacemaker Insertion;  Surgeon: Taylor Sandoval MD;  Location: [facility] Cath Lab;  Service: Cardiology     H ABLATION FOCAL AFIB       HEART CATH, ANGIOPLASTY       IMPLANT PACEMAKER       RELEASE CARPAL TUNNEL        ZZC TOTAL KNEE ARTHROPLASTY Right 08/15/2019    Procedure: RIGHT  MINIMALLY TOTAL KNEE ARTHROPLASTY;  Surgeon: Keven Cerda MD;  Location: [facility];  Service: Orthopedics       Prior to Admission Medications   Prior to Admission Medications   Prescriptions Last Dose Informant Patient Reported? Taking?   Coenzyme Q10 300 MG CAPS 1/23/2023 at am  Yes Yes   Sig: Take 300 mg by mouth daily   acetaminophen (TYLENOL) 500 MG tablet Past Week  Yes Yes   Sig: Take 500 mg by mouth every 6 hours as needed for pain   amiodarone (PACERONE) 200 MG tablet 1/22/2023  No Yes   Sig: Take 1 tablet (200 mg) by mouth every other day   aspirin (ASA) 81 MG chewable tablet 1/23/2023 at am  No Yes   Sig: Take 1 tablet (81 mg) by mouth every other day   Patient taking differently: Take 81 mg by mouth Every Mon, Wed, Fri Morning Taking Monday, Wednesday and Fridays   atorvastatin (LIPITOR) 10 MG tablet 1/22/2023 at hs  Yes Yes   Sig: Take 10 mg by mouth four times a week Sunday, Tuesday, Thursday, & Saturday at bedtime   bumetanide (BUMEX) 2 MG tablet 1/23/2023 at x1  No Yes   Sig: Take 1 tablet (2 mg) by mouth 2 times daily   diphenhydrAMINE-acetaminophen (TYLENOL PM)  MG tablet Past Week at Sat  Yes Yes   Sig: Take 1-2 tablets by mouth nightly as needed for sleep   doxazosin (CARDURA) 8 MG tablet 1/22/2023 at pm  Yes Yes   Sig: TAKE 1/2 TABLET BY MOUTH ONCE DAILY IN THE EVENING TO SHRINK PROSTATE AND LOWER BLOOD PRESSURE   finasteride (PROSCAR) 5 mg tablet 1/22/2023 at pm  Yes Yes   Sig: [FINASTERIDE (PROSCAR) 5 MG TABLET] Take 5 mg by mouth at bedtime.          gabapentin (NEURONTIN) 100 MG capsule 1/23/2023 at x2  Yes Yes   Sig: Take 200 mg by mouth 2 times daily Two capsules in the morning, two capsules in the afternoon, and three capsules in the evening   gabapentin (NEURONTIN) 100 MG capsule 1/22/2023 at pm  Yes Yes   Sig: Take 300 mg by mouth every evening   glucosamine/chondr cole A sod (OSTEO BI-FLEX ORAL)  1/23/2023 at x1  Yes Yes   Sig: Take 1 tablet by mouth 2 times daily (with meals)   hydrocortisone 2.5 % cream 1/23/2023 at x1  Yes Yes   Sig: Apply topically 2 times daily   melatonin 10 mg Tab 1/22/2023 at hs  Yes Yes   Sig: Take 10 mg by mouth At Bedtime    metoprolol succinate ER (TOPROL-XL) 25 MG 24 hr tablet 1/22/2023 at hs  No Yes   Sig: Take 1 tablet (25 mg) by mouth At Bedtime   multivitamin, therapeutic (THERA-VIT) TABS tablet 1/23/2023 at am  Yes Yes   Sig: Take 1 tablet by mouth daily   nitroGLYcerin (NITROSTAT) 0.4 MG sublingual tablet Unknown at never  No Yes   Sig: For chest pain place 1 tablet under the tongue every 5 minutes for 3 doses. If symptoms persist 5 minutes after 1st dose call 911.   omeprazole (PRILOSEC) 20 MG capsule 1/23/2023 at x1  Yes Yes   Sig: [OMEPRAZOLE (PRILOSEC) 20 MG CAPSULE] Take 20 mg by mouth 2 (two) times a day before meals.   potassium chloride ER (KLOR-CON M) 20 MEQ CR tablet 1/23/2023 at am  No Yes   Sig: Take 1 tablet (20 mEq) by mouth 2 times daily   rivaroxaban ANTICOAGULANT (XARELTO) 20 MG TABS tablet 1/22/2023 at pm  No Yes   Sig: Take 1 tablet (20 mg) by mouth daily (with dinner)   sertraline (ZOLOFT) 50 MG tablet 1/23/2023 at x1  Yes Yes   Sig: Take 50 mg by mouth 2 times daily    vit A/vit C/vit E/zinc/copper (PRESERVISION AREDS ORAL) 1/23/2023 at x1  Yes Yes   Sig: Take 1 tablet by mouth 2 times daily (with meals)      Facility-Administered Medications: None        Review of Systems    The 10 point Review of Systems is negative other than noted in the HPI or here.      Physical Exam   Vital Signs:     BP: 101/58 Pulse: 60   Resp: 15 SpO2: 100 % O2 Device: Nasal cannula Oxygen Delivery: 2 LPM  Weight: 158 lbs 0 oz    Physical Exam  HENT:      Head: Normocephalic.      Nose: Nose normal.      Mouth/Throat:      Mouth: Mucous membranes are dry.   Cardiovascular:      Rate and Rhythm: Normal rate and regular rhythm.   Pulmonary:      Effort: Pulmonary effort is  normal.      Breath sounds: Normal breath sounds.   Abdominal:      General: Abdomen is flat.      Palpations: Abdomen is soft.   Musculoskeletal:         General: Normal range of motion.      Cervical back: Normal range of motion.   Skin:     General: Skin is warm.   Neurological:      General: No focal deficit present.      Mental Status: He is alert and oriented to person, place, and time.         Medical Decision Making       45 MINUTES SPENT BY ME on the date of service doing chart review, history, exam, documentation & further activities per the note.      Data     I have personally reviewed the following data over the past 24 hrs:    10.9  \   13.8   / 379     126 (L) 90 (L) 96.9 (H) /  89   5.5 (H) 25 1.98 (H) \       ALT: N/A AST: N/A AP: N/A TBILI: N/A   ALB: N/A TOT PROTEIN: N/A LIPASE: 55       Trop: 98 (H) BNP: 22,197 (H)       Procal: N/A CRP: N/A Lactic Acid: 2.0         Imaging results reviewed over the past 24 hrs:   Recent Results (from the past 24 hour(s))   XR Chest 2 Views    Narrative    EXAM: XR CHEST 2 VIEWS  LOCATION: [facility]  DATE/TIME: 1/23/2023 6:33 PM    INDICATION: Shortness of breath.  COMPARISON: Chest CT 02/22/2022. Chest radiograph 02/22/2022.      Impression    IMPRESSION:    Small to moderate bilateral pleural effusions, larger on the right. There are adjacent airspace opacities which likely reflect atelectasis, although infection is not excluded. Upper lungs are clear. No pneumothorax.    Stable enlarged cardiac silhouette. Unchanged right subclavian approach pacemaker.

## 2023-01-25 PROBLEM — I27.20 PULMONARY HYPERTENSION (H): Chronic | Status: ACTIVE | Noted: 2020-04-01

## 2023-01-25 NOTE — PROGRESS NOTES
"      RENAL PROGRESS NOTE      ASSESSMENT & PLAN:   SHERLYN: Baseline Cr in 0.8 -1.0 with eGFR in 70-80%. He was receiving aggressive diuresis as an outpatient. Also outpatient note stated he was hypotensive too. Most likely SHERLYN is from pre renal and ? ATN for pro long pre renal state. Cr up to 2.09 upon admission and now trending down to 1.6 today.  -Monitor kidney function closely with resuming diuretics.  -avoid extreme hypo/hypertension.  Hold parameters ordered to doxazosin.  - avoid nephrotoxic medications.   - renally dosing medications       Iso osmolar moderate hyponatremia: Urine Na is < 20 and c/w aldosterone action vs deficient Na store from urinary loss from diuretic. Serum osmo 304, urine osmo 336,   -stable/improved with Na 133 today.     Hyperkalemia: likely from K replacement (PTA 20 meq bid) in the setting of SHERLYN with decrease K excretion.   Resolved, K stable at 4.2     Hypochloremia:   Improving..     Cardiomyopathy  HFrEF  EF 35-40%  Decompensated and currently in cardiogenic shock    Holding BB and started on dobutamine gtt   RHC \"RHC significant for elevated right-sided and left-sided filling pressures; adequate CO/CI on dobutamine 5 - will continue dobutamine and gently diurese\" per cardiology  Started on lasix 40mg IV Q12 hr plus IV albumin.        Afib  Complete heart block  -S/p ablation and DCCV  -S/p CRT-P  -Currently being considered for watchman       SUBJECTIVE:  Drowsy following procedure.  C/o thirst.  Denies SOB.  Spoke with dtr at bedside re: plan above.       OBJECTIVE:  Physical Exam   Temp: 98  F (36.7  C) Temp src: Oral BP: 112/59 Pulse: 64   Resp: 16 SpO2: 92 % O2 Device: Nasal cannula Oxygen Delivery: 2 LPM  Vitals:    01/23/23 1559 01/23/23 1846 01/25/23 0046   Weight: 73 kg (160 lb 15 oz) 71.7 kg (158 lb) 73.7 kg (162 lb 7.7 oz)     Vital Signs with Ranges  Temp:  [97.2  F (36.2  C)-98.5  F (36.9  C)] 98  F (36.7  C)  Pulse:  [59-77] 64  Resp:  [16] 16  BP: ()/(55-68) " 112/59  FiO2 (%):  [3 %] 3 %  SpO2:  [92 %-97 %] 92 %  I/O last 3 completed shifts:  In: 592 [P.O.:500; I.V.:92]  Out: 3125 [Urine:3125]        Patient Vitals for the past 72 hrs:   Weight   01/25/23 0046 73.7 kg (162 lb 7.7 oz)   01/23/23 1846 71.7 kg (158 lb)   01/23/23 1559 73 kg (160 lb 15 oz)       PHYSICAL EXAM:  General - drowsy, NAD  HEENT - oral mucosa dry  Respiratory - Lungs CTA ant, normal effort, O2 per NC  Cardiovascular - AP RRR without murmur  Abdomen - soft  Extremities - + LE edema  Integumentary - thin, fragile.  Neurologic - drowsy  Psych:  Judgement intact, affect WNL  :  +purewick    LABORATORY STUDIES:     Recent Labs   Lab 01/24/23  0520 01/23/23  1601   WBC 8.4 10.9   RBC 4.15* 4.62   HGB 12.6* 13.8   HCT 38.9* 43.9    379       Basic Metabolic Panel:  Recent Labs   Lab 01/25/23  1159 01/25/23  0503 01/24/23  2321 01/24/23  1925 01/24/23  1218 01/24/23  0520 01/24/23  0459 01/24/23  0201 01/24/23  0123 01/24/23  0016 01/23/23  2304 01/23/23  2221 01/23/23  1845 01/23/23  1601   * 128*  129* 128* 126* 129* 128*  --   --   --   --   --  126*  --  123*   POTASSIUM 4.2 4.5  4.5 4.8 4.7 5.2 5.1  --   --   --   --   --  5.5*   < > 7.1*   CHLORIDE  --  89*  --   --   --  91*  --   --   --   --   --  90*  --  86*   CO2  --  26  --   --   --  25  --   --   --   --   --  25  --  22   BUN  --  84.5*  --   --   --  93.3*  --   --   --   --   --  96.9*  --  95.9*   CR  --  1.62*  --   --   --  1.85*  --   --   --   --   --  1.98*  --  2.09*   GLC  --  86  --   --   --  82 95 82 82 95   < > 77   < > 134*   ANGELY  --  9.3  --   --   --  9.1  --   --   --   --   --  9.6  --  9.5    < > = values in this interval not displayed.       INRNo lab results found in last 7 days.     Recent Labs   Lab Test 01/24/23  0520 01/23/23  1601 02/12/21  0604 02/11/21  1724 03/25/20  0235 03/24/20  1745   INR  --   --   --  1.03  --  1.05   WBC 8.4 10.9   < > 4.8   < > 6.3   HGB 12.6* 13.8   < > 14.2   < >  11.8*    379   < > 171   < > 211    < > = values in this interval not displayed.       Personally reviewed current labs       Gay Krause NP  [facility]  [phone]

## 2023-01-25 NOTE — PROGRESS NOTES
"CLINICAL NUTRITION SERVICES - ASSESSMENT NOTE     Nutrition Prescription    RECOMMENDATIONS FOR MDs/PROVIDERS TO ORDER:      Malnutrition Status:    Unable to determine-need nutrition history and NFPE    Recommendations already ordered by Registered Dietitian (RD):  Follow for diet advance and possible need for nutritional supplements    Future/Additional Recommendations:  Follow diet adv, po intake, weight, poc     REASON FOR ASSESSMENT  Reymundo Petersen is a/an 82 year old male assessed by the dietitian for Admission Nutrition Risk Screen for positive weight loss PTA and decreased appetite (per risk screen pt has lost 34 or more lbs)    Pt admitted with fatigue, confusion and dehydration.  Noted hyponatremia, hyperkalemia and acute kidney injury.  Cardiomyopathy noted also    NUTRITION HISTORY  Unable to obtain-pt currently out of his room    CURRENT NUTRITION ORDERS  Diet: NPO ( to cath lab for right heart catheterization)    LABS  Labs reviewed: Na 133, K 4.2, Glu 86    MEDICATIONS  Medications reviewed: lipitor    ANTHROPOMETRICS  Height: 177.8 cm (5' 10\")  Most Recent Weight: 73.7 kg (162 lb 7.7 oz)    IBW: 75.45 kg (166 lb)  BMI: Normal BMI  Weight History:   Wt Readings from Last 10 Encounters:   01/25/23 73.7 kg (162 lb 7.7 oz)   01/18/23 73 kg (161 lb)   01/12/23 71.7 kg (158 lb)   01/04/23 72.5 kg (159 lb 12.8 oz)   12/23/22 73.1 kg (161 lb 1.6 oz)   11/24/22 77.7 kg (171 lb 4.8 oz)   11/16/22 78 kg (172 lb)   11/16/22 78 kg (172 lb)   10/31/22 79.3 kg (174 lb 12.8 oz)   10/18/22 79.8 kg (176 lb)   Weight down 10 lb in the past 2 months (5.8%)    Dosing Weight: 73.7 kg    ASSESSED NUTRITION NEEDS  Estimated Energy Needs: 9510-2835 kcals/day (25 - 30 kcals/kg)  Justification: Maintenance  Estimated Protein Needs: 59 grams protein/day (0.8)  Justification: SHERLYN  Estimated Fluid Needs: 4900-4882 mL/day (25 - 30 mL/kg)   Justification: Per provider pending fluid status    PHYSICAL FINDINGS  See malnutrition " section below.  Skin: scab forearm, shin  Redness/blanchable sacrum/coccyx  Lawrence score=14, nutrition noted as probably inadequate  GI: WDL per nurse  Last BM not documented    MALNUTRITION:  % Weight Loss:  Weight loss does not meet criteria for malnutrition   % Intake:  Unknown nutrition history  Subcutaneous Fat Loss:  Unable to view  Muscle Loss:  Unable to view  Fluid Retention:  3+ ( bilateral lower extremities)    Malnutrition Diagnosis: Unable to determine due to need nutrition history and NFPE    NUTRITION DIAGNOSIS  Inadequate oral intake related to acute illness as evidenced by weight loss      INTERVENTIONS  Implementation  Nutrition Education: Will be provided if education needs arise   Follow for diet advance and possible need for protein shake nutritional supplements    Goals  Diet advancement vs nutrition support within 2-3 days.  Patient to consume % of nutritionally adequate meals three times per day, or the equivalent with supplements/snacks.  Maintain weight     Monitoring/Evaluation  Progress toward goals will be monitored and evaluated per protocol.

## 2023-01-25 NOTE — PRE-PROCEDURE
GENERAL PRE-PROCEDURE:   Procedure:  Right heart cathterization  Date/Time:  1/25/2023 1:22 PM    Written consent obtained?: Yes    Risks and benefits: Risks, benefits and alternatives were discussed    Consent given by:  Patient  Patient states understanding of procedure being performed: Yes    Patient's understanding of procedure matches consent: Yes    Procedure consent matches procedure scheduled: Yes    Expected level of sedation:  Moderate  Appropriately NPO:  Yes  ASA Class:  4 (end stage cardiomyopathy, cardiogenic shock, CAD, atrial fibrillation; s/p ablation, CHB; s/p CRT-P in situ)  Mallampati  :  Grade 2- soft palate, base of uvula, tonsillar pillars, and portion of posterior pharyngeal wall visible  Lungs:  Lungs clear with good breath sounds bilaterally  Heart:  Normal heart sounds and rate  History & Physical reviewed:  History and physical reviewed and no updates needed  Statement of review:  I have reviewed the lab findings, diagnostic data, medications, and the plan for sedation

## 2023-01-25 NOTE — PROGRESS NOTES
[facility]    Medicine Progress Note - Hospitalist Service    Date of Admission:  1/23/2023    Assessment & Plan     Reymundo Petersen is a 82 year old male admitted on 1/23/2023. He lives at home with family.  He presented via EMS with fatigue, confusion and dehydration.  On presentation labs showed hyponatremia, hyperkalemia and acute kidney injury.     Hyperkalemia  -Resolved  -Likely multifactorial from potassium supplements, SHERLYN  -Patient has received Lokelma and a loop diuretic on admission  -Hold potassium supplements  -Nephrology consulted, recommendations appreciated     Hyponatremia  -Likely hypovolemic hyponatremia  -Improved to 133 today  -Urine sodium, urine and serum osmolality noted     Acute kidney injury  -Suspect prerenal/dehydration from overdiuresis/CRS  -Lasix 40 mg twice daily started  -Dobutamine infusion started January 25 and as expected urine output improved.  -Avoid nephrotoxic agent     Cardiomyopathy  Heart failure with reduced EF  Cardiogenic shock  -Dobutamine infusion started January 25  -Furosemide 40 mg IV twice daily started January 25.  -Most recent transthoracic echocardiogram December 19, showed a left ventricular ejection fraction of 35 to 40%  -Weigh patient daily and monitor I's and O's  -Continue metoprolol, hydralazine, isosorbide mononitrate when blood pressure tolerates  -Consult cardiology, recommendations appreciated  -S/p RHC January 25, reveals increased pressures consistent with volume overload/pulm hypertension. RA 15, RV 50/5; RVEDP 15 , PA 54/20 mean 30 and Wedge 20    Persistent atrial fibrillation  Complete heart block  -S/p ablation and DCCV  -S/p CRT-P  -Continue rivaroxaban and amiodarone  -Continue metoprolol when blood pressure permits.  -Being considered for watchman     BPH  -Continue doxazosin with holding parameters     General anxiety disorder  -Continue sertraline     Degenerative joint disease  -Stable, Tylenol for pain  -PT  and OT in a.m.    Pressure injury left heel  -Decubitus ulcer prophylactic measures  -Urea cream bilaterally  -Wound care consult          Diet: Fluid restriction 1500 ML FLUID  Low Saturated Fat Na <2400 mg    DVT Prophylaxis: DOAC  Adorno Catheter: Not present  Lines: PRESENT      PICC 01/24/23 Triple Lumen Left Basilic-Site Assessment: WDL      Cardiac Monitoring: ACTIVE order. Indication: Acute decompensated heart failure (48 hours)  Code Status: Full Code      Clinically Significant Risk Factors        # Hyperkalemia: Highest K = 7.1 mmol/L in last 2 days, will monitor as appropriate  # Hyponatremia: Lowest Na = 123 mmol/L in last 2 days, will monitor as appropriate   # Hypercalcemia: corrected calcium is >10.1, will monitor as appropriate    # Hypoalbuminemia: Lowest albumin = 3 g/dL at 1/24/2023  5:20 AM, will monitor as appropriate    # Acute Kidney Injury, unspecified: based on a >150% or 0.3 mg/dL increase in last creatinine compared to past 90 day average, will monitor renal function                 Disposition Plan      Expected Discharge Date: 01/27/2023        Discharge Comments: Dobutamine gtt.          Elliot Reddy MD  Hospitalist Service  [facility]  Securely message with [employer] (more info)  Text page via [employer] Paging/Directory   ______________________________________________________________________    Interval History     No acute events recorded overnight.  Afebrile.  Started on dobutamine infusion today and is s/p right heart cath to evaluate volume status     Physical Exam   Vital Signs: Temp: 98  F (36.7  C) Temp src: Oral BP: 108/61 Pulse: 64   Resp: 18 SpO2: 94 % O2 Device: Nasal cannula Oxygen Delivery: 2 LPM  Weight: 162 lbs 7.66 oz      Physical Exam  HENT:      Head: Normocephalic.      Nose: Nose normal.      Mouth/Throat:      Mouth: Mucous membranes are moist.   Eyes:      Pupils: Pupils are equal, round, and reactive to light.   Cardiovascular:      Rate and  Rhythm: Normal rate.      Pulses: Normal pulses.   Pulmonary:      Effort: Pulmonary effort is normal.      Breath sounds: Examination of the right-lower field reveals rales. Examination of the left-lower field reveals rales. Rales present.   Abdominal:      General: Abdomen is flat.   Musculoskeletal:         General: Normal range of motion.      Cervical back: Normal range of motion.   Skin:     General: Skin is warm.      Capillary Refill: Capillary refill takes less than 2 seconds.   Neurological:      General: No focal deficit present.      Mental Status: He is lethargic.           Medical Decision Making       30 MINUTES SPENT BY ME on the date of service doing chart review, history, exam, documentation & further activities per the note.      Data     I have personally reviewed the following data over the past 24 hrs:    N/A  \   N/A   / N/A     133 (L) 89 (L) 84.5 (H) /  86   4.2 26 1.62 (H) \       Imaging results reviewed over the past 24 hrs:   Recent Results (from the past 24 hour(s))   Cardiac Catheterization    Narrative    Written informed consent was obtained in the pre-procedure area. A time   out procedure was performed with the entirecardiac catheterization   laboratory staff, confirming the patient's name, date of birth, type of   procedure, indication for the procedure and site of procedure.  The right   IJ was sterilely prepped and draped. 1% Lidocaine wasused to infiltrate   the right neck. Sheath was placed into the right internal jugular vein   using modified Seldinger technique. The sheath was then upsized to a 12 F   sheath. A 7 Fr balloon wedge pressure catheter was advancedthrough the   introducer sheath to perform pressure measurements throughout the right   heart. Following pressure measurements in the RA 15, RV 50/5; RVEDP 15 ,   PA 54/20 mean 30 and Wedge 20. A SV02 sample was drawn from the pulmonary   artery and found to be 63%.  The cardiac output and index were determined   to be  4.62/2.41, respectively by Angela method.  The balloon wedge pressure   catheter was removed.The sheath was removed and hemostasis of the access   achieved via manual pressure. The patient tolerated the procedure well   without complications.

## 2023-01-25 NOTE — PROGRESS NOTES
Pt is alert to self only, denies pain, pt was transfer from bed to bed on arrival to the unit. [place] wears  hearing aids. Denies any history of falls, chest pain, nausea, and vomiting. Legs have 2 + edema, slightly discolored, feet are scaly, peeling and so dry, and cold to touch. Feet also look dusky. Pt was placed on 3 of oxygen via oxy mask due to low oxygen saturation  In the lower 80's when pt arrived to the unit. Lips and feet look blue wish in color. Bed ret at this time. Last K was 4.8. Will continue to monitor.

## 2023-01-25 NOTE — CONSULTS
[facility]  WOC Nurse Inpatient Assessment     Consulted for: L heel    Patient History (according to provider note(s):      Reymundo Petersen is a 82 year old male admitted on 1/23/2023. He lives at home with family.  He presented via EMS with fatigue, confusion and dehydration.  On presentation labs showed hyponatremia, hyperkalemia and acute kidney injury.    Areas Assessed:      Skin Injury Location: L heel    1/25/23    Last photo: 1/23  Skin injury due to: Mixed etiology: likely vascular vs pressure  Skin history and plan of care:   Daughters at bedside described hx of fissures to feet  Affected area:      Skin assessment: Intact     Measurements (length x width x depth, in cm) 1  x 1  cm - lavender hue, erythema to bottoms of bilateral feet     Color: red     Temperature  warm     Drainage: none .      Color: none      Odor: none  Pain: absent, none  Pain interventions prior to dressing change: slow and gentle cares   Treatment goal: offload pressure  STATUS: initial assessment  Supplies ordered: supplies stored on unit      Treatment Plan:     L heel  1. Protect heel with mepilex dressing, change every three days or PRN if soiled, falls off  2. Offload heels in bed at all times    Orders: Written    RECOMMEND PRIMARY TEAM ORDER: None, at this time  Education provided: importance of repositioning and Off-loading pressure  Discussed plan of care with: Family  WOC nurse follow-up plan: weekly  Notify WOC if wound(s) deteriorate.  Nursing to notify the Provider(s) and re-consult the WOC Nurse if new skin concern.    DATA:     Current support surface: Standard  Standard gel/foam mattress (IsoFlex, Atmos air, etc)  Containment of urine/stool: Incontinence Protocol  BMI: Body mass index is 23.31 kg/m .   Active diet order: Orders Placed This Encounter      NPO per Anesthesia Guidelines for Procedure/Surgery Except for: Meds     Output: I/O last 3 completed shifts:  In: 770 [P.O.:570]  Out:  1825 [Urine:1825]     Labs: Recent Labs   Lab 01/24/23  0520   ALBUMIN 3.0*   HGB 12.6*   WBC 8.4     Pressure injury risk assessment:   Sensory Perception: 3-->slightly limited  Moisture: 3-->occasionally moist  Activity: 1-->bedfast  Mobility: 3-->slightly limited  Nutrition: 2-->probably inadequate  Friction and Shear: 2-->potential problem  Lawrence Score: 14    SHERI ROMON RN CWOCN

## 2023-01-25 NOTE — DISCHARGE INSTRUCTIONS
WOC DISCHARGE INSTRUCTIONS:   L heel  1. Protect heel with mepilex dressing, change every three days or PRN if soiled, falls off  2. Offload heels in bed at all times

## 2023-01-25 NOTE — PROGRESS NOTES
Patient back in room from angiogram and right internal jugular with bandaid C/D/I. Post op checks and CWMS good. Denied pain.Patient V paced. Family in room while the patient sleeps. O2  2 liters/nc sats 93 %. Both Hearing aides in. Call light in reach.

## 2023-01-25 NOTE — PLAN OF CARE
Problem: Risk for Delirium  Goal: Improved Attention and Thought Clarity  Outcome: Progressing   Pt slept most of the shift. Repositioned q3hrs, mepilex applied to coccyx for redness. Pt reported pain in left hip this am, repositioned off hip. AV paced on tele. Crackles noted in bilateral lung bases. Primofit patent and draining. Family at bedside most of the shift. BP stable this shift in the high 90s systolic.

## 2023-01-25 NOTE — PROGRESS NOTES
1900: Handoff report received from DIAMOND Flowers. Dual skin and safety check completed at bedside. Pt currently resting in bed. Daughter is present at bedside. No c/o pain. VSS. Mepilex in place to sacrum. No other concerns at this time.     2330: Handoff report given to P3 RN for transfer.

## 2023-01-25 NOTE — PLAN OF CARE
Problem: Cardiac Catheterization (Diagnostic/Interventional)  Goal: Stable Heart Rate and Rhythm  Outcome: Progressing   Goal Outcome Evaluation:       Patient left in his bed to Cath lab for Right heart catheterization. NPO. AV paced. Denied pain. Slept on and off with family bedside. WOC nurse assessed left heel pressure sore and mepilex applied and change q 3 days. Dobutamine drip at 5 mcgs/kg/min in left PICC line. Edema in lower extremities and elevated on pillows and feet have lotion on them since dry and are cipriano color. Skin warm to touch.

## 2023-01-26 NOTE — PROGRESS NOTES
"    RENAL PROGRESS NOTE    CC:  Elliot Reddy MD    ASSESSMENT & PLAN:   82y M with PMHX of HFrEF, a fib on amidarone, metoprolol and rivaroxaban who was also previously on losartan which was discontinued due to hypotension, has been having light headed at home and was on bumex 2 mg bid.   He was brought in for more lethargic to ED.   He was found to have K  of 7.1, Na 123 and Cr 2.09 from baseline ~1.0.   Nephrology consult for management of SHERLYN, hyponatremia and hyperkalemia.     SHERLYN: Baseline Cr in 0.8 -1.0 with eGFR in 70-80%. He was receiving aggressive diuresis as an outpatient. Also outpatient note stated he was hypotensive too. Most likely SHERLYN is from pre renal and ? ATN for pro long pre renal state. Cr up to 2.09 upon admission and now back to his baseline. 0.93 today.  - further diuresis per cardiologist.   -Monitor kidney function closely with resuming diuretics.  - diuretic doesn't have intrinsic nephrotoxicity as long as renal perfusion is maintained.  -avoid extreme hypo/hypertension.  Hold parameters ordered to doxazosin.  - avoid nephrotoxic medications.   - renally dosing medications  - avoid nephrotoxic medications.         Iso osmolar moderate hyponatremia: Urine Na is < 20 and c/w aldosterone action vs deficient Na store from urinary loss from diuretic. Serum osmo 304, urine osmo 336,   -stable/improved with Na 134 today.     Hyperkalemia: likely from K replacement (PTA 20 meq bid) in the setting of SHERLYN with decrease K excretion.   Resolved, K stable at 3.6     Hypochloremia:   Improving.      Cardiomyopathy  HFrEF  EF 35-40%  Decompensated and currently in cardiogenic shock    Holding BB and started on dobutamine gtt   RHC \"RHC significant for elevated right-sided and left-sided filling pressures; adequate CO/CI on dobutamine 5 - will continue dobutamine and gently diurese\" per cardiology  Started on lasix 40mg IV Q12 hr plus IV albumin.        Nephrology will sign off for now. Further " diuresis per cardiologist.   Please let us know if additional assistance needed.     SUBJECTIVE:  Patient seen and examine at bedside. Discussed with daughter at bedside. Denies any CP, sob. Appetite is good. Not getting out of bed yet. More awake compare to 2 days ago.     OBJECTIVE:  Physical Exam   Temp: 97.6  F (36.4  C) Temp src: Oral BP: 109/67 Pulse: 69   Resp: 18 SpO2: 95 % O2 Device: Oxymask Oxygen Delivery: 2 LPM  Vitals:    01/23/23 1846 01/25/23 0046 01/26/23 0500   Weight: 71.7 kg (158 lb) 73.7 kg (162 lb 7.7 oz) 71.7 kg (158 lb 1.1 oz)     Vital Signs with Ranges  Temp:  [96.6  F (35.9  C)-98  F (36.7  C)] 97.6  F (36.4  C)  Pulse:  [] 69  Resp:  [15-18] 18  BP: (100-126)/(56-67) 109/67  SpO2:  [92 %-96 %] 95 %  I/O last 3 completed shifts:  In: 859.37 [P.O.:690; I.V.:169.37]  Out: 3900 [Urine:3900]    @TMAXR(24)@    Patient Vitals for the past 72 hrs:   Weight   01/26/23 0500 71.7 kg (158 lb 1.1 oz)   01/25/23 0046 73.7 kg (162 lb 7.7 oz)   01/23/23 1846 71.7 kg (158 lb)   01/23/23 1559 73 kg (160 lb 15 oz)       Intake/Output Summary (Last 24 hours) at 1/26/2023 1106  Last data filed at 1/26/2023 0559  Gross per 24 hour   Intake 809.37 ml   Output 3400 ml   Net -2590.63 ml       PHYSICAL EXAM:  General - Alert and oriented x3, appears comfortable, NAD, thin frail male.   Cardiovascular - Regular rate and rhythm, no rub  Respiratory - CTA BL   Abd: BS present, no guarding or pain with palpation, no ascites  Extremities - No lower extremity edema bilaterally  Skin: dry, intact, no rash, good turgor  Neuro:  Grossly intact, no focal deficits  MSK:  Grossly intact  Psych:  Normal affect    LABORATORY STUDIES:     Recent Labs   Lab 01/24/23  0520 01/23/23  1601   WBC 8.4 10.9   RBC 4.15* 4.62   HGB 12.6* 13.8   HCT 38.9* 43.9    379       Basic Metabolic Panel:  Recent Labs   Lab 01/26/23  0610 01/26/23  0029 01/25/23  1811 01/25/23  1159 01/25/23  0503 01/24/23  2321 01/24/23  1218  01/24/23  0520 01/24/23  0459 01/24/23  0201 01/24/23  0123 01/23/23  2304 01/23/23  2221 01/23/23  1845 01/23/23  1601   *  134* 132* 134* 133* 128*  129* 128*   < > 128*  --   --   --   --  126*  --  123*   POTASSIUM 3.6  3.6 3.8 3.8 4.2 4.5  4.5 4.8   < > 5.1  --   --   --   --  5.5*   < > 7.1*   CHLORIDE 93*  --   --   --  89*  --   --  91*  --   --   --   --  90*  --  86*   CO2 31*  --   --   --  26  --   --  25  --   --   --   --  25  --  22   BUN 59.5*  --   --   --  84.5*  --   --  93.3*  --   --   --   --  96.9*  --  95.9*   CR 0.93  --   --   --  1.62*  --   --  1.85*  --   --   --   --  1.98*  --  2.09*   *  --   --   --  86  --   --  82 95 82 82   < > 77   < > 134*   ANGELY 9.0  --   --   --  9.3  --   --  9.1  --   --   --   --  9.6  --  9.5    < > = values in this interval not displayed.       INRNo lab results found in last 7 days.     Recent Labs   Lab Test 01/24/23  0520 01/23/23  1601 02/12/21  0604 02/11/21  1724 03/25/20  0235 03/24/20  1745   INR  --   --   --  1.03  --  1.05   WBC 8.4 10.9   < > 4.8   < > 6.3   HGB 12.6* 13.8   < > 14.2   < > 11.8*    379   < > 171   < > 211    < > = values in this interval not displayed.       Personally reviewed current labs      Wing Yarbrough MD  [facility]  [phone]

## 2023-01-26 NOTE — PROVIDER NOTIFICATION
The patient has difficulty swallowing and has that in the past too. Do you want to order a swallow study for tomorrow? The daughter is here and would like it done since he is trying to clear his throst after eating and coughs.Updated the MD by message.

## 2023-01-26 NOTE — CONSULTS
Care Management Initial Consult    General Information  Assessment completed with: Patient, patient, family  Type of CM/SW Visit: Initial Assessment    Primary Care Provider verified and updated as needed: No   Readmission within the last 30 days: previous discharge plan unsuccessful   Return Category: Exacerbation of disease     Advance Care Planning:            Communication Assessment  Patient's communication style: spoken language (English or Bilingual)    Hearing Difficulty or Deaf: yes   Wear Glasses or Blind: yes    Cognitive  Cognitive/Neuro/Behavioral: WDL  Level of Consciousness: alert  Arousal Level: arouses to voice  Orientation: disoriented to, situation  Mood/Behavior: calm, cooperative  Best Language: 0 - No aphasia  Speech: clear    Living Environment:   People in home: sibling(s)     Current living Arrangements: house      Able to return to prior arrangements: yes       Family/Social Support:  Care provided by: self, child(morteza)  Provides care for: no one  Marital Status:   Children          Description of Support System: Supportive    Support Assessment: Adequate family and caregiver support    Current Resources:   Patient receiving home care services: Yes  Skilled Home Care Services: Skilled Nursing, Physicial Therapy, Occupational Therapy, Speech Therapy  Community Resources: None  Equipment currently used at home: walker, standard  Supplies currently used at home:      Employment/Financial:  Employment Status:          Financial Concerns:             Lifestyle & Psychosocial Needs:  Social Determinants of Health     Tobacco Use: Low Risk      Smoking Tobacco Use: Never     Smokeless Tobacco Use: Never     Passive Exposure: Not on file   Alcohol Use: Not on file   Financial Resource Strain: Not on file   Food Insecurity: Not on file   Transportation Needs: Not on file   Physical Activity: Not on file   Stress: Not on file   Social Connections: Not on file   Intimate Partner Violence: Not on  file   Depression: Not on file   Housing Stability: Not on file       Functional Status:  Prior to admission patient needed assistance:   Dependent ADLs:: Ambulation-walker  Dependent IADLs:: Medication Management, Meal Preparation, Shopping       Mental Health Status:  Mental Health Status: No Current Concerns       Chemical Dependency Status:  Chemical Dependency Status: No Current Concerns             Values/Beliefs:  Spiritual, Cultural Beliefs, Jain Practices, Values that affect care:                 Additional Information:  Reviewed chart and met with daughter Juliet for initial assessment. Patient lives in his house. He is typically independent at baseline, however daughters have been staying with him 24/7. Patient has a sister in law that is currently residing in the home with patient (has been in the home for 18 yrs). Daughters working on facilitating the sister in law moving out.   Daughters are very involved in patient's care.   If patient needs TCU- first choices [facility]  Did also briefly discuss DMITRY for patient in the future as an option if patient is needing additional daily support, daughter states understanding of this option and agreeable to explore if needed post TCU.      Yvonne Bhagat, DANNSW         05-Jan-2017

## 2023-01-26 NOTE — PROVIDER NOTIFICATION
Texted MD to see if he wanted to order PT/OT. Patient needs strengthening/weakness .Family bedside and supportive. Turn q 2 hours. Lotion on red feet and elevated heels off the bed.

## 2023-01-26 NOTE — PLAN OF CARE
Problem: Risk for Delirium  Goal: Improved Behavioral Control  Intervention: Minimize Safety Risk  Recent Flowsheet Documentation  Taken 1/25/2023 2000 by Lanette Doty RN  Enhanced Safety Measures: bed alarm set  Taken 1/25/2023 1600 by Lanette Doty RN  Enhanced Safety Measures: bed alarm set     Problem: Cardiac Catheterization (Diagnostic/Interventional)  Goal: Stable Heart Rate and Rhythm  Outcome: Progressing  Goal: Absence of Bleeding  Outcome: Progressing  Goal: Absence of Contrast-Induced Injury  Outcome: Progressing  Goal: Absence of Embolism Signs and Symptoms  Outcome: Progressing  Goal: Anesthesia/Sedation Recovery  Outcome: Progressing  Intervention: Optimize Anesthesia Recovery  Recent Flowsheet Documentation  Taken 1/25/2023 2000 by Lanette oDty RN  Safety Promotion/Fall Prevention: activity supervised  Taken 1/25/2023 1600 by Lanette Doty RN  Safety Promotion/Fall Prevention: activity supervised  Goal: Acceptable Pain Control  Outcome: Progressing  Goal: Absence of Vascular Access Complication  Outcome: Progressing  Intervention: Prevent Access Site Complications  Recent Flowsheet Documentation  Taken 1/25/2023 2000 by Lanette Doty RN  Activity Management: bedrest  Head of Bed (HOB) Positioning: HOB flat  Taken 1/25/2023 1600 by Lanette Doty RN  Activity Management: bedrest  Head of Bed (HOB) Positioning: HOB flat   Goal Outcome Evaluation:       Pt is alert and oriented x 4, c/o pain in the tailbone, Tylenol given with relief.Lung sounds diminished, on O2 at 2 LPM per Oxymask, SpO2 93 to 96%. Tried to wean him down to 1 LPM per nasal cannula but he desaturated to mid 80's when sleeping. Pt is a mouth breather, his O2 saturation still drops to 80's on O2 at 1LPM per Oxymask. Turned and repositioned q 2 hours. Primofit in place, voiding well. On Dobutamine drip at 11.1 ml/hr. Held Cardura tonight, /62. Per pt's daughter pt has  swallowing problem, takes pills with apple sauce. He is also a feeder. Jugular site ( post right heart cath) is soft, small bruising above the site, no swelling, no hematoma noted.

## 2023-01-26 NOTE — PLAN OF CARE
Problem: Cardiac Catheterization (Diagnostic/Interventional)  Goal: Stable Heart Rate and Rhythm  Outcome: Progressing     Problem: Cardiac Catheterization (Diagnostic/Interventional)  Goal: Acceptable Pain Control  Outcome: Progressing     Problem: Cardiac Catheterization (Diagnostic/Interventional)  Goal: Absence of Vascular Access Complication  Outcome: Progressing  Intervention: Prevent Access Site Complications  Recent Flowsheet Documentation  Taken 1/26/2023 0410 by Cecelia Eugene, RN  Activity Management: activity adjusted per tolerance  Head of Bed (HOB) Positioning: HOB at 20-30 degrees  Taken 1/26/2023 0010 by Cecelia Eugene, RN  Activity Management: activity adjusted per tolerance  Head of Bed (HOB) Positioning: HOB at 20-30 degrees     Goal Outcome Evaluation:  Pt denies pain. Right internal jugular site is WDL-no swelling noted. Slight bruising noted above the site and bandaid. 1500 FR maintained. Male primofit in place with good urine output. Pt requesting eye drops for dry eyes today. Dobutamine gtt running at 11.1 ml/hr. 2L oxymask with O2 sats in the mid 90s. Denies sob at rest. Heels elevated with mepilex in place on left heel. A/O. VSS. Will continue to monitor and notify MD of any changes.

## 2023-01-26 NOTE — PROGRESS NOTES
[facility]    Medicine Progress Note - Hospitalist Service    Date of Admission:  1/23/2023    Assessment & Plan     Reymundo Petersen is a 82 year old male admitted on 1/23/2023. He lives at home with family.  He presented via EMS with fatigue, confusion and dehydration.  On presentation labs showed hyponatremia, hyperkalemia and acute kidney injury.     Hyperkalemia  -Resolved  -Likely multifactorial from potassium supplements, SHERLYN  -Patient has received Lokelma and a loop diuretic on admission  -Nephrology consulted, recommendations appreciated     Hyponatremia  -Improved   -Urine sodium, urine and serum osmolality noted     Acute kidney injury  -Resolved  -Suspect prerenal/dehydration from overdiuresis/CRS hypotension.  -IV Lasix discontinued July 26  -Bumetanide 1 mg daily tomorrow  -Dobutamine infusion started January 25   -Avoid nephrotoxic  agents     Cardiomyopathy  Heart failure with reduced EF  Cardiogenic shock  -Dobutamine infusion started January 25  -Furosemide 40 mg IV twice daily started January 25 discontinued January 26  -Most recent transthoracic echocardiogram December 19, showed a left ventricular ejection fraction of 35 to 40%  -Weigh patient daily and monitor I's and O's  -Per cardiology hold beta-blocker now on post discharge.  -Consult cardiology, recommendations appreciated  -S/p RHC January 25, reveals elevated right and left sided filling pressures consistent with volume overload/pulm hypertension. RA 15, RV 50/5; RVEDP 15 , PA 54/20 mean 30 and Wedge 20    Persistent atrial fibrillation  Complete heart block  -S/p ablation and DCCV  -S/p CRT-P  -Continue rivaroxaban, amiodarone held  -Hold metoprolol  per cardiology.  -Being considered for watchman     BPH  -Continue doxazosin with holding parameters     General anxiety disorder  -Continue sertraline     Degenerative joint disease  -Stable, Tylenol for pain  -PT and OT in a.m.    Pressure injury left  heel  -Decubitus ulcer prophylactic measures  -Urea cream bilaterally  -Wound care consult          Diet: Fluid restriction 1500 ML FLUID  Combination Diet Mechanical/Dental Soft Diet; Low Saturated Fat Na <2400mg Diet    DVT Prophylaxis: DOAC  Adorno Catheter: Not present  Lines: PRESENT      PICC 01/24/23 Triple Lumen Left Basilic-Site Assessment: WDL      Cardiac Monitoring: ACTIVE order. Indication: Acute decompensated heart failure (48 hours)  Code Status: Full Code      Clinically Significant Risk Factors         # Hyponatremia: Lowest Na = 126 mmol/L in last 2 days, will monitor as appropriate      # Hypoalbuminemia: Lowest albumin = 3 g/dL at 1/24/2023  5:20 AM, will monitor as appropriate                    Disposition Plan     Expected Discharge Date: 01/27/2023      Destination: home with help/services;home with family;inpatient rehabilitation facility  Discharge Comments: Dobutamine gtt.          Elliot Reddy MD  Hospitalist Service  [facility]  Securely message with [employer] (more info)  Text page via [email or web address] Paging/Directory   ______________________________________________________________________    Interval History       Daughter at bedside.  Continues to improve on dobutamine infusion and diuretics.  Symptomatically he feels better.  We will continue treatment plan today and transition to oral diuretic in a.m. and wean off dobutamine infusion in the next 24 to 48 hours.    Physical Exam   Vital Signs: Temp: 98.1  F (36.7  C) Temp src: Oral BP: 110/62 Pulse: 78   Resp: 16 SpO2: 96 % O2 Device: Nasal cannula Oxygen Delivery: 2 LPM  Weight: 158 lbs 1.12 oz      Physical Exam  HENT:      Head: Normocephalic.      Nose: Nose normal.      Mouth/Throat:      Mouth: Mucous membranes are moist.   Eyes:      Pupils: Pupils are equal, round, and reactive to light.   Cardiovascular:      Rate and Rhythm: Normal rate.      Pulses: Normal pulses.   Pulmonary:      Effort: Pulmonary effort is  normal.      Breath sounds: Examination of the right-lower field reveals rales. Examination of the left-lower field reveals rales. Rales present.   Abdominal:      General: Abdomen is flat.   Musculoskeletal:         General: Normal range of motion.      Cervical back: Normal range of motion.   Skin:     General: Skin is warm.      Capillary Refill: Capillary refill takes less than 2 seconds.   Neurological:      General: No focal deficit present.      Mental Status: Mental status is at baseline.           Medical Decision Making       30 MINUTES SPENT BY ME on the date of service doing chart review, history, exam, documentation & further activities per the note.      Data     I have personally reviewed the following data over the past 24 hrs:    N/A  \   N/A   / N/A     134 (L); 134 (L) 93 (L) 59.5 (H) /  140 (H)   3.6; 3.6 31 (H) 0.93 \       Procal: N/A CRP: N/A Lactic Acid: 1.2         Imaging results reviewed over the past 24 hrs:   No results found for this or any previous visit (from the past 24 hour(s)).

## 2023-01-26 NOTE — PROGRESS NOTES
Heart Failure Care Map  GOALS TO BE MET BEFORE DISCHARGE:    1. Decrease congestion and/or edema with diuretic therapy to achieve near optimal volume status.     Dyspnea improved: No, further care required to meet this goal. Please explain Needs Oxygen at present   Edema improved: No, further care required to meet this goal. Please explain edema lower extremities. Legs elevated lennie pillow.        Last 24 hour I/O:   Intake/Output Summary (Last 24 hours) at 1/26/2023 1020  Last data filed at 1/26/2023 0559  Gross per 24 hour   Intake 809.37 ml   Output 3400 ml   Net -2590.63 ml           Net I/O and Weights since admission:   12/27 1500 - 01/26 1459  In: 1629.37 [P.O.:1260; I.V.:169.37]  Out: 6425 [Urine:6425]  Net: -4795.63     Vitals:    01/23/23 1559 01/23/23 1846 01/25/23 0046 01/26/23 0500   Weight: 73 kg (160 lb 15 oz) 71.7 kg (158 lb) 73.7 kg (162 lb 7.7 oz) 71.7 kg (158 lb 1.1 oz)       2.  O2 sats > 90% on room air, or at prior home O2 therapy level.      Able to wean O2 this shift to keep sats above 90%?: No, further care required to meet this goal. Please explain Needs to be on oxygen to keep O2 sas greater that 90%. O2 @ 2 liters per n/c.    Does patient use Home O2? No          Current oxygenation status:   SpO2: 95 %     O2 Device: Oxymask, Oxygen Delivery: 2 LPM    3.  Tolerates ambulation and mobility near baseline.     Ambulation: No, further care required to meet this goal. Please explain Patient weak and needs PT/OT. turns with help of 2 assist. Skin assessed and left heel has mepilex intact.    Times patient ambulated with staff this shift: 1    Please review the Heart Failure Care Map for additional HF goal outcomes.  Patient alert and oriented x 4.He answered questions appropriately. Will continue to diuresis and weight improved. Bumex oral given. Dobutamine gtt 2.5 mcq/kg/min. 1500 fluid restriction Will try to get an order for PT/OT.PICC dressing C/D/I. Potassium 3.6 and Sodium 134.Call light  in reach.  Tracie Raymundo, DIAMOND  1/26/2023

## 2023-01-26 NOTE — PROGRESS NOTES
HEART CARE NOTE          Assessment/Recommendations     1. End stage CM c/b cardiogenic shock  Assessment / Plan    Diuresing well on current regimen; continue to hold BBlocker even at discharge; will transition to oral diuretic regimen and wean dobutamine gtt; hold midodrine and given contraindication in HFrEF particularly in the setting or cardiogenic shock unless patient transitions to comfort care    RHC significant for elevated right and left sided filling pressures; cardiac indices noted to be adequate on 5 of dobutamine    GDMT as detailed below     Current Pharmacotherapy AHA Guideline-Directed Medical Therapy   Losartan 25 mg daily - on hold given borderline BP and SHERLYN Lisinopril 20 mg twice daily   Metoprolol succinate 25 mg daily - on hold at discharge Carvedilol 25 mg twice daily   Spironolactone not started Spironolactone 25 mg once daily   Hydralazine NA Hydralazine 100 mg three times daily   Isosorbide dinitrate NA Isosorbide dinitrate 40 mg three times daily   SGLT2 inhibitor not started Dapagliflozin or Empagliflozin 10 mg daily      2. Arrhythmia  Assessment / Plan    Persistent atrial fibrillation s/p ablations and DCCV    Currently on rivaroxaban; being considered for Watchman device - continue amiodarone    s/p PVI 11/22    Complete Heart Block s/p CRT-P (on the right as patient is left handed)    Follows with Dr. Michelle in EP as well as afib clinic     3. CAD  Assessment / Plan    Denies chest pain or anginal equivalents    Continue ASA, atorvastatin      History of Present Illness/Subjective      Mr. 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 Petersen denies any acute cardiac events; but does appear to have some dyspnea at rest;  "Management plan as detailed above     ECG: Personally reviewed. Paced rhythm     ECHO (personnaly Reviewed):   The left ventricle is mildly dilated.  Left ventricular function is decreased. The ejection fraction is 30-35%  (moderately reduced).  There is moderate global hypokinesia of the left ventricle.  The right ventricle is mildly dilated.  The right ventricular systolic function is normal.  The left atrium is severely dilated.  The right atrium is moderately dilated.  There is moderately severe (3+) tricuspid regurgitation.  Right ventricle systolic pressure estimate normal  There is mild to moderate (1-2+) aortic regurgitation.  The ascending aorta is Mildly dilated.  IVC diameter >2.1 cm collapsing <50% with sniff suggests a high RA pressure  estimated at 15 mmHg or greater.  Small pericardial effusion  There are no echocardiographic indications of cardiac tamponade.             Physical Examination Review of Systems   /56 (BP Location: Right arm)   Pulse 69   Temp 97.6  F (36.4  C) (Axillary)   Resp 18   Ht 1.778 m (5' 10\")   Wt 73.7 kg (162 lb 7.7 oz)   SpO2 96%   BMI 23.31 kg/m    Body mass index is 23.31 kg/m .  Wt Readings from Last 3 Encounters:   01/25/23 73.7 kg (162 lb 7.7 oz)   01/18/23 73 kg (161 lb)   01/12/23 71.7 kg (158 lb)     General Appearance:   no distress, normal body habitus   ENT/Mouth: membranes moist, no oral lesions or bleeding gums.      EYES:  no scleral icterus, normal conjunctivae   Neck: no carotid bruits or thyromegaly   Chest/Lungs:   lungs are clear to auscultation, no rales or wheezing, equal chest wall expansion    Cardiovascular:   Regular. Normal first and second heart sounds with no murmurs, rubs, or gallops; the carotid, radial and posterior tibial pulses are intact, no JVD mild LE edema bilaterally    Abdomen:  no organomegaly, masses, bruits, or tenderness; bowel sounds are present   Extremities: no cyanosis or clubbing   Skin: no xanthelasma, warm.  "   Neurologic: alert and calm     Psychiatric: alert and calm     A complete 10 systems ROS was reviewed  And is negative except what is listed in the HPI.          Medical History  Surgical History Family History Social History   Past Medical History:   Diagnosis Date     Atrial fibrillation (H)      Atrial fibrillation, transient (H) 08/11/2020     BPH (benign prostatic hyperplasia) 07/11/2018     Chronic combined systolic and diastolic heart failure (H) 02/11/2021     Congestive heart failure (H)      Coronary artery disease      COVID 02/2021     Dilated cardiomyopathy (H) 02/11/2021     MARIAN (generalized anxiety disorder) 02/11/2021     GERD (gastroesophageal reflux disease)      High cholesterol      Hyperlipidemia      Hypertension      LBBB (left bundle branch block) 07/11/2018     Nonrheumatic aortic valve insufficiency 04/01/2020     Pulmonary hypertension (H) 04/01/2020    Past Surgical History:   Procedure Laterality Date     ANGIOPLASTY       ARTHROSCOPY KNEE       CARDIOVERSION  2021     CARDIOVERSION  02/03/2022     CV CORONARY ANGIOGRAM N/A 10/23/2020    Procedure: Coronary Angiogram;  Surgeon: Varun Freire MD;  Location: [facility] Cath Lab;  Service: Cardiology     CV LEFT HEART CATHETERIZATION WITHOUT LEFT VENTRICULOGRAM Left 10/23/2020    Procedure: Left Heart Catheterization Without Left Ventriculogram;  Surgeon: Varun Freire MD;  Location: [facility] Cath Lab;  Service: Cardiology     EP BIV PACEMAKER INSERT N/A 03/25/2020    Procedure: EP Biventricular Pacemaker Insertion;  Surgeon: Taylor Sandoval MD;  Location: [facility] Cath Lab;  Service: Cardiology     H ABLATION FOCAL AFIB       HEART CATH, ANGIOPLASTY       IMPLANT PACEMAKER       PICC TRIPLE LUMEN PLACEMENT  1/24/2023          RELEASE CARPAL TUNNEL       ZZC TOTAL KNEE ARTHROPLASTY Right 08/15/2019    Procedure: RIGHT  MINIMALLY TOTAL KNEE ARTHROPLASTY;  Surgeon: Keven Cerda MD;  Location: [facility]  OR;  Service: Orthopedics    no family history of premature coronary artery disease Social History     Socioeconomic History     Marital status:      Spouse name: Not on file     Number of children: Not on file     Years of education: Not on file     Highest education level: Not on file   Occupational History     Not on file   Tobacco Use     Smoking status: Never     Smokeless tobacco: Never   Substance and Sexual Activity     Alcohol use: Yes     Comment: Alcoholic Drinks/day: rare     Drug use: No     Sexual activity: Not on file   Other Topics Concern     Parent/sibling w/ CABG, MI or angioplasty before 65F 55M? Not Asked   Social History Narrative    Retired.  Good sense of humor     Social Determinants of Health     Financial Resource Strain: Not on file   Food Insecurity: Not on file   Transportation Needs: Not on file   Physical Activity: Not on file   Stress: Not on file   Social Connections: Not on file   Intimate Partner Violence: Not on file   Housing Stability: Not on file           Lab Results    Chemistry/lipid CBC Cardiac Enzymes/BNP/TSH/INR   Lab Results   Component Value Date    CHOL 107 06/23/2022    HDL 38 (L) 06/23/2022    TRIG 35 06/23/2022    BUN 84.5 (H) 01/25/2023     (L) 01/26/2023    CO2 26 01/25/2023    Lab Results   Component Value Date    WBC 8.4 01/24/2023    HGB 12.6 (L) 01/24/2023    HCT 38.9 (L) 01/24/2023    MCV 94 01/24/2023     01/24/2023    Lab Results   Component Value Date    TROPONINI 0.09 03/14/2022     (H) 06/17/2022    TSH 4.26 10/17/2022    INR 1.03 02/11/2021     Lab Results   Component Value Date    TROPONINI 0.09 03/14/2022          Weight:    Wt Readings from Last 3 Encounters:   01/25/23 73.7 kg (162 lb 7.7 oz)   01/18/23 73 kg (161 lb)   01/12/23 71.7 kg (158 lb)       Allergies  Allergies   Allergen Reactions     Diltiazem Other (See Comments)     Contraindicated in HFrEF     Midodrine Other (See Comments)     Contraindicated in HFrEF          Surgical History  Past Surgical History:   Procedure Laterality Date     ANGIOPLASTY       ARTHROSCOPY KNEE       CARDIOVERSION  2021     CARDIOVERSION  02/03/2022     CV CORONARY ANGIOGRAM N/A 10/23/2020    Procedure: Coronary Angiogram;  Surgeon: Varun Freire MD;  Location: [facility] Cath Lab;  Service: Cardiology     CV LEFT HEART CATHETERIZATION WITHOUT LEFT VENTRICULOGRAM Left 10/23/2020    Procedure: Left Heart Catheterization Without Left Ventriculogram;  Surgeon: Varun Freire MD;  Location: [facility] Cath Lab;  Service: Cardiology     EP BIV PACEMAKER INSERT N/A 03/25/2020    Procedure: EP Biventricular Pacemaker Insertion;  Surgeon: Taylor Sandoval MD;  Location: [facility] Cath Lab;  Service: Cardiology     H ABLATION FOCAL AFIB       HEART CATH, ANGIOPLASTY       IMPLANT PACEMAKER       PICC TRIPLE LUMEN PLACEMENT  1/24/2023          RELEASE CARPAL TUNNEL       ZZC TOTAL KNEE ARTHROPLASTY Right 08/15/2019    Procedure: RIGHT  MINIMALLY TOTAL KNEE ARTHROPLASTY;  Surgeon: Keven Cerda MD;  Location: [facility];  Service: Orthopedics       Social History  Tobacco:   History   Smoking Status     Never   Smokeless Tobacco     Never    Alcohol:   Social History    Substance and Sexual Activity      Alcohol use: Yes        Comment: Alcoholic Drinks/day: rare   Illicit Drugs:   History   Drug Use No       Family History  Family History   Problem Relation Age of Onset     Alzheimer Disease Mother      Heart Disease Father      Cancer Sister      Diabetes Type 2  Sister      Chronic Obstructive Pulmonary Disease Sister      LUNG DISEASE Brother           Jhon Paul Vasquez MD on 1/26/2023      cc: Jamie Noguera

## 2023-01-26 NOTE — CARE PLAN
Heart Failure Care Map  GOALS TO BE MET BEFORE DISCHARGE:    1. Decrease congestion and/or edema with diuretic therapy to achieve near optimal volume status.     Dyspnea improved: Yes, satisfactory for discharge.   Edema improved: No, further care required to meet this goal. Please explain still has edema in both legs        Last 24 hour I/O:   Intake/Output Summary (Last 24 hours) at 1/25/2023 2250  Last data filed at 1/25/2023 2249  Gross per 24 hour   Intake 949.37 ml   Output 3925 ml   Net -2975.63 ml           Net I/O and Weights since admission:   12/26 2300 - 01/25 2259  In: 1389.37 [P.O.:1020; I.V.:169.37]  Out: 5575 [Urine:5575]  Net: -4185.63     Vitals:    01/23/23 1559 01/23/23 1846 01/25/23 0046   Weight: 73 kg (160 lb 15 oz) 71.7 kg (158 lb) 73.7 kg (162 lb 7.7 oz)       2.  O2 sats > 90% on room air, or at prior home O2 therapy level.      Able to wean O2 this shift to keep sats above 90%?: No, further care required to meet this goal. Please explain On O2 at 2LPM   Does patient use Home O2? No          Current oxygenation status:   SpO2: 95 %     O2 Device: Oxymask, Oxygen Delivery: 2 LPM    3.  Tolerates ambulation and mobility near baseline.     Ambulation: No, further care required to meet this goal. Please explain unable to tolerate   Times patient ambulated with staff this shift: 0    Please review the Heart Failure Care Map for additional HF goal outcomes.    Lanette Doty RN  1/25/2023

## 2023-01-27 NOTE — PROGRESS NOTES
[facility]    Medicine Progress Note - Hospitalist Service    Date of Admission:  1/23/2023    Assessment & Plan     Reymundo Petersen is a 82 year old male admitted on 1/23/2023. He lives at home with family.  He presented via EMS with fatigue, confusion and dehydration.  On presentation labs revealed hyponatremia, hyperkalemia and acute kidney injury.     Hyperkalemia  -Resolved  -Likely multifactorial from potassium supplements, SHERLYN  -Patient has received Lokelma and a loop diuretic on admission  -Nephrology consulted, recommendations appreciated     Hyponatremia  -Improved   -Urine sodium, urine and serum osmolality noted     Acute kidney injury  -Resolved  -Suspect prerenal/dehydration from overdiuresis/CRS  -Bumetanide 2 mg daily January 27.  -Dobutamine infusion started January 25   -Avoid nephrotoxic  agents     Cardiomyopathy  Heart failure with reduced EF  Cardiogenic shock  -Dobutamine infusion started January 25 and weaned off January 27  -Bumetanide p.o. 2 mg daily started today 27  -Most recent transthoracic echocardiogram December 19, showed a left ventricular ejection fraction of 35 to 40%  -Weigh patient daily and monitor I's and O's  -Per cardiology hold beta-blocker now and  post discharge.  -Consult cardiology, recommendations appreciated  -S/p RHC January 25, reveals elevated right and left sided filling pressures consistent with volume overload/pulm hypertension. RA 15, RV 50/5; RVEDP 15 , PA 54/20 mean 30 and Wedge 20    Persistent atrial fibrillation  Complete heart block  -S/p ablation and DCCV  -S/p CRT-P  -Continue rivaroxaban, amiodarone held  -Hold metoprolol  per cardiology.  -He was being considered for watchman outpatient     BPH  -Continue doxazosin with holding parameters     General anxiety disorder  -Continue sertraline     Degenerative joint disease  -Stable, Tylenol for pain  -PT and OT     Pressure injury left heel  -Decubitus ulcer prophylactic  measures  -Urea cream bilaterally  -Wound care consult    Weakness and physical deconditioning  -Multifactorial from the above  -Family have expressed concerns about returning home and now express interest in TCU.  -PT and OT consulted  -Given his frailty and high readmission risk cardiology has initiated palliative care consult for goals of care discussion.  Daughter and granddaughter are in agreement.  Should he rapidly decline hospice care will be initiated.         Diet: Fluid restriction 1500 ML FLUID  Combination Diet Mechanical/Dental Soft Diet; Low Saturated Fat Na <2400mg Diet    DVT Prophylaxis: DOAC  Adorno Catheter: Not present  Lines: PRESENT      PICC 01/24/23 Triple Lumen Left Basilic-Site Assessment: WDL      Cardiac Monitoring: ACTIVE order. Indication: Acute decompensated heart failure (48 hours)  Code Status: Full Code      Clinically Significant Risk Factors              # Hypoalbuminemia: Lowest albumin = 3 g/dL at 1/24/2023  5:20 AM, will monitor as appropriate                    Disposition Plan   TCU     Expected Discharge Date: 01/27/2023      Destination: home with help/services;home with family;inpatient rehabilitation facility  Discharge Comments: Dobutamine gtt.          Elliot Reddy MD  Hospitalist Service  [facility]  Securely message with [employer] (more info)  Text page via [employer] Paging/Directory   ______________________________________________________________________    Interval History       Daughter and granddaughter at bedside.    Patient appears weak deconditioned and frail.  Family members are interested in TCU as they do not think he is safe at home.  Palliative care consulted for goals of care discussion today    Physical Exam   Vital Signs: Temp: 98  F (36.7  C) Temp src: Oral BP: 111/66 Pulse: 68   Resp: 18 SpO2: (!) 84 % O2 Device: None (Room air) Oxygen Delivery: 2 LPM  Weight: 159 lbs 6.28 oz      Physical Exam  HENT:      Head: Normocephalic.       Nose: Nose normal.      Mouth/Throat:      Mouth: Mucous membranes are moist.   Eyes:      Pupils: Pupils are equal, round, and reactive to light.   Cardiovascular:      Rate and Rhythm: Normal rate.      Pulses: Normal pulses.   Pulmonary:      Effort: Pulmonary effort is normal.      Breath sounds: Examination of the right-lower field reveals rales. Examination of the left-lower field reveals rales. Rales present.   Abdominal:      General: Abdomen is flat.   Musculoskeletal:         General: Normal range of motion.      Cervical back: Normal range of motion.   Skin:     General: Skin is warm.      Capillary Refill: Capillary refill takes less than 2 seconds.   Neurological:      General: No focal deficit present.      Mental Status: Mental status is at baseline.           Medical Decision Making       30 MINUTES SPENT BY ME on the date of service doing chart review, history, exam, documentation & further activities per the note.      Data     I have personally reviewed the following data over the past 24 hrs:    7.5  \   11.5 (L)   / 274     141 99 37.5 (H) /  142 (H)   3.7 36 (H) 0.92 \       Imaging results reviewed over the past 24 hrs:   No results found for this or any previous visit (from the past 24 hour(s)).

## 2023-01-27 NOTE — PROGRESS NOTES
Speech Pathology Clinical Swallow Evaluation       01/27/23 1100   Appointment Info   Signing Clinician's Name / Credentials (SLP) Silverio Brown MA [facility] SLP   General Information   Onset of Illness/Injury or Date of Surgery 01/23/23   Referring Physician Dr Reddy   Patient/Family Therapy Goal Statement (SLP) to find out why he belches, has difficulty swallowing   Pertinent History of Current Problem a 82 year old male admitted on 1/23/2023. He lives at home with family.  He presented via EMS with fatigue, confusion and dehydration.  On presentation labs showed hyponatremia, hyperkalemia and acute kidney injury. Per patient and family he has had difficulty swallowing for past few weeks, he describes it as there's not enough room for things to go down and points to his larynx, also reports frequent belching and throat clearing during or after meals.   General Observations Pt is alert, up in the chair, his family is present.   Type of Evaluation   Type of Evaluation Swallow Evaluation   Oral Motor   Oral Musculature generally intact   Structural Abnormalities none present   Dentition (Oral Motor)   Dentition (Oral Motor) adequate dentition   Facial Symmetry (Oral Motor)   Facial Symmetry (Oral Motor) WNL   Lip Function (Oral Motor)   Lip Range of Motion (Oral Motor) WNL   Lip Strength (Oral Motor) WNL   Tongue Function (Oral Motor)   Tongue Strength (Oral Motor) WNL   Tongue Coordination/Speed (Oral Motor) slows down progressively   Tongue ROM (Oral Motor) WNL   Jaw Function (Oral Motor)   Jaw Function (Oral Motor) WNL   Cough/Swallow/Gag Reflex (Oral Motor)   Volitional Throat Clear/Cough (Oral Motor) WNL   Volitional Swallow (Oral Motor) WNL   Vocal Quality/Secretion Management (Oral Motor)   Vocal Quality (Oral Motor) harsh   Secretion Management (Oral Motor) WNL   General Swallowing Observations   Past History of Dysphagia none prior   Respiratory Support (General Swallowing Observations) none   Current  Diet/Method of Nutritional Intake (General Swallowing Observations, NIS) thin liquids (level 0);easy to chew (level 7)   Swallowing Evaluation Clinical swallow evaluation   Clinical Swallow Evaluation   Feeding Assistance set up only required   Clinical Swallow Evaluation Textures Trialed thin liquids;solid foods;soft & bite-sized   Clinical Swallow Eval: Thin Liquid Texture Trial   Mode of Presentation, Thin Liquids straw;self-fed   Volume of Liquid or Food Presented 4 oz   Oral Phase of Swallow WFL   Pharyngeal Phase of Swallow throat clearing   Diagnostic Statement throat clearing delayed after swallow, intermittent, includes belching   Clinical Swallow Eval: Soft & Bite Sized   Mode of Presentation self-fed   Volume Presented 2 oz   Oral Phase WFL   Pharyngeal Phase throat clearing   Diagnostic Statement throat clearing delayed after swallow, intermittent, includes belching   Clinical Swallow Evaluation: Solid Food Texture Trial   Mode of Presentation self-fed   Volume Presented 4 oz   Oral Phase WFL   Oral Residue other (see comments)  (small masticated pieces of pineapple, he spit it out)   Pharyngeal Phase throat clearing   Diagnostic Statement throat clearing delayed after swallow, intermittent, includes belching   Esophageal Phase of Swallow   Patient reports or presents with symptoms of esophageal dysphagia Yes   Esophageal comments abdominal pain with eating, belching   Swallowing Recommendations   Diet Consistency Recommendations other (see comments)  (contineu mech soft w/thin liquid)   Instrumental Assessment Recommendations VFSS (videofluoroscopic swallowing study)   Comment, Swallowing Recommendations Recommend video swallow study including esophageal sweep   Clinical Impression   Criteria for Skilled Therapeutic Interventions Met (SLP Eval) Other (see comments)  (TBD based on VFSS results)   Clinical Impression Comments Patient demonstrates mild oral dysphagia with regular solids resulting in small  amounts of diffuse stasis with specific textures. He demonstrates frequent delayed throat clearing and belching throughout and after intact. He also reports abdominal pain/discomfort while eating. Recommend video swallow study with esopahgeal sweep to determine oral pharyngeal swallow function and possible need for further esophageal assessment.   SLP Total Evaluation Time   Eval: oral/pharyngeal swallow function, clinical swallow Minutes (66738) 20   SLP Discharge Planning   SLP Plan VFSS w/esophageal sweep   SLP Rationale for DC Rec TBD determined, patient lives with family, has strong support.   SLP Brief overview of current status  [facility] soft diet w/thin liquids, upright for all intake, remain upright for 30 min after meals   Total Session Time   Total Session Time (sum of timed and untimed services) 20

## 2023-01-27 NOTE — PLAN OF CARE
Problem: Risk for Delirium  Goal: Improved Behavioral Control  Intervention: Minimize Safety Risk  Recent Flowsheet Documentation  Taken 1/27/2023 1202 by Kayla Nagy RN  Enhanced Safety Measures: bed alarm set  Taken 1/27/2023 0900 by Kayla Nagy RN  Enhanced Safety Measures: bed alarm set     Problem: Cardiac Catheterization (Diagnostic/Interventional)  Goal: Stable Heart Rate and Rhythm  Outcome: Progressing  Goal: Anesthesia/Sedation Recovery  Intervention: Optimize Anesthesia Recovery  Recent Flowsheet Documentation  Taken 1/27/2023 1202 by Kayla Nagy RN  Safety Promotion/Fall Prevention: bed alarm on  Taken 1/27/2023 0900 by Kayla Nagy RN  Safety Promotion/Fall Prevention: bed alarm on  Goal: Absence of Vascular Access Complication  Intervention: Prevent Access Site Complications  Recent Flowsheet Documentation  Taken 1/27/2023 1202 by Kayla Nagy RN  Activity Management: activity adjusted per tolerance  Taken 1/27/2023 0900 by Kayla Nagy RN  Activity Management: activity adjusted per tolerance     Problem: Pain Acute  Goal: Optimal Pain Control and Function  Outcome: Progressing  Intervention: Prevent or Manage Pain  Recent Flowsheet Documentation  Taken 1/27/2023 1202 by Kayla Nagy RN  Medication Review/Management: medications reviewed  Taken 1/27/2023 0900 by Kayla Nagy RN  Medication Review/Management: medications reviewed   Goal Outcome Evaluation:    Pt c/o a lot of different ailments all shift.  Lower extremities are burning.  Has a headache, stomach hurts.  Is dizzy and lightheaded, blood pressure checked and 120/64.  V paced HR 66. Tums given for heartburn.  Speech assessed and ordered a Video swallow study. Assist of 2 to chair.  Gave pills crushed in applesauce. Pt on 2L NC when asleep.  Will drop to 85% on RA when sleeping. Primofit changed Output 950. Intake 340cc. Only ate breakfast not lunch. Pallative consult ordered.

## 2023-01-27 NOTE — PROGRESS NOTES
Heart Failure Care Map  GOALS TO BE MET BEFORE DISCHARGE:    1. Decrease congestion and/or edema with diuretic therapy to achieve near optimal volume status.     Dyspnea improved: Yes, satisfactory for discharge.   Edema improved: No, further care required to meet this goal. Please explain Lower extremities with +2 edema and feet puffy and red and meplix on left heel pressure area with no breakdown.         Last 24 hour I/O:   Intake/Output Summary (Last 24 hours) at 1/26/2023 1829  Last data filed at 1/26/2023 1740  Gross per 24 hour   Intake 1330.37 ml   Output 3050 ml   Net -1719.63 ml           Net I/O and Weights since admission:   12/27 2300 - 01/26 2259  In: 2405.37 [P.O.:1960; I.V.:245.37]  Out: 7825 [Urine:7825]  Net: -5419.63     Vitals:    01/23/23 1559 01/23/23 1846 01/25/23 0046 01/26/23 0500   Weight: 73 kg (160 lb 15 oz) 71.7 kg (158 lb) 73.7 kg (162 lb 7.7 oz) 71.7 kg (158 lb 1.1 oz)       2.  O2 sats > 90% on room air, or at prior home O2 therapy level.      Able to wean O2 this shift to keep sats above 90%?: No, further care required to meet this goal. Please explain Mouth breather and O2 sats drop with O2 off. Wears O2 @ 2 liters/nc when he eats.    Does patient use Home O2? No          Current oxygenation status:   SpO2: 96 %     O2 Device: Nasal cannula, Oxygen Delivery: 2 LPM    3.  Tolerates ambulation and mobility near baseline.     Ambulation: No, further care required to meet this goal. Please explain Patient weak and to strt PT/OT when off dobutamine gtt per MD.    Times patient ambulated with staff this shift: 0    Please review the Heart Failure Care Map for additional HF goal outcomes.    Patient alert and oriented x 4. Continue Dobutamine gtt at 2.5 mcq/kg/min. Slept on and off this afternon. Appetite poor and at times coughs and daughter suggested a swallow study. Message left for MD. Continue to monitor I and O q shift. Turn q 2 hours with 2 assist. V paced 100% pacer capture. At  night time may need oxy mask at 2 liters of Oxygen since a mouth breather. Daughter Juliet bedside and supportive.     Tracie Raymundo RN  1/26/2023

## 2023-01-27 NOTE — PROVIDER NOTIFICATION
The patient would like eye gtts for dry eyes. May we have a prn eye gtt like Refresh. Also I sent a message about a swallow study since patient coughs at times after eating a soft diet and tries to clear his throat. He had this problem in the past per daughter as well as being tested for this in the past. She feels it is more noticeable recently. Message sent to Manjeet Rothman MD.

## 2023-01-27 NOTE — PLAN OF CARE
Problem: Cardiac Catheterization (Diagnostic/Interventional)  Goal: Stable Heart Rate and Rhythm  Outcome: Progressing     Problem: Cardiac Catheterization (Diagnostic/Interventional)  Goal: Acceptable Pain Control  Outcome: Progressing  Intervention: Prevent or Manage Pain  Recent Flowsheet Documentation  Taken 1/26/2023 2000 by Cecelia Eugene RN  Pain Management Interventions:   medication (see MAR)   repositioned     Goal Outcome Evaluation:  Pt endorses discomfort in his hip. PRN tylenol given with some relief. Repositioned regularly overnight. Heels floated-mepilex still applied on left heel. Having some belching and indigestion this shift. Maalox given with some relief. Pt states he was not able to sleep well at all even after receiving melatonin. 1500 FR maintained. V-paced with HR in 70s. Dobutamine gtt discontinued this AM. 2L NC with O2 sats in the upper 90s. Plan for speech to see him today. A/O. VSS. Will continue to monitor and notify MD of any changes.

## 2023-01-27 NOTE — PROGRESS NOTES
Chart reviewed. MD paged for OT/PT order. Care Management to follow for therapy recommendations. Patient open to [facility] home care services.

## 2023-01-27 NOTE — CONSULTS
[facility]  Palliative Care Consultation Note    Patient: Reymundo Petersen  Date of Admission:  1/23/2023    Requesting Clinician / Team: Dr. Vasquez  Reason for consult: Goals of care    Code status: Full Code    Impression & Recommendations:  SYMPTOM ASSESSMENT  Generalized weakness and fatigue   - PT consulted.  - Likely to benefit from TCU at discharge.     ADVANCED CARE PLANNING  - Code status: FULL CODE;  Now DNR/I per discussion today.  - Surrogate decision makers: DTRS, Juliet Petersen and Justa Petersen  - Has HCD in chart.  - No POLST.  - POLST completed today indicating wishes for DNR/I, selective treatment, no feeding tube and agreeable to oral antibiotics.    GOALS OF CARE DISCUSSION  - Goals are life prolonging with limits, now DNR/I.  - Continue current cares and treatments.  - Discussed role of outpatient palliative care clinic. Family and patient will consider for ongoing goals of care discussions. Referral placed.        Thank you for the opportunity to participate in the care of this patient and family. Our team: will continue to follow.     During regular M-F work hours -- if you are not sure who specifically to contact -- please contact us by calling us directly at the Palliative Care Main Line [phone]    After regular work hours and on weekends/holidays, you can leave a message at [phone]      History of Present Illness:  History gathered today from: patient, family/loved ones, medical chart  Reymundo Petersen is a 82 year old male with PMH persistent atrial fibrillation, chronic systolic and diastolic HFrEF (35-40 %) due to nonischemic cardiomyopathy, moderate Complete heart block, s/p CRT-P, s/p ablation and DC cardioversion, CAD with prior RCA PCI, HTN, BPH and CKD stage 2  Patient admitted from home where he lives with family with increased fatigue, dehydration and confusion.    Patient was seen for:  Goals of care discussion and advanced care  "planning    Prognosis, Goals, & Planning:      Functional Status just prior to hospitalization: 2 (Ambulatory and capable of all selfcare but unable to carry out any work activities; may need help with IADLs up and about > 50% of waking hours)      Prognosis, Goals, and/or Advance Care Planning were addressed today: Yes        Summary/Comments: Met in room with patient, granddaughter and 2 daughters.  Reviewed Palliative Care is specialized medical care for people with serious illness, focused on providing patients with relief from symptoms, pain and stresses of serious illness.  The goal is to improve quality of life for both the patient and the family.  Patient acknowledges his multiple medical problems and significant cardiac disease.  Discussed denise his symptoms in terms of fatigue and difficulty swallowing.  Patient agrees with modified diet.  He is very clear that she would not want a feeding tube if he ever lost the ability to swallow or with a higher aspiration risk.  All family members in agreement with no feeding tube.  Discussed benefit versus burden of resuscitation and intubation with patient.  Patient clearly indicates that he does not wish to pursue aggressive resuscitation or intubation and would prefer a \"natural death.\"  All family members in agreement with this.  With patient and family permission placed order for DNR/I.  Patient wishes to [facility] his body to the [facility].  Reviewed role of POLST document.  This was completed with patient and his family today indicating wishes for DNR/I, selective treatment as he would want to return to the hospital for medical intervention and evaluation, no feeding tube and he does agree with oral antibiotics.    Family expressed concern about patient's living situation.  His wife  18 years ago.  Upon her death, his sister moved down.  She has had increasing mental health issues over the last 2-3 years and it has become more of a hoarding " situation per family report.  Patient cannot return to that home until she has moved out.  That is in the works.  Patient is agreeable to discharging to TCU at discharge.      Patient's decision making preferences: shared with support from loved ones          Patient has decision-making capacity today for complex decisions: Yes            I have concerns about the patient/family's health literacy today: No           Patient has a completed Health Care Directive: Yes, and on file.      Code status: Full Code    Coping, Meaning, & Spirituality:   Mood, coping, and/or meaning in the context of serious illness were addressed today: No    Key Palliative Symptom Data:  # Pain severity the last 12 hours: none  # Dyspnea severity the last 12 hours: low  # Nausea severity the last 12 hours: none  # Anxiety severity the last 12 hours: low  Dyspnea worsens with exertion.  Patient has had chronic anxiety throughout his life.  On Zoloft.  Insomnia: Patient takes Tylenol with Benadryl at bedtime.  Urinary frequency increased due to BPH.  At times has difficulty emptying bladder and has nocturia.  Weakness and fatigue: Moderate - severe  Appetite: Decreased    ROS:  Comprehensive ROS is reviewed and is negative except as noted above.     Past Medical History:  Past Medical History:   Diagnosis Date     Atrial fibrillation (H)      Atrial fibrillation, transient (H) 08/11/2020     BPH (benign prostatic hyperplasia) 07/11/2018     Chronic combined systolic and diastolic heart failure (H) 02/11/2021     Congestive heart failure (H)      Coronary artery disease      COVID 02/2021     Dilated cardiomyopathy (H) 02/11/2021     MARIAN (generalized anxiety disorder) 02/11/2021     GERD (gastroesophageal reflux disease)      High cholesterol      Hyperlipidemia      Hypertension      LBBB (left bundle branch block) 07/11/2018     Nonrheumatic aortic valve insufficiency 04/01/2020     Pulmonary hypertension (H) 04/01/2020        Past Surgical  "History:  Past Surgical History:   Procedure Laterality Date     ANGIOPLASTY       ARTHROSCOPY KNEE       CARDIOVERSION  2021     CARDIOVERSION  02/03/2022     CV CORONARY ANGIOGRAM N/A 10/23/2020    Procedure: Coronary Angiogram;  Surgeon: Varun Freire MD;  Location: [facility] Cath Lab;  Service: Cardiology     CV LEFT HEART CATHETERIZATION WITHOUT LEFT VENTRICULOGRAM Left 10/23/2020    Procedure: Left Heart Catheterization Without Left Ventriculogram;  Surgeon: Varun Freire MD;  Location: [facility] Cath Lab;  Service: Cardiology     EP BIV PACEMAKER INSERT N/A 03/25/2020    Procedure: EP Biventricular Pacemaker Insertion;  Surgeon: Taylor Sandoval MD;  Location: [facility] Cath Lab;  Service: Cardiology     H ABLATION FOCAL AFIB       HEART CATH, ANGIOPLASTY       IMPLANT PACEMAKER       PICC TRIPLE LUMEN PLACEMENT  1/24/2023          RELEASE CARPAL TUNNEL       ZZC TOTAL KNEE ARTHROPLASTY Right 08/15/2019    Procedure: RIGHT  MINIMALLY TOTAL KNEE ARTHROPLASTY;  Surgeon: Keven Cerda MD;  Location: [facility];  Service: Orthopedics         Family History:  Family History   Problem Relation Age of Onset     Alzheimer Disease Mother      Heart Disease Father      Cancer Sister      Diabetes Type 2  Sister      Chronic Obstructive Pulmonary Disease Sister      LUNG DISEASE Brother         Social:     Living situation: Lives in his own home with his sister.  Has support from family.  2 daughters present today described the home as a \"hoarding\" situation as patient's sister unable to get rid of clutter.    Key family / caregivers: Granddaughter who is a nurse, 2 daughters and son     Allergies:  Allergies   Allergen Reactions     Diltiazem Other (See Comments)     Contraindicated in HFrEF     Midodrine Other (See Comments)     Contraindicated in HFrEF        Medications:  I have reviewed this patient's medication profile and medications from this hospitalization.     Lab Results: " personally reviewed.   Lab Results   Component Value Date     01/27/2023    CO2 36 01/27/2023    CO2 30 10/17/2022    BUN 37.5 01/27/2023    BUN 27 10/17/2022     Lab Results   Component Value Date    WBC 7.5 01/27/2023    HGB 11.5 01/27/2023    HCT 37.3 01/27/2023    MCV 97 01/27/2023     01/27/2023     AST   Date Value Ref Range Status   12/20/2022 24 10 - 50 U/L Final     ALT   Date Value Ref Range Status   12/20/2022 7 (L) 10 - 50 U/L Final     Alkaline Phosphatase   Date Value Ref Range Status   12/20/2022 102 40 - 129 U/L Final     Albumin   Date Value Ref Range Status   01/24/2023 3.0 (L) 3.5 - 5.2 g/dL Final   03/14/2022 3.4 (L) 3.5 - 5.0 g/dL Final       RADIOLOGY:  XR Chest 2 Views    Result Date: 1/23/2023  EXAM: XR CHEST 2 VIEWS LOCATION: [facility] DATE/TIME: 1/23/2023 6:33 PM INDICATION: Shortness of breath. COMPARISON: Chest CT 02/22/2022. Chest radiograph 02/22/2022.     IMPRESSION: Small to moderate bilateral pleural effusions, larger on the right. There are adjacent airspace opacities which likely reflect atelectasis, although infection is not excluded. Upper lungs are clear. No pneumothorax. Stable enlarged cardiac silhouette. Unchanged right subclavian approach pacemaker.    Cardiac Catheterization    Result Date: 1/25/2023  Written informed consent was obtained in the pre-procedure area. A time out procedure was performed with the entirecardiac catheterization laboratory staff, confirming the patient's name, date of birth, type of procedure, indication for the procedure and site of procedure.  The right IJ was sterilely prepped and draped. 1% Lidocaine wasused to infiltrate the right neck. Sheath was placed into the right internal jugular vein using modified Seldinger technique. The sheath was then upsized to a 12 F sheath. A 7 Fr balloon wedge pressure catheter was advancedthrough the introducer sheath to perform pressure measurements throughout the right  heart. Following pressure measurements in the RA 15, RV 50/5; RVEDP 15 , PA 54/20 mean 30 and Wedge 20. A SV02 sample was drawn from the pulmonary artery and found to be 63%.  The cardiac output and index were determined to be 4.62/2.41, respectively by Angela method.  The balloon wedge pressure catheter was removed.The sheath was removed and hemostasis of the access achieved via manual pressure. The patient tolerated the procedure well without complications.    XR Chest Port 1 View    Result Date: 1/24/2023  EXAM: XR CHEST PORT 1 VIEW LOCATION: [facility] DATE/TIME: 1/24/2023 8:18 AM INDICATION: RN placed PICC   verify tip placement COMPARISON: 01/23/2023 at 1824 hours.     IMPRESSION: There is a new PICC catheter from a left upper extremity approach with tip in the superior vena cava near its junction with the right atrium. There is no pneumothorax. Bilateral pleural effusions mild cardiomegaly are unchanged. Right subclavian approach pacemaker with leads over the right atrium, right ventricle and coronary sinus is unchanged. Bibasilar atelectasis or consolidation is stable. There are degenerative changes in both shoulders.    Cardiac Device Check - Remote (Standing ORD 5 count)    Result Date: 1/2/2023  Type: remote CRT-P transmission done prior to clinic appointment with Marek STANTON CNP. Done as a courtesy check. Combined totals from unscheduled remote 12/12/22 and today. Device: Medtronic Percepta Pacing%/Programmed: AP 50%, biVP 99.4%, in DDDR  ppm mode. Lead(s): stable Battery longevity: estimated 4 yrs 7 mos. Presenting: AT/AFL with alternate P waves falling in blanking, biV pacing rate 95 bpm. Atrial arrhythmias: since 11/23/22, 237 mode switches, burden 1% (may be higher r/t undersensing), EGMs shows AT/AFL with intermittent atrial undersensing and occasional atrial lead noise. Anticoagulant: Xarelto Ventricular arrhythmias: since 11/23/22, three NSVT, longest 13 bts. Device/Lead  alerts: device on advisory Comments: normal pacemaker function. Plan: next remote transmission April 11, 2023.  SID Avila, Device Specialist I have reviewed and interpreted the device interrogation, settings, programming, and encounter summary. The device is functioning within normal device parameters. I agree with the current findings, assessment and plan.        Physical Exam:  Temp:  [97.5  F (36.4  C)-98.2  F (36.8  C)] 98.2  F (36.8  C)  Pulse:  [68-78] 76  Resp:  [16-20] 18  BP: (110-119)/(62-70) 114/64  SpO2:  [84 %-99 %] 97 %  Wt Readings from Last 3 Encounters:   01/27/23 72.3 kg (159 lb 6.3 oz)   01/18/23 73 kg (161 lb)   01/12/23 71.7 kg (158 lb)      General appearance: alert, cachectic, cooperative, fatigued and no distress  Head: Normocephalic, without obvious abnormality, atraumatic, temopral wasting noted  Eyes: lids and lashes normal. Sclera anicteric  Nose: no discharge  Throat: lips without lesions.oral mucosa moist.  Lungs: non-labored at rest in bed. No accessory muscle use noted.  Heart: regular rate  Abdomen: soft, non-tender, non-distended  Extremities: warm, nor mottling or edema noted  Neurologic: alert. Oriented x4    TTS: I have personally spent a total of 75 minutes on unit in review of medical record, consultation with the medical providers and assessment of patient today, with more than 50% of this time spent in counseling and discussing goals of care with patient and family re: prognosis, symptom management and development of plan of care as noted above.    WAN Griffin, SALVADOR, CNS  Palliative Care  [phone]

## 2023-01-27 NOTE — PROGRESS NOTES
HEART CARE NOTE          Assessment/Recommendations     1. End stage CM c/b cardiogenic shock  Assessment / Plan    Tolerating oral diuretic regimen and dobutamine wean - no changes to regimen at this time; hold midodrine and given contraindication in HFrEF particularly in the setting or cardiogenic shock unless patient transitions to comfort care    Will place PalMed consult to address end of life goals of care given severity and progression of patient's cardiac disease    GDMT as detailed below     Current Pharmacotherapy AHA Guideline-Directed Medical Therapy   Losartan 25 mg daily - hold at discharge Lisinopril 20 mg twice daily   Metoprolol succinate 25 mg daily - hold at discharge Carvedilol 25 mg twice daily   Spironolactone not started Spironolactone 25 mg once daily   Hydralazine NA Hydralazine 100 mg three times daily   Isosorbide dinitrate NA Isosorbide dinitrate 40 mg three times daily   SGLT2 inhibitor not started Dapagliflozin or Empagliflozin 10 mg daily      2. Arrhythmia  Assessment / Plan    Persistent atrial fibrillation s/p ablations and DCCV    Currently on rivaroxaban; being considered for Watchman device - continue amiodarone    s/p PVI 11/22    Complete Heart Block s/p CRT-P (on the right as patient is left handed)    Follows with Dr. Michelle in EP as well as afib clinic     3. CAD  Assessment / Plan    Denies chest pain or anginal equivalents    Continue ASA, atorvastatin       History of Present Illness/Subjective      Mr. 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 Petersen denies any acute cardiac events or complaints; Management plan as detailed above     ECG: Personally reviewed. Paced rhythm     ECHO (personnaly  "Reviewed):   The left ventricle is mildly dilated.  Left ventricular function is decreased. The ejection fraction is 30-35%  (moderately reduced).  There is moderate global hypokinesia of the left ventricle.  The right ventricle is mildly dilated.  The right ventricular systolic function is normal.  The left atrium is severely dilated.  The right atrium is moderately dilated.  There is moderately severe (3+) tricuspid regurgitation.  Right ventricle systolic pressure estimate normal  There is mild to moderate (1-2+) aortic regurgitation.  The ascending aorta is Mildly dilated.  IVC diameter >2.1 cm collapsing <50% with sniff suggests a high RA pressure  estimated at 15 mmHg or greater.  Small pericardial effusion  There are no echocardiographic indications of cardiac tamponade.          Physical Examination Review of Systems   /70 (BP Location: Right arm)   Pulse 69   Temp 98.2  F (36.8  C) (Oral)   Resp 16   Ht 1.778 m (5' 10\")   Wt 71.7 kg (158 lb 1.1 oz)   SpO2 99%   BMI 22.68 kg/m    Body mass index is 22.68 kg/m .  Wt Readings from Last 3 Encounters:   01/26/23 71.7 kg (158 lb 1.1 oz)   01/18/23 73 kg (161 lb)   01/12/23 71.7 kg (158 lb)     General Appearance:   no distress, normal body habitus   ENT/Mouth: membranes moist, no oral lesions or bleeding gums.      EYES:  no scleral icterus, normal conjunctivae   Neck: no carotid bruits or thyromegaly   Chest/Lungs:   lungs are clear to auscultation, no rales or wheezing, equal chest wall expansion    Cardiovascular:   Regular. Normal first and second heart sounds with no murmurs, rubs, or gallops; the carotid, radial and posterior tibial pulses are intact, no JVD and trace LE edema bilaterally    Abdomen:  no organomegaly, masses, bruits, or tenderness; bowel sounds are present   Extremities: no cyanosis or clubbing   Skin: no xanthelasma, warm.    Neurologic: alert and calm     Psychiatric: alert and calm     A complete 10 systems ROS was reviewed  " And is negative except what is listed in the HPI.          Medical History  Surgical History Family History Social History   Past Medical History:   Diagnosis Date     Atrial fibrillation (H)      Atrial fibrillation, transient (H) 08/11/2020     BPH (benign prostatic hyperplasia) 07/11/2018     Chronic combined systolic and diastolic heart failure (H) 02/11/2021     Congestive heart failure (H)      Coronary artery disease      COVID 02/2021     Dilated cardiomyopathy (H) 02/11/2021     MARIAN (generalized anxiety disorder) 02/11/2021     GERD (gastroesophageal reflux disease)      High cholesterol      Hyperlipidemia      Hypertension      LBBB (left bundle branch block) 07/11/2018     Nonrheumatic aortic valve insufficiency 04/01/2020     Pulmonary hypertension (H) 04/01/2020    Past Surgical History:   Procedure Laterality Date     ANGIOPLASTY       ARTHROSCOPY KNEE       CARDIOVERSION  2021     CARDIOVERSION  02/03/2022     CV CORONARY ANGIOGRAM N/A 10/23/2020    Procedure: Coronary Angiogram;  Surgeon: Varun Freire MD;  Location: [facility] Cath Lab;  Service: Cardiology     CV LEFT HEART CATHETERIZATION WITHOUT LEFT VENTRICULOGRAM Left 10/23/2020    Procedure: Left Heart Catheterization Without Left Ventriculogram;  Surgeon: Varun Freire MD;  Location: [facility] Cath Lab;  Service: Cardiology     EP BIV PACEMAKER INSERT N/A 03/25/2020    Procedure: EP Biventricular Pacemaker Insertion;  Surgeon: Taylor Sandoval MD;  Location: [facility] Cath Lab;  Service: Cardiology     H ABLATION FOCAL AFIB       HEART CATH, ANGIOPLASTY       IMPLANT PACEMAKER       PICC TRIPLE LUMEN PLACEMENT  1/24/2023          RELEASE CARPAL TUNNEL       ZZC TOTAL KNEE ARTHROPLASTY Right 08/15/2019    Procedure: RIGHT  MINIMALLY TOTAL KNEE ARTHROPLASTY;  Surgeon: Keven Cerda MD;  Location: [facility];  Service: Orthopedics    no family history of premature coronary artery disease Social History      Socioeconomic History     Marital status:      Spouse name: Not on file     Number of children: Not on file     Years of education: Not on file     Highest education level: Not on file   Occupational History     Not on file   Tobacco Use     Smoking status: Never     Smokeless tobacco: Never   Substance and Sexual Activity     Alcohol use: Yes     Comment: Alcoholic Drinks/day: rare     Drug use: No     Sexual activity: Not on file   Other Topics Concern     Parent/sibling w/ CABG, MI or angioplasty before 65F 55M? Not Asked   Social History Narrative    Retired.  Good sense of humor     Social Determinants of Health     Financial Resource Strain: Not on file   Food Insecurity: Not on file   Transportation Needs: Not on file   Physical Activity: Not on file   Stress: Not on file   Social Connections: Not on file   Intimate Partner Violence: Not on file   Housing Stability: Not on file           Lab Results    Chemistry/lipid CBC Cardiac Enzymes/BNP/TSH/INR   Lab Results   Component Value Date    CHOL 107 06/23/2022    HDL 38 (L) 06/23/2022    TRIG 35 06/23/2022    BUN 59.5 (H) 01/26/2023     (L) 01/26/2023     (L) 01/26/2023    CO2 31 (H) 01/26/2023    Lab Results   Component Value Date    WBC 8.4 01/24/2023    HGB 12.6 (L) 01/24/2023    HCT 38.9 (L) 01/24/2023    MCV 94 01/24/2023     01/24/2023    Lab Results   Component Value Date    TROPONINI 0.09 03/14/2022     (H) 06/17/2022    TSH 4.26 10/17/2022    INR 1.03 02/11/2021     Lab Results   Component Value Date    TROPONINI 0.09 03/14/2022          Weight:    Wt Readings from Last 3 Encounters:   01/26/23 71.7 kg (158 lb 1.1 oz)   01/18/23 73 kg (161 lb)   01/12/23 71.7 kg (158 lb)       Allergies  Allergies   Allergen Reactions     Diltiazem Other (See Comments)     Contraindicated in HFrEF     Midodrine Other (See Comments)     Contraindicated in HFrEF         Surgical History  Past Surgical History:   Procedure Laterality  Date     ANGIOPLASTY       ARTHROSCOPY KNEE       CARDIOVERSION  2021     CARDIOVERSION  02/03/2022     CV CORONARY ANGIOGRAM N/A 10/23/2020    Procedure: Coronary Angiogram;  Surgeon: Varun Freire MD;  Location: [facility] Cath Lab;  Service: Cardiology     CV LEFT HEART CATHETERIZATION WITHOUT LEFT VENTRICULOGRAM Left 10/23/2020    Procedure: Left Heart Catheterization Without Left Ventriculogram;  Surgeon: Varun Freire MD;  Location: [facility] Cath Lab;  Service: Cardiology     EP BIV PACEMAKER INSERT N/A 03/25/2020    Procedure: EP Biventricular Pacemaker Insertion;  Surgeon: Taylor Sandoval MD;  Location: [facility] Cath Lab;  Service: Cardiology     H ABLATION FOCAL AFIB       HEART CATH, ANGIOPLASTY       IMPLANT PACEMAKER       PICC TRIPLE LUMEN PLACEMENT  1/24/2023          RELEASE CARPAL TUNNEL       ZZC TOTAL KNEE ARTHROPLASTY Right 08/15/2019    Procedure: RIGHT  MINIMALLY TOTAL KNEE ARTHROPLASTY;  Surgeon: Keven Cerda MD;  Location: [facility];  Service: Orthopedics       Social History  Tobacco:   History   Smoking Status     Never   Smokeless Tobacco     Never    Alcohol:   Social History    Substance and Sexual Activity      Alcohol use: Yes        Comment: Alcoholic Drinks/day: rare   Illicit Drugs:   History   Drug Use No       Family History  Family History   Problem Relation Age of Onset     Alzheimer Disease Mother      Heart Disease Father      Cancer Sister      Diabetes Type 2  Sister      Chronic Obstructive Pulmonary Disease Sister      LUNG DISEASE Brother           John Paul Vasquez MD on 1/27/2023      cc: Jamie Noguera

## 2023-01-27 NOTE — PROGRESS NOTES
"CLINICAL NUTRITION SERVICES - REASSESSMENT NOTE     Nutrition Prescription    RECOMMENDATIONS FOR MDs/PROVIDERS TO ORDER:    Malnutrition Status:    Severe in acute on chronic illness    Recommendations already ordered by Registered Dietitian (RD):  Add vanilla ensure for 2-3 pm snack and magic cup with supper meals    Future/Additional Recommendations:  Follow po intake, weight, labs, poc     EVALUATION OF THE PROGRESS TOWARD GOALS   Diet: mechanical/dental soft, low saturated fat< 2400 mg Na with 1500 ml FR  Intake: 0-100% meals 1/25-1/26/2023  Pt very tired did not sleep well last night and wants to rest  He did state he would like some nutritional supplements (he took muscle milk at home).  Per nursing note nsg sent message about swallow study as pt coughs at times after eating soft diet.     ANTHROPOMETRICS  Height: 177.8 cm (5' 10\")  Most Recent Weight: 72.3 kg (159 lb 6.3 oz)    Weight History:   Wt Readings from Last 10 Encounters:   01/27/23 72.3 kg (159 lb 6.3 oz)   01/18/23 73 kg (161 lb)   01/12/23 71.7 kg (158 lb)   01/04/23 72.5 kg (159 lb 12.8 oz)   12/23/22 73.1 kg (161 lb 1.6 oz)   11/24/22 77.7 kg (171 lb 4.8 oz)   11/16/22 78 kg (172 lb)   11/16/22 78 kg (172 lb)   10/31/22 79.3 kg (174 lb 12.8 oz)   10/18/22 79.8 kg (176 lb)   Weight down 17 lb in the past 3 months (9.7%) which is clinically significant and severe    GI CONCERNS  Heartburn  Last BM not documented    LABS  Reviewed: Na 141, K 3.7, urea nitrogen 37.5 (H), Cr 0.92, Mg 2.3 (H)    MEDICATIONS  Reviewed: lipitor, bumex, potassium chloride    MALNUTRITION:  % Weight Loss:  > 7.5% in 3 months (severe malnutrition)  % Intake:  </= 50% for >/= 5 days (severe malnutrition)  Subcutaneous Fat Loss:  Orbital region moderate depletion and Upper arm region moderate depletion  Muscle Loss:  Temporal region moderate depletion and Clavicle bone region moderate depletion  Fluid Retention:  Trace to moderate    Malnutrition Diagnosis: Severe " malnutrition  In Context of:  Acute illness or injury  Chronic illness or disease    CURRENT NUTRITION DIAGNOSIS  Malnutrition related to acute on chronic illness as evidenced by 9.7% weight loss in 3 months, moderate subcutaneous fat and muscle loss and fluid accumulation      INTERVENTIONS  Implementation  Continue to follow for need of diet education  Add ensure daily for 2-3 pm snack and magic cup with supper meals    Goals  Patient to consume % of nutritionally adequate meals three times per day, or the equivalent with supplements/snacks.    Monitoring/Evaluation  Progress toward goals will be monitored and evaluated per protocol.

## 2023-01-28 NOTE — PROGRESS NOTES
01/28/23 1131   Appointment Info   Signing Clinician's Name / Credentials (OT) Mary Reyes, OTR/L   Living Environment   People in Home sibling(s);child(morteza), adult   Current Living Arrangements house   Home Accessibility stairs to enter home   Number of Stairs, Main Entrance greater than 10 stairs   Stair Railings, Main Entrance railing on right side (ascending)   Living Environment Comments pt lives w/ MARK, able to stay on main level, adult children have been staying w/ pt 24/7 last few wks   Self-Care   Usual Activity Tolerance fair   Current Activity Tolerance poor   Equipment Currently Used at Home grab bar, tub/shower;shower chair  (4WW,)   Fall history within last six months no   Activity/Exercise/Self-Care Comment 4WW, has been receiving A w/ all mobility lately from family   Instrumental Activities of Daily Living (IADL)   IADL Comments A w/ dressing, bathing, toileting, trsfs and all household tasks in last 2-3 weeks, normally pt was indep. w/ all ADLs and mobility,   General Information   Onset of Illness/Injury or Date of Surgery 01/23/23   Patient/Family Therapy Goal Statement (OT) none stated   Existing Precautions/Restrictions fall   Limitations/Impairments hearing   Cognitive Status Examination   Orientation Status person;time;place   Visual Perception   Visual Impairment/Limitations corrective lenses for reading   Range of Motion Comprehensive   General Range of Motion   (limited AROM B shldrs)   Strength Comprehensive (MMT)   General Manual Muscle Testing (MMT) Assessment   (generalized weakness)   Coordination   Upper Extremity Coordination No deficits were identified   Bed Mobility   Bed Mobility sit-supine;scooting/bridging   Scooting/Bridging [place] (Bed Mobility) maximum assist (25% patient effort);2 person assist   Sit-Supine [place] (Bed Mobility) 2 person assist;moderate assist (50% patient effort)   Transfers   Transfers bed-chair transfer   Transfer Skill: Bed to Chair/Chair  to Bed   Bed-Chair [place] (Transfers) moderate assist (50% patient effort);2 person assist   Balance   Balance Assessment standing balance: dynamic   Balance Comments poor   Clinical Impression   Criteria for Skilled Therapeutic Interventions Met (OT) Yes, treatment indicated   OT Diagnosis decreased ADLs   OT Problem List-Impairments impacting ADL balance;mobility;flexibility;strength   Assessment of Occupational Performance 1-3 Performance Deficits   Identified Performance Deficits bed mobility, trsfs,   Planned Therapy Interventions (OT) ADL retraining;strengthening;transfer training   Clinical Decision Making Complexity (OT) moderate complexity   Anticipated Equipment Needs Upon Discharge (OT)   (cont.to assess)   Risk & Benefits of therapy have been explained evaluation/treatment results reviewed;care plan/treatment goals reviewed;patient;participants included   OT Total Evaluation Time   OT Eval, Moderate Complexity Minutes (99195) 10   OT Goals   Therapy Frequency (OT) Daily   OT Predicted Duration/Target Date for Goal Attainment 02/02/23   OT Goals Transfers;Bed Mobility   OT: Bed Mobility Moderate assist   OT: Transfer Minimal assist   Interventions   Interventions Quick Adds Self-Care/Home Management   Self-Care/Home Management   Self-Care/Home Mgmt/ADL, Compensatory, Meal Prep Minutes (21798) 10   Symptoms Noted During/After Treatment (Meal Preparation/Planning Training) fatigue;dizziness   Treatment Detail/Skilled Intervention sit>stand from chair mod Ax2 w/ cues for hand placement, and LE advancement, pt c/o weakness and dizziness, cues for safe trsf to bed, EOB sit x5 minutes w/ cues for posture, sit>supine w/ mod Ax2, boost to HOB max Ax2,   OT Discharge Planning   OT Plan close trsfs, seated g/h, bed mobility, commode trsf   OT Discharge Recommendation (DC Rec) Transitional Care Facility   OT Rationale for DC Rec currently pt requiries Ax2 for mobility and ADLs,   OT Brief overview of current  status mod Ax2 trsf, mod/max A bed mobility   Total Session Time   Timed Code Treatment Minutes 10   Total Session Time (sum of timed and untimed services) 20

## 2023-01-28 NOTE — PLAN OF CARE
Heart Failure Care Map  GOALS TO BE MET BEFORE DISCHARGE:    1. Decrease congestion and/or edema with diuretic therapy to achieve near optimal volume status.     Dyspnea improved: No, further care required to meet this goal. Please explain patient has been bed bound this shift.  Reported he was up in the chair during day shift for brief period of time.   Edema improved: Yes, satisfactory for discharge.        Last 24 hour I/O:   Intake/Output Summary (Last 24 hours) at 1/27/2023 2058  Last data filed at 1/27/2023 2037  Gross per 24 hour   Intake 1360 ml   Output 2500 ml   Net -1140 ml           Net I/O and Weights since admission:   12/28 2300 - 01/27 2259  In: 4005.37 [P.O.:3560; I.V.:245.37]  Out: 34914 [Urine:58717]  Net: -6319.63     Vitals:    01/23/23 1559 01/23/23 1846 01/25/23 0046 01/26/23 0500   Weight: 73 kg (160 lb 15 oz) 71.7 kg (158 lb) 73.7 kg (162 lb 7.7 oz) 71.7 kg (158 lb 1.1 oz)    01/27/23 0702   Weight: 72.3 kg (159 lb 6.3 oz)       2.  O2 sats > 90% on room air, or at prior home O2 therapy level.      Able to wean O2 this shift to keep sats above 90%?: No, further care required to meet this goal. Please explain He is on 2 L NC   Does patient use Home O2? Yes-  I believe he is at his baseline          Current oxygenation status:   SpO2: 98 %     O2 Device: Nasal cannula, Oxygen Delivery: 2 LPM    3.  Tolerates ambulation and mobility near baseline.     Ambulation: No, further care required to meet this goal. Please explain patient has been in bed this shift.  Frequent repositioning was done   Times patient ambulated with staff this shift: 0    Please review the Heart Failure Care Map for additional HF goal outcomes.    Patient has had intermediate pain in his right hip and upper leg.  Tylenol prn was given, which has helped.  He is on 2 L NC, and has been stable.  His blood pressures have been 118/65 and 104/60, so I held his Doxazosin 4mg because the order parameters were not met.  He is A/O,  but his voice is hoarse.  It is thought to be due to his 1500 mL fluid restriction.  Patient has had 1,120 mL intake and 1,350 mL output at this point of the day.  Tele was BBB.    Larry Meredith RN  1/27/2023

## 2023-01-28 NOTE — PLAN OF CARE
Patient reports his sleeping meds (unisom) is NOT working for him; He reports he barely slept and is exhausted; per staff he was sound asleep from about 2200 to 0200 with only one interruption for vitals. Denied pain. Tele V paced; occasionally spiked. Continues with 2L oxygen via NC. Plan for video swallow study today hopefully.     Vitals:    01/27/23 1909 01/27/23 2329 01/28/23 0300 01/28/23 0507   BP: 104/60 119/76  122/73   BP Location: Right arm Right arm  Right arm   Patient Position: Semi-Estrada's      Pulse: 105 71  69   Resp: 18 18  18   Temp: 97.7  F (36.5  C) 97.7  F (36.5  C)  98.1  F (36.7  C)   TempSrc: Oral Oral  Oral   SpO2: 98% 97% 98% 98%   Weight:       Height:            Problem: Risk for Delirium  Goal: Improved Sleep  Outcome: Not Progressing     Problem: Plan of Care - These are the overarching goals to be used throughout the patient stay.    Goal: Optimal Comfort and Wellbeing  Outcome: Progressing     Problem: Risk for Delirium  Goal: Improved Attention and Thought Clarity  Outcome: Progressing     Problem: Cardiac Catheterization (Diagnostic/Interventional)  Goal: Acceptable Pain Control  Outcome: Progressing     Problem: Pain Acute  Goal: Optimal Pain Control and Function  Outcome: Progressing  Intervention: Prevent or Manage Pain  Recent Flowsheet Documentation  Taken 1/28/2023 0005 by Gisella Pineda RN  Medication Review/Management: medications reviewed   Goal Outcome Evaluation:

## 2023-01-28 NOTE — CONSULTS
[facility] Gastroenterology Consultation    Reymundo Petersen MRN# [id number]   Age: 82 year old YOB: 1940     Date of Admission:  1/23/2023    Reason for consult: Dysphagia       Requesting physician: Maureen       Level of consult: Consult, follow and place orders           Assessment and Plan:   Assessment:   Dysphagia: This is a pleasant 82-year-old gentleman with significant weight loss and difficulty with eating.  Speech pathology work-up shows a significant esophageal dysmotility.  Exact nature of which we do not know.  An esophagram is scheduled.  Patient does drink a lot of iced water with his meals.  He has lost somewhere between 30 and 50 pounds depending upon who is asked.  Per the family about 30 per the patient about 50.  His daughter has been managing his diet.  She he comes in with significant dehydration and acute renal failure.  Renal is following.  He is able to get food through but then required fluid to get it all the way down.    Assessment significant esophageal dysmotility suggestive of achalasia.  We will need some verification prior to consideration of Botox or other interventions.      Plan:   Esophagram, we will follow-up following this.  Possible Botox if this appears to be achalasia.  He will need EGD prior to rule out secondary achalasia.  I would defer until his cardiac and renal issues are addressed.             Chief Complaint:   Dysphagia     History is obtained from the patient please see assessment.  No new medications.  No focal neuro changes.  Denies shortness of breath or chest pain at this time.    -           Past Medical History:   I have reviewed this patient's past medical history          Past Surgical History:   I have reviewed this patient's past surgical history          Social History:   I have reviewed this patient's social history          Family History:   I have reviewed this patient's family history          Immunizations:    Immunizations are up to date          Allergies:   All allergies reviewed and addressed          Medications:     Current Facility-Administered Medications   Medication     acetaminophen (TYLENOL) tablet 650 mg    Or     acetaminophen (TYLENOL) Suppository 650 mg     alum & mag hydroxide-simethicone (MAALOX) suspension 30 mL     amiodarone (PACERONE) tablet 200 mg     aspirin (ASA) chewable tablet 81 mg     atorvastatin (LIPITOR) tablet 10 mg     bumetanide (BUMEX) tablet 2 mg     calcium carbonate (TUMS) chewable tablet 500 mg     carboxymethylcellulose PF (REFRESH PLUS) 0.5 % ophthalmic solution 1-2 drop     dextrose 5% BOLUS     glucose gel 15-30 g    Or     dextrose 50 % injection 25-50 mL    Or     glucagon injection 1 mg     doxazosin (CARDURA) tablet 4 mg     doxylamine (UNISOM) tablet 25 mg     finasteride (PROSCAR) tablet 5 mg     lidocaine 1 % 0.1-1 mL     melatonin tablet 1 mg     nitroGLYcerin (NITROSTAT) sublingual tablet 0.4 mg     ondansetron (ZOFRAN ODT) ODT tab 4 mg    Or     ondansetron (ZOFRAN) injection 4 mg     Patient is already receiving anticoagulation with heparin, enoxaparin (LOVENOX), warfarin (COUMADIN)  or other anticoagulant medication     polyethylene glycol (MIRALAX) Packet 17 g     rivaroxaban ANTICOAGULANT (XARELTO) tablet 20 mg     sertraline (ZOLOFT) tablet 50 mg     sodium chloride (PF) 0.9% PF flush 10-20 mL     sodium chloride (PF) 0.9% PF flush 10-40 mL     sodium chloride (PF) 0.9% PF flush 10-40 mL     sodium chloride (PF) 0.9% PF flush 3 mL     sodium chloride (PF) 0.9% PF flush 3 mL     urea (GORMEL) 20 % cream CREA             Review of Systems:   The Review of Systems is negative other than noted in the HPI          Physical Exam:   Vitals were reviewed.  Appears cachectic.  Lungs:   No increased work of breathing, good air exchange, clear to auscultation bilaterally, no crackles or wheezing     Cardiovascular:   Normal apical impulse, regular rate and rhythm, normal  S1 and S2, no S3 or S4, and no murmur noted     Abdomen:   No scars, normal bowel sounds, soft, non-distended, non-tender, no masses palpated, no hepatosplenomegally     Hematologic / Lymphatic:   no cervical lymphadenopathy and no supraclavicular lymphadenopathy     Neuropsychiatric:   Orientation: oriented to self, place, time and situation             Data:   All laboratory data reviewed  -  All imaging studies reviewed by me.    Attestation:  I have reviewed today's vital signs, notes, medications, labs and imaging.    Sheng Ballard MD

## 2023-01-28 NOTE — PROGRESS NOTES
"  HEART CARE CONSULTATON NOTE        Assessment/Recommendations   Assessment/Plan:   1. 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        2. Arrhythmia  Assessment / Plan    Persistent atrial fibrillation s/p ablations and DCCV    Currently on rivaroxaban    Complete Heart Block s/p CRT-P (on the right as patient is left handed)    Follows with Dr. Michelle in EP as well as afib clinic     3. CAD without angina.    Assessment / Plan    Continue ASA, atorvastatin at this time.      If patient patient transitions to hospice or comfort care only could de-escalate some of his preventative medications including statin.  Discussed with family and patient.  He remains relatively large today.       Cardiology will follow       History of Present Illness/Subjective    HPI: Reymundo Petersen is a 82 year old male end-stage heart failure.    Reviewed telemetry he continues to have intermittent ventricular paced rhythm.  No sustained ventricular arrhythmias.  Occasional atrial tachycardia noted.  Currently he denies any chest pain symptoms.  He is quite lethargic this morning.  Has difficulty with sleeping overnight.  Undergoing swallow study today.  Denies any nausea or vomiting.  No abdominal pain.  No fever chills sweats.  No cough.  Ongoing dyspnea at rest.  Positive orthopnea.  No lower extremity edema.  Reviewed notes from Dr. Vasquez.  Reviewed hospice notes.  Updated nursing team       Physical Examination  Review of Systems   VITALS: /65 (BP Location: Right arm)   Pulse 77   Temp 98.1  F (36.7  C) (Oral)   Resp 18   Ht 1.778 m (5' 10\")   Wt 70.2 kg (154 lb 12.2 oz)   SpO2 98%   BMI 22.21 " kg/m    BMI: Body mass index is 22.21 kg/m .  Wt Readings from Last 3 Encounters:   01/28/23 70.2 kg (154 lb 12.2 oz)   01/18/23 73 kg (161 lb)   01/12/23 71.7 kg (158 lb)       Intake/Output Summary (Last 24 hours) at 1/28/2023 0930  Last data filed at 1/28/2023 0815  Gross per 24 hour   Intake 1000 ml   Output 1850 ml   Net -850 ml     General Appearance:    Lethargic.  In bed seen   ENT/Mouth: membranes moist, no oral lesions or bleeding gums.      EYES:  no scleral icterus, normal conjunctivae   Neck:    Chest/Lungs:   + Rales   Cardiovascular:    Regular.  Systolic murmur.  No edema.                            The above review of systems      Lab Results    Chemistry/lipid CBC Cardiac Enzymes/BNP/TSH/INR   Recent Labs   Lab Test 06/23/22  1337   CHOL 107   HDL 38*   LDL 62   TRIG 35     Recent Labs   Lab Test 06/23/22  1337 10/23/20  0918 05/16/19  0950   LDL 62 75 89     Recent Labs   Lab Test 01/28/23  0517      POTASSIUM 3.7   CHLORIDE 98   CO2 38*   *   BUN 27.4*   CR 0.77   GFRESTIMATED 88   ANGELY 9.4     Recent Labs   Lab Test 01/28/23  0517 01/27/23  0541 01/26/23  0610   CR 0.77 0.92 0.93     No results for input(s): A1C in the last 32808 hours.       Recent Labs   Lab Test 01/28/23  0517   WBC 8.9   HGB 12.3*   HCT 40.5   MCV 98        Recent Labs   Lab Test 01/28/23  0517 01/27/23  0541 01/24/23  0520   HGB 12.3* 11.5* 12.6*    Recent Labs   Lab Test 03/14/22  2055 03/14/22  1454 02/11/21  1724   TROPONINI 0.09 0.07 0.14     Recent Labs   Lab Test 01/23/23  1601 12/19/22  1741 06/17/22  1446 03/14/22  1454 01/18/22  1526   BNP  --   --  819* 1,163* 661*   NTBNPI 22,197* 7,078*  --   --   --      Recent Labs   Lab Test 10/17/22  1348   TSH 4.26     Recent Labs   Lab Test 02/11/21  1724 03/24/20  1745 08/15/19  0811   INR 1.03 1.05 0.98        Medical History  Surgical History Family History Social History   Past Medical History:   Diagnosis Date     Atrial fibrillation (H)      Atrial  fibrillation, transient (H) 08/11/2020     BPH (benign prostatic hyperplasia) 07/11/2018     Chronic combined systolic and diastolic heart failure (H) 02/11/2021     Congestive heart failure (H)      Coronary artery disease      COVID 02/2021     Dilated cardiomyopathy (H) 02/11/2021     MARIAN (generalized anxiety disorder) 02/11/2021     GERD (gastroesophageal reflux disease)      High cholesterol      Hyperlipidemia      Hypertension      LBBB (left bundle branch block) 07/11/2018     Nonrheumatic aortic valve insufficiency 04/01/2020     Pulmonary hypertension (H) 04/01/2020     Past Surgical History:   Procedure Laterality Date     ANGIOPLASTY       ARTHROSCOPY KNEE       CARDIOVERSION  2021     CARDIOVERSION  02/03/2022     CV CORONARY ANGIOGRAM N/A 10/23/2020    Procedure: Coronary Angiogram;  Surgeon: Varun Freire MD;  Location: [facility] Cath Lab;  Service: Cardiology     CV LEFT HEART CATHETERIZATION WITHOUT LEFT VENTRICULOGRAM Left 10/23/2020    Procedure: Left Heart Catheterization Without Left Ventriculogram;  Surgeon: Varun Freire MD;  Location: [facility] Cath Lab;  Service: Cardiology     EP BIV PACEMAKER INSERT N/A 03/25/2020    Procedure: EP Biventricular Pacemaker Insertion;  Surgeon: Taylor Sandoval MD;  Location: [facility] Cath Lab;  Service: Cardiology     H ABLATION FOCAL AFIB       HEART CATH, ANGIOPLASTY       IMPLANT PACEMAKER       PICC TRIPLE LUMEN PLACEMENT  1/24/2023          RELEASE CARPAL TUNNEL       ZZC TOTAL KNEE ARTHROPLASTY Right 08/15/2019    Procedure: RIGHT  MINIMALLY TOTAL KNEE ARTHROPLASTY;  Surgeon: Keven Cerda MD;  Location: [facility];  Service: Orthopedics     Family History   Problem Relation Age of Onset     Alzheimer Disease Mother      Heart Disease Father      Cancer Sister      Diabetes Type 2  Sister      Chronic Obstructive Pulmonary Disease Sister      LUNG DISEASE Brother         Social History     Socioeconomic History      Marital status:      Spouse name: Not on file     Number of children: Not on file     Years of education: Not on file     Highest education level: Not on file   Occupational History     Not on file   Tobacco Use     Smoking status: Never     Smokeless tobacco: Never   Substance and Sexual Activity     Alcohol use: Yes     Comment: Alcoholic Drinks/day: rare     Drug use: No     Sexual activity: Not on file   Other Topics Concern     Parent/sibling w/ CABG, MI or angioplasty before 65F 55M? Not Asked   Social History Narrative    Retired.  Good sense of humor     Social Determinants of Health     Financial Resource Strain: Not on file   Food Insecurity: Not on file   Transportation Needs: Not on file   Physical Activity: Not on file   Stress: Not on file   Social Connections: Not on file   Intimate Partner Violence: Not on file   Housing Stability: Not on file         Medications  Allergies   No current outpatient medications on file.        Allergies   Allergen Reactions     Diltiazem Other (See Comments)     Contraindicated in HFrEF     Midodrine Other (See Comments)     Contraindicated in HFrEF         Tony Rob, DO

## 2023-01-28 NOTE — PROGRESS NOTES
[facility]    Medicine Progress Note - Hospitalist Service    Date of Admission:  1/23/2023    Assessment & Plan     Reymundo Petersen is a 82 year old male admitted on 1/23/2023. He lives at home with family.  He presented via EMS with fatigue, confusion and dehydration.  On presentation labs revealed hyponatremia, hyperkalemia and acute kidney injury.     Hyperkalemia  -Resolved  -Likely multifactorial from potassium supplements, SHERLYN  -Patient has received Lokelma and a loop diuretic on admission  -Nephrology consulted, recommendations appreciated     Hyponatremia  -Improved   -Urine sodium, urine and serum osmolality noted     Acute kidney injury  -Resolved  -Suspect prerenal/dehydration from overdiuresis/CRS  -Bumetanide 2 mg daily January 27.  -Avoid nephrotoxic  agents     Cardiomyopathy  Heart failure with reduced EF  Cardiogenic shock  -Dobutamine infusion started January 25 and weaned off January 27  -Bumetanide p.o. 2 mg daily started today 27  -Most recent transthoracic echocardiogram December 19, showed a left ventricular ejection fraction of 35 to 40%  -Weigh patient daily and monitor I's and O's  -Per cardiology hold beta-blocker now and  post discharge.  -Consult cardiology, recommendations appreciated  -S/p RHC January 25, reveals elevated right and left sided filling pressures consistent with volume overload/pulm hypertension. RA 15, RV 50/5; RVEDP 15 , PA 54/20 mean 30 and Wedge 20    Persistent atrial fibrillation  Complete heart block  -S/p ablation and DCCV  -S/p CRT-P  -Continue rivaroxaban and  amiodarone   -Hold metoprolol  per cardiology.  -He was being considered for watchman outpatient     BPH  -Continue doxazosin with holding parameters    Dysphagia  -SLP evaluation appreciated  -Concern for esophageal dysmotility, esophagram ordered and reviewed.  Showed moderate esophageal dysmotility no stricture or narrowing.  -GI consult for further evaluation     General  anxiety disorder  -Continue sertraline     Degenerative joint disease  -Stable, Tylenol for pain  -PT and OT     Pressure injury left heel  -Decubitus ulcer prophylactic measures  -Urea cream bilaterally  -Wound care consult    Weakness and physical deconditioning  -Multifactorial from the above  -Family have expressed concerns about returning home and now interested in TCU.  -Given his frailty and high readmission risk cardiology has initiated palliative care consult for goals of care discussion.  Daughter and granddaughter are in agreement.  Should he rapidly decline will be transition to hospice.  CODE STATUS changed to DNR/DNI January 27  -Continue PT and OT            Diet: Fluid restriction 1500 ML FLUID  Combination Diet Mechanical/Dental Soft Diet; Low Saturated Fat Na <2400mg Diet  Snacks/Supplements Adult: Ensure Enlive; Between Meals  Snacks/Supplements Adult: Magic Cup; With Meals    DVT Prophylaxis: DOAC  Adorno Catheter: Not present  Lines: PRESENT      PICC 01/24/23 Triple Lumen Left Basilic-Site Assessment: WDL      Cardiac Monitoring: ACTIVE order. Indication: Acute decompensated heart failure (48 hours)  Code Status: No CPR- Do NOT Intubate      Clinically Significant Risk Factors              # Hypoalbuminemia: Lowest albumin = 3 g/dL at 1/24/2023  5:20 AM, will monitor as appropriate             # Severe Malnutrition: based on nutrition assessment        Disposition Plan   TCU placement pending     Expected Discharge Date: 01/30/2023      Destination: home with help/services;home with family;inpatient rehabilitation facility  Discharge Comments: Will need TCU bed          Elliot Reddy MD  Hospitalist Service  [facility]  Securely message with [employer] (more info)  Text page via [address] Paging/Directory   ______________________________________________________________________    Interval History       Patient complains of poor sleep at night.  He is exhausted and feels  thirsty.  SLP evaluation today concerning for esophageal dysmotility.      Physical Exam   Vital Signs: Temp: 97.3  F (36.3  C) Temp src: Axillary BP: 108/63 Pulse: 82   Resp: 18 SpO2: 91 % O2 Device: None (Room air) Oxygen Delivery: 2 LPM  Weight: 154 lbs 12.21 oz      Physical Exam  Constitutional:       Comments: Frail elderly male   HENT:      Head: Normocephalic.   Eyes:      Pupils: Pupils are equal, round, and reactive to light.   Cardiovascular:      Rate and Rhythm: Normal rate.      Pulses: Normal pulses.   Pulmonary:      Effort: Pulmonary effort is normal.      Breath sounds: Examination of the right-lower field reveals rales. Examination of the left-lower field reveals rales. Rales present.   Abdominal:      General: Abdomen is flat.   Skin:     General: Skin is warm.      Capillary Refill: Capillary refill takes less than 2 seconds.   Neurological:      General: No focal deficit present.      Mental Status: Mental status is at baseline.           Medical Decision Making       30 MINUTES SPENT BY ME on the date of service doing chart review, history, exam, documentation & further activities per the note.      Data     I have personally reviewed the following data over the past 24 hrs:    8.9  \   12.3 (L)   / 289     140 98 27.4 (H) /  135 (H)   3.7 38 (H) 0.77 \       Imaging results reviewed over the past 24 hrs:   Recent Results (from the past 24 hour(s))   XR Video Swallow with SLP or OT    Narrative    EXAM: XR VIDEO SWALLOW WITH SLP OR OT  LOCATION: [facility]  DATE/TIME: 1/28/2023 10:03 AM    INDICATION: Difficulty swallowing.  COMPARISON: None.    TECHNIQUE: Routine swallow study with speech pathology using multiple barium thicknesses.    FINDINGS:   FLUOROSCOPIC TIME: 1.6  NUMBER OF IMAGES: 6    Swallow study with Speech Pathology using multiple barium thicknesses. No significant penetration or aspiration. Early pour over noted.     After swallowing and clearing of  contents from the cervical esophagus, reflux of barium was subsequently noted back into the proximal esophagus. The fluoroscopic camera was moved inferiorly to look at the remaining esophagus, which demonstrated retained   barium and irregular contractions suggesting dysmotility, though this is not an esophagram and inadequately evaluates for any other process or narrowing. Recommend GI consultation with direct visualization versus esophagram.         XR Esophagram    Narrative    EXAM: XR ESOPHAGRAM SINGLE CONTRAST  LOCATION: [facility]  DATE/TIME: 1/28/2023 1:16 PM    INDICATION: Esophageal dysmotility. Difficulty swallowing.  COMPARISON: Same day video swallow.  TECHNIQUE: Routine.    FINDINGS:   FLUOROSCOPIC TIME: .7  NUMBER OF IMAGES: 5    ESOPHAGUS: Modified semiupright esophagram.     Oral barium contrast administered. The esophagus is patulous and mildly dilated in appearance. Irregular esophageal contractions identified. No stricture or narrowing identified. Contrast extends out of the stomach and into the proximal small bowel.     Could not evaluate for gastroesophageal reflux secondary to patient immobility.    Multilead pacer apparatus noted.      Impression    IMPRESSION:     1.  Moderate esophageal dysmotility.    2.  No stricture or narrowing.

## 2023-01-28 NOTE — PROGRESS NOTES
"   01/28/23 0900   Appointment Info   Signing Clinician's Name / Credentials (PT) Opal Colón, PT, DPT   Living Environment   People in Home child(morteza), adult;sibling(s)  (Daughters, son, and sister-in-law)   Current Living Arrangements house   Home Accessibility stairs to enter home   Number of Stairs, Main Entrance greater than 10 stairs   Stair Railings, Main Entrance railing on right side (ascending)   Transportation Anticipated family or friend will provide   Living Environment Comments Patient lives alone in a house. Patient's sister-in-law also lives in the house. Patient's daughters and son have been staying with the patient and providing 24/7 care since his previous hospitalization. He was also receiving homecare services. There are stairs to enter the house, then all needs can be met on one level.   Self-Care   Usual Activity Tolerance fair   Current Activity Tolerance poor   Regular Exercise No   Equipment Currently Used at Home walker, rolling  (4WW)   Fall history within last six months no   Activity/Exercise/Self-Care Comment Patient uses a 4WW for all mobility. Per daughter, patient's mobility has been very limited recently. Daughters and/or son have been providing assist with all ADLs and mobility.   General Information   Onset of Illness/Injury or Date of Surgery 01/23/23   Referring Physician Elliot Reddy MD   Patient/Family Therapy Goals Statement (PT) Get stronger   Pertinent History of Current Problem (include personal factors and/or comorbidities that impact the POC) Per chart, \"Reymundo Petersen is a 82 year old male admitted on 1/23/2023. He lives at home with family.  He presented via EMS with fatigue, confusion and dehydration.  On presentation labs revealed hyponatremia, hyperkalemia and acute kidney injury.\"   Existing Precautions/Restrictions no known precautions/restrictions   Weight-Bearing Status - LLE full weight-bearing   Weight-Bearing Status - RLE full weight-bearing "   Posture    Posture Forward head position;Kyphosis   Range of Motion (ROM)   Range of Motion ROM deficits secondary to weakness   Strength (Manual Muscle Testing)   Strength (Manual Muscle Testing) Deficits observed during functional mobility   Bed Mobility   Bed Mobility supine-sit   Supine-Sit McLeod (Bed Mobility) minimum assist (75% patient effort)   Bed Mobility Limitations decreased ability to use arms for pushing/pulling;decreased ability to use legs for bridging/pushing   Impairments Contributing to Impaired Bed Mobility decreased ROM;decreased strength   Assistive Device (Bed Mobility) bed rails   Transfers   Transfers sit-stand transfer   Impairments Contributing to Impaired Transfers impaired balance;decreased ROM;decreased strength   Sit-Stand Transfer   Sit-Stand McLeod (Transfers) moderate assist (50% patient effort)   Assistive Device (Sit-Stand Transfers) walker, 4-wheeled   Gait/Stairs (Locomotion)   McLeod Level (Gait) minimum assist (75% patient effort)   Assistive Device (Gait) walker, 4-wheeled   Pattern (Gait) step-to   Deviations/Abnormal Patterns (Gait) eprnell decreased;festinating/shuffling   Clinical Impression   Criteria for Skilled Therapeutic Intervention Yes, treatment indicated   PT Diagnosis (PT) Impaired functional mobility   Influenced by the following impairments Deconditioning, fatigue, weakness   Functional limitations due to impairments Bed mobility, transfers, gait, stairs   Clinical Presentation (PT Evaluation Complexity) Evolving/Changing   Clinical Presentation Rationale Patient presents as medically diagnosed.   Clinical Decision Making (Complexity) moderate complexity   Planned Therapy Interventions (PT) balance training;bed mobility training;gait training;home exercise program;patient/family education;stair training;strengthening;transfer training;home program guidelines   Anticipated Equipment Needs at Discharge (PT) walker, rolling  (4WW)   Risk &  Benefits of therapy have been explained evaluation/treatment results reviewed;care plan/treatment goals reviewed;patient   PT Total Evaluation Time   PT Eval, Moderate Complexity Minutes (64682) 15   Physical Therapy Goals   PT Frequency Daily   PT Predicted Duration/Target Date for Goal Attainment 02/04/23   PT Goals Bed Mobility;Transfers;Gait;Stairs   PT: Bed Mobility Supervision/stand-by assist;Supine to/from sit   PT: Transfers Supervision/stand-by assist;Sit to/from stand;Assistive device  (4WW)   PT: Gait Supervision/stand-by assist;Assistive device;50 feet  (4WW)   PT: Stairs Minimal assist;Assistive device;Greater than 10 stairs;Rail on right   Interventions   Interventions Quick Adds Therapeutic Activity   Therapeutic Activity   Therapeutic Activities: dynamic activities to improve functional performance Minutes (17663) 15   Symptoms Noted During/After Treatment Dizziness;Fatigue   Treatment Detail/Skilled Intervention Patient encountered in supine, agreeable to PT with encouragement. Supine>sit with min A of 1. Verbal cues to advance BLE and use bed rail to pull to sitting position. HOB elevated. Verbal cues to scoot to EOB. Sitting x 5 minutes to allow dizziness to subside. Sit>stand from EOB with mod A of 1 and 4WW. Assist to apply brakes. Verbal cues for hand placement. Patient ambulated a few steps bed>chair with min A of 1 and 4WW. Additional sit<>stand from chair with max A of 1 and 4WW to change brief in standing. Assist to reposition to upright sitting position in chair for breakfast.   PT Discharge Planning   PT Plan Bed mobility, transfers, gait with 4WW and chair follow, stairs as able   PT Discharge Recommendation (DC Rec) (S)  Transitional Care Facility   PT Rationale for DC Rec Patient currently requires assist of 1-2 for all mobility due to fatigue and weakness. Recommend TCU for continued rehab to improve functional mobility and strength. IF patient were to return home, patient would  continue to require 24/7 care from family and a chair lift to safely enter the home.   PT Brief overview of current status Assist of 1-2 for all mobility.

## 2023-01-28 NOTE — PROGRESS NOTES
Chart reviewed. PT recommendations TCU.     Met with patient's daughter Justa at bedside to introduce care management role, discuss PT recommendations and assist with discharge planning. Requester referrals be placed #1 Kenn[facility] Rosette #2 [facility]. Referrals sent.

## 2023-01-28 NOTE — PROGRESS NOTES
Speech-Language Pathology: Video Swallow Study     01/28/23 1000   Appointment Info   Signing Clinician's Name / Credentials (SLP) BALDEMAR Avalos   General Information   Onset of Illness/Injury or Date of Surgery 01/23/23   Referring Physician Dr Reddy   Patient/Family Therapy Goal Statement (SLP) to find out why he belches, has difficulty swallowing   Pertinent History of Current Problem Per Bedside Swallow Study results and recommendation from 1/27/23: Patient demonstrates mild oral dysphagia with regular solids resulting in small amounts of diffuse stasis with specific textures. He demonstrates frequent delayed throat clearing and belching throughout and after intact. He also reports abdominal pain/discomfort while eating. Recommend video swallow study with esopahgeal sweep to determine oral pharyngeal swallow function and possible need for further esophageal assessment. 82 year old male admitted on 1/23/2023. He lives at home with family.  He presented via EMS with fatigue, confusion and dehydration.  On presentation labs showed hyponatremia, hyperkalemia and acute kidney injury. Per patient and family he has had difficulty swallowing for past few weeks, he describes it as there's not enough room for things to go down and points to his larynx, also reports frequent belching and throat clearing during or after meals.   General Observations Pt is tired and cooperative - did not sleep well last night.   General Swallowing Observations   Past History of Dysphagia Pt reports difficulty swallowing for about 4 months with 55 lb wt loss.   Respiratory Support (General Swallowing Observations) none   Current Diet/Method of Nutritional Intake (General Swallowing Observations, NIS) thin liquids (level 0);easy to chew (level 7)   Swallowing Evaluation Videofluoroscopic swallow study (VFSS)   VFSS Evaluation   Radiologist Dr. Phalen   Physical Location of Procedure [facility]   VFSS Textures Trialed thin  liquids;mildly thick liquids;moderately thick liquids/liquidized;pureed;solid foods   VFSS Eval: Thin Liquid Texture Trial   Mode of Presentation, Thin Liquid cup;self-fed   Order of Presentation 4,5   Preparatory Phase poor bolus control   Oral Phase, Thin Liquid premature pharyngeal entry   Bolus Location When Swallow Triggered pyriforms   Pharyngeal Phase, Thin Liquid WFL   Rosenbek's Penetration Aspiration Scale: Thin Liquid Trial Results 1 - no aspiration, contrast does not enter airway   VFSS Eval: Mildly Thick Liquids   Mode of Presentation cup;self-fed   Order of Presentation 3   Preparatory Phase poor bolus control   Oral Phase premature pharyngeal entry   Bolus Location When Swallow Triggered pyriforms   Pharyngeal Phase WFL   Rosenbek's Penetration Aspiration Scale 1 - no aspiration, contrast does not enter airway   VFSS Eval: Moderately Thick Liquids   Mode of Presentation spoon;fed by clinician   Order of Presentation 1   Preparatory Phase poor bolus control   Oral Phase premature pharyngeal entry   Bolus Location When Swallow Triggered valleculae   Pharyngeal Phase WFL   Rosenbek's Penetration Aspiration Scale 1 - no aspiration, contrast does not enter airway   VFSS Evaluation: Puree Solid Texture Trial   Mode of Presentation, Puree spoon;fed by clinician   Order of Presentation 2   Preparatory Phase poor bolus control   Oral Phase, Puree premature pharyngeal entry   Bolus Location When Swallow Triggered valleculae   Pharyngeal Phase, Puree WFL   Rosenbek's Penetration Aspiration Scale: Puree Food Trial Results 1 - no aspiration, contrast does not enter airway   Diagnostic Statement This was the last presentation of this study. When fluoro was turned on there was pyriform stasis noted that was present from last image indicating probable esophageal regurgitation. Esophageal sweep was then completed with noted esophageal dysmotlity noted. Needs further evaluation.   VFSS Evaluation: Solid Food Texture  Trial   Mode of Presentation, Solid self-fed   Order of Presentation 6(barium coated cracker)   Preparatory Phase poor bolus control   Oral Phase, Solid premature pharyngeal entry   Bolus Location When Swallow Triggered valleculae   Pharyngeal Phase, Solid WFL   Rosenbek's Penetration Aspiration Scale: Solid Food Trial Results 1 - no aspiration, contrast does not enter airway   Esophageal Phase of Swallow   Patient reports or presents with symptoms of esophageal dysphagia Yes   Esophageal sweep performed during today s vidofluoroscopic exam  Yes;Please refer to radiologist's report for details   Esophageal comments dysmotility noted, needs further assessment   Swallowing Recommendations   Diet Consistency Recommendations thin liquids (level 0);easy to chew (level 7)   Supervision Level for Intake distant supervision needed   Mode of Delivery Recommendations bolus size, small   Recommended Feeding/Eating Techniques (Swallow Eval) maintain upright posture during/after eating for 30 minutes;maintain upright sitting position for eating;other (see comments)  (small meals/snacks throughout the day d/t dysmotility)   Instrumental Assessment Recommendations   (esophagram)   Comment, Swallowing Recommendations kimberlee soft wtih thin liquidds sitting fully upright small bites and very small meals throughout the day. do not lie flat 20-30 minutes after eating.   General Therapy Interventions   Planned Therapy Interventions Dysphagia Treatment   Dysphagia treatment Compensatory strategies for swallowing   Clinical Impression   Criteria for Skilled Therapeutic Interventions Met (SLP Eval) Yes, treatment indicated   SLP Diagnosis esophageal dysphagia   Risks & Benefits of therapy have been explained evaluation/treatment results reviewed;patient   Clinical Impression Comments Belching and throat clearing noted during evaluation in absence of penetration or aspiration. Pt has poor bolus control of bolus causing pre-spill into pharynx  prior swallow. Once swallow is triggered, epiglottis compeltely inverts and adequate hyolaryngeal elevation and excursion. NO stasis noted after swallow. There was esophageal dysmotility noted during esophageal sweep. This is not a formal test of esophagus as esophageal motility needs further assessment. This seems to be the main problem with pt swallow and poor appetite. Will follow up [facility] pt to teach compensatory strategies for dysmotility such as small meals(snacks) throughout the day sitting fully upright and staying upright 20-30 min's after oral intake. Take small bites and sips. Needs GI consult/esophagram to further evaluate esophagus.   SLP Total Evaluation Time   Evaluation, videofluoroscopic eval of swallow function Minutes (68803) 65

## 2023-01-28 NOTE — PLAN OF CARE
Problem: Pain Acute  Goal: Optimal Pain Control and Function  Outcome: Progressing  Intervention: Prevent or Manage Pain  Recent Flowsheet Documentation  Taken 1/28/2023 1200 by Kayla Nagy, RN  Medication Review/Management: medications reviewed  Taken 1/28/2023 0900 by Kayla Nagy, RN  Medication Review/Management: medications reviewed   Goal Outcome Evaluation:    No pain.  Vitals stable.  On RA when awake.  Had Video swallow and esophagram done today.  Pt did not eat much.  Oral intake 350.  Had large incontinence. Up in chair with assist of 2 with walker and gait belt. Placed new primofit on.  Pills crushed in applesauce.

## 2023-01-29 NOTE — PLAN OF CARE
Problem: Pain Acute  Goal: Optimal Pain Control and Function  Outcome: Progressing  Intervention: Prevent or Manage Pain  Recent Flowsheet Documentation  Taken 1/29/2023 0900 by Kayla Nagy RN  Medication Review/Management: medications reviewed   Goal Outcome Evaluation:    Pt up in chair with assist of 1 staff.  Vitals stable.  RA when awake.  2L NC when sleeping.  Gabapentin started.  Pt is diagnosed with Esophageal dysmotility.  He needs to sit up 90 degrees when eating or drinking.  Only small snack through out the day no big meals.  And stay stinting up 30 min after intake.  Daughter Juliet was present for the education.  Telemetry dc'd. Gave prn tylenol 0845 for generalized pain.  LBM 1/23/23 gave prn miralax. Intake 560cc output 1000cc.

## 2023-01-29 NOTE — PROGRESS NOTES
"  HEART CARE CONSULTATON NOTE        Assessment/Recommendations   Assessment/Plan:   1. End stage cardiomyopathy (HFrEF) recovered from cardiogenic shock; 35-40%       Continue oral diuretic  Creatinine   Date Value Ref Range Status   01/29/2023 0.72 0.67 - 1.17 mg/dL Final         Agree with end of life goals of care pre palliative medicine.        2. Arrhythmia  Assessment:     Persistent atrial fibrillation s/p ablations and DCCV    Currently on rivaroxaban    Complete Heart Block s/p CRT-P (on the right as patient is left handed)    Follows with Dr. Michelle in EP as well as afib clinic    discharge telemetry, stable past 48 hours.      3. CAD without angina.    Assessment / Plan    Continue ASA    Discontinue atorvastatin joint pain.        Discussed hospice with daughter.  Patient continues to express desire for focus on comfort care only.         History of Present Illness/Subjective    HPI: Reymundo Petersen is a 82 year old male end-stage heart failure.    Few telemetry today demonstrates no same ventricular arrhythmias.  Patient continues to have a multitude of generalized issues including joint pain, muscle cramping, abdominal pain, headache, lethargy.  Overall he states he just did not feel well.  Discussed again with daughter consideration for transition to more of a comfort care plan.  Discussed stopping statins as this is caused him muscle aches and cramping in the past.  They agree to stop any unnecessary medications for prevention only use.    Reviewed hospitalist notes.       Physical Examination  Review of Systems   VITALS: /65 (BP Location: Right arm)   Pulse 70   Temp 97.5  F (36.4  C) (Oral)   Resp 20   Ht 1.778 m (5' 10\")   Wt 72.1 kg (158 lb 15.2 oz)   SpO2 98%   BMI 22.81 kg/m    BMI: Body mass index is 22.81 kg/m .  Wt Readings from Last 3 Encounters:   01/29/23 72.1 kg (158 lb 15.2 oz)   01/18/23 73 kg (161 lb)   01/12/23 71.7 kg (158 lb)       Intake/Output Summary (Last 24 " hours) at 1/28/2023 0930  Last data filed at 1/28/2023 0815  Gross per 24 hour   Intake 1000 ml   Output 1850 ml   Net -850 ml     General Appearance:    Lethargic.  Upright in bed   ENT/Mouth: membranes moist, no oral lesions or bleeding gums.      EYES:  no scleral icterus, normal conjunctivae   Neck:    Chest/Lungs:   + Rales   Cardiovascular:    Regular.  Systolic murmur.  No edema.            Questions.  Hard of hearing.  Daughter at bedside                The above review of systems      Lab Results    Chemistry/lipid CBC Cardiac Enzymes/BNP/TSH/INR   Recent Labs   Lab Test 06/23/22  1337   CHOL 107   HDL 38*   LDL 62   TRIG 35     Recent Labs   Lab Test 06/23/22  1337 10/23/20  0918 05/16/19  0950   LDL 62 75 89     Recent Labs   Lab Test 01/28/23  0517      POTASSIUM 3.7   CHLORIDE 98   CO2 38*   *   BUN 27.4*   CR 0.77   GFRESTIMATED 88   ANGELY 9.4     Recent Labs   Lab Test 01/28/23  0517 01/27/23  0541 01/26/23  0610   CR 0.77 0.92 0.93     No results for input(s): A1C in the last 06511 hours.       Recent Labs   Lab Test 01/28/23  0517   WBC 8.9   HGB 12.3*   HCT 40.5   MCV 98        Recent Labs   Lab Test 01/28/23  0517 01/27/23  0541 01/24/23  0520   HGB 12.3* 11.5* 12.6*    Recent Labs   Lab Test 03/14/22  2055 03/14/22  1454 02/11/21  1724   TROPONINI 0.09 0.07 0.14     Recent Labs   Lab Test 01/23/23  1601 12/19/22  1741 06/17/22  1446 03/14/22  1454 01/18/22  1526   BNP  --   --  819* 1,163* 661*   NTBNPI 22,197* 7,078*  --   --   --      Recent Labs   Lab Test 10/17/22  1348   TSH 4.26     Recent Labs   Lab Test 02/11/21  1724 03/24/20  1745 08/15/19  0811   INR 1.03 1.05 0.98        Medical History  Surgical History Family History Social History   Past Medical History:   Diagnosis Date     Atrial fibrillation (H)      Atrial fibrillation, transient (H) 08/11/2020     BPH (benign prostatic hyperplasia) 07/11/2018     Chronic combined systolic and diastolic heart failure (H)  02/11/2021     Congestive heart failure (H)      Coronary artery disease      COVID 02/2021     Dilated cardiomyopathy (H) 02/11/2021     MARIAN (generalized anxiety disorder) 02/11/2021     GERD (gastroesophageal reflux disease)      High cholesterol      Hyperlipidemia      Hypertension      LBBB (left bundle branch block) 07/11/2018     Nonrheumatic aortic valve insufficiency 04/01/2020     Pulmonary hypertension (H) 04/01/2020     Past Surgical History:   Procedure Laterality Date     ANGIOPLASTY       ARTHROSCOPY KNEE       CARDIOVERSION  2021     CARDIOVERSION  02/03/2022     CV CORONARY ANGIOGRAM N/A 10/23/2020    Procedure: Coronary Angiogram;  Surgeon: Varun Freire MD;  Location: [facility] Lab;  Service: Cardiology     CV LEFT HEART CATHETERIZATION WITHOUT LEFT VENTRICULOGRAM Left 10/23/2020    Procedure: Left Heart Catheterization Without Left Ventriculogram;  Surgeon: Varun Freire MD;  Location: [facility];  Service: Cardiology     EP BIV PACEMAKER INSERT N/A 03/25/2020    Procedure: EP Biventricular Pacemaker Insertion;  Surgeon: Taylor Sandoval MD;  Location: [facility];  Service: Cardiology     H ABLATION FOCAL AFIB       HEART CATH, ANGIOPLASTY       IMPLANT PACEMAKER       PICC TRIPLE LUMEN PLACEMENT  1/24/2023          RELEASE CARPAL TUNNEL       ZZC TOTAL KNEE ARTHROPLASTY Right 08/15/2019    Procedure: RIGHT  MINIMALLY TOTAL KNEE ARTHROPLASTY;  Surgeon: Keven Cerda MD;  Location: [facility];  Service: Orthopedics     Family History   Problem Relation Age of Onset     Alzheimer Disease Mother      Heart Disease Father      Cancer Sister      Diabetes Type 2  Sister      Chronic Obstructive Pulmonary Disease Sister      LUNG DISEASE Brother         Social History     Socioeconomic History     Marital status:      Spouse name: Not on file     Number of children: Not on file     Years of education: Not on file     Highest education  level: Not on file   Occupational History     Not on file   Tobacco Use     Smoking status: Never     Smokeless tobacco: Never   Substance and Sexual Activity     Alcohol use: Yes     Comment: Alcoholic Drinks/day: rare     Drug use: No     Sexual activity: Not on file   Other Topics Concern     Parent/sibling w/ CABG, MI or angioplasty before 65F 55M? Not Asked   Social History Narrative    Retired.  Good sense of humor     Social Determinants of Health     Financial Resource Strain: Not on file   Food Insecurity: Not on file   Transportation Needs: Not on file   Physical Activity: Not on file   Stress: Not on file   Social Connections: Not on file   Intimate Partner Violence: Not on file   Housing Stability: Not on file         Medications  Allergies   No current outpatient medications on file.        Allergies   Allergen Reactions     Diltiazem Other (See Comments)     Contraindicated in HFrEF     Midodrine Other (See Comments)     Contraindicated in HFrEF         Tony Rob, DO

## 2023-01-29 NOTE — PLAN OF CARE
Patient denied pain and was able to sleep better tonight compared to previous nights. Did complain of indigestion; took PRN tums. Tele V paced. Clustered cares to facilitate sleep; he slept until maybe 0430 this morning; then was awake but drowsy for the remainder of the morning.    Vitals:    01/28/23 2143 01/29/23 0022 01/29/23 0500 01/29/23 0618   BP:  107/66 115/67    BP Location:  Right arm Right arm    Pulse:  74 72    Resp:  19 19    Temp:  97.6  F (36.4  C) 97.7  F (36.5  C)    TempSrc:  Oral Oral    SpO2: 93% 95% 96%    Weight:    72.1 kg (158 lb 15.2 oz)   Height:            Problem: Plan of Care - These are the overarching goals to be used throughout the patient stay.    Goal: Optimal Comfort and Wellbeing  Outcome: Progressing     Problem: Risk for Delirium  Goal: Improved Attention and Thought Clarity  Outcome: Progressing  Goal: Improved Sleep  Outcome: Progressing     Problem: Pain Acute  Goal: Optimal Pain Control and Function  Outcome: Progressing  Intervention: Prevent or Manage Pain  Recent Flowsheet Documentation  Taken 1/29/2023 0022 by Gisella Pineda, RN  Medication Review/Management: medications reviewed   Goal Outcome Evaluation:

## 2023-01-29 NOTE — PLAN OF CARE
Speech Language Therapy Discharge Summary    Reason for therapy discharge:    All goals and outcomes met, no further needs identified.    Progress towards therapy goal(s). See goals on Care Plan in [facility] electronic health record for goal details.  Goals met    Therapy recommendation(s):    No further therapy is recommended. Spent time with pt, daughter and nurse reviewing swallow strategies to help compensate for esophageal dysmotility. Pt/family given written information of the following: small meals(snacks) throughout the day sitting fully upright using small bites/sips and staying upright 20-30 min's after oral intake. They were able to verbalize understanding. It was stated that this would not eliminate his belching/feeling like food gets stuck sx's but may help to reduce those sx's. They verbalized understanding. No further ST warranted at this time as goals have been met.    Goal Outcome Evaluation:

## 2023-01-29 NOTE — PROGRESS NOTES
[facility]    Medicine Progress Note - Hospitalist Service    Date of Admission:  1/23/2023    Assessment & Plan     Reymundo Petersen is a 82 year old male admitted on 1/23/2023. He lives at home with family.  He presented via EMS with fatigue, confusion and dehydration.  On presentation labs revealed hyponatremia, hyperkalemia and acute kidney injury.     Hyperkalemia  -Resolved  -Likely multifactorial from potassium supplements, SHERLYN  -Patient has received Lokelma and a loop diuretic on admission  -Nephrology consulted, recommendations appreciated     Hyponatremia  -Improved   -Urine sodium, urine and serum osmolality noted     Acute kidney injury  -Resolved  -Suspect prerenal/dehydration from overdiuresis/CRS  -Bumetanide 2 mg dailystarted January 27.  -Avoid nephrotoxic  agents     Cardiomyopathy  Heart failure with reduced EF  Cardiogenic shock  -Dobutamine infusion started January 25 and weaned off January 27  -Bumetanide p.o. 2 mg daily started today 27  -Most recent transthoracic echocardiogram December 19, showed a left ventricular ejection fraction of 35 to 40%  -Weigh patient daily and monitor I's and O's  -Per cardiology hold beta-blocker now and  post discharge.  -Consult cardiology, recommendations appreciated  -S/p RHC January 25, reveals elevated right and left sided filling pressures consistent with volume overload/pulm hypertension. RA 15, RV 50/5; RVEDP 15 , PA 54/20 mean 30 and Wedge 20    Persistent atrial fibrillation  Complete heart block  -S/p ablation and DCCV  -S/p CRT-P  -Continue rivaroxaban and  amiodarone   -Hold metoprolol  per cardiology.  -He was being considered for watchman outpatient     BPH with LUTS  -Continue doxazosin with holding parameters    Dysphagia  -SLP evaluation appreciated  -Concern for esophageal dysmotility, esophagram ordered and reviewed.  Showed moderate esophageal dysmotility no stricture or narrowing.  -GI consulted, esophagram  reviewed  -No indication for intervention presently including EGD.  Diet modification per SLP recommended.    -Twice daily PPI  -RD consult    General anxiety disorder  -Continue sertraline     Degenerative joint disease  -Stable, Tylenol for pain  -PT and OT     Pressure injury left heel  -Decubitus ulcer prophylactic measures  -Urea 20% cream bilaterally  -Wound care consult    Weakness and physical deconditioning  -Multifactorial from the above  -Family have expressed concerns about returning home and are now interested in TCU.  -Given his frailty and high readmission risk cardiology has initiated palliative care consult for goals of care discussion.  Family is in agreement.  Should he rapidly decline will be transition to hospice.  CODE STATUS changed to DNR/DNI January 27  -Continue PT and OT            Diet: Fluid restriction 1500 ML FLUID  Combination Diet Mechanical/Dental Soft Diet; Low Saturated Fat Na <2400mg Diet  Snacks/Supplements Adult: Ensure Enlive; Between Meals  Snacks/Supplements Adult: Magic Cup; With Meals    DVT Prophylaxis: DOAC  Adorno Catheter: Not present  Lines: PRESENT      PICC 01/24/23 Triple Lumen Left Basilic-Site Assessment: WDL      Cardiac Monitoring: ACTIVE order. Indication: Acute decompensated heart failure (48 hours)  Code Status: No CPR- Do NOT Intubate      Clinically Significant Risk Factors              # Hypoalbuminemia: Lowest albumin = 3 g/dL at 1/24/2023  5:20 AM, will monitor as appropriate             # Severe Malnutrition: based on nutrition assessment        Disposition Plan   TCU placement pending    Expected Discharge Date: 01/30/2023      Destination: home with help/services;home with family;inpatient rehabilitation facility  Discharge Comments: Will need TCU bed          Elliot eRddy MD  Hospitalist Service  [facility]  Securely message with [employer] (more info)  Text page via [employer] Paging/Directory    ______________________________________________________________________    Interval History     Daughter at bedside    Patient resting tremor evaluation.  He has been complaining of low back and lower extremity pain from last night.  He also complains of poor sleep.  Daughter has inquired about restarting gabapentin.  No fever documented overnight.    Physical Exam   Vital Signs: Temp: 97.5  F (36.4  C) Temp src: Oral BP: 105/65 Pulse: 70   Resp: 20 SpO2: 98 % O2 Device: Nasal cannula Oxygen Delivery: 2 LPM  Weight: 158 lbs 15.23 oz      Physical Exam  Constitutional:       Comments: Frail elderly male   HENT:      Head: Normocephalic.   Eyes:      Pupils: Pupils are equal, round, and reactive to light.   Cardiovascular:      Rate and Rhythm: Normal rate.      Pulses: Normal pulses.   Pulmonary:      Effort: Pulmonary effort is normal.   Abdominal:      General: Abdomen is flat.   Skin:     General: Skin is warm.      Capillary Refill: Capillary refill takes less than 2 seconds.   Neurological:      General: No focal deficit present.      Mental Status: Mental status is at baseline.           Medical Decision Making       25 MINUTES SPENT BY ME on the date of service doing chart review, history, exam, documentation & further activities per the note.      Data     I have personally reviewed the following data over the past 24 hrs:    N/A  \   N/A   / N/A     139 96 (L) 21.4 /  131 (H)   3.8 39 (H) 0.72 \       Imaging results reviewed over the past 24 hrs:   Recent Results (from the past 24 hour(s))   XR Video Swallow with SLP or OT    Narrative    EXAM: XR VIDEO SWALLOW WITH SLP OR OT  LOCATION: [facility]  DATE/TIME: 1/28/2023 10:03 AM    INDICATION: Difficulty swallowing.  COMPARISON: None.    TECHNIQUE: Routine swallow study with speech pathology using multiple barium thicknesses.    FINDINGS:   FLUOROSCOPIC TIME: 1.6  NUMBER OF IMAGES: 6    Swallow study with Speech Pathology using  multiple barium thicknesses. No significant penetration or aspiration. Early pour over noted.     After swallowing and clearing of contents from the cervical esophagus, reflux of barium was subsequently noted back into the proximal esophagus. The fluoroscopic camera was moved inferiorly to look at the remaining esophagus, which demonstrated retained   barium and irregular contractions suggesting dysmotility, though this is not an esophagram and inadequately evaluates for any other process or narrowing. Recommend GI consultation with direct visualization versus esophagram.         XR Esophagram    Narrative    EXAM: XR ESOPHAGRAM SINGLE CONTRAST  LOCATION: [facility]  DATE/TIME: 1/28/2023 1:16 PM    INDICATION: Esophageal dysmotility. Difficulty swallowing.  COMPARISON: Same day video swallow.  TECHNIQUE: Routine.    FINDINGS:   FLUOROSCOPIC TIME: .7  NUMBER OF IMAGES: 5    ESOPHAGUS: Modified semiupright esophagram.     Oral barium contrast administered. The esophagus is patulous and mildly dilated in appearance. Irregular esophageal contractions identified. No stricture or narrowing identified. Contrast extends out of the stomach and into the proximal small bowel.     Could not evaluate for gastroesophageal reflux secondary to patient immobility.    Multilead pacer apparatus noted.      Impression    IMPRESSION:     1.  Moderate esophageal dysmotility.    2.  No stricture or narrowing.

## 2023-01-29 NOTE — PROGRESS NOTES
"  GASTROENTEROLOGY PROGRESS NOTE     SUBJECTIVE   The patient reports having a poor appetite and \"nothing tastes good.\" Tells me the ice cream he had last night tasted like sand.   No abdominal pain. No heartburn. He normally takes twice daily ppi (not on here in the hospital).  Reviewed esophagram results with the patient and family. No stricture, but does show findings of esophageal dysmotility.      OBJECTIVE     Vitals Blood pressure 105/65, pulse 70, temperature 97.5  F (36.4  C), temperature source Oral, resp. rate 20, height 1.778 m (5' 10\"), weight 72.1 kg (158 lb 15.2 oz), SpO2 98 %.          Physical Exam   General: Sitting in chair. No distress    Cardiovascular: S1S2    Chest: lungs are clear anteriorly    Abdomen: +bs, soft, not tender          LABORATORY    ELECTROLYTE PANEL   Recent Labs   Lab 01/29/23  0616 01/28/23  0517 01/27/23  1658 01/27/23  0541    140  --  141   POTASSIUM 3.8 3.7  --  3.7   CHLORIDE 96* 98  --  99   CO2 39* 38*  --  36*   * 135* 248* 142*   CR 0.72 0.77  --  0.92   BUN 21.4 27.4*  --  37.5*      HEMATOLOGY PANEL   Recent Labs   Lab 01/28/23  0517 01/27/23  0541 01/24/23  0520   HGB 12.3* 11.5* 12.6*   MCV 98 97 94   WBC 8.9 7.5 8.4    274 305      LIVER AND PANCREAS PANEL   Recent Labs   Lab 01/23/23  1601   LIPASE 55     IMAGING STUDIES    EXAM: XR ESOPHAGRAM SINGLE CONTRAST  LOCATION: [facility]  DATE/TIME: 1/28/2023 1:16 PM     INDICATION: Esophageal dysmotility. Difficulty swallowing.  COMPARISON: Same day video swallow.  TECHNIQUE: Routine.     FINDINGS:   FLUOROSCOPIC TIME: .7  NUMBER OF IMAGES: 5     ESOPHAGUS: Modified semiupright esophagram.      Oral barium contrast administered. The esophagus is patulous and mildly dilated in appearance. Irregular esophageal contractions identified. No stricture or narrowing identified. Contrast extends out of the stomach and into the proximal small bowel.      Could not evaluate for " gastroesophageal reflux secondary to patient immobility.     Multilead pacer apparatus noted.                                                                      IMPRESSION:      1.  Moderate esophageal dysmotility.     2.  No stricture or narrowing.     EXAM: XR VIDEO SWALLOW WITH SLP OR OT  LOCATION: [facility]  DATE/TIME: 1/28/2023 10:03 AM     INDICATION: Difficulty swallowing.  COMPARISON: None.     TECHNIQUE: Routine swallow study with speech pathology using multiple barium thicknesses.     FINDINGS:   FLUOROSCOPIC TIME: 1.6  NUMBER OF IMAGES: 6     Swallow study with Speech Pathology using multiple barium thicknesses. No significant penetration or aspiration. Early pour over noted.      After swallowing and clearing of contents from the cervical esophagus, reflux of barium was subsequently noted back into the proximal esophagus. The fluoroscopic camera was moved inferiorly to look at the remaining esophagus, which demonstrated retained   barium and irregular contractions suggesting dysmotility, though this is not an esophagram and inadequately evaluates for any other process or narrowing. Recommend GI consultation with direct visualization versus esophagram.     I have reviewed the current diagnostic and laboratory tests.              IMPRESSION     GERD, Weight loss, poor appetite-- This 81 yo male with complex medical history including cardiomyopathy, SHERLYN, atrial fibrillation who was seen in consultation by Dr Ballard for evaluation of dysphagia. Swallow evaluation with speech revealed findings suggestive of esophageal dysmotility which was also seen on esophagram. The esophagram did not show evidence of stricture, mass, or definite gerd. I discussed with the patient and family. At this point, recommend conservative treatment/dietary modifications to minimize symptoms. No plans for endoscopic evaluation.              PLAN   The patient should eat sitting upright, take small bites,  chew completely, drink sips of liquid in between bites, and eat slowly.   Resume twice daily ppi as he takes at home.   Consider RD consultation for recommendations re: nutritional supplements.  No plans for endoscopic evaluation at this time.   [facility] will sign off. Call with questions or concerns.  Total time spent on this encounter =35minutes        Celina Fallon PA-C  Thank you for the opportunity to participate in the care of this patient.   Please feel free to call me with any questions or concerns.  Phone number [phone].

## 2023-01-29 NOTE — PLAN OF CARE
Patient was A/O, VSS, and denied pain this shift.  Patient is currently resting on 2 L NC, when he is awake he is on room air.  Patient's appetite was poor to fair, he ate 50% of his dinner, and had two popsicles for snack this shift.  Patient reported some discomfort in his throat which is alleved from having a cold drink.  Patient's Tele was BBB.    Problem: Cardiac Catheterization (Diagnostic/Interventional)  Goal: Stable Heart Rate and Rhythm  Outcome: Progressing  Goal: Absence of Embolism Signs and Symptoms  Outcome: Progressing  Goal: Acceptable Pain Control  Outcome: Progressing     Problem: Pain Acute  Goal: Optimal Pain Control and Function  Outcome: Progressing  Intervention: Prevent or Manage Pain  Recent Flowsheet Documentation  Taken 1/28/2023 1959 by Larry Meredith, RN  Medication Review/Management: medications reviewed  Taken 1/28/2023 1623 by Larry Meredith, RN  Medication Review/Management: medications reviewed      Tono Meredith RN

## 2023-01-30 NOTE — PROGRESS NOTES
HEART CARE NOTE          Assessment/Recommendations     1. End stage CM c/b cardiogenic shock  Assessment / Plan    Near euvolemia on physical exam - no changes to regimen at this time; hold midodrine and given contraindication in HFrEF particularly in the setting or cardiogenic shock unless patient transitions to comfort care    [facility] folllowing regarding end of life goals of care given severity and progression of patient's cardiac disease    GDMT as detailed below     Current Pharmacotherapy AHA Guideline-Directed Medical Therapy   Losartan 25 mg daily - hold at discharge Lisinopril 20 mg twice daily   Metoprolol succinate 25 mg daily - hold at discharge Carvedilol 25 mg twice daily   Spironolactone not started Spironolactone 25 mg once daily   Hydralazine NA Hydralazine 100 mg three times daily   Isosorbide dinitrate NA Isosorbide dinitrate 40 mg three times daily   SGLT2 inhibitor not started Dapagliflozin or Empagliflozin 10 mg daily      2. Arrhythmia  Assessment / Plan    Persistent atrial fibrillation s/p ablations and DCCV    Currently on rivaroxaban; being considered for Watchman device - continue amiodarone    s/p PVI 11/22    Complete Heart Block s/p CRT-P (on the right as patient is left handed)    Follows with Dr. Michelle in EP as well as afib clinic     3. CAD  Assessment / Plan    Denies chest pain or anginal equivalents    Continue ASA, atorvastatin    Ok for discharge from a cardiology standpoint. Cardiology team will sign-off for now. Please do not hesitate to consult us again if new questions or concerns arise. Follow-up appointment will be arranged by CORE/HF clinic.     History of Present Illness/Subjective      Mr. 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 Petersen denies any acute cardiac events or complaints; Management plan as detailed above     ECG: Personally reviewed. Paced rhythm     ECHO (personnaly Reviewed):   The left ventricle is mildly dilated.  Left ventricular function is decreased. The ejection fraction is 30-35%  (moderately reduced).  There is moderate global hypokinesia of the left ventricle.  The right ventricle is mildly dilated.  The right ventricular systolic function is normal.  The left atrium is severely dilated.  The right atrium is moderately dilated.  There is moderately severe (3+) tricuspid regurgitation.  Right ventricle systolic pressure estimate normal  There is mild to moderate (1-2+) aortic regurgitation.  The ascending aorta is Mildly dilated.  IVC diameter >2.1 cm collapsing <50% with sniff suggests a high RA pressure  estimated at 15 mmHg or greater.  Small pericardial effusion  There are no echocardiographic indications of cardiac tamponade.             Physical Examination Review of Systems   /62 (BP Location: Right arm)   Pulse 70   Temp 97.6  F (36.4  C) (Oral)   Resp 19   Ht 1.778 m (5' 10\")   Wt 72.1 kg (158 lb 15.2 oz)   SpO2 98%   BMI 22.81 kg/m    Body mass index is 22.81 kg/m .  Wt Readings from Last 3 Encounters:   01/29/23 72.1 kg (158 lb 15.2 oz)   01/18/23 73 kg (161 lb)   01/12/23 71.7 kg (158 lb)     General Appearance:   no distress, normal body habitus   ENT/Mouth: membranes moist, no oral lesions or bleeding gums.      EYES:  no scleral icterus, normal conjunctivae   Neck: no carotid bruits or thyromegaly   Chest/Lungs:   lungs are clear to auscultation, no rales or wheezing, equal chest wall expansion    Cardiovascular:   Regular. Normal first and second heart sounds with no murmurs, rubs, or gallops; the carotid, radial and posterior tibial pulses are intact, no JVD and trace-mild LE edema bilaterally    Abdomen:  no organomegaly, masses, bruits, or tenderness; bowel " sounds are present   Extremities: no cyanosis or clubbing   Skin: no xanthelasma, warm.    Neurologic: alert and calm     Psychiatric: alert and calm     A complete 10 systems ROS was reviewed  And is negative except what is listed in the HPI.          Medical History  Surgical History Family History Social History   Past Medical History:   Diagnosis Date     Atrial fibrillation (H)      Atrial fibrillation, transient (H) 08/11/2020     BPH (benign prostatic hyperplasia) 07/11/2018     Chronic combined systolic and diastolic heart failure (H) 02/11/2021     Congestive heart failure (H)      Coronary artery disease      COVID 02/2021     Dilated cardiomyopathy (H) 02/11/2021     MARIAN (generalized anxiety disorder) 02/11/2021     GERD (gastroesophageal reflux disease)      High cholesterol      Hyperlipidemia      Hypertension      LBBB (left bundle branch block) 07/11/2018     Nonrheumatic aortic valve insufficiency 04/01/2020     Pulmonary hypertension (H) 04/01/2020    Past Surgical History:   Procedure Laterality Date     ANGIOPLASTY       ARTHROSCOPY KNEE       CARDIOVERSION  2021     CARDIOVERSION  02/03/2022     CV CORONARY ANGIOGRAM N/A 10/23/2020    Procedure: Coronary Angiogram;  Surgeon: Varun Freire MD;  Location: [facility] Cath Lab;  Service: Cardiology     CV LEFT HEART CATHETERIZATION WITHOUT LEFT VENTRICULOGRAM Left 10/23/2020    Procedure: Left Heart Catheterization Without Left Ventriculogram;  Surgeon: Varun Freire MD;  Location: [facility] Cath Lab;  Service: Cardiology     EP BIV PACEMAKER INSERT N/A 03/25/2020    Procedure: EP Biventricular Pacemaker Insertion;  Surgeon: Taylor Sandoval MD;  Location: [facility] Cath Lab;  Service: Cardiology     H ABLATION FOCAL AFIB       HEART CATH, ANGIOPLASTY       IMPLANT PACEMAKER       PICC TRIPLE LUMEN PLACEMENT  1/24/2023          RELEASE CARPAL TUNNEL       ZZC TOTAL KNEE ARTHROPLASTY Right 08/15/2019    Procedure: RIGHT   MINIMALLY TOTAL KNEE ARTHROPLASTY;  Surgeon: Keven Cerda MD;  Location: [facility] Main OR;  Service: Orthopedics    no family history of premature coronary artery disease Social History     Socioeconomic History     Marital status:      Spouse name: Not on file     Number of children: Not on file     Years of education: Not on file     Highest education level: Not on file   Occupational History     Not on file   Tobacco Use     Smoking status: Never     Smokeless tobacco: Never   Substance and Sexual Activity     Alcohol use: Yes     Comment: Alcoholic Drinks/day: rare     Drug use: No     Sexual activity: Not on file   Other Topics Concern     Parent/sibling w/ CABG, MI or angioplasty before 65F 55M? Not Asked   Social History Narrative    Retired.  Good sense of humor     Social Determinants of Health     Financial Resource Strain: Not on file   Food Insecurity: Not on file   Transportation Needs: Not on file   Physical Activity: Not on file   Stress: Not on file   Social Connections: Not on file   Intimate Partner Violence: Not on file   Housing Stability: Not on file           Lab Results    Chemistry/lipid CBC Cardiac Enzymes/BNP/TSH/INR   Lab Results   Component Value Date    CHOL 107 06/23/2022    HDL 38 (L) 06/23/2022    TRIG 35 06/23/2022    BUN 21.4 01/29/2023     01/29/2023    CO2 39 (H) 01/29/2023    Lab Results   Component Value Date    WBC 8.9 01/28/2023    HGB 12.3 (L) 01/28/2023    HCT 40.5 01/28/2023    MCV 98 01/28/2023     01/28/2023    Lab Results   Component Value Date    TROPONINI 0.09 03/14/2022     (H) 06/17/2022    TSH 4.26 10/17/2022    INR 1.03 02/11/2021     Lab Results   Component Value Date    TROPONINI 0.09 03/14/2022          Weight:    Wt Readings from Last 3 Encounters:   01/29/23 72.1 kg (158 lb 15.2 oz)   01/18/23 73 kg (161 lb)   01/12/23 71.7 kg (158 lb)       Allergies  Allergies   Allergen Reactions     Diltiazem Other (See Comments)      Contraindicated in HFrEF     Midodrine Other (See Comments)     Contraindicated in HFrEF         Surgical History  Past Surgical History:   Procedure Laterality Date     ANGIOPLASTY       ARTHROSCOPY KNEE       CARDIOVERSION  2021     CARDIOVERSION  02/03/2022     CV CORONARY ANGIOGRAM N/A 10/23/2020    Procedure: Coronary Angiogram;  Surgeon: Varun Freire MD;  Location: [facility] Cath Lab;  Service: Cardiology     CV LEFT HEART CATHETERIZATION WITHOUT LEFT VENTRICULOGRAM Left 10/23/2020    Procedure: Left Heart Catheterization Without Left Ventriculogram;  Surgeon: Varun Freire MD;  Location: [facility] Cath Lab;  Service: Cardiology     EP BIV PACEMAKER INSERT N/A 03/25/2020    Procedure: EP Biventricular Pacemaker Insertion;  Surgeon: Taylor Sandoval MD;  Location: [facility] Cath Lab;  Service: Cardiology     H ABLATION FOCAL AFIB       HEART CATH, ANGIOPLASTY       IMPLANT PACEMAKER       PICC TRIPLE LUMEN PLACEMENT  1/24/2023          RELEASE CARPAL TUNNEL       ZZC TOTAL KNEE ARTHROPLASTY Right 08/15/2019    Procedure: RIGHT  MINIMALLY TOTAL KNEE ARTHROPLASTY;  Surgeon: Keven Cerda MD;  Location: [facility];  Service: Orthopedics       Social History  Tobacco:   History   Smoking Status     Never   Smokeless Tobacco     Never    Alcohol:   Social History    Substance and Sexual Activity      Alcohol use: Yes        Comment: Alcoholic Drinks/day: rare   Illicit Drugs:   History   Drug Use No       Family History  Family History   Problem Relation Age of Onset     Alzheimer Disease Mother      Heart Disease Father      Cancer Sister      Diabetes Type 2  Sister      Chronic Obstructive Pulmonary Disease Sister      LUNG DISEASE Brother           John Paul Vasquez MD on 1/30/2023      cc: Jamie Noguera

## 2023-01-30 NOTE — PROGRESS NOTES
Hospitalist Progress Note        CODE STATUS:  No CPR- Do NOT Intubate    Assessment/Plan:  Reymundo Petersen is a 82 year old male with history of HFrEF, persistent Afib on xarelto, BPH, Dysphagia, DJD and general anxiety disorder who presented via EMS with fatigue, confusion and dehydration.  On presentation labs revealed hyponatremia, hyperkalemia and acute kidney injury.     Hyperkalemia  - Resolved  - Likely multifactorial from potassium supplements, SHERLYN  - Patient has received Lokelma and a loop diuretic on admission  - Nephrology consulted, recommendations appreciated     Hyponatremia  - Resolved     Acute kidney injury  - Resolved  - Suspect prerenal/dehydration from overdiuresis/CRS  - Bumetanide 2 mg daily started January 27.  - Avoid nephrotoxic  agents     Cardiomyopathy  Heart failure with reduced EF  Cardiogenic shock  - Dobutamine infusion started January 25 and weaned off January 27  - Bumetanide p.o. 2 mg daily started Jan 27  - Most recent transthoracic echocardiogram December 19, showed a left ventricular ejection fraction of 35 to 40%  - Weigh patient daily and monitor I's and O's  - Consult cardiology, recommendations appreciated  - Per cardiology hold beta-blocker now and  post discharge.  - S/p RHC January 25, reveals elevated right and left sided filling pressures consistent with volume overload/pulm hypertension. RA 15, RV 50/5; RVEDP 15 , PA 54/20 mean 30 and Wedge 20     Persistent atrial fibrillation  Complete heart block  - S/p ablation and DCCV  - S/p CRT-P  - Continue rivaroxaban and amiodarone   - Hold metoprolol  per cardiology.  - [place] [facility] [place] [facility]     BPH with LUTS  - Continue doxazosin with holding parameters     Dysphagia  - SLP evaluation appreciated  - Concern for esophageal dysmotility, esophagram ordered and reviewed.  Showed moderate esophageal dysmotility no stricture or narrowing.  - GI consulted, esophagram reviewed  - No indication for  intervention presently including EGD.  Diet modification per SLP recommended.    - Twice daily PPI  - RD consult     General anxiety disorder  - Continue sertraline     Degenerative joint disease  - Stable, Tylenol for pain  - PT and OT      Pressure injury left heel  - Decubitus ulcer prophylactic measures  - Urea 20% cream bilaterally  - Wound care consult     Weakness and physical deconditioning  - Multifactorial from the above  - Family have expressed concerns about returning home and are now interested in TCU.  - Given his frailty and high readmission risk cardiology has initiated palliative care consult for goals of care discussion.  Family is in agreement.  Should he rapidly decline will be transition to hospice.  CODE STATUS changed to DNR/DNI January 27  - Continue PT and OT       DVT Prophylaxis: Xarelto     Disposition/Barrier to discharge; Medically stable. TCU placement      Subjective:  Interval History:   Patient seen and examined.   Granddaughter and daughter at bedside. Both feel patient is improved & getting to his baseline. His appetite is improved.     Review of Systems:   As mentioned in subjective.    Patient Active Problem List   Diagnosis     Primary osteoarthritis of knees, bilateral     Primary hypertension     BPH (benign prostatic hyperplasia)     Combined hyperlipidemia     Dizziness     DJD (degenerative joint disease)     Left ventricular hypertrophy     Troponin level elevated     Acute combined systolic and diastolic congestive heart failure (H)     Pulmonary hypertension (H)     Nonrheumatic aortic valve insufficiency     Coronary artery disease due to lipid rich plaque     MARIAN (generalized anxiety disorder)     GERD (gastroesophageal reflux disease)     Infection due to 2019 novel coronavirus     Dilated cardiomyopathy (H)     Persistent atrial fibrillation (H)     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)     Stage 2 chronic kidney disease     Hearing loss      Arthritis, lumbar spine     Peripheral edema     Elevated TSH     Elevated troponin     Pleural effusion on right     Acute on chronic congestive heart failure, unspecified heart failure type (H)     Status post catheter ablation of atrial fibrillation     Heart failure with reduced ejection fraction (H)     Hyperkalemia     Hyponatremia     Cardiogenic shock (H)       Scheduled Meds:    amiodarone  200 mg Oral Every Other Day     aspirin  81 mg Oral Q Mon Wed Fri AM     bumetanide  2 mg Oral Daily     doxazosin  4 mg Oral At Bedtime     finasteride  5 mg Oral At Bedtime     gabapentin  200 mg Oral QAM AC     gabapentin  200 mg Oral Daily before lunch     gabapentin  300 mg Oral QPM     pantoprazole  40 mg Oral BID     rivaroxaban ANTICOAGULANT  20 mg Oral Daily with supper     sertraline  50 mg Oral BID     sodium chloride (PF)  10-40 mL Intracatheter Q7 Days     sodium chloride (PF)  3 mL Intracatheter Q8H     Continuous Infusions:    - MEDICATION INSTRUCTIONS -       PRN Meds:.acetaminophen **OR** acetaminophen, alum & mag hydroxide-simethicone, calcium carbonate, artificial tears, dextrose 5%, glucose **OR** dextrose **OR** glucagon, doxylamine, lidocaine (buffered or not buffered), melatonin, nitroGLYcerin, ondansetron **OR** ondansetron, - MEDICATION INSTRUCTIONS -, polyethylene glycol, sodium chloride (PF), sodium chloride (PF), sodium chloride (PF), urea    Objective:  Vital signs in last 24 hours:  Temp:  [97.4  F (36.3  C)-97.6  F (36.4  C)] 97.4  F (36.3  C)  Pulse:  [65-72] 72  Resp:  [18-19] 18  BP: ()/(59-65) 115/65  SpO2:  [94 %-99 %] 94 %      Physical Exam:  General: Chronically ill appearing. In NAD  HEENT: NCAT & EOMI  CV: Normal S1S2, regular rhythm, normal rate  Lungs: CTAB  Abdomen: Soft, NT, ND, +BS  Extremities: No LE edema b/l  Neuro: AAOx3  Psych: Normal Affect    Lab Results:    Recent Results (from the past 24 hour(s))   Basic metabolic panel    Collection Time: 01/30/23  6:02  AM   Result Value Ref Range    Sodium 137 136 - 145 mmol/L    Potassium 4.0 3.4 - 5.3 mmol/L    Chloride 94 (L) 98 - 107 mmol/L    Carbon Dioxide (CO2) 37 (H) 22 - 29 mmol/L    Anion Gap 6 (L) 7 - 15 mmol/L    Urea Nitrogen 18.6 8.0 - 23.0 mg/dL    Creatinine 0.74 0.67 - 1.17 mg/dL    Calcium 8.8 8.8 - 10.2 mg/dL    Glucose 112 (H) 70 - 99 mg/dL    GFR Estimate 89 >60 mL/min/1.73m2   Magnesium    Collection Time: 01/30/23  6:02 AM   Result Value Ref Range    Magnesium 2.1 1.7 - 2.3 mg/dL       Serum Glucose range:   Recent Labs   Lab 01/30/23  0602 01/29/23  0616 01/28/23  0517 01/27/23  1658   * 131* 135* 248*     ABG: No lab results found in last 7 days.  CBC:   Recent Labs   Lab 01/28/23  0517 01/27/23  0541 01/24/23  0520 01/23/23  1601   WBC 8.9 7.5 8.4 10.9   HGB 12.3* 11.5* 12.6* 13.8   HCT 40.5 37.3* 38.9* 43.9   MCV 98 97 94 95    274 305 379   NEUTROPHIL 87 88  --  86   LYMPH 5 4  --  6   MONOCYTE 7 7  --  7   EOSINOPHIL 0 0  --  0     Chemistry:   Recent Labs   Lab 01/30/23  0602 01/29/23  0616 01/28/23  0517 01/24/23  1218 01/24/23  0520    139 140   < > 128*   POTASSIUM 4.0 3.8 3.7   < > 5.1   CHLORIDE 94* 96* 98   < > 91*   CO2 37* 39* 38*   < > 25   BUN 18.6 21.4 27.4*   < > 93.3*   CR 0.74 0.72 0.77   < > 1.85*   GFRESTIMATED 89 90 88   < > 35*   ANGELY 8.8 8.9 9.4   < > 9.1   MAG 2.1 2.2 2.4*   < >  --    ALBUMIN  --   --   --   --  3.0*    < > = values in this interval not displayed.     Coags:  No results for input(s): INR, PROTIME, PTT in the last 168 hours.    Invalid input(s): APTT  Cardiac Markers:  No results for input(s): CKTOTAL, TROPONINI in the last 168 hours.               01/30/2023   Linda Dial MD  Hospitalist  Pager: [phone]

## 2023-01-30 NOTE — PROGRESS NOTES
"CLINICAL NUTRITION SERVICES - REASSESSMENT NOTE     Nutrition Prescription    RECOMMENDATIONS FOR MDs/PROVIDERS TO ORDER:  Discontinue saturated fat diet restriction while keeping a 2 or 3 gm Na restriction - to perhaps broaden pt's meal choices.  (pt intake is poor)    Malnutrition Status:    Severe in acute on chronic illness    Recommendations already ordered by Registered Dietitian (RD):  Continue nutrition supplements    Future/Additional Recommendations:  Follow po intake, weight, labs, poc     EVALUATION OF THE PROGRESS TOWARD GOALS   Received consult - nutrition education - Malnutrition    Diet: mechanical/dental soft, low saturated fat< 2400 mg Na with 1500 ml FR  Intake: 25% of meals  Supplements:  Vanilla Ensure at 2 PM, Vanilla Magic Cup with supper    Pt is hard of hearing, daughter in the room.  Pt likes supplements if cold.  He likes vanilla flavors best.  Pt likes [place] water which family has made ice cubes out of for the patient to have.  He is also drinking a small can of soda daily.  Pt eats a few bites of each soft food at each meal.  He has been doing this for quite awhile (PTA).  Another daughter was a dietitian for many years and makes sure his diet order is followed at home.  Currently pt is waiting to go to a TCU.   Discussed  - RD will request fat restriction to be discontinued to perhaps give pt more choices - daughter in agreement.    ANTHROPOMETRICS  Height: 177.8 cm (5' 10\")  Most Recent Weight: 72.8 kg (160 lb 7.9 oz)    Admit weight:73 kg (160 lb 15 oz) 1/23/23    GI CONCERNS  Constipation, belching this morning per nursing  Last BM this afternoon    LABS  Reviewed:   Glucose 112    MEDICATIONS  Reviewed: Dulcolax suppository today; senokot,  Iv bumex today    MALNUTRITION: (1/27/23)  % Weight Loss:  > 7.5% in 3 months (severe malnutrition)  % Intake:  </= 50% for >/= 5 days (severe malnutrition)  Subcutaneous Fat Loss:  Orbital region moderate depletion and Upper arm region moderate " depletion  Muscle Loss:  Temporal region moderate depletion and Clavicle bone region moderate depletion  Fluid Retention:  Trace to moderate    Malnutrition Diagnosis: Severe malnutrition  In Context of:  Acute illness or injury  Chronic illness or disease    CURRENT NUTRITION DIAGNOSIS  Malnutrition related to acute on chronic illness as evidenced by 9.7% weight loss in 3 months, moderate subcutaneous fat and muscle loss and fluid accumulation      INTERVENTIONS  Implementation  Pt's daughter makes sure special diet is followed at home. She worked in a hospital as a dietitian for many years and understands the diet per her sister.  Pt to do to TCY    Continue supplements    Suggest discontinuing saturated fat restriction to possibly broaden pt's meal choices    Goals  Patient to consume % of nutritionally adequate meals three times per day, or the equivalent with supplements/snacks. - not met    Monitoring/Evaluation  Progress toward goals will be monitored and evaluated per protocol.

## 2023-01-30 NOTE — PLAN OF CARE
"  Patient is now Med Surg.  He is Alert, but drowsy this shift.  Family was at the bedside until 2030.  Patient was on 2 L NC this shift.  He denied pain, but explained discomfort that comes and goes when speaking to him.  When asked \"are you in pain\" he denied.  Patient accepted an ice pack for comfort.  Patient was stable, he is following his new diet order of small bites of food throughout the day and staying in a full fowlers position.  Patient did not have a BM this shift, and he had half a cup of ice water mixed with his Miralax PRN.    Problem: Plan of Care - These are the overarching goals to be used throughout the patient stay.    Goal: Absence of Hospital-Acquired Illness or Injury  Outcome: Progressing  Intervention: Identify and Manage Fall Risk  Recent Flowsheet Documentation  Taken 1/29/2023 1636 by Larry Meredith, RN  Safety Promotion/Fall Prevention:    activity supervised    bed alarm on    clutter free environment maintained    fall prevention program maintained    increase visualization of patient    lighting adjusted    mobility aid in reach    nonskid shoes/slippers when out of bed    patient and family education  Goal: Optimal Comfort and Wellbeing  Outcome: Progressing  Intervention: Monitor Pain and Promote Comfort  Recent Flowsheet Documentation  Taken 1/29/2023 1636 by Larry Meredith, RN  Pain Management Interventions:    care clustered    distraction    emotional support    pillow support provided  Goal: Readiness for Transition of Care  Outcome: Progressing     Problem: Cardiac Catheterization (Diagnostic/Interventional)  Goal: Stable Heart Rate and Rhythm  Outcome: Progressing     Tono Meredith RN  "

## 2023-01-30 NOTE — PLAN OF CARE
Problem: Oral Intake Inadequate  Goal: Improved Oral Intake  Outcome: Not Progressing   Goal Outcome Evaluation:       Diet: Mechanical/Dental soft, Low saturated fat < 2400 mg Na, 1500 mL fluid restriction  Supplements: Vanilla Ensure at 2 PM, Magic Cup at supper - pt likes these cold or he will not drink .    Pt is eating 25% of meals.  Family is involved and encourages the patient to eat.    Suggest: Discontinuing saturated fat restriction while continuing sodium restriction either 2 or 3 gm /day.  Pt continues to need the soft diet.

## 2023-01-30 NOTE — PROGRESS NOTES
Care Management Follow Up    Length of Stay (days): 7    Expected Discharge Date: 01/31/2023     Concerns to be Addressed:  Cardio status, goals of care discussion, TCU acceptance       Patient plan of care discussed at interdisciplinary rounds: Yes    Anticipated Discharge Disposition:  TCU     Anticipated Discharge Services:  TCU    Anticipated Discharge DME:  None      Education Provided on the Discharge Plan:  Yes    Patient/Family in Agreement with the Plan:  Yes      Referrals Placed by CM/SW:  TCU       Additional Information:  Patient presented via EMS with fatigue, confusion and dehydration.  On presentation labs revealed hyponatremia, hyperkalemia and acute kidney injury. Gi following for esophageal dysmotility-modified diet recommended. Palliative consult-goals of care: restorative, referral to outpatient Palliative Clinic. Nephrology consulted. Cardiology following.     Therapy: Patient currently requires assist of 1-2 for all mobility due to fatigue and weakness. Recommend TCU for continued rehab to improve functional mobility and strength. IF patient were to return home, patient would continue to require 24/7 care from family and a chair lift to safely enter the home.      Social History:  Patient lives in his house. He is typically independent at baseline, however daughters have been staying with him 24/7. Patient has a sister in law that is currently residing in the home with patient (has been in the home for 18 yrs). Daughters working on facilitating the sister in law moving out.   Daughters are very involved in patient's care.   If patient needs TCU- [facility] choices are [facility]. Transportation TBD.     Patient follows with Dr Prakash for EP and a-fib clinic.    Currently seeking TCU. Referrals pending. May need more TCU preferences.      Daiana Feliz RN

## 2023-01-30 NOTE — PLAN OF CARE
Patient denied pain and was able to sleep for most of the night without interruption. Able to make needs known.     Vitals:    01/29/23 0727 01/29/23 1508 01/29/23 1636 01/30/23 0009   BP: 105/65 94/59  100/62   BP Location: Right arm Right arm  Right arm   Pulse: 70 65  70   Resp: 20 18  19   Temp: 97.5  F (36.4  C) 97.5  F (36.4  C)  97.6  F (36.4  C)   TempSrc: Oral Oral  Oral   SpO2: 98% 99% 99% 98%   Weight:       Height:            Problem: Plan of Care - These are the overarching goals to be used throughout the patient stay.    Goal: Optimal Comfort and Wellbeing  Outcome: Progressing     Problem: Risk for Delirium  Goal: Improved Sleep  Outcome: Progressing     Problem: Cardiac Catheterization (Diagnostic/Interventional)  Goal: Acceptable Pain Control  Outcome: Progressing     Problem: Pain Acute  Goal: Optimal Pain Control and Function  Outcome: Progressing  Intervention: Prevent or Manage Pain  Recent Flowsheet Documentation  Taken 1/30/2023 0010 by Gisella Pineda RN  Bowel Elimination Promotion: (miralax given)   ambulation promoted   other (see comments)   Goal Outcome Evaluation:

## 2023-01-30 NOTE — PLAN OF CARE
Problem: Cardiac Catheterization (Diagnostic/Interventional)  Goal: Stable Heart Rate and Rhythm  Outcome: Progressing  Goal: Anesthesia/Sedation Recovery  Intervention: Optimize Anesthesia Recovery  Recent Flowsheet Documentation  Taken 1/30/2023 0900 by Kayla Nagy RN  Safety Promotion/Fall Prevention: activity supervised  Goal: Absence of Vascular Access Complication  Intervention: Prevent Access Site Complications  Recent Flowsheet Documentation  Taken 1/30/2023 0900 by Kayla Nagy RN  Activity Management: activity adjusted per tolerance     Problem: Pain Acute  Goal: Optimal Pain Control and Function  Outcome: Progressing  Intervention: Prevent or Manage Pain  Recent Flowsheet Documentation  Taken 1/30/2023 0900 by Kayla Nagy RN  Medication Review/Management: medications reviewed   Goal Outcome Evaluation:    Pt is more awake today.  Talking more. Is in a much better mood.  Does not complain about burning hands and feet anymore.   Gave supp with good results of large formed BM on the BSC.   Intake 680cc. Output 1300 ml.RA. Med surg. Assist of 1 staff.

## 2023-01-31 NOTE — PLAN OF CARE
Problem: Plan of Care - These are the overarching goals to be used throughout the patient stay.    Goal: Plan of Care Review  Description: The Plan of Care Review/Shift note should be completed every shift.  The Outcome Evaluation is a brief statement about your assessment that the patient is improving, declining, or no change.  This information will be displayed automatically on your shift note.  Outcome: Progressing     Problem: Pain Acute  Goal: Optimal Pain Control and Function  Outcome: Progressing  Intervention: Prevent or Manage Pain  Recent Flowsheet Documentation  Taken 1/31/2023 0022 by Emilie Sanchez, DIAMOND  Bowel Elimination Promotion: ambulation promoted  Medication Review/Management: medications reviewed     Problem: Pain Acute  Goal: Optimal Pain Control and Function  Intervention: Prevent or Manage Pain  Recent Flowsheet Documentation  Taken 1/31/2023 0022 by Emilie Sanchez, DIAMOND  Bowel Elimination Promotion: ambulation promoted  Medication Review/Management: medications reviewed   Goal Outcome Evaluation:         Patient denies pain at this time. Gavi's 500 mg's given for a little heartburn. Primofit in place with good output. Patient is a little forgetful, bed alarm on for safety. Bp is a little low 89/54 recheck 94/60. Will cont to monitor.

## 2023-01-31 NOTE — PROGRESS NOTES
Hospitalist Progress Note        CODE STATUS:  No CPR- Do NOT Intubate    Assessment/Plan:  Reymundo Petersen is a 82 year old male with history of HFrEF, persistent Afib on xarelto, BPH, Dysphagia, DJD and general anxiety disorder who presented via EMS with fatigue, confusion and dehydration.  On presentation labs revealed hyponatremia, hyperkalemia and acute kidney injury.     Hyperkalemia  - Resolved  - Likely multifactorial from potassium supplements, SHERLYN  - Patient has received Lokelma and a loop diuretic on admission  - Nephrology consulted, recommendations appreciated     Hyponatremia  - Improved     Hypokalemia   - Replace with IV KCl    Acute kidney injury - Resolved  - Suspect prerenal/dehydration from overdiuresis/CRS  - Bumetanide 2 mg daily started January 27.  - Avoid nephrotoxic  agents     Cardiomyopathy  Heart failure with reduced EF  Cardiogenic shock  - Dobutamine infusion started January 25 and weaned off January 27  - Bumetanide p.o. 2 mg daily started Jan 27  - Most recent transthoracic echocardiogram December 19, showed a left ventricular ejection fraction of 35 to 40%  - Cardiology consulted, recommendations appreciated  - Per cardiology hold beta-blocker now and  post discharge.  - S/p RHC January 25, reveals elevated right and left sided filling pressures consistent with volume overload/pulm hypertension. RA 15, RV 50/5; RVEDP 15 , PA 54/20 mean 30 and Wedge 20     Persistent atrial fibrillation  Complete heart block  - S/p ablation and DCCV  - S/p CRT-P  - Continue rivaroxaban and amiodarone   - Hold metoprolol per cardiology.  - He was being considered for watchman outpatient     BPH with LUTS  - Continue doxazosin with holding parameters     Dysphagia  - SLP evaluation appreciated  - Concern for esophageal dysmotility, esophagram ordered and reviewed.  Showed moderate esophageal dysmotility no stricture or narrowing.  - GI consulted, esophagram reviewed  - No indication for intervention  presently including EGD.  Diet modification per SLP recommended.    - Twice daily PPI  - RD consult     General anxiety disorder  - Continue sertraline     Degenerative joint disease  - Stable, Tylenol for pain  - PT and OT      Pressure injury left heel  - Decubitus ulcer prophylactic measures  - Urea 20% cream bilaterally  - Wound care consult     Weakness and physical deconditioning  - Multifactorial from the above  - Family have expressed concerns about returning home and are now interested in TCU.  - Given his frailty and high readmission risk cardiology has initiated palliative care consult for goals of care discussion.  Family is in agreement.  Should he rapidly decline will be transition to hospice.  CODE STATUS changed to DNR/DNI January 27  - Continue PT and OT       DVT Prophylaxis: Xarelto     Disposition/Barrier to discharge; Medically stable. TCU placement      Subjective:  Interval History:   Patient seen and examined.   No events overnight.  Patient feeling well.    Review of Systems:   As mentioned in subjective.    Patient Active Problem List   Diagnosis     Primary osteoarthritis of knees, bilateral     Primary hypertension     BPH (benign prostatic hyperplasia)     Combined hyperlipidemia     Dizziness     DJD (degenerative joint disease)     Left ventricular hypertrophy     Troponin level elevated     Acute combined systolic and diastolic congestive heart failure (H)     Pulmonary hypertension (H)     Nonrheumatic aortic valve insufficiency     Coronary artery disease due to lipid rich plaque     MARIAN (generalized anxiety disorder)     GERD (gastroesophageal reflux disease)     Infection due to 2019 novel coronavirus     Dilated cardiomyopathy (H)     Persistent atrial fibrillation (H)     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)     Stage 2 chronic kidney disease     Hearing loss     Arthritis, lumbar spine     Peripheral edema     Elevated TSH     Elevated troponin      Pleural effusion on right     Acute on chronic congestive heart failure, unspecified heart failure type (H)     Status post catheter ablation of atrial fibrillation     Heart failure with reduced ejection fraction (H)     Hyperkalemia     Hyponatremia     Cardiogenic shock (H)       Scheduled Meds:    amiodarone  200 mg Oral Every Other Day     aspirin  81 mg Oral Q Mon Wed Fri AM     bumetanide  2 mg Oral Daily     doxazosin  4 mg Oral At Bedtime     finasteride  5 mg Oral At Bedtime     gabapentin  200 mg Oral QAM AC     gabapentin  200 mg Oral Daily before lunch     gabapentin  300 mg Oral QPM     pantoprazole  40 mg Oral BID     rivaroxaban ANTICOAGULANT  20 mg Oral Daily with supper     sennosides  8.6 mg Oral BID     sertraline  50 mg Oral BID     sodium chloride (PF)  10-40 mL Intracatheter Q7 Days     sodium chloride (PF)  3 mL Intracatheter Q8H     Continuous Infusions:    - MEDICATION INSTRUCTIONS -       PRN Meds:.acetaminophen **OR** acetaminophen, alum & mag hydroxide-simethicone, calcium carbonate, artificial tears, dextrose 5%, glucose **OR** dextrose **OR** glucagon, doxylamine, lidocaine (buffered or not buffered), melatonin, nitroGLYcerin, ondansetron **OR** ondansetron, - MEDICATION INSTRUCTIONS -, polyethylene glycol, sodium chloride (PF), sodium chloride (PF), sodium chloride (PF), urea    Objective:  Vital signs in last 24 hours:  Temp:  [97.5  F (36.4  C)-97.8  F (36.6  C)] 97.8  F (36.6  C)  Pulse:  [64-68] 64  Resp:  [16-18] 16  BP: ()/(54-65) 105/60  SpO2:  [91 %-94 %] 91 %      Physical Exam:  General: Chronically ill appearing. In NAD  HEENT: NCAT & EOMI  CV: Normal S1S2, regular rhythm, normal rate  Lungs: CTAB  Abdomen: Soft, NT, ND, +BS  Extremities: No LE edema b/l  Neuro: AAOx3  Psych: Normal Affect    Lab Results:    Recent Results (from the past 24 hour(s))   Basic metabolic panel    Collection Time: 01/31/23  4:58 AM   Result Value Ref Range    Sodium 133 (L) 136 - 145  mmol/L    Potassium 3.3 (L) 3.4 - 5.3 mmol/L    Chloride 91 (L) 98 - 107 mmol/L    Carbon Dioxide (CO2) 38 (H) 22 - 29 mmol/L    Anion Gap 4 (L) 7 - 15 mmol/L    Urea Nitrogen 19.0 8.0 - 23.0 mg/dL    Creatinine 0.80 0.67 - 1.17 mg/dL    Calcium 8.7 (L) 8.8 - 10.2 mg/dL    Glucose 123 (H) 70 - 99 mg/dL    GFR Estimate 87 >60 mL/min/1.73m2   Magnesium    Collection Time: 01/31/23  4:58 AM   Result Value Ref Range    Magnesium 2.0 1.7 - 2.3 mg/dL       Serum Glucose range:   Recent Labs   Lab 01/31/23 0458 01/30/23  0602 01/29/23  0616 01/28/23  0517   * 112* 131* 135*     ABG: No lab results found in last 7 days.  CBC:   Recent Labs   Lab 01/28/23  0517 01/27/23  0541   WBC 8.9 7.5   HGB 12.3* 11.5*   HCT 40.5 37.3*   MCV 98 97    274   NEUTROPHIL 87 88   LYMPH 5 4   MONOCYTE 7 7   EOSINOPHIL 0 0     Chemistry:   Recent Labs   Lab 01/31/23 0458 01/30/23  0602 01/29/23  0616   * 137 139   POTASSIUM 3.3* 4.0 3.8   CHLORIDE 91* 94* 96*   CO2 38* 37* 39*   BUN 19.0 18.6 21.4   CR 0.80 0.74 0.72   GFRESTIMATED 87 89 90   ANGELY 8.7* 8.8 8.9   MAG 2.0 2.1 2.2     Coags:  No results for input(s): INR, PROTIME, PTT in the last 168 hours.    Invalid input(s): APTT  Cardiac Markers:  No results for input(s): CKTOTAL, TROPONINI in the last 168 hours.               01/31/2023   Linda Dial MD  Hospitalist  Pager: [phone]

## 2023-01-31 NOTE — PROGRESS NOTES
[facility]: [facility] Routine Remote Evaluation     HRS Enrollment Date:    12/30/22                                        Initial setup by: HRS team 12/30/22   Billing Dates: 12/30/22 through 1/30/23  HF Dx: HFrEF      HRS Alerts During Monitoring Period:   1/23/23 HRS wt     No adjustments made this month.  Discussed with patient/caregiver and they will continue with current plan of care.           Future Appointments   Date Time Provider Department Center   1/31/2023  2:30 PM Emilie Covington, PT JNP[facility]   2/1/2023 11:00 AM Vannesa Akbar OT JNOCCT [facility]   2/10/2023  8:30 AM Aleena Buitrago, APRN CNP [facility]   2/23/2023  3:15 PM WW [facility] DEVICE NURSE 1 CVW [facility]   2/23/2023  3:50 PM [facility]   3/2/2023  4:00 AM SJN [facility] CARDIOMEMS [facility]   4/11/2023 12:00 AM DALILA [facility] REMOTE DEVICE CHECK FROM HOME CVN [facility]     Will continue with weekly monitoring with HF provider team.     TOTAL INTERACTIVE COMMUNICATION WITH PATIENT DURING THIS BILLING PERIOD: 15 minutes.    Damian ABREU RN  BSN    I have reviewed Damian TOMAS RN, BSN's note and agree.    John Paul Vasquez MD., MHS    READINGS:

## 2023-01-31 NOTE — PLAN OF CARE
Problem: Plan of Care - These are the overarching goals to be used throughout the patient stay.    Goal: Optimal Comfort and Wellbeing  Outcome: Progressing     Problem: Plan of Care - These are the overarching goals to be used throughout the patient stay.    Goal: Optimal Comfort and Wellbeing  Outcome: Progressing   Goal Outcome Evaluation:    Pt is AXOX3/4 and forgetful, on RA and assistx2 for cares. 1500 ml fluid restriction. Pt takes meds with applesauce. Denies pain. Family at bedside. Continue to monitor.        FYI- pt is [facility], currently has both right and left hearing aids in.

## 2023-01-31 NOTE — PROGRESS NOTES
"PALLIATIVE CARE CHART REVIEW:    Reymundo Petersen is a 82 year old male with PMH persistent atrial fibrillation, chronic systolic and diastolic HFrEF (35-40 %) due to nonischemic cardiomyopathy, moderate Complete heart block, s/p CRT-P, s/p ablation and DC cardioversion, CAD with prior RCA PCI, HTN, BPH and CKD stage 2  Patient admitted from home where he lives with family with increased fatigue, dehydration and confusion.     Patient was seen for:  Goals of care discussion and advanced care planning    SYMPTOM ASSESSMENT:  1.  Generalized weakness and fatigue - continues  - PT consulted.  - Plan is TCU at discharge.      2.  Slow transition constipation - 1/30/2023  - Continue Senna  1 tab po BID  - Continue Miralax 17 gram po BID prn (good as a softener. Lacks stimulant properties; patient needs both).  - normally eats \"prune soup\" routinely at home.  - Bisacodyl suppository pr daily prn     ADVANCED CARE PLANNING  - Code status: DNR/I   - Surrogate decision makers: DTRS, Juliet Petersen and Justa Petersen  - Has HCD in chart.  - POLST completed 1/27/2023 indicating wishes for DNR/I, selective treatment, no feeding tube and agreeable to oral antibiotics.     GOALS OF CARE DISCUSSION  - Goals are life prolonging with limits, DNR/I.  - Continue current cares and treatments.  - Plan to discharge to TCU for strengthening. Patient and family agree with this plan.  - Discussed role of outpatient palliative care clinic. Family and patient will consider for ongoing goals of care discussions. Referral placed.    Symptoms are managed and goals established. Palliative care will sign off at this time. Thank you for inviting us into the care of this patient.    SALVADOR Dee, [facility] Palliative care  [phone]  "

## 2023-02-01 NOTE — TELEPHONE ENCOUNTER
[facility] Call Center    Phone Message    May a detailed message be left on voicemail: yes     Reason for Call: Other: Pt's daughter calling on behalf of the patient who is still in the hospital. She is wondering if patient is transferred to a TCU if they can change this appointment to a later date or if it needs to be this day and also has some questions regarding the fee for the monitor/transmitter if patient isnt home to be using that if he can be put on hold. Please return call to discuss.      Action Taken: Other: Cardiology    Travel Screening: Not Applicable     Thank you!  [facility]

## 2023-02-01 NOTE — PROGRESS NOTES
Hospitalist Progress Note        CODE STATUS:  No CPR- Do NOT Intubate    Assessment/Plan:  Reymundo Petersen is a 82 year old male with history of HFrEF, persistent Afib on xarelto, BPH, Dysphagia, DJD and general anxiety disorder who presented via EMS with fatigue, confusion and dehydration.  On presentation labs revealed hyponatremia, hyperkalemia and acute kidney injury.     Hyperkalemia  - Resolved  - Likely multifactorial from potassium supplements, SHERLYN  - Patient has received Lokelma and a loop diuretic on admission  - Nephrology consulted, recommendations appreciated     Hyponatremia  - Improved     Hypokalemia  - K is low again   - Replace with PO KCl  - He may need to be back on a lower dose of daily KCl supplement    Acute kidney injury - Resolved  - Suspect prerenal/dehydration from overdiuresis/CRS  - Bumetanide 2 mg daily started January 27.  - Avoid nephrotoxic  agents     Cardiomyopathy  Heart failure with reduced EF  Cardiogenic shock  - Dobutamine infusion started January 25 and weaned off January 27  - Bumetanide p.o. 2 mg daily started Jan 27  - Most recent transthoracic echocardiogram December 19, showed a left ventricular ejection fraction of 35 to 40%  - Cardiology consulted, recommendations appreciated  - Per cardiology hold beta-blocker now and  post discharge.  - S/p RHC January 25, reveals elevated right and left sided filling pressures consistent with volume overload/pulm hypertension. RA 15, RV 50/5; RVEDP 15 , PA 54/20 mean 30 and Wedge 20     Persistent atrial fibrillation  Complete heart block  - S/p ablation and DCCV  - S/p CRT-P  - Continue rivaroxaban and amiodarone   - Hold metoprolol per cardiology.  - He was being considered for watchman outpatient     BPH with LUTS  - Continue doxazosin with holding parameters     Dysphagia  - SLP evaluation appreciated  - Concern for esophageal dysmotility, esophagram ordered and reviewed.  Showed moderate esophageal dysmotility no stricture  or narrowing.  - GI consulted, esophagram reviewed  - No indication for intervention presently including EGD.  Diet modification per SLP recommended.    - Twice daily PPI  - RD consult     General anxiety disorder  - Continue sertraline     Degenerative joint disease  - Stable, Tylenol for pain  - PT and OT      Pressure injury left heel  - Decubitus ulcer prophylactic measures  - Urea 20% cream bilaterally  - Wound care consult     Weakness and physical deconditioning  - Multifactorial from the above  - Family have expressed concerns about returning home and are now interested in TCU.  - Given his frailty and high readmission risk cardiology has initiated palliative care consult for goals of care discussion.  Family is in agreement.  Should he rapidly decline will be transition to hospice.  CODE STATUS changed to DNR/DNI January 27  - Continue PT and OT       DVT Prophylaxis: Xarelto     Disposition/Barrier to discharge; Medically stable. TCU placement      Subjective:  Interval History:   Patient seen and examined.   No events overnight.  Patient reports feeling tired. A lot of interruptions at night.  Daughter at bedside.     Review of Systems:   As mentioned in subjective.    Patient Active Problem List   Diagnosis     Primary osteoarthritis of knees, bilateral     Primary hypertension     BPH (benign prostatic hyperplasia)     Combined hyperlipidemia     Dizziness     DJD (degenerative joint disease)     Left ventricular hypertrophy     Troponin level elevated     Acute combined systolic and diastolic congestive heart failure (H)     Pulmonary hypertension (H)     Nonrheumatic aortic valve insufficiency     Coronary artery disease due to lipid rich plaque     MARIAN (generalized anxiety disorder)     GERD (gastroesophageal reflux disease)     Infection due to 2019 novel coronavirus     Dilated cardiomyopathy (H)     Persistent atrial fibrillation (H)     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)     Stage 2 chronic kidney disease     Hearing loss     Arthritis, lumbar spine     Peripheral edema     Elevated TSH     Elevated troponin     Pleural effusion on right     Acute on chronic congestive heart failure, unspecified heart failure type (H)     Status post catheter ablation of atrial fibrillation     Heart failure with reduced ejection fraction (H)     Hyperkalemia     Hyponatremia     Cardiogenic shock (H)       Scheduled Meds:    amiodarone  200 mg Oral Every Other Day     aspirin  81 mg Oral Q Mon Wed Fri AM     bumetanide  2 mg Oral Daily     doxazosin  4 mg Oral At Bedtime     finasteride  5 mg Oral At Bedtime     gabapentin  200 mg Oral QAM AC     gabapentin  200 mg Oral Daily before lunch     gabapentin  300 mg Oral QPM     pantoprazole  40 mg Oral BID     rivaroxaban ANTICOAGULANT  20 mg Oral Daily with supper     sennosides  8.6 mg Oral BID     sertraline  50 mg Oral BID     sodium chloride (PF)  10-40 mL Intracatheter Q7 Days     sodium chloride (PF)  3 mL Intracatheter Q8H     Continuous Infusions:    - MEDICATION INSTRUCTIONS -       PRN Meds:.acetaminophen **OR** acetaminophen, alum & mag hydroxide-simethicone, calcium carbonate, artificial tears, dextrose 5%, glucose **OR** dextrose **OR** glucagon, doxylamine, lidocaine (buffered or not buffered), melatonin, nitroGLYcerin, ondansetron **OR** ondansetron, - MEDICATION INSTRUCTIONS -, polyethylene glycol, sodium chloride (PF), sodium chloride (PF), sodium chloride (PF), urea    Objective:  Vital signs in last 24 hours:  Temp:  [97.4  F (36.3  C)-98.3  F (36.8  C)] 98.1  F (36.7  C)  Pulse:  [67-69] 69  Resp:  [18-20] 18  BP: ()/(54-61) 93/60  SpO2:  [91 %-93 %] 93 %      Physical Exam:  General: Chronically ill appearing. In NAD  HEENT: NCAT & EOMI  CV: Normal S1S2, regular rhythm, normal rate  Lungs: CTAB  Abdomen: Soft, NT, ND, +BS  Extremities: No LE edema b/l  Neuro: AAOx3  Psych: Normal Affect    Lab Results:    Recent  Results (from the past 24 hour(s))   Basic metabolic panel    Collection Time: 02/01/23  5:23 AM   Result Value Ref Range    Sodium 134 (L) 136 - 145 mmol/L    Potassium 3.2 (L) 3.4 - 5.3 mmol/L    Chloride 92 (L) 98 - 107 mmol/L    Carbon Dioxide (CO2) 36 (H) 22 - 29 mmol/L    Anion Gap 6 (L) 7 - 15 mmol/L    Urea Nitrogen 18.6 8.0 - 23.0 mg/dL    Creatinine 0.83 0.67 - 1.17 mg/dL    Calcium 9.0 8.8 - 10.2 mg/dL    Glucose 113 (H) 70 - 99 mg/dL    GFR Estimate 86 >60 mL/min/1.73m2   Magnesium    Collection Time: 02/01/23  5:23 AM   Result Value Ref Range    Magnesium 2.0 1.7 - 2.3 mg/dL       Serum Glucose range:   Recent Labs   Lab 02/01/23  0523 01/31/23  0458 01/30/23  0602 01/29/23  0616   * 123* 112* 131*     ABG: No lab results found in last 7 days.  CBC:   Recent Labs   Lab 01/28/23  0517 01/27/23  0541   WBC 8.9 7.5   HGB 12.3* 11.5*   HCT 40.5 37.3*   MCV 98 97    274   NEUTROPHIL 87 88   LYMPH 5 4   MONOCYTE 7 7   EOSINOPHIL 0 0     Chemistry:   Recent Labs   Lab 02/01/23  0523 01/31/23  0458 01/30/23  0602   * 133* 137   POTASSIUM 3.2* 3.3* 4.0   CHLORIDE 92* 91* 94*   CO2 36* 38* 37*   BUN 18.6 19.0 18.6   CR 0.83 0.80 0.74   GFRESTIMATED 86 87 89   ANGELY 9.0 8.7* 8.8   MAG 2.0 2.0 2.1     Coags:  No results for input(s): INR, PROTIME, PTT in the last 168 hours.    Invalid input(s): APTT  Cardiac Markers:  No results for input(s): CKTOTAL, TROPONINI in the last 168 hours.               02/01/2023   Linda Dial MD  Hospitalist  Pager: [phone]

## 2023-02-01 NOTE — TELEPHONE ENCOUNTER
Pt's daughter was called to inform that appt should be kept as is and that there is only a monthly billing for the HRS and no way to put it on hold. Informed they can take the kit to the TCU as well.    No further questions or concerns.    Damian ABREU RN  BSN

## 2023-02-01 NOTE — PLAN OF CARE
Problem: Plan of Care - These are the overarching goals to be used throughout the patient stay.    Goal: Absence of Hospital-Acquired Illness or Injury  Outcome: Progressing  Intervention: Identify and Manage Fall Risk  Recent Flowsheet Documentation  Taken 2/1/2023 0815 by Nilsa Dwyer RN  Safety Promotion/Fall Prevention:   assistive device/personal items within reach   fall prevention program maintained   increased rounding and observation   nonskid shoes/slippers when out of bed   patient and family education   safety round/check completed     Problem: Plan of Care - These are the overarching goals to be used throughout the patient stay.    Goal: Optimal Comfort and Wellbeing  Outcome: Progressing   Goal Outcome Evaluation:      Plan of Care Reviewed With: patient    Overall Patient Progress: improvingOverall Patient Progress: improving     Pt is AXOX3/4 and forgetful at times, on RA, and assistx2. PICC line flushed, unit draw. Denies pain. Pt sitting upright with meds. 1500 ml fluid restriction. Pt takes meds with applesauce. Continue to monitor. Pt to discharge pending TCU placement.

## 2023-02-01 NOTE — PLAN OF CARE
Problem: Plan of Care - These are the overarching goals to be used throughout the patient stay.    Goal: Plan of Care Review  Description: The Plan of Care Review/Shift note should be completed every shift.  The Outcome Evaluation is a brief statement about your assessment that the patient is improving, declining, or no change.  This information will be displayed automatically on your shift note.  Outcome: Progressing     Problem: Plan of Care - These are the overarching goals to be used throughout the patient stay.    Goal: Readiness for Transition of Care  Outcome: Progressing     Problem: Risk for Delirium  Goal: Optimal Coping  Outcome: Progressing  Goal: Improved Behavioral Control  Intervention: Minimize Safety Risk  Recent Flowsheet Documentation  Taken 1/31/2023 1730 by Nallely Solo RN  Enhanced Safety Measures:   bed alarm set   chair alarm set  Goal: Improved Attention and Thought Clarity  Outcome: Progressing     Problem: Cardiac Catheterization (Diagnostic/Interventional)  Goal: Absence of Vascular Access Complication  Intervention: Prevent Access Site Complications  Recent Flowsheet Documentation  Taken 1/31/2023 1730 by Nallely Solo RN  Activity Management: activity adjusted per tolerance     Problem: Pain Acute  Goal: Optimal Pain Control and Function  Outcome: Progressing  Intervention: Prevent or Manage Pain  Recent Flowsheet Documentation  Taken 1/31/2023 1730 by Nallely Solo RN  Medication Review/Management: medications reviewed     Problem: Oral Intake Inadequate  Goal: Improved Oral Intake  Outcome: Progressing   Goal Outcome Evaluation:               Alert. Oriented. Forgetful at times.    Denied pain.     Weak, easily tired. Up on chair for dinner. Poor appetite. Needed encouragement to eat more. Assist of 2 with transfer. Falls education and interventions in placed. Daughter at bedside helping patient till end of visiting hours.     Slow with swallowing. Sitting up right. Tolerated  mechanical soft diet.      L heel with blackish reddish pressure site? Skin intact . Note left to MD. Mepilex placed. Off load heels with pillow.

## 2023-02-01 NOTE — PROGRESS NOTES
[facility]  WOC Nurse Inpatient Assessment     2.1.23 Received call from nurse regarding heel. Wound care plans in place, but heel worse per nursing report.  Discussed with nurse the need for new WOC consult as the old order has been completed/signed off. Nursing will request order from physician. See below for last in person WOC assessment. Brigitte Ford RN CWOCN    Consulted for: L heel    Patient History (according to provider note(s):      Reymundo Petersen is a 82 year old male admitted on 1/23/2023. He lives at home with family.  He presented via EMS with fatigue, confusion and dehydration.  On presentation labs showed hyponatremia, hyperkalemia and acute kidney injury.    Areas Assessed:      Skin Injury Location: L heel    1/25/23    Last photo: 1/23  Skin injury due to: Mixed etiology: likely vascular vs pressure  Skin history and plan of care:   Daughters at bedside described hx of fissures to feet  Affected area:      Skin assessment: Intact     Measurements (length x width x depth, in cm) 1  x 1  cm - lavender hue, erythema to bottoms of bilateral feet     Color: red     Temperature  warm     Drainage: none .      Color: none      Odor: none  Pain: absent, none  Pain interventions prior to dressing change: slow and gentle cares   Treatment goal: offload pressure  STATUS: initial assessment  Supplies ordered: supplies stored on unit      Treatment Plan:     L heel  1. Protect heel with mepilex dressing, change every three days or PRN if soiled, falls off  2. Offload heels in bed at all times    Orders: Written    RECOMMEND PRIMARY TEAM ORDER: None, at this time  Education provided: importance of repositioning and Off-loading pressure  Discussed plan of care with: Family  WOC nurse follow-up plan: weekly  Notify WOC if wound(s) deteriorate.  Nursing to notify the Provider(s) and re-consult the WOC Nurse if new skin concern.    DATA:     Current support surface: Standard  Standard  gel/foam mattress (IsoFlex, Atmos air, etc)  Containment of urine/stool: Incontinence Protocol  BMI: Body mass index is 23.34 kg/m .   Active diet order: Orders Placed This Encounter      Combination Diet Mechanical/Dental Soft Diet; Low Saturated Fat Na <2400mg Diet     Output: I/O last 3 completed shifts:  In: 240 [P.O.:240]  Out: 600 [Urine:600]     Labs:   Recent Labs   Lab 01/28/23  0517   HGB 12.3*   WBC 8.9     Pressure injury risk assessment:   Sensory Perception: 3-->slightly limited  Moisture: 3-->occasionally moist  Activity: 2-->chairfast  Mobility: 3-->slightly limited  Nutrition: 2-->probably inadequate  Friction and Shear: 2-->potential problem  Lawrence Score: 15    SHERI ROMON RN CWOCN

## 2023-02-01 NOTE — PLAN OF CARE
Patient denied pain and was able to sleep for most of the night. Took PRN Tums for indigestion.       Vitals:    01/31/23 0740 01/31/23 1603 01/31/23 1959 01/31/23 2337   BP: 105/60 97/54 105/61 104/58   BP Location: Right arm Right arm Right arm Left arm   Patient Position:   Semi-Estrada's    Cuff Size:       Pulse: 64 67 68 68   Resp: 16 20  20   Temp: 97.8  F (36.6  C) 97.4  F (36.3  C)  98.3  F (36.8  C)   TempSrc: Axillary Oral  Oral   SpO2: 91% 91% 93% 91%   Weight:       Height:            Problem: Plan of Care - These are the overarching goals to be used throughout the patient stay.    Goal: Optimal Comfort and Wellbeing  Outcome: Progressing     Problem: Risk for Delirium  Goal: Improved Attention and Thought Clarity  Outcome: Progressing  Goal: Improved Sleep  Outcome: Progressing     Problem: Cardiac Catheterization (Diagnostic/Interventional)  Goal: Acceptable Pain Control  Outcome: Progressing   Goal Outcome Evaluation:

## 2023-02-01 NOTE — PROGRESS NOTES
Care Management Follow Up    Length of Stay (days): 9    Expected Discharge Date: 02/02/2023     Concerns to be Addressed:   TCU acceptance       Patient plan of care discussed at interdisciplinary rounds: Yes     Anticipated Discharge Disposition:  TCU     Anticipated Discharge Services:  TCU     Anticipated Discharge DME:  None        Education Provided on the Discharge Plan:  Yes     Patient/Family in Agreement with the Plan:  Yes        Referrals Placed by CM/SW:  TCU        Additional Information:  Patient presented via EMS with fatigue, confusion and dehydration.  On presentation labs revealed hyponatremia, hyperkalemia and acute kidney injury. Gi following for esophageal dysmotility-modified diet recommended. Palliative consult-goals of care: restorative (signed off 1/31), referral to outpatient Palliative Clinic. Nephrology consulted. Cardiology signed off 1/30.    Patient medically ready for discharge.      Therapy: Patient currently requires assist of 1-2 for all mobility due to fatigue and weakness. Recommend TCU for continued rehab to improve functional mobility and strength. IF patient were to return home, patient would continue to require 24/7 care from family and a chair lift to safely enter the home.        Social History:  Patient lives in his house. He is typically independent at baseline, however daughters have been staying with him 24/7. Patient has a sister in law that is currently residing in the home with patient (has been in the home for 18 yrs). Daughters working on facilitating the sister in law moving out.   Daughters are very involved in patient's care.   If patient needs TCU- first choices are [facility]. Transportation TBD.      Patient follows with Dr Prakash for EP and a-fib clinic.    Met with patient and dtr-Juliet. Juliet mentioning interest in facilities having openings for LTC post TCU stay. Update on discharge planning provided and requesting more TCU  preferences. Provided them with [facility] TCU list. Instructed on need for 4-5 preferences. Reviewed need to discharge to first available opening.    Resent and called referrals to preferences: [facility] and [facility]. Juliet agreed to referral to [facility]. She will provide more after reviewing list.    3:36 PM Patient accepted for TCU at [facility]. Patient and daughter Juliet agree to facility. [facility] scheduled for 1500 per Nayely in admissions request. PAS needed.            Daiana Feliz RN

## 2023-02-02 NOTE — PLAN OF CARE
Problem: Plan of Care - These are the overarching goals to be used throughout the patient stay.    Goal: Absence of Hospital-Acquired Illness or Injury  Intervention: Identify and Manage Fall Risk  Recent Flowsheet Documentation  Taken 2/2/2023 0000 by Kajal Henry RN  Safety Promotion/Fall Prevention:   activity supervised   bed alarm on   clutter free environment maintained   increase visualization of patient   lighting adjusted   nonskid shoes/slippers when out of bed     Problem: Plan of Care - These are the overarching goals to be used throughout the patient stay.    Goal: Optimal Comfort and Wellbeing  Outcome: Progressing   Goal Outcome Evaluation:         Pt. Denies pain, VSS  Grouping cares to help promote sleep  Primo fit in place, bed alarm on, call light in reach  Plan for discharge to TCU today

## 2023-02-02 NOTE — DISCHARGE SUMMARY
[facility] MEDICINE  DISCHARGE SUMMARY     Primary Care Physician: Jamie Noguera  Admission Date: 1/23/2023   Discharge Provider: Linda Dial MD Discharge Date: 2/2/2023   Diet:    Code Status: No CPR- Do NOT Intubate   Activity: DCACTIVITY: Activity as tolerated        Condition at Discharge: Stable     REASON FOR PRESENTATION(See Admission Note for Details)   Fatigue, Confusion, Dehydration      PRINCIPAL & ACTIVE DISCHARGE DIAGNOSES     Principal Problem:    Cardiogenic shock (H)  Active Problems:    Primary hypertension    BPH (benign prostatic hyperplasia)    DJD (degenerative joint disease)    Acute combined systolic and diastolic congestive heart failure (H)    Pulmonary hypertension (H)    Coronary artery disease due to lipid rich plaque    MARIAN (generalized anxiety disorder)    Hyperkalemia    Hyponatremia      PENDING LABS     Unresulted Labs Ordered in the Past 30 Days of this Admission     No orders found from 12/24/2022 to 1/24/2023.            PROCEDURES ( this hospitalization only)      Procedure(s):  Right Heart Catheterization    RECOMMENDATIONS TO OUTPATIENT PROVIDER FOR F/U VISIT     Follow-up Appointments     Follow Up and recommended labs and tests      Follow up with facility physician. Please check BMP within next 5 days.    Follow up with primary care provider in 1 week from TCU discharge.  The   following labs/tests are recommended: BMP & magnesium level.    Follow up with Cardiology per prior discussions.                 DISPOSITION     Skilled Nursing Facility    SUMMARY OF HOSPITAL COURSE:    Reymundo Petersen is a 82 year old male with history of HFrEF, persistent Afib on xarelto, BPH, Dysphagia, DJD and general anxiety disorder who presented via EMS with fatigue, confusion and dehydration.  On presentation labs revealed hyponatremia, hyperkalemia and acute kidney injury.     Hyperkalemia  - Resolved  - Likely multifactorial from potassium  supplements, SHERLYN  - Patient has received Lokelma and a loop diuretic on admission  - Nephrology consulted, recommendations appreciated     Hyponatremia  - Improved     Hypokalemia  - Resumed on lower daily KCl supplement, 20 mEq daily     Acute kidney injury - Resolved  - Suspect prerenal/dehydration from overdiuresis/CRS  - Bumetanide 2 mg daily started January 27.     Cardiomyopathy  Heart failure with reduced EF  Cardiogenic shock  - Dobutamine infusion started January 25 and weaned off January 27  - Bumetanide p.o. 2 mg daily started Jan 27, lower than previous dose of 2 mg twice daily  - Most recent transthoracic echocardiogram December 19, showed a left ventricular ejection fraction of 35 to 40%  - Cardiology consulted, recommendations appreciated  - Per cardiology hold beta-blocker now and  post discharge.  - S/p RHC January 25, reveals elevated right and left sided filling pressures consistent with volume overload/pulm hypertension. RA 15, RV 50/5; RVEDP 15 , PA 54/20 mean 30 and Wedge 20     Persistent atrial fibrillation  Complete heart block  - S/p ablation and DCCV  - S/p CRT-P  - Continue rivaroxaban and amiodarone   - Hold metoprolol per cardiology.  - He was being considered for watchman outpatient     BPH with LUTS  - Continue doxazosin with holding parameters     Dysphagia  - SLP evaluation appreciated  - Concern for esophageal dysmotility, esophagram ordered and reviewed.  Showed moderate esophageal dysmotility no stricture or narrowing.  - GI consulted, esophagram reviewed  - No indication for intervention presently including EGD.  Diet modification per SLP recommended.    - Twice daily PPI  - Appreciate RD consult     General anxiety disorder  - Continue sertraline     Degenerative joint disease  - Stable, Tylenol for pain     Pressure injury left heel  - Decubitus ulcer prophylactic measures  - Urea 20% cream bilaterally  - Wound care consult     Weakness and physical deconditioning  -  Multifactorial from the above  - Family have expressed concerns about returning home.  - Given his frailty and high readmission risk cardiology has initiated palliative care consult for goals of care discussion.  Family is in agreement.  Should he rapidly decline will be transition to hospice.  CODE STATUS changed to DNR/DNI January 27  - Discharge to TCU    Discharge Medications with Med changes:     Discharge Medication List as of 2/2/2023  2:38 PM      START taking these medications    Details   pantoprazole (PROTONIX) 40 MG EC tablet Take 1 tablet (40 mg) by mouth 2 times daily, Historical      sennosides (SENOKOT) 8.6 MG tablet Take by mouth 2 times daily, Historical         CONTINUE these medications which have CHANGED    Details   acetaminophen (TYLENOL) 325 MG tablet Take 2 tablets (650 mg) by mouth every 6 hours as needed for mild pain or other (and adjunct with moderate or severe pain or per patient request) Alternate with ibuprofen if ordered. Maximum acetaminophen dose from all sources = 75 mg/kg/day not to excee d 4 grams/day., Historical      bumetanide (BUMEX) 2 MG tablet Take 1 tablet (2 mg) by mouth daily, Historical      doxazosin (CARDURA) 4 MG tablet Take 1 tablet (4 mg) by mouth At Bedtime, R-0, Historical      !! gabapentin (NEURONTIN) 100 MG capsule Take 2 capsules (200 mg) by mouth daily before breakfast, Historical      !! gabapentin (NEURONTIN) 100 MG capsule Take 2 capsules (200 mg) by mouth daily (before lunch), Historical      !! gabapentin (NEURONTIN) 300 MG capsule Take 1 capsule (300 mg) by mouth every evening, Historical      potassium chloride ER (KLOR-CON M) 20 MEQ CR tablet Take 1 tablet (20 mEq) by mouth daily, Historical       !! - Potential duplicate medications found. Please discuss with provider.      CONTINUE these medications which have NOT CHANGED    Details   aspirin (ASA) 81 MG chewable tablet Take 1 tablet (81 mg) by mouth every other day, Disp-30 tablet, R-0, OTC       atorvastatin (LIPITOR) 10 MG tablet Take 10 mg by mouth four times a week Sunday, Tuesday, Thursday, & Saturday at bedtime, Historical      Coenzyme Q10 300 MG CAPS Take 300 mg by mouth daily, Historical      finasteride (PROSCAR) 5 mg tablet [FINASTERIDE (PROSCAR) 5 MG TABLET] Take 5 mg by mouth at bedtime.       , Historical      glucosamine/chondr cole A sod (OSTEO BI-FLEX ORAL) Take 1 tablet by mouth 2 times daily (with meals), Historical      melatonin 10 mg Tab Take 10 mg by mouth At Bedtime , Historical      multivitamin, therapeutic (THERA-VIT) TABS tablet Take 1 tablet by mouth daily, Historical      nitroGLYcerin (NITROSTAT) 0.4 MG sublingual tablet For chest pain place 1 tablet under the tongue every 5 minutes for 3 doses. If symptoms persist 5 minutes after 1st dose call 911., Disp-9 tablet, R-0, E-Prescribe      rivaroxaban ANTICOAGULANT (XARELTO) 20 MG TABS tablet Take 1 tablet (20 mg) by mouth daily (with dinner), Disp-90 tablet, R-3, Local Print      sertraline (ZOLOFT) 50 MG tablet Take 50 mg by mouth 2 times daily , Historical      amiodarone (PACERONE) 200 MG tablet TAKE 1 TABLET (200 MG) BY MOUTH EVERY OTHER DAY, Disp-45 tablet, R-0, E-PrescribeDX Code Needed  .         STOP taking these medications       diphenhydrAMINE-acetaminophen (TYLENOL PM)  MG tablet Comments:   Reason for Stopping:         hydrocortisone 2.5 % cream Comments:   Reason for Stopping:         metoprolol succinate ER (TOPROL-XL) 25 MG 24 hr tablet Comments:   Reason for Stopping:         omeprazole (PRILOSEC) 20 MG capsule Comments:   Reason for Stopping:         vit A/vit C/vit E/zinc/copper (PRESERVISION AREDS ORAL) Comments:   Reason for Stopping:                 Consults   - Gastroenterology    - Nephrology  - Cardiology   - Palliative Care      Immunizations given this encounter     Most Recent Immunizations   Administered Date(s) Administered     COVID-19 Vaccine ([employer]) 04/10/2021     COVID-19 Vaccine 12+  (Pfizer 2022) 06/23/2022     COVID-19 Vaccine 12+ (Pfizer) 06/23/2022     COVID-19 Vaccine Bivalent Booster 12+ (Pfizer) 10/13/2022     FLU 6-35 months 09/10/2020     FLUAD(HD)65+ QUAD 10/22/2021     Flu, Unspecified 09/10/2020     Influenza (High Dose) 3 valent vaccine 08/25/2018     Influenza (IIV3) PF 10/28/2013     Influenza Vaccine 65+ (Fluzone HD) 10/13/2022     Influenza Vaccine, 6+MO IM (QUADRIVALENT W/PRESERVATIVES) 10/09/2017     Pneumo Conj 13-V (2010&after) 02/17/2015     Pneumococcal 23 valent 08/03/2006     Pneumococcal, Unspecified 08/03/2006     TD (ADULT, 7+) 05/01/2013     TDAP Vaccine (Boostrix) 10/10/2017     Tdap (Adacel,Boostrix) 11/07/2013          SIGNIFICANT IMAGING FINDINGS     Results for orders placed or performed during the hospital encounter of 01/23/23   XR Chest 2 Views    Impression    IMPRESSION:    Small to moderate bilateral pleural effusions, larger on the right. There are adjacent airspace opacities which likely reflect atelectasis, although infection is not excluded. Upper lungs are clear. No pneumothorax.    Stable enlarged cardiac silhouette. Unchanged right subclavian approach pacemaker.   XR Chest Port 1 View    Impression    IMPRESSION: There is a new PICC catheter from a left upper extremity approach with tip in the superior vena cava near its junction with the right atrium. There is no pneumothorax. Bilateral pleural effusions mild cardiomegaly are unchanged. Right   subclavian approach pacemaker with leads over the right atrium, right ventricle and coronary sinus is unchanged. Bibasilar atelectasis or consolidation is stable. There are degenerative changes in both shoulders.   XR Esophagram    Impression    IMPRESSION:     1.  Moderate esophageal dysmotility.    2.  No stricture or narrowing.         SIGNIFICANT LABORATORY FINDINGS     Most Recent 3 CBC's:Recent Labs   Lab Test 01/28/23  0517 01/27/23  0541 01/24/23  0520   WBC 8.9 7.5 8.4   HGB 12.3* 11.5* 12.6*   MCV  98 97 94    274 305     Most Recent 3 BMP's:Recent Labs   Lab Test 02/02/23  0434 02/01/23  0523 01/31/23  0458   * 134* 133*   POTASSIUM 3.5 3.2* 3.3*   CHLORIDE 92* 92* 91*   CO2 35* 36* 38*   BUN 17.5 18.6 19.0   CR 0.81 0.83 0.80   ANIONGAP 7 6* 4*   ANGELY 8.8 9.0 8.7*   * 113* 123*         Discharge Orders        Primary Care - Care Coordination Referral      General info for SNF    Length of Stay Estimate: Short Term Care: Estimated # of Days <30  Condition at Discharge: Improving  Level of care:skilled   Rehabilitation Potential: Good  Admission H&P remains valid and up-to-date: Yes  Recent Chemotherapy: N/A  Use Nursing Home Standing Orders: Yes     Mantoux instructions    Give two-step Mantoux (PPD) Per Facility Policy Yes     Follow Up and recommended labs and tests    Follow up with facility physician. Please check BMP within next 5 days.    Follow up with primary care provider in 1 week from TCU discharge.  The following labs/tests are recommended: BMP & magnesium level.    Follow up with Cardiology per prior discussions.     Reason for your hospital stay    Hyperkalemia     Activity - Up with nursing assistance     Physical Therapy Adult Consult    Evaluate and treat as clinically indicated.     Occupational Therapy Adult Consult    Evaluate and treat as clinically indicated.     Fall precautions     Diet    Follow this diet upon discharge: Orders Placed This Encounter      Fluid restriction 1500 ML FLUID      Snacks/Supplements Adult: Ensure Enlive; Between Meals      Snacks/Supplements Adult: Magic Cup; With Meals      Combination Diet Mechanical/Dental Soft Diet; Low Saturated Fat Na <2400mg Diet       Examination   General: Chronically ill appearing. In NAD  HEENT: NCAT & EOMI  CV: Normal S1S2, regular rhythm, normal rate  Lungs: CTAB  Abdomen: Soft, NT, ND, +BS  Extremities: No LE edema b/l  Neuro: AAOx3  Psych: Normal Affect    Please see EMR for more detailed significant labs,  imaging, consultant notes etc.    I, Linda Dial MD, personally saw the patient today and spent greater than 30 minutes discharging this patient.    Linda Dial MD  [facility]    CC:Jamie Noguera

## 2023-02-02 NOTE — PROGRESS NOTES
Cross cover:    Nurse reported that pt needs large pill to be crushed. Gabapentin ordered cannot be crushed and nurse requested alternative.    Plan:hold current gabapentin and defer to hospitalist to address this issue in am; give one dose of gabapentin in solution formula tonight.

## 2023-02-02 NOTE — PROGRESS NOTES
Care Management Discharge Note    Discharge Date: 02/02/2023       Discharge Disposition: Transitional Care    Discharge Services: None    Discharge DME: None    Discharge Transportation:  [facility], w/c ride at 1500    Private pay costs discussed: potential transportation cost    PAS Confirmation Code:  Completed, on chart, [id number]    Patient/family educated on Medicare website which has current facility and service quality ratings:  yes    Education Provided on the Discharge Plan:  Yes    Persons Notified of Discharge Plans: yes      Patient/Family in Agreement with the Plan: yes    Handoff Referral Completed:  yes    Additional Information:    Discharge plan confirmed. Patient to discharge to [facility].         Daiana Feliz RN

## 2023-02-02 NOTE — PLAN OF CARE
Occupational Therapy Discharge Summary    Reason for therapy discharge:    Discharged to transitional care facility.    Progress towards therapy goal(s). See goals on Care Plan in [facility] electronic health record for goal details.  Goals not met.  Barriers to achieving goals:   discharge from facility.    Therapy recommendation(s):    Continued therapy is recommended.  Rationale/Recommendations:  continue OT service at TCU to promote ability to perform ADLs and functional mobility.

## 2023-02-02 NOTE — PLAN OF CARE
Patient is Alert, denies pain, is stable.  Met with PT, and WOC.  Patient is set to discharge at 3 pm.  [facility]  Daughter is at the bedside.    Tono Meredith RN

## 2023-02-02 NOTE — PROGRESS NOTES
Physical Therapy Discharge Summary    Reason for therapy discharge:    Discharged to transitional care facility.    Progress towards therapy goal(s). See goals on Care Plan in [facility] electronic health record for goal details.  Goals partially met.  Barriers to achieving goals:   discharge from facility.    Therapy recommendation(s):    Continued therapy is recommended.  Rationale/Recommendations:  TCU to progress functional mobility and strength.

## 2023-02-02 NOTE — PROGRESS NOTES
[facility]  WOC Nurse Inpatient Assessment     Consulted for: L heel - reconsulted for same concern, reported worsening 2/2    Patient History (according to provider note(s):      Reymundo Petersen is a 82 year old male admitted on 1/23/2023. He lives at home with family.  He presented via EMS with fatigue, confusion and dehydration.  On presentation labs showed hyponatremia, hyperkalemia and acute kidney injury.    Areas Assessed:      Skin Injury Location: L heel    1/25/23    Last photo: 1/23  Skin injury due to: Mixed etiology: likely vascular vs pressure  Skin history and plan of care:   Daughters at bedside described hx of fissures to feet  Affected area:      Skin assessment: Intact     Measurements (length x width x depth, in cm) 1  x 1  cm - lavender hue, erythema to bottoms of bilateral feet     Color: red     Temperature  warm     Drainage: none .      Color: none      Odor: none  Pain: absent, none  Pain interventions prior to dressing change: slow and gentle cares   Treatment goal: offload pressure  STATUS: initial assessment and no change noted compared to initial assessment  Supplies ordered: supplies stored on unit      Treatment Plan:     L heel  1. Protect heel with mepilex dressing, change every three days or PRN if soiled, falls off  2. Offload heels in bed at all times    Orders: Written    RECOMMEND PRIMARY TEAM ORDER: None, at this time  Education provided: importance of repositioning and Off-loading pressure  Discussed plan of care with: Family  WOC nurse follow-up plan: weekly  Notify WOC if wound(s) deteriorate.  Nursing to notify the Provider(s) and re-consult the WOC Nurse if new skin concern.    DATA:     Current support surface: Standard  Standard gel/foam mattress (IsoFlex, Atmos air, etc)  Containment of urine/stool: Incontinence Protocol  BMI: Body mass index is 23.34 kg/m .   Active diet order: Orders Placed This Encounter      Combination Diet  Mechanical/Dental Soft Diet; Low Saturated Fat Na <2400mg Diet      Diet     Output: I/O last 3 completed shifts:  In: 1240 [P.O.:1240]  Out: 1700 [Urine:1700]     Labs:   Recent Labs   Lab 01/28/23  0517   HGB 12.3*   WBC 8.9     Pressure injury risk assessment:   Sensory Perception: 4-->no impairment  Moisture: 3-->occasionally moist  Activity: 2-->chairfast  Mobility: 3-->slightly limited  Nutrition: 3-->adequate  Friction and Shear: 2-->potential problem  Lawrence Score: 17    SHERI ROMON RN CWOCN

## 2023-02-02 NOTE — PLAN OF CARE
Goal Outcome Evaluation:    Pt aox4, forgetful. LS diminished. On RA. Pt on aspiration precautions. Takes meds with applesauce. 1500mL fluid restriction. Ate 50% of dinner. No pain. Turns q2h. Bed alarm on, call light in reach.     Problem: Oral Intake Inadequate  Goal: Improved Oral Intake  Outcome: Progressing     Problem: Pain Acute  Goal: Optimal Pain Control and Function  Outcome: Progressing  Intervention: Develop Pain Management Plan  Recent Flowsheet Documentation  Taken 2/1/2023 1945 by Eugenia Hoffman RN  Pain Management Interventions:    care clustered    distraction    emotional support    pillow support provided    medication (see MAR)  Intervention: Prevent or Manage Pain  Recent Flowsheet Documentation  Taken 2/1/2023 2000 by Eugenia Hoffman RN  Sensory Stimulation Regulation: care clustered  Medication Review/Management: medications reviewed  Taken 2/1/2023 1600 by Eugenia Hoffman RN  Sensory Stimulation Regulation: care clustered  Medication Review/Management: medications reviewed     Problem: Risk for Delirium  Goal: Optimal Coping  Outcome: Progressing  Goal: Improved Behavioral Control  Outcome: Progressing  Intervention: Minimize Safety Risk  Recent Flowsheet Documentation  Taken 2/1/2023 2000 by Eugenia Hoffman RN  Enhanced Safety Measures: bed alarm set  Taken 2/1/2023 1600 by Eugenia Hoffman RN  Enhanced Safety Measures: bed alarm set  Goal: Improved Attention and Thought Clarity  Outcome: Progressing  Intervention: Maximize Cognitive Function  Recent Flowsheet Documentation  Taken 2/1/2023 2000 by Eugenia Hoffman RN  Sensory Stimulation Regulation: care clustered  Reorientation Measures: clock in view  Taken 2/1/2023 1600 by Eugenia Hoffman RN  Sensory Stimulation Regulation: care clustered  Reorientation Measures: clock in view  Goal: Improved Sleep  Outcome: Progressing

## 2023-02-06 PROBLEM — K44.9 DIAPHRAGMATIC HERNIA: Status: ACTIVE | Noted: 2017-03-23

## 2023-02-06 PROBLEM — R10.84 GENERALIZED ABDOMINAL PAIN: Status: ACTIVE | Noted: 2017-02-08

## 2023-02-06 PROBLEM — R19.7 DIARRHEA: Status: ACTIVE | Noted: 2017-02-08

## 2023-02-06 PROBLEM — K57.30 DIVERTICULAR DISEASE OF LARGE INTESTINE: Status: ACTIVE | Noted: 2017-03-23

## 2023-02-06 PROBLEM — K21.00 GASTRO-ESOPHAGEAL REFLUX DISEASE WITH ESOPHAGITIS: Status: ACTIVE | Noted: 2017-03-23

## 2023-02-06 NOTE — PROGRESS NOTES
[facility] GERIATRIC SERVICES    Code Status:  DNR/DNI   Visit Type:   Chief Complaint   Patient presents with     TCU Admission     [facility] 1/23/2023 - 2/2/2023     Facility:  [facility] ([facility]) [[id number]]               HPI: Reymundo Petersen is a 82 year old male who I am seeing today for admit to the TCU.  Patient recently hospitalized on 1/23/2023 secondary to fatigue, confusion and dehydration.  Patient with cardiogenic shock secondary to cardiomyopathy with acute on chronic congestive heart failure with reduced ejection fracture.  Patient initially treated with dobutamine infusion and transition to oral Bumex.  Transthoracic echocardiogram on December 19 showed a left ventricular ejection fracture of 35 to 40%.  Beta-blocker held.  Patient is status post RHC on 1/25 which showed elevated right and left-sided filling pressures consistent with volume overload and pulmonary hypertension.  Due to diuresis patient had an acute kidney injury.  Bumex held and then resumed.  Persistent atrial fib with complete heart block status post ablation and DCCV status post CRT/P.  Hyperkalemia initially however ended up with hypokalemia.  Hyponatremia replaced.  Dysphagia.  Patient was seen by speech and there was some concern for esophageal dysmotility.  General anxiety disorder on sertraline.  DJD on Tylenol for pain.  Pressure injury of the left heel being treated topically.  Overall weakness and fatigue with deconditioning.  Per hospital records cardiology initiated palliative care consult.    Transitional Care Course: Today patient sitting up in bedside chair.  His daughter is present on exam.  Review of the records showed patient had some elevated heart rates last evening around 1 12-1 18.  He had a low O2 sat of 85%.  Today vital signs are within normal limits.  I did discuss this with both patient and his daughter.  He does have underlying generalized anxiety disorder.  His daughter shared that there are some  major concerns with his current living conditions.  There was speak of hoarding by another family member who lives with the patient.  Patient reports some shortness of breath with exertion.  He is currently off oxygen.  O2 sats within normal limits.  He continues on Bumex.  Trace lower extremity edema.  Lung sounds are clear.  He denies any chest pain.  No palpitations.  I did talk with his daughter about cardiology recommendations.  For now they would like to continue TCU care and reevaluate.  We did talk about his elevated heart rates could be from his underlying atrial fib.  It was suggested that patient be considered for Watchman device.  Currently not on beta-blocker due to complete heart block.  Patient denies any dysphagia.  He is on a moist and minced diet.  Appears to be tolerating well.  No coughing.  Generalized anxiety disorder on sertraline.  Patient is reporting some pain in his elbows.  Right elbow slightly red.  Heel ulcer not observed.  Patient reports he is having bowel movements.  He is emptying his bladder.  Overall appetite poor.      Assessment/Plan:     Cardiogenic shock (H)  Acute on chronic congestive heart failure, unspecified heart failure type (H)  Pulmonary hypertension  -Echocardiogram 12/19 showed a left ventricular ejection fracture of 35 to 40%.  -Holding beta-blocker.  -Bumex 2 mg daily  -Potassium supplement.  -Daily weights.  -Cardiac diet.  -Monitor O2 sats.  -Cardiology did suggest palliative care.  Family declines at this time.  We will continue therapy and reevaluate.  -Okay for PT OT eval and treat.  -CO2 retention noted from laboratory today with CO2 in the 30s.  -Encourage incentive spirometer with pursed lip breathing.    Status post catheter ablation of atrial fibrillation  Complete heart block (H)  -Holding beta-blocker.  -Cardiology suggested Watchman procedure outpatient.  -Rate control with amiodarone.  Monitor heart rates  -Continue Xarelto. (There is a question on  dosing per pharmacy.  Creatinine clearance 76.  Continue current dosing.    Stage 2 chronic kidney disease  -Laboratory reviewed today.  Creatinine 0.80.  Sodium 138.  Potassium 4.1.  Magnesium 2.1.  -Monitor    Benign prostatic hyperplasia without lower urinary tract symptoms  -No evidence of urinary retention.  -Finasteride 5 mg daily.  -Cardura 4 mg daily.    Dysphagia, unspecified type  -Continue moist and minced diet.  -GI dysmotility noted from esophagram.  No stricture or narrowing.  -Continue PPI.  -Speech therapy to eval and treat.  -RD to consult for weight loss.    Anxiety disorder due to known physiological condition  -Sertraline daily.    Osteoarthritis of both knees, unspecified osteoarthritis type  -Tylenol every 4 hours.  For pain.    Pressure ulcer of the left heel.  -Continue topical treatment.  -Air mattress.  -Heals up.    Elbow soreness  -Elbow protectors  -Air mattress      Over 10 orders needed clarification from admit orders.  Orders reviewed and clarifications provided.      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     Dizziness 07/11/2018     DJD (degenerative joint disease) 07/11/2018     Left ventricular hypertrophy 07/31/2018     Troponin level elevated 03/25/2020     Acute combined systolic and diastolic congestive heart failure (H) 03/26/2020     Pulmonary hypertension (H) 04/01/2020     Nonrheumatic aortic valve insufficiency 04/01/2020     Coronary artery disease due to lipid rich plaque 10/15/2020     MARIAN (generalized anxiety disorder) 02/11/2021     GERD (gastroesophageal reflux disease)      Infection due to 2019 novel coronavirus 02/11/2021     Dilated cardiomyopathy (H) 02/11/2021     Persistent atrial fibrillation (H) 08/11/2020     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) 12/01/2020     Stage 2 chronic kidney disease 10/08/2021     Hearing loss 10/08/2021     Arthritis, lumbar spine 10/08/2021     Peripheral edema 03/14/2022     Elevated TSH 03/16/2022     Elevated troponin 12/19/2022     Pleural effusion on right 12/19/2022     Acute on chronic congestive heart failure, unspecified heart failure type (H) 12/19/2022     Status post catheter ablation of atrial fibrillation 01/04/2023     Heart failure with reduced ejection fraction (H) 01/12/2023     Hyperkalemia 01/23/2023     Hyponatremia 01/23/2023     Cardiogenic shock (H)      Generalized abdominal pain 02/08/2017     Gastro-esophageal reflux disease with esophagitis 03/23/2017     Diverticular disease of large intestine 03/23/2017     Diarrhea 02/08/2017     Diaphragmatic hernia 03/23/2017     Resolved Ambulatory Problems     Diagnosis Date Noted     Complete heart block (H) 03/24/2020     Bradycardia 07/11/2018     LBBB (left bundle branch block) 07/11/2018     S/P drug eluting coronary stent placement 05/30/2019     Cardiac pacemaker in situ 09/29/2020     Chronic combined systolic and diastolic heart failure (H) 02/11/2021     Weight loss 02/11/2021     COVID-19 02/12/2021     CAD (coronary artery disease) 07/11/2018     CHF (congestive heart failure) (H) 09/08/2021     Past Medical History:   Diagnosis Date     Atrial fibrillation (H)      Atrial fibrillation, transient (H) 08/11/2020     Congestive heart failure (H)      Coronary artery disease      COVID 02/2021     High cholesterol      Hyperlipidemia      Hypertension      Allergies   Allergen Reactions     Diltiazem Other (See Comments)     Contraindicated in HFrEF     Lactose GI Disturbance     Midodrine Other (See Comments)     Contraindicated in HFrEF       All Meds and Allergies reviewed in the record at the facility and is the most up-to-date.    Post Discharge Medication Reconciliation Status: discharge medications reconciled and changed, per  note/orders  Current Outpatient Medications   Medication Sig     acetaminophen (TYLENOL) 325 MG tablet Take 2 tablets (650 mg) by mouth every 6 hours as needed for mild pain or other (and adjunct with moderate or severe pain or per patient request) Alternate with ibuprofen if ordered. Maximum acetaminophen dose from all sources = 75 mg/kg/day not to exceed 4 grams/day.     amiodarone (PACERONE) 200 MG tablet TAKE 1 TABLET (200 MG) BY MOUTH EVERY OTHER DAY     aspirin (ASA) 81 MG chewable tablet Take 1 tablet (81 mg) by mouth every other day     atorvastatin (LIPITOR) 10 MG tablet Take 10 mg by mouth four times a week Sunday, Tuesday, Thursday, & Saturday at bedtime     bumetanide (BUMEX) 2 MG tablet Take 1 tablet (2 mg) by mouth daily     Coenzyme Q10 300 MG CAPS Take 300 mg by mouth daily     doxazosin (CARDURA) 4 MG tablet Take 1 tablet (4 mg) by mouth At Bedtime     finasteride (PROSCAR) 5 mg tablet [FINASTERIDE (PROSCAR) 5 MG TABLET] Take 5 mg by mouth at bedtime.            gabapentin (NEURONTIN) 100 MG capsule Take 200 mg by mouth 2 times daily (before meals) 2 capsules (200 mg) PO BID every morning before breakfast and every afternoon before lunch     gabapentin (NEURONTIN) 300 MG capsule Take 300 mg by mouth At Bedtime     glucosamine/chondr cole A sod (OSTEO BI-FLEX ORAL) Take 1 tablet by mouth 2 times daily (with meals)     melatonin 10 mg Tab Take 10 mg by mouth At Bedtime      multivitamin, therapeutic (THERA-VIT) TABS tablet Take 1 tablet by mouth daily     nitroGLYcerin (NITROSTAT) 0.4 MG sublingual tablet For chest pain place 1 tablet under the tongue every 5 minutes for 3 doses. If symptoms persist 5 minutes after 1st dose call 911.     pantoprazole (PROTONIX) 40 MG EC tablet Take 1 tablet (40 mg) by mouth 2 times daily     potassium chloride ER (KLOR-CON M) 20 MEQ CR tablet Take 1 tablet (20 mEq) by mouth daily     rivaroxaban ANTICOAGULANT (XARELTO) 20 MG TABS tablet Take 1 tablet (20 mg) by mouth  "daily (with dinner)     sennosides (SENOKOT) 8.6 MG tablet Take by mouth 2 times daily     sertraline (ZOLOFT) 50 MG tablet Take 50 mg by mouth 2 times daily      gabapentin (NEURONTIN) 100 MG capsule Take 2 capsules (200 mg) by mouth daily (before lunch) (Patient not taking: Reported on 2/6/2023)     No current facility-administered medications for this visit.       REVIEW OF SYSTEMS:   10 point review of systems reviewed and pertinent positives in the HPI.     PHYSICAL EXAMINATION:  Physical Exam     Vital signs: /70   Pulse 120   Temp 97.8  F (36.6  C)   Resp 18   Ht 1.778 m (5' 10\")   Wt 72.7 kg (160 lb 3.2 oz)   SpO2 (!) 85%   BMI 22.99 kg/m    General: Awake, Alert, oriented x2, sitting up in bedside chair appropriately, follows simple commands, conversant  HEENT: Pink conjunctiva, dry oral mucosa  NECK: Supple  CVS: Irregular irregular.  LUNG: Clear to auscultation, No wheezes, rales or rhonci.  BACK: No kyphosis of the thoracic spine  ABDOMEN: Soft, thin, nontender to palpation, with positive bowel sounds  EXTREMITIES: Moves both upper and lower extremities with diffuse weakness, no pedal edema, no calf tenderness  SKIN: Left heel with slight redness and bogginess to touch.  Right elbow slightly red and spongy.  NEUROLOGIC: Intact, pulses palpable  PSYCHIATRIC: Flat affect.      Labs:  All labs reviewed in the nursing home record and Epic   @  Lab Results   Component Value Date    WBC 8.9 01/28/2023     Lab Results   Component Value Date    RBC 4.15 01/28/2023     Lab Results   Component Value Date    HGB 12.3 01/28/2023     Lab Results   Component Value Date    HCT 40.5 01/28/2023     Lab Results   Component Value Date    MCV 98 01/28/2023     Lab Results   Component Value Date    MCH 29.6 01/28/2023     Lab Results   Component Value Date    MCHC 30.4 01/28/2023     Lab Results   Component Value Date    RDW 15.8 01/28/2023     Lab Results   Component Value Date     01/28/2023    "     @Last Comprehensive Metabolic Panel:  Sodium   Date Value Ref Range Status   02/06/2023 138 136 - 145 mmol/L Final     Potassium   Date Value Ref Range Status   02/06/2023 4.1 3.4 - 5.3 mmol/L Final   10/17/2022 4.2 3.5 - 5.0 mmol/L Final     Chloride   Date Value Ref Range Status   02/06/2023 96 (L) 98 - 107 mmol/L Final   10/17/2022 99 98 - 107 mmol/L Final     Carbon Dioxide (CO2)   Date Value Ref Range Status   02/06/2023 30 (H) 22 - 29 mmol/L Final   10/17/2022 30 22 - 31 mmol/L Final     Anion Gap   Date Value Ref Range Status   02/06/2023 12 7 - 15 mmol/L Final   10/17/2022 10 5 - 18 mmol/L Final     Glucose   Date Value Ref Range Status   02/06/2023 127 (H) 70 - 99 mg/dL Final   10/17/2022 99 70 - 125 mg/dL Final     GLUCOSE BY METER POCT   Date Value Ref Range Status   01/27/2023 248 (H) 70 - 99 mg/dL Final     Urea Nitrogen   Date Value Ref Range Status   02/06/2023 21.1 8.0 - 23.0 mg/dL Final   10/17/2022 27 8 - 28 mg/dL Final     Creatinine   Date Value Ref Range Status   02/06/2023 0.80 0.67 - 1.17 mg/dL Final     GFR Estimate   Date Value Ref Range Status   02/06/2023 87 >60 mL/min/1.73m2 Final     Comment:     eGFR calculated using 2021 CKD-EPI equation.   07/02/2021 >60 >60 mL/min/1.73m2 Final     Calcium   Date Value Ref Range Status   02/06/2023 9.0 8.8 - 10.2 mg/dL Final     45 minutes spent for this visit which included reviewing overall plan of care with both patient and his daughter as well as talking about palliative/hospice care.  At this time patient and family decline palliative/hospice.  They would like to continue with TCU care and reevaluate.    this note has been dictated using voice recognition software. Any grammatical or context distortions are unintentional and inherent to the software    Electronically signed by: Nitza Ludwig, CNP

## 2023-02-06 NOTE — LETTER
2/6/2023        RE: Reymundo Petersen  [address] [place] [address]        [facility]    Code Status:  DNR/DNI   Visit Type:   Chief Complaint   Patient presents with     TCU Admission     [facility] 1/23/2023 - 2/2/2023     Facility:  [facility] ([facility]) [[id number]]               HPI: Reymundo Petersen is a 82 year old male who I am seeing today for admit to the TCU.  Patient recently hospitalized on 1/23/2023 secondary to fatigue, confusion and dehydration.  Patient with cardiogenic shock secondary to cardiomyopathy with acute on chronic congestive heart failure with reduced ejection fracture.  Patient initially treated with dobutamine infusion and transition to oral Bumex.  Transthoracic echocardiogram on December 19 showed a left ventricular ejection fracture of 35 to 40%.  Beta-blocker held.  Patient is status post RHC on 1/25 which showed elevated right and left-sided filling pressures consistent with volume overload and pulmonary hypertension.  Due to diuresis patient had an acute kidney injury.  Bumex held and then resumed.  Persistent atrial fib with complete heart block status post ablation and DCCV status post CRT/P.  Hyperkalemia initially however ended up with hypokalemia.  Hyponatremia replaced.  Dysphagia.  Patient was seen by speech and there was some concern for esophageal dysmotility.  General anxiety disorder on sertraline.  DJD on Tylenol for pain.  Pressure injury of the left heel being treated topically.  Overall weakness and fatigue with deconditioning.  Per hospital records cardiology initiated palliative care consult.    Transitional Care Course: Today patient sitting up in bedside chair.  His daughter is present on exam.  Review of the records showed patient had some elevated heart rates last evening around 1 12-1 18.  He had a low O2 sat of 85%.  Today vital signs are within normal limits.  I did discuss this with both patient and his daughter.  He does  have underlying generalized anxiety disorder.  His daughter shared that there are some major concerns with his current living conditions.  There was speak of hoarding by another family member who lives with the patient.  Patient reports some shortness of breath with exertion.  He is currently off oxygen.  O2 sats within normal limits.  He continues on Bumex.  Trace lower extremity edema.  Lung sounds are clear.  He denies any chest pain.  No palpitations.  I did talk with his daughter about cardiology recommendations.  For now they would like to continue TCU care and reevaluate.  We did talk about his elevated heart rates could be from his underlying atrial fib.  It was suggested that patient be considered for Watchman device.  Currently not on beta-blocker due to complete heart block.  Patient denies any dysphagia.  He is on a moist and minced diet.  Appears to be tolerating well.  No coughing.  Generalized anxiety disorder on sertraline.  Patient is reporting some pain in his elbows.  Right elbow slightly red.  Heel ulcer not observed.  Patient reports he is having bowel movements.  He is emptying his bladder.  Overall appetite poor.      Assessment/Plan:     Cardiogenic shock (H)  Acute on chronic congestive heart failure, unspecified heart failure type (H)  Pulmonary hypertension  -Echocardiogram 12/19 showed a left ventricular ejection fracture of 35 to 40%.  -Holding beta-blocker.  -Bumex 2 mg daily  -Potassium supplement.  -Daily weights.  -Cardiac diet.  -Monitor O2 sats.  -Cardiology did suggest palliative care.  Family declines at this time.  We will continue therapy and reevaluate.  -Okay for PT OT eval and treat.  -CO2 retention noted from laboratory today with CO2 in the 30s.  -Encourage incentive spirometer with pursed lip breathing.    Status post catheter ablation of atrial fibrillation  Complete heart block (H)  -Holding beta-blocker.  -Cardiology suggested Watchman procedure outpatient.  -Rate  control with amiodarone.  Monitor heart rates  -Continue Xarelto. (There is a question on dosing per pharmacy.  Creatinine clearance 76.  Continue current dosing.    Stage 2 chronic kidney disease  -Laboratory reviewed today.  Creatinine 0.80.  Sodium 138.  Potassium 4.1.  Magnesium 2.1.  -Monitor    Benign prostatic hyperplasia without lower urinary tract symptoms  -No evidence of urinary retention.  -Finasteride 5 mg daily.  -Cardura 4 mg daily.    Dysphagia, unspecified type  -Continue moist and minced diet.  -GI dysmotility noted from esophagram.  No stricture or narrowing.  -Continue PPI.  -Speech therapy to eval and treat.  -RD to consult for weight loss.    Anxiety disorder due to known physiological condition  -Sertraline daily.    Osteoarthritis of both knees, unspecified osteoarthritis type  -Tylenol every 4 hours.  For pain.    Pressure ulcer of the left heel.  -Continue topical treatment.  -Air mattress.  -Heals up.    Elbow soreness  -Elbow protectors  -Air mattress      Over 10 orders needed clarification from admit orders.  Orders reviewed and clarifications provided.      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     Dizziness 07/11/2018     DJD (degenerative joint disease) 07/11/2018     Left ventricular hypertrophy 07/31/2018     Troponin level elevated 03/25/2020     Acute combined systolic and diastolic congestive heart failure (H) 03/26/2020     Pulmonary hypertension (H) 04/01/2020     Nonrheumatic aortic valve insufficiency 04/01/2020     Coronary artery disease due to lipid rich plaque 10/15/2020     MARIAN (generalized anxiety disorder) 02/11/2021     GERD (gastroesophageal reflux disease)      Infection due to 2019 novel coronavirus 02/11/2021     Dilated cardiomyopathy (H) 02/11/2021     Persistent  atrial fibrillation (H) 08/11/2020     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) 12/01/2020     Stage 2 chronic kidney disease 10/08/2021     Hearing loss 10/08/2021     Arthritis, lumbar spine 10/08/2021     Peripheral edema 03/14/2022     Elevated TSH 03/16/2022     Elevated troponin 12/19/2022     Pleural effusion on right 12/19/2022     Acute on chronic congestive heart failure, unspecified heart failure type (H) 12/19/2022     Status post catheter ablation of atrial fibrillation 01/04/2023     Heart failure with reduced ejection fraction (H) 01/12/2023     Hyperkalemia 01/23/2023     Hyponatremia 01/23/2023     Cardiogenic shock (H)      Generalized abdominal pain 02/08/2017     Gastro-esophageal reflux disease with esophagitis 03/23/2017     Diverticular disease of large intestine 03/23/2017     Diarrhea 02/08/2017     Diaphragmatic hernia 03/23/2017     Resolved Ambulatory Problems     Diagnosis Date Noted     Complete heart block (H) 03/24/2020     Bradycardia 07/11/2018     LBBB (left bundle branch block) 07/11/2018     S/P drug eluting coronary stent placement 05/30/2019     Cardiac pacemaker in situ 09/29/2020     Chronic combined systolic and diastolic heart failure (H) 02/11/2021     Weight loss 02/11/2021     COVID-19 02/12/2021     CAD (coronary artery disease) 07/11/2018     CHF (congestive heart failure) (H) 09/08/2021     Past Medical History:   Diagnosis Date     Atrial fibrillation (H)      Atrial fibrillation, transient (H) 08/11/2020     Congestive heart failure (H)      Coronary artery disease      COVID 02/2021     High cholesterol      Hyperlipidemia      Hypertension      Allergies   Allergen Reactions     Diltiazem Other (See Comments)     Contraindicated in HFrEF     Lactose GI Disturbance     Midodrine Other (See Comments)     Contraindicated in HFrEF       All Meds and Allergies reviewed in the record at the facility and is the most up-to-date.    Post Discharge  Medication Reconciliation Status: discharge medications reconciled and changed, per note/orders  Current Outpatient Medications   Medication Sig     acetaminophen (TYLENOL) 325 MG tablet Take 2 tablets (650 mg) by mouth every 6 hours as needed for mild pain or other (and adjunct with moderate or severe pain or per patient request) Alternate with ibuprofen if ordered. Maximum acetaminophen dose from all sources = 75 mg/kg/day not to exceed 4 grams/day.     amiodarone (PACERONE) 200 MG tablet TAKE 1 TABLET (200 MG) BY MOUTH EVERY OTHER DAY     aspirin (ASA) 81 MG chewable tablet Take 1 tablet (81 mg) by mouth every other day     atorvastatin (LIPITOR) 10 MG tablet Take 10 mg by mouth four times a week Sunday, Tuesday, Thursday, & Saturday at bedtime     bumetanide (BUMEX) 2 MG tablet Take 1 tablet (2 mg) by mouth daily     Coenzyme Q10 300 MG CAPS Take 300 mg by mouth daily     doxazosin (CARDURA) 4 MG tablet Take 1 tablet (4 mg) by mouth At Bedtime     finasteride (PROSCAR) 5 mg tablet [FINASTERIDE (PROSCAR) 5 MG TABLET] Take 5 mg by mouth at bedtime.            gabapentin (NEURONTIN) 100 MG capsule Take 200 mg by mouth 2 times daily (before meals) 2 capsules (200 mg) PO BID every morning before breakfast and every afternoon before lunch     gabapentin (NEURONTIN) 300 MG capsule Take 300 mg by mouth At Bedtime     glucosamine/chondr cole A sod (OSTEO BI-FLEX ORAL) Take 1 tablet by mouth 2 times daily (with meals)     melatonin 10 mg Tab Take 10 mg by mouth At Bedtime      multivitamin, therapeutic (THERA-VIT) TABS tablet Take 1 tablet by mouth daily     nitroGLYcerin (NITROSTAT) 0.4 MG sublingual tablet For chest pain place 1 tablet under the tongue every 5 minutes for 3 doses. If symptoms persist 5 minutes after 1st dose call 911.     pantoprazole (PROTONIX) 40 MG EC tablet Take 1 tablet (40 mg) by mouth 2 times daily     potassium chloride ER (KLOR-CON M) 20 MEQ CR tablet Take 1 tablet (20 mEq) by mouth daily      "rivaroxaban ANTICOAGULANT (XARELTO) 20 MG TABS tablet Take 1 tablet (20 mg) by mouth daily (with dinner)     sennosides (SENOKOT) 8.6 MG tablet Take by mouth 2 times daily     sertraline (ZOLOFT) 50 MG tablet Take 50 mg by mouth 2 times daily      gabapentin (NEURONTIN) 100 MG capsule Take 2 capsules (200 mg) by mouth daily (before lunch) (Patient not taking: Reported on 2/6/2023)     No current facility-administered medications for this visit.       REVIEW OF SYSTEMS:   10 point review of systems reviewed and pertinent positives in the HPI.     PHYSICAL EXAMINATION:  Physical Exam     Vital signs: /70   Pulse 120   Temp 97.8  F (36.6  C)   Resp 18   Ht 1.778 m (5' 10\")   Wt 72.7 kg (160 lb 3.2 oz)   SpO2 (!) 85%   BMI 22.99 kg/m    General: Awake, Alert, oriented x2, sitting up in bedside chair appropriately, follows simple commands, conversant  HEENT: Pink conjunctiva, dry oral mucosa  NECK: Supple  CVS: Irregular irregular.  LUNG: Clear to auscultation, No wheezes, rales or rhonci.  BACK: No kyphosis of the thoracic spine  ABDOMEN: Soft, thin, nontender to palpation, with positive bowel sounds  EXTREMITIES: Moves both upper and lower extremities with diffuse weakness, no pedal edema, no calf tenderness  SKIN: Left heel with slight redness and bogginess to touch.  Right elbow slightly red and spongy.  NEUROLOGIC: Intact, pulses palpable  PSYCHIATRIC: Flat affect.      Labs:  All labs reviewed in the nursing home record and [facility]   @  Lab Results   Component Value Date    WBC 8.9 01/28/2023     Lab Results   Component Value Date    RBC 4.15 01/28/2023     Lab Results   Component Value Date    HGB 12.3 01/28/2023     Lab Results   Component Value Date    HCT 40.5 01/28/2023     Lab Results   Component Value Date    MCV 98 01/28/2023     Lab Results   Component Value Date    MCH 29.6 01/28/2023     Lab Results   Component Value Date    MCHC 30.4 01/28/2023     Lab Results   Component Value Date    RDW " 15.8 01/28/2023     Lab Results   Component Value Date     01/28/2023        @Last Comprehensive Metabolic Panel:  Sodium   Date Value Ref Range Status   02/06/2023 138 136 - 145 mmol/L Final     Potassium   Date Value Ref Range Status   02/06/2023 4.1 3.4 - 5.3 mmol/L Final   10/17/2022 4.2 3.5 - 5.0 mmol/L Final     Chloride   Date Value Ref Range Status   02/06/2023 96 (L) 98 - 107 mmol/L Final   10/17/2022 99 98 - 107 mmol/L Final     Carbon Dioxide (CO2)   Date Value Ref Range Status   02/06/2023 30 (H) 22 - 29 mmol/L Final   10/17/2022 30 22 - 31 mmol/L Final     Anion Gap   Date Value Ref Range Status   02/06/2023 12 7 - 15 mmol/L Final   10/17/2022 10 5 - 18 mmol/L Final     Glucose   Date Value Ref Range Status   02/06/2023 127 (H) 70 - 99 mg/dL Final   10/17/2022 99 70 - 125 mg/dL Final     GLUCOSE BY METER POCT   Date Value Ref Range Status   01/27/2023 248 (H) 70 - 99 mg/dL Final     Urea Nitrogen   Date Value Ref Range Status   02/06/2023 21.1 8.0 - 23.0 mg/dL Final   10/17/2022 27 8 - 28 mg/dL Final     Creatinine   Date Value Ref Range Status   02/06/2023 0.80 0.67 - 1.17 mg/dL Final     GFR Estimate   Date Value Ref Range Status   02/06/2023 87 >60 mL/min/1.73m2 Final     Comment:     eGFR calculated using 2021 CKD-EPI equation.   07/02/2021 >60 >60 mL/min/1.73m2 Final     Calcium   Date Value Ref Range Status   02/06/2023 9.0 8.8 - 10.2 mg/dL Final     45 minutes spent for this visit which included reviewing overall plan of care with both patient and his daughter as well as talking about palliative/hospice care.  At this time patient and family decline palliative/hospice.  They would like to continue with TCU care and reevaluate.    this note has been dictated using voice recognition software. Any grammatical or context distortions are unintentional and inherent to the software    Electronically signed by: Nitza Ludwig CNP         Sincerely,        Nitza Ludwig, NP

## 2023-02-09 NOTE — LETTER
2/9/2023        RE: Reymundo Petersen  [address] [place] [address]        [facility]    Code Status:  DNR/DNI   Visit Type:   Chief Complaint   Patient presents with     TCU Follow Up     Facility:  [facility] ([facility]) [[id number]]               HPI: Reymundo Petersen is a 82 year old male who I am seeing today for follow-up on the TCU.  Patient recently hospitalized on 1/23/2023 secondary to fatigue, confusion and dehydration.  Patient with cardiogenic shock secondary to cardiomyopathy with acute on chronic congestive heart failure with reduced ejection fracture.  Patient initially treated with dobutamine infusion and transition to oral Bumex.  Transthoracic echocardiogram on December 19 showed a left ventricular ejection fracture of 35 to 40%.  Beta-blocker held.  Patient is status post RHC on 1/25 which showed elevated right and left-sided filling pressures consistent with volume overload and pulmonary hypertension.  Due to diuresis patient had an acute kidney injury.  Bumex held and then resumed.  Persistent atrial fib with complete heart block status post ablation and DCCV status post CRT/P.  Hyperkalemia initially however ended up with hypokalemia.  Hyponatremia replaced.  Dysphagia.  Patient was seen by speech and there was some concern for esophageal dysmotility.  General anxiety disorder on sertraline.  DJD on Tylenol for pain.  Pressure injury of the left heel being treated topically.  Overall weakness and fatigue with deconditioning.  Per hospital records cardiology initiated palliative care consult.    Transitional Care Course: Today patient sitting up in bedside chair.  His daughter is present on exam.  Previous exam patient having elevated heart rates in the low 100s.  Heart rates have improved.  Now in the 60s.  Continues on metoprolol.  O2 sats also improved in the 90s.  He does have some shortness of breath with exertion.  No chest pain.  BP previously on the  low side.  His Bumex was held x2.  This has been resumed.  He does have 1+ lower extremity edema.  Lung sounds are clear.  He has a follow-up with cardiology in the a.m.  he has had some poor oral intake however today was eating better.He has a coccyx ulcer which she is receiving topical treatment.  Patient has his own compression stockings.  We will make sure to apply them on in the a.m. off at at bedtime.  He is having regular bowel movements.  Underlying anxiety.  He reports increased anxiety symptoms as well as insomnia.  Currently on sertraline twice daily however having some sleepiness and somnolence during the day time.  His daughter reports he was taking this previously at bedtime.  We will change this back to bedtime.  He is also on melatonin 10 mg nightly.      Assessment/Plan: Reviewed with changes.     Cardiogenic shock (H)  Acute on chronic congestive heart failure, unspecified heart failure type (H)  Pulmonary hypertension  -Echocardiogram 12/19 showed a left ventricular ejection fracture of 35 to 40%.  -Beta-blocker discontinued in hospital due to complete heart block.   -Bumex 2 mg daily ( this was held 2 days prior due to low bp). Bp is improving. Medication resumed.   -Potassium supplement.   -Daily weights. (1+ LE edema today.)   -Compression stockings on in am/off at HS.   -Cardiac diet.  -Cardiology did suggest palliative care.  Family declines at this time.  We will continue therapy and reevaluate.  -Follow up with cardiology in am.   -CO2 retention noted from laboratory today with CO2 in the 30s.  -Encourage incentive spirometer with pursed lip breathing.    Status post catheter ablation of atrial fibrillation  Complete heart block (H)  -Beta-blocker discontinued in hospital.   -Cardiology suggested Watchman procedure outpatient.  -Rate control with amiodarone. Heart rates initially in the low 100s. Heart rates have varied from 40s-80s.   -Continue Xarelto. (There is a question on dosing per  pharmacy).  Creatinine clearance 76.  Continue current dosing.    Stage 2 chronic kidney disease  -Laboratory reviewed today.  Creatinine 0.80.  Sodium 138.  Potassium 4.1.  Magnesium 2.1.  -Monitor    Benign prostatic hyperplasia without lower urinary tract symptoms  -No evidence of urinary retention.  -Finasteride 5 mg daily.  -Cardura 4 mg daily.    Dysphagia, unspecified type  -Continue moist and minced diet.  -GI dysmotility noted from esophagram.  No stricture or narrowing.  -Continue PPI.  -Speech therapy following.   -RD following.     Anxiety disorder due to known physiological condition  -Increased symptomology.   -Currently on sertraline 50 mg BID. Some sleepiness and somnolence during the daytime hours.  Reporting insomnia at night.  -Change sertraline to 100 mg Q HS.   -Hold melatonin while receiving 100 at bedtime and evaluate effectiveness.    Osteoarthritis of both knees, unspecified osteoarthritis type  -Tylenol every 4 hours.  For pain.    Pressure ulcer of the left heel.  -Continue topical treatment.  -Air mattress.  -Heals up.    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     Dizziness 07/11/2018     DJD (degenerative joint disease) 07/11/2018     Left ventricular hypertrophy 07/31/2018     Troponin level elevated 03/25/2020     Acute combined systolic and diastolic congestive heart failure (H) 03/26/2020     Pulmonary hypertension (H) 04/01/2020     Nonrheumatic aortic valve insufficiency 04/01/2020     Coronary artery disease due to lipid rich plaque 10/15/2020     MARIAN (generalized anxiety disorder) 02/11/2021     GERD (gastroesophageal reflux disease)      Infection due to 2019 novel coronavirus 02/11/2021     Dilated cardiomyopathy (H) 02/11/2021      Persistent atrial fibrillation (H) 08/11/2020     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) 12/01/2020     Stage 2 chronic kidney disease 10/08/2021     Hearing loss 10/08/2021     Arthritis, lumbar spine 10/08/2021     Peripheral edema 03/14/2022     Elevated TSH 03/16/2022     Elevated troponin 12/19/2022     Pleural effusion on right 12/19/2022     Acute on chronic congestive heart failure, unspecified heart failure type (H) 12/19/2022     Status post catheter ablation of atrial fibrillation 01/04/2023     Heart failure with reduced ejection fraction (H) 01/12/2023     Hyperkalemia 01/23/2023     Hyponatremia 01/23/2023     Cardiogenic shock (H)      Generalized abdominal pain 02/08/2017     Gastro-esophageal reflux disease with esophagitis 03/23/2017     Diverticular disease of large intestine 03/23/2017     Diarrhea 02/08/2017     Diaphragmatic hernia 03/23/2017     Resolved Ambulatory Problems     Diagnosis Date Noted     Complete heart block (H) 03/24/2020     Bradycardia 07/11/2018     LBBB (left bundle branch block) 07/11/2018     S/P drug eluting coronary stent placement 05/30/2019     Cardiac pacemaker in situ 09/29/2020     Chronic combined systolic and diastolic heart failure (H) 02/11/2021     Weight loss 02/11/2021     COVID-19 02/12/2021     CAD (coronary artery disease) 07/11/2018     CHF (congestive heart failure) (H) 09/08/2021     Past Medical History:   Diagnosis Date     Atrial fibrillation (H)      Atrial fibrillation, transient (H) 08/11/2020     Congestive heart failure (H)      Coronary artery disease      COVID 02/2021     High cholesterol      Hyperlipidemia      Hypertension      Allergies   Allergen Reactions     Diltiazem Other (See Comments)     Contraindicated in HFrEF     Lactose GI Disturbance     Midodrine Other (See Comments)     Contraindicated in HFrEF       All Meds and Allergies reviewed in the record at the facility and is the most  up-to-date.      Current Outpatient Medications   Medication Sig     acetaminophen (TYLENOL) 325 MG tablet Take 2 tablets (650 mg) by mouth every 6 hours as needed for mild pain or other (and adjunct with moderate or severe pain or per patient request) Alternate with ibuprofen if ordered. Maximum acetaminophen dose from all sources = 75 mg/kg/day not to exceed 4 grams/day.     amiodarone (PACERONE) 200 MG tablet TAKE 1 TABLET (200 MG) BY MOUTH EVERY OTHER DAY     aspirin (ASA) 81 MG chewable tablet Take 1 tablet (81 mg) by mouth every other day     atorvastatin (LIPITOR) 10 MG tablet Take 10 mg by mouth four times a week Sunday, Tuesday, Thursday, & Saturday at bedtime     bumetanide (BUMEX) 2 MG tablet Take 1 tablet (2 mg) by mouth daily     Coenzyme Q10 300 MG CAPS Take 300 mg by mouth daily     doxazosin (CARDURA) 4 MG tablet Take 1 tablet (4 mg) by mouth At Bedtime     finasteride (PROSCAR) 5 mg tablet [FINASTERIDE (PROSCAR) 5 MG TABLET] Take 5 mg by mouth at bedtime.            gabapentin (NEURONTIN) 100 MG capsule Take 200 mg by mouth 2 times daily (before meals) 2 capsules (200 mg) PO BID every morning before breakfast and every afternoon before lunch     gabapentin (NEURONTIN) 300 MG capsule Take 300 mg by mouth At Bedtime     glucosamine/chondr cole A sod (OSTEO BI-FLEX ORAL) Take 1 tablet by mouth 2 times daily (with meals)     melatonin 10 mg Tab Take 10 mg by mouth At Bedtime      multivitamin, therapeutic (THERA-VIT) TABS tablet Take 1 tablet by mouth daily     nitroGLYcerin (NITROSTAT) 0.4 MG sublingual tablet For chest pain place 1 tablet under the tongue every 5 minutes for 3 doses. If symptoms persist 5 minutes after 1st dose call 911.     pantoprazole (PROTONIX) 40 MG EC tablet Take 1 tablet (40 mg) by mouth 2 times daily     polyethylene glycol-propylene glycol (SYSTANE ULTRA) 0.4-0.3 % SOLN ophthalmic solution Place 2 drops into both eyes 2 times daily as needed for dry eyes     potassium chloride  "ER (KLOR-CON M) 20 MEQ CR tablet Take 1 tablet (20 mEq) by mouth daily     rivaroxaban ANTICOAGULANT (XARELTO) 20 MG TABS tablet Take 1 tablet (20 mg) by mouth daily (with dinner)     sennosides (SENOKOT) 8.6 MG tablet Take by mouth 2 times daily     sertraline (ZOLOFT) 50 MG tablet Take 100 mg by mouth daily     No current facility-administered medications for this visit.       REVIEW OF SYSTEMS:   10 point review of systems reviewed and pertinent positives in the HPI.     PHYSICAL EXAMINATION:  Physical Exam     Vital signs: BP (!) 143/74   Pulse 82   Temp 98.6  F (37  C)   Resp 17   Ht 1.778 m (5' 10\")   Wt 74.2 kg (163 lb 9.6 oz)   SpO2 98%   BMI 23.47 kg/m    General: Awake, Alert, tired and fatigued, oriented x2, sitting up in wheelchair, follows simple commands  HEENT: Pink conjunctiva, dry oral mucosa  NECK: Supple  CVS: Irregular irregular.  LUNG: Clear to auscultation, No wheezes, rales or rhonci.  BACK: No kyphosis of the thoracic spine  ABDOMEN: Soft, thin, nontender to palpation, with positive bowel sounds  EXTREMITIES:  thin frail extremities, diffuse weakness, 1+ pedal edema, no calf tenderness  SKIN: Dressing to left heel and right elbow.  NEUROLOGIC: Intact  PSYCHIATRIC: Flat affect.      Labs:  All labs reviewed in the nursing home record and Epic   @  Lab Results   Component Value Date    WBC 8.9 01/28/2023     Lab Results   Component Value Date    RBC 4.15 01/28/2023     Lab Results   Component Value Date    HGB 12.3 01/28/2023     Lab Results   Component Value Date    HCT 40.5 01/28/2023     Lab Results   Component Value Date    MCV 98 01/28/2023     Lab Results   Component Value Date    MCH 29.6 01/28/2023     Lab Results   Component Value Date    MCHC 30.4 01/28/2023     Lab Results   Component Value Date    RDW 15.8 01/28/2023     Lab Results   Component Value Date     01/28/2023        @Last Comprehensive Metabolic Panel:  Sodium   Date Value Ref Range Status   02/06/2023 138 " 136 - 145 mmol/L Final     Potassium   Date Value Ref Range Status   02/06/2023 4.1 3.4 - 5.3 mmol/L Final   10/17/2022 4.2 3.5 - 5.0 mmol/L Final     Chloride   Date Value Ref Range Status   02/06/2023 96 (L) 98 - 107 mmol/L Final   10/17/2022 99 98 - 107 mmol/L Final     Carbon Dioxide (CO2)   Date Value Ref Range Status   02/06/2023 30 (H) 22 - 29 mmol/L Final   10/17/2022 30 22 - 31 mmol/L Final     Anion Gap   Date Value Ref Range Status   02/06/2023 12 7 - 15 mmol/L Final   10/17/2022 10 5 - 18 mmol/L Final     Glucose   Date Value Ref Range Status   02/06/2023 127 (H) 70 - 99 mg/dL Final   10/17/2022 99 70 - 125 mg/dL Final     GLUCOSE BY METER POCT   Date Value Ref Range Status   01/27/2023 248 (H) 70 - 99 mg/dL Final     Urea Nitrogen   Date Value Ref Range Status   02/06/2023 21.1 8.0 - 23.0 mg/dL Final   10/17/2022 27 8 - 28 mg/dL Final     Creatinine   Date Value Ref Range Status   02/06/2023 0.80 0.67 - 1.17 mg/dL Final     GFR Estimate   Date Value Ref Range Status   02/06/2023 87 >60 mL/min/1.73m2 Final     Comment:     eGFR calculated using 2021 CKD-EPI equation.   07/02/2021 >60 >60 mL/min/1.73m2 Final     Calcium   Date Value Ref Range Status   02/06/2023 9.0 8.8 - 10.2 mg/dL Final       this note has been dictated using voice recognition software. Any grammatical or context distortions are unintentional and inherent to the software    Electronically signed by: Nitza Ludwig CNP         Sincerely,        Nitza Ludwig, NP

## 2023-02-10 PROBLEM — I42.8 NONISCHEMIC CARDIOMYOPATHY (H): Status: ACTIVE | Noted: 2023-01-01

## 2023-02-10 NOTE — PATIENT INSTRUCTIONS
Reymundo Petersen,    It was a pleasure to see you today at [facility].     My recommendations after this visit include:  - Please follow up with Dr Vasquez in 8 weeks   - Please follow up with Dr Michelle February 22 with a device check  - Please follow up with Aleena Buitrago in mid March  - Continue current medications    Aleena Buitrago, CNP

## 2023-02-10 NOTE — PROGRESS NOTES
[facility] GERIATRIC SERVICES    Code Status:  DNR/DNI   Visit Type:   Chief Complaint   Patient presents with     TCU Follow Up     Facility:  [facility] ([facility]) [[id number]]               HPI: Reymundo Petersen is a 82 year old male who I am seeing today for follow-up on the TCU.  Patient recently hospitalized on 1/23/2023 secondary to fatigue, confusion and dehydration.  Patient with cardiogenic shock secondary to cardiomyopathy with acute on chronic congestive heart failure with reduced ejection fracture.  Patient initially treated with dobutamine infusion and transition to oral Bumex.  Transthoracic echocardiogram on December 19 showed a left ventricular ejection fracture of 35 to 40%.  Beta-blocker held.  Patient is status post RHC on 1/25 which showed elevated right and left-sided filling pressures consistent with volume overload and pulmonary hypertension.  Due to diuresis patient had an acute kidney injury.  Bumex held and then resumed.  Persistent atrial fib with complete heart block status post ablation and DCCV status post CRT/P.  Hyperkalemia initially however ended up with hypokalemia.  Hyponatremia replaced.  Dysphagia.  Patient was seen by speech and there was some concern for esophageal dysmotility.  General anxiety disorder on sertraline.  DJD on Tylenol for pain.  Pressure injury of the left heel being treated topically.  Overall weakness and fatigue with deconditioning.  Per hospital records cardiology initiated palliative care consult.    Transitional Care Course: Today patient sitting up in bedside chair.  His daughter is present on exam.  Previous exam patient having elevated heart rates in the low 100s.  Heart rates have improved.  Now in the 60s.  Continues on metoprolol.  O2 sats also improved in the 90s.  He does have some shortness of breath with exertion.  No chest pain.  BP previously on the low side.  His Bumex was held x2.  This has been resumed.  He does have 1+ lower extremity  edema.  Lung sounds are clear.  He has a follow-up with cardiology in the a.m.  he has had some poor oral intake however today was eating better.He has a coccyx ulcer which she is receiving topical treatment.  Patient has his own compression stockings.  We will make sure to apply them on in the a.m. off at at bedtime.  He is having regular bowel movements.  Underlying anxiety.  He reports increased anxiety symptoms as well as insomnia.  Currently on sertraline twice daily however having some sleepiness and somnolence during the day time.  His daughter reports he was taking this previously at bedtime.  We will change this back to bedtime.  He is also on melatonin 10 mg nightly.      Assessment/Plan: Reviewed with changes.     Cardiogenic shock (H)  Acute on chronic congestive heart failure, unspecified heart failure type (H)  Pulmonary hypertension  -Echocardiogram 12/19 showed a left ventricular ejection fracture of 35 to 40%.  -Beta-blocker discontinued in hospital due to complete heart block.   -Bumex 2 mg daily ( this was held 2 days prior due to low bp). Bp is improving. Medication resumed.   -Potassium supplement.   -Daily weights. (1+ LE edema today.)   -Compression stockings on in am/off at HS.   -Cardiac diet.  -Cardiology did suggest palliative care.  Family declines at this time.  We will continue therapy and reevaluate.  -Follow up with cardiology in am.   -CO2 retention noted from laboratory today with CO2 in the 30s.  -Encourage incentive spirometer with pursed lip breathing.    Status post catheter ablation of atrial fibrillation  Complete heart block (H)  -Beta-blocker discontinued in hospital.   -Cardiology suggested Watchman procedure outpatient.  -Rate control with amiodarone. Heart rates initially in the low 100s. Heart rates have varied from 40s-80s.   -Continue Xarelto. (There is a question on dosing per pharmacy).  Creatinine clearance 76.  Continue current dosing.    Stage 2 chronic kidney  disease  -Laboratory reviewed today.  Creatinine 0.80.  Sodium 138.  Potassium 4.1.  Magnesium 2.1.  -Monitor    Benign prostatic hyperplasia without lower urinary tract symptoms  -No evidence of urinary retention.  -Finasteride 5 mg daily.  -Cardura 4 mg daily.    Dysphagia, unspecified type  -Continue moist and minced diet.  -GI dysmotility noted from esophagram.  No stricture or narrowing.  -Continue PPI.  -Speech therapy following.   -RD following.     Anxiety disorder due to known physiological condition  -Increased symptomology.   -Currently on sertraline 50 mg BID. Some sleepiness and somnolence during the daytime hours.  Reporting insomnia at night.  -Change sertraline to 100 mg Q HS.   -Hold melatonin while receiving 100 at bedtime and evaluate effectiveness.    Osteoarthritis of both knees, unspecified osteoarthritis type  -Tylenol every 4 hours.  For pain.    Pressure ulcer of the left heel.  -Continue topical treatment.  -Air mattress.  -Heals up.    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     Dizziness 07/11/2018     DJD (degenerative joint disease) 07/11/2018     Left ventricular hypertrophy 07/31/2018     Troponin level elevated 03/25/2020     Acute combined systolic and diastolic congestive heart failure (H) 03/26/2020     Pulmonary hypertension (H) 04/01/2020     Nonrheumatic aortic valve insufficiency 04/01/2020     Coronary artery disease due to lipid rich plaque 10/15/2020     MARIAN (generalized anxiety disorder) 02/11/2021     GERD (gastroesophageal reflux disease)      Infection due to 2019 novel coronavirus 02/11/2021     Dilated cardiomyopathy (H) 02/11/2021     Persistent atrial fibrillation (H) 08/11/2020     Presence of cardiac resynchronization  therapy pacemaker (CRT-P) 12/01/2020     Stage 2 chronic kidney disease 10/08/2021     Hearing loss 10/08/2021     Arthritis, lumbar spine 10/08/2021     Peripheral edema 03/14/2022     Elevated TSH 03/16/2022     Elevated troponin 12/19/2022     Pleural effusion on right 12/19/2022     Acute on chronic congestive heart failure, unspecified heart failure type (H) 12/19/2022     Status post catheter ablation of atrial fibrillation 01/04/2023     Heart failure with reduced ejection fraction (H) 01/12/2023     Hyperkalemia 01/23/2023     Hyponatremia 01/23/2023     Cardiogenic shock (H)      Generalized abdominal pain 02/08/2017     Gastro-esophageal reflux disease with esophagitis 03/23/2017     Diverticular disease of large intestine 03/23/2017     Diarrhea 02/08/2017     Diaphragmatic hernia 03/23/2017     Resolved Ambulatory Problems     Diagnosis Date Noted     Complete heart block (H) 03/24/2020     Bradycardia 07/11/2018     LBBB (left bundle branch block) 07/11/2018     S/P drug eluting coronary stent placement 05/30/2019     Cardiac pacemaker in situ 09/29/2020     Chronic combined systolic and diastolic heart failure (H) 02/11/2021     Weight loss 02/11/2021     COVID-19 02/12/2021     CAD (coronary artery disease) 07/11/2018     CHF (congestive heart failure) (H) 09/08/2021     Past Medical History:   Diagnosis Date     Atrial fibrillation (H)      Atrial fibrillation, transient (H) 08/11/2020     Congestive heart failure (H)      Coronary artery disease      COVID 02/2021     High cholesterol      Hyperlipidemia      Hypertension      Allergies   Allergen Reactions     Diltiazem Other (See Comments)     Contraindicated in HFrEF     Lactose GI Disturbance     Midodrine Other (See Comments)     Contraindicated in HFrEF       All Meds and Allergies reviewed in the record at the facility and is the most up-to-date.      Current Outpatient Medications   Medication Sig     acetaminophen (TYLENOL) 325 MG tablet  Take 2 tablets (650 mg) by mouth every 6 hours as needed for mild pain or other (and adjunct with moderate or severe pain or per patient request) Alternate with ibuprofen if ordered. Maximum acetaminophen dose from all sources = 75 mg/kg/day not to exceed 4 grams/day.     amiodarone (PACERONE) 200 MG tablet TAKE 1 TABLET (200 MG) BY MOUTH EVERY OTHER DAY     aspirin (ASA) 81 MG chewable tablet Take 1 tablet (81 mg) by mouth every other day     atorvastatin (LIPITOR) 10 MG tablet Take 10 mg by mouth four times a week Sunday, Tuesday, Thursday, & Saturday at bedtime     bumetanide (BUMEX) 2 MG tablet Take 1 tablet (2 mg) by mouth daily     Coenzyme Q10 300 MG CAPS Take 300 mg by mouth daily     doxazosin (CARDURA) 4 MG tablet Take 1 tablet (4 mg) by mouth At Bedtime     finasteride (PROSCAR) 5 mg tablet [FINASTERIDE (PROSCAR) 5 MG TABLET] Take 5 mg by mouth at bedtime.            gabapentin (NEURONTIN) 100 MG capsule Take 200 mg by mouth 2 times daily (before meals) 2 capsules (200 mg) PO BID every morning before breakfast and every afternoon before lunch     gabapentin (NEURONTIN) 300 MG capsule Take 300 mg by mouth At Bedtime     glucosamine/chondr cole A sod (OSTEO BI-FLEX ORAL) Take 1 tablet by mouth 2 times daily (with meals)     melatonin 10 mg Tab Take 10 mg by mouth At Bedtime      multivitamin, therapeutic (THERA-VIT) TABS tablet Take 1 tablet by mouth daily     nitroGLYcerin (NITROSTAT) 0.4 MG sublingual tablet For chest pain place 1 tablet under the tongue every 5 minutes for 3 doses. If symptoms persist 5 minutes after 1st dose call 911.     pantoprazole (PROTONIX) 40 MG EC tablet Take 1 tablet (40 mg) by mouth 2 times daily     polyethylene glycol-propylene glycol (SYSTANE ULTRA) 0.4-0.3 % SOLN ophthalmic solution Place 2 drops into both eyes 2 times daily as needed for dry eyes     potassium chloride ER (KLOR-CON M) 20 MEQ CR tablet Take 1 tablet (20 mEq) by mouth daily     rivaroxaban ANTICOAGULANT  "(XARELTO) 20 MG TABS tablet Take 1 tablet (20 mg) by mouth daily (with dinner)     sennosides (SENOKOT) 8.6 MG tablet Take by mouth 2 times daily     sertraline (ZOLOFT) 50 MG tablet Take 100 mg by mouth daily     No current facility-administered medications for this visit.       REVIEW OF SYSTEMS:   10 point review of systems reviewed and pertinent positives in the HPI.     PHYSICAL EXAMINATION:  Physical Exam     Vital signs: BP (!) 143/74   Pulse 82   Temp 98.6  F (37  C)   Resp 17   Ht 1.778 m (5' 10\")   Wt 74.2 kg (163 lb 9.6 oz)   SpO2 98%   BMI 23.47 kg/m    General: Awake, Alert, tired and fatigued, oriented x2, sitting up in wheelchair, follows simple commands  HEENT: Pink conjunctiva, dry oral mucosa  NECK: Supple  CVS: Irregular irregular.  LUNG: Clear to auscultation, No wheezes, rales or rhonci.  BACK: No kyphosis of the thoracic spine  ABDOMEN: Soft, thin, nontender to palpation, with positive bowel sounds  EXTREMITIES:  thin frail extremities, diffuse weakness, 1+ pedal edema, no calf tenderness  SKIN: Dressing to left heel and right elbow.  NEUROLOGIC: Intact  PSYCHIATRIC: Flat affect.      Labs:  All labs reviewed in the nursing home record and Epic   @  Lab Results   Component Value Date    WBC 8.9 01/28/2023     Lab Results   Component Value Date    RBC 4.15 01/28/2023     Lab Results   Component Value Date    HGB 12.3 01/28/2023     Lab Results   Component Value Date    HCT 40.5 01/28/2023     Lab Results   Component Value Date    MCV 98 01/28/2023     Lab Results   Component Value Date    MCH 29.6 01/28/2023     Lab Results   Component Value Date    MCHC 30.4 01/28/2023     Lab Results   Component Value Date    RDW 15.8 01/28/2023     Lab Results   Component Value Date     01/28/2023        @Last Comprehensive Metabolic Panel:  Sodium   Date Value Ref Range Status   02/06/2023 138 136 - 145 mmol/L Final     Potassium   Date Value Ref Range Status   02/06/2023 4.1 3.4 - 5.3 mmol/L " Final   10/17/2022 4.2 3.5 - 5.0 mmol/L Final     Chloride   Date Value Ref Range Status   02/06/2023 96 (L) 98 - 107 mmol/L Final   10/17/2022 99 98 - 107 mmol/L Final     Carbon Dioxide (CO2)   Date Value Ref Range Status   02/06/2023 30 (H) 22 - 29 mmol/L Final   10/17/2022 30 22 - 31 mmol/L Final     Anion Gap   Date Value Ref Range Status   02/06/2023 12 7 - 15 mmol/L Final   10/17/2022 10 5 - 18 mmol/L Final     Glucose   Date Value Ref Range Status   02/06/2023 127 (H) 70 - 99 mg/dL Final   10/17/2022 99 70 - 125 mg/dL Final     GLUCOSE BY METER POCT   Date Value Ref Range Status   01/27/2023 248 (H) 70 - 99 mg/dL Final     Urea Nitrogen   Date Value Ref Range Status   02/06/2023 21.1 8.0 - 23.0 mg/dL Final   10/17/2022 27 8 - 28 mg/dL Final     Creatinine   Date Value Ref Range Status   02/06/2023 0.80 0.67 - 1.17 mg/dL Final     GFR Estimate   Date Value Ref Range Status   02/06/2023 87 >60 mL/min/1.73m2 Final     Comment:     eGFR calculated using 2021 CKD-EPI equation.   07/02/2021 >60 >60 mL/min/1.73m2 Final     Calcium   Date Value Ref Range Status   02/06/2023 9.0 8.8 - 10.2 mg/dL Final       this note has been dictated using voice recognition software. Any grammatical or context distortions are unintentional and inherent to the software    Electronically signed by: Nitza Ludwig, CNP

## 2023-02-10 NOTE — PROGRESS NOTES
Assessment/Recommendations   Assessment:    1. Nonischemic cardiomyopathy, heart failure with reduced ejection fraction, ejection fraction 35-40%, NYHA class III: Compensated.  He has fatigue, dyspnea exertion, and left lower extremity edema.  He states his weight has been stable at TCU.  His appetite is well and his daughter states he is eating well.  We briefly discussed palliative care during the visit and the progression of his heart failure.  His metoprolol was discontinued during hospitalization.  Bumex was decreased.  2.  Persistent atrial fibrillation: Status post PVI November 2022.  He continues amiodarone and Xarelto  3.  CAD: Denies chest pain.  History of PCI to RCA.  He continues aspirin and atorvastatin  4.  Hypertension: Controlled.  Blood pressure 113/69    Plan:  1.  Continue current medications  2.  Continue monitoring weights and follow low-sodium diet  3.  Continue working with therapy  4.  Schedule follow-up appointment with palliative care    Reymundo Petersen will follow up with Dr. Michelle February 22, Dr. Vasquez in 6 to 8 weeks and in the heart failure clinic mid March.     History of Present Illness/Subjective    Mr. Reymundo Petersen is a 82 year old male seen at [facility] heart failure clinic today for continued follow-up.  His daughter accompanies him today.  He follows up for nonischemic cardiomyopathy, heart failure with reduced ejection fraction.  He has been hospitalized a few times in the last couple months.  He was recently hospitalized January 23 to February 2 with fatigue, confusion and dehydration.  He had cardiogenic shock with a sodium 123 and potassium 7.1.  He was started on a dobutamine drip.  He also had a right heart catheterization which showed left and right filling pressures.  Palliative care was consulted and saw him and his daughter to discuss goals of care.  His last echocardiogram was done in December which showed ejection fraction of 35 to 40%.  It  was recommended he be discharged to TCU for rehab.  He has a past medical history significant for persistent atrial fibrillation, CRT-P, status post PVI November 2022, CAD with PCI to RCA, hypertension, anemia.    Today, he has fatigue, weakness, dyspnea on exertion and left lower extremity edema.  He denies lightheadedness, orthopnea, PND, palpitations, chest pain and abdominal fullness/bloating.      His weight has been monitored at TCU which is stable he states.  He follows a low-sodium diet.  He states his appetite is improving    Right heart catheterization: 1/25/2023  Hemodynamics    Hemodynamics on dobutamine 5mcg/kg/min    RA 15  RV 50/5; RVEDP 15  PA 54/20/30  PCWP 20  MVO2 63%  Angela CO/CI 4.62/2.41        Physical Examination Review of Systems   /69 (BP Location: Left arm, Patient Position: Sitting, Cuff Size: Adult Regular)   Pulse 70   Resp 18   SpO2 92%   There is no height or weight on file to calculate BMI.  Wt Readings from Last 3 Encounters:   02/09/23 74.2 kg (163 lb 9.6 oz)   02/06/23 72.7 kg (160 lb 3.2 oz)   02/01/23 73.8 kg (162 lb 11.2 oz)       General Appearance:   no acute distress, sleepy   ENT/Mouth: Wearing mask   EYES:  no scleral icterus, normal conjunctivae   Neck: no thyromegaly   Chest/Lungs:   lungs are clear to auscultation, no rales or wheezing, equal chest wall expansion    Cardiovascular:   Regular. Normal first and second heart sounds with no murmurs, rubs, or gallops, 2+ left lower extremity edema, trace right lower extremity edema   Abdomen:  bowel sounds are present   Extremities: no cyanosis or clubbing   Skin: no xanthelasma, warm.    Neurologic: normal  bilateral, no tremors     Psychiatric: alert and oriented x3    Enc Vitals  BP: 113/69  Pulse: 70  Resp: 18  SpO2: 92 %  Weight:  (Unable to obtain)                                         Medical History  Surgical History Family History Social History   Past Medical History:   Diagnosis Date     Atrial  fibrillation (H)      Atrial fibrillation, transient (H) 08/11/2020     BPH (benign prostatic hyperplasia) 07/11/2018     Chronic combined systolic and diastolic heart failure (H) 02/11/2021     Congestive heart failure (H)      Coronary artery disease      COVID 02/2021     Dilated cardiomyopathy (H) 02/11/2021     MARIAN (generalized anxiety disorder) 02/11/2021     GERD (gastroesophageal reflux disease)      High cholesterol      Hyperlipidemia      Hypertension      LBBB (left bundle branch block) 07/11/2018     Nonrheumatic aortic valve insufficiency 04/01/2020     Pulmonary hypertension (H) 04/01/2020    Past Surgical History:   Procedure Laterality Date     ANGIOPLASTY       ARTHROSCOPY KNEE       CARDIOVERSION  2021     CARDIOVERSION  02/03/2022     CV CORONARY ANGIOGRAM N/A 10/23/2020    Procedure: Coronary Angiogram;  Surgeon: Varun Freire MD;  Location: [facility] Cath Lab;  Service: Cardiology     CV LEFT HEART CATHETERIZATION WITHOUT LEFT VENTRICULOGRAM Left 10/23/2020    Procedure: Left Heart Catheterization Without Left Ventriculogram;  Surgeon: Varun Freire MD;  Location: [facility] Cath Lab;  Service: Cardiology     CV RIGHT HEART CATH MEASUREMENTS RECORDED N/A 1/25/2023    Procedure: Right Heart Catheterization;  Surgeon: John Paul Vasquez MD;  Location: [facility] CATH LAB CV     EP BIV PACEMAKER INSERT N/A 03/25/2020    Procedure: EP Biventricular Pacemaker Insertion;  Surgeon: Taylor Sandoval MD;  Location: [facility] Cath Lab;  Service: Cardiology     H ABLATION FOCAL AFIB       HEART CATH, ANGIOPLASTY       IMPLANT PACEMAKER       PICC TRIPLE LUMEN PLACEMENT  1/24/2023          RELEASE CARPAL TUNNEL       ZZC TOTAL KNEE ARTHROPLASTY Right 08/15/2019    Procedure: RIGHT  MINIMALLY TOTAL KNEE ARTHROPLASTY;  Surgeon: Keven Cerda MD;  Location: [facility];  Service: Orthopedics    Family History   Problem Relation Age of Onset     Alzheimer Disease Mother       Heart Disease Father      Cancer Sister      Diabetes Type 2  Sister      Chronic Obstructive Pulmonary Disease Sister      LUNG DISEASE Brother     Social History     Socioeconomic History     Marital status:      Spouse name: Not on file     Number of children: Not on file     Years of education: Not on file     Highest education level: Not on file   Occupational History     Not on file   Tobacco Use     Smoking status: Never     Smokeless tobacco: Never   Substance and Sexual Activity     Alcohol use: Yes     Comment: Alcoholic Drinks/day: rare     Drug use: No     Sexual activity: Not on file   Other Topics Concern     Parent/sibling w/ CABG, MI or angioplasty before 65F 55M? Not Asked   Social History Narrative    Retired.  Good sense of humor     Social Determinants of Health     Financial Resource Strain: Not on file   Food Insecurity: Not on file   Transportation Needs: Not on file   Physical Activity: Not on file   Stress: Not on file   Social Connections: Not on file   Intimate Partner Violence: Not on file   Housing Stability: Not on file          Medications  Allergies   Current Outpatient Medications   Medication Sig Dispense Refill     acetaminophen (TYLENOL) 325 MG tablet Take 2 tablets (650 mg) by mouth every 6 hours as needed for mild pain or other (and adjunct with moderate or severe pain or per patient request) Alternate with ibuprofen if ordered. Maximum acetaminophen dose from all sources = 75 mg/kg/day not to exceed 4 grams/day.       amiodarone (PACERONE) 200 MG tablet TAKE 1 TABLET (200 MG) BY MOUTH EVERY OTHER DAY 45 tablet 0     aspirin (ASA) 81 MG chewable tablet Take 1 tablet (81 mg) by mouth every other day 30 tablet 0     atorvastatin (LIPITOR) 10 MG tablet Take 10 mg by mouth four times a week Sunday, Tuesday, Thursday, & Saturday at bedtime       bumetanide (BUMEX) 2 MG tablet Take 1 tablet (2 mg) by mouth daily       Coenzyme Q10 300 MG CAPS Take 300 mg by mouth daily        doxazosin (CARDURA) 4 MG tablet Take 1 tablet (4 mg) by mouth At Bedtime  0     finasteride (PROSCAR) 5 mg tablet [FINASTERIDE (PROSCAR) 5 MG TABLET] Take 5 mg by mouth at bedtime.              gabapentin (NEURONTIN) 100 MG capsule Take 200 mg by mouth 2 times daily (before meals) 2 capsules (200 mg) PO BID every morning before breakfast and every afternoon before lunch       gabapentin (NEURONTIN) 300 MG capsule Take 300 mg by mouth At Bedtime       glucosamine/chondr cole A sod (OSTEO BI-FLEX ORAL) Take 1 tablet by mouth 2 times daily (with meals)       Melatonin 10 MG CAPS Take 10 mg by mouth daily       multivitamin, therapeutic (THERA-VIT) TABS tablet Take 1 tablet by mouth daily       nitroGLYcerin (NITROSTAT) 0.4 MG sublingual tablet For chest pain place 1 tablet under the tongue every 5 minutes for 3 doses. If symptoms persist 5 minutes after 1st dose call 911. 9 tablet 0     pantoprazole (PROTONIX) 40 MG EC tablet Take 1 tablet (40 mg) by mouth 2 times daily       polyethylene glycol-propylene glycol (SYSTANE ULTRA) 0.4-0.3 % SOLN ophthalmic solution Place 2 drops into both eyes 2 times daily as needed for dry eyes       potassium chloride ER (KLOR-CON M) 20 MEQ CR tablet Take 1 tablet (20 mEq) by mouth daily       rivaroxaban ANTICOAGULANT (XARELTO) 20 MG TABS tablet Take 1 tablet (20 mg) by mouth daily (with dinner) 90 tablet 3     sennosides (SENOKOT) 8.6 MG tablet Take by mouth 2 times daily       sertraline (ZOLOFT) 50 MG tablet Take 50 mg by mouth 2 times daily      Allergies   Allergen Reactions     Diltiazem Other (See Comments)     Contraindicated in HFrEF     Lactose GI Disturbance     Midodrine Other (See Comments)     Contraindicated in HFrEF         Lab Results    Chemistry/lipid CBC Cardiac Enzymes/BNP/TSH/INR   Lab Results   Component Value Date    CHOL 107 06/23/2022    HDL 38 (L) 06/23/2022    TRIG 35 06/23/2022    BUN 21.1 02/06/2023     02/06/2023    CO2 30 (H) 02/06/2023    Lab  Results   Component Value Date    WBC 8.9 01/28/2023    HGB 12.3 (L) 01/28/2023    HCT 40.5 01/28/2023    MCV 98 01/28/2023     01/28/2023    Lab Results   Component Value Date    TROPONINI 0.09 03/14/2022     (H) 06/17/2022    TSH 4.26 10/17/2022    INR 1.03 02/11/2021             This note has been dictated using voice recognition software. Any grammatical, typographical, or context distortions are unintentional and inherent to the software    30 minutes spent on the date of encounter doing chart review, review of outside records, review of test results, interpretation with above tests, patient visit, documentation and discussion with family.

## 2023-02-10 NOTE — LETTER
2/10/2023    ADDISON BENÍTEZ MD  [address]  [place] [facility] [address]    RE: Reymundo Petersen       Dear Colleague,     I had the pleasure of seeing Reymundo Petersen in the [facility].        Assessment/Recommendations   Assessment:    1. Nonischemic cardiomyopathy, heart failure with reduced ejection fraction, ejection fraction 35-40%, NYHA class III: Compensated.  He has fatigue, dyspnea exertion, and left lower extremity edema.  He states his weight has been stable at TCU.  His appetite is well and his daughter states he is eating well.  We briefly discussed palliative care during the visit and the progression of his heart failure.  His metoprolol was discontinued during hospitalization.  Bumex was decreased.  2.  Persistent atrial fibrillation: Status post PVI November 2022.  He continues amiodarone and Xarelto  3.  CAD: Denies chest pain.  History of PCI to RCA.  He continues aspirin and atorvastatin  4.  Hypertension: Controlled.  Blood pressure 113/69    Plan:  1.  Continue current medications  2.  Continue monitoring weights and follow low-sodium diet  3.  Continue working with therapy  4.  Schedule follow-up appointment with palliative care    Reymundo Petersen will follow up with Dr. Michelle February 22, Dr. Vasquez in 6 to 8 weeks and in the heart failure clinic mid March.     History of Present Illness/Subjective    Mr. Reymundo Petersen is a 82 year old male seen at [facility] heart failure clinic today for continued follow-up.  His daughter accompanies him today.  He follows up for nonischemic cardiomyopathy, heart failure with reduced ejection fraction.  He has been hospitalized a few times in the last couple months.  He was recently hospitalized January 23 to February 2 with fatigue, confusion and dehydration.  He had cardiogenic shock with a sodium 123 and potassium 7.1.  He was started on a dobutamine drip.  He also had a right heart catheterization which showed left  and right filling pressures.  Palliative care was consulted and saw him and his daughter to discuss goals of care.  His last echocardiogram was done in December which showed ejection fraction of 35 to 40%.  It was recommended he be discharged to TCU for rehab.  He has a past medical history significant for persistent atrial fibrillation, CRT-P, status post PVI November 2022, CAD with PCI to RCA, hypertension, anemia.    Today, he has fatigue, weakness, dyspnea on exertion and left lower extremity edema.  He denies lightheadedness, orthopnea, PND, palpitations, chest pain and abdominal fullness/bloating.      His weight has been monitored at TCU which is stable he states.  He follows a low-sodium diet.  He states his appetite is improving    Right heart catheterization: 1/25/2023  Hemodynamics    Hemodynamics on dobutamine 5mcg/kg/min    RA 15  RV 50/5; RVEDP 15  PA 54/20/30  PCWP 20  MVO2 63%  Angela CO/CI 4.62/2.41        Physical Examination Review of Systems   /69 (BP Location: Left arm, Patient Position: Sitting, Cuff Size: Adult Regular)   Pulse 70   Resp 18   SpO2 92%   There is no height or weight on file to calculate BMI.  Wt Readings from Last 3 Encounters:   02/09/23 74.2 kg (163 lb 9.6 oz)   02/06/23 72.7 kg (160 lb 3.2 oz)   02/01/23 73.8 kg (162 lb 11.2 oz)       General Appearance:   no acute distress, sleepy   ENT/Mouth: Wearing mask   EYES:  no scleral icterus, normal conjunctivae   Neck: no thyromegaly   Chest/Lungs:   lungs are clear to auscultation, no rales or wheezing, equal chest wall expansion    Cardiovascular:   Regular. Normal first and second heart sounds with no murmurs, rubs, or gallops, 2+ left lower extremity edema, trace right lower extremity edema   Abdomen:  bowel sounds are present   Extremities: no cyanosis or clubbing   Skin: no xanthelasma, warm.    Neurologic: normal  bilateral, no tremors     Psychiatric: alert and oriented x3    Enc Vitals  BP: 113/69  Pulse:  70  Resp: 18  SpO2: 92 %  Weight:  (Unable to obtain)                                         Medical History  Surgical History Family History Social History   Past Medical History:   Diagnosis Date     Atrial fibrillation (H)      Atrial fibrillation, transient (H) 08/11/2020     BPH (benign prostatic hyperplasia) 07/11/2018     Chronic combined systolic and diastolic heart failure (H) 02/11/2021     Congestive heart failure (H)      Coronary artery disease      COVID 02/2021     Dilated cardiomyopathy (H) 02/11/2021     MARIAN (generalized anxiety disorder) 02/11/2021     GERD (gastroesophageal reflux disease)      High cholesterol      Hyperlipidemia      Hypertension      LBBB (left bundle branch block) 07/11/2018     Nonrheumatic aortic valve insufficiency 04/01/2020     Pulmonary hypertension (H) 04/01/2020    Past Surgical History:   Procedure Laterality Date     ANGIOPLASTY       ARTHROSCOPY KNEE       CARDIOVERSION  2021     CARDIOVERSION  02/03/2022     CV CORONARY ANGIOGRAM N/A 10/23/2020    Procedure: Coronary Angiogram;  Surgeon: Varun Freire MD;  Location: [facility] Cath Lab;  Service: Cardiology     CV LEFT HEART CATHETERIZATION WITHOUT LEFT VENTRICULOGRAM Left 10/23/2020    Procedure: Left Heart Catheterization Without Left Ventriculogram;  Surgeon: Varun Freire MD;  Location: [facility] Cath Lab;  Service: Cardiology     CV RIGHT HEART CATH MEASUREMENTS RECORDED N/A 1/25/2023    Procedure: Right Heart Catheterization;  Surgeon: John Paul Vasquez MD;  Location: [facility] CATH LAB CV     EP BIV PACEMAKER INSERT N/A 03/25/2020    Procedure: EP Biventricular Pacemaker Insertion;  Surgeon: Taylor Sandoval MD;  Location: [facility] Cath Lab;  Service: Cardiology     H ABLATION FOCAL AFIB       HEART CATH, ANGIOPLASTY       IMPLANT PACEMAKER       PICC TRIPLE LUMEN PLACEMENT  1/24/2023          RELEASE CARPAL TUNNEL       ZZC TOTAL KNEE ARTHROPLASTY Right 08/15/2019    Procedure:  RIGHT  MINIMALLY TOTAL KNEE ARTHROPLASTY;  Surgeon: Keven Cerda MD;  Location: [facility] Main OR;  Service: Orthopedics    Family History   Problem Relation Age of Onset     Alzheimer Disease Mother      Heart Disease Father      Cancer Sister      Diabetes Type 2  Sister      Chronic Obstructive Pulmonary Disease Sister      LUNG DISEASE Brother     Social History     Socioeconomic History     Marital status:      Spouse name: Not on file     Number of children: Not on file     Years of education: Not on file     Highest education level: Not on file   Occupational History     Not on file   Tobacco Use     Smoking status: Never     Smokeless tobacco: Never   Substance and Sexual Activity     Alcohol use: Yes     Comment: Alcoholic Drinks/day: rare     Drug use: No     Sexual activity: Not on file   Other Topics Concern     Parent/sibling w/ CABG, MI or angioplasty before 65F 55M? Not Asked   Social History Narrative    Retired.  Good sense of humor     Social Determinants of Health     Financial Resource Strain: Not on file   Food Insecurity: Not on file   Transportation Needs: Not on file   Physical Activity: Not on file   Stress: Not on file   Social Connections: Not on file   Intimate Partner Violence: Not on file   Housing Stability: Not on file          Medications  Allergies   Current Outpatient Medications   Medication Sig Dispense Refill     acetaminophen (TYLENOL) 325 MG tablet Take 2 tablets (650 mg) by mouth every 6 hours as needed for mild pain or other (and adjunct with moderate or severe pain or per patient request) Alternate with ibuprofen if ordered. Maximum acetaminophen dose from all sources = 75 mg/kg/day not to exceed 4 grams/day.       amiodarone (PACERONE) 200 MG tablet TAKE 1 TABLET (200 MG) BY MOUTH EVERY OTHER DAY 45 tablet 0     aspirin (ASA) 81 MG chewable tablet Take 1 tablet (81 mg) by mouth every other day 30 tablet 0     atorvastatin (LIPITOR) 10 MG tablet Take 10 mg by  mouth four times a week Sunday, Tuesday, Thursday, & Saturday at bedtime       bumetanide (BUMEX) 2 MG tablet Take 1 tablet (2 mg) by mouth daily       Coenzyme Q10 300 MG CAPS Take 300 mg by mouth daily       doxazosin (CARDURA) 4 MG tablet Take 1 tablet (4 mg) by mouth At Bedtime  0     finasteride (PROSCAR) 5 mg tablet [FINASTERIDE (PROSCAR) 5 MG TABLET] Take 5 mg by mouth at bedtime.              gabapentin (NEURONTIN) 100 MG capsule Take 200 mg by mouth 2 times daily (before meals) 2 capsules (200 mg) PO BID every morning before breakfast and every afternoon before lunch       gabapentin (NEURONTIN) 300 MG capsule Take 300 mg by mouth At Bedtime       glucosamine/chondr cole A sod (OSTEO BI-FLEX ORAL) Take 1 tablet by mouth 2 times daily (with meals)       Melatonin 10 MG CAPS Take 10 mg by mouth daily       multivitamin, therapeutic (THERA-VIT) TABS tablet Take 1 tablet by mouth daily       nitroGLYcerin (NITROSTAT) 0.4 MG sublingual tablet For chest pain place 1 tablet under the tongue every 5 minutes for 3 doses. If symptoms persist 5 minutes after 1st dose call 911. 9 tablet 0     pantoprazole (PROTONIX) 40 MG EC tablet Take 1 tablet (40 mg) by mouth 2 times daily       polyethylene glycol-propylene glycol (SYSTANE ULTRA) 0.4-0.3 % SOLN ophthalmic solution Place 2 drops into both eyes 2 times daily as needed for dry eyes       potassium chloride ER (KLOR-CON M) 20 MEQ CR tablet Take 1 tablet (20 mEq) by mouth daily       rivaroxaban ANTICOAGULANT (XARELTO) 20 MG TABS tablet Take 1 tablet (20 mg) by mouth daily (with dinner) 90 tablet 3     sennosides (SENOKOT) 8.6 MG tablet Take by mouth 2 times daily       sertraline (ZOLOFT) 50 MG tablet Take 50 mg by mouth 2 times daily      Allergies   Allergen Reactions     Diltiazem Other (See Comments)     Contraindicated in HFrEF     Lactose GI Disturbance     Midodrine Other (See Comments)     Contraindicated in HFrEF         Lab Results    Chemistry/lipid CBC  Cardiac Enzymes/BNP/TSH/INR   Lab Results   Component Value Date    CHOL 107 06/23/2022    HDL 38 (L) 06/23/2022    TRIG 35 06/23/2022    BUN 21.1 02/06/2023     02/06/2023    CO2 30 (H) 02/06/2023    Lab Results   Component Value Date    WBC 8.9 01/28/2023    HGB 12.3 (L) 01/28/2023    HCT 40.5 01/28/2023    MCV 98 01/28/2023     01/28/2023    Lab Results   Component Value Date    TROPONINI 0.09 03/14/2022     (H) 06/17/2022    TSH 4.26 10/17/2022    INR 1.03 02/11/2021             This note has been dictated using voice recognition software. Any grammatical, typographical, or context distortions are unintentional and inherent to the software    30 minutes spent on the date of encounter doing chart review, review of outside records, review of test results, interpretation with above tests, patient visit, documentation and discussion with family.                  Thank you for allowing me to participate in the care of your patient.      Sincerely,     Aleena Buitrago, [facility]  cc:   John Paul Vasquez MD  [address]  [place] [address]

## 2023-02-13 NOTE — TELEPHONE ENCOUNTER
Called dtr back and discussed returning HRS kit. Patient will be at TCU for an extended period and currently too weak to stand on a scale- needing assist of 2. Dtr confirms she has all pieces of the kit and will drop off at [facility] location around 2pm today.    Rere Connell RN

## 2023-02-13 NOTE — TELEPHONE ENCOUNTER
[facility] Call Center    Phone Message    May a detailed message be left on voicemail: yes     Reason for Call: Other: Pt's daughter calling on behalf of pt who is currently in a tcu. They had previous called wondering if they could put HRS on hold which they were advised there was not an option for that and now that they believe pt is going to be at tcu for an extended time they are wondering if they should return HRS. Please return call to advise.      Action Taken: Other: Cardiology    Travel Screening: Not Applicable     Thank you!  [facility]

## 2023-02-14 NOTE — LETTER
2/14/2023        RE: Reymundo Petersen  [address] [place] [address]        [facility]    Code Status:  DNR/DNI   Visit Type:   Chief Complaint   Patient presents with     TCU Follow Up     Facility:  [facility] ([facility]) [[id number]]               HPI: Reymundo Petersen is a 82 year old male who I am seeing today for follow-up on the TCU.  Patient recently hospitalized on 1/23/2023 secondary to fatigue, confusion and dehydration.  Patient with cardiogenic shock secondary to cardiomyopathy with acute on chronic congestive heart failure with reduced ejection fracture.  Patient initially treated with dobutamine infusion and transition to oral Bumex.  Transthoracic echocardiogram on December 19 showed a left ventricular ejection fracture of 35 to 40%.  Beta-blocker held.  Patient is status post RHC on 1/25 which showed elevated right and left-sided filling pressures consistent with volume overload and pulmonary hypertension.  Due to diuresis patient had an acute kidney injury.  Bumex held and then resumed.  Persistent atrial fib with complete heart block status post ablation and DCCV status post CRT/P.  Hyperkalemia initially however ended up with hypokalemia.  Hyponatremia replaced.  Dysphagia.  Patient was seen by speech and there was some concern for esophageal dysmotility.  General anxiety disorder on sertraline.  DJD on Tylenol for pain.  Pressure injury of the left heel being treated topically.  Overall weakness and fatigue with deconditioning.  Per hospital records cardiology initiated palliative care consult.    Transitional Care Course: Today patient sitting up in bedside chair. His daughter is present on exam.  Patient with underlying acute on chronic congestive heart failure.  He continues on Bumex.  Today he appears with increased lower extremity edema.  Weight up 6 pounds from admit.  BP on the soft side with systolic BPs in the 90s.  He is reporting some dizziness.   Vitals were obtained and O2 sat was 87% on room air.  I do note a 74% O2 saturation in the records.  Oxygen placed on patient.  I did review overall prognosis with both patient and his daughter.  It has been very difficult to diurese giving low blood pressures.  He was seen by cardiology last week and they suggested palliative care consult.  Patient previously tachycardic upon admit however pulses have improved.  He is on amiodarone for rate control.  Patient with some underlying anxiety secondary to chronic living situation.  He continues on melatonin at at bedtime for insomnia.  His sertraline was switched to bedtime.  Coccyx ulcer with topical treatment.      Assessment/Plan:     Acute on chronic congestive heart failure, unspecified heart failure type (H)  -Ejection fraction of 35 to 40%.  -Continue Bumex 2 mg in the a.m.  We will attempt to give 0.5 mg this afternoon if systolic blood pressure greater than 105.  -Difficulty diuresing giving soft blood pressure.  -Hypoxia noted today.  -Okay for O2 at 1 to 2 L to keep sats greater than 90.  -Continue daily weights.  -Patient seen by cardiology last week which suggested palliative care.  -BNP over 7000 yesterday.  -Repeat BMP and BNP on Thursday.    Complete heart block (H)  Status post catheter ablation of atrial fibrillation  -Rate controlled with amiodarone.  -No further tachycardia.  -No beta-blocker.  -Continue Xarelto.    Dysphagia, unspecified type  GI dysmotility noted from esophagram.  No stricture or narrowing.  -Continues on altered diet.  Speech following.  -Continue PPI    Stage 2 chronic kidney disease  -Creatinine 0.86.    Benign prostatic hyperplasia without lower urinary tract symptoms  -Continue doxazosin and finasteride.      Active Ambulatory Problems     Diagnosis Date Noted     Primary osteoarthritis of knees, bilateral 08/15/2019     Primary hypertension 07/11/2018     BPH (benign prostatic hyperplasia) 07/11/2018     Combined  hyperlipidemia 07/11/2018     Dizziness 07/11/2018     DJD (degenerative joint disease) 07/11/2018     Left ventricular hypertrophy 07/31/2018     Troponin level elevated 03/25/2020     Acute combined systolic and diastolic congestive heart failure (H) 03/26/2020     Pulmonary hypertension (H) 04/01/2020     Nonrheumatic aortic valve insufficiency 04/01/2020     Coronary artery disease due to lipid rich plaque 10/15/2020     MARIAN (generalized anxiety disorder) 02/11/2021     GERD (gastroesophageal reflux disease)      Infection due to 2019 novel coronavirus 02/11/2021     Dilated cardiomyopathy (H) 02/11/2021     Persistent atrial fibrillation (H) 08/11/2020     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) 12/01/2020     Stage 2 chronic kidney disease 10/08/2021     Hearing loss 10/08/2021     Arthritis, lumbar spine 10/08/2021     Peripheral edema 03/14/2022     Elevated TSH 03/16/2022     Elevated troponin 12/19/2022     Pleural effusion on right 12/19/2022     Acute on chronic congestive heart failure, unspecified heart failure type (H) 12/19/2022     Status post catheter ablation of atrial fibrillation 01/04/2023     Heart failure with reduced ejection fraction (H) 01/12/2023     Hyperkalemia 01/23/2023     Hyponatremia 01/23/2023     Cardiogenic shock (H)      Generalized abdominal pain 02/08/2017     Gastro-esophageal reflux disease with esophagitis 03/23/2017     Diverticular disease of large intestine 03/23/2017     Diarrhea 02/08/2017     Diaphragmatic hernia 03/23/2017     Nonischemic cardiomyopathy (H) 02/10/2023     Resolved Ambulatory Problems     Diagnosis Date Noted     Complete heart block (H) 03/24/2020     Bradycardia 07/11/2018     LBBB (left bundle branch block) 07/11/2018     S/P drug eluting coronary stent placement 05/30/2019     Cardiac pacemaker in situ 09/29/2020     Chronic combined systolic and diastolic heart failure (H) 02/11/2021     Weight loss 02/11/2021      COVID-19 02/12/2021     CAD (coronary artery disease) 07/11/2018     CHF (congestive heart failure) (H) 09/08/2021     Past Medical History:   Diagnosis Date     Atrial fibrillation (H)      Atrial fibrillation, transient (H) 08/11/2020     Congestive heart failure (H)      Coronary artery disease      COVID 02/2021     High cholesterol      Hyperlipidemia      Hypertension      Allergies   Allergen Reactions     Diltiazem Other (See Comments)     Contraindicated in HFrEF     Lactose GI Disturbance     Midodrine Other (See Comments)     Contraindicated in HFrEF       All Meds and Allergies reviewed in the record at the facility and is the most up-to-date.      Current Outpatient Medications   Medication Sig     acetaminophen (TYLENOL) 325 MG tablet Take 2 tablets (650 mg) by mouth every 6 hours as needed for mild pain or other (and adjunct with moderate or severe pain or per patient request) Alternate with ibuprofen if ordered. Maximum acetaminophen dose from all sources = 75 mg/kg/day not to exceed 4 grams/day.     amiodarone (PACERONE) 200 MG tablet TAKE 1 TABLET (200 MG) BY MOUTH EVERY OTHER DAY     aspirin (ASA) 81 MG chewable tablet Take 1 tablet (81 mg) by mouth every other day     atorvastatin (LIPITOR) 10 MG tablet Take 10 mg by mouth four times a week Sunday, Tuesday, Thursday, & Saturday at bedtime     bumetanide (BUMEX) 2 MG tablet Take 1 tablet (2 mg) by mouth daily     Coenzyme Q10 300 MG CAPS Take 300 mg by mouth daily     doxazosin (CARDURA) 4 MG tablet Take 1 tablet (4 mg) by mouth At Bedtime     finasteride (PROSCAR) 5 mg tablet [FINASTERIDE (PROSCAR) 5 MG TABLET] Take 5 mg by mouth at bedtime.            gabapentin (NEURONTIN) 100 MG capsule Take 200 mg by mouth 2 times daily (before meals) 2 capsules (200 mg) PO BID every morning before breakfast and every afternoon before lunch     gabapentin (NEURONTIN) 300 MG capsule Take 300 mg by mouth At Bedtime     glucosamine/chondr cole A sod (OSTEO BI-FLEX  "ORAL) Take 1 tablet by mouth 2 times daily (with meals)     Melatonin 10 MG CAPS Take 10 mg by mouth daily     multivitamin, therapeutic (THERA-VIT) TABS tablet Take 1 tablet by mouth daily     nitroGLYcerin (NITROSTAT) 0.4 MG sublingual tablet For chest pain place 1 tablet under the tongue every 5 minutes for 3 doses. If symptoms persist 5 minutes after 1st dose call 911.     pantoprazole (PROTONIX) 40 MG EC tablet Take 1 tablet (40 mg) by mouth 2 times daily     polyethylene glycol-propylene glycol (SYSTANE ULTRA) 0.4-0.3 % SOLN ophthalmic solution Place 2 drops into both eyes 2 times daily as needed for dry eyes     potassium chloride ER (KLOR-CON M) 20 MEQ CR tablet Take 1 tablet (20 mEq) by mouth daily     rivaroxaban ANTICOAGULANT (XARELTO) 20 MG TABS tablet Take 1 tablet (20 mg) by mouth daily (with dinner)     sennosides (SENOKOT) 8.6 MG tablet Take by mouth 2 times daily     sertraline (ZOLOFT) 50 MG tablet Take 50 mg by mouth 2 times daily     No current facility-administered medications for this visit.       REVIEW OF SYSTEMS:   10 point review of systems reviewed and pertinent positives in the HPI.     PHYSICAL EXAMINATION:  Physical Exam     Vital signs: /78   Pulse 73   Temp 98.1  F (36.7  C)   Resp 16   Ht 1.778 m (5' 10\")   Wt 75.7 kg (166 lb 12.8 oz)   SpO2 (!) 88%   BMI 23.93 kg/m    General: Awake, Alert, tired and fatigued, oriented x2, sitting up in wheelchair  HEENT: Pink conjunctiva, dry oral mucosa  NECK: Supple  CVS: Irregular irregular.  LUNG: Shallow respiratory effort.  Hypoxia with O2 sat 87%.  Oxygen applied at 1 L.  BACK: No kyphosis of the thoracic spine  ABDOMEN: Soft, thin, nontender to palpation, with positive bowel sounds  EXTREMITIES:  thin frail extremities, diffuse weakness, 2+ pedal edema, no calf tenderness  NEUROLOGIC: Intact  PSYCHIATRIC: Flat affect.      Labs:  All labs reviewed in the nursing home record and [facility]   @  Lab Results   Component Value Date    " WBC 8.9 01/28/2023     Lab Results   Component Value Date    RBC 4.15 01/28/2023     Lab Results   Component Value Date    HGB 12.3 01/28/2023     Lab Results   Component Value Date    HCT 40.5 01/28/2023     Lab Results   Component Value Date    MCV 98 01/28/2023     Lab Results   Component Value Date    MCH 29.6 01/28/2023     Lab Results   Component Value Date    MCHC 30.4 01/28/2023     Lab Results   Component Value Date    RDW 15.8 01/28/2023     Lab Results   Component Value Date     01/28/2023        @Last Comprehensive Metabolic Panel:  Sodium   Date Value Ref Range Status   02/13/2023 137 136 - 145 mmol/L Final     Potassium   Date Value Ref Range Status   02/13/2023 4.9 3.4 - 5.3 mmol/L Final   10/17/2022 4.2 3.5 - 5.0 mmol/L Final     Chloride   Date Value Ref Range Status   02/13/2023 96 (L) 98 - 107 mmol/L Final   10/17/2022 99 98 - 107 mmol/L Final     Carbon Dioxide (CO2)   Date Value Ref Range Status   02/13/2023 32 (H) 22 - 29 mmol/L Final   10/17/2022 30 22 - 31 mmol/L Final     Anion Gap   Date Value Ref Range Status   02/13/2023 9 7 - 15 mmol/L Final   10/17/2022 10 5 - 18 mmol/L Final     Glucose   Date Value Ref Range Status   02/13/2023 120 (H) 70 - 99 mg/dL Final   10/17/2022 99 70 - 125 mg/dL Final     GLUCOSE BY METER POCT   Date Value Ref Range Status   01/27/2023 248 (H) 70 - 99 mg/dL Final     Urea Nitrogen   Date Value Ref Range Status   02/13/2023 20.2 8.0 - 23.0 mg/dL Final   10/17/2022 27 8 - 28 mg/dL Final     Creatinine   Date Value Ref Range Status   02/13/2023 0.86 0.67 - 1.17 mg/dL Final     GFR Estimate   Date Value Ref Range Status   02/13/2023 85 >60 mL/min/1.73m2 Final     Comment:     eGFR calculated using 2021 CKD-EPI equation.   07/02/2021 >60 >60 mL/min/1.73m2 Final     Calcium   Date Value Ref Range Status   02/13/2023 9.3 8.8 - 10.2 mg/dL Final       this note has been dictated using voice recognition software. Any grammatical or context distortions are  unintentional and inherent to the software    Electronically signed by: Nitza Ludwig CNP         Sincerely,        Nitza Ludwig NP

## 2023-02-14 NOTE — PROGRESS NOTES
[facility] GERIATRIC SERVICES    Code Status:  DNR/DNI   Visit Type:   Chief Complaint   Patient presents with     TCU Follow Up     Facility:  [facility] ([facility]) [[id number]]               HPI: Reymundo Petersen is a 82 year old male who I am seeing today for follow-up on the TCU.  Patient recently hospitalized on 1/23/2023 secondary to fatigue, confusion and dehydration.  Patient with cardiogenic shock secondary to cardiomyopathy with acute on chronic congestive heart failure with reduced ejection fracture.  Patient initially treated with dobutamine infusion and transition to oral Bumex.  Transthoracic echocardiogram on December 19 showed a left ventricular ejection fracture of 35 to 40%.  Beta-blocker held.  Patient is status post RHC on 1/25 which showed elevated right and left-sided filling pressures consistent with volume overload and pulmonary hypertension.  Due to diuresis patient had an acute kidney injury.  Bumex held and then resumed.  Persistent atrial fib with complete heart block status post ablation and DCCV status post CRT/P.  Hyperkalemia initially however ended up with hypokalemia.  Hyponatremia replaced.  Dysphagia.  Patient was seen by speech and there was some concern for esophageal dysmotility.  General anxiety disorder on sertraline.  DJD on Tylenol for pain.  Pressure injury of the left heel being treated topically.  Overall weakness and fatigue with deconditioning.  Per hospital records cardiology initiated palliative care consult.    Transitional Care Course: Today patient sitting up in bedside chair. His daughter is present on exam.  Patient with underlying acute on chronic congestive heart failure.  He continues on Bumex.  Today he appears with increased lower extremity edema.  Weight up 6 pounds from admit.  BP on the soft side with systolic BPs in the 90s.  He is reporting some dizziness.  Vitals were obtained and O2 sat was 87% on room air.  I do note a 74% O2 saturation in the  records.  Oxygen placed on patient.  I did review overall prognosis with both patient and his daughter.  It has been very difficult to diurese giving low blood pressures.  He was seen by cardiology last week and they suggested palliative care consult.  Patient previously tachycardic upon admit however pulses have improved.  He is on amiodarone for rate control.  Patient with some underlying anxiety secondary to chronic living situation.  He continues on melatonin at at bedtime for insomnia.  His sertraline was switched to bedtime.  Coccyx ulcer with topical treatment.      Assessment/Plan:     Acute on chronic congestive heart failure, unspecified heart failure type (H)  -Ejection fraction of 35 to 40%.  -Continue Bumex 2 mg in the a.m.  We will attempt to give 0.5 mg this afternoon if systolic blood pressure greater than 105.  -Difficulty diuresing giving soft blood pressure.  -Hypoxia noted today.  -Okay for O2 at 1 to 2 L to keep sats greater than 90.  -Continue daily weights.  -Patient seen by cardiology last week which suggested palliative care.  -BNP over 7000 yesterday.  -Repeat BMP and BNP on Thursday.    Complete heart block (H)  Status post catheter ablation of atrial fibrillation  -Rate controlled with amiodarone.  -No further tachycardia.  -No beta-blocker.  -Continue Xarelto.    Dysphagia, unspecified type  GI dysmotility noted from esophagram.  No stricture or narrowing.  -Continues on altered diet.  Speech following.  -Continue PPI    Stage 2 chronic kidney disease  -Creatinine 0.86.    Benign prostatic hyperplasia without lower urinary tract symptoms  -Continue doxazosin and finasteride.      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     Dizziness 07/11/2018     DJD (degenerative joint disease) 07/11/2018      Left ventricular hypertrophy 07/31/2018     Troponin level elevated 03/25/2020     Acute combined systolic and diastolic congestive heart failure (H) 03/26/2020     Pulmonary hypertension (H) 04/01/2020     Nonrheumatic aortic valve insufficiency 04/01/2020     Coronary artery disease due to lipid rich plaque 10/15/2020     MARIAN (generalized anxiety disorder) 02/11/2021     GERD (gastroesophageal reflux disease)      Infection due to 2019 novel coronavirus 02/11/2021     Dilated cardiomyopathy (H) 02/11/2021     Persistent atrial fibrillation (H) 08/11/2020     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) 12/01/2020     Stage 2 chronic kidney disease 10/08/2021     Hearing loss 10/08/2021     Arthritis, lumbar spine 10/08/2021     Peripheral edema 03/14/2022     Elevated TSH 03/16/2022     Elevated troponin 12/19/2022     Pleural effusion on right 12/19/2022     Acute on chronic congestive heart failure, unspecified heart failure type (H) 12/19/2022     Status post catheter ablation of atrial fibrillation 01/04/2023     Heart failure with reduced ejection fraction (H) 01/12/2023     Hyperkalemia 01/23/2023     Hyponatremia 01/23/2023     Cardiogenic shock (H)      Generalized abdominal pain 02/08/2017     Gastro-esophageal reflux disease with esophagitis 03/23/2017     Diverticular disease of large intestine 03/23/2017     Diarrhea 02/08/2017     Diaphragmatic hernia 03/23/2017     Nonischemic cardiomyopathy (H) 02/10/2023     Resolved Ambulatory Problems     Diagnosis Date Noted     Complete heart block (H) 03/24/2020     Bradycardia 07/11/2018     LBBB (left bundle branch block) 07/11/2018     S/P drug eluting coronary stent placement 05/30/2019     Cardiac pacemaker in situ 09/29/2020     Chronic combined systolic and diastolic heart failure (H) 02/11/2021     Weight loss 02/11/2021     COVID-19 02/12/2021     CAD (coronary artery disease) 07/11/2018     CHF (congestive heart failure) (H)  09/08/2021     Past Medical History:   Diagnosis Date     Atrial fibrillation (H)      Atrial fibrillation, transient (H) 08/11/2020     Congestive heart failure (H)      Coronary artery disease      COVID 02/2021     High cholesterol      Hyperlipidemia      Hypertension      Allergies   Allergen Reactions     Diltiazem Other (See Comments)     Contraindicated in HFrEF     Lactose GI Disturbance     Midodrine Other (See Comments)     Contraindicated in HFrEF       All Meds and Allergies reviewed in the record at the facility and is the most up-to-date.      Current Outpatient Medications   Medication Sig     acetaminophen (TYLENOL) 325 MG tablet Take 2 tablets (650 mg) by mouth every 6 hours as needed for mild pain or other (and adjunct with moderate or severe pain or per patient request) Alternate with ibuprofen if ordered. Maximum acetaminophen dose from all sources = 75 mg/kg/day not to exceed 4 grams/day.     amiodarone (PACERONE) 200 MG tablet TAKE 1 TABLET (200 MG) BY MOUTH EVERY OTHER DAY     aspirin (ASA) 81 MG chewable tablet Take 1 tablet (81 mg) by mouth every other day     atorvastatin (LIPITOR) 10 MG tablet Take 10 mg by mouth four times a week Sunday, Tuesday, Thursday, & Saturday at bedtime     bumetanide (BUMEX) 2 MG tablet Take 1 tablet (2 mg) by mouth daily     Coenzyme Q10 300 MG CAPS Take 300 mg by mouth daily     doxazosin (CARDURA) 4 MG tablet Take 1 tablet (4 mg) by mouth At Bedtime     finasteride (PROSCAR) 5 mg tablet [FINASTERIDE (PROSCAR) 5 MG TABLET] Take 5 mg by mouth at bedtime.            gabapentin (NEURONTIN) 100 MG capsule Take 200 mg by mouth 2 times daily (before meals) 2 capsules (200 mg) PO BID every morning before breakfast and every afternoon before lunch     gabapentin (NEURONTIN) 300 MG capsule Take 300 mg by mouth At Bedtime     glucosamine/chondr cole A sod (OSTEO BI-FLEX ORAL) Take 1 tablet by mouth 2 times daily (with meals)     Melatonin 10 MG CAPS Take 10 mg by mouth  "daily     multivitamin, therapeutic (THERA-VIT) TABS tablet Take 1 tablet by mouth daily     nitroGLYcerin (NITROSTAT) 0.4 MG sublingual tablet For chest pain place 1 tablet under the tongue every 5 minutes for 3 doses. If symptoms persist 5 minutes after 1st dose call 911.     pantoprazole (PROTONIX) 40 MG EC tablet Take 1 tablet (40 mg) by mouth 2 times daily     polyethylene glycol-propylene glycol (SYSTANE ULTRA) 0.4-0.3 % SOLN ophthalmic solution Place 2 drops into both eyes 2 times daily as needed for dry eyes     potassium chloride ER (KLOR-CON M) 20 MEQ CR tablet Take 1 tablet (20 mEq) by mouth daily     rivaroxaban ANTICOAGULANT (XARELTO) 20 MG TABS tablet Take 1 tablet (20 mg) by mouth daily (with dinner)     sennosides (SENOKOT) 8.6 MG tablet Take by mouth 2 times daily     sertraline (ZOLOFT) 50 MG tablet Take 50 mg by mouth 2 times daily     No current facility-administered medications for this visit.       REVIEW OF SYSTEMS:   10 point review of systems reviewed and pertinent positives in the HPI.     PHYSICAL EXAMINATION:  Physical Exam     Vital signs: /78   Pulse 73   Temp 98.1  F (36.7  C)   Resp 16   Ht 1.778 m (5' 10\")   Wt 75.7 kg (166 lb 12.8 oz)   SpO2 (!) 88%   BMI 23.93 kg/m    General: Awake, Alert, tired and fatigued, oriented x2, sitting up in wheelchair  HEENT: Pink conjunctiva, dry oral mucosa  NECK: Supple  CVS: Irregular irregular.  LUNG: Shallow respiratory effort.  Hypoxia with O2 sat 87%.  Oxygen applied at 1 L.  BACK: No kyphosis of the thoracic spine  ABDOMEN: Soft, thin, nontender to palpation, with positive bowel sounds  EXTREMITIES:  thin frail extremities, diffuse weakness, 2+ pedal edema, no calf tenderness  NEUROLOGIC: Intact  PSYCHIATRIC: Flat affect.      Labs:  All labs reviewed in the nursing home record and [facility]   @  Lab Results   Component Value Date    WBC 8.9 01/28/2023     Lab Results   Component Value Date    RBC 4.15 01/28/2023     Lab Results "   Component Value Date    HGB 12.3 01/28/2023     Lab Results   Component Value Date    HCT 40.5 01/28/2023     Lab Results   Component Value Date    MCV 98 01/28/2023     Lab Results   Component Value Date    MCH 29.6 01/28/2023     Lab Results   Component Value Date    MCHC 30.4 01/28/2023     Lab Results   Component Value Date    RDW 15.8 01/28/2023     Lab Results   Component Value Date     01/28/2023        @Last Comprehensive Metabolic Panel:  Sodium   Date Value Ref Range Status   02/13/2023 137 136 - 145 mmol/L Final     Potassium   Date Value Ref Range Status   02/13/2023 4.9 3.4 - 5.3 mmol/L Final   10/17/2022 4.2 3.5 - 5.0 mmol/L Final     Chloride   Date Value Ref Range Status   02/13/2023 96 (L) 98 - 107 mmol/L Final   10/17/2022 99 98 - 107 mmol/L Final     Carbon Dioxide (CO2)   Date Value Ref Range Status   02/13/2023 32 (H) 22 - 29 mmol/L Final   10/17/2022 30 22 - 31 mmol/L Final     Anion Gap   Date Value Ref Range Status   02/13/2023 9 7 - 15 mmol/L Final   10/17/2022 10 5 - 18 mmol/L Final     Glucose   Date Value Ref Range Status   02/13/2023 120 (H) 70 - 99 mg/dL Final   10/17/2022 99 70 - 125 mg/dL Final     GLUCOSE BY METER POCT   Date Value Ref Range Status   01/27/2023 248 (H) 70 - 99 mg/dL Final     Urea Nitrogen   Date Value Ref Range Status   02/13/2023 20.2 8.0 - 23.0 mg/dL Final   10/17/2022 27 8 - 28 mg/dL Final     Creatinine   Date Value Ref Range Status   02/13/2023 0.86 0.67 - 1.17 mg/dL Final     GFR Estimate   Date Value Ref Range Status   02/13/2023 85 >60 mL/min/1.73m2 Final     Comment:     eGFR calculated using 2021 CKD-EPI equation.   07/02/2021 >60 >60 mL/min/1.73m2 Final     Calcium   Date Value Ref Range Status   02/13/2023 9.3 8.8 - 10.2 mg/dL Final       this note has been dictated using voice recognition software. Any grammatical or context distortions are unintentional and inherent to the software    Electronically signed by: Nitza Ludwig CNP

## 2023-02-16 NOTE — LETTER
2/16/2023        RE: Reymundo Petersen  [address] [place] [address]        [facility]    Code Status:  DNR/DNI   Visit Type:   Chief Complaint   Patient presents with     TCU Follow Up     Facility:  [facility] ([facility]) [[id number]]               HPI: Reymundo Petersen is a 82 year old male who I am seeing today for follow-up on the TCU.  Patient recently hospitalized on 1/23/2023 secondary to fatigue, confusion and dehydration.  Patient with cardiogenic shock secondary to cardiomyopathy with acute on chronic congestive heart failure with reduced ejection fracture.  Patient initially treated with dobutamine infusion and transition to oral Bumex.  Transthoracic echocardiogram on December 19 showed a left ventricular ejection fracture of 35 to 40%.  Beta-blocker held.  Patient is status post RHC on 1/25 which showed elevated right and left-sided filling pressures consistent with volume overload and pulmonary hypertension.  Due to diuresis patient had an acute kidney injury.  Bumex held and then resumed.  Persistent atrial fib with complete heart block status post ablation and DCCV status post CRT/P.  Hyperkalemia initially however ended up with hypokalemia.  Hyponatremia replaced.  Dysphagia.  Patient was seen by speech and there was some concern for esophageal dysmotility.  General anxiety disorder on sertraline.  DJD on Tylenol for pain.  Pressure injury of the left heel being treated topically.  Overall weakness and fatigue with deconditioning.  Per hospital records cardiology initiated palliative care consult.    Transitional Care Course: Today patient sitting up in bedside chair. His daughter is present on exam.  Patient with underlying acute on chronic congestive heart failure. Pt continues with progressive weakness, SOB and now mild cognitive impairment. He is a febrile. He continues on Bumex. He has had soft BP which has made it difficult to diuresis. He was given  additional dose of Bumex yesterday. He continues with LE edema 2+. He is now on O2 @ 2L. Today bp better. Pt appears overall very fatigued on exam. He is eating well. Today I discussed with both pt and daughter regarding overall prognosis of care. Daughter is open to exploring hospice if no improvement. I will rule out infection first.     Assessment/Plan:     Acute on chronic congestive heart failure, unspecified heart failure type (H)  -Ejection fraction of 35 to 40%.  -Increase  Bumex 2 mg in am/1 mg in afternoon.   -Follow up CXR today.   -Continue O2 at 1 to 2 L to keep sats greater than 90.  -Continue daily weights.  -Patient seen by cardiology last week which suggested palliative care.  -BNP today in the 7000s.   -Repeat BMP and BNP on Monday.     Complete heart block (H)  Status post catheter ablation of atrial fibrillation  -Rate controlled with amiodarone.  -No further tachycardia.  -No beta-blocker.  -Continue Xarelto.    Mild cognitive impairment  -Rule out UTI. Obtain UA/UC.  -CBC in am.     Dysphagia, unspecified type  GI dysmotility noted from esophagram.  No stricture or narrowing.  -Continues on altered diet.  Speech following.  -Continue PPI    Stage 2 chronic kidney disease  -Creatinine today 0.89.    Benign prostatic hyperplasia without lower urinary tract symptoms  -Continue doxazosin and finasteride.      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     Dizziness 07/11/2018     DJD (degenerative joint disease) 07/11/2018     Left ventricular hypertrophy 07/31/2018     Troponin level elevated 03/25/2020     Acute combined systolic and diastolic congestive heart failure (H) 03/26/2020     Pulmonary hypertension (H) 04/01/2020     Nonrheumatic aortic valve insufficiency 04/01/2020     Coronary artery disease  due to lipid rich plaque 10/15/2020     MARIAN (generalized anxiety disorder) 02/11/2021     GERD (gastroesophageal reflux disease)      Infection due to 2019 novel coronavirus 02/11/2021     Dilated cardiomyopathy (H) 02/11/2021     Persistent atrial fibrillation (H) 08/11/2020     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) 12/01/2020     Stage 2 chronic kidney disease 10/08/2021     Hearing loss 10/08/2021     Arthritis, lumbar spine 10/08/2021     Peripheral edema 03/14/2022     Elevated TSH 03/16/2022     Elevated troponin 12/19/2022     Pleural effusion on right 12/19/2022     Acute on chronic congestive heart failure, unspecified heart failure type (H) 12/19/2022     Status post catheter ablation of atrial fibrillation 01/04/2023     Heart failure with reduced ejection fraction (H) 01/12/2023     Hyperkalemia 01/23/2023     Hyponatremia 01/23/2023     Cardiogenic shock (H)      Generalized abdominal pain 02/08/2017     Gastro-esophageal reflux disease with esophagitis 03/23/2017     Diverticular disease of large intestine 03/23/2017     Diarrhea 02/08/2017     Diaphragmatic hernia 03/23/2017     Nonischemic cardiomyopathy (H) 02/10/2023     Resolved Ambulatory Problems     Diagnosis Date Noted     Complete heart block (H) 03/24/2020     Bradycardia 07/11/2018     LBBB (left bundle branch block) 07/11/2018     S/P drug eluting coronary stent placement 05/30/2019     Cardiac pacemaker in situ 09/29/2020     Chronic combined systolic and diastolic heart failure (H) 02/11/2021     Weight loss 02/11/2021     COVID-19 02/12/2021     CAD (coronary artery disease) 07/11/2018     CHF (congestive heart failure) (H) 09/08/2021     Past Medical History:   Diagnosis Date     Atrial fibrillation (H)      Atrial fibrillation, transient (H) 08/11/2020     Congestive heart failure (H)      Coronary artery disease      COVID 02/2021     High cholesterol      Hyperlipidemia      Hypertension      Allergies    Allergen Reactions     Diltiazem Other (See Comments)     Contraindicated in HFrEF     Lactose GI Disturbance     Midodrine Other (See Comments)     Contraindicated in HFrEF       All Meds and Allergies reviewed in the record at the facility and is the most up-to-date.      Current Outpatient Medications   Medication Sig     acetaminophen (TYLENOL) 325 MG tablet Take 2 tablets (650 mg) by mouth every 6 hours as needed for mild pain or other (and adjunct with moderate or severe pain or per patient request) Alternate with ibuprofen if ordered. Maximum acetaminophen dose from all sources = 75 mg/kg/day not to exceed 4 grams/day.     amiodarone (PACERONE) 200 MG tablet TAKE 1 TABLET (200 MG) BY MOUTH EVERY OTHER DAY     aspirin (ASA) 81 MG chewable tablet Take 1 tablet (81 mg) by mouth every other day     atorvastatin (LIPITOR) 10 MG tablet Take 10 mg by mouth four times a week Sunday, Tuesday, Thursday, & Saturday at bedtime     bumetanide (BUMEX) 2 MG tablet Take 1 tablet (2 mg) by mouth daily     Coenzyme Q10 300 MG CAPS Take 300 mg by mouth daily     doxazosin (CARDURA) 4 MG tablet Take 1 tablet (4 mg) by mouth At Bedtime     finasteride (PROSCAR) 5 mg tablet [FINASTERIDE (PROSCAR) 5 MG TABLET] Take 5 mg by mouth at bedtime.            gabapentin (NEURONTIN) 100 MG capsule Take 200 mg by mouth 2 times daily (before meals) 2 capsules (200 mg) PO BID every morning before breakfast and every afternoon before lunch     gabapentin (NEURONTIN) 300 MG capsule Take 300 mg by mouth At Bedtime     glucosamine/chondr cole A sod (OSTEO BI-FLEX ORAL) Take 1 tablet by mouth 2 times daily (with meals)     Melatonin 10 MG CAPS Take 10 mg by mouth daily     multivitamin, therapeutic (THERA-VIT) TABS tablet Take 1 tablet by mouth daily     nitroGLYcerin (NITROSTAT) 0.4 MG sublingual tablet For chest pain place 1 tablet under the tongue every 5 minutes for 3 doses. If symptoms persist 5 minutes after 1st dose call 911.      "pantoprazole (PROTONIX) 40 MG EC tablet Take 1 tablet (40 mg) by mouth 2 times daily     polyethylene glycol-propylene glycol (SYSTANE ULTRA) 0.4-0.3 % SOLN ophthalmic solution Place 2 drops into both eyes 2 times daily as needed for dry eyes     potassium chloride ER (KLOR-CON M) 20 MEQ CR tablet Take 1 tablet (20 mEq) by mouth daily     rivaroxaban ANTICOAGULANT (XARELTO) 20 MG TABS tablet Take 1 tablet (20 mg) by mouth daily (with dinner)     sennosides (SENOKOT) 8.6 MG tablet Take by mouth 2 times daily     sertraline (ZOLOFT) 50 MG tablet Take 50 mg by mouth 2 times daily     No current facility-administered medications for this visit.       REVIEW OF SYSTEMS:   10 point review of systems reviewed and pertinent positives in the HPI.     PHYSICAL EXAMINATION:  Physical Exam     Vital signs: /73   Pulse 73   Temp 97.5  F (36.4  C)   Resp 16   Ht 1.778 m (5' 10\")   Wt 76.9 kg (169 lb 9.6 oz)   SpO2 91%   BMI 24.34 kg/m    General: Awake, Alert, tired and fatigued, oriented x2, sitting up in bedside chair.   HEENT: Pink conjunctiva, dry oral mucosa  NECK: Supple  CVS: Irregular irregular.  LUNG: Shallow respiratory effort. Diminished sounds in bases. Oxygen on at 2 L.  BACK: No kyphosis of the thoracic spine  ABDOMEN: Soft, thin, nontender to palpation, with positive bowel sounds  EXTREMITIES:  thin frail extremities, diffuse weakness, 2+ pedal edema, no calf tenderness  NEUROLOGIC: Intact  PSYCHIATRIC: Flat affect. Slow to answer question.       Labs:  All labs reviewed in the nursing home record and Epic   @  Lab Results   Component Value Date    WBC 8.9 01/28/2023     Lab Results   Component Value Date    RBC 4.15 01/28/2023     Lab Results   Component Value Date    HGB 12.3 01/28/2023     Lab Results   Component Value Date    HCT 40.5 01/28/2023     Lab Results   Component Value Date    MCV 98 01/28/2023     Lab Results   Component Value Date    MCH 29.6 01/28/2023     Lab Results   Component Value " Date    MCHC 30.4 01/28/2023     Lab Results   Component Value Date    RDW 15.8 01/28/2023     Lab Results   Component Value Date     01/28/2023        @Last Comprehensive Metabolic Panel:  Sodium   Date Value Ref Range Status   02/16/2023 136 136 - 145 mmol/L Final     Potassium   Date Value Ref Range Status   02/16/2023 4.4 3.4 - 5.3 mmol/L Final   10/17/2022 4.2 3.5 - 5.0 mmol/L Final     Chloride   Date Value Ref Range Status   02/16/2023 94 (L) 98 - 107 mmol/L Final   10/17/2022 99 98 - 107 mmol/L Final     Carbon Dioxide (CO2)   Date Value Ref Range Status   02/16/2023 34 (H) 22 - 29 mmol/L Final   10/17/2022 30 22 - 31 mmol/L Final     Anion Gap   Date Value Ref Range Status   02/16/2023 8 7 - 15 mmol/L Final   10/17/2022 10 5 - 18 mmol/L Final     Glucose   Date Value Ref Range Status   02/16/2023 105 (H) 70 - 99 mg/dL Final   10/17/2022 99 70 - 125 mg/dL Final     GLUCOSE BY METER POCT   Date Value Ref Range Status   01/27/2023 248 (H) 70 - 99 mg/dL Final     Urea Nitrogen   Date Value Ref Range Status   02/16/2023 18.2 8.0 - 23.0 mg/dL Final   10/17/2022 27 8 - 28 mg/dL Final     Creatinine   Date Value Ref Range Status   02/16/2023 0.89 0.67 - 1.17 mg/dL Final     GFR Estimate   Date Value Ref Range Status   02/16/2023 85 >60 mL/min/1.73m2 Final     Comment:     eGFR calculated using 2021 CKD-EPI equation.   07/02/2021 >60 >60 mL/min/1.73m2 Final     Calcium   Date Value Ref Range Status   02/16/2023 9.0 8.8 - 10.2 mg/dL Final       this note has been dictated using voice recognition software. Any grammatical or context distortions are unintentional and inherent to the software    Electronically signed by: Nitza Ludwig, SYM[employer]         Sincerely,        Nitza Ludwig, NP

## 2023-02-17 NOTE — PROGRESS NOTES
[facility]    Code Status:  DNR/DNI   Visit Type:   Chief Complaint   Patient presents with     TCU Follow Up     Facility:  [facility] ([facility]) [[id number]]               HPI: Reymundo Petersen is a 82 year old male who I am seeing today for follow-up on the TCU.  Patient recently hospitalized on 1/23/2023 secondary to fatigue, confusion and dehydration.  Patient with cardiogenic shock secondary to cardiomyopathy with acute on chronic congestive heart failure with reduced ejection fracture.  Patient initially treated with dobutamine infusion and transition to oral Bumex.  Transthoracic echocardiogram on December 19 showed a left ventricular ejection fracture of 35 to 40%.  Beta-blocker held.  Patient is status post RHC on 1/25 which showed elevated right and left-sided filling pressures consistent with volume overload and pulmonary hypertension.  Due to diuresis patient had an acute kidney injury.  Bumex held and then resumed.  Persistent atrial fib with complete heart block status post ablation and DCCV status post CRT/P.  Hyperkalemia initially however ended up with hypokalemia.  Hyponatremia replaced.  Dysphagia.  Patient was seen by speech and there was some concern for esophageal dysmotility.  General anxiety disorder on sertraline.  DJD on Tylenol for pain.  Pressure injury of the left heel being treated topically.  Overall weakness and fatigue with deconditioning.  Per hospital records cardiology initiated palliative care consult.    Transitional Care Course: Today patient sitting up in bedside chair. His daughter is present on exam.  Patient with underlying acute on chronic congestive heart failure. Pt continues with progressive weakness, SOB and now mild cognitive impairment. He is a febrile. He continues on Bumex. He has had soft BP which has made it difficult to diuresis. He was given additional dose of Bumex yesterday. He continues with LE edema 2+. He is now on O2 @ 2L. Today bp  better. Pt appears overall very fatigued on exam. He is eating well. Today I discussed with both pt and daughter regarding overall prognosis of care. Daughter is open to exploring hospice if no improvement. I will rule out infection first.     Assessment/Plan:     Acute on chronic congestive heart failure, unspecified heart failure type (H)  -Ejection fraction of 35 to 40%.  -Increase  Bumex 2 mg in am/1 mg in afternoon.   -Follow up CXR today.   -Continue O2 at 1 to 2 L to keep sats greater than 90.  -Continue daily weights.  -Patient seen by cardiology last week which suggested palliative care.  -BNP today in the 7000s.   -Repeat BMP and BNP on Monday.     Complete heart block (H)  Status post catheter ablation of atrial fibrillation  -Rate controlled with amiodarone.  -No further tachycardia.  -No beta-blocker.  -Continue Xarelto.    Mild cognitive impairment  -Rule out UTI. Obtain UA/UC.  -CBC in am.     Dysphagia, unspecified type  GI dysmotility noted from esophagram.  No stricture or narrowing.  -Continues on altered diet.  Speech following.  -Continue PPI    Stage 2 chronic kidney disease  -Creatinine today 0.89.    Benign prostatic hyperplasia without lower urinary tract symptoms  -Continue doxazosin and finasteride.      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     Dizziness 07/11/2018     DJD (degenerative joint disease) 07/11/2018     Left ventricular hypertrophy 07/31/2018     Troponin level elevated 03/25/2020     Acute combined systolic and diastolic congestive heart failure (H) 03/26/2020     Pulmonary hypertension (H) 04/01/2020     Nonrheumatic aortic valve insufficiency 04/01/2020     Coronary artery disease due to lipid rich plaque 10/15/2020     MARIAN (generalized anxiety disorder) 02/11/2021     GERD  (gastroesophageal reflux disease)      Infection due to 2019 novel coronavirus 02/11/2021     Dilated cardiomyopathy (H) 02/11/2021     Persistent atrial fibrillation (H) 08/11/2020     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) 12/01/2020     Stage 2 chronic kidney disease 10/08/2021     Hearing loss 10/08/2021     Arthritis, lumbar spine 10/08/2021     Peripheral edema 03/14/2022     Elevated TSH 03/16/2022     Elevated troponin 12/19/2022     Pleural effusion on right 12/19/2022     Acute on chronic congestive heart failure, unspecified heart failure type (H) 12/19/2022     Status post catheter ablation of atrial fibrillation 01/04/2023     Heart failure with reduced ejection fraction (H) 01/12/2023     Hyperkalemia 01/23/2023     Hyponatremia 01/23/2023     Cardiogenic shock (H)      Generalized abdominal pain 02/08/2017     Gastro-esophageal reflux disease with esophagitis 03/23/2017     Diverticular disease of large intestine 03/23/2017     Diarrhea 02/08/2017     Diaphragmatic hernia 03/23/2017     Nonischemic cardiomyopathy (H) 02/10/2023     Resolved Ambulatory Problems     Diagnosis Date Noted     Complete heart block (H) 03/24/2020     Bradycardia 07/11/2018     LBBB (left bundle branch block) 07/11/2018     S/P drug eluting coronary stent placement 05/30/2019     Cardiac pacemaker in situ 09/29/2020     Chronic combined systolic and diastolic heart failure (H) 02/11/2021     Weight loss 02/11/2021     COVID-19 02/12/2021     CAD (coronary artery disease) 07/11/2018     CHF (congestive heart failure) (H) 09/08/2021     Past Medical History:   Diagnosis Date     Atrial fibrillation (H)      Atrial fibrillation, transient (H) 08/11/2020     Congestive heart failure (H)      Coronary artery disease      COVID 02/2021     High cholesterol      Hyperlipidemia      Hypertension      Allergies   Allergen Reactions     Diltiazem Other (See Comments)     Contraindicated in HFrEF     Lactose GI  Disturbance     Midodrine Other (See Comments)     Contraindicated in HFrEF       All Meds and Allergies reviewed in the record at the facility and is the most up-to-date.      Current Outpatient Medications   Medication Sig     acetaminophen (TYLENOL) 325 MG tablet Take 2 tablets (650 mg) by mouth every 6 hours as needed for mild pain or other (and adjunct with moderate or severe pain or per patient request) Alternate with ibuprofen if ordered. Maximum acetaminophen dose from all sources = 75 mg/kg/day not to exceed 4 grams/day.     amiodarone (PACERONE) 200 MG tablet TAKE 1 TABLET (200 MG) BY MOUTH EVERY OTHER DAY     aspirin (ASA) 81 MG chewable tablet Take 1 tablet (81 mg) by mouth every other day     atorvastatin (LIPITOR) 10 MG tablet Take 10 mg by mouth four times a week Sunday, Tuesday, Thursday, & Saturday at bedtime     bumetanide (BUMEX) 2 MG tablet Take 1 tablet (2 mg) by mouth daily     Coenzyme Q10 300 MG CAPS Take 300 mg by mouth daily     doxazosin (CARDURA) 4 MG tablet Take 1 tablet (4 mg) by mouth At Bedtime     finasteride (PROSCAR) 5 mg tablet [FINASTERIDE (PROSCAR) 5 MG TABLET] Take 5 mg by mouth at bedtime.            gabapentin (NEURONTIN) 100 MG capsule Take 200 mg by mouth 2 times daily (before meals) 2 capsules (200 mg) PO BID every morning before breakfast and every afternoon before lunch     gabapentin (NEURONTIN) 300 MG capsule Take 300 mg by mouth At Bedtime     glucosamine/chondr cole A sod (OSTEO BI-FLEX ORAL) Take 1 tablet by mouth 2 times daily (with meals)     Melatonin 10 MG CAPS Take 10 mg by mouth daily     multivitamin, therapeutic (THERA-VIT) TABS tablet Take 1 tablet by mouth daily     nitroGLYcerin (NITROSTAT) 0.4 MG sublingual tablet For chest pain place 1 tablet under the tongue every 5 minutes for 3 doses. If symptoms persist 5 minutes after 1st dose call 911.     pantoprazole (PROTONIX) 40 MG EC tablet Take 1 tablet (40 mg) by mouth 2 times daily     polyethylene  "glycol-propylene glycol (SYSTANE ULTRA) 0.4-0.3 % SOLN ophthalmic solution Place 2 drops into both eyes 2 times daily as needed for dry eyes     potassium chloride ER (KLOR-CON M) 20 MEQ CR tablet Take 1 tablet (20 mEq) by mouth daily     rivaroxaban ANTICOAGULANT (XARELTO) 20 MG TABS tablet Take 1 tablet (20 mg) by mouth daily (with dinner)     sennosides (SENOKOT) 8.6 MG tablet Take by mouth 2 times daily     sertraline (ZOLOFT) 50 MG tablet Take 50 mg by mouth 2 times daily     No current facility-administered medications for this visit.       REVIEW OF SYSTEMS:   10 point review of systems reviewed and pertinent positives in the HPI.     PHYSICAL EXAMINATION:  Physical Exam     Vital signs: /73   Pulse 73   Temp 97.5  F (36.4  C)   Resp 16   Ht 1.778 m (5' 10\")   Wt 76.9 kg (169 lb 9.6 oz)   SpO2 91%   BMI 24.34 kg/m    General: Awake, Alert, tired and fatigued, oriented x2, sitting up in bedside chair.   HEENT: Pink conjunctiva, dry oral mucosa  NECK: Supple  CVS: Irregular irregular.  LUNG: Shallow respiratory effort. Diminished sounds in bases. Oxygen on at 2 L.  BACK: No kyphosis of the thoracic spine  ABDOMEN: Soft, thin, nontender to palpation, with positive bowel sounds  EXTREMITIES:  thin frail extremities, diffuse weakness, 2+ pedal edema, no calf tenderness  NEUROLOGIC: Intact  PSYCHIATRIC: Flat affect. Slow to answer question.       Labs:  All labs reviewed in the nursing home record and [facility]   @  Lab Results   Component Value Date    WBC 8.9 01/28/2023     Lab Results   Component Value Date    RBC 4.15 01/28/2023     Lab Results   Component Value Date    HGB 12.3 01/28/2023     Lab Results   Component Value Date    HCT 40.5 01/28/2023     Lab Results   Component Value Date    MCV 98 01/28/2023     Lab Results   Component Value Date    MCH 29.6 01/28/2023     Lab Results   Component Value Date    MCHC 30.4 01/28/2023     Lab Results   Component Value Date    RDW 15.8 01/28/2023     Lab " Results   Component Value Date     01/28/2023        @Last Comprehensive Metabolic Panel:  Sodium   Date Value Ref Range Status   02/16/2023 136 136 - 145 mmol/L Final     Potassium   Date Value Ref Range Status   02/16/2023 4.4 3.4 - 5.3 mmol/L Final   10/17/2022 4.2 3.5 - 5.0 mmol/L Final     Chloride   Date Value Ref Range Status   02/16/2023 94 (L) 98 - 107 mmol/L Final   10/17/2022 99 98 - 107 mmol/L Final     Carbon Dioxide (CO2)   Date Value Ref Range Status   02/16/2023 34 (H) 22 - 29 mmol/L Final   10/17/2022 30 22 - 31 mmol/L Final     Anion Gap   Date Value Ref Range Status   02/16/2023 8 7 - 15 mmol/L Final   10/17/2022 10 5 - 18 mmol/L Final     Glucose   Date Value Ref Range Status   02/16/2023 105 (H) 70 - 99 mg/dL Final   10/17/2022 99 70 - 125 mg/dL Final     GLUCOSE BY METER POCT   Date Value Ref Range Status   01/27/2023 248 (H) 70 - 99 mg/dL Final     Urea Nitrogen   Date Value Ref Range Status   02/16/2023 18.2 8.0 - 23.0 mg/dL Final   10/17/2022 27 8 - 28 mg/dL Final     Creatinine   Date Value Ref Range Status   02/16/2023 0.89 0.67 - 1.17 mg/dL Final     GFR Estimate   Date Value Ref Range Status   02/16/2023 85 >60 mL/min/1.73m2 Final     Comment:     eGFR calculated using 2021 CKD-EPI equation.   07/02/2021 >60 >60 mL/min/1.73m2 Final     Calcium   Date Value Ref Range Status   02/16/2023 9.0 8.8 - 10.2 mg/dL Final       this note has been dictated using voice recognition software. Any grammatical or context distortions are unintentional and inherent to the software    Electronically signed by: Nitza Ludwig, CNP

## 2023-02-20 NOTE — LETTER
2/20/2023        RE: Reymundo Petersen  [address] [place] [address]        [facility]    Code Status:  DNR/DNI   Visit Type:   Chief Complaint   Patient presents with     TCU Follow Up     Facility:  [facility] ([facility]) [[id number]]               HPI: Reymundo Petersen is a 82 year old male who I am seeing today for follow-up on the TCU.  Patient recently hospitalized on 1/23/2023 secondary to fatigue, confusion and dehydration.  Patient with cardiogenic shock secondary to cardiomyopathy with acute on chronic congestive heart failure with reduced ejection fracture.  Patient initially treated with dobutamine infusion and transition to oral Bumex.  Transthoracic echocardiogram on December 19 showed a left ventricular ejection fracture of 35 to 40%.  Beta-blocker held.  Patient is status post RHC on 1/25 which showed elevated right and left-sided filling pressures consistent with volume overload and pulmonary hypertension.  Due to diuresis patient had an acute kidney injury.  Bumex held and then resumed.  Persistent atrial fib with complete heart block status post ablation and DCCV status post CRT/P.  Hyperkalemia initially however ended up with hypokalemia.  Hyponatremia replaced.  Dysphagia.  Patient was seen by speech and there was some concern for esophageal dysmotility.  General anxiety disorder on sertraline.  DJD on Tylenol for pain.  Pressure injury of the left heel being treated topically.  Overall weakness and fatigue with deconditioning.  Per hospital records cardiology initiated palliative care consult.    Transitional Care Course: Today patient sitting up in bedside chair. His 2 daughters are  present on exam. Patient with underlying acute on chronic congestive heart failure. Follow-up chest x-ray showed bilateral pleural effusions. Patient continues on Bumex 2 mg in the a.m. and 1 mg in the afternoon.  BP slightly improved.  Dizziness with standing which is made it  somewhat difficult to diurese.  Continues with lower extremity edema.  Hypoxia now on oxygen at 2 L.  Continues with overall fatigue and weakness.  Appetite is good.  He had a loose tooth to the front that came out over the weekend.  He does have a partial plate which is being held and appropriately.  Family is seeking dental follow-up.  Patient denies chest pain or cough.  Continues with shortness breath with exertion.  Denies constipation.  Anxiety improving.  UA UC negative.      Assessment/Plan: Reviewed with changes.    Acute on chronic congestive heart failure, unspecified heart failure type (H)  -Ejection fraction of 35 to 40%.  -Increase Bumex 2 mg in am/1.5  mg in afternoon as long as BP supported.  -Follow up CXR today.   -Continue O2 at 1 to 2 L to keep sats greater than 90.  -Continue daily weights.  -Patient seen by cardiology last week which suggested palliative care.  -BNP now in the 9000's..   -Repeat BMP and BNP on Monday.     Complete heart block (H)  Status post catheter ablation of atrial fibrillation  -Rate controlled with amiodarone.  -Family fearful of him being on amiodarone long-term.  He is to follow-up with cardiology on Thursday.  -No further tachycardia.  -No beta-blocker.  -Continue Xarelto.    Mild cognitive impairment  -Rule out UTI.  UA UC obtained and was negative.  -CBC without leukocytosis.  Hemoglobin 12.    Dysphagia, unspecified type  GI dysmotility noted from esophagram.  No stricture or narrowing.  -Continues on altered diet.  Speech following.  -Continue PPI  -Lost tooth.  He has a partial plate on the top.  Family seeking dental services.    Stage 2 chronic kidney disease  -Creatinine today 0.86.  -Monitor.    Benign prostatic hyperplasia without lower urinary tract symptoms  -Continue doxazosin and finasteride.      Active Ambulatory Problems     Diagnosis Date Noted     Primary osteoarthritis of knees, bilateral 08/15/2019     Primary hypertension 07/11/2018     BPH  (benign prostatic hyperplasia) 07/11/2018     Combined hyperlipidemia 07/11/2018     Dizziness 07/11/2018     DJD (degenerative joint disease) 07/11/2018     Left ventricular hypertrophy 07/31/2018     Troponin level elevated 03/25/2020     Acute combined systolic and diastolic congestive heart failure (H) 03/26/2020     Pulmonary hypertension (H) 04/01/2020     Nonrheumatic aortic valve insufficiency 04/01/2020     Coronary artery disease due to lipid rich plaque 10/15/2020     MARIAN (generalized anxiety disorder) 02/11/2021     GERD (gastroesophageal reflux disease)      Infection due to 2019 novel coronavirus 02/11/2021     Dilated cardiomyopathy (H) 02/11/2021     Persistent atrial fibrillation (H) 08/11/2020     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) 12/01/2020     Stage 2 chronic kidney disease 10/08/2021     Hearing loss 10/08/2021     Arthritis, lumbar spine 10/08/2021     Peripheral edema 03/14/2022     Elevated TSH 03/16/2022     Elevated troponin 12/19/2022     Pleural effusion on right 12/19/2022     Acute on chronic congestive heart failure, unspecified heart failure type (H) 12/19/2022     Status post catheter ablation of atrial fibrillation 01/04/2023     Heart failure with reduced ejection fraction (H) 01/12/2023     Hyperkalemia 01/23/2023     Hyponatremia 01/23/2023     Cardiogenic shock (H)      Generalized abdominal pain 02/08/2017     Gastro-esophageal reflux disease with esophagitis 03/23/2017     Diverticular disease of large intestine 03/23/2017     Diarrhea 02/08/2017     Diaphragmatic hernia 03/23/2017     Nonischemic cardiomyopathy (H) 02/10/2023     Resolved Ambulatory Problems     Diagnosis Date Noted     Complete heart block (H) 03/24/2020     Bradycardia 07/11/2018     LBBB (left bundle branch block) 07/11/2018     S/P drug eluting coronary stent placement 05/30/2019     Cardiac pacemaker in situ 09/29/2020     Chronic combined systolic and diastolic heart  failure (H) 02/11/2021     Weight loss 02/11/2021     COVID-19 02/12/2021     CAD (coronary artery disease) 07/11/2018     CHF (congestive heart failure) (H) 09/08/2021     Past Medical History:   Diagnosis Date     Atrial fibrillation (H)      Atrial fibrillation, transient (H) 08/11/2020     Congestive heart failure (H)      Coronary artery disease      COVID 02/2021     High cholesterol      Hyperlipidemia      Hypertension      Allergies   Allergen Reactions     Diltiazem Other (See Comments)     Contraindicated in HFrEF     Lactose GI Disturbance     Midodrine Other (See Comments)     Contraindicated in HFrEF       All Meds and Allergies reviewed in the record at the facility and is the most up-to-date.      Current Outpatient Medications   Medication Sig     acetaminophen (TYLENOL) 325 MG tablet Take 2 tablets (650 mg) by mouth every 6 hours as needed for mild pain or other (and adjunct with moderate or severe pain or per patient request) Alternate with ibuprofen if ordered. Maximum acetaminophen dose from all sources = 75 mg/kg/day not to exceed 4 grams/day.     amiodarone (PACERONE) 200 MG tablet TAKE 1 TABLET (200 MG) BY MOUTH EVERY OTHER DAY     aspirin (ASA) 81 MG chewable tablet Take 1 tablet (81 mg) by mouth every other day     atorvastatin (LIPITOR) 10 MG tablet Take 10 mg by mouth four times a week Sunday, Tuesday, Thursday, & Saturday at bedtime     bumetanide (BUMEX) 2 MG tablet Take 2 mg by mouth 2 times daily Patient taking 2 mg in the a.m. and 1 mg in the afternoon     Coenzyme Q10 300 MG CAPS Take 300 mg by mouth daily     doxazosin (CARDURA) 4 MG tablet Take 1 tablet (4 mg) by mouth At Bedtime     finasteride (PROSCAR) 5 mg tablet [FINASTERIDE (PROSCAR) 5 MG TABLET] Take 5 mg by mouth at bedtime.            gabapentin (NEURONTIN) 100 MG capsule Take 200 mg by mouth 2 times daily (before meals) 2 capsules (200 mg) PO BID every morning before breakfast and every afternoon before lunch      "gabapentin (NEURONTIN) 300 MG capsule Take 300 mg by mouth At Bedtime     glucosamine/chondr cole A sod (OSTEO BI-FLEX ORAL) Take 1 tablet by mouth 2 times daily (with meals)     Melatonin 10 MG CAPS Take 10 mg by mouth daily     multivitamin, therapeutic (THERA-VIT) TABS tablet Take 1 tablet by mouth daily     nitroGLYcerin (NITROSTAT) 0.4 MG sublingual tablet For chest pain place 1 tablet under the tongue every 5 minutes for 3 doses. If symptoms persist 5 minutes after 1st dose call 911.     pantoprazole (PROTONIX) 40 MG EC tablet Take 1 tablet (40 mg) by mouth 2 times daily     polyethylene glycol-propylene glycol (SYSTANE ULTRA) 0.4-0.3 % SOLN ophthalmic solution Place 2 drops into both eyes 2 times daily as needed for dry eyes     potassium chloride ER (KLOR-CON M) 20 MEQ CR tablet Take 1 tablet (20 mEq) by mouth daily     rivaroxaban ANTICOAGULANT (XARELTO) 20 MG TABS tablet Take 1 tablet (20 mg) by mouth daily (with dinner)     sennosides (SENOKOT) 8.6 MG tablet Take by mouth 2 times daily     sertraline (ZOLOFT) 50 MG tablet Take 50 mg by mouth 2 times daily     No current facility-administered medications for this visit.       REVIEW OF SYSTEMS:   10 point review of systems reviewed and pertinent positives in the HPI.     PHYSICAL EXAMINATION:  Physical Exam     Vital signs: /71   Pulse 73   Temp (!) 96.2  F (35.7  C)   Resp 24   Ht 1.778 m (5' 10\")   Wt 77.1 kg (170 lb)   SpO2 (!) 89%   BMI 24.39 kg/m    General: Awake, Alert, tired and fatigued, oriented x2, sitting up in wheelchair.  HEENT: Pink conjunctiva, dry oral mucosa  NECK: Supple  CVS: Irregular irregular.  LUNG: Shallow respiratory effort. Diminished sounds in bases. Oxygen on at 2 L.  BACK: No kyphosis of the thoracic spine  ABDOMEN: Soft, round, nontender to palpation, with positive bowel sounds  EXTREMITIES:  thin frail extremities, diffuse weakness, 2+ pedal edema, no calf tenderness  NEUROLOGIC: Intact  PSYCHIATRIC: Flat affect.  " Cognitive impairment noted.      Labs:  All labs reviewed in the nursing home record and Epic   @  Lab Results   Component Value Date    WBC 8.9 01/28/2023     Lab Results   Component Value Date    RBC 4.15 01/28/2023     Lab Results   Component Value Date    HGB 12.3 01/28/2023     Lab Results   Component Value Date    HCT 40.5 01/28/2023     Lab Results   Component Value Date    MCV 98 01/28/2023     Lab Results   Component Value Date    MCH 29.6 01/28/2023     Lab Results   Component Value Date    MCHC 30.4 01/28/2023     Lab Results   Component Value Date    RDW 15.8 01/28/2023     Lab Results   Component Value Date     01/28/2023        @Last Comprehensive Metabolic Panel:  Sodium   Date Value Ref Range Status   02/20/2023 136 136 - 145 mmol/L Final     Potassium   Date Value Ref Range Status   02/20/2023 4.2 3.4 - 5.3 mmol/L Final   10/17/2022 4.2 3.5 - 5.0 mmol/L Final     Chloride   Date Value Ref Range Status   02/20/2023 91 (L) 98 - 107 mmol/L Final   10/17/2022 99 98 - 107 mmol/L Final     Carbon Dioxide (CO2)   Date Value Ref Range Status   02/20/2023 36 (H) 22 - 29 mmol/L Final   10/17/2022 30 22 - 31 mmol/L Final     Anion Gap   Date Value Ref Range Status   02/20/2023 9 7 - 15 mmol/L Final   10/17/2022 10 5 - 18 mmol/L Final     Glucose   Date Value Ref Range Status   02/20/2023 107 (H) 70 - 99 mg/dL Final   10/17/2022 99 70 - 125 mg/dL Final     GLUCOSE BY METER POCT   Date Value Ref Range Status   01/27/2023 248 (H) 70 - 99 mg/dL Final     Urea Nitrogen   Date Value Ref Range Status   02/20/2023 14.9 8.0 - 23.0 mg/dL Final   10/17/2022 27 8 - 28 mg/dL Final     Creatinine   Date Value Ref Range Status   02/20/2023 0.86 0.67 - 1.17 mg/dL Final     GFR Estimate   Date Value Ref Range Status   02/20/2023 85 >60 mL/min/1.73m2 Final     Comment:     eGFR calculated using 2021 CKD-EPI equation.   07/02/2021 >60 >60 mL/min/1.73m2 Final     Calcium   Date Value Ref Range Status   02/20/2023 9.0 8.8 -  10.2 mg/dL Final       this note has been dictated using voice recognition software. Any grammatical or context distortions are unintentional and inherent to the software    Electronically signed by: Nitza Ludwig CNP         Sincerely,        Nitza Ludwig, NP

## 2023-02-21 NOTE — PROGRESS NOTES
[facility]    Code Status:  DNR/DNI   Visit Type:   Chief Complaint   Patient presents with     TCU Follow Up     Facility:  [facility] ([facility]) [[id number]]               HPI: Reymundo Petersen is a 82 year old male who I am seeing today for follow-up on the TCU.  Patient recently hospitalized on 1/23/2023 secondary to fatigue, confusion and dehydration.  Patient with cardiogenic shock secondary to cardiomyopathy with acute on chronic congestive heart failure with reduced ejection fracture.  Patient initially treated with dobutamine infusion and transition to oral Bumex.  Transthoracic echocardiogram on December 19 showed a left ventricular ejection fracture of 35 to 40%.  Beta-blocker held.  Patient is status post RHC on 1/25 which showed elevated right and left-sided filling pressures consistent with volume overload and pulmonary hypertension.  Due to diuresis patient had an acute kidney injury.  Bumex held and then resumed.  Persistent atrial fib with complete heart block status post ablation and DCCV status post CRT/P.  Hyperkalemia initially however ended up with hypokalemia.  Hyponatremia replaced.  Dysphagia.  Patient was seen by speech and there was some concern for esophageal dysmotility.  General anxiety disorder on sertraline.  DJD on Tylenol for pain.  Pressure injury of the left heel being treated topically.  Overall weakness and fatigue with deconditioning.  Per hospital records cardiology initiated palliative care consult.    Transitional Care Course: Today patient sitting up in bedside chair. His 2 daughters are  present on exam. Patient with underlying acute on chronic congestive heart failure. Follow-up chest x-ray showed bilateral pleural effusions. Patient continues on Bumex 2 mg in the a.m. and 1 mg in the afternoon.  BP slightly improved.  Dizziness with standing which is made it somewhat difficult to diurese.  Continues with lower extremity edema.  Hypoxia now on oxygen  at 2 L.  Continues with overall fatigue and weakness.  Appetite is good.  He had a loose tooth to the front that came out over the weekend.  He does have a partial plate which is being held and appropriately.  Family is seeking dental follow-up.  Patient denies chest pain or cough.  Continues with shortness breath with exertion.  Denies constipation.  Anxiety improving.  UA UC negative.      Assessment/Plan: Reviewed with changes.    Acute on chronic congestive heart failure, unspecified heart failure type (H)  -Ejection fraction of 35 to 40%.  -Increase Bumex 2 mg in am/1.5  mg in afternoon as long as BP supported.  -Follow up CXR today.   -Continue O2 at 1 to 2 L to keep sats greater than 90.  -Continue daily weights.  -Patient seen by cardiology last week which suggested palliative care.  -BNP now in the 9000's..   -Repeat BMP and BNP on Monday.     Complete heart block (H)  Status post catheter ablation of atrial fibrillation  -Rate controlled with amiodarone.  -Family fearful of him being on amiodarone long-term.  He is to follow-up with cardiology on Thursday.  -No further tachycardia.  -No beta-blocker.  -Continue Xarelto.    Mild cognitive impairment  -Rule out UTI.  UA UC obtained and was negative.  -CBC without leukocytosis.  Hemoglobin 12.    Dysphagia, unspecified type  GI dysmotility noted from esophagram.  No stricture or narrowing.  -Continues on altered diet.  Speech following.  -Continue PPI  -Lost tooth.  He has a partial plate on the top.  Family seeking dental services.    Stage 2 chronic kidney disease  -Creatinine today 0.86.  -Monitor.    Benign prostatic hyperplasia without lower urinary tract symptoms  -Continue doxazosin and finasteride.      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     Dizziness  07/11/2018     DJD (degenerative joint disease) 07/11/2018     Left ventricular hypertrophy 07/31/2018     Troponin level elevated 03/25/2020     Acute combined systolic and diastolic congestive heart failure (H) 03/26/2020     Pulmonary hypertension (H) 04/01/2020     Nonrheumatic aortic valve insufficiency 04/01/2020     Coronary artery disease due to lipid rich plaque 10/15/2020     MARIAN (generalized anxiety disorder) 02/11/2021     GERD (gastroesophageal reflux disease)      Infection due to 2019 novel coronavirus 02/11/2021     Dilated cardiomyopathy (H) 02/11/2021     Persistent atrial fibrillation (H) 08/11/2020     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) 12/01/2020     Stage 2 chronic kidney disease 10/08/2021     Hearing loss 10/08/2021     Arthritis, lumbar spine 10/08/2021     Peripheral edema 03/14/2022     Elevated TSH 03/16/2022     Elevated troponin 12/19/2022     Pleural effusion on right 12/19/2022     Acute on chronic congestive heart failure, unspecified heart failure type (H) 12/19/2022     Status post catheter ablation of atrial fibrillation 01/04/2023     Heart failure with reduced ejection fraction (H) 01/12/2023     Hyperkalemia 01/23/2023     Hyponatremia 01/23/2023     Cardiogenic shock (H)      Generalized abdominal pain 02/08/2017     Gastro-esophageal reflux disease with esophagitis 03/23/2017     Diverticular disease of large intestine 03/23/2017     Diarrhea 02/08/2017     Diaphragmatic hernia 03/23/2017     Nonischemic cardiomyopathy (H) 02/10/2023     Resolved Ambulatory Problems     Diagnosis Date Noted     Complete heart block (H) 03/24/2020     Bradycardia 07/11/2018     LBBB (left bundle branch block) 07/11/2018     S/P drug eluting coronary stent placement 05/30/2019     Cardiac pacemaker in situ 09/29/2020     Chronic combined systolic and diastolic heart failure (H) 02/11/2021     Weight loss 02/11/2021     COVID-19 02/12/2021     CAD (coronary artery  disease) 07/11/2018     CHF (congestive heart failure) (H) 09/08/2021     Past Medical History:   Diagnosis Date     Atrial fibrillation (H)      Atrial fibrillation, transient (H) 08/11/2020     Congestive heart failure (H)      Coronary artery disease      COVID 02/2021     High cholesterol      Hyperlipidemia      Hypertension      Allergies   Allergen Reactions     Diltiazem Other (See Comments)     Contraindicated in HFrEF     Lactose GI Disturbance     Midodrine Other (See Comments)     Contraindicated in HFrEF       All Meds and Allergies reviewed in the record at the facility and is the most up-to-date.      Current Outpatient Medications   Medication Sig     acetaminophen (TYLENOL) 325 MG tablet Take 2 tablets (650 mg) by mouth every 6 hours as needed for mild pain or other (and adjunct with moderate or severe pain or per patient request) Alternate with ibuprofen if ordered. Maximum acetaminophen dose from all sources = 75 mg/kg/day not to exceed 4 grams/day.     amiodarone (PACERONE) 200 MG tablet TAKE 1 TABLET (200 MG) BY MOUTH EVERY OTHER DAY     aspirin (ASA) 81 MG chewable tablet Take 1 tablet (81 mg) by mouth every other day     atorvastatin (LIPITOR) 10 MG tablet Take 10 mg by mouth four times a week Sunday, Tuesday, Thursday, & Saturday at bedtime     bumetanide (BUMEX) 2 MG tablet Take 2 mg by mouth 2 times daily Patient taking 2 mg in the a.m. and 1 mg in the afternoon     Coenzyme Q10 300 MG CAPS Take 300 mg by mouth daily     doxazosin (CARDURA) 4 MG tablet Take 1 tablet (4 mg) by mouth At Bedtime     finasteride (PROSCAR) 5 mg tablet [FINASTERIDE (PROSCAR) 5 MG TABLET] Take 5 mg by mouth at bedtime.            gabapentin (NEURONTIN) 100 MG capsule Take 200 mg by mouth 2 times daily (before meals) 2 capsules (200 mg) PO BID every morning before breakfast and every afternoon before lunch     gabapentin (NEURONTIN) 300 MG capsule Take 300 mg by mouth At Bedtime     glucosamine/chondr cole A sod  "(OSTEO BI-FLEX ORAL) Take 1 tablet by mouth 2 times daily (with meals)     Melatonin 10 MG CAPS Take 10 mg by mouth daily     multivitamin, therapeutic (THERA-VIT) TABS tablet Take 1 tablet by mouth daily     nitroGLYcerin (NITROSTAT) 0.4 MG sublingual tablet For chest pain place 1 tablet under the tongue every 5 minutes for 3 doses. If symptoms persist 5 minutes after 1st dose call 911.     pantoprazole (PROTONIX) 40 MG EC tablet Take 1 tablet (40 mg) by mouth 2 times daily     polyethylene glycol-propylene glycol (SYSTANE ULTRA) 0.4-0.3 % SOLN ophthalmic solution Place 2 drops into both eyes 2 times daily as needed for dry eyes     potassium chloride ER (KLOR-CON M) 20 MEQ CR tablet Take 1 tablet (20 mEq) by mouth daily     rivaroxaban ANTICOAGULANT (XARELTO) 20 MG TABS tablet Take 1 tablet (20 mg) by mouth daily (with dinner)     sennosides (SENOKOT) 8.6 MG tablet Take by mouth 2 times daily     sertraline (ZOLOFT) 50 MG tablet Take 50 mg by mouth 2 times daily     No current facility-administered medications for this visit.       REVIEW OF SYSTEMS:   10 point review of systems reviewed and pertinent positives in the HPI.     PHYSICAL EXAMINATION:  Physical Exam     Vital signs: /71   Pulse 73   Temp (!) 96.2  F (35.7  C)   Resp 24   Ht 1.778 m (5' 10\")   Wt 77.1 kg (170 lb)   SpO2 (!) 89%   BMI 24.39 kg/m    General: Awake, Alert, tired and fatigued, oriented x2, sitting up in wheelchair.  HEENT: Pink conjunctiva, dry oral mucosa  NECK: Supple  CVS: Irregular irregular.  LUNG: Shallow respiratory effort. Diminished sounds in bases. Oxygen on at 2 L.  BACK: No kyphosis of the thoracic spine  ABDOMEN: Soft, round, nontender to palpation, with positive bowel sounds  EXTREMITIES:  thin frail extremities, diffuse weakness, 2+ pedal edema, no calf tenderness  NEUROLOGIC: Intact  PSYCHIATRIC: Flat affect.  Cognitive impairment noted.      Labs:  All labs reviewed in the nursing home record and Epic "   @  Lab Results   Component Value Date    WBC 8.9 01/28/2023     Lab Results   Component Value Date    RBC 4.15 01/28/2023     Lab Results   Component Value Date    HGB 12.3 01/28/2023     Lab Results   Component Value Date    HCT 40.5 01/28/2023     Lab Results   Component Value Date    MCV 98 01/28/2023     Lab Results   Component Value Date    MCH 29.6 01/28/2023     Lab Results   Component Value Date    MCHC 30.4 01/28/2023     Lab Results   Component Value Date    RDW 15.8 01/28/2023     Lab Results   Component Value Date     01/28/2023        @Last Comprehensive Metabolic Panel:  Sodium   Date Value Ref Range Status   02/20/2023 136 136 - 145 mmol/L Final     Potassium   Date Value Ref Range Status   02/20/2023 4.2 3.4 - 5.3 mmol/L Final   10/17/2022 4.2 3.5 - 5.0 mmol/L Final     Chloride   Date Value Ref Range Status   02/20/2023 91 (L) 98 - 107 mmol/L Final   10/17/2022 99 98 - 107 mmol/L Final     Carbon Dioxide (CO2)   Date Value Ref Range Status   02/20/2023 36 (H) 22 - 29 mmol/L Final   10/17/2022 30 22 - 31 mmol/L Final     Anion Gap   Date Value Ref Range Status   02/20/2023 9 7 - 15 mmol/L Final   10/17/2022 10 5 - 18 mmol/L Final     Glucose   Date Value Ref Range Status   02/20/2023 107 (H) 70 - 99 mg/dL Final   10/17/2022 99 70 - 125 mg/dL Final     GLUCOSE BY METER POCT   Date Value Ref Range Status   01/27/2023 248 (H) 70 - 99 mg/dL Final     Urea Nitrogen   Date Value Ref Range Status   02/20/2023 14.9 8.0 - 23.0 mg/dL Final   10/17/2022 27 8 - 28 mg/dL Final     Creatinine   Date Value Ref Range Status   02/20/2023 0.86 0.67 - 1.17 mg/dL Final     GFR Estimate   Date Value Ref Range Status   02/20/2023 85 >60 mL/min/1.73m2 Final     Comment:     eGFR calculated using 2021 CKD-EPI equation.   07/02/2021 >60 >60 mL/min/1.73m2 Final     Calcium   Date Value Ref Range Status   02/20/2023 9.0 8.8 - 10.2 mg/dL Final       this note has been dictated using voice recognition software. Any  grammatical or context distortions are unintentional and inherent to the software    Electronically signed by: Nitza Ludwig CNP

## 2023-02-22 NOTE — LETTER
2/22/2023        RE: Reymundo Petersen  [address] [place] [address]        [facility]    Code Status:  DNR/DNI   Visit Type:   Chief Complaint   Patient presents with     TCU Follow Up     Facility:  [facility] ([facility]) [[id number]]           HPI: Reymundo Petersen is a 82 year old male who I am seeing today for follow-up on the TCU.  Patient recently hospitalized on 1/23/2023 secondary to fatigue, confusion and dehydration.  Patient with cardiogenic shock secondary to cardiomyopathy with acute on chronic congestive heart failure with reduced ejection fracture.  Patient initially treated with dobutamine infusion and transition to oral Bumex.  Transthoracic echocardiogram on December 19 showed a left ventricular ejection fracture of 35 to 40%.  Beta-blocker held.  Patient is status post RHC on 1/25 which showed elevated right and left-sided filling pressures consistent with volume overload and pulmonary hypertension.  Due to diuresis patient had an acute kidney injury.  Bumex held and then resumed.  Persistent atrial fib with complete heart block status post ablation and DCCV status post CRT/P.  Hyperkalemia initially however ended up with hypokalemia.  Hyponatremia replaced.  Dysphagia.  Patient was seen by speech and there was some concern for esophageal dysmotility.  General anxiety disorder on sertraline.  DJD on Tylenol for pain.  Pressure injury of the left heel being treated topically.  Overall weakness and fatigue with deconditioning.  Per hospital records cardiology initiated palliative care consult.    Transitional Care Course: Today patient sitting up in chair. His daughter is present on exam.  Patient with underlying acute on chronic congestive heart failure. Follow-up chest x-ray showed bilateral pleural effusions.  Blood pressures improved.  Bumex increased to 2 mg in the a.m. and 1.5 mg in the afternoon.  He continues with shortness of breath.  On oxygen at 2 L.   Continues with 2+ lower extremity edema.  Continues with overall fatigue and weakness.  His daughter is concerned today about him continuing on amiodarone.  This has been deferred to cardiology.  He was to have a follow-up appointment in the a.m. however his family canceled this due to the weather.  The appointment is now in March.  Today reviewed overall goals of care.  Patient continues to decline.  It was suggested palliative care previously.  We reviewed this today.  We will continue current measures for now.    Assessment/Plan:     Acute on chronic congestive heart failure, unspecified heart failure type (H)  -Ejection fraction of 35 to 40%.  -Increase Bumex 2 mg in am/1.5  mg in afternoon.   -Follow up CXR showed bilateral pleural effusions.  -Continue O2 at 1 to 2 L to keep sats greater than 90.  -Continue daily weights.  -Cardiology has suggested palliative care.  I did review this again on today's visit.  Patient was to have a follow-up with cardiology in the a.m. however this was canceled due to weather.  Now has follow-up in March.  Overall goals of care reviewed.  -BNP now in the 7000s.   -Repeat BMP on Monday.     Complete heart block (H)  Status post catheter ablation of atrial fibrillation  -Rate controlled with amiodarone.  Family concerned with patient being on long-term amiodarone.  Reviewed that he cannot have a beta-blocker.  Defer to cardiology.  -No further tachycardia.  -No beta-blocker.  -Continue Xarelto.    Mild cognitive impairment  -UA UC negative.  -Increase symptomology.  No signs or symptoms of infection.  Overall decline.  -CBC without leukocytosis.  Hemoglobin 12.    Stage 2 chronic kidney disease  -Creatinine stable  -Monitor.        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     Dizziness  07/11/2018     DJD (degenerative joint disease) 07/11/2018     Left ventricular hypertrophy 07/31/2018     Troponin level elevated 03/25/2020     Acute combined systolic and diastolic congestive heart failure (H) 03/26/2020     Pulmonary hypertension (H) 04/01/2020     Nonrheumatic aortic valve insufficiency 04/01/2020     Coronary artery disease due to lipid rich plaque 10/15/2020     MARIAN (generalized anxiety disorder) 02/11/2021     GERD (gastroesophageal reflux disease)      Infection due to 2019 novel coronavirus 02/11/2021     Dilated cardiomyopathy (H) 02/11/2021     Persistent atrial fibrillation (H) 08/11/2020     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) 12/01/2020     Stage 2 chronic kidney disease 10/08/2021     Hearing loss 10/08/2021     Arthritis, lumbar spine 10/08/2021     Peripheral edema 03/14/2022     Elevated TSH 03/16/2022     Elevated troponin 12/19/2022     Pleural effusion on right 12/19/2022     Acute on chronic congestive heart failure, unspecified heart failure type (H) 12/19/2022     Status post catheter ablation of atrial fibrillation 01/04/2023     Heart failure with reduced ejection fraction (H) 01/12/2023     Hyperkalemia 01/23/2023     Hyponatremia 01/23/2023     Cardiogenic shock (H)      Generalized abdominal pain 02/08/2017     Gastro-esophageal reflux disease with esophagitis 03/23/2017     Diverticular disease of large intestine 03/23/2017     Diarrhea 02/08/2017     Diaphragmatic hernia 03/23/2017     Nonischemic cardiomyopathy (H) 02/10/2023     Resolved Ambulatory Problems     Diagnosis Date Noted     Complete heart block (H) 03/24/2020     Bradycardia 07/11/2018     LBBB (left bundle branch block) 07/11/2018     S/P drug eluting coronary stent placement 05/30/2019     Cardiac pacemaker in situ 09/29/2020     Chronic combined systolic and diastolic heart failure (H) 02/11/2021     Weight loss 02/11/2021     COVID-19 02/12/2021     CAD (coronary artery  disease) 07/11/2018     CHF (congestive heart failure) (H) 09/08/2021     Past Medical History:   Diagnosis Date     Atrial fibrillation (H)      Atrial fibrillation, transient (H) 08/11/2020     Congestive heart failure (H)      Coronary artery disease      COVID 02/2021     High cholesterol      Hyperlipidemia      Hypertension      Allergies   Allergen Reactions     Diltiazem Other (See Comments)     Contraindicated in HFrEF     Lactose GI Disturbance     Midodrine Other (See Comments)     Contraindicated in HFrEF       All Meds and Allergies reviewed in the record at the facility and is the most up-to-date.      Current Outpatient Medications   Medication Sig     acetaminophen (TYLENOL) 325 MG tablet Take 2 tablets (650 mg) by mouth every 6 hours as needed for mild pain or other (and adjunct with moderate or severe pain or per patient request) Alternate with ibuprofen if ordered. Maximum acetaminophen dose from all sources = 75 mg/kg/day not to exceed 4 grams/day.     amiodarone (PACERONE) 200 MG tablet TAKE 1 TABLET (200 MG) BY MOUTH EVERY OTHER DAY     aspirin (ASA) 81 MG chewable tablet Take 1 tablet (81 mg) by mouth every other day     atorvastatin (LIPITOR) 10 MG tablet Take 10 mg by mouth four times a week Sunday, Tuesday, Thursday, & Saturday at bedtime     bumetanide (BUMEX) 1 MG tablet Take 1.5 mg by mouth daily Given the afternoon.     bumetanide (BUMEX) 2 MG tablet Take 2 mg by mouth daily Given the a.m.     Coenzyme Q10 300 MG CAPS Take 300 mg by mouth daily     doxazosin (CARDURA) 4 MG tablet Take 1 tablet (4 mg) by mouth At Bedtime     finasteride (PROSCAR) 5 mg tablet [FINASTERIDE (PROSCAR) 5 MG TABLET] Take 5 mg by mouth at bedtime.            gabapentin (NEURONTIN) 100 MG capsule Take 200 mg by mouth 2 times daily (before meals) 2 capsules (200 mg) PO BID every morning before breakfast and every afternoon before lunch     gabapentin (NEURONTIN) 300 MG capsule Take 300 mg by mouth At Bedtime      "glucosamine/chondr cole A sod (OSTEO BI-FLEX ORAL) Take 1 tablet by mouth 2 times daily (with meals)     Melatonin 10 MG CAPS Take 10 mg by mouth daily     multivitamin, therapeutic (THERA-VIT) TABS tablet Take 1 tablet by mouth daily     nitroGLYcerin (NITROSTAT) 0.4 MG sublingual tablet For chest pain place 1 tablet under the tongue every 5 minutes for 3 doses. If symptoms persist 5 minutes after 1st dose call 911.     pantoprazole (PROTONIX) 40 MG EC tablet Take 1 tablet (40 mg) by mouth 2 times daily     polyethylene glycol-propylene glycol (SYSTANE ULTRA) 0.4-0.3 % SOLN ophthalmic solution Place 2 drops into both eyes 2 times daily as needed for dry eyes     potassium chloride ER (KLOR-CON M) 20 MEQ CR tablet Take 1 tablet (20 mEq) by mouth daily     rivaroxaban ANTICOAGULANT (XARELTO) 20 MG TABS tablet Take 1 tablet (20 mg) by mouth daily (with dinner)     sennosides (SENOKOT) 8.6 MG tablet Take by mouth 2 times daily     sertraline (ZOLOFT) 50 MG tablet Take 50 mg by mouth 2 times daily     No current facility-administered medications for this visit.       REVIEW OF SYSTEMS:   10 point review of systems reviewed and pertinent positives in the HPI.     PHYSICAL EXAMINATION:  Physical Exam     Vital signs: /69   Pulse 72   Temp 97.6  F (36.4  C)   Resp 18   Ht 1.778 m (5' 10\")   Wt 77.1 kg (170 lb)   SpO2 92%   BMI 24.39 kg/m    General: Awake, Alert, tired and fatigued, oriented x2, sitting up in wheelchair.  HEENT: Pink conjunctiva, dry oral mucosa  NECK: Supple  CVS: Irregular irregular.  LUNG: Shallow respiratory effort. Diminished sounds in bases. Oxygen on at 2 L.  No cough on exam.  BACK: No kyphosis of the thoracic spine  ABDOMEN: Soft with positive bowel sounds  EXTREMITIES:  thin frail extremities, diffuse weakness, 2+ pedal edema, no calf tenderness  NEUROLOGIC: Intact  PSYCHIATRIC: Flat affect.  Cognitive impairment noted.      Labs:  All labs reviewed in the nursing home record and [facility] "   @  Lab Results   Component Value Date    WBC 8.9 01/28/2023     Lab Results   Component Value Date    RBC 4.15 01/28/2023     Lab Results   Component Value Date    HGB 12.3 01/28/2023     Lab Results   Component Value Date    HCT 40.5 01/28/2023     Lab Results   Component Value Date    MCV 98 01/28/2023     Lab Results   Component Value Date    MCH 29.6 01/28/2023     Lab Results   Component Value Date    MCHC 30.4 01/28/2023     Lab Results   Component Value Date    RDW 15.8 01/28/2023     Lab Results   Component Value Date     01/28/2023        @Last Comprehensive Metabolic Panel:  Sodium   Date Value Ref Range Status   02/20/2023 136 136 - 145 mmol/L Final     Potassium   Date Value Ref Range Status   02/20/2023 4.2 3.4 - 5.3 mmol/L Final   10/17/2022 4.2 3.5 - 5.0 mmol/L Final     Chloride   Date Value Ref Range Status   02/20/2023 91 (L) 98 - 107 mmol/L Final   10/17/2022 99 98 - 107 mmol/L Final     Carbon Dioxide (CO2)   Date Value Ref Range Status   02/20/2023 36 (H) 22 - 29 mmol/L Final   10/17/2022 30 22 - 31 mmol/L Final     Anion Gap   Date Value Ref Range Status   02/20/2023 9 7 - 15 mmol/L Final   10/17/2022 10 5 - 18 mmol/L Final     Glucose   Date Value Ref Range Status   02/20/2023 107 (H) 70 - 99 mg/dL Final   10/17/2022 99 70 - 125 mg/dL Final     GLUCOSE BY METER POCT   Date Value Ref Range Status   01/27/2023 248 (H) 70 - 99 mg/dL Final     Urea Nitrogen   Date Value Ref Range Status   02/20/2023 14.9 8.0 - 23.0 mg/dL Final   10/17/2022 27 8 - 28 mg/dL Final     Creatinine   Date Value Ref Range Status   02/20/2023 0.86 0.67 - 1.17 mg/dL Final     GFR Estimate   Date Value Ref Range Status   02/20/2023 85 >60 mL/min/1.73m2 Final     Comment:     eGFR calculated using 2021 CKD-EPI equation.   07/02/2021 >60 >60 mL/min/1.73m2 Final     Calcium   Date Value Ref Range Status   02/20/2023 9.0 8.8 - 10.2 mg/dL Final       this note has been dictated using voice recognition software. Any  grammatical or context distortions are unintentional and inherent to the software    Electronically signed by: Nitza Ludwig CNP         Sincerely,        Nitza Ludwig NP

## 2023-02-23 NOTE — PROGRESS NOTES
[facility] GERIATRIC SERVICES    Code Status:  DNR/DNI   Visit Type:   Chief Complaint   Patient presents with     TCU Follow Up     Facility:  [facility] ([facility]) [[id number]]           HPI: Reymundo Petersen is a 82 year old male who I am seeing today for follow-up on the TCU.  Patient recently hospitalized on 1/23/2023 secondary to fatigue, confusion and dehydration.  Patient with cardiogenic shock secondary to cardiomyopathy with acute on chronic congestive heart failure with reduced ejection fracture.  Patient initially treated with dobutamine infusion and transition to oral Bumex.  Transthoracic echocardiogram on December 19 showed a left ventricular ejection fracture of 35 to 40%.  Beta-blocker held.  Patient is status post RHC on 1/25 which showed elevated right and left-sided filling pressures consistent with volume overload and pulmonary hypertension.  Due to diuresis patient had an acute kidney injury.  Bumex held and then resumed.  Persistent atrial fib with complete heart block status post ablation and DCCV status post CRT/P.  Hyperkalemia initially however ended up with hypokalemia.  Hyponatremia replaced.  Dysphagia.  Patient was seen by speech and there was some concern for esophageal dysmotility.  General anxiety disorder on sertraline.  DJD on Tylenol for pain.  Pressure injury of the left heel being treated topically.  Overall weakness and fatigue with deconditioning.  Per hospital records cardiology initiated palliative care consult.    Transitional Care Course: Today patient sitting up in chair. His daughter is present on exam.  Patient with underlying acute on chronic congestive heart failure. Follow-up chest x-ray showed bilateral pleural effusions.  Blood pressures improved.  Bumex increased to 2 mg in the a.m. and 1.5 mg in the afternoon.  He continues with shortness of breath.  On oxygen at 2 L.  Continues with 2+ lower extremity edema.  Continues with overall fatigue and weakness.  His  daughter is concerned today about him continuing on amiodarone.  This has been deferred to cardiology.  He was to have a follow-up appointment in the a.m. however his family canceled this due to the weather.  The appointment is now in March.  Today reviewed overall goals of care.  Patient continues to decline.  It was suggested palliative care previously.  We reviewed this today.  We will continue current measures for now.    Assessment/Plan:     Acute on chronic congestive heart failure, unspecified heart failure type (H)  -Ejection fraction of 35 to 40%.  -Increase Bumex 2 mg in am/1.5  mg in afternoon.   -Follow up CXR showed bilateral pleural effusions.  -Continue O2 at 1 to 2 L to keep sats greater than 90.  -Continue daily weights.  -Cardiology has suggested palliative care.  I did review this again on today's visit.  Patient was to have a follow-up with cardiology in the a.m. however this was canceled due to weather.  Now has follow-up in March.  Overall goals of care reviewed.  -BNP now in the 7000s.   -Repeat BMP on Monday.     Complete heart block (H)  Status post catheter ablation of atrial fibrillation  -Rate controlled with amiodarone.  Family concerned with patient being on long-term amiodarone.  Reviewed that he cannot have a beta-blocker.  Defer to cardiology.  -No further tachycardia.  -No beta-blocker.  -Continue Xarelto.    Mild cognitive impairment  -UA UC negative.  -Increase symptomology.  No signs or symptoms of infection.  Overall decline.  -CBC without leukocytosis.  Hemoglobin 12.    Stage 2 chronic kidney disease  -Creatinine stable  -Monitor.        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     Dizziness 07/11/2018     DJD (degenerative joint disease) 07/11/2018     Left ventricular hypertrophy  07/31/2018     Troponin level elevated 03/25/2020     Acute combined systolic and diastolic congestive heart failure (H) 03/26/2020     Pulmonary hypertension (H) 04/01/2020     Nonrheumatic aortic valve insufficiency 04/01/2020     Coronary artery disease due to lipid rich plaque 10/15/2020     MAIRAN (generalized anxiety disorder) 02/11/2021     GERD (gastroesophageal reflux disease)      Infection due to 2019 novel coronavirus 02/11/2021     Dilated cardiomyopathy (H) 02/11/2021     Persistent atrial fibrillation (H) 08/11/2020     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) 12/01/2020     Stage 2 chronic kidney disease 10/08/2021     Hearing loss 10/08/2021     Arthritis, lumbar spine 10/08/2021     Peripheral edema 03/14/2022     Elevated TSH 03/16/2022     Elevated troponin 12/19/2022     Pleural effusion on right 12/19/2022     Acute on chronic congestive heart failure, unspecified heart failure type (H) 12/19/2022     Status post catheter ablation of atrial fibrillation 01/04/2023     Heart failure with reduced ejection fraction (H) 01/12/2023     Hyperkalemia 01/23/2023     Hyponatremia 01/23/2023     Cardiogenic shock (H)      Generalized abdominal pain 02/08/2017     Gastro-esophageal reflux disease with esophagitis 03/23/2017     Diverticular disease of large intestine 03/23/2017     Diarrhea 02/08/2017     Diaphragmatic hernia 03/23/2017     Nonischemic cardiomyopathy (H) 02/10/2023     Resolved Ambulatory Problems     Diagnosis Date Noted     Complete heart block (H) 03/24/2020     Bradycardia 07/11/2018     LBBB (left bundle branch block) 07/11/2018     S/P drug eluting coronary stent placement 05/30/2019     Cardiac pacemaker in situ 09/29/2020     Chronic combined systolic and diastolic heart failure (H) 02/11/2021     Weight loss 02/11/2021     COVID-19 02/12/2021     CAD (coronary artery disease) 07/11/2018     CHF (congestive heart failure) (H) 09/08/2021     Past Medical  History:   Diagnosis Date     Atrial fibrillation (H)      Atrial fibrillation, transient (H) 08/11/2020     Congestive heart failure (H)      Coronary artery disease      COVID 02/2021     High cholesterol      Hyperlipidemia      Hypertension      Allergies   Allergen Reactions     Diltiazem Other (See Comments)     Contraindicated in HFrEF     Lactose GI Disturbance     Midodrine Other (See Comments)     Contraindicated in HFrEF       All Meds and Allergies reviewed in the record at the facility and is the most up-to-date.      Current Outpatient Medications   Medication Sig     acetaminophen (TYLENOL) 325 MG tablet Take 2 tablets (650 mg) by mouth every 6 hours as needed for mild pain or other (and adjunct with moderate or severe pain or per patient request) Alternate with ibuprofen if ordered. Maximum acetaminophen dose from all sources = 75 mg/kg/day not to exceed 4 grams/day.     amiodarone (PACERONE) 200 MG tablet TAKE 1 TABLET (200 MG) BY MOUTH EVERY OTHER DAY     aspirin (ASA) 81 MG chewable tablet Take 1 tablet (81 mg) by mouth every other day     atorvastatin (LIPITOR) 10 MG tablet Take 10 mg by mouth four times a week Sunday, Tuesday, Thursday, & Saturday at bedtime     bumetanide (BUMEX) 1 MG tablet Take 1.5 mg by mouth daily Given the afternoon.     bumetanide (BUMEX) 2 MG tablet Take 2 mg by mouth daily Given the a.m.     Coenzyme Q10 300 MG CAPS Take 300 mg by mouth daily     doxazosin (CARDURA) 4 MG tablet Take 1 tablet (4 mg) by mouth At Bedtime     finasteride (PROSCAR) 5 mg tablet [FINASTERIDE (PROSCAR) 5 MG TABLET] Take 5 mg by mouth at bedtime.            gabapentin (NEURONTIN) 100 MG capsule Take 200 mg by mouth 2 times daily (before meals) 2 capsules (200 mg) PO BID every morning before breakfast and every afternoon before lunch     gabapentin (NEURONTIN) 300 MG capsule Take 300 mg by mouth At Bedtime     glucosamine/chondr cole A sod (OSTEO BI-FLEX ORAL) Take 1 tablet by mouth 2 times daily  "(with meals)     Melatonin 10 MG CAPS Take 10 mg by mouth daily     multivitamin, therapeutic (THERA-VIT) TABS tablet Take 1 tablet by mouth daily     nitroGLYcerin (NITROSTAT) 0.4 MG sublingual tablet For chest pain place 1 tablet under the tongue every 5 minutes for 3 doses. If symptoms persist 5 minutes after 1st dose call 911.     pantoprazole (PROTONIX) 40 MG EC tablet Take 1 tablet (40 mg) by mouth 2 times daily     polyethylene glycol-propylene glycol (SYSTANE ULTRA) 0.4-0.3 % SOLN ophthalmic solution Place 2 drops into both eyes 2 times daily as needed for dry eyes     potassium chloride ER (KLOR-CON M) 20 MEQ CR tablet Take 1 tablet (20 mEq) by mouth daily     rivaroxaban ANTICOAGULANT (XARELTO) 20 MG TABS tablet Take 1 tablet (20 mg) by mouth daily (with dinner)     sennosides (SENOKOT) 8.6 MG tablet Take by mouth 2 times daily     sertraline (ZOLOFT) 50 MG tablet Take 50 mg by mouth 2 times daily     No current facility-administered medications for this visit.       REVIEW OF SYSTEMS:   10 point review of systems reviewed and pertinent positives in the HPI.     PHYSICAL EXAMINATION:  Physical Exam     Vital signs: /69   Pulse 72   Temp 97.6  F (36.4  C)   Resp 18   Ht 1.778 m (5' 10\")   Wt 77.1 kg (170 lb)   SpO2 92%   BMI 24.39 kg/m    General: Awake, Alert, tired and fatigued, oriented x2, sitting up in wheelchair.  HEENT: Pink conjunctiva, dry oral mucosa  NECK: Supple  CVS: Irregular irregular.  LUNG: Shallow respiratory effort. Diminished sounds in bases. Oxygen on at 2 L.  No cough on exam.  BACK: No kyphosis of the thoracic spine  ABDOMEN: Soft with positive bowel sounds  EXTREMITIES:  thin frail extremities, diffuse weakness, 2+ pedal edema, no calf tenderness  NEUROLOGIC: Intact  PSYCHIATRIC: Flat affect.  Cognitive impairment noted.      Labs:  All labs reviewed in the nursing home record and [employer]   @  Lab Results   Component Value Date    WBC 8.9 01/28/2023     Lab Results "   Component Value Date    RBC 4.15 01/28/2023     Lab Results   Component Value Date    HGB 12.3 01/28/2023     Lab Results   Component Value Date    HCT 40.5 01/28/2023     Lab Results   Component Value Date    MCV 98 01/28/2023     Lab Results   Component Value Date    MCH 29.6 01/28/2023     Lab Results   Component Value Date    MCHC 30.4 01/28/2023     Lab Results   Component Value Date    RDW 15.8 01/28/2023     Lab Results   Component Value Date     01/28/2023        @Last Comprehensive Metabolic Panel:  Sodium   Date Value Ref Range Status   02/20/2023 136 136 - 145 mmol/L Final     Potassium   Date Value Ref Range Status   02/20/2023 4.2 3.4 - 5.3 mmol/L Final   10/17/2022 4.2 3.5 - 5.0 mmol/L Final     Chloride   Date Value Ref Range Status   02/20/2023 91 (L) 98 - 107 mmol/L Final   10/17/2022 99 98 - 107 mmol/L Final     Carbon Dioxide (CO2)   Date Value Ref Range Status   02/20/2023 36 (H) 22 - 29 mmol/L Final   10/17/2022 30 22 - 31 mmol/L Final     Anion Gap   Date Value Ref Range Status   02/20/2023 9 7 - 15 mmol/L Final   10/17/2022 10 5 - 18 mmol/L Final     Glucose   Date Value Ref Range Status   02/20/2023 107 (H) 70 - 99 mg/dL Final   10/17/2022 99 70 - 125 mg/dL Final     GLUCOSE BY METER POCT   Date Value Ref Range Status   01/27/2023 248 (H) 70 - 99 mg/dL Final     Urea Nitrogen   Date Value Ref Range Status   02/20/2023 14.9 8.0 - 23.0 mg/dL Final   10/17/2022 27 8 - 28 mg/dL Final     Creatinine   Date Value Ref Range Status   02/20/2023 0.86 0.67 - 1.17 mg/dL Final     GFR Estimate   Date Value Ref Range Status   02/20/2023 85 >60 mL/min/1.73m2 Final     Comment:     eGFR calculated using 2021 CKD-EPI equation.   07/02/2021 >60 >60 mL/min/1.73m2 Final     Calcium   Date Value Ref Range Status   02/20/2023 9.0 8.8 - 10.2 mg/dL Final       this note has been dictated using voice recognition software. Any grammatical or context distortions are unintentional and inherent to the  software    Electronically signed by: Nitza Ludwig, CNP

## 2023-02-24 NOTE — TELEPHONE ENCOUNTER
[facility] Call Center    Phone Message    May a detailed message be left on voicemail: yes     Reason for Call: Other: Pt tri called stating they have a coincern about him getting a sooner appointment, say care facility where pt is at has concerns about the appt set, and wanted to discuss getting a sooner appointment, also wanted to let care team know the pt did not receive in clinic device and if it makes sense if he should do a remote one instead, also woud like to discuss medications, please call daughter back at [phone] ASAP     Action Taken: Other: cardiology    Travel Screening: Not Applicable     Thank you!  [facility]

## 2023-02-24 NOTE — TELEPHONE ENCOUNTER
Spoke to daughter- let her know we have opening on Tuesday, she states she will work on transport.    Scheduling- please call pt to schedule the 9:20am timeslot that looked like was still available for Tuesday 2/28 with Dr. Michelle.  He does have a device and will need a device check.  IF device RN is not available can you please reach out to their team to see if they are able to squeeze the pt in. Or if we could get a rep to come help?    Pt does not have bedside remote and is unable to do a remote download due to being in TCU and family not able to get in his home to get his remote device.    Please call daughter at the number listed below [phone].    Thank you!  Mary Jo

## 2023-02-28 NOTE — PROGRESS NOTES
[facility]  Cardiac Electrophysiology  [address]  [place] [address]   Office: [phone]  Fax: [phone]     Cardiac Electrophysiology Follow-up Visit    Patient: Reymundo Petersen   : 1940     Primary Care Provider: Jamie Noguera MD    CHIEF COMPLAINT/REASON FOR VISIT  Persistent atrial fibrillation, now status post PVI, PWI, LA septal/anterior wall AT/AFl ablation, CTI ablation, LSPV-MVA ablation 2022  Chronic systolic and diastolic heart failure due to nonischemic cardiomyopathy with severe concentric LV thickening (LVEF 30-35% by 2022 TTE, 41% by 2/10/2022 MPI)  Complete heart block  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    Persistent atrial fibrillation - associated with recurrent episodes of decompensated heart failure, though unclear if primary or secondary  [id number], severe biatrial dilation in the setting of chronic systolic and diastolic heart failure with severe concentric LV thickening  He underwent  PVI, PWI, LA septal/anterior wall AT/AFl ablation, CTI ablation, LSPV-MVA ablation with note of severe bi-atrial dilation and fibrosis with inducible AF with burst pacing at 300ms following ablation on 2022.  He has had some device detected AT/AFl following ablation with reported burden <1% (some atrial undersensing was noted).   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  Low atrial sensing, intermittent atrial noise.  Underlying complete heart block reported  - continue routine CRT-P follow-up     Chronic systolic and diastolic heart failure - LVEF 35-40% by 12/20/2022 TTE.  LVEF 30-35% by 6/13/2022 TTE, 41% by 2/10/2022 MPI, MPI LVEF 41% 2/10/2022.  TTE LVEF 43% with severe concentric LV thickening   Query cardiac amyloidosis, other infiltrative process, vs hypertensive cardiomyopathy (note of LV thickening, LV systolic dysfunction, AV block, CKD).  His previously metoprolol XL 25mg daily and losartan 25mg daily appear to have been discontinued  - Tc-PYP MPI planned for evaluation for cardiac amyloidosis  - continue follow-up with cardiology and CORE/HF clinics  - if LV function remains <35% in future despite medical optimization, upgrade to primary prevention ICD can be offered, though lower benefit/risk given his age    Goals of care  - ongoing discussions and considerations regarding goals of care, including hospice    Follow up: as above         History of Present Illness   Reymundo Petersen is a 82 year old male with persistent atrial fibrillation now status post PVI, PWI, LA septal/anterior wall AT/AFl ablation, CTI ablation, LSPV-MVA ablation 11/23/2022, chronic systolic and diastolic heart failure due to nonischemic cardiomyopathy with severe concentric LV thickening (LVEF 30-35% by 6/13/2022 TTE, 41% by 2/10/2022 MPI), CHB, 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    Mr. Petersen was  noted to have complete heart block with junctional escape rhythm with note of LVEF 47% and underwent right chest wall HBP-P placement following unsuccessful CS lead placement 3/25/2020 (Dr. Taylor Sandoval).was noted to have onset of atrial fibrillation around 8/2021.  He was noted to have persistent AF with increasing OptiVol 8/29/2021 - he underwent DCCV 9/14/2021 after which OptiVol improved for a period.  He had rising OptiVol while in SR through 12/2021.  He redeveloped AF, was started on sotalol around 1/24/2022 and underwent DCCV 2/3/2022 with AF recurrence 2/6/2022.  He was started on amiodarone around mid 2/2022 and underwent DCCV 3/8/2022.  He was admitted 3/14/2022 to 3/16/2022 with decompensated heart failure treated with lasix IV diuresis.  Device check today 10/31/2022 shows general maintenance of sinus rhythm since 3/8/2022.  He was noted to have some corneal deposits by his ophthalmologist.  He underwent  PVI, PWI, LA septal/anterior wall AT/AFl ablation, CTI ablation, LSPV-MVA ablation with note of severe bi-atrial dilation and fibrosis with inducible AF with burst pacing at 300ms following ablation on 11/23/2022.  He has had some device detected AT/AFl following ablation with reported burden <1% (some atrial undersensing was noted).  He continues on amiodarone 200mg every other day.  He has done well in regarding to suppression of sustained atrial arrhythmias, though has had issues with heart failure and fluid management.  Device check today shows stable low unipolar (0.6mV) and bipolar (0.3mV) sensing, though more noise in unipolar configuration.    He had a fall 7/2021with traumatic head injury and hematoma.  He denies syncope, chest pain.       Physical Examination  Review of Systems   VITALS: /60 (BP Location: Right arm, Patient Position: Sitting, Cuff Size: Adult Regular)   Pulse 60   Resp 14   Wt 76.7 kg (169 lb)   BMI 24.25 kg/m    Wt Readings from Last 3 Encounters:   02/22/23 77.1  kg (170 lb)   02/20/23 77.1 kg (170 lb)   02/16/23 76.9 kg (169 lb 9.6 oz)     CONSTITUTIONAL: well nourished, comfortable, no distress  EYES:  Conjunctivae pink, sclerae clear.    E/N/T:  Oral mucosa pink  RESPIRATORY:  Respiratory effort is normal  CARDIOVASCULAR:  normal S1 and S2  GASTROINTESTINAL:  Abdomen without masses or tenderness  EXTREMITIES:  No clubbing or cyanosis.    MUSCULOSKELETAL:  Overall grossly normal muscle strength  SKIN:  Overall, skin warm and dry, no lesions.  NEURO/PSYCH:  Oriented x 3 with normal affect.   Constitutional:  No weight loss or loss of appetite    Eyes:  No difficulty with vision, no double vision, no dry eyes  ENT:  No sore throat, difficulty swallowing; changes in hearing or tinnitus  Cardiovascular: As detailed above  Respiratory:  No cough  Musculoskeletal  No joint pain, muscle aches  Neurologic:  No syncope, lightheadedness, fainting spells   Hematologic: No easy bruising, excessive bleeding tendency   Gastrointestinal:  No jaundice, abdominal pain or abdominal bloating  Genitourinary: No changes in urinary habits, no trouble urinating    Psychiatric: No anxiety or depression      Medical History  Surgical History   Past Medical History:   Diagnosis Date     Atrial fibrillation (H)      Atrial fibrillation, transient (H) 08/11/2020     BPH (benign prostatic hyperplasia) 07/11/2018     Chronic combined systolic and diastolic heart failure (H) 02/11/2021     Congestive heart failure (H)      Coronary artery disease      COVID 02/2021     Dilated cardiomyopathy (H) 02/11/2021     MARIAN (generalized anxiety disorder) 02/11/2021     GERD (gastroesophageal reflux disease)      High cholesterol      Hyperlipidemia      Hypertension      LBBB (left bundle branch block) 07/11/2018     Nonrheumatic aortic valve insufficiency 04/01/2020     Pulmonary hypertension (H) 04/01/2020    Past Surgical History:   Procedure Laterality Date     ANGIOPLASTY       ARTHROSCOPY KNEE        CARDIOVERSION  2021     CARDIOVERSION  02/03/2022     CV CORONARY ANGIOGRAM N/A 10/23/2020    Procedure: Coronary Angiogram;  Surgeon: Varun Freire MD;  Location: [facility] Cath Lab;  Service: Cardiology     CV LEFT HEART CATHETERIZATION WITHOUT LEFT VENTRICULOGRAM Left 10/23/2020    Procedure: Left Heart Catheterization Without Left Ventriculogram;  Surgeon: Varun Freire MD;  Location: [facility] Cath Lab;  Service: Cardiology     CV RIGHT HEART CATH MEASUREMENTS RECORDED N/A 1/25/2023    Procedure: Right Heart Catheterization;  Surgeon: John Paul Vasquez MD;  Location: [facility] CATH LAB CV     EP BIV PACEMAKER INSERT N/A 03/25/2020    Procedure: EP Biventricular Pacemaker Insertion;  Surgeon: Taylor Sandoval MD;  Location: [facility] Cath Lab;  Service: Cardiology     H ABLATION FOCAL AFIB       HEART CATH, ANGIOPLASTY       IMPLANT PACEMAKER       PICC TRIPLE LUMEN PLACEMENT  1/24/2023          RELEASE CARPAL TUNNEL       ZZC TOTAL KNEE ARTHROPLASTY Right 08/15/2019    Procedure: RIGHT  MINIMALLY TOTAL KNEE ARTHROPLASTY;  Surgeon: Keven Cerda MD;  Location: [facility];  Service: Orthopedics         Family History Social History   Family History   Problem Relation Age of Onset     Alzheimer Disease Mother      Heart Disease Father      Cancer Sister      Diabetes Type 2  Sister      Chronic Obstructive Pulmonary Disease Sister      LUNG DISEASE Brother         Social History     Tobacco Use     Smoking status: Never     Smokeless tobacco: Never   Substance Use Topics     Alcohol use: Yes     Comment: Alcoholic Drinks/day: rare     Drug use: No         Medications  Allergies     Current Outpatient Medications:      acetaminophen (TYLENOL) 325 MG tablet, Take 2 tablets (650 mg) by mouth every 6 hours as needed for mild pain or other (and adjunct with moderate or severe pain or per patient request) Alternate with ibuprofen if ordered. Maximum acetaminophen dose from all  sources = 75 mg/kg/day not to exceed 4 grams/day., Disp: , Rfl:      amiodarone (PACERONE) 200 MG tablet, TAKE 1 TABLET (200 MG) BY MOUTH EVERY OTHER DAY, Disp: 45 tablet, Rfl: 0     aspirin (ASA) 81 MG chewable tablet, Take 1 tablet (81 mg) by mouth every other day, Disp: 30 tablet, Rfl: 0     atorvastatin (LIPITOR) 10 MG tablet, Take 10 mg by mouth four times a week Sunday, Tuesday, Thursday, & Saturday at bedtime, Disp: , Rfl:      bumetanide (BUMEX) 1 MG tablet, Take 1.5 mg by mouth daily Given the afternoon., Disp: , Rfl:      bumetanide (BUMEX) 2 MG tablet, Take 2 mg by mouth daily Given the a.m., Disp: , Rfl:      Coenzyme Q10 300 MG CAPS, Take 300 mg by mouth daily, Disp: , Rfl:      doxazosin (CARDURA) 4 MG tablet, Take 1 tablet (4 mg) by mouth At Bedtime, Disp: , Rfl: 0     finasteride (PROSCAR) 5 mg tablet, [FINASTERIDE (PROSCAR) 5 MG TABLET] Take 5 mg by mouth at bedtime.       , Disp: , Rfl:      gabapentin (NEURONTIN) 100 MG capsule, Take 200 mg by mouth 2 times daily (before meals) 2 capsules (200 mg) PO BID every morning before breakfast and every afternoon before lunch, Disp: , Rfl:      gabapentin (NEURONTIN) 300 MG capsule, Take 300 mg by mouth At Bedtime, Disp: , Rfl:      glucosamine/chondr cole A sod (OSTEO BI-FLEX ORAL), Take 1 tablet by mouth 2 times daily (with meals), Disp: , Rfl:      Melatonin 10 MG CAPS, Take 10 mg by mouth daily, Disp: , Rfl:      multivitamin, therapeutic (THERA-VIT) TABS tablet, Take 1 tablet by mouth daily, Disp: , Rfl:      nitroGLYcerin (NITROSTAT) 0.4 MG sublingual tablet, For chest pain place 1 tablet under the tongue every 5 minutes for 3 doses. If symptoms persist 5 minutes after 1st dose call 911., Disp: 9 tablet, Rfl: 0     pantoprazole (PROTONIX) 40 MG EC tablet, Take 1 tablet (40 mg) by mouth 2 times daily, Disp: , Rfl:      polyethylene glycol-propylene glycol (SYSTANE ULTRA) 0.4-0.3 % SOLN ophthalmic solution, Place 2 drops into both eyes 2 times daily as  needed for dry eyes, Disp: , Rfl:      potassium chloride ER (KLOR-CON M) 20 MEQ CR tablet, Take 1 tablet (20 mEq) by mouth daily, Disp: , Rfl:      rivaroxaban ANTICOAGULANT (XARELTO) 20 MG TABS tablet, Take 1 tablet (20 mg) by mouth daily (with dinner), Disp: 90 tablet, Rfl: 3     sennosides (SENOKOT) 8.6 MG tablet, Take by mouth 2 times daily, Disp: , Rfl:      sertraline (ZOLOFT) 50 MG tablet, Take 50 mg by mouth 2 times daily, Disp: , Rfl:      Allergies   Allergen Reactions     Diltiazem Other (See Comments)     Contraindicated in HFrEF     Lactose GI Disturbance     Midodrine Other (See Comments)     Contraindicated in HFrEF          Lab Results    Chemistry CBC Cardiac Enzymes/BNP/TSH/INR   Recent Labs   Lab Test 03/22/22  1616      POTASSIUM 3.8   CHLORIDE 98   CO2 32*      BUN 13   CR 1.17   GFRESTIMATED 62   ANGELY 9.1     Recent Labs   Lab Test 03/22/22  1616 03/16/22  0506 03/15/22  0437   CR 1.17 1.03 1.16          Recent Labs   Lab Test 03/14/22  1454   WBC 6.3   HGB 11.6*   HCT 36.2*   MCV 96        Recent Labs   Lab Test 03/14/22  1454 01/18/22  1526 07/02/21 2010   HGB 11.6* 13.1* 14.8    Recent Labs   Lab Test 03/14/22  2055 03/14/22  1454 02/11/21  1724   TROPONINI 0.09 0.07 0.14     Recent Labs   Lab Test 03/14/22  1454 01/18/22  1526 02/11/21  1724   BNP 1,163* 661* 436*     Recent Labs   Lab Test 03/14/22  1454   TSH 7.62*     Recent Labs   Lab Test 02/11/21  1724 03/24/20  1745 08/15/19  0811   INR 1.03 1.05 0.98         Data Review    ECGs (tracings independently reviewed)  9/21/2022 - ApVp 64bpm, QRS 154ms  3/14/2022 - ApVp 65bpm, QRS 148ms    2/28/2023 HBP-P check  Pacing %/Programmed: AP- 39.2%, CSP- 99.4%, DDDR 60/120   Lead(s): Stable  Battery longevity: Estimating 4.5 years remaining.  Presenting rhythm: AS/BiVP @ 65 bpm  Underlying rhythm: CHB, no R's @ VVI 30  Heart rates: Histograms show primarily 60-80 bpm.  Atrial arrhythmias: Since 11/23/22, 335 mode switches,  burden 0.5%. [place] may be skewed d/t undersensing, EGMs shows AT/AF with intermittent atrial undersensing and occasional atrial lead noise.    Anticoagulant: Xarelto. Antiarythmic: Amioderone  Ventricular arrhythmias: Since 11/23/22- 3 VHRs logged; available EGMs show 6 to 13 beats of NSVT @ 169 to 182 bpm. EF= 35-40% (Echo; 12/20/22). 11/23 patient hospitalized for one night post ablation. 11/30 and 12/24; patient's caregiver states she cannot correlate symptoms.    Lead/device alerts: [employer] device on advisory.  Comments: Normal device function. Atrial sensing in unipolar = 0.6 mV/bipolar = 0.3 mV. Patient left in Bipolar Sensing d/t atrial lead noise much more evident during isometrics in unipolar setting    10/31/2022 HBP-P check  Normal lead and device function - presenting ApVp 65bpm  No arrhythmia episodes  91.5% RAp, 99.8% BiVp  Estimated remaining battery longevity 5.8yrs    4/14/2022  HBP-P check  Normal lead and device function - low atrial sensing (0.1mV bipolar, 1.1mV bipolar)  No atrial fibrillation episodes since 3/8/2022 (DCCV)  98% RA pacing, 99.7% BiV pacing  Estimated remaining battery longevity 6.8yrs    12/20/2022 TTE  1. The left ventricle is normal in size.Left ventricular function is  decreased. The ejection fraction is 35-40% (moderately reduced). There is  severe concentric left ventricular hypertrophy. There is moderate global  hypokinesia of the left ventricle.  2. The right ventricle is normal size. There is mild to moderate right  ventricular hypertrophy. The right ventricular systolic function is normal.  3. The left atrium is severely dilated. The right atrium is severely dilated.  4. There is mild to moderate (1-2+) mitral regurgitation.  IVC diameter >2.1 cm collapsing <50% with sniff suggests a high RA pressure  estimated at 15 mmHg or greater.  5. There is mild to moderate (1-2+) tricuspid regurgitation.  6. Small pericardial effusion. There are no echocardiographic indications  of  cardiac tamponade.    5/19/2021 TTE    Left ventricular cavity size is normal. Severe concentric increase in wall thickness. Ejection fraction is mildly decreased. The calculated left ventricular ejection fraction is 43%.    Right ventricular cavity size is mildly dilated. Normal right ventricular systolic function.    Severe biatrial enlargement.    Mild aortic regurgitation.    The ascending aorta is mildly dilated measuring 4.1 cm.    Moderate pulmonary hypertension present. The estimated systolic pulmonary artery pressure is 51 mm Hg.    When compared to the previous study dated 9/1/2020, there has been no significant change.    2/10/2022 MPI     The nuclear stress test is probably negative for inducible myocardial ischemia or infarction. Cannot exclude a small non-transmural apical inferior infarct but it is likely diaphragmatic attenuation     The left ventricular ejection fraction at stress is 41% without focal wall motion abnormality.     A prior study was conducted on 10/7/2020.  Prior images were unavailable for comparison review, but the report sounds similar to what is seen today, except that TID is no longer present.       Cc: [facility], Jorge A Leigh MD, Jamie Noguera MD Amila Dilusha William, MD  2/28/2023  9:50 AM

## 2023-02-28 NOTE — LETTER
2023    ADDISON BENÍTEZ MD  [address]  [address] [facility] [address]    RE: Reymundo Petersen       Dear Colleague,     I had the pleasure of seeing Reymundo Petersen in the [facility].     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 with severe concentric LV thickening (LVEF 30-35% by 2022 TTE, 41% by 2/10/2022 MPI)  Complete heart block  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    Persistent atrial fibrillation - associated with recurrent episodes of decompensated heart failure, though unclear if primary or secondary  [id number], severe biatrial dilation in the setting of chronic systolic and diastolic heart failure with severe concentric LV thickening  He underwent  PVI, PWI, LA septal/anterior wall AT/AFl ablation, CTI ablation, LSPV-MVA ablation with note of severe bi-atrial dilation and fibrosis with inducible AF with burst pacing at 300ms following ablation on 2022.  He has had some  device detected AT/AFl following ablation with reported burden <1% (some atrial undersensing was noted).   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  Low atrial sensing, intermittent atrial noise.  Underlying complete heart block reported  - continue routine CRT-P follow-up     Chronic systolic and diastolic heart failure - LVEF 35-40% by 12/20/2022 TTE.  LVEF 30-35% by 6/13/2022 TTE, 41% by 2/10/2022 MPI, MPI LVEF 41% 2/10/2022.  TTE LVEF 43% with severe concentric LV thickening   Query cardiac amyloidosis, other infiltrative process, vs hypertensive cardiomyopathy (note of LV thickening, LV systolic dysfunction, AV block, CKD).  His previously metoprolol XL 25mg daily and losartan 25mg daily appear to have been discontinued  - Tc-PYP MPI planned for evaluation for cardiac amyloidosis  - continue follow-up with cardiology and CORE/HF clinics  - if LV function remains <35% in future despite medical optimization, upgrade to primary prevention ICD can be offered, though lower benefit/risk given his age    Goals of care  - ongoing discussions and considerations regarding goals of care, including hospice    Follow up: as above         History of Present Illness   Reymundo Petersen is a 82 year old male with persistent atrial fibrillation now status post PVI, PWI, LA septal/anterior wall AT/AFl ablation, CTI ablation, LSPV-MVA ablation 11/23/2022, chronic systolic and diastolic heart failure due to nonischemic cardiomyopathy with severe concentric LV thickening (LVEF 30-35% by  6/13/2022 TTE, 41% by 2/10/2022 MPI), CHB, 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    Mr. Petersen was noted to have complete heart block with junctional escape rhythm with note of LVEF 47% and underwent right chest wall HBP-P placement following unsuccessful CS lead placement 3/25/2020 (Dr. Taylor Sandoval).was noted to have onset of atrial fibrillation around 8/2021.  He was noted to have persistent AF with increasing OptiVol 8/29/2021 - he underwent DCCV 9/14/2021 after which OptiVol improved for a period.  He had rising OptiVol while in SR through 12/2021.  He redeveloped AF, was started on sotalol around 1/24/2022 and underwent DCCV 2/3/2022 with AF recurrence 2/6/2022.  He was started on amiodarone around mid 2/2022 and underwent DCCV 3/8/2022.  He was admitted 3/14/2022 to 3/16/2022 with decompensated heart failure treated with lasix IV diuresis.  Device check today 10/31/2022 shows general maintenance of sinus rhythm since 3/8/2022.  He was noted to have some corneal deposits by his ophthalmologist.  He underwent  PVI, PWI, LA septal/anterior wall AT/AFl ablation, CTI ablation, LSPV-MVA ablation with note of severe bi-atrial dilation and fibrosis with inducible AF with burst pacing at 300ms following ablation on 11/23/2022.  He has had some device detected AT/AFl following ablation with reported burden <1% (some atrial undersensing was noted).  He continues on amiodarone 200mg every other day.  He has done well in regarding to suppression of sustained atrial arrhythmias, though has had issues with heart failure and fluid management.  Device check today shows stable low unipolar (0.6mV) and bipolar (0.3mV) sensing, though more noise in unipolar configuration.    He had a fall 7/2021with traumatic head injury and hematoma.  He denies syncope, chest pain.       Physical Examination  Review of Systems   VITALS: BP  104/60 (BP Location: Right arm, Patient Position: Sitting, Cuff Size: Adult Regular)   Pulse 60   Resp 14   Wt 76.7 kg (169 lb)   BMI 24.25 kg/m    Wt Readings from Last 3 Encounters:   02/22/23 77.1 kg (170 lb)   02/20/23 77.1 kg (170 lb)   02/16/23 76.9 kg (169 lb 9.6 oz)     CONSTITUTIONAL: well nourished, comfortable, no distress  EYES:  Conjunctivae pink, sclerae clear.    E/N/T:  Oral mucosa pink  RESPIRATORY:  Respiratory effort is normal  CARDIOVASCULAR:  normal S1 and S2  GASTROINTESTINAL:  Abdomen without masses or tenderness  EXTREMITIES:  No clubbing or cyanosis.    MUSCULOSKELETAL:  Overall grossly normal muscle strength  SKIN:  Overall, skin warm and dry, no lesions.  NEURO/PSYCH:  Oriented x 3 with normal affect.   Constitutional:  No weight loss or loss of appetite    Eyes:  No difficulty with vision, no double vision, no dry eyes  ENT:  No sore throat, difficulty swallowing; changes in hearing or tinnitus  Cardiovascular: As detailed above  Respiratory:  No cough  Musculoskeletal  No joint pain, muscle aches  Neurologic:  No syncope, lightheadedness, fainting spells   Hematologic: No easy bruising, excessive bleeding tendency   Gastrointestinal:  No jaundice, abdominal pain or abdominal bloating  Genitourinary: No changes in urinary habits, no trouble urinating    Psychiatric: No anxiety or depression      Medical History  Surgical History   Past Medical History:   Diagnosis Date     Atrial fibrillation (H)      Atrial fibrillation, transient (H) 08/11/2020     BPH (benign prostatic hyperplasia) 07/11/2018     Chronic combined systolic and diastolic heart failure (H) 02/11/2021     Congestive heart failure (H)      Coronary artery disease      COVID 02/2021     Dilated cardiomyopathy (H) 02/11/2021     MARIAN (generalized anxiety disorder) 02/11/2021     GERD (gastroesophageal reflux disease)      High cholesterol      Hyperlipidemia      Hypertension      LBBB (left bundle branch block) 07/11/2018      Nonrheumatic aortic valve insufficiency 04/01/2020     Pulmonary hypertension (H) 04/01/2020    Past Surgical History:   Procedure Laterality Date     ANGIOPLASTY       ARTHROSCOPY KNEE       CARDIOVERSION  2021     CARDIOVERSION  02/03/2022     CV CORONARY ANGIOGRAM N/A 10/23/2020    Procedure: Coronary Angiogram;  Surgeon: Varun Freire MD;  Location: [facility] Cath Lab;  Service: Cardiology     CV LEFT HEART CATHETERIZATION WITHOUT LEFT VENTRICULOGRAM Left 10/23/2020    Procedure: Left Heart Catheterization Without Left Ventriculogram;  Surgeon: Varun Freire MD;  Location: [facility] Cath Lab;  Service: Cardiology     CV RIGHT HEART CATH MEASUREMENTS RECORDED N/A 1/25/2023    Procedure: Right Heart Catheterization;  Surgeon: John Paul Vaqsuez MD;  Location: [facility] CATH LAB CV     EP BIV PACEMAKER INSERT N/A 03/25/2020    Procedure: EP Biventricular Pacemaker Insertion;  Surgeon: Taylor Sandoval MD;  Location: [facility] Cath Lab;  Service: Cardiology     H ABLATION FOCAL AFIB       HEART CATH, ANGIOPLASTY       IMPLANT PACEMAKER       PICC TRIPLE LUMEN PLACEMENT  1/24/2023          RELEASE CARPAL TUNNEL       ZZC TOTAL KNEE ARTHROPLASTY Right 08/15/2019    Procedure: RIGHT  MINIMALLY TOTAL KNEE ARTHROPLASTY;  Surgeon: Keven Cerda MD;  Location: [facility];  Service: Orthopedics         Family History Social History   Family History   Problem Relation Age of Onset     Alzheimer Disease Mother      Heart Disease Father      Cancer Sister      Diabetes Type 2  Sister      Chronic Obstructive Pulmonary Disease Sister      LUNG DISEASE Brother         Social History     Tobacco Use     Smoking status: Never     Smokeless tobacco: Never   Substance Use Topics     Alcohol use: Yes     Comment: Alcoholic Drinks/day: rare     Drug use: No         Medications  Allergies     Current Outpatient Medications:      acetaminophen (TYLENOL) 325 MG tablet, Take 2 tablets (650 mg) by  mouth every 6 hours as needed for mild pain or other (and adjunct with moderate or severe pain or per patient request) Alternate with ibuprofen if ordered. Maximum acetaminophen dose from all sources = 75 mg/kg/day not to exceed 4 grams/day., Disp: , Rfl:      amiodarone (PACERONE) 200 MG tablet, TAKE 1 TABLET (200 MG) BY MOUTH EVERY OTHER DAY, Disp: 45 tablet, Rfl: 0     aspirin (ASA) 81 MG chewable tablet, Take 1 tablet (81 mg) by mouth every other day, Disp: 30 tablet, Rfl: 0     atorvastatin (LIPITOR) 10 MG tablet, Take 10 mg by mouth four times a week Sunday, Tuesday, Thursday, & Saturday at bedtime, Disp: , Rfl:      bumetanide (BUMEX) 1 MG tablet, Take 1.5 mg by mouth daily Given the afternoon., Disp: , Rfl:      bumetanide (BUMEX) 2 MG tablet, Take 2 mg by mouth daily Given the a.m., Disp: , Rfl:      Coenzyme Q10 300 MG CAPS, Take 300 mg by mouth daily, Disp: , Rfl:      doxazosin (CARDURA) 4 MG tablet, Take 1 tablet (4 mg) by mouth At Bedtime, Disp: , Rfl: 0     finasteride (PROSCAR) 5 mg tablet, [FINASTERIDE (PROSCAR) 5 MG TABLET] Take 5 mg by mouth at bedtime.       , Disp: , Rfl:      gabapentin (NEURONTIN) 100 MG capsule, Take 200 mg by mouth 2 times daily (before meals) 2 capsules (200 mg) PO BID every morning before breakfast and every afternoon before lunch, Disp: , Rfl:      gabapentin (NEURONTIN) 300 MG capsule, Take 300 mg by mouth At Bedtime, Disp: , Rfl:      glucosamine/chondr cole A sod (OSTEO BI-FLEX ORAL), Take 1 tablet by mouth 2 times daily (with meals), Disp: , Rfl:      Melatonin 10 MG CAPS, Take 10 mg by mouth daily, Disp: , Rfl:      multivitamin, therapeutic (THERA-VIT) TABS tablet, Take 1 tablet by mouth daily, Disp: , Rfl:      nitroGLYcerin (NITROSTAT) 0.4 MG sublingual tablet, For chest pain place 1 tablet under the tongue every 5 minutes for 3 doses. If symptoms persist 5 minutes after 1st dose call 911., Disp: 9 tablet, Rfl: 0     pantoprazole (PROTONIX) 40 MG EC tablet, Take 1  tablet (40 mg) by mouth 2 times daily, Disp: , Rfl:      polyethylene glycol-propylene glycol (SYSTANE ULTRA) 0.4-0.3 % SOLN ophthalmic solution, Place 2 drops into both eyes 2 times daily as needed for dry eyes, Disp: , Rfl:      potassium chloride ER (KLOR-CON M) 20 MEQ CR tablet, Take 1 tablet (20 mEq) by mouth daily, Disp: , Rfl:      rivaroxaban ANTICOAGULANT (XARELTO) 20 MG TABS tablet, Take 1 tablet (20 mg) by mouth daily (with dinner), Disp: 90 tablet, Rfl: 3     sennosides (SENOKOT) 8.6 MG tablet, Take by mouth 2 times daily, Disp: , Rfl:      sertraline (ZOLOFT) 50 MG tablet, Take 50 mg by mouth 2 times daily, Disp: , Rfl:      Allergies   Allergen Reactions     Diltiazem Other (See Comments)     Contraindicated in HFrEF     Lactose GI Disturbance     Midodrine Other (See Comments)     Contraindicated in HFrEF          Lab Results    Chemistry CBC Cardiac Enzymes/BNP/TSH/INR   Recent Labs   Lab Test 03/22/22  1616      POTASSIUM 3.8   CHLORIDE 98   CO2 32*      BUN 13   CR 1.17   GFRESTIMATED 62   ANGELY 9.1     Recent Labs   Lab Test 03/22/22  1616 03/16/22  0506 03/15/22  0437   CR 1.17 1.03 1.16          Recent Labs   Lab Test 03/14/22  1454   WBC 6.3   HGB 11.6*   HCT 36.2*   MCV 96        Recent Labs   Lab Test 03/14/22  1454 01/18/22  1526 07/02/21 2010   HGB 11.6* 13.1* 14.8    Recent Labs   Lab Test 03/14/22  2055 03/14/22  1454 02/11/21  1724   TROPONINI 0.09 0.07 0.14     Recent Labs   Lab Test 03/14/22  1454 01/18/22  1526 02/11/21  1724   BNP 1,163* 661* 436*     Recent Labs   Lab Test 03/14/22  1454   TSH 7.62*     Recent Labs   Lab Test 02/11/21  1724 03/24/20  1745 08/15/19  0811   INR 1.03 1.05 0.98         Data Review    ECGs (tracings independently reviewed)  9/21/2022 - ApVp 64bpm, QRS 154ms  3/14/2022 - ApVp 65bpm, QRS 148ms    2/28/2023 HBP-P check  Pacing %/Programmed: AP- 39.2%, CSP- 99.4%, DDDR 60/120   Lead(s): Stable  Battery longevity: Estimating 4.5 years  remaining.  Presenting rhythm: AS/BiVP @ 65 bpm  Underlying rhythm: CHB, no R's @ VVI 30  Heart rates: Histograms show primarily 60-80 bpm.  Atrial arrhythmias: Since 11/23/22, 335 mode switches, burden 0.5%. McCormick may be skewed d/t undersensing, EGMs shows AT/AF with intermittent atrial undersensing and occasional atrial lead noise.    Anticoagulant: Xarelto. Antiarythmic: Amioderone  Ventricular arrhythmias: Since 11/23/22- 3 VHRs logged; available EGMs show 6 to 13 beats of NSVT @ 169 to 182 bpm. EF= 35-40% (Echo; 12/20/22). 11/23 patient hospitalized for one night post ablation. 11/30 and 12/24; patient's caregiver states she cannot correlate symptoms.    Lead/device alerts: Percepta device on advisory.  Comments: Normal device function. Atrial sensing in unipolar = 0.6 mV/bipolar = 0.3 mV. Patient left in Bipolar Sensing d/t atrial lead noise much more evident during isometrics in unipolar setting    10/31/2022 HBP-P check  Normal lead and device function - presenting ApVp 65bpm  No arrhythmia episodes  91.5% RAp, 99.8% BiVp  Estimated remaining battery longevity 5.8yrs    4/14/2022  HBP-P check  Normal lead and device function - low atrial sensing (0.1mV bipolar, 1.1mV bipolar)  No atrial fibrillation episodes since 3/8/2022 (DCCV)  98% RA pacing, 99.7% BiV pacing  Estimated remaining battery longevity 6.8yrs    12/20/2022 TTE  1. The left ventricle is normal in size.Left ventricular function is  decreased. The ejection fraction is 35-40% (moderately reduced). There is  severe concentric left ventricular hypertrophy. There is moderate global  hypokinesia of the left ventricle.  2. The right ventricle is normal size. There is mild to moderate right  ventricular hypertrophy. The right ventricular systolic function is normal.  3. The left atrium is severely dilated. The right atrium is severely dilated.  4. There is mild to moderate (1-2+) mitral regurgitation.  IVC diameter >2.1 cm collapsing <50% with sniff  suggests a high RA pressure  estimated at 15 mmHg or greater.  5. There is mild to moderate (1-2+) tricuspid regurgitation.  6. Small pericardial effusion. There are no echocardiographic indications of  cardiac tamponade.    5/19/2021 TTE    Left ventricular cavity size is normal. Severe concentric increase in wall thickness. Ejection fraction is mildly decreased. The calculated left ventricular ejection fraction is 43%.    Right ventricular cavity size is mildly dilated. Normal right ventricular systolic function.    Severe biatrial enlargement.    Mild aortic regurgitation.    The ascending aorta is mildly dilated measuring 4.1 cm.    Moderate pulmonary hypertension present. The estimated systolic pulmonary artery pressure is 51 mm Hg.    When compared to the previous study dated 9/1/2020, there has been no significant change.    2/10/2022 MPI     The nuclear stress test is probably negative for inducible myocardial ischemia or infarction. Cannot exclude a small non-transmural apical inferior infarct but it is likely diaphragmatic attenuation     The left ventricular ejection fraction at stress is 41% without focal wall motion abnormality.     A prior study was conducted on 10/7/2020.  Prior images were unavailable for comparison review, but the report sounds similar to what is seen today, except that TID is no longer present.       Cc: Jorge A Howell CNP, MD, Jamie Noguera MD Amila Dilusha William, MD  2/28/2023  9:50 AM        Thank you for allowing me to participate in the care of your patient.      Sincerely,     Ole Michelle MD     [facility]  cc:   Joe Perez MD  [address] [place] [id number]

## 2023-02-28 NOTE — PATIENT INSTRUCTIONS
[facility]  Cardiac Electrophysiology  [address]  [place] [id number]   Office: [phone]  Fax: [phone]       Thank you for seeing us in clinic today - it is a pleasure to be a part of your care team.  Below is a summary of our plan from today's visit.       You have persistent atrial fibrillation and underwent ablation on 11/23/2022.  You have done well in terms of atrial arrhythmia suppression since that time, though have had brief episodes.  Your pacemaker check today shows stable lead and device function - your right atrial lead has had low sensing and intermittent noise for some time.  We will plan for the following:  - stop amiodarone for now   - continue Xarelto  - we will continue follow-up of your pacemaker system (next routine transmission 5/2023).  - continue to follow with Dr. Leigh and Dr. Vasquez     Please do not hesitate to be in touch with our office at [phone] with any questions that may arise.       Thank you for trusting us with your care,    Ole Michelle MD  Clinical Cardiac Electrophysiology  [facility]  [address]  [place] [address]   Office: [phone]  Fax: [phone]

## 2023-02-28 NOTE — LETTER
2/28/2023        RE: Reymundo Petersen  [address] [place] [address]        [facility]    Code Status:  DNR/DNI   Visit Type:   Chief Complaint   Patient presents with     TCU Follow Up     Facility:  [facility] ([facility]) [[id number]]           HPI: Reymundo Petersen is a 82 year old male who I am seeing today for follow-up on the TCU.  Patient recently hospitalized on 1/23/2023 secondary to fatigue, confusion and dehydration.  Patient with cardiogenic shock secondary to cardiomyopathy with acute on chronic congestive heart failure with reduced ejection fracture.  Patient initially treated with dobutamine infusion and transition to oral Bumex.  Transthoracic echocardiogram on December 19 showed a left ventricular ejection fracture of 35 to 40%.  Beta-blocker held.  Patient is status post RHC on 1/25 which showed elevated right and left-sided filling pressures consistent with volume overload and pulmonary hypertension.  Due to diuresis patient had an acute kidney injury.  Bumex held and then resumed.  Persistent atrial fib with complete heart block status post ablation and DCCV status post CRT/P.  Hyperkalemia initially however ended up with hypokalemia.  Hyponatremia replaced.  Dysphagia.  Patient was seen by speech and there was some concern for esophageal dysmotility.  General anxiety disorder on sertraline.  DJD on Tylenol for pain.  Pressure injury of the left heel being treated topically.  Overall weakness and fatigue with deconditioning.  Per hospital records cardiology initiated palliative care consult.    Transitional Care Course: Today patient sitting up in chair. His daughter is present on exam.  Patient with underlying acute on chronic congestive heart failure. Follow-up chest x-ray showed bilateral pleural effusions.  Patient continues on Bumex 2 mg in the a.m. and 1.5 mg in the afternoon.  He had a follow-up with cardiology today.  His amiodarone was discontinued.  He  continues on apixaban.  Is unable to tolerate beta-blocker secondary to complete heart block.  Today we discussed increasing his Bumex.  His blood pressure appears to have improved.  He continues in compression socks.  Greater edema around the ankles to plus.  He did have some weeping over the weekend however this is resolved.  Today his daughter is questioning hospice.  We did discuss this.  Patient plans to move to long-term care and would like to possibly pursue hospice.  He continues on oxygen at 2 L.  Cognitive impairment increasing.    Assessment/Plan:     Acute on chronic congestive heart failure, unspecified heart failure type (H)  -Ejection fraction of 35 to 40%.  -Increase Bumex 2 mg twice daily  -Follow up CXR showed bilateral pleural effusions.  -Continue O2 at 1 to 2 L to keep sats greater than 90.  -Continue daily weights.  -Cardiology has suggested palliative care.  Reviewed again overall goals of care.  Family is interested in hospice.  Patient plans to move to long-term care when bed available.  -BNP now in the 7000s.   -Repeat BMP on Thursday.    Complete heart block (H)  Status post catheter ablation of atrial fibrillation  -Follow-up with cardiology today.  Amiodarone discontinued.  -No further tachycardia.  -No beta-blocker.  -Continue Xarelto.    Mild cognitive impairment  -UA UC negative.  -Increase symptomology.  No signs or symptoms of infection.  Overall decline.  -CBC without leukocytosis.  Hemoglobin 12.    Stage 2 chronic kidney disease  -Creatinine stable  -Follow-up BMP on Thursday.        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     Dizziness 07/11/2018     DJD (degenerative joint disease) 07/11/2018     Left ventricular hypertrophy 07/31/2018     Troponin level elevated 03/25/2020     Acute  combined systolic and diastolic congestive heart failure (H) 03/26/2020     Pulmonary hypertension (H) 04/01/2020     Nonrheumatic aortic valve insufficiency 04/01/2020     Coronary artery disease due to lipid rich plaque 10/15/2020     MARIAN (generalized anxiety disorder) 02/11/2021     GERD (gastroesophageal reflux disease)      Infection due to 2019 novel coronavirus 02/11/2021     Dilated cardiomyopathy (H) 02/11/2021     Persistent atrial fibrillation (H) 08/11/2020     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) 12/01/2020     Stage 2 chronic kidney disease 10/08/2021     Hearing loss 10/08/2021     Arthritis, lumbar spine 10/08/2021     Peripheral edema 03/14/2022     Elevated TSH 03/16/2022     Elevated troponin 12/19/2022     Pleural effusion on right 12/19/2022     Acute on chronic congestive heart failure, unspecified heart failure type (H) 12/19/2022     Status post catheter ablation of atrial fibrillation 01/04/2023     Heart failure with reduced ejection fraction (H) 01/12/2023     Hyperkalemia 01/23/2023     Hyponatremia 01/23/2023     Cardiogenic shock (H)      Generalized abdominal pain 02/08/2017     Gastro-esophageal reflux disease with esophagitis 03/23/2017     Diverticular disease of large intestine 03/23/2017     Diarrhea 02/08/2017     Diaphragmatic hernia 03/23/2017     Nonischemic cardiomyopathy (H) 02/10/2023     Resolved Ambulatory Problems     Diagnosis Date Noted     Complete heart block (H) 03/24/2020     Bradycardia 07/11/2018     LBBB (left bundle branch block) 07/11/2018     S/P drug eluting coronary stent placement 05/30/2019     Cardiac pacemaker in situ 09/29/2020     Chronic combined systolic and diastolic heart failure (H) 02/11/2021     Weight loss 02/11/2021     COVID-19 02/12/2021     CAD (coronary artery disease) 07/11/2018     CHF (congestive heart failure) (H) 09/08/2021     Past Medical History:   Diagnosis Date     Atrial fibrillation (H)      Atrial  fibrillation, transient (H) 08/11/2020     Congestive heart failure (H)      Coronary artery disease      COVID 02/2021     High cholesterol      Hyperlipidemia      Hypertension      Allergies   Allergen Reactions     Diltiazem Other (See Comments)     Contraindicated in HFrEF     Lactose GI Disturbance     Midodrine Other (See Comments)     Contraindicated in HFrEF       All Meds and Allergies reviewed in the record at the facility and is the most up-to-date.      Current Outpatient Medications   Medication Sig     acetaminophen (TYLENOL) 325 MG tablet Take 2 tablets (650 mg) by mouth every 6 hours as needed for mild pain or other (and adjunct with moderate or severe pain or per patient request) Alternate with ibuprofen if ordered. Maximum acetaminophen dose from all sources = 75 mg/kg/day not to exceed 4 grams/day.     aspirin (ASA) 81 MG chewable tablet Take 1 tablet (81 mg) by mouth every other day     atorvastatin (LIPITOR) 10 MG tablet Take 10 mg by mouth four times a week Sunday, Tuesday, Thursday, & Saturday at bedtime     bumetanide (BUMEX) 2 MG tablet Take 2 mg by mouth 2 times daily Given the a.m.     Coenzyme Q10 300 MG CAPS Take 300 mg by mouth daily     doxazosin (CARDURA) 4 MG tablet Take 1 tablet (4 mg) by mouth At Bedtime     finasteride (PROSCAR) 5 mg tablet [FINASTERIDE (PROSCAR) 5 MG TABLET] Take 5 mg by mouth at bedtime.            gabapentin (NEURONTIN) 100 MG capsule Take 200 mg by mouth 2 times daily (before meals) 2 capsules (200 mg) PO BID every morning before breakfast and every afternoon before lunch     gabapentin (NEURONTIN) 300 MG capsule Take 300 mg by mouth At Bedtime     glucosamine/chondr cole A sod (OSTEO BI-FLEX ORAL) Take 1 tablet by mouth 2 times daily (with meals)     Melatonin 10 MG CAPS Take 10 mg by mouth daily     multivitamin, therapeutic (THERA-VIT) TABS tablet Take 1 tablet by mouth daily     nitroGLYcerin (NITROSTAT) 0.4 MG sublingual tablet For chest pain place 1  "tablet under the tongue every 5 minutes for 3 doses. If symptoms persist 5 minutes after 1st dose call 911.     pantoprazole (PROTONIX) 40 MG EC tablet Take 1 tablet (40 mg) by mouth 2 times daily     polyethylene glycol-propylene glycol (SYSTANE ULTRA) 0.4-0.3 % SOLN ophthalmic solution Place 2 drops into both eyes 2 times daily as needed for dry eyes     potassium chloride ER (KLOR-CON M) 20 MEQ CR tablet Take 1 tablet (20 mEq) by mouth daily     rivaroxaban ANTICOAGULANT (XARELTO) 20 MG TABS tablet Take 1 tablet (20 mg) by mouth daily (with dinner)     sennosides (SENOKOT) 8.6 MG tablet Take by mouth 2 times daily     sertraline (ZOLOFT) 50 MG tablet Take 100 mg by mouth At Bedtime     No current facility-administered medications for this visit.       REVIEW OF SYSTEMS:   10 point review of systems reviewed and pertinent positives in the HPI.     PHYSICAL EXAMINATION:  Physical Exam     Vital signs: /62   Pulse 87   Temp 98.4  F (36.9  C)   Resp 18   Ht 1.778 m (5' 10\")   Wt 77.7 kg (171 lb 3.2 oz)   SpO2 92%   BMI 24.56 kg/m    General: Awake, Alert, tired and fatigued, oriented x2, sitting up in bedside chair.  HEENT: Pink conjunctiva, dry oral mucosa  NECK: Supple  CVS: Irregular irregular.  LUNG: Shallow respiratory effort. Diminished sounds in bases. Oxygen on at 2 L.  No cough on exam.  BACK: No kyphosis of the thoracic spine  ABDOMEN: Soft with positive bowel sounds  EXTREMITIES:  thin frail extremities, diffuse weakness, 2 to 3+ pedal edema, no calf tenderness  NEUROLOGIC: Intact  PSYCHIATRIC: Flat affect.  Cognitive impairment noted.      Labs:  All labs reviewed in the nursing home record and [employer]   @  Lab Results   Component Value Date    WBC 8.9 01/28/2023     Lab Results   Component Value Date    RBC 4.15 01/28/2023     Lab Results   Component Value Date    HGB 12.3 01/28/2023     Lab Results   Component Value Date    HCT 40.5 01/28/2023     Lab Results   Component Value Date    MCV 98 " 01/28/2023     Lab Results   Component Value Date    MCH 29.6 01/28/2023     Lab Results   Component Value Date    MCHC 30.4 01/28/2023     Lab Results   Component Value Date    RDW 15.8 01/28/2023     Lab Results   Component Value Date     01/28/2023        @Last Comprehensive Metabolic Panel:  Sodium   Date Value Ref Range Status   02/20/2023 136 136 - 145 mmol/L Final     Potassium   Date Value Ref Range Status   02/20/2023 4.2 3.4 - 5.3 mmol/L Final   10/17/2022 4.2 3.5 - 5.0 mmol/L Final     Chloride   Date Value Ref Range Status   02/20/2023 91 (L) 98 - 107 mmol/L Final   10/17/2022 99 98 - 107 mmol/L Final     Carbon Dioxide (CO2)   Date Value Ref Range Status   02/20/2023 36 (H) 22 - 29 mmol/L Final   10/17/2022 30 22 - 31 mmol/L Final     Anion Gap   Date Value Ref Range Status   02/20/2023 9 7 - 15 mmol/L Final   10/17/2022 10 5 - 18 mmol/L Final     Glucose   Date Value Ref Range Status   02/20/2023 107 (H) 70 - 99 mg/dL Final   10/17/2022 99 70 - 125 mg/dL Final     GLUCOSE BY METER POCT   Date Value Ref Range Status   01/27/2023 248 (H) 70 - 99 mg/dL Final     Urea Nitrogen   Date Value Ref Range Status   02/20/2023 14.9 8.0 - 23.0 mg/dL Final   10/17/2022 27 8 - 28 mg/dL Final     Creatinine   Date Value Ref Range Status   02/20/2023 0.86 0.67 - 1.17 mg/dL Final     GFR Estimate   Date Value Ref Range Status   02/20/2023 85 >60 mL/min/1.73m2 Final     Comment:     eGFR calculated using 2021 CKD-EPI equation.   07/02/2021 >60 >60 mL/min/1.73m2 Final     Calcium   Date Value Ref Range Status   02/20/2023 9.0 8.8 - 10.2 mg/dL Final       this note has been dictated using voice recognition software. Any grammatical or context distortions are unintentional and inherent to the software    Electronically signed by: Nitza Ludwig CNP         Sincerely,        Nitza Ludwig NP

## 2023-03-01 NOTE — PROGRESS NOTES
[facility]    Code Status:  DNR/DNI   Visit Type:   Chief Complaint   Patient presents with     TCU Follow Up     Facility:  [facility] ([facility]) [[id number]]           HPI: Reymundo Petersen is a 82 year old male who I am seeing today for follow-up on the TCU.  Patient recently hospitalized on 1/23/2023 secondary to fatigue, confusion and dehydration.  Patient with cardiogenic shock secondary to cardiomyopathy with acute on chronic congestive heart failure with reduced ejection fracture.  Patient initially treated with dobutamine infusion and transition to oral Bumex.  Transthoracic echocardiogram on December 19 showed a left ventricular ejection fracture of 35 to 40%.  Beta-blocker held.  Patient is status post RHC on 1/25 which showed elevated right and left-sided filling pressures consistent with volume overload and pulmonary hypertension.  Due to diuresis patient had an acute kidney injury.  Bumex held and then resumed.  Persistent atrial fib with complete heart block status post ablation and DCCV status post CRT/P.  Hyperkalemia initially however ended up with hypokalemia.  Hyponatremia replaced.  Dysphagia.  Patient was seen by speech and there was some concern for esophageal dysmotility.  General anxiety disorder on sertraline.  DJD on Tylenol for pain.  Pressure injury of the left heel being treated topically.  Overall weakness and fatigue with deconditioning.  Per hospital records cardiology initiated palliative care consult.    Transitional Care Course: Today patient sitting up in chair. His daughter is present on exam.  Patient with underlying acute on chronic congestive heart failure. Follow-up chest x-ray showed bilateral pleural effusions.  Patient continues on Bumex 2 mg in the a.m. and 1.5 mg in the afternoon.  He had a follow-up with cardiology today.  His amiodarone was discontinued.  He continues on apixaban.  Is unable to tolerate beta-blocker secondary to complete heart  block.  Today we discussed increasing his Bumex.  His blood pressure appears to have improved.  He continues in compression socks.  Greater edema around the ankles to plus.  He did have some weeping over the weekend however this is resolved.  Today his daughter is questioning hospice.  We did discuss this.  Patient plans to move to long-term care and would like to possibly pursue hospice.  He continues on oxygen at 2 L.  Cognitive impairment increasing.    Assessment/Plan:     Acute on chronic congestive heart failure, unspecified heart failure type (H)  -Ejection fraction of 35 to 40%.  -Increase Bumex 2 mg twice daily  -Follow up CXR showed bilateral pleural effusions.  -Continue O2 at 1 to 2 L to keep sats greater than 90.  -Continue daily weights.  -Cardiology has suggested palliative care.  Reviewed again overall goals of care.  Family is interested in hospice.  Patient plans to move to long-term care when bed available.  -BNP now in the 7000s.   -Repeat BMP on Thursday.    Complete heart block (H)  Status post catheter ablation of atrial fibrillation  -Follow-up with cardiology today.  Amiodarone discontinued.  -No further tachycardia.  -No beta-blocker.  -Continue Xarelto.    Mild cognitive impairment  -UA UC negative.  -Increase symptomology.  No signs or symptoms of infection.  Overall decline.  -CBC without leukocytosis.  Hemoglobin 12.    Stage 2 chronic kidney disease  -Creatinine stable  -Follow-up BMP on Thursday.        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     Dizziness 07/11/2018     DJD (degenerative joint disease) 07/11/2018     Left ventricular hypertrophy 07/31/2018     Troponin level elevated 03/25/2020     Acute combined systolic and diastolic congestive heart failure (H) 03/26/2020     Pulmonary  hypertension (H) 04/01/2020     Nonrheumatic aortic valve insufficiency 04/01/2020     Coronary artery disease due to lipid rich plaque 10/15/2020     MARIAN (generalized anxiety disorder) 02/11/2021     GERD (gastroesophageal reflux disease)      Infection due to 2019 novel coronavirus 02/11/2021     Dilated cardiomyopathy (H) 02/11/2021     Persistent atrial fibrillation (H) 08/11/2020     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) 12/01/2020     Stage 2 chronic kidney disease 10/08/2021     Hearing loss 10/08/2021     Arthritis, lumbar spine 10/08/2021     Peripheral edema 03/14/2022     Elevated TSH 03/16/2022     Elevated troponin 12/19/2022     Pleural effusion on right 12/19/2022     Acute on chronic congestive heart failure, unspecified heart failure type (H) 12/19/2022     Status post catheter ablation of atrial fibrillation 01/04/2023     Heart failure with reduced ejection fraction (H) 01/12/2023     Hyperkalemia 01/23/2023     Hyponatremia 01/23/2023     Cardiogenic shock (H)      Generalized abdominal pain 02/08/2017     Gastro-esophageal reflux disease with esophagitis 03/23/2017     Diverticular disease of large intestine 03/23/2017     Diarrhea 02/08/2017     Diaphragmatic hernia 03/23/2017     Nonischemic cardiomyopathy (H) 02/10/2023     Resolved Ambulatory Problems     Diagnosis Date Noted     Complete heart block (H) 03/24/2020     Bradycardia 07/11/2018     LBBB (left bundle branch block) 07/11/2018     S/P drug eluting coronary stent placement 05/30/2019     Cardiac pacemaker in situ 09/29/2020     Chronic combined systolic and diastolic heart failure (H) 02/11/2021     Weight loss 02/11/2021     COVID-19 02/12/2021     CAD (coronary artery disease) 07/11/2018     CHF (congestive heart failure) (H) 09/08/2021     Past Medical History:   Diagnosis Date     Atrial fibrillation (H)      Atrial fibrillation, transient (H) 08/11/2020     Congestive heart failure (H)      Coronary  artery disease      COVID 02/2021     High cholesterol      Hyperlipidemia      Hypertension      Allergies   Allergen Reactions     Diltiazem Other (See Comments)     Contraindicated in HFrEF     Lactose GI Disturbance     Midodrine Other (See Comments)     Contraindicated in HFrEF       All Meds and Allergies reviewed in the record at the facility and is the most up-to-date.      Current Outpatient Medications   Medication Sig     acetaminophen (TYLENOL) 325 MG tablet Take 2 tablets (650 mg) by mouth every 6 hours as needed for mild pain or other (and adjunct with moderate or severe pain or per patient request) Alternate with ibuprofen if ordered. Maximum acetaminophen dose from all sources = 75 mg/kg/day not to exceed 4 grams/day.     aspirin (ASA) 81 MG chewable tablet Take 1 tablet (81 mg) by mouth every other day     atorvastatin (LIPITOR) 10 MG tablet Take 10 mg by mouth four times a week Sunday, Tuesday, Thursday, & Saturday at bedtime     bumetanide (BUMEX) 2 MG tablet Take 2 mg by mouth 2 times daily Given the a.m.     Coenzyme Q10 300 MG CAPS Take 300 mg by mouth daily     doxazosin (CARDURA) 4 MG tablet Take 1 tablet (4 mg) by mouth At Bedtime     finasteride (PROSCAR) 5 mg tablet [FINASTERIDE (PROSCAR) 5 MG TABLET] Take 5 mg by mouth at bedtime.            gabapentin (NEURONTIN) 100 MG capsule Take 200 mg by mouth 2 times daily (before meals) 2 capsules (200 mg) PO BID every morning before breakfast and every afternoon before lunch     gabapentin (NEURONTIN) 300 MG capsule Take 300 mg by mouth At Bedtime     glucosamine/chondr cole A sod (OSTEO BI-FLEX ORAL) Take 1 tablet by mouth 2 times daily (with meals)     Melatonin 10 MG CAPS Take 10 mg by mouth daily     multivitamin, therapeutic (THERA-VIT) TABS tablet Take 1 tablet by mouth daily     nitroGLYcerin (NITROSTAT) 0.4 MG sublingual tablet For chest pain place 1 tablet under the tongue every 5 minutes for 3 doses. If symptoms persist 5 minutes after  "1st dose call 911.     pantoprazole (PROTONIX) 40 MG EC tablet Take 1 tablet (40 mg) by mouth 2 times daily     polyethylene glycol-propylene glycol (SYSTANE ULTRA) 0.4-0.3 % SOLN ophthalmic solution Place 2 drops into both eyes 2 times daily as needed for dry eyes     potassium chloride ER (KLOR-CON M) 20 MEQ CR tablet Take 1 tablet (20 mEq) by mouth daily     rivaroxaban ANTICOAGULANT (XARELTO) 20 MG TABS tablet Take 1 tablet (20 mg) by mouth daily (with dinner)     sennosides (SENOKOT) 8.6 MG tablet Take by mouth 2 times daily     sertraline (ZOLOFT) 50 MG tablet Take 100 mg by mouth At Bedtime     No current facility-administered medications for this visit.       REVIEW OF SYSTEMS:   10 point review of systems reviewed and pertinent positives in the HPI.     PHYSICAL EXAMINATION:  Physical Exam     Vital signs: /62   Pulse 87   Temp 98.4  F (36.9  C)   Resp 18   Ht 1.778 m (5' 10\")   Wt 77.7 kg (171 lb 3.2 oz)   SpO2 92%   BMI 24.56 kg/m    General: Awake, Alert, tired and fatigued, oriented x2, sitting up in bedside chair.  HEENT: Pink conjunctiva, dry oral mucosa  NECK: Supple  CVS: Irregular irregular.  LUNG: Shallow respiratory effort. Diminished sounds in bases. Oxygen on at 2 L.  No cough on exam.  BACK: No kyphosis of the thoracic spine  ABDOMEN: Soft with positive bowel sounds  EXTREMITIES:  thin frail extremities, diffuse weakness, 2 to 3+ pedal edema, no calf tenderness  NEUROLOGIC: Intact  PSYCHIATRIC: Flat affect.  Cognitive impairment noted.      Labs:  All labs reviewed in the nursing home record and [employer]   @  Lab Results   Component Value Date    WBC 8.9 01/28/2023     Lab Results   Component Value Date    RBC 4.15 01/28/2023     Lab Results   Component Value Date    HGB 12.3 01/28/2023     Lab Results   Component Value Date    HCT 40.5 01/28/2023     Lab Results   Component Value Date    MCV 98 01/28/2023     Lab Results   Component Value Date    MCH 29.6 01/28/2023     Lab Results "   Component Value Date    MCHC 30.4 01/28/2023     Lab Results   Component Value Date    RDW 15.8 01/28/2023     Lab Results   Component Value Date     01/28/2023        @Last Comprehensive Metabolic Panel:  Sodium   Date Value Ref Range Status   02/20/2023 136 136 - 145 mmol/L Final     Potassium   Date Value Ref Range Status   02/20/2023 4.2 3.4 - 5.3 mmol/L Final   10/17/2022 4.2 3.5 - 5.0 mmol/L Final     Chloride   Date Value Ref Range Status   02/20/2023 91 (L) 98 - 107 mmol/L Final   10/17/2022 99 98 - 107 mmol/L Final     Carbon Dioxide (CO2)   Date Value Ref Range Status   02/20/2023 36 (H) 22 - 29 mmol/L Final   10/17/2022 30 22 - 31 mmol/L Final     Anion Gap   Date Value Ref Range Status   02/20/2023 9 7 - 15 mmol/L Final   10/17/2022 10 5 - 18 mmol/L Final     Glucose   Date Value Ref Range Status   02/20/2023 107 (H) 70 - 99 mg/dL Final   10/17/2022 99 70 - 125 mg/dL Final     GLUCOSE BY METER POCT   Date Value Ref Range Status   01/27/2023 248 (H) 70 - 99 mg/dL Final     Urea Nitrogen   Date Value Ref Range Status   02/20/2023 14.9 8.0 - 23.0 mg/dL Final   10/17/2022 27 8 - 28 mg/dL Final     Creatinine   Date Value Ref Range Status   02/20/2023 0.86 0.67 - 1.17 mg/dL Final     GFR Estimate   Date Value Ref Range Status   02/20/2023 85 >60 mL/min/1.73m2 Final     Comment:     eGFR calculated using 2021 CKD-EPI equation.   07/02/2021 >60 >60 mL/min/1.73m2 Final     Calcium   Date Value Ref Range Status   02/20/2023 9.0 8.8 - 10.2 mg/dL Final       this note has been dictated using voice recognition software. Any grammatical or context distortions are unintentional and inherent to the software    Electronically signed by: Nitza Ludwig, CNP

## 2023-03-06 NOTE — LETTER
3/6/2023        RE: Reymundo Petersen  [address] [place] [address]        [facility]    Code Status:  DNR/DNI   Visit Type:   Chief Complaint   Patient presents with     TCU Follow Up     Facility:  [facility] ([facility]) [[id number]]           HPI: Reymundo Petersen is a 82 year old male who I am seeing today for follow-up on the TCU.  Patient recently hospitalized on 1/23/2023 secondary to fatigue, confusion and dehydration.  Patient with cardiogenic shock secondary to cardiomyopathy with acute on chronic congestive heart failure with reduced ejection fracture.  Patient initially treated with dobutamine infusion and transition to oral Bumex.  Transthoracic echocardiogram on December 19 showed a left ventricular ejection fracture of 35 to 40%.  Beta-blocker held.  Patient is status post RHC on 1/25 which showed elevated right and left-sided filling pressures consistent with volume overload and pulmonary hypertension.  Due to diuresis patient had an acute kidney injury.  Bumex held and then resumed.  Persistent atrial fib with complete heart block status post ablation and DCCV status post CRT/P.  Hyperkalemia initially however ended up with hypokalemia.  Hyponatremia replaced.  Dysphagia.  Patient was seen by speech and there was some concern for esophageal dysmotility.  General anxiety disorder on sertraline.  DJD on Tylenol for pain.  Pressure injury of the left heel being treated topically.  Overall weakness and fatigue with deconditioning.  Per hospital records cardiology initiated palliative care consult.    Transitional Care Course: Today patient sitting up in chair.  Patient other daughter present on exam today.  Patient is somewhat sleepy.  His daughter reports that he did not sleep well and was up at 5 AM this morning.  Underlying congestive heart failure.  He denies any shortness of breath or chest pain.  Continues on Bumex.  He is on oxygen at 2 L.  Edema to lower  extremities remains the same.  Continues in compression socks.  He reports he is eating well.  He is having regular bowel movement.  No dysuria.  Patient awaiting long-term care placement.    Assessment/Plan: Reviewed with changes    Acute on chronic congestive heart failure, unspecified heart failure type (H)  -Ejection fraction of 35 to 40%.  -Follow up CXR showed bilateral pleural effusions.  -Recent increase in Bumex 2 mg to twice daily  -Continues on 2 L of O2.  -Continue daily weights.  -Follow-up BMP on Thursday.    Complete heart block (H)  Status post catheter ablation of atrial fibrillation  -Recent follow-up with cardiology. Amiodarone discontinued.  -No further tachycardia.  -No beta-blocker.  -Continue Xarelto.    Mild cognitive impairment  -UA UC negative.  -Increase symptomology.  No signs or symptoms of infection.  Overall decline.  -CBC without leukocytosis.  Hemoglobin 12.  -Occasional anxiety.  Continues on sertraline.    Stage 2 chronic kidney disease  -Creatinine stable  -Follow-up BMP on Thursday.        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     Dizziness 07/11/2018     DJD (degenerative joint disease) 07/11/2018     Left ventricular hypertrophy 07/31/2018     Troponin level elevated 03/25/2020     Acute combined systolic and diastolic congestive heart failure (H) 03/26/2020     Pulmonary hypertension (H) 04/01/2020     Nonrheumatic aortic valve insufficiency 04/01/2020     Coronary artery disease due to lipid rich plaque 10/15/2020     MARIAN (generalized anxiety disorder) 02/11/2021     GERD (gastroesophageal reflux disease)      Infection due to 2019 novel coronavirus 02/11/2021     Dilated cardiomyopathy (H) 02/11/2021     Persistent atrial fibrillation (H) 08/11/2020     Presence of cardiac  resynchronization therapy pacemaker (CRT-P) 12/01/2020     Stage 2 chronic kidney disease 10/08/2021     Hearing loss 10/08/2021     Arthritis, lumbar spine 10/08/2021     Peripheral edema 03/14/2022     Elevated TSH 03/16/2022     Elevated troponin 12/19/2022     Pleural effusion on right 12/19/2022     Acute on chronic congestive heart failure, unspecified heart failure type (H) 12/19/2022     Status post catheter ablation of atrial fibrillation 01/04/2023     Heart failure with reduced ejection fraction (H) 01/12/2023     Hyperkalemia 01/23/2023     Hyponatremia 01/23/2023     Cardiogenic shock (H)      Generalized abdominal pain 02/08/2017     Gastro-esophageal reflux disease with esophagitis 03/23/2017     Diverticular disease of large intestine 03/23/2017     Diarrhea 02/08/2017     Diaphragmatic hernia 03/23/2017     Nonischemic cardiomyopathy (H) 02/10/2023     Resolved Ambulatory Problems     Diagnosis Date Noted     Complete heart block (H) 03/24/2020     Bradycardia 07/11/2018     LBBB (left bundle branch block) 07/11/2018     S/P drug eluting coronary stent placement 05/30/2019     Cardiac pacemaker in situ 09/29/2020     Chronic combined systolic and diastolic heart failure (H) 02/11/2021     Weight loss 02/11/2021     COVID-19 02/12/2021     CAD (coronary artery disease) 07/11/2018     CHF (congestive heart failure) (H) 09/08/2021     Past Medical History:   Diagnosis Date     Atrial fibrillation (H)      Atrial fibrillation, transient (H) 08/11/2020     Congestive heart failure (H)      Coronary artery disease      COVID 02/2021     High cholesterol      Hyperlipidemia      Hypertension      Allergies   Allergen Reactions     Diltiazem Other (See Comments)     Contraindicated in HFrEF     Lactose GI Disturbance     Midodrine Other (See Comments)     Contraindicated in HFrEF       All Meds and Allergies reviewed in the record at the facility and is the most up-to-date.      Current Outpatient  Medications   Medication Sig     acetaminophen (TYLENOL) 325 MG tablet Take 2 tablets (650 mg) by mouth every 6 hours as needed for mild pain or other (and adjunct with moderate or severe pain or per patient request) Alternate with ibuprofen if ordered. Maximum acetaminophen dose from all sources = 75 mg/kg/day not to exceed 4 grams/day.     aspirin (ASA) 81 MG chewable tablet Take 1 tablet (81 mg) by mouth every other day     atorvastatin (LIPITOR) 10 MG tablet Take 10 mg by mouth four times a week Sunday, Tuesday, Thursday, & Saturday at bedtime     bumetanide (BUMEX) 2 MG tablet Take 2 mg by mouth 2 times daily Given the a.m.     Coenzyme Q10 300 MG CAPS Take 300 mg by mouth daily     doxazosin (CARDURA) 4 MG tablet Take 1 tablet (4 mg) by mouth At Bedtime     finasteride (PROSCAR) 5 mg tablet [FINASTERIDE (PROSCAR) 5 MG TABLET] Take 5 mg by mouth at bedtime.            gabapentin (NEURONTIN) 100 MG capsule Take 200 mg by mouth 2 times daily (before meals) 2 capsules (200 mg) PO BID every morning before breakfast and every afternoon before lunch     gabapentin (NEURONTIN) 300 MG capsule Take 300 mg by mouth At Bedtime     glucosamine/chondr cole A sod (OSTEO BI-FLEX ORAL) Take 1 tablet by mouth 2 times daily (with meals)     Melatonin 10 MG CAPS Take 10 mg by mouth daily     multivitamin, therapeutic (THERA-VIT) TABS tablet Take 1 tablet by mouth daily     nitroGLYcerin (NITROSTAT) 0.4 MG sublingual tablet For chest pain place 1 tablet under the tongue every 5 minutes for 3 doses. If symptoms persist 5 minutes after 1st dose call 911.     pantoprazole (PROTONIX) 40 MG EC tablet Take 1 tablet (40 mg) by mouth 2 times daily     polyethylene glycol-propylene glycol (SYSTANE ULTRA) 0.4-0.3 % SOLN ophthalmic solution Place 2 drops into both eyes 2 times daily as needed for dry eyes     potassium chloride ER (KLOR-CON M) 20 MEQ CR tablet Take 1 tablet (20 mEq) by mouth daily     rivaroxaban ANTICOAGULANT (XARELTO) 20 MG  "TABS tablet Take 1 tablet (20 mg) by mouth daily (with dinner)     sennosides (SENOKOT) 8.6 MG tablet Take by mouth 2 times daily     sertraline (ZOLOFT) 50 MG tablet Take 100 mg by mouth At Bedtime     No current facility-administered medications for this visit.       REVIEW OF SYSTEMS:   10 point review of systems reviewed and pertinent positives in the HPI.     PHYSICAL EXAMINATION:  Physical Exam     Vital signs: BP 94/63   Pulse 69   Temp 98  F (36.7  C)   Resp 16   Ht 1.778 m (5' 10\")   Wt 77.2 kg (170 lb 3.2 oz)   SpO2 93%   BMI 24.42 kg/m    General: Awake, Alert, tired and fatigued, oriented x2, sitting up in bedside chair.  HEENT: Pink conjunctiva, dry oral mucosa  NECK: Supple  CVS: Irregular irregular.  LUNG: Shallow respiratory effort.  No wheezes rales or rhonchi.  Oxygen on at 2 L.  No cough on exam.  BACK: No kyphosis of the thoracic spine  EXTREMITIES:  thin frail extremities, diffuse weakness, 2+ pedal edema, no calf tenderness  NEUROLOGIC: Intact  PSYCHIATRIC: Flat affect.  Cognitive impairment noted.      Labs:  All labs reviewed in the nursing home record and Epic   @  Lab Results   Component Value Date    WBC 8.9 01/28/2023     Lab Results   Component Value Date    RBC 4.15 01/28/2023     Lab Results   Component Value Date    HGB 12.3 01/28/2023     Lab Results   Component Value Date    HCT 40.5 01/28/2023     Lab Results   Component Value Date    MCV 98 01/28/2023     Lab Results   Component Value Date    MCH 29.6 01/28/2023     Lab Results   Component Value Date    MCHC 30.4 01/28/2023     Lab Results   Component Value Date    RDW 15.8 01/28/2023     Lab Results   Component Value Date     01/28/2023        @Last Comprehensive Metabolic Panel:  Sodium   Date Value Ref Range Status   03/02/2023 137 136 - 145 mmol/L Final     Potassium   Date Value Ref Range Status   03/02/2023 3.5 3.4 - 5.3 mmol/L Final   10/17/2022 4.2 3.5 - 5.0 mmol/L Final     Chloride   Date Value Ref Range " Status   03/02/2023 89 (L) 98 - 107 mmol/L Final   10/17/2022 99 98 - 107 mmol/L Final     Carbon Dioxide (CO2)   Date Value Ref Range Status   03/02/2023 40 (H) 22 - 29 mmol/L Final   10/17/2022 30 22 - 31 mmol/L Final     Anion Gap   Date Value Ref Range Status   03/02/2023 8 7 - 15 mmol/L Final   10/17/2022 10 5 - 18 mmol/L Final     Glucose   Date Value Ref Range Status   03/02/2023 100 (H) 70 - 99 mg/dL Final   10/17/2022 99 70 - 125 mg/dL Final     GLUCOSE BY METER POCT   Date Value Ref Range Status   01/27/2023 248 (H) 70 - 99 mg/dL Final     Urea Nitrogen   Date Value Ref Range Status   03/02/2023 10.7 8.0 - 23.0 mg/dL Final   10/17/2022 27 8 - 28 mg/dL Final     Creatinine   Date Value Ref Range Status   03/02/2023 0.88 0.67 - 1.17 mg/dL Final     GFR Estimate   Date Value Ref Range Status   03/02/2023 85 >60 mL/min/1.73m2 Final     Comment:     eGFR calculated using 2021 CKD-EPI equation.   07/02/2021 >60 >60 mL/min/1.73m2 Final     Calcium   Date Value Ref Range Status   03/02/2023 9.0 8.8 - 10.2 mg/dL Final       this note has been dictated using voice recognition software. Any grammatical or context distortions are unintentional and inherent to the software    Electronically signed by: Nitza Ludwig, SYM[employer]         Sincerely,        Nitza Ludwig, NP

## 2023-03-14 NOTE — LETTER
3/14/2023        RE: Reymundo Petersen  [address] [place] [address]        [facility]    Code Status:  DNR/DNI   Visit Type:   Chief Complaint   Patient presents with     TCU To LTC Discharge     Facility:  [facility] ([facility]) [[id number]]           HPI: Reymundo Petersen is a 82 year old male who I am seeing today for from the TCU to long-term care.  Patient with multiple chronic comorbidities which need continued monitoring.  Patient unable to care for himself independently at home.  Patient recently hospitalized on 1/23/2023 secondary to fatigue, confusion and dehydration.  Patient with cardiogenic shock secondary to cardiomyopathy with acute on chronic congestive heart failure with reduced ejection fracture.  Patient initially treated with dobutamine infusion and transition to oral Bumex.  Transthoracic echocardiogram on December 19 showed a left ventricular ejection fracture of 35 to 40%.  Beta-blocker held.  Patient is status post RHC on 1/25 which showed elevated right and left-sided filling pressures consistent with volume overload and pulmonary hypertension.  Due to diuresis patient had an acute kidney injury.  Bumex held and then resumed.  Persistent atrial fib with complete heart block status post ablation and DCCV status post CRT/P.  Hyperkalemia initially however ended up with hypokalemia.  Hyponatremia replaced.  Dysphagia.  Patient was seen by speech and there was some concern for esophageal dysmotility.  General anxiety disorder on sertraline.  DJD on Tylenol for pain.  Pressure injury of the left heel being treated topically.  Overall weakness and fatigue with deconditioning.  Per hospital records cardiology initiated palliative care consult.    Transitional Care Course: Today patient sitting up in chair.  Patient daughter present on exam.  Patient is somewhat sleepy.  End-stage congestive heart failure with continued decline.  Patient signed onto hospice late  last week.  He will transfer to the long-term care here at [facility] on hospice.  He continues on Bumex 2 mg twice daily.  Weight has been stable.  He continues with lower extremity edema.  Continued hypoxia on oxygen at 2 L.  He continues in compression socks.  Some increasing cognitive impairment.  DJD on Tylenol.  Atrial for with complete heart block.  Overall very weak and fatigued.       Assessment/Plan: Reviewed with changes    Acute on chronic congestive heart failure, unspecified heart failure type (H)  -Ejection fraction of 35 to 40%.  -Follow up CXR showed bilateral pleural effusions.  -Recent increase in Bumex 2 mg to twice daily  -Continues on 2 L of O2.  -Stop daily weights with patient signing onto hospice.  -Hospice following.    Complete heart block (H)  Status post catheter ablation of atrial fibrillation  -Recent follow-up with cardiology. Amiodarone discontinued.  -No further tachycardia.  -No beta-blocker.  -Xarelto discontinued per hospice.  Started on warfarin.    Mild cognitive impairment  -Increasing cognitive impairment.    Stage 2 chronic kidney disease  -Creatinine stable    Weakness and fatigue  -Patient signed onto hospice.  -Goals of care comfort.    Anxiety  -Continue sertraline.    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     Dizziness 07/11/2018     DJD (degenerative joint disease) 07/11/2018     Left ventricular hypertrophy 07/31/2018     Troponin level elevated 03/25/2020     Acute combined systolic and diastolic congestive heart failure (H) 03/26/2020     Pulmonary hypertension (H) 04/01/2020     Nonrheumatic aortic valve insufficiency 04/01/2020     Coronary artery disease due to lipid rich plaque 10/15/2020     MARIAN (generalized anxiety disorder) 02/11/2021     GERD (gastroesophageal reflux  disease)      Infection due to 2019 novel coronavirus 02/11/2021     Dilated cardiomyopathy (H) 02/11/2021     Persistent atrial fibrillation (H) 08/11/2020     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) 12/01/2020     Stage 2 chronic kidney disease 10/08/2021     Hearing loss 10/08/2021     Arthritis, lumbar spine 10/08/2021     Peripheral edema 03/14/2022     Elevated TSH 03/16/2022     Elevated troponin 12/19/2022     Pleural effusion on right 12/19/2022     Acute on chronic congestive heart failure, unspecified heart failure type (H) 12/19/2022     Status post catheter ablation of atrial fibrillation 01/04/2023     Heart failure with reduced ejection fraction (H) 01/12/2023     Hyperkalemia 01/23/2023     Hyponatremia 01/23/2023     Cardiogenic shock (H)      Generalized abdominal pain 02/08/2017     Gastro-esophageal reflux disease with esophagitis 03/23/2017     Diverticular disease of large intestine 03/23/2017     Diarrhea 02/08/2017     Diaphragmatic hernia 03/23/2017     Nonischemic cardiomyopathy (H) 02/10/2023     Resolved Ambulatory Problems     Diagnosis Date Noted     Complete heart block (H) 03/24/2020     Bradycardia 07/11/2018     LBBB (left bundle branch block) 07/11/2018     S/P drug eluting coronary stent placement 05/30/2019     Cardiac pacemaker in situ 09/29/2020     Chronic combined systolic and diastolic heart failure (H) 02/11/2021     Weight loss 02/11/2021     COVID-19 02/12/2021     CAD (coronary artery disease) 07/11/2018     CHF (congestive heart failure) (H) 09/08/2021     Past Medical History:   Diagnosis Date     Atrial fibrillation (H)      Atrial fibrillation, transient (H) 08/11/2020     Congestive heart failure (H)      Coronary artery disease      COVID 02/2021     High cholesterol      Hyperlipidemia      Hypertension      Allergies   Allergen Reactions     Diltiazem Other (See Comments)     Contraindicated in HFrEF     Lactose GI Disturbance      Midodrine Other (See Comments)     Contraindicated in HFrEF       All Meds and Allergies reviewed in the record at the facility and is the most up-to-date.      Current Outpatient Medications   Medication Sig     acetaminophen (TYLENOL) 325 MG tablet Take 2 tablets (650 mg) by mouth every 6 hours as needed for mild pain or other (and adjunct with moderate or severe pain or per patient request) Alternate with ibuprofen if ordered. Maximum acetaminophen dose from all sources = 75 mg/kg/day not to exceed 4 grams/day.     aspirin (ASA) 81 MG chewable tablet Take 1 tablet (81 mg) by mouth every other day     atorvastatin (LIPITOR) 10 MG tablet Take 10 mg by mouth four times a week Sunday, Tuesday, Thursday, & Saturday at bedtime     bumetanide (BUMEX) 2 MG tablet Take 2 mg by mouth 2 times daily Given the a.m.     Coenzyme Q10 300 MG CAPS Take 300 mg by mouth daily     doxazosin (CARDURA) 4 MG tablet Take 1 tablet (4 mg) by mouth At Bedtime     finasteride (PROSCAR) 5 mg tablet [FINASTERIDE (PROSCAR) 5 MG TABLET] Take 5 mg by mouth at bedtime.            gabapentin (NEURONTIN) 100 MG capsule Take 200 mg by mouth 2 times daily (before meals) 2 capsules (200 mg) PO BID every morning before breakfast and every afternoon before lunch     gabapentin (NEURONTIN) 300 MG capsule Take 300 mg by mouth At Bedtime     glucosamine/chondr cole A sod (OSTEO BI-FLEX ORAL) Take 1 tablet by mouth 2 times daily (with meals)     Melatonin 10 MG CAPS Take 10 mg by mouth daily     multivitamin, therapeutic (THERA-VIT) TABS tablet Take 1 tablet by mouth daily     nitroGLYcerin (NITROSTAT) 0.4 MG sublingual tablet For chest pain place 1 tablet under the tongue every 5 minutes for 3 doses. If symptoms persist 5 minutes after 1st dose call 911.     pantoprazole (PROTONIX) 40 MG EC tablet Take 1 tablet (40 mg) by mouth 2 times daily     polyethylene glycol-propylene glycol (SYSTANE ULTRA) 0.4-0.3 % SOLN ophthalmic solution Place 2 drops into both  "eyes 2 times daily as needed for dry eyes     potassium chloride ER (KLOR-CON M) 20 MEQ CR tablet Take 1 tablet (20 mEq) by mouth daily     rivaroxaban ANTICOAGULANT (XARELTO) 20 MG TABS tablet Take 1 tablet (20 mg) by mouth daily (with dinner)     sennosides (SENOKOT) 8.6 MG tablet Take by mouth 2 times daily     sertraline (ZOLOFT) 50 MG tablet Take 100 mg by mouth At Bedtime     No current facility-administered medications for this visit.       REVIEW OF SYSTEMS:   10 point review of systems reviewed and pertinent positives in the HPI.     PHYSICAL EXAMINATION:  Physical Exam     Vital signs: /82   Pulse 61   Temp 97.3  F (36.3  C)   Resp 18   Ht 1.778 m (5' 10\")   Wt 79.1 kg (174 lb 6.4 oz)   SpO2 90%   BMI 25.02 kg/m    General: Awake, Alert, tired and fatigued, oriented x2, sitting up in bedside chair.  HEENT:  dry oral mucosa  NECK: Supple  CVS: Irregular irregular.  LUNG: Shallow respiratory effort.  No wheezes rales or rhonchi.  Oxygen on at 2 L.  No cough on exam.  BACK: No kyphosis of the thoracic spine  EXTREMITIES: Moves all extremities, diffuse weakness, 2+ pedal edema, compression socks on, no calf tenderness  NEUROLOGIC: Intact  PSYCHIATRIC: Flat affect.  Cognitive impairment noted.      Labs:  All labs reviewed in the nursing home record and Epic   @  Lab Results   Component Value Date    WBC 8.9 01/28/2023     Lab Results   Component Value Date    RBC 4.15 01/28/2023     Lab Results   Component Value Date    HGB 12.3 01/28/2023     Lab Results   Component Value Date    HCT 40.5 01/28/2023     Lab Results   Component Value Date    MCV 98 01/28/2023     Lab Results   Component Value Date    MCH 29.6 01/28/2023     Lab Results   Component Value Date    MCHC 30.4 01/28/2023     Lab Results   Component Value Date    RDW 15.8 01/28/2023     Lab Results   Component Value Date     01/28/2023        @Last Comprehensive Metabolic Panel:  Sodium   Date Value Ref Range Status   03/02/2023 " 137 136 - 145 mmol/L Final     Potassium   Date Value Ref Range Status   03/02/2023 3.5 3.4 - 5.3 mmol/L Final   10/17/2022 4.2 3.5 - 5.0 mmol/L Final     Chloride   Date Value Ref Range Status   03/02/2023 89 (L) 98 - 107 mmol/L Final   10/17/2022 99 98 - 107 mmol/L Final     Carbon Dioxide (CO2)   Date Value Ref Range Status   03/02/2023 40 (H) 22 - 29 mmol/L Final   10/17/2022 30 22 - 31 mmol/L Final     Anion Gap   Date Value Ref Range Status   03/02/2023 8 7 - 15 mmol/L Final   10/17/2022 10 5 - 18 mmol/L Final     Glucose   Date Value Ref Range Status   03/02/2023 100 (H) 70 - 99 mg/dL Final   10/17/2022 99 70 - 125 mg/dL Final     GLUCOSE BY METER POCT   Date Value Ref Range Status   01/27/2023 248 (H) 70 - 99 mg/dL Final     Urea Nitrogen   Date Value Ref Range Status   03/02/2023 10.7 8.0 - 23.0 mg/dL Final   10/17/2022 27 8 - 28 mg/dL Final     Creatinine   Date Value Ref Range Status   03/02/2023 0.88 0.67 - 1.17 mg/dL Final     GFR Estimate   Date Value Ref Range Status   03/02/2023 85 >60 mL/min/1.73m2 Final     Comment:     eGFR calculated using 2021 CKD-EPI equation.   07/02/2021 >60 >60 mL/min/1.73m2 Final     Calcium   Date Value Ref Range Status   03/02/2023 9.0 8.8 - 10.2 mg/dL Final     Okay to DC to long-term care with current meds and treatments on hospice.    this note has been dictated using voice recognition software. Any grammatical or context distortions are unintentional and inherent to the software    Electronically signed by: Nitza Ludwig CNP         Sincerely,        Nitza Ludwig, NP

## 2023-03-15 NOTE — PROGRESS NOTES
[facility] GERIATRIC SERVICES    Code Status:  DNR/DNI   Visit Type:   Chief Complaint   Patient presents with     TCU To LTC Discharge     Facility:  [facility] ([facility]) [[id number]]           HPI: Reymundo Petersen is a 82 year old male who I am seeing today for from the TCU to long-term care.  Patient with multiple chronic comorbidities which need continued monitoring.  Patient unable to care for himself independently at home.  Patient recently hospitalized on 1/23/2023 secondary to fatigue, confusion and dehydration.  Patient with cardiogenic shock secondary to cardiomyopathy with acute on chronic congestive heart failure with reduced ejection fracture.  Patient initially treated with dobutamine infusion and transition to oral Bumex.  Transthoracic echocardiogram on December 19 showed a left ventricular ejection fracture of 35 to 40%.  Beta-blocker held.  Patient is status post RHC on 1/25 which showed elevated right and left-sided filling pressures consistent with volume overload and pulmonary hypertension.  Due to diuresis patient had an acute kidney injury.  Bumex held and then resumed.  Persistent atrial fib with complete heart block status post ablation and DCCV status post CRT/P.  Hyperkalemia initially however ended up with hypokalemia.  Hyponatremia replaced.  Dysphagia.  Patient was seen by speech and there was some concern for esophageal dysmotility.  General anxiety disorder on sertraline.  DJD on Tylenol for pain.  Pressure injury of the left heel being treated topically.  Overall weakness and fatigue with deconditioning.  Per hospital records cardiology initiated palliative care consult.    Transitional Care Course: Today patient sitting up in chair.  Patient daughter present on exam.  Patient is somewhat sleepy.  End-stage congestive heart failure with continued decline.  Patient signed onto hospice late last week.  He will transfer to the long-term [facility] here at [facility] on hospice.  He  continues on Bumex 2 mg twice daily.  Weight has been stable.  He continues with lower extremity edema.  Continued hypoxia on oxygen at 2 L.  He continues in compression socks.  Some increasing cognitive impairment.  DJD on Tylenol.  Atrial for with complete heart block.  Overall very weak and fatigued.       Assessment/Plan: Reviewed with changes    Acute on chronic congestive heart failure, unspecified heart failure type (H)  -Ejection fraction of 35 to 40%.  -Follow up CXR showed bilateral pleural effusions.  -Recent increase in Bumex 2 mg to twice daily  -Continues on 2 L of O2.  -Stop daily weights with patient signing onto hospice.  -Hospice following.    Complete heart block (H)  Status post catheter ablation of atrial fibrillation  -Recent follow-up with cardiology. Amiodarone discontinued.  -No further tachycardia.  -No beta-blocker.  -Xarelto discontinued per hospice.  Started on warfarin.    Mild cognitive impairment  -Increasing cognitive impairment.    Stage 2 chronic kidney disease  -Creatinine stable    Weakness and fatigue  -Patient signed onto hospice.  -Goals of care comfort.    Anxiety  -Continue sertraline.    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     Dizziness 07/11/2018     DJD (degenerative joint disease) 07/11/2018     Left ventricular hypertrophy 07/31/2018     Troponin level elevated 03/25/2020     Acute combined systolic and diastolic congestive heart failure (H) 03/26/2020     Pulmonary hypertension (H) 04/01/2020     Nonrheumatic aortic valve insufficiency 04/01/2020     Coronary artery disease due to lipid rich plaque 10/15/2020     MARIAN (generalized anxiety disorder) 02/11/2021     GERD (gastroesophageal reflux disease)      Infection due to 2019 novel coronavirus 02/11/2021     Dilated  cardiomyopathy (H) 02/11/2021     Persistent atrial fibrillation (H) 08/11/2020     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) 12/01/2020     Stage 2 chronic kidney disease 10/08/2021     Hearing loss 10/08/2021     Arthritis, lumbar spine 10/08/2021     Peripheral edema 03/14/2022     Elevated TSH 03/16/2022     Elevated troponin 12/19/2022     Pleural effusion on right 12/19/2022     Acute on chronic congestive heart failure, unspecified heart failure type (H) 12/19/2022     Status post catheter ablation of atrial fibrillation 01/04/2023     Heart failure with reduced ejection fraction (H) 01/12/2023     Hyperkalemia 01/23/2023     Hyponatremia 01/23/2023     Cardiogenic shock (H)      Generalized abdominal pain 02/08/2017     Gastro-esophageal reflux disease with esophagitis 03/23/2017     Diverticular disease of large intestine 03/23/2017     Diarrhea 02/08/2017     Diaphragmatic hernia 03/23/2017     Nonischemic cardiomyopathy (H) 02/10/2023     Resolved Ambulatory Problems     Diagnosis Date Noted     Complete heart block (H) 03/24/2020     Bradycardia 07/11/2018     LBBB (left bundle branch block) 07/11/2018     S/P drug eluting coronary stent placement 05/30/2019     Cardiac pacemaker in situ 09/29/2020     Chronic combined systolic and diastolic heart failure (H) 02/11/2021     Weight loss 02/11/2021     COVID-19 02/12/2021     CAD (coronary artery disease) 07/11/2018     CHF (congestive heart failure) (H) 09/08/2021     Past Medical History:   Diagnosis Date     Atrial fibrillation (H)      Atrial fibrillation, transient (H) 08/11/2020     Congestive heart failure (H)      Coronary artery disease      COVID 02/2021     High cholesterol      Hyperlipidemia      Hypertension      Allergies   Allergen Reactions     Diltiazem Other (See Comments)     Contraindicated in HFrEF     Lactose GI Disturbance     Midodrine Other (See Comments)     Contraindicated in HFrEF       All Meds and  Allergies reviewed in the record at the facility and is the most up-to-date.      Current Outpatient Medications   Medication Sig     acetaminophen (TYLENOL) 325 MG tablet Take 2 tablets (650 mg) by mouth every 6 hours as needed for mild pain or other (and adjunct with moderate or severe pain or per patient request) Alternate with ibuprofen if ordered. Maximum acetaminophen dose from all sources = 75 mg/kg/day not to exceed 4 grams/day.     aspirin (ASA) 81 MG chewable tablet Take 1 tablet (81 mg) by mouth every other day     atorvastatin (LIPITOR) 10 MG tablet Take 10 mg by mouth four times a week Sunday, Tuesday, Thursday, & Saturday at bedtime     bumetanide (BUMEX) 2 MG tablet Take 2 mg by mouth 2 times daily Given the a.m.     Coenzyme Q10 300 MG CAPS Take 300 mg by mouth daily     doxazosin (CARDURA) 4 MG tablet Take 1 tablet (4 mg) by mouth At Bedtime     finasteride (PROSCAR) 5 mg tablet [FINASTERIDE (PROSCAR) 5 MG TABLET] Take 5 mg by mouth at bedtime.            gabapentin (NEURONTIN) 100 MG capsule Take 200 mg by mouth 2 times daily (before meals) 2 capsules (200 mg) PO BID every morning before breakfast and every afternoon before lunch     gabapentin (NEURONTIN) 300 MG capsule Take 300 mg by mouth At Bedtime     glucosamine/chondr cole A sod (OSTEO BI-FLEX ORAL) Take 1 tablet by mouth 2 times daily (with meals)     Melatonin 10 MG CAPS Take 10 mg by mouth daily     multivitamin, therapeutic (THERA-VIT) TABS tablet Take 1 tablet by mouth daily     nitroGLYcerin (NITROSTAT) 0.4 MG sublingual tablet For chest pain place 1 tablet under the tongue every 5 minutes for 3 doses. If symptoms persist 5 minutes after 1st dose call 911.     pantoprazole (PROTONIX) 40 MG EC tablet Take 1 tablet (40 mg) by mouth 2 times daily     polyethylene glycol-propylene glycol (SYSTANE ULTRA) 0.4-0.3 % SOLN ophthalmic solution Place 2 drops into both eyes 2 times daily as needed for dry eyes     potassium chloride ER (KLOR-CON M)  "20 MEQ CR tablet Take 1 tablet (20 mEq) by mouth daily     rivaroxaban ANTICOAGULANT (XARELTO) 20 MG TABS tablet Take 1 tablet (20 mg) by mouth daily (with dinner)     sennosides (SENOKOT) 8.6 MG tablet Take by mouth 2 times daily     sertraline (ZOLOFT) 50 MG tablet Take 100 mg by mouth At Bedtime     No current facility-administered medications for this visit.       REVIEW OF SYSTEMS:   10 point review of systems reviewed and pertinent positives in the HPI.     PHYSICAL EXAMINATION:  Physical Exam     Vital signs: /82   Pulse 61   Temp 97.3  F (36.3  C)   Resp 18   Ht 1.778 m (5' 10\")   Wt 79.1 kg (174 lb 6.4 oz)   SpO2 90%   BMI 25.02 kg/m    General: Awake, Alert, tired and fatigued, oriented x2, sitting up in bedside chair.  HEENT:  dry oral mucosa  NECK: Supple  CVS: Irregular irregular.  LUNG: Shallow respiratory effort.  No wheezes rales or rhonchi.  Oxygen on at 2 L.  No cough on exam.  BACK: No kyphosis of the thoracic spine  EXTREMITIES: Moves all extremities, diffuse weakness, 2+ pedal edema, compression socks on, no calf tenderness  NEUROLOGIC: Intact  PSYCHIATRIC: Flat affect.  Cognitive impairment noted.      Labs:  All labs reviewed in the nursing home record and Epic   @  Lab Results   Component Value Date    WBC 8.9 01/28/2023     Lab Results   Component Value Date    RBC 4.15 01/28/2023     Lab Results   Component Value Date    HGB 12.3 01/28/2023     Lab Results   Component Value Date    HCT 40.5 01/28/2023     Lab Results   Component Value Date    MCV 98 01/28/2023     Lab Results   Component Value Date    MCH 29.6 01/28/2023     Lab Results   Component Value Date    MCHC 30.4 01/28/2023     Lab Results   Component Value Date    RDW 15.8 01/28/2023     Lab Results   Component Value Date     01/28/2023        @Last Comprehensive Metabolic Panel:  Sodium   Date Value Ref Range Status   03/02/2023 137 136 - 145 mmol/L Final     Potassium   Date Value Ref Range Status "   03/02/2023 3.5 3.4 - 5.3 mmol/L Final   10/17/2022 4.2 3.5 - 5.0 mmol/L Final     Chloride   Date Value Ref Range Status   03/02/2023 89 (L) 98 - 107 mmol/L Final   10/17/2022 99 98 - 107 mmol/L Final     Carbon Dioxide (CO2)   Date Value Ref Range Status   03/02/2023 40 (H) 22 - 29 mmol/L Final   10/17/2022 30 22 - 31 mmol/L Final     Anion Gap   Date Value Ref Range Status   03/02/2023 8 7 - 15 mmol/L Final   10/17/2022 10 5 - 18 mmol/L Final     Glucose   Date Value Ref Range Status   03/02/2023 100 (H) 70 - 99 mg/dL Final   10/17/2022 99 70 - 125 mg/dL Final     GLUCOSE BY METER POCT   Date Value Ref Range Status   01/27/2023 248 (H) 70 - 99 mg/dL Final     Urea Nitrogen   Date Value Ref Range Status   03/02/2023 10.7 8.0 - 23.0 mg/dL Final   10/17/2022 27 8 - 28 mg/dL Final     Creatinine   Date Value Ref Range Status   03/02/2023 0.88 0.67 - 1.17 mg/dL Final     GFR Estimate   Date Value Ref Range Status   03/02/2023 85 >60 mL/min/1.73m2 Final     Comment:     eGFR calculated using 2021 CKD-EPI equation.   07/02/2021 >60 >60 mL/min/1.73m2 Final     Calcium   Date Value Ref Range Status   03/02/2023 9.0 8.8 - 10.2 mg/dL Final     Okay to DC to long-term care with current meds and treatments on hospice.    this note has been dictated using voice recognition software. Any grammatical or context distortions are unintentional and inherent to the software    Electronically signed by: Nitza Ludwig, CNP

## 2023-03-16 NOTE — PROGRESS NOTES
Called [facility] and spoke with Deborah who states that [facility] medical director will be dosing warfarin and that [facility] ACC does not need to follow. Closing [facility] encounter.

## 2023-03-16 NOTE — PROGRESS NOTES
"JB---Received call from Brian VILLA, at [facility]. Patient was discharged from TCU to LTC. Information was limited. Patient recently was changed from Xarelto to Warfarin--this was noted in Discharge Summary on 3/14 but no other information regarding dosing, or goal  Range was in note. ALLEN Torres did not have any of the requested information by writer and writer was unable to dose patient. Brian stated he would call INR clinic back with this information.     Referral sent to Provider Nitza Ludwig NP at [facility].     Of note: ALLEN Torres stated patient started Ensure BID on 3/7 and High Protein \"Magic cup\" on 3/7.     INR was 1.5 today.    It also appears patient was recently d/c'd from Amiodarone.     Writer MICHOACANO at ~4:20 pm requesting call back    Brian VILLA # [phone]    Thanks! Duyen Smith RN    "

## 2023-03-16 NOTE — PROGRESS NOTES
Called [facility] and spoke with Deborah who states that [facility] medical director will be dosing warfarin and that [facility] does not need to follow. Closing [facility] encounter.

## 2023-03-27 ENCOUNTER — DOCUMENTATION ONLY (OUTPATIENT)
Dept: GERIATRICS | Facility: CLINIC | Age: 83
End: 2023-03-27
Payer: MEDICARE

## 2023-03-27 NOTE — PROGRESS NOTES
Patient  on 3/22/23 @ 0545 at [facility].  Patient was on [facility] hospice.

## 2023-09-26 NOTE — TELEPHONE ENCOUNTER
----- Message from Yuliet Leigh MD sent at 9/29/2020  6:45 PM CDT -----  Can we set this 79 male with cad and cm up for pharm nuke stress and renal profile and mag level given vt seen on device please.  LF  ----- Message -----  From: Erum Handy RN  Sent: 9/29/2020   8:06 AM CDT  To: Yuliet Leigh MD      Orders placed; called patient and updated on results and recommendation for the above testing. He verbalized understanding and prefers to have the stress test done at  and the blood work same day. Transferred to scheduling to have arranged. -[facility]  
none

## (undated) DEVICE — CUSTOM PACK EP

## (undated) DEVICE — SYR ANGIOGRAPHY MULTIUSE KIT ACIST 014612

## (undated) DEVICE — TUBE SET SMARKABLATE IRRIGATION

## (undated) DEVICE — CATH NAV PENTARAY F CURVE

## (undated) DEVICE — NEEDLE 71CM NRG TRANSSEPTAL C0  8F FIX CURVE NRG-E-HF-71-C0

## (undated) DEVICE — INTRO TERUMO 5FRX10CM RSS503

## (undated) DEVICE — PATCH CARTO 3 EXTERNAL REFERENCE 3D MAPPING CREFP6

## (undated) DEVICE — TRANSPAC IV MONITORING KIT WI SAFESET RESERVOIR AND TWO BLOOD SAMPLING PORTS 84" TUBING DISPOSABLE TRANSDUCER

## (undated) DEVICE — 4FR X 10CM STIFF SHEATH, MINI-STICK MAX COAXIAL VASCULAR ENTRY KIT, 0.018IN STAINLESS STEEL TIP GUIDEWIRE, 21G X 7CM ECHOGENIC NEEDLE

## (undated) DEVICE — INTRO TERUMO 9FRX10CM W/MARKER RSB902

## (undated) DEVICE — 8F SOUNDSTAR ECO ULTRASOUND CATHETER

## (undated) DEVICE — KIT HAND CONTROL ACIST 014644 AR-P54

## (undated) DEVICE — GUIDEWIRE PROTRACK PGTL .025IN DIA 23

## (undated) DEVICE — GUIDEWIRE NITINOL .018 80CM N180802

## (undated) DEVICE — CATH THERMOCOOL SMARTTOUCH SF FJ CURVE

## (undated) DEVICE — MANIFOLD KIT ANGIO AUTOMATED 014613

## (undated) DEVICE — GUIDEWIRE TEFLON CRV .035 50CM 3MM J

## (undated) DEVICE — CATH ANGIO WEDGE PRESSURE 5FRX110CM DL AI-07124

## (undated) DEVICE — CATH EP WOVENFLEXIE QUAD 5FRX110CM REPRO SYK200597

## (undated) DEVICE — Device

## (undated) DEVICE — ELECTRODE ADULT PACING MULTI P-211-M1

## (undated) DEVICE — CUSTOM PACK CORONARY SAN5BCRHEA

## (undated) DEVICE — INTRO SHEATH 8FRX10CM PINNACLE RSS802

## (undated) DEVICE — CATH EP DECAPOLAR CS 7FR BI-DIRECTL FJ

## (undated) DEVICE — SHEATH INTRODUCER VIZIGO 17 MM SMALL CURVE D138501

## (undated) DEVICE — SUTURE ETHIBOND XTRA 1 1-1 30 IN

## (undated) RX ORDER — HEPARIN SODIUM 1000 [USP'U]/ML
INJECTION, SOLUTION INTRAVENOUS; SUBCUTANEOUS
Status: DISPENSED
Start: 2022-01-01

## (undated) RX ORDER — FENTANYL CITRATE 50 UG/ML
INJECTION, SOLUTION INTRAMUSCULAR; INTRAVENOUS
Status: DISPENSED
Start: 2022-01-01

## (undated) RX ORDER — ADENOSINE 3 MG/ML
INJECTION, SOLUTION INTRAVENOUS
Status: DISPENSED
Start: 2022-01-01

## (undated) RX ORDER — ACETAMINOPHEN 325 MG/1
TABLET ORAL
Status: DISPENSED
Start: 2022-01-01

## (undated) RX ORDER — LIDOCAINE HYDROCHLORIDE 10 MG/ML
INJECTION, SOLUTION EPIDURAL; INFILTRATION; INTRACAUDAL; PERINEURAL
Status: DISPENSED
Start: 2022-01-01

## (undated) RX ORDER — PROTAMINE SULFATE 10 MG/ML
INJECTION, SOLUTION INTRAVENOUS
Status: DISPENSED
Start: 2022-01-01

## (undated) RX ORDER — PROPOFOL 10 MG/ML
INJECTION, EMULSION INTRAVENOUS
Status: DISPENSED
Start: 2022-01-01

## (undated) RX ORDER — FENTANYL CITRATE-0.9 % NACL/PF 10 MCG/ML
PLASTIC BAG, INJECTION (ML) INTRAVENOUS
Status: DISPENSED
Start: 2022-01-01

## (undated) RX ORDER — HEPARIN SODIUM 10000 [USP'U]/100ML
INJECTION, SOLUTION INTRAVENOUS
Status: DISPENSED
Start: 2022-01-01

## (undated) RX ORDER — KETAMINE HYDROCHLORIDE 10 MG/ML
INJECTION INTRAMUSCULAR; INTRAVENOUS
Status: DISPENSED
Start: 2022-01-01

## (undated) RX ORDER — EPHEDRINE SULFATE 50 MG/ML
INJECTION, SOLUTION INTRAMUSCULAR; INTRAVENOUS; SUBCUTANEOUS
Status: DISPENSED
Start: 2022-01-01

## (undated) RX ORDER — DEXAMETHASONE SODIUM PHOSPHATE 10 MG/ML
INJECTION, SOLUTION INTRAMUSCULAR; INTRAVENOUS
Status: DISPENSED
Start: 2022-01-01

## (undated) RX ORDER — FUROSEMIDE 10 MG/ML
INJECTION INTRAMUSCULAR; INTRAVENOUS
Status: DISPENSED
Start: 2022-01-01

## (undated) RX ORDER — PHENYLEPHRINE HYDROCHLORIDE 10 MG/ML
INJECTION INTRAVENOUS
Status: DISPENSED
Start: 2022-01-01

## (undated) RX ORDER — METHOHEXITAL IN WATER/PF 100MG/10ML
SYRINGE (ML) INTRAVENOUS
Status: DISPENSED
Start: 2021-09-14

## (undated) RX ORDER — ONDANSETRON 2 MG/ML
INJECTION INTRAMUSCULAR; INTRAVENOUS
Status: DISPENSED
Start: 2022-01-01

## (undated) RX ORDER — VASOPRESSIN IN 0.9 % NACL 2 UNIT/2ML
SYRINGE (ML) INTRAVENOUS
Status: DISPENSED
Start: 2022-01-01